# Patient Record
Sex: FEMALE | Race: BLACK OR AFRICAN AMERICAN | NOT HISPANIC OR LATINO | Employment: FULL TIME | URBAN - METROPOLITAN AREA
[De-identification: names, ages, dates, MRNs, and addresses within clinical notes are randomized per-mention and may not be internally consistent; named-entity substitution may affect disease eponyms.]

---

## 2018-01-18 RX ORDER — METFORMIN HYDROCHLORIDE 1000 MG/1
TABLET ORAL
Qty: 60 TABLET | Refills: 2 | OUTPATIENT
Start: 2018-01-18

## 2018-01-18 RX ORDER — LISINOPRIL AND HYDROCHLOROTHIAZIDE 12.5; 2 MG/1; MG/1
TABLET ORAL
Qty: 30 TABLET | Refills: 2 | OUTPATIENT
Start: 2018-01-18

## 2018-01-18 RX ORDER — SIMVASTATIN 40 MG/1
TABLET, FILM COATED ORAL
Qty: 30 TABLET | Refills: 2 | OUTPATIENT
Start: 2018-01-18

## 2018-01-24 ENCOUNTER — TELEPHONE (OUTPATIENT)
Dept: PRIMARY CARE CLINIC | Facility: CLINIC | Age: 58
End: 2018-01-24

## 2018-01-24 NOTE — TELEPHONE ENCOUNTER
----- Message from Tova Johnson sent at 1/24/2018 12:49 PM CST -----  Patient states that she need to see the doctor today for med refills.  Please call [phone].

## 2018-02-01 ENCOUNTER — OFFICE VISIT (OUTPATIENT)
Dept: PRIMARY CARE CLINIC | Facility: CLINIC | Age: 58
End: 2018-02-01
Payer: COMMERCIAL

## 2018-02-01 VITALS
BODY MASS INDEX: 39.24 KG/M2 | HEIGHT: 67 IN | HEART RATE: 71 BPM | TEMPERATURE: 98 F | WEIGHT: 250 LBS | DIASTOLIC BLOOD PRESSURE: 80 MMHG | SYSTOLIC BLOOD PRESSURE: 184 MMHG | OXYGEN SATURATION: 98 % | RESPIRATION RATE: 18 BRPM

## 2018-02-01 DIAGNOSIS — E11.69 TYPE 2 DIABETES MELLITUS WITH HYPERLIPIDEMIA: Primary | ICD-10-CM

## 2018-02-01 DIAGNOSIS — I10 ESSENTIAL HYPERTENSION, BENIGN: ICD-10-CM

## 2018-02-01 DIAGNOSIS — R06.09 EXERTIONAL DYSPNEA: ICD-10-CM

## 2018-02-01 DIAGNOSIS — E78.5 TYPE 2 DIABETES MELLITUS WITH HYPERLIPIDEMIA: Primary | ICD-10-CM

## 2018-02-01 DIAGNOSIS — E78.00 HYPERCHOLESTEROLEMIA: ICD-10-CM

## 2018-02-01 PROCEDURE — 99214 OFFICE O/P EST MOD 30 MIN: CPT | Mod: S$GLB,,, | Performed by: FAMILY MEDICINE

## 2018-02-01 PROCEDURE — 3008F BODY MASS INDEX DOCD: CPT | Mod: S$GLB,,, | Performed by: FAMILY MEDICINE

## 2018-02-01 PROCEDURE — 99999 PR PBB SHADOW E&M-EST. PATIENT-LVL IV: CPT | Mod: PBBFAC,,, | Performed by: FAMILY MEDICINE

## 2018-02-01 RX ORDER — INSULIN GLARGINE 100 [IU]/ML
42 INJECTION, SOLUTION SUBCUTANEOUS DAILY
Qty: 15 ML | Refills: 5 | Status: SHIPPED | OUTPATIENT
Start: 2018-02-01 | End: 2018-10-24 | Stop reason: SDUPTHER

## 2018-02-01 RX ORDER — METFORMIN HYDROCHLORIDE 1000 MG/1
1000 TABLET ORAL 2 TIMES DAILY WITH MEALS
Qty: 180 TABLET | Refills: 1 | Status: SHIPPED | OUTPATIENT
Start: 2018-02-01 | End: 2019-04-17 | Stop reason: SDUPTHER

## 2018-02-01 RX ORDER — LISINOPRIL AND HYDROCHLOROTHIAZIDE 12.5; 2 MG/1; MG/1
1 TABLET ORAL DAILY
Qty: 90 TABLET | Refills: 1 | Status: SHIPPED | OUTPATIENT
Start: 2018-02-01 | End: 2019-04-17 | Stop reason: SDUPTHER

## 2018-02-01 RX ORDER — ASPIRIN 81 MG/1
81 TABLET ORAL DAILY
COMMUNITY
Start: 2018-02-01

## 2018-02-01 RX ORDER — LISINOPRIL AND HYDROCHLOROTHIAZIDE 12.5; 2 MG/1; MG/1
1 TABLET ORAL DAILY
COMMUNITY
End: 2018-02-01 | Stop reason: SDUPTHER

## 2018-02-01 RX ORDER — SIMVASTATIN 40 MG/1
40 TABLET, FILM COATED ORAL NIGHTLY
Qty: 90 TABLET | Refills: 1 | Status: SHIPPED | OUTPATIENT
Start: 2018-02-01 | End: 2019-02-03 | Stop reason: SDUPTHER

## 2018-02-01 RX ORDER — INSULIN GLARGINE 100 [IU]/ML
42 INJECTION, SOLUTION SUBCUTANEOUS DAILY
COMMUNITY
End: 2018-02-01 | Stop reason: SDUPTHER

## 2018-02-01 RX ORDER — SIMVASTATIN 40 MG/1
40 TABLET, FILM COATED ORAL NIGHTLY
COMMUNITY
End: 2018-02-01 | Stop reason: SDUPTHER

## 2018-02-01 RX ORDER — METFORMIN HYDROCHLORIDE 1000 MG/1
1000 TABLET ORAL 2 TIMES DAILY WITH MEALS
COMMUNITY
End: 2018-02-01 | Stop reason: SDUPTHER

## 2018-02-01 RX ORDER — ASPIRIN 81 MG/1
81 TABLET ORAL DAILY
COMMUNITY
End: 2018-02-01 | Stop reason: SDUPTHER

## 2018-02-01 NOTE — PROGRESS NOTES
"Subjective:       Patient ID: Aishwarya Jensen is a 57 y.o. female.    Chief Complaint: Medication Refill (pt states all meds need to be refilled. )    Out of all meds x 2 weeks.  DM - doesn't have glucometer, so hasn't been checking sugar. 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   HENT: Negative for trouble swallowing.    Eyes: Negative for visual disturbance.   Respiratory: Positive for shortness of breath.    Cardiovascular: Negative for chest pain, palpitations and leg swelling.   Gastrointestinal: Negative for blood in stool, nausea and vomiting.   Endocrine: Negative for polydipsia and polyuria.   Genitourinary: Negative for difficulty urinating.   Musculoskeletal: Negative for joint swelling.   Skin: Negative for rash.   Neurological: Negative for dizziness and weakness.   Hematological: Does not bruise/bleed easily.   Psychiatric/Behavioral: Negative for sleep disturbance.       Objective:      Vitals:    02/01/18 1424   BP: (!) 184/80   BP Location: Left arm   Patient Position: Sitting   BP Method: Medium (Automatic)   Pulse: 71   Resp: 18   Temp: 98.3 °F (36.8 °C)   TempSrc: Oral   SpO2: 98%   Weight: 113.4 kg (250 lb)   Height: 5' 7" (1.702 m)     Physical Exam   Constitutional: She is oriented to person, place, and time. She appears well-developed and well-nourished.   HENT:   Head: Normocephalic and atraumatic.   Eyes: EOM are normal. Pupils are equal, round, and reactive to light.   Neck: Neck supple. No JVD present. Carotid bruit is not present.   Cardiovascular: Normal rate, regular rhythm and normal heart sounds.    Pulses:       Radial pulses are 2+ on the right side, and 2+ on the left side. "   Pulmonary/Chest: Effort normal and breath sounds normal.   Abdominal: Soft. Bowel sounds are normal. She exhibits no distension. There is no tenderness.   Musculoskeletal: She exhibits no edema.   Neurological: She is alert and oriented to person, place, and time.   Skin: Skin is warm and dry.   Psychiatric: She has a normal mood and affect. Her behavior is normal.   Nursing note and vitals reviewed.      Assessment:       1. Type 2 diabetes mellitus with hyperlipidemia    2. Essential hypertension, benign Sub-optimally controlled   3. Hypercholesterolemia    4. Exertional dyspnea        Plan:       Type 2 diabetes mellitus with hyperlipidemia  -     insulin glargine (LANTUS SOLOSTAR) 100 unit/mL (3 mL) InPn pen; Inject 42 Units into the skin once daily.  Dispense: 15 mL; Refill: 5  -     liraglutide 0.6 mg/0.1 mL, 18 mg/3 mL, subq PNIJ 0.6 mg/0.1 mL (18 mg/3 mL) PnIj; Inject 1.2 mg into the skin once daily.  Dispense: 6 mL; Refill: 5  -     metFORMIN (GLUCOPHAGE) 1000 MG tablet; Take 1 tablet (1,000 mg total) by mouth 2 (two) times daily with meals.  Dispense: 180 tablet; Refill: 1  -     Hemoglobin A1c; Future; Expected date: 05/01/2018  -     Microalbumin/creatinine urine ratio; Future; Expected date: 05/01/2018  -     Ambulatory Referral to Diabetes Education    Essential hypertension, benign  Comments:  Restart medication  Orders:  -     lisinopril-hydrochlorothiazide (PRINZIDE,ZESTORETIC) 20-12.5 mg per tablet; Take 1 tablet by mouth once daily.  Dispense: 90 tablet; Refill: 1  -     CBC auto differential; Future; Expected date: 05/01/2018  -     POCT EKG 12-LEAD (NOT FOR [facility] USE)  -     Stress test ([facility] ONLY); Future  -     Transthoracic echo (TTE) complete ([facility] ONLY); Future    Hypercholesterolemia  -     simvastatin (ZOCOR) 40 MG tablet; Take 1 tablet (40 mg total) by mouth every evening.  Dispense: 90 tablet; Refill: 1  -     Comprehensive metabolic panel; Future; Expected date:  05/01/2018  -     Lipid panel; Future; Expected date: 05/01/2018    Exertional dyspnea  Comments:  Needs risk stratification  Orders:  -     POCT EKG 12-LEAD (NOT FOR [facility] USE)  -     Stress test ([facility] ONLY); Future  -     Transthoracic echo (TTE) complete ([facility] ONLY); Future    Other orders  -     aspirin (ECOTRIN) 81 MG EC tablet; Take 1 tablet (81 mg total) by mouth once daily.      Medication List with Changes/Refills   Changed and/or Refilled Medications    Modified Medication Previous Medication    ASPIRIN (ECOTRIN) 81 MG EC TABLET aspirin (ECOTRIN) 81 MG EC tablet       Take 1 tablet (81 mg total) by mouth once daily.    Take 81 mg by mouth once daily.    INSULIN GLARGINE (LANTUS SOLOSTAR) 100 UNIT/ML (3 ML) INPN PEN insulin glargine (LANTUS SOLOSTAR) 100 unit/mL (3 mL) InPn pen       Inject 42 Units into the skin once daily.    Inject 42 Units into the skin once daily.    LIRAGLUTIDE 0.6 MG/0.1 ML, 18 MG/3 ML, SUBQ PNIJ 0.6 MG/0.1 ML (18 MG/3 ML) PNIJ liraglutide 0.6 mg/0.1 mL, 18 mg/3 mL, subq PNIJ 0.6 mg/0.1 mL (18 mg/3 mL) PnIj       Inject 1.2 mg into the skin once daily.    Inject 1.2 mg into the skin once daily.    LISINOPRIL-HYDROCHLOROTHIAZIDE (PRINZIDE,ZESTORETIC) 20-12.5 MG PER TABLET lisinopril-hydrochlorothiazide (PRINZIDE,ZESTORETIC) 20-12.5 mg per tablet       Take 1 tablet by mouth once daily.    Take 1 tablet by mouth once daily.    METFORMIN (GLUCOPHAGE) 1000 MG TABLET metFORMIN (GLUCOPHAGE) 1000 MG tablet       Take 1 tablet (1,000 mg total) by mouth 2 (two) times daily with meals.    Take 1,000 mg by mouth 2 (two) times daily with meals.    SIMVASTATIN (ZOCOR) 40 MG TABLET simvastatin (ZOCOR) 40 MG tablet       Take 1 tablet (40 mg total) by mouth every evening.    Take 40 mg by mouth every evening.

## 2018-02-01 NOTE — PROGRESS NOTES
Pt ID verified by Name and . EKG done per MD order. Pt tolerated well. Result signed off by Dr Cintron.

## 2018-03-02 ENCOUNTER — CLINICAL SUPPORT (OUTPATIENT)
Dept: PRIMARY CARE CLINIC | Facility: CLINIC | Age: 58
End: 2018-03-02
Payer: COMMERCIAL

## 2018-03-02 DIAGNOSIS — E78.5 TYPE 2 DIABETES MELLITUS WITH HYPERLIPIDEMIA: ICD-10-CM

## 2018-03-02 DIAGNOSIS — I10 ESSENTIAL HYPERTENSION, BENIGN: ICD-10-CM

## 2018-03-02 DIAGNOSIS — E78.00 HYPERCHOLESTEROLEMIA: ICD-10-CM

## 2018-03-02 DIAGNOSIS — E11.69 TYPE 2 DIABETES MELLITUS WITH HYPERLIPIDEMIA: ICD-10-CM

## 2018-03-02 LAB
ALBUMIN SERPL BCP-MCNC: 3.4 G/DL
ALP SERPL-CCNC: 111 U/L
ALT SERPL W/O P-5'-P-CCNC: 20 U/L
ANION GAP SERPL CALC-SCNC: 9 MMOL/L
AST SERPL-CCNC: 21 U/L
BASOPHILS # BLD AUTO: 0.03 K/UL
BASOPHILS NFR BLD: 0.6 %
BILIRUB SERPL-MCNC: 0.7 MG/DL
BUN SERPL-MCNC: 16 MG/DL
CALCIUM SERPL-MCNC: 9.3 MG/DL
CHLORIDE SERPL-SCNC: 103 MMOL/L
CHOLEST SERPL-MCNC: 180 MG/DL
CHOLEST/HDLC SERPL: 3.2 {RATIO}
CO2 SERPL-SCNC: 28 MMOL/L
CREAT SERPL-MCNC: 0.9 MG/DL
CREAT UR-MCNC: 145 MG/DL
DIFFERENTIAL METHOD: ABNORMAL
EOSINOPHIL # BLD AUTO: 0.1 K/UL
EOSINOPHIL NFR BLD: 1.9 %
ERYTHROCYTE [DISTWIDTH] IN BLOOD BY AUTOMATED COUNT: 13.7 %
EST. GFR  (AFRICAN AMERICAN): >60 ML/MIN/1.73 M^2
EST. GFR  (NON AFRICAN AMERICAN): >60 ML/MIN/1.73 M^2
ESTIMATED AVG GLUCOSE: ABNORMAL MG/DL
GLUCOSE SERPL-MCNC: 103 MG/DL
HBA1C MFR BLD HPLC: >14 %
HCT VFR BLD AUTO: 38.1 %
HDLC SERPL-MCNC: 56 MG/DL
HDLC SERPL: 31.1 %
HGB BLD-MCNC: 11.3 G/DL
IMM GRANULOCYTES # BLD AUTO: 0.01 K/UL
IMM GRANULOCYTES NFR BLD AUTO: 0.2 %
LDLC SERPL CALC-MCNC: 104.4 MG/DL
LYMPHOCYTES # BLD AUTO: 2 K/UL
LYMPHOCYTES NFR BLD: 37.8 %
MCH RBC QN AUTO: 22.6 PG
MCHC RBC AUTO-ENTMCNC: 29.7 G/DL
MCV RBC AUTO: 76 FL
MICROALBUMIN UR DL<=1MG/L-MCNC: 90 UG/ML
MICROALBUMIN/CREATININE RATIO: 62.1 UG/MG
MONOCYTES # BLD AUTO: 0.3 K/UL
MONOCYTES NFR BLD: 5.5 %
NEUTROPHILS # BLD AUTO: 2.9 K/UL
NEUTROPHILS NFR BLD: 54 %
NONHDLC SERPL-MCNC: 124 MG/DL
NRBC BLD-RTO: 0 /100 WBC
PLATELET # BLD AUTO: 231 K/UL
PMV BLD AUTO: 12.9 FL
POTASSIUM SERPL-SCNC: 3.5 MMOL/L
PROT SERPL-MCNC: 7.5 G/DL
RBC # BLD AUTO: 4.99 M/UL
SODIUM SERPL-SCNC: 140 MMOL/L
TRIGL SERPL-MCNC: 98 MG/DL
WBC # BLD AUTO: 5.29 K/UL

## 2018-03-02 PROCEDURE — 83036 HEMOGLOBIN GLYCOSYLATED A1C: CPT

## 2018-03-02 PROCEDURE — 80061 LIPID PANEL: CPT

## 2018-03-02 PROCEDURE — 85025 COMPLETE CBC W/AUTO DIFF WBC: CPT

## 2018-03-02 PROCEDURE — 99999 PR PBB SHADOW E&M-EST. PATIENT-LVL I: CPT | Mod: PBBFAC,,,

## 2018-03-02 PROCEDURE — 82043 UR ALBUMIN QUANTITATIVE: CPT

## 2018-03-02 PROCEDURE — 80053 COMPREHEN METABOLIC PANEL: CPT

## 2018-03-09 ENCOUNTER — OFFICE VISIT (OUTPATIENT)
Dept: PRIMARY CARE CLINIC | Facility: CLINIC | Age: 58
End: 2018-03-09
Payer: COMMERCIAL

## 2018-03-09 VITALS
HEIGHT: 67 IN | WEIGHT: 222 LBS | BODY MASS INDEX: 34.84 KG/M2 | OXYGEN SATURATION: 100 % | TEMPERATURE: 98 F | DIASTOLIC BLOOD PRESSURE: 71 MMHG | RESPIRATION RATE: 18 BRPM | SYSTOLIC BLOOD PRESSURE: 145 MMHG | HEART RATE: 70 BPM

## 2018-03-09 DIAGNOSIS — Z13.5 DIABETIC RETINOPATHY SCREENING: ICD-10-CM

## 2018-03-09 DIAGNOSIS — R80.9 MICROALBUMINURIA DUE TO TYPE 2 DIABETES MELLITUS: ICD-10-CM

## 2018-03-09 DIAGNOSIS — E78.5 TYPE 2 DIABETES MELLITUS WITH HYPERLIPIDEMIA: Primary | ICD-10-CM

## 2018-03-09 DIAGNOSIS — Z12.11 COLON CANCER SCREENING: ICD-10-CM

## 2018-03-09 DIAGNOSIS — E11.69 TYPE 2 DIABETES MELLITUS WITH HYPERLIPIDEMIA: Primary | ICD-10-CM

## 2018-03-09 DIAGNOSIS — I10 ESSENTIAL HYPERTENSION, BENIGN: ICD-10-CM

## 2018-03-09 DIAGNOSIS — Z23 NEED FOR VACCINATION: ICD-10-CM

## 2018-03-09 DIAGNOSIS — E11.29 MICROALBUMINURIA DUE TO TYPE 2 DIABETES MELLITUS: ICD-10-CM

## 2018-03-09 DIAGNOSIS — E78.00 HYPERCHOLESTEROLEMIA: ICD-10-CM

## 2018-03-09 DIAGNOSIS — Z12.31 ENCOUNTER FOR SCREENING MAMMOGRAM FOR BREAST CANCER: ICD-10-CM

## 2018-03-09 DIAGNOSIS — Z11.59 NEED FOR HEPATITIS C SCREENING TEST: ICD-10-CM

## 2018-03-09 LAB — GLUCOSE SERPL-MCNC: 130 MG/DL (ref 70–110)

## 2018-03-09 PROCEDURE — 3078F DIAST BP <80 MM HG: CPT | Mod: S$GLB,,, | Performed by: FAMILY MEDICINE

## 2018-03-09 PROCEDURE — 99214 OFFICE O/P EST MOD 30 MIN: CPT | Mod: S$GLB,,, | Performed by: FAMILY MEDICINE

## 2018-03-09 PROCEDURE — 99999 PR PBB SHADOW E&M-EST. PATIENT-LVL IV: CPT | Mod: PBBFAC,,, | Performed by: FAMILY MEDICINE

## 2018-03-09 PROCEDURE — 3077F SYST BP >= 140 MM HG: CPT | Mod: S$GLB,,, | Performed by: FAMILY MEDICINE

## 2018-03-09 PROCEDURE — 82948 REAGENT STRIP/BLOOD GLUCOSE: CPT | Mod: S$GLB,,, | Performed by: FAMILY MEDICINE

## 2018-03-09 NOTE — PROGRESS NOTES
"Subjective:       Patient ID: Aishwarya Jensen is a 57 y.o. female.    Chief Complaint: Results (pt is here to discuss lab results )    Recent lab reviewed, A1C exceptionally high. Upon further questioning, pt admits that she had been out of all of her meds for ~4-5 months (not 2 weeks as she previously told me). Has been feeling a little lightheaded at times. Hasn't been checking sugar b/c trying to iron out insurance issues related to her glucometer. No hypoglycemic symptoms, not checking BP, but has been consistently compliant with all of her meds past few weeks.      Review of Systems   Constitutional: Negative for fever.   HENT: Negative for trouble swallowing.    Eyes: Negative for visual disturbance.   Respiratory: Negative for cough and wheezing.    Cardiovascular: Negative for chest pain.   Gastrointestinal: Negative for nausea and vomiting.   Genitourinary: Negative for difficulty urinating.   Skin: Negative for rash.   Neurological: Positive for light-headedness. Negative for syncope.       Objective:      Vitals:    03/09/18 1038   BP: (!) 145/71   BP Location: Left arm   Patient Position: Sitting   BP Method: Large (Automatic)   Pulse: 70   Resp: 18   Temp: 98.2 °F (36.8 °C)   TempSrc: Oral   SpO2: 100%   Weight: 100.7 kg (222 lb)   Height: 5' 7" (1.702 m)     Physical Exam   Constitutional: She is oriented to person, place, and time. She appears well-developed and well-nourished.   HENT:   Head: Normocephalic and atraumatic.   Eyes: EOM are normal.   Neck: Neck supple. No JVD present.   Cardiovascular: Normal rate, regular rhythm and normal heart sounds.    Pulses:       Dorsalis pedis pulses are 2+ on the right side, and 2+ on the left side.   Pulmonary/Chest: Effort normal and breath sounds normal.   Abdominal: Soft. There is no tenderness.   Musculoskeletal: She exhibits no edema.        Right foot: There is no deformity.        Left foot: There is no deformity.   Feet:   Right Foot:   Protective " Sensation: 8 sites tested. 8 sites sensed.   Skin Integrity: Negative for ulcer, blister or skin breakdown.   Left Foot:   Protective Sensation: 8 sites tested. 8 sites sensed.   Skin Integrity: Negative for ulcer, blister or skin breakdown.   Neurological: She is alert and oriented to person, place, and time.   Skin: Skin is warm and dry.   Psychiatric: She has a normal mood and affect. Her behavior is normal.   Nursing note and vitals reviewed.      Assessment:       1. Type 2 diabetes mellitus with hyperlipidemia Poorly controlled   2. Hypercholesterolemia    3. Essential hypertension, benign    4. Microalbuminuria due to type 2 diabetes mellitus    5. Diabetic retinopathy screening    6. Need for hepatitis C screening test    7. Colon cancer screening    8. Encounter for screening mammogram for breast cancer    9. Need for vaccination        Plan:       Type 2 diabetes mellitus with hyperlipidemia  -     blood sugar diagnostic (BLOOD GLUCOSE TEST) Strp; 1 strip by Misc.(Non-Drug; Combo Route) route 2 (two) times daily. Contour Next EZ  Dispense: 200 strip; Refill: 1  -     Ambulatory Referral to Ophthalmology  -     Basic metabolic panel; Future; Expected date: 06/09/2018  -     Hemoglobin A1c; Future; Expected date: 06/09/2018  -     POCT Glucose  -     [facility] DIABETES FOOT EXAM  Current medications for now, reassess glycemic control in a few months  Hypercholesterolemia    Essential hypertension, benign  Continue current medications  Microalbuminuria due to type 2 diabetes mellitus    Diabetic retinopathy screening  -     Ambulatory Referral to Ophthalmology    Need for hepatitis C screening test  -     Hepatitis C antibody; Future; Expected date: 06/09/2018    Colon cancer screening  -     Ambulatory referral to Colorectal Surgery    Encounter for screening mammogram for breast cancer  -     Mammo Digital Screening Bilat without CA; Future; Expected date: 03/23/2018    Need for vaccination  Comments:  thinks she  may have had pneumonia shot at work, says she will check      Medication List with Changes/Refills   Current Medications    ASPIRIN (ECOTRIN) 81 MG EC TABLET    Take 1 tablet (81 mg total) by mouth once daily.    BLOOD-GLUCOSE METER (CONTOUR NEXT EZ METER MISC)    by Misc.(Non-Drug; Combo Route) route 2 (two) times daily.    INSULIN GLARGINE (LANTUS SOLOSTAR) 100 UNIT/ML (3 ML) INPN PEN    Inject 42 Units into the skin once daily.    LIRAGLUTIDE 0.6 MG/0.1 ML, 18 MG/3 ML, SUBQ PNIJ 0.6 MG/0.1 ML (18 MG/3 ML) PNIJ    Inject 1.2 mg into the skin once daily.    LISINOPRIL-HYDROCHLOROTHIAZIDE (PRINZIDE,ZESTORETIC) 20-12.5 MG PER TABLET    Take 1 tablet by mouth once daily.    METFORMIN (GLUCOPHAGE) 1000 MG TABLET    Take 1 tablet (1,000 mg total) by mouth 2 (two) times daily with meals.    SIMVASTATIN (ZOCOR) 40 MG TABLET    Take 1 tablet (40 mg total) by mouth every evening.   Changed and/or Refilled Medications    Modified Medication Previous Medication    BLOOD SUGAR DIAGNOSTIC (BLOOD GLUCOSE TEST) STRP blood sugar diagnostic (BLOOD GLUCOSE TEST) Strp       1 strip by Misc.(Non-Drug; Combo Route) route 2 (two) times daily. Contour Next EZ    1 strip by Misc.(Non-Drug; Combo Route) route 2 (two) times daily. Contour Next EZ   Discontinued Medications    BLOOD SUGAR DIAGNOSTIC (ONETOUCH VERIO) STRP    1 strip by Misc.(Non-Drug; Combo Route) route 2 (two) times daily before meals.    LANCETS (ONETOUCH DELICA LANCETS) 33 GAUGE MISC    1 lancet by Misc.(Non-Drug; Combo Route) route 2 (two) times daily before meals.

## 2018-03-23 ENCOUNTER — TELEPHONE (OUTPATIENT)
Dept: OPHTHALMOLOGY | Facility: CLINIC | Age: 58
End: 2018-03-23

## 2018-03-26 ENCOUNTER — TELEPHONE (OUTPATIENT)
Dept: SURGERY | Facility: CLINIC | Age: 58
End: 2018-03-26

## 2018-03-26 NOTE — TELEPHONE ENCOUNTER
----- Message from Shashank Archuleta LPN sent at 3/9/2018  4:41 PM CST -----  Please call to schedule LVM  ----- Message -----  From: Kermit Toney MA  Sent: 3/9/2018  11:17 AM  To: Shashank Archuleta LPN    Please contact patient to schedule colonoscopy

## 2018-06-11 ENCOUNTER — CLINICAL SUPPORT (OUTPATIENT)
Dept: PRIMARY CARE CLINIC | Facility: CLINIC | Age: 58
End: 2018-06-11
Payer: COMMERCIAL

## 2018-06-11 DIAGNOSIS — Z11.59 NEED FOR HEPATITIS C SCREENING TEST: ICD-10-CM

## 2018-06-11 DIAGNOSIS — E11.69 TYPE 2 DIABETES MELLITUS WITH HYPERLIPIDEMIA: ICD-10-CM

## 2018-06-11 DIAGNOSIS — E78.5 TYPE 2 DIABETES MELLITUS WITH HYPERLIPIDEMIA: ICD-10-CM

## 2018-06-11 LAB
ANION GAP SERPL CALC-SCNC: 8 MMOL/L
BUN SERPL-MCNC: 22 MG/DL
CALCIUM SERPL-MCNC: 10 MG/DL
CHLORIDE SERPL-SCNC: 96 MMOL/L
CO2 SERPL-SCNC: 33 MMOL/L
CREAT SERPL-MCNC: 0.9 MG/DL
EST. GFR  (AFRICAN AMERICAN): >60 ML/MIN/1.73 M^2
EST. GFR  (NON AFRICAN AMERICAN): >60 ML/MIN/1.73 M^2
ESTIMATED AVG GLUCOSE: ABNORMAL MG/DL
GLUCOSE SERPL-MCNC: 244 MG/DL
HBA1C MFR BLD HPLC: >14 %
POTASSIUM SERPL-SCNC: 4 MMOL/L
SODIUM SERPL-SCNC: 137 MMOL/L

## 2018-06-11 PROCEDURE — 83036 HEMOGLOBIN GLYCOSYLATED A1C: CPT

## 2018-06-11 PROCEDURE — 86803 HEPATITIS C AB TEST: CPT

## 2018-06-11 PROCEDURE — 99999 PR PBB SHADOW E&M-EST. PATIENT-LVL II: CPT | Mod: PBBFAC,,,

## 2018-06-12 LAB — HCV AB SERPL QL IA: NEGATIVE

## 2018-07-09 ENCOUNTER — OFFICE VISIT (OUTPATIENT)
Dept: PRIMARY CARE CLINIC | Facility: CLINIC | Age: 58
End: 2018-07-09
Payer: COMMERCIAL

## 2018-07-09 VITALS
RESPIRATION RATE: 18 BRPM | WEIGHT: 228.13 LBS | DIASTOLIC BLOOD PRESSURE: 68 MMHG | HEIGHT: 67 IN | BODY MASS INDEX: 35.81 KG/M2 | SYSTOLIC BLOOD PRESSURE: 103 MMHG | OXYGEN SATURATION: 98 % | TEMPERATURE: 98 F

## 2018-07-09 DIAGNOSIS — E78.00 HYPERCHOLESTEROLEMIA: ICD-10-CM

## 2018-07-09 DIAGNOSIS — E11.69 TYPE 2 DIABETES MELLITUS WITH HYPERLIPIDEMIA: Primary | ICD-10-CM

## 2018-07-09 DIAGNOSIS — I10 ESSENTIAL HYPERTENSION, BENIGN: ICD-10-CM

## 2018-07-09 DIAGNOSIS — E78.5 TYPE 2 DIABETES MELLITUS WITH HYPERLIPIDEMIA: Primary | ICD-10-CM

## 2018-07-09 LAB — GLUCOSE SERPL-MCNC: 193 MG/DL (ref 70–110)

## 2018-07-09 PROCEDURE — 99999 PR PBB SHADOW E&M-EST. PATIENT-LVL III: CPT | Mod: PBBFAC,,, | Performed by: FAMILY MEDICINE

## 2018-07-09 PROCEDURE — 99214 OFFICE O/P EST MOD 30 MIN: CPT | Mod: S$GLB,,, | Performed by: FAMILY MEDICINE

## 2018-07-09 PROCEDURE — 3008F BODY MASS INDEX DOCD: CPT | Mod: CPTII,S$GLB,, | Performed by: FAMILY MEDICINE

## 2018-07-09 PROCEDURE — 82948 REAGENT STRIP/BLOOD GLUCOSE: CPT | Mod: S$GLB,,, | Performed by: FAMILY MEDICINE

## 2018-07-09 PROCEDURE — 3074F SYST BP LT 130 MM HG: CPT | Mod: CPTII,S$GLB,, | Performed by: FAMILY MEDICINE

## 2018-07-09 PROCEDURE — 3078F DIAST BP <80 MM HG: CPT | Mod: CPTII,S$GLB,, | Performed by: FAMILY MEDICINE

## 2018-07-09 NOTE — PROGRESS NOTES
Subjective:       Patient ID: Aishwarya Jensen is a 58 y.o. female.    Chief Complaint: Follow-up    Diabetes   She presents for her follow-up diabetic visit. She has type 2 diabetes mellitus. Her disease course has been worsening. There are no hypoglycemic associated symptoms. Pertinent negatives for diabetes include no blurred vision, no chest pain, no fatigue, no foot paresthesias, no foot ulcerations, no polydipsia, no polyphagia, no polyuria, no weakness and no weight loss. There are no hypoglycemic complications. Diabetic complications include nephropathy. Risk factors for coronary artery disease include diabetes mellitus, dyslipidemia, hypertension and obesity. Current diabetic treatment includes insulin injections and oral agent (monotherapy). She is compliant with treatment most of the time. She is currently taking insulin at bedtime. Insulin injections are given by patient. Her weight is stable. She is following a generally unhealthy diet. She has not had a previous visit with a dietitian. There is no change in her home blood glucose trend. Her dinner blood glucose range is generally 180-200 mg/dl. An ACE inhibitor/angiotensin II receptor blocker is being taken.    Continues to eat poorly, says she eats a lot of sweets.  Frequently forgets to take her evening dose of metformin  Review of Systems   Constitutional: Negative for fatigue and weight loss.   HENT: Negative for trouble swallowing.    Eyes: Negative for blurred vision and visual disturbance.   Respiratory: Negative for shortness of breath.    Cardiovascular: Negative for chest pain.   Gastrointestinal: Negative for vomiting.   Endocrine: Negative for polydipsia, polyphagia and polyuria.   Genitourinary: Negative for difficulty urinating.   Musculoskeletal: Negative for joint swelling.   Skin: Negative for rash.   Neurological: Negative for weakness.   Hematological: Does not bruise/bleed easily.   Psychiatric/Behavioral: Negative for agitation  "and behavioral problems.       Objective:      Vitals:    07/09/18 1254   BP: 103/68   BP Location: Right arm   Patient Position: Sitting   BP Method: Large (Automatic)   Resp: 18   Temp: 98 °F (36.7 °C)   TempSrc: Oral   SpO2: 98%   Weight: 103.5 kg (228 lb 1.6 oz)   Height: 5' 7" (1.702 m)     Lab Results   Component Value Date    WBC 5.29 03/02/2018    HGB 11.3 (L) 03/02/2018    HCT 38.1 03/02/2018     03/02/2018    CHOL 180 03/02/2018    TRIG 98 03/02/2018    HDL 56 03/02/2018    ALT 20 03/02/2018    AST 21 03/02/2018     06/11/2018    K 4.0 06/11/2018    CL 96 (L) 06/11/2018    CREATININE 0.9 06/11/2018    BUN 22 (H) 06/11/2018    CO2 33 (H) 06/11/2018    HGBA1C >14.0 (H) 06/11/2018     Physical Exam   Constitutional: She is oriented to person, place, and time. She appears well-developed and well-nourished.   HENT:   Head: Normocephalic and atraumatic.   Eyes: EOM are normal.   Neck: No JVD present.   Cardiovascular: Normal rate, regular rhythm and normal heart sounds.    Pulmonary/Chest: Effort normal and breath sounds normal.   Musculoskeletal: She exhibits no edema.   Neurological: She is alert and oriented to person, place, and time.   Skin: Skin is warm and dry.   Psychiatric: She has a normal mood and affect. Her behavior is normal.   Nursing note and vitals reviewed.      Assessment:       1. Type 2 diabetes mellitus with hyperlipidemia    2. Essential hypertension, benign    3. Hypercholesterolemia        Plan:       Type 2 diabetes mellitus with hyperlipidemia  -     POCT Glucose  -     Basic metabolic panel; Future; Expected date: 10/09/2018  -     Hemoglobin A1c; Future; Expected date: 10/09/2018  -     Ambulatory Referral to Diabetes Education  -     liraglutide 0.6 mg/0.1 mL, 18 mg/3 mL, subq PNIJ 0.6 mg/0.1 mL (18 mg/3 mL) PnIj; Inject 1.8 mg into the skin once daily.  Dispense: 6 mL; Refill: 5  -     empagliflozin (JARDIANCE) 25 mg Tab; Take 25 mg by mouth once daily.  Dispense: 30 " tablet; Refill: 5    Essential hypertension, benign    Hypercholesterolemia     Stressed importance of improving dietary and medication compliance to improve glycemic control.  Increase Victoza, add Jardiance.  Medication List with Changes/Refills   New Medications    EMPAGLIFLOZIN (JARDIANCE) 25 MG TAB    Take 25 mg by mouth once daily.   Current Medications    ASPIRIN (ECOTRIN) 81 MG EC TABLET    Take 1 tablet (81 mg total) by mouth once daily.    BLOOD SUGAR DIAGNOSTIC (BLOOD GLUCOSE TEST) STRP    1 strip by Misc.(Non-Drug; Combo Route) route 2 (two) times daily. Contour Next EZ    BLOOD-GLUCOSE METER (CONTOUR NEXT EZ METER MISC)    by Misc.(Non-Drug; Combo Route) route 2 (two) times daily.    INSULIN GLARGINE (LANTUS SOLOSTAR) 100 UNIT/ML (3 ML) INPN PEN    Inject 42 Units into the skin once daily.    LISINOPRIL-HYDROCHLOROTHIAZIDE (PRINZIDE,ZESTORETIC) 20-12.5 MG PER TABLET    Take 1 tablet by mouth once daily.    METFORMIN (GLUCOPHAGE) 1000 MG TABLET    Take 1 tablet (1,000 mg total) by mouth 2 (two) times daily with meals.    SIMVASTATIN (ZOCOR) 40 MG TABLET    Take 1 tablet (40 mg total) by mouth every evening.   Changed and/or Refilled Medications    Modified Medication Previous Medication    LIRAGLUTIDE 0.6 MG/0.1 ML, 18 MG/3 ML, SUBQ PNIJ 0.6 MG/0.1 ML (18 MG/3 ML) PNIJ liraglutide 0.6 mg/0.1 mL, 18 mg/3 mL, subq PNIJ 0.6 mg/0.1 mL (18 mg/3 mL) PnIj       Inject 1.8 mg into the skin once daily.    Inject 1.2 mg into the skin once daily.

## 2018-07-16 ENCOUNTER — TELEPHONE (OUTPATIENT)
Dept: PRIMARY CARE CLINIC | Facility: CLINIC | Age: 58
End: 2018-07-16

## 2018-07-16 DIAGNOSIS — E11.69 TYPE 2 DIABETES MELLITUS WITH HYPERLIPIDEMIA: Primary | ICD-10-CM

## 2018-07-16 DIAGNOSIS — E78.5 TYPE 2 DIABETES MELLITUS WITH HYPERLIPIDEMIA: Primary | ICD-10-CM

## 2018-07-16 NOTE — TELEPHONE ENCOUNTER
----- Message from Savita Rockwell sent at 7/16/2018  3:43 PM CDT -----  Contact: Patient  Type: Needs Medical Advice    Who Called:  Aishwarya, patient  Symptoms (please be specific):  N/A  How long has patient had these symptoms:  N/A  Pharmacy name and phone #:    [id number] [place] [address] [address]  Phone: [phone] Fax: [phone]  Best Call Back Number: [phone]  Additional Information: Calling because Rx empagliflozin (JARDIANCE) 25 mg Tab is not covered by her insurance, can you prescribe something else. Thanks.

## 2018-07-19 ENCOUNTER — TELEPHONE (OUTPATIENT)
Dept: PRIMARY CARE CLINIC | Facility: CLINIC | Age: 58
End: 2018-07-19

## 2018-07-19 NOTE — TELEPHONE ENCOUNTER
----- Message from Kristal Alex sent at 7/19/2018  8:22 AM CDT -----  Type:  Pharmacy Calling for Prior Authorization    Name of Caller:  [place]  Pharmacy Name:  Cover my Meds  Prescription Name:  Jardiance  What do they need to clarify?: Prior authorization  Best Call Back Number:  [phone] Reference number [id number]  Additional Information:

## 2018-07-23 ENCOUNTER — TELEPHONE (OUTPATIENT)
Dept: PRIMARY CARE CLINIC | Facility: CLINIC | Age: 58
End: 2018-07-23

## 2018-07-23 RX ORDER — LISINOPRIL AND HYDROCHLOROTHIAZIDE 12.5; 2 MG/1; MG/1
TABLET ORAL
Qty: 90 TABLET | Refills: 0 | Status: SHIPPED | OUTPATIENT
Start: 2018-07-23 | End: 2018-10-24

## 2018-07-23 RX ORDER — DAPAGLIFLOZIN 10 MG/1
10 TABLET, FILM COATED ORAL DAILY
Qty: 30 TABLET | Refills: 2 | Status: SHIPPED | OUTPATIENT
Start: 2018-07-23 | End: 2018-10-24 | Stop reason: CLARIF

## 2018-07-23 NOTE — TELEPHONE ENCOUNTER
Neither Jardiance or Invokana are covered.  Is there something else in the same class that can be prescribed?

## 2018-07-23 NOTE — TELEPHONE ENCOUNTER
----- Message from Sandra Hastings sent at 7/23/2018 12:45 PM CDT -----  Contact: self   Patient need to speak with a nurse regarding rx canagliflozin (INVOKANA) 300 mg Tab tablet she states her insurance does not cover this medication and would like something else called in. Please call back at  [phone] (home)

## 2018-08-08 ENCOUNTER — TELEPHONE (OUTPATIENT)
Dept: PRIMARY CARE CLINIC | Facility: CLINIC | Age: 58
End: 2018-08-08

## 2018-08-08 NOTE — TELEPHONE ENCOUNTER
We have now tried all SGLT-2 inhibitors, and apparently none of them are covered.  She should be able to go to [employer] and get a co-pay voucher that should get the medicine covered, presumably for a $0 co-pay.

## 2018-08-08 NOTE — TELEPHONE ENCOUNTER
----- Message from Asuncion Campbell sent at 8/8/2018 11:11 AM CDT -----  Type: Needs Medical Advice    Who Called:  Patient  Pharmacy name and phone #:    [id number] - [place], [place] - [address] [facility]  [address] [facility] [address]  Phone: [phone] Fax: [phone]  Best Call Back Number: [phone]  Additional Information: Patient is asking office to please call her in medication for her diabetes that her insurance will cover. She states that the ones that has been prescribed has not been covered. Please call to advise.

## 2018-08-09 NOTE — TELEPHONE ENCOUNTER
Called patient notified Dr Cintron messages states does not have nor how to work computer. Notified patient would see if we could get card and call patient back

## 2018-10-24 ENCOUNTER — OFFICE VISIT (OUTPATIENT)
Dept: PRIMARY CARE CLINIC | Facility: CLINIC | Age: 58
End: 2018-10-24
Payer: COMMERCIAL

## 2018-10-24 ENCOUNTER — TELEPHONE (OUTPATIENT)
Dept: PRIMARY CARE CLINIC | Facility: CLINIC | Age: 58
End: 2018-10-24

## 2018-10-24 VITALS
DIASTOLIC BLOOD PRESSURE: 74 MMHG | SYSTOLIC BLOOD PRESSURE: 140 MMHG | OXYGEN SATURATION: 97 % | BODY MASS INDEX: 34.84 KG/M2 | HEART RATE: 84 BPM | WEIGHT: 222 LBS | TEMPERATURE: 99 F | RESPIRATION RATE: 18 BRPM | HEIGHT: 67 IN

## 2018-10-24 DIAGNOSIS — E78.5 TYPE 2 DIABETES MELLITUS WITH HYPERLIPIDEMIA: ICD-10-CM

## 2018-10-24 DIAGNOSIS — J06.9 UPPER RESPIRATORY TRACT INFECTION, UNSPECIFIED TYPE: Primary | ICD-10-CM

## 2018-10-24 DIAGNOSIS — E11.69 TYPE 2 DIABETES MELLITUS WITH HYPERLIPIDEMIA: ICD-10-CM

## 2018-10-24 DIAGNOSIS — B37.2 CUTANEOUS CANDIDIASIS: ICD-10-CM

## 2018-10-24 LAB — GLUCOSE SERPL-MCNC: >450 MG/DL (ref 70–110)

## 2018-10-24 PROCEDURE — 82948 REAGENT STRIP/BLOOD GLUCOSE: CPT | Mod: S$GLB,,, | Performed by: FAMILY MEDICINE

## 2018-10-24 PROCEDURE — 99214 OFFICE O/P EST MOD 30 MIN: CPT | Mod: S$GLB,,, | Performed by: FAMILY MEDICINE

## 2018-10-24 PROCEDURE — 3008F BODY MASS INDEX DOCD: CPT | Mod: CPTII,S$GLB,, | Performed by: FAMILY MEDICINE

## 2018-10-24 PROCEDURE — 3077F SYST BP >= 140 MM HG: CPT | Mod: CPTII,S$GLB,, | Performed by: FAMILY MEDICINE

## 2018-10-24 PROCEDURE — 99999 PR PBB SHADOW E&M-EST. PATIENT-LVL III: CPT | Mod: PBBFAC,,, | Performed by: FAMILY MEDICINE

## 2018-10-24 PROCEDURE — 3078F DIAST BP <80 MM HG: CPT | Mod: CPTII,S$GLB,, | Performed by: FAMILY MEDICINE

## 2018-10-24 RX ORDER — INSULIN GLARGINE 100 [IU]/ML
42 INJECTION, SOLUTION SUBCUTANEOUS DAILY
Qty: 15 ML | Refills: 5 | Status: SHIPPED | OUTPATIENT
Start: 2018-10-24 | End: 2019-05-01

## 2018-10-24 RX ORDER — AZITHROMYCIN 250 MG/1
TABLET, FILM COATED ORAL
Qty: 6 TABLET | Refills: 0 | Status: SHIPPED | OUTPATIENT
Start: 2018-10-24 | End: 2018-10-29

## 2018-10-24 RX ORDER — PROMETHAZINE HYDROCHLORIDE AND DEXTROMETHORPHAN HYDROBROMIDE 6.25; 15 MG/5ML; MG/5ML
5 SYRUP ORAL EVERY 6 HOURS PRN
Qty: 180 ML | Refills: 1 | Status: SHIPPED | OUTPATIENT
Start: 2018-10-24 | End: 2019-04-17

## 2018-10-24 RX ORDER — NYSTATIN AND TRIAMCINOLONE ACETONIDE 100000; 1 [USP'U]/G; MG/G
CREAM TOPICAL 2 TIMES DAILY
Qty: 30 G | Refills: 0 | Status: SHIPPED | OUTPATIENT
Start: 2018-10-24 | End: 2020-09-25

## 2018-10-24 NOTE — PROGRESS NOTES
"Subjective:       Patient ID: Aishwarya Jensen is a 58 y.o. female.    Chief Complaint: Cough (patient is coughing up a yellow phlegm x2 days) and Rash (under breast x2 days )    URI    This is a new problem. The current episode started in the past 7 days. The problem has been gradually worsening. The maximum temperature recorded prior to her arrival was 100.4 - 100.9 F. Associated symptoms include congestion, coughing, headaches, a rash, rhinorrhea and a sore throat. Pertinent negatives include no abdominal pain, chest pain, dysuria, nausea, sinus pain or wheezing. Treatments tried: cough drops. The treatment provided no relief.   Rash   This is a new problem. The current episode started in the past 7 days. The affected locations include the torso. The rash is characterized by redness. She was exposed to a new medication (baby powder). Associated symptoms include congestion, coughing, a fever, rhinorrhea and a sore throat. Past treatments include nothing.     Review of Systems   Constitutional: Positive for fever.   HENT: Positive for congestion, rhinorrhea and sore throat. Negative for sinus pain.    Respiratory: Positive for cough. Negative for wheezing.    Cardiovascular: Negative for chest pain.   Gastrointestinal: Negative for abdominal pain and nausea.   Genitourinary: Negative for dysuria.   Skin: Positive for rash.   Neurological: Positive for headaches.       Objective:      Vitals:    10/24/18 1005   BP: (!) 140/74   BP Location: Left arm   Patient Position: Sitting   BP Method: Large (Automatic)   Pulse: 84   Resp: 18   Temp: 98.8 °F (37.1 °C)   TempSrc: Oral   SpO2: 97%   Weight: 100.7 kg (222 lb)   Height: 5' 7" (1.702 m)     Physical Exam   Constitutional: She is oriented to person, place, and time. She appears well-developed and well-nourished.   HENT:   Head: Normocephalic and atraumatic.   Right Ear: Tympanic membrane normal.   Left Ear: Tympanic membrane normal.   Cobblestoning present   Eyes: " EOM are normal.   Neck: Neck supple.   Cardiovascular: Normal rate, regular rhythm and normal heart sounds.   Pulmonary/Chest: Effort normal and breath sounds normal.   Musculoskeletal: She exhibits no edema.   Lymphadenopathy:     She has no cervical adenopathy.   Neurological: She is alert and oriented to person, place, and time.   Skin: Skin is warm and dry.        Nursing note and vitals reviewed.      Assessment:       1. Upper respiratory tract infection, unspecified type    2. Type 2 diabetes mellitus with hyperlipidemia    3. Cutaneous candidiasis        Plan:       Upper respiratory tract infection, unspecified type  -     azithromycin (Z-DAMIEN) 250 MG tablet; Take 2 tablets (500 mg total) by mouth once daily for 1 day, THEN 1 tablet (250 mg total) once daily for 4 days.  Dispense: 6 tablet; Refill: 0  -     promethazine-dextromethorphan (PROMETHAZINE-DM) 6.25-15 mg/5 mL Syrp; Take 5 mLs by mouth every 6 (six) hours as needed (cough).  Dispense: 180 mL; Refill: 1    Type 2 diabetes mellitus with hyperlipidemia  -     insulin glargine (LANTUS SOLOSTAR U-100 INSULIN) 100 unit/mL (3 mL) InPn pen; Inject 42 Units into the skin once daily.  Dispense: 15 mL; Refill: 5  -     POCT Glucose    Cutaneous candidiasis  -     nystatin-triamcinolone (MYCOLOG II) cream; Apply topically 2 (two) times daily.  Dispense: 30 g; Refill: 0         Medication List           Accurate as of 10/24/18 10:32 AM. If you have any questions, ask your nurse or doctor.               START taking these medications    azithromycin 250 MG tablet  Commonly known as:  Z-DAMIEN  Take 2 tablets (500 mg total) by mouth once daily for 1 day, THEN 1 tablet (250 mg total) once daily for 4 days.  Start taking on:  10/24/2018  Started by:  Joey Cintron MD     nystatin-triamcinolone cream  Commonly known as:  MYCOLOG II  Apply topically 2 (two) times daily.  Started by:  Joey Cintron MD     promethazine-dextromethorphan 6.25-15 mg/5 mL Syrp  Commonly  known as:  PROMETHAZINE-DM  Take 5 mLs by mouth every 6 (six) hours as needed (cough).  Started by:  Joey Cintron MD        CHANGE how you take these medications    lisinopril-hydrochlorothiazide 20-12.5 mg per tablet  Commonly known as:  PRINZIDE,ZESTORETIC  Take 1 tablet by mouth once daily.  What changed:  Another medication with the same name was removed. Continue taking this medication, and follow the directions you see here.  Changed by:  Joey Cintron MD        CONTINUE taking these medications    aspirin 81 MG EC tablet  Commonly known as:  ECOTRIN  Take 1 tablet (81 mg total) by mouth once daily.     blood sugar diagnostic Strp  Commonly known as:  BLOOD GLUCOSE TEST  1 strip by Misc.(Non-Drug; Combo Route) route 2 (two) times daily. Contour Next EZ     CONTOUR NEXT EZ METER MISC     insulin glargine 100 unit/mL (3 mL) Inpn pen  Commonly known as:  LANTUS SOLOSTAR U-100 INSULIN  Inject 42 Units into the skin once daily.     liraglutide 0.6 mg/0.1 mL (18 mg/3 mL) subq PNIJ 0.6 mg/0.1 mL (18 mg/3 mL) Pnij  Commonly known as:  VICTOZA 2-DAMIEN  Inject 1.8 mg into the skin once daily.     metFORMIN 1000 MG tablet  Commonly known as:  GLUCOPHAGE  Take 1 tablet (1,000 mg total) by mouth 2 (two) times daily with meals.     simvastatin 40 MG tablet  Commonly known as:  ZOCOR  Take 1 tablet (40 mg total) by mouth every evening.        STOP taking these medications    dapagliflozin 10 mg Tab  Commonly known as:  FARXIGA  Stopped by:  Joey Cintron MD           Where to Get Your Medications      These medications were sent to [id number] [address] [id number] [facility]  [address] [id number]    Phone:  [phone]   · azithromycin 250 MG tablet  · insulin glargine 100 unit/mL (3 mL) Inpn pen  · nystatin-triamcinolone cream  · promethazine-dextromethorphan 6.25-15 mg/5 mL Syrp

## 2018-10-24 NOTE — TELEPHONE ENCOUNTER
Spoke with Kimberly with [facility] lab, patient has a glucose level of 498. Message sent to PCP. Please advise.

## 2018-10-24 NOTE — TELEPHONE ENCOUNTER
Spoke with patient, instructed to make sure she follows up with him on 11/12/18 and to make sure she is taking all of her medications. Patient verbalized understanding.

## 2018-10-24 NOTE — TELEPHONE ENCOUNTER
Please notify patient of severely elevated blood sugar.  Please ensure that she is actually taking all of her medications as prescribed, and make sure that she follows up with me in a couple of weeks as advised.

## 2019-02-03 DIAGNOSIS — E78.00 HYPERCHOLESTEROLEMIA: ICD-10-CM

## 2019-02-03 RX ORDER — SIMVASTATIN 40 MG/1
40 TABLET, FILM COATED ORAL NIGHTLY
Qty: 30 TABLET | Refills: 5 | Status: SHIPPED | OUTPATIENT
Start: 2019-02-03 | End: 2019-04-17 | Stop reason: SDUPTHER

## 2019-02-22 DIAGNOSIS — Z12.11 COLON CANCER SCREENING: ICD-10-CM

## 2019-03-08 DIAGNOSIS — E11.9 TYPE 2 DIABETES MELLITUS WITHOUT COMPLICATION: ICD-10-CM

## 2019-03-22 DIAGNOSIS — E11.9 TYPE 2 DIABETES MELLITUS WITHOUT COMPLICATION: ICD-10-CM

## 2019-04-12 DIAGNOSIS — I10 ESSENTIAL HYPERTENSION, BENIGN: ICD-10-CM

## 2019-04-15 ENCOUNTER — TELEPHONE (OUTPATIENT)
Dept: PRIMARY CARE CLINIC | Facility: CLINIC | Age: 59
End: 2019-04-15

## 2019-04-15 DIAGNOSIS — Z12.39 BREAST CANCER SCREENING: Primary | ICD-10-CM

## 2019-04-15 NOTE — TELEPHONE ENCOUNTER
Patient wanted to schedule her mammogram after her appointment with Dr. Cintron on Wednesday. Appointment made

## 2019-04-15 NOTE — TELEPHONE ENCOUNTER
----- Message from Savita Rockwell sent at 4/15/2019 12:34 PM CDT -----  Contact: Patient  Type:  Mammogram    Caller is requesting to schedule their annual mammogram appointment.  Order is not listed in EPIC.  Please enter order and contact patient to schedule.    Name of Caller:  Aishwarya, patient  Where would they like the mammogram performed?  [facility]  Best Call Back Number:  [phone]  Additional Information: Please add order and call patient to schedule. Thanks.

## 2019-04-17 ENCOUNTER — CLINICAL SUPPORT (OUTPATIENT)
Dept: PRIMARY CARE CLINIC | Facility: CLINIC | Age: 59
End: 2019-04-17
Payer: COMMERCIAL

## 2019-04-17 ENCOUNTER — OFFICE VISIT (OUTPATIENT)
Dept: PRIMARY CARE CLINIC | Facility: CLINIC | Age: 59
End: 2019-04-17
Payer: COMMERCIAL

## 2019-04-17 VITALS
SYSTOLIC BLOOD PRESSURE: 156 MMHG | BODY MASS INDEX: 34.84 KG/M2 | WEIGHT: 222 LBS | TEMPERATURE: 98 F | HEART RATE: 68 BPM | HEIGHT: 67 IN | DIASTOLIC BLOOD PRESSURE: 73 MMHG | RESPIRATION RATE: 18 BRPM | OXYGEN SATURATION: 98 %

## 2019-04-17 DIAGNOSIS — E78.00 HYPERCHOLESTEROLEMIA: ICD-10-CM

## 2019-04-17 DIAGNOSIS — E11.69 TYPE 2 DIABETES MELLITUS WITH HYPERLIPIDEMIA: Primary | ICD-10-CM

## 2019-04-17 DIAGNOSIS — E78.5 TYPE 2 DIABETES MELLITUS WITH HYPERLIPIDEMIA: Primary | ICD-10-CM

## 2019-04-17 DIAGNOSIS — I10 ESSENTIAL HYPERTENSION, BENIGN: ICD-10-CM

## 2019-04-17 DIAGNOSIS — E11.69 TYPE 2 DIABETES MELLITUS WITH HYPERLIPIDEMIA: ICD-10-CM

## 2019-04-17 DIAGNOSIS — E11.9 TYPE 2 DIABETES MELLITUS WITHOUT COMPLICATION: ICD-10-CM

## 2019-04-17 DIAGNOSIS — E78.5 TYPE 2 DIABETES MELLITUS WITH HYPERLIPIDEMIA: ICD-10-CM

## 2019-04-17 DIAGNOSIS — Z91.148 NONCOMPLIANCE W/MEDICATION TREATMENT DUE TO INTERMIT USE OF MEDICATION: ICD-10-CM

## 2019-04-17 LAB
ALBUMIN SERPL BCP-MCNC: 3.4 G/DL (ref 3.5–5.2)
ALP SERPL-CCNC: 101 U/L (ref 38–126)
ALT SERPL W/O P-5'-P-CCNC: 27 U/L (ref 14–54)
ANION GAP SERPL CALC-SCNC: 10 MMOL/L (ref 8–16)
AST SERPL-CCNC: 25 U/L (ref 15–41)
BASOPHILS # BLD AUTO: 0.1 K/UL (ref 0–0.2)
BASOPHILS NFR BLD: 1.3 % (ref 0–1.9)
BILIRUB SERPL-MCNC: 0.5 MG/DL (ref 0.3–1.2)
BUN SERPL-MCNC: 17 MG/DL (ref 6–20)
CALCIUM SERPL-MCNC: 9.3 MG/DL (ref 8.6–10)
CHLORIDE SERPL-SCNC: 101 MMOL/L (ref 101–111)
CHOLEST SERPL-MCNC: 210 MG/DL (ref 80–200)
CHOLEST/HDLC SERPL: 3.4 {RATIO} (ref 2–5)
CO2 SERPL-SCNC: 29 MMOL/L (ref 23–29)
CREAT SERPL-MCNC: 0.9 MG/DL (ref 0.5–1.4)
DIFFERENTIAL METHOD: ABNORMAL
EOSINOPHIL # BLD AUTO: 0.1 K/UL (ref 0–0.5)
EOSINOPHIL NFR BLD: 2.2 % (ref 0–8)
ERYTHROCYTE [DISTWIDTH] IN BLOOD BY AUTOMATED COUNT: 13.5 % (ref 11.5–14.5)
EST. GFR  (AFRICAN AMERICAN): >60 ML/MIN/1.73 M^2
EST. GFR  (NON AFRICAN AMERICAN): >60 ML/MIN/1.73 M^2
ESTIMATED AVG GLUCOSE: ABNORMAL MG/DL (ref 68–131)
GLUCOSE SERPL-MCNC: 173 MG/DL (ref 74–118)
GLUCOSE SERPL-MCNC: 224 MG/DL (ref 70–110)
HBA1C MFR BLD HPLC: >14 % (ref 4–5.6)
HCT VFR BLD AUTO: 39.5 % (ref 37–48.5)
HDLC SERPL-MCNC: 62 MG/DL (ref 40–75)
HDLC SERPL: 29.5 % (ref 20–50)
HGB BLD-MCNC: 12 G/DL (ref 12–16)
LDLC SERPL CALC-MCNC: 120 MG/DL
LYMPHOCYTES # BLD AUTO: 1.8 K/UL (ref 1–4.8)
LYMPHOCYTES NFR BLD: 45.5 % (ref 18–48)
MCH RBC QN AUTO: 22.8 PG (ref 27–31)
MCHC RBC AUTO-ENTMCNC: 30.4 G/DL (ref 32–36)
MCV RBC AUTO: 75 FL (ref 82–98)
MONOCYTES # BLD AUTO: 0.2 K/UL (ref 0.3–1)
MONOCYTES NFR BLD: 5.8 % (ref 4–15)
NEUTROPHILS # BLD AUTO: 1.8 K/UL (ref 1.8–7.7)
NEUTROPHILS NFR BLD: 45.2 % (ref 38–73)
NONHDLC SERPL-MCNC: 148 MG/DL
PLATELET # BLD AUTO: 228 K/UL (ref 150–350)
PMV BLD AUTO: 10.6 FL (ref 9.2–12.9)
POTASSIUM SERPL-SCNC: 3.6 MMOL/L (ref 3.5–5.1)
PROT SERPL-MCNC: 7 G/DL (ref 6–8.4)
RBC # BLD AUTO: 5.27 M/UL (ref 4–5.4)
SODIUM SERPL-SCNC: 140 MMOL/L (ref 136–145)
TRIGL SERPL-MCNC: 139 MG/DL (ref 30–150)
WBC # BLD AUTO: 4.1 K/UL (ref 3.9–12.7)

## 2019-04-17 PROCEDURE — 82962 POCT GLUCOSE, HAND-HELD DEVICE: ICD-10-PCS | Mod: S$GLB,,, | Performed by: FAMILY MEDICINE

## 2019-04-17 PROCEDURE — 80061 LIPID PANEL: CPT

## 2019-04-17 PROCEDURE — 3077F SYST BP >= 140 MM HG: CPT | Mod: CPTII,S$GLB,, | Performed by: FAMILY MEDICINE

## 2019-04-17 PROCEDURE — 83036 HEMOGLOBIN GLYCOSYLATED A1C: CPT

## 2019-04-17 PROCEDURE — 99214 OFFICE O/P EST MOD 30 MIN: CPT | Mod: S$GLB,,, | Performed by: FAMILY MEDICINE

## 2019-04-17 PROCEDURE — 3046F HEMOGLOBIN A1C LEVEL >9.0%: CPT | Mod: CPTII,S$GLB,, | Performed by: FAMILY MEDICINE

## 2019-04-17 PROCEDURE — 3078F PR MOST RECENT DIASTOLIC BLOOD PRESSURE < 80 MM HG: ICD-10-PCS | Mod: CPTII,S$GLB,, | Performed by: FAMILY MEDICINE

## 2019-04-17 PROCEDURE — 3008F BODY MASS INDEX DOCD: CPT | Mod: CPTII,S$GLB,, | Performed by: FAMILY MEDICINE

## 2019-04-17 PROCEDURE — 3046F PR MOST RECENT HEMOGLOBIN A1C LEVEL > 9.0%: ICD-10-PCS | Mod: CPTII,S$GLB,, | Performed by: FAMILY MEDICINE

## 2019-04-17 PROCEDURE — 99214 PR OFFICE/OUTPT VISIT, EST, LEVL IV, 30-39 MIN: ICD-10-PCS | Mod: S$GLB,,, | Performed by: FAMILY MEDICINE

## 2019-04-17 PROCEDURE — 82962 GLUCOSE BLOOD TEST: CPT | Mod: S$GLB,,, | Performed by: FAMILY MEDICINE

## 2019-04-17 PROCEDURE — 3008F PR BODY MASS INDEX (BMI) DOCUMENTED: ICD-10-PCS | Mod: CPTII,S$GLB,, | Performed by: FAMILY MEDICINE

## 2019-04-17 PROCEDURE — 80053 COMPREHEN METABOLIC PANEL: CPT

## 2019-04-17 PROCEDURE — 3077F PR MOST RECENT SYSTOLIC BLOOD PRESSURE >= 140 MM HG: ICD-10-PCS | Mod: CPTII,S$GLB,, | Performed by: FAMILY MEDICINE

## 2019-04-17 PROCEDURE — 99999 PR PBB SHADOW E&M-EST. PATIENT-LVL IV: CPT | Mod: PBBFAC,,, | Performed by: FAMILY MEDICINE

## 2019-04-17 PROCEDURE — 85025 COMPLETE CBC W/AUTO DIFF WBC: CPT

## 2019-04-17 PROCEDURE — 3078F DIAST BP <80 MM HG: CPT | Mod: CPTII,S$GLB,, | Performed by: FAMILY MEDICINE

## 2019-04-17 PROCEDURE — 99999 PR PBB SHADOW E&M-EST. PATIENT-LVL IV: ICD-10-PCS | Mod: PBBFAC,,, | Performed by: FAMILY MEDICINE

## 2019-04-17 RX ORDER — LISINOPRIL AND HYDROCHLOROTHIAZIDE 12.5; 2 MG/1; MG/1
1 TABLET ORAL DAILY
Qty: 90 TABLET | Refills: 1 | Status: SHIPPED | OUTPATIENT
Start: 2019-04-17 | End: 2020-03-09

## 2019-04-17 RX ORDER — LISINOPRIL AND HYDROCHLOROTHIAZIDE 12.5; 2 MG/1; MG/1
1 TABLET ORAL DAILY
Qty: 30 TABLET | Refills: 0 | OUTPATIENT
Start: 2019-04-17

## 2019-04-17 RX ORDER — SIMVASTATIN 40 MG/1
40 TABLET, FILM COATED ORAL NIGHTLY
Qty: 90 TABLET | Refills: 1 | Status: SHIPPED | OUTPATIENT
Start: 2019-04-17 | End: 2019-05-01 | Stop reason: SDUPTHER

## 2019-04-17 RX ORDER — METFORMIN HYDROCHLORIDE 1000 MG/1
1000 TABLET ORAL 2 TIMES DAILY WITH MEALS
Qty: 180 TABLET | Refills: 1 | Status: SHIPPED | OUTPATIENT
Start: 2019-04-17 | End: 2020-07-02 | Stop reason: SDUPTHER

## 2019-04-17 NOTE — PROGRESS NOTES
"Subjective:       Patient ID: Aishwarya Jensen is a 58 y.o. female.    Chief Complaint: Diabetes (patient is here for a check up) and Vaginal Itching (thinks she has a yeast infection )    Long overdue for updated labs.  Intermittently compliant patient.  Says she frequently misses her p.m. dose of metformin, unclear if she is actually taking her Victoza and Lantus consistently as prescribed (she 1st told me that she has 1 Victoza pen left, and a few minutes later said she has been out for a few weeks).  Also has not been eating particularly well.  Has not been monitoring her blood sugar regularly, either.  Has been prescribed SGLT2 inhibitors in the past, but they were not covered.  Ran out of blood pressure medications a couple of weeks ago, so blood pressure is elevated today.  Not checking her pressure at home.    Review of Systems   Constitutional: Negative for fever.   Eyes: Negative for visual disturbance.   Respiratory: Negative for shortness of breath.    Gastrointestinal: Negative for nausea and vomiting.   Endocrine: Positive for polydipsia and polyuria.   Genitourinary: Negative for difficulty urinating.   Skin: Negative for rash and wound.   Neurological: Negative for seizures and syncope.       Objective:      Vitals:    04/17/19 0805   BP: (!) 156/73   BP Location: Left arm   Patient Position: Sitting   BP Method: Large (Automatic)   Pulse: 68   Resp: 18   Temp: 98.2 °F (36.8 °C)   TempSrc: Oral   SpO2: 98%   Weight: 100.7 kg (222 lb)   Height: 5' 7" (1.702 m)     Physical Exam   Constitutional: She is oriented to person, place, and time. She appears well-developed and well-nourished.   HENT:   Head: Normocephalic and atraumatic.   Cardiovascular: Normal rate, regular rhythm and normal heart sounds.   Pulses:       Dorsalis pedis pulses are 1+ on the right side, and 1+ on the left side.   Pulmonary/Chest: Effort normal and breath sounds normal.   Musculoskeletal: She exhibits no edema.   Feet: "   Right Foot:   Protective Sensation: 10 sites tested. 10 sites sensed.   Skin Integrity: Negative for ulcer, blister or skin breakdown.   Left Foot:   Protective Sensation: 10 sites tested. 10 sites sensed.   Skin Integrity: Negative for ulcer, blister or skin breakdown.   Neurological: She is alert and oriented to person, place, and time.   Skin: Skin is warm and dry.   Nursing note and vitals reviewed.      Assessment:       1. Type 2 diabetes mellitus with hyperlipidemia    2. Hypercholesterolemia    3. Essential hypertension, benign    4. Noncompliance w/medication treatment due to intermit use of medication        Plan:       Type 2 diabetes mellitus with hyperlipidemia  -     Foot Exam Performed  -     Ambulatory Referral to Ophthalmology  -     metFORMIN (GLUCOPHAGE) 1000 MG tablet; Take 1 tablet (1,000 mg total) by mouth 2 (two) times daily with meals.  Dispense: 180 tablet; Refill: 1  -     POCT Glucose, Hand-Held Device  -     Comprehensive metabolic panel; Future; Expected date: 04/17/2019  -     liraglutide 0.6 mg/0.1 mL, 18 mg/3 mL, subq PNIJ (VICTOZA 2-DAMIEN) 0.6 mg/0.1 mL (18 mg/3 mL) PnIj; Inject 1.8 mg into the skin once daily.  Dispense: 6 mL; Refill: 5  -     Ambulatory referral to Endocrinology  -     Ambulatory Referral to Diabetes Education  Stressed importance of taking all medications as prescribed, and monitoring blood sugar regularly.  Needs updated labs, referral to endocrinology and Diabetes Education  Hypercholesterolemia  -     simvastatin (ZOCOR) 40 MG tablet; Take 1 tablet (40 mg total) by mouth every evening.  Dispense: 90 tablet; Refill: 1  -     Comprehensive metabolic panel; Future; Expected date: 04/17/2019  Check labs today adjust meds accordingly  Essential hypertension, benign  -     lisinopril-hydrochlorothiazide (PRINZIDE,ZESTORETIC) 20-12.5 mg per tablet; Take 1 tablet by mouth once daily.  Dispense: 90 tablet; Refill: 1  -     CBC auto differential; Future; Expected date:  04/17/2019  Needs to get back on blood pressure medications, follow-up in a couple of weeks  Noncompliance w/medication treatment due to intermit use of medication  As above    Medication List with Changes/Refills   Current Medications    ASPIRIN (ECOTRIN) 81 MG EC TABLET    Take 1 tablet (81 mg total) by mouth once daily.    BLOOD SUGAR DIAGNOSTIC (BLOOD GLUCOSE TEST) STRP    1 strip by Misc.(Non-Drug; Combo Route) route 2 (two) times daily. Contour Next EZ    BLOOD-GLUCOSE METER (CONTOUR NEXT EZ METER MISC)    by Misc.(Non-Drug; Combo Route) route 2 (two) times daily.    INSULIN GLARGINE (LANTUS SOLOSTAR U-100 INSULIN) 100 UNIT/ML (3 ML) INPN PEN    Inject 42 Units into the skin once daily.    NYSTATIN-TRIAMCINOLONE (MYCOLOG II) CREAM    Apply topically 2 (two) times daily.   Changed and/or Refilled Medications    Modified Medication Previous Medication    LIRAGLUTIDE 0.6 MG/0.1 ML, 18 MG/3 ML, SUBQ PNIJ (VICTOZA 2-DAMIEN) 0.6 MG/0.1 ML (18 MG/3 ML) PNIJ liraglutide 0.6 mg/0.1 mL, 18 mg/3 mL, subq PNIJ 0.6 mg/0.1 mL (18 mg/3 mL) PnIj       Inject 1.8 mg into the skin once daily.    Inject 1.8 mg into the skin once daily.    LISINOPRIL-HYDROCHLOROTHIAZIDE (PRINZIDE,ZESTORETIC) 20-12.5 MG PER TABLET lisinopril-hydrochlorothiazide (PRINZIDE,ZESTORETIC) 20-12.5 mg per tablet       Take 1 tablet by mouth once daily.    Take 1 tablet by mouth once daily.    METFORMIN (GLUCOPHAGE) 1000 MG TABLET metFORMIN (GLUCOPHAGE) 1000 MG tablet       Take 1 tablet (1,000 mg total) by mouth 2 (two) times daily with meals.    Take 1 tablet (1,000 mg total) by mouth 2 (two) times daily with meals.    SIMVASTATIN (ZOCOR) 40 MG TABLET simvastatin (ZOCOR) 40 MG tablet       Take 1 tablet (40 mg total) by mouth every evening.    TAKE 1 TABLET (40 MG TOTAL) BY MOUTH EVERY EVENING.   Discontinued Medications    HYDROCORTISONE 2.5 % CREAM    Apply topically 2 (two) times daily.    NYSTATIN (MYCOSTATIN) CREAM    Apply topically 2 (two) times  daily.    PROMETHAZINE-DEXTROMETHORPHAN (PROMETHAZINE-DM) 6.25-15 MG/5 ML SYRP    Take 5 mLs by mouth every 6 (six) hours as needed (cough).

## 2019-04-22 ENCOUNTER — LAB VISIT (OUTPATIENT)
Dept: LAB | Facility: HOSPITAL | Age: 59
End: 2019-04-22
Attending: FAMILY MEDICINE
Payer: COMMERCIAL

## 2019-04-22 DIAGNOSIS — Z12.11 COLON CANCER SCREENING: ICD-10-CM

## 2019-04-22 PROCEDURE — 82274 ASSAY TEST FOR BLOOD FECAL: CPT

## 2019-04-23 LAB — HEMOCCULT STL QL IA: NEGATIVE

## 2019-05-01 ENCOUNTER — OFFICE VISIT (OUTPATIENT)
Dept: ENDOCRINOLOGY | Facility: CLINIC | Age: 59
End: 2019-05-01
Payer: COMMERCIAL

## 2019-05-01 ENCOUNTER — CLINICAL SUPPORT (OUTPATIENT)
Dept: PRIMARY CARE CLINIC | Facility: CLINIC | Age: 59
End: 2019-05-01
Payer: COMMERCIAL

## 2019-05-01 VITALS
WEIGHT: 220.69 LBS | BODY MASS INDEX: 34.64 KG/M2 | HEART RATE: 64 BPM | OXYGEN SATURATION: 99 % | DIASTOLIC BLOOD PRESSURE: 69 MMHG | HEIGHT: 67 IN | SYSTOLIC BLOOD PRESSURE: 139 MMHG

## 2019-05-01 DIAGNOSIS — R80.9 MICROALBUMINURIA DUE TO TYPE 2 DIABETES MELLITUS: ICD-10-CM

## 2019-05-01 DIAGNOSIS — E11.29 MICROALBUMINURIA DUE TO TYPE 2 DIABETES MELLITUS: ICD-10-CM

## 2019-05-01 DIAGNOSIS — Z23 NEED FOR SHINGLES VACCINE: ICD-10-CM

## 2019-05-01 DIAGNOSIS — Z79.4 TYPE 2 DIABETES MELLITUS WITH HYPERGLYCEMIA, WITH LONG-TERM CURRENT USE OF INSULIN: ICD-10-CM

## 2019-05-01 DIAGNOSIS — Z79.4 TYPE 2 DIABETES MELLITUS WITH HYPERGLYCEMIA, WITH LONG-TERM CURRENT USE OF INSULIN: Primary | ICD-10-CM

## 2019-05-01 DIAGNOSIS — Z79.4 TYPE 2 DIABETES MELLITUS WITH DIABETIC POLYNEUROPATHY, WITH LONG-TERM CURRENT USE OF INSULIN: ICD-10-CM

## 2019-05-01 DIAGNOSIS — E11.42 TYPE 2 DIABETES MELLITUS WITH DIABETIC POLYNEUROPATHY, WITH LONG-TERM CURRENT USE OF INSULIN: ICD-10-CM

## 2019-05-01 DIAGNOSIS — E11.65 TYPE 2 DIABETES MELLITUS WITH HYPERGLYCEMIA, WITH LONG-TERM CURRENT USE OF INSULIN: ICD-10-CM

## 2019-05-01 DIAGNOSIS — I10 ESSENTIAL HYPERTENSION, BENIGN: ICD-10-CM

## 2019-05-01 DIAGNOSIS — E11.65 TYPE 2 DIABETES MELLITUS WITH HYPERGLYCEMIA, WITH LONG-TERM CURRENT USE OF INSULIN: Primary | ICD-10-CM

## 2019-05-01 DIAGNOSIS — E78.00 HYPERCHOLESTEROLEMIA: ICD-10-CM

## 2019-05-01 DIAGNOSIS — Z91.148 NONCOMPLIANCE W/MEDICATION TREATMENT DUE TO INTERMIT USE OF MEDICATION: ICD-10-CM

## 2019-05-01 LAB
ALBUMIN/CREAT UR: 119.5 UG/MG (ref 0–30)
CREAT UR-MCNC: 159 MG/DL (ref 15–325)
MICROALBUMIN UR DL<=1MG/L-MCNC: 190 UG/ML

## 2019-05-01 PROCEDURE — 3078F PR MOST RECENT DIASTOLIC BLOOD PRESSURE < 80 MM HG: ICD-10-PCS | Mod: CPTII,S$GLB,, | Performed by: NURSE PRACTITIONER

## 2019-05-01 PROCEDURE — 3008F PR BODY MASS INDEX (BMI) DOCUMENTED: ICD-10-PCS | Mod: CPTII,S$GLB,, | Performed by: NURSE PRACTITIONER

## 2019-05-01 PROCEDURE — 3008F BODY MASS INDEX DOCD: CPT | Mod: CPTII,S$GLB,, | Performed by: NURSE PRACTITIONER

## 2019-05-01 PROCEDURE — 3046F HEMOGLOBIN A1C LEVEL >9.0%: CPT | Mod: CPTII,S$GLB,, | Performed by: NURSE PRACTITIONER

## 2019-05-01 PROCEDURE — 3078F DIAST BP <80 MM HG: CPT | Mod: CPTII,S$GLB,, | Performed by: NURSE PRACTITIONER

## 2019-05-01 PROCEDURE — 3075F PR MOST RECENT SYSTOLIC BLOOD PRESS GE 130-139MM HG: ICD-10-PCS | Mod: CPTII,S$GLB,, | Performed by: NURSE PRACTITIONER

## 2019-05-01 PROCEDURE — 99999 PR PBB SHADOW E&M-EST. PATIENT-LVL V: CPT | Mod: PBBFAC,,, | Performed by: NURSE PRACTITIONER

## 2019-05-01 PROCEDURE — 99204 OFFICE O/P NEW MOD 45 MIN: CPT | Mod: S$GLB,,, | Performed by: NURSE PRACTITIONER

## 2019-05-01 PROCEDURE — 99999 PR PBB SHADOW E&M-EST. PATIENT-LVL V: ICD-10-PCS | Mod: PBBFAC,,, | Performed by: NURSE PRACTITIONER

## 2019-05-01 PROCEDURE — 82043 UR ALBUMIN QUANTITATIVE: CPT

## 2019-05-01 PROCEDURE — 3075F SYST BP GE 130 - 139MM HG: CPT | Mod: CPTII,S$GLB,, | Performed by: NURSE PRACTITIONER

## 2019-05-01 PROCEDURE — 99204 PR OFFICE/OUTPT VISIT, NEW, LEVL IV, 45-59 MIN: ICD-10-PCS | Mod: S$GLB,,, | Performed by: NURSE PRACTITIONER

## 2019-05-01 PROCEDURE — 3046F PR MOST RECENT HEMOGLOBIN A1C LEVEL > 9.0%: ICD-10-PCS | Mod: CPTII,S$GLB,, | Performed by: NURSE PRACTITIONER

## 2019-05-01 RX ORDER — SIMVASTATIN 40 MG/1
40 TABLET, FILM COATED ORAL NIGHTLY
Qty: 90 TABLET | Refills: 1 | Status: SHIPPED | OUTPATIENT
Start: 2019-05-01 | End: 2020-04-03

## 2019-05-01 RX ORDER — LANCETS
EACH MISCELLANEOUS
Qty: 150 EACH | Refills: 11 | Status: SHIPPED | OUTPATIENT
Start: 2019-05-01 | End: 2019-05-15 | Stop reason: CLARIF

## 2019-05-01 RX ORDER — INSULIN PUMP SYRINGE, 3 ML
EACH MISCELLANEOUS
Qty: 1 EACH | Refills: 0 | Status: SHIPPED | OUTPATIENT
Start: 2019-05-01 | End: 2019-05-15 | Stop reason: CLARIF

## 2019-05-01 RX ORDER — INSULIN ASPART 100 [IU]/ML
INJECTION, SOLUTION INTRAVENOUS; SUBCUTANEOUS
Qty: 3 VIAL | Refills: 3 | Status: SHIPPED | OUTPATIENT
Start: 2019-05-01 | End: 2019-11-30 | Stop reason: SDUPTHER

## 2019-05-01 NOTE — ASSESSMENT & PLAN NOTE
Uncontrolled   BG readings are markedly above goal based on A1c level. Likely r/t noncompliance.    She is not currently monitoring her BG.   Discussed the importance of SBGM and insulin compliance.     Discussed DM course, progression, and the need for good BG control to prevent or allay long-term complications.       -- Reviewed goals of therapy are to get the best control we can without hypoglycemia  -- Refer to diabetes education- VGo start     Medication changes:   Continue Metformin 1000 mg BID- encouraged better compliance   Stop Victoza   Start Ozempic  Ozempic 0.25 mg weekly for 2 weeks & then 0.5 mg weekly thereafter. Discussed MOA & SE.   Start Vgo 30- 4 clicks 3 times AC.   Benefits form completed today.     -- Reviewed patient's current insulin regimen. Clarified proper insulin dose and timing in relation to meals, etc. Insulin injection sites and proper rotation instructed.    -- Advised frequent self blood glucose monitoring.  Patient encouraged to document glucose results and bring them to every clinic visit. AC/HS. Rx for new meter sent to pharmacy. Logs provided. Send me logs weekly after starting the VGo.   -- Hypoglycemia precautions discussed. Instructed on precautions before driving.    -- Call for Bg repeatedly < 90 or > 180.   -- Close adherence to lifestyle changes recommended.   -- Periodic follow ups for eye evaluations, foot care and dental care suggested.    -- referral to ophthalmology - annual exam   -- referral to podiatry - nail care.   --urine MAC today   -- coupon cards given for all medications.

## 2019-05-01 NOTE — ASSESSMENT & PLAN NOTE
On statin per ADA recommendations  LDL goal < 100. LDL above goal. LFTs WNL. Continue statin.   Missing doses of cholesterol medication.

## 2019-05-01 NOTE — PATIENT INSTRUCTIONS
Stop Victoza   Start Ozempic  Ozempic 0.25 mg weekly for 2 weeks & then 0.5 mg weekly thereafter. Discussed MOA & SE. \    We are going to start the VGo. You will  the VGo devices and the Novolog insulin and bring it to your diabetes education appointment to learn how to put it on and use it. Give half (20 units) of Lantus the night before your diabetes education appointment.       Recommend medic alert bracelet.       Snacks can be an important part of a balanced, healthy meal plan. They allow you to eat more frequently, feeling full and satisfied throughout the day. Also, they allow you to spread carbohydrates evenly, which may stabilize blood sugars.  Plus, snacks are enjoyable!     The amount of carbohydrate needed at snacks varies. Generally, about 15-30 grams of carbohydrate per snack is recommended.  Below you will find some tasty treats.       0-5 gm carb   Crystal Light   Vitamin Water Zero   Herbal tea, unsweetened   2 tsp peanut butter on celery   1./2 cup sugar-free jell-o   1 sugar-free popsicle   ¼ cup blueberries   8oz Blue Amee unsweetened almond milk   5 baby carrots & celery sticks, cucumbers, bell peppers dipped in ¼ cup salsa, 2Tbsp light ranch dressing or 2Tbsp plain Greek yogurt   10 Goldfish crackers   ½ oz low-fat cheese or string cheese   1 closed handful of nuts, unsalted   1 Tbsp of sunflower seeds, unsalted   1 cup Smart Pop popcorn   1 whole grain brown rice cake        15 gm carb   1 small piece of fruit or ½ banana or 1/2 cup lite canned fruit   3 derek cracker squares   3 cups Smart Pop popcorn, top spray butter, Jewell lite salt or cinnamon and Truvia   5 Vanilla Wafers   ½ cup low fat, no added sugar ice cream or frozen yogurt (Blue bell, Blue Bunny, Weight Watchers, Skinny Cow)   ½ turkey, ham, or chicken sandwich   ½ c fruit with ½ c Cottage cheese   4-6 unsalted wheat crackers with 1 oz low fat cheese or 1 tbsp peanut butter    30-45 goldfish  crackers (depending on flavor)    7-8 Sabianist mini brown rice cakes (caramel, apple cinnamon, chocolate)    12 Sabianist mini brown rice cakes (cheddar, bbq, ranch)    1/3 cup hummus dip with raw veg   1/2 whole wheat reynold, 1Tbsp hummus   Mini Pizza (1/2 whole wheat English muffin, low-fat  cheese, tomato sauce)   100 calorie snack pack (Oreo, Chips Ahoy, Ritz Mix, Baked Cheetos)   4-6 oz. light or Greek Style yogurt (Chobani, Yoplait, Okios, Stoneyfield)   ½ cup sugar-free pudding     6 in. wheat tortilla or reynold oven toasted chips (topped with spray butter flavoring, cinnamon, Truvia OR spray butter, garlic powder, chili powder)    18 BBQ Popchips (available at [employer], [employer], Fresh Market)

## 2019-05-01 NOTE — ASSESSMENT & PLAN NOTE
Optimize BG readings.   See above.   Referral to podiatry     Educated patient to check feet daily for any foreign objects and/or wounds. Discussed with patient the importance of wearing appropriate footwear at all times, not to walk barefoot ever, and to check shoes before putting them on feet. Instructed patient to keep feet dry by regularly changing shoes and socks and drying feet after baths and exercises. Also, instructed patient to report any new lesions, discolorations, or swelling to a healthcare professional.
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
## 2019-05-01 NOTE — ASSESSMENT & PLAN NOTE
BP goal is < 140/90.   Tolerating ACEI    Controlled   Blood pressure goals discussed with patient

## 2019-05-01 NOTE — LETTER
May 1, 2019      Joey Cintron MD  [address]  [address]  [place] [address]           [facility]  [address] [place] [address]  Phone: [phone]  Fax: [phone]          Patient: Aishwarya Jensen   MR Number: [id number]   YOB: 1960   Date of Visit: 5/1/2019       Dear Dr. Joey Cintron:    Thank you for referring Aishwarya Jensen to me for evaluation. Attached you will find relevant portions of my assessment and plan of care.    If you have questions, please do not hesitate to call me. I look forward to following Aishwarya Jensen along with you.    Sincerely,    Micki Knott, NP    Enclosure  CC:  No Recipients    If you would like to receive this communication electronically, please contact [email or web address] or [phone] to request more information on [employer] access.    For providers and/or their staff who would like to refer a patient to [facility], please contact us through our one-stop-shop provider referral line, [facility], at [phone].    If you feel you have received this communication in error or would no longer like to receive these types of communications, please e-mail [email or web address]

## 2019-05-03 ENCOUNTER — TELEPHONE (OUTPATIENT)
Dept: ENDOCRINOLOGY | Facility: CLINIC | Age: 59
End: 2019-05-03

## 2019-05-03 NOTE — TELEPHONE ENCOUNTER
----- Message from Kristal Alex sent at 5/3/2019  3:05 PM CDT -----  Type:  Pharmacy Calling to Clarify an RX    Name of Caller:  Carol  Pharmacy Name:  [facility]  Prescription Name:  Vego  What do they need to clarify?:  Needs to know which Vego needed and what type diabetic  Best Call Back Number:  [phone]  Additional Information:

## 2019-05-10 DIAGNOSIS — Z79.4 TYPE 2 DIABETES MELLITUS WITH HYPERGLYCEMIA, WITH LONG-TERM CURRENT USE OF INSULIN: ICD-10-CM

## 2019-05-10 DIAGNOSIS — E11.65 TYPE 2 DIABETES MELLITUS WITH HYPERGLYCEMIA, WITH LONG-TERM CURRENT USE OF INSULIN: ICD-10-CM

## 2019-05-10 RX ORDER — SUB-Q INSULIN DEVICE, 40 UNIT
EACH MISCELLANEOUS
Qty: 30 DEVICE | Refills: 6 | Status: SHIPPED | OUTPATIENT
Start: 2019-05-10 | End: 2020-07-02

## 2019-05-15 ENCOUNTER — CLINICAL SUPPORT (OUTPATIENT)
Dept: DIABETES | Facility: CLINIC | Age: 59
End: 2019-05-15
Payer: COMMERCIAL

## 2019-05-15 VITALS — WEIGHT: 220.69 LBS | HEIGHT: 67 IN | BODY MASS INDEX: 34.64 KG/M2

## 2019-05-15 DIAGNOSIS — E11.69 TYPE 2 DIABETES MELLITUS WITH HYPERLIPIDEMIA: Primary | ICD-10-CM

## 2019-05-15 DIAGNOSIS — E78.5 TYPE 2 DIABETES MELLITUS WITH HYPERLIPIDEMIA: Primary | ICD-10-CM

## 2019-05-15 DIAGNOSIS — E11.29 MICROALBUMINURIA DUE TO TYPE 2 DIABETES MELLITUS: ICD-10-CM

## 2019-05-15 DIAGNOSIS — Z79.4 TYPE 2 DIABETES MELLITUS WITH HYPERGLYCEMIA, WITH LONG-TERM CURRENT USE OF INSULIN: ICD-10-CM

## 2019-05-15 DIAGNOSIS — E11.65 TYPE 2 DIABETES MELLITUS WITH HYPERGLYCEMIA, WITH LONG-TERM CURRENT USE OF INSULIN: ICD-10-CM

## 2019-05-15 DIAGNOSIS — E11.42 TYPE 2 DIABETES MELLITUS WITH DIABETIC POLYNEUROPATHY, WITH LONG-TERM CURRENT USE OF INSULIN: ICD-10-CM

## 2019-05-15 DIAGNOSIS — R80.9 MICROALBUMINURIA DUE TO TYPE 2 DIABETES MELLITUS: ICD-10-CM

## 2019-05-15 DIAGNOSIS — Z79.4 TYPE 2 DIABETES MELLITUS WITH DIABETIC POLYNEUROPATHY, WITH LONG-TERM CURRENT USE OF INSULIN: ICD-10-CM

## 2019-05-15 PROCEDURE — G0108 PR DIAB MANAGE TRN  PER INDIV: ICD-10-PCS | Mod: S$GLB,,, | Performed by: DIETITIAN, REGISTERED

## 2019-05-15 PROCEDURE — G0108 DIAB MANAGE TRN  PER INDIV: HCPCS | Mod: S$GLB,,, | Performed by: DIETITIAN, REGISTERED

## 2019-05-15 PROCEDURE — 99999 PR PBB SHADOW E&M-EST. PATIENT-LVL II: CPT | Mod: PBBFAC,,, | Performed by: DIETITIAN, REGISTERED

## 2019-05-15 PROCEDURE — 99999 PR PBB SHADOW E&M-EST. PATIENT-LVL II: ICD-10-PCS | Mod: PBBFAC,,, | Performed by: DIETITIAN, REGISTERED

## 2019-05-15 RX ORDER — LANCETS 33 GAUGE
EACH MISCELLANEOUS
Refills: 11 | COMMUNITY
Start: 2019-05-01 | End: 2020-07-02

## 2019-05-15 RX ORDER — BLOOD-GLUCOSE METER
EACH MISCELLANEOUS
Refills: 0 | COMMUNITY
Start: 2019-05-01 | End: 2020-07-02

## 2019-05-15 NOTE — PROGRESS NOTES
Diabetes Education  Author: Marla Stein RD  Date: 5/15/2019    Diabetes Care Management Summary  Diabetes Education Record Assessment/Progress: Initial  Current Diabetes Risk Level: High     Last A1c:   Lab Results   Component Value Date    HGBA1C >14.0 (H) 04/17/2019     Last Visit with Diabetes Educator: Last Education Visit: Not Found  Last OPCM Referral: : Not Found   Enrolled in OPCM: No    Insulin Pump Education  Discussed Medtronic VGo30 insulin pump. Patient educated on insulin pump therapy, the purpose of insulin pump therapy, advantages and disadvantages of insulin pump therapy vs multiple daily injections, explained the basal rate is 30 units a day - patient will receive a little bit of insulin throughout 24 hours for a total of 30 units based on which VGo is used, bolus dose are delivered delivered by the double clicks - each double click administers 2 units of insulin, explained that patient will be doing 4 sets of double clicks before each meal, discussed when to change Vgo, demonstrated how to fill VGo with insulin, how to apply vgo and insert the needle, explained the double clicks and what each feature is on the vgo, explained and demonstrated how to safely retract the needle and remove the vgo after 24 hours, discussed ease of usage, carbohydrate counting, demonstrated applying device, inserting needle, administering bolus insulin, retracting needle, and removing/disposing of vgo. Patient and spouse state understanding and performed return demonstration. Patient started first vgo in office. Patient provided with written literature and CDE contact information     Diabetes Type  Diabetes Type : Type II    Diabetes History  Diabetes Diagnosis: 5-10 years  Current Treatment: Oral Medication, Injectable, Insulin    Health Maintenance was reviewed today with patient. Discussed with patient importance of routine eye exams, foot exams/foot care, blood work (i.e.: A1c, microalbumin, and lipid), dental  visits, yearly flu vaccine, and pneumonia vaccine as indicated by PCP. Patient verbalized understanding.     Health Maintenance Topics with due status: Not Due       Topic Last Completion Date    TETANUS VACCINE 01/01/2012    Influenza Vaccine 10/10/2018    Mammogram 04/17/2019    Lipid Panel 04/17/2019    Hemoglobin A1c 04/17/2019    Fecal Occult Blood Test (FOBT)/FitKit 04/18/2019    Low Dose Statin 05/01/2019    Foot Exam 05/01/2019     Health Maintenance Due   Topic Date Due    Eye Exam  06/05/1968       Nutrition  What type of sweetener do you use?: Sweet N Low  What type of beverages do you drink?: juice, water    Monitoring   Monitoring: Other  Self Monitoring : True Metrix checking a couple times a day  Blood Glucose Logs: No  Do you use a personal continuous glucose monitor?: No  In the last month, how often have you had a low blood sugar reaction?: never  What are your symptoms of low blood sugar?: dizzy, light headed  How do you treat low blood sugar?: juice  Can you tell when your blood sugar is too high?: yes  How do you treat high blood sugar?: medication    Exercise   Exercise Type: none  Frequency: Never    Current Diabetes Treatment   Current Treatment: Oral Medication, Injectable, Insulin    Social History  Preferred Learning Method: Face to Face, Demonstration  Primary Support: Self, Family  Educational Level: High School  Occupation: works at the [employer]  Smoking Status: Never a Smoker  Alcohol Use: Never    PHQ-2 Total Score: 0       DDS-2 Score  ( > 3 = SIGNIFICANT DISTRESS): 1.5                   Barriers to Change  Barriers to Change: Financial  Learning Challenges : None    Readiness to Learn   Readiness to Learn : Acceptance    Cultural Influences  Cultural Influences: No    Diabetes Education Assessment/Progress  Diabetes Disease Process (diabetes disease process and treatment options): Discussion, Individual Session, Comprehends Key Points  Nutrition (Incorporating nutritional management into  one's lifestyle): Discussion, Individual Session, Comprehends Key Points  Physical Activity (incorporating physical activity into one's lifestyle): Discussion, Individual Session, Comprehends Key Points  Medications (states correct name, dose, onset, peak, duration, side effects & timing of meds): Discussion, Instructed, Individual Session, Written Materials Provided, Comprehends Key Points, Demonstration, Demonstrates Understanding/Competency(verbalizes/demonstrates), Return Demonstration  Monitoring (monitoring blood glucose/other parameters & using results): Discussion, Individual Session, Comprehends Key Points  Acute Complications (preventing, detecting, and treating acute complications): Discussion, Individual Session, Comprehends Key Points, Written Materials Provided, Instructed  Chronic Complications (preventing, detecting, and treating chronic complications): Comprehends Key Points, Individual Session, Discussion  Clinical (diabetes, other pertinent medical history, and relevant comorbidities reviewed during visit): Discussion, Individual Session, Comprehends Key Points  Cognitive (knowledge of self-management skills, functional health literacy): Discussion, Individual Session, Comprehends Key Points  Psychosocial (emotional response to diabetes): Discussion, Individual Session, Comprehends Key Points  Diabetes Distress and Support Systems: Discussion, Individual Session, Comprehends Key Points  Behavioral (readiness for change, lifestyle practices, self-care behaviors): Discussion, Individual Session, Comprehends Key Points    Goals  Patient has selected/evaluated goals during today's session: Yes, evaluated  Monitoring: In Progress    Diabetes Self-Management Support Plan  Stress Management: family  Medication: Medication Assistance  Review Status: Patient has selected and agrees to support plan., Patient was provided Diabetes Self-Management Support Plan document that includes support  options.    Diabetes Care Plan/Intervention  Education Plan/Intervention: MNT Class, Basic Group Class, Individual Follow-Up MNT, Individual Follow-Up DSMT    Diabetes Meal Plan  Restrictions: Low Fat, Low Sodium, Restricted Carbohydrate  Calories: 1800  Carbohydrate Per Meal: 30-45g  Carbohydrate Per Snack : 7-15g  Fat: 50  Protein: 135    Today's Self-Management Care Plan was developed with the patient's input and is based on barriers identified during today's assessment.    The long and short-term goals in the care plan were written with the patient/caregiver's input. The patient has agreed to work toward these goals to improve her overall diabetes control.      The patient received a copy of today's self-management plan and verbalized understanding of the care plan, goals, and all of today's instructions.      The patient was encouraged to communicate with her physician and care team regarding her condition(s) and treatment.  I provided the patient with my contact information today and encouraged her to contact me via phone or patient portal as needed.     Education Units of Time   Time Spent: 60 min

## 2019-05-21 ENCOUNTER — TELEPHONE (OUTPATIENT)
Dept: ENDOCRINOLOGY | Facility: CLINIC | Age: 59
End: 2019-05-21

## 2019-05-21 NOTE — TELEPHONE ENCOUNTER
----- Message from Marla Stein RD sent at 5/21/2019  2:47 PM CDT -----  Contact: Patient  Can you please see why this patient has not received her VGos    Thank you    ----- Message -----  From: Savita Rockwell  Sent: 5/21/2019  11:00 AM  To: Marla Stein RD    Type: Needs Medical Advice    Who Called:  Aishwarya patient  Symptoms (please be specific):  N/A  How long has patient had these symptoms:  N/A  Pharmacy name and phone #:    [facility] [place] - [address]  [address] [place] [address]  Phone: [phone] Fax: [phone]  Best Call Back Number: [phone]  Additional Information: Calling to inquire about the insulin she has not received yet. Please call her. Thanks.

## 2019-05-23 NOTE — TELEPHONE ENCOUNTER
Spoke with the pharmacist and she state that her deductible was 65.00 for a 30 day supply or a 90 day. The prescription was changed to 90 day for 65.00

## 2019-05-23 NOTE — TELEPHONE ENCOUNTER
Spoke with the patient to let her know of changes to her medication and up and coming appointment. Patient stated her understanding and will pick her meds up as soon as her can. She will also call Marla to schedule the appointment herself.

## 2019-05-23 NOTE — TELEPHONE ENCOUNTER
Spoke with patient. She states that she has her V-go but is having trouble getting her Novolog. Pharmacy is being called.

## 2019-06-12 ENCOUNTER — TELEPHONE (OUTPATIENT)
Dept: ENDOCRINOLOGY | Facility: CLINIC | Age: 59
End: 2019-06-12

## 2019-06-12 NOTE — TELEPHONE ENCOUNTER
----- Message from RT Felicitas sent at 6/12/2019  3:49 PM CDT -----  Contact: pt    pt , requesting a call back soon concerning the diabetic class, thanks.

## 2019-07-05 ENCOUNTER — NUTRITION (OUTPATIENT)
Dept: DIABETES | Facility: CLINIC | Age: 59
End: 2019-07-05
Payer: COMMERCIAL

## 2019-07-05 VITALS — BODY MASS INDEX: 34.64 KG/M2 | WEIGHT: 220.69 LBS | HEIGHT: 67 IN

## 2019-07-05 DIAGNOSIS — E78.5 TYPE 2 DIABETES MELLITUS WITH HYPERLIPIDEMIA: Primary | ICD-10-CM

## 2019-07-05 DIAGNOSIS — E11.29 MICROALBUMINURIA DUE TO TYPE 2 DIABETES MELLITUS: ICD-10-CM

## 2019-07-05 DIAGNOSIS — R80.9 MICROALBUMINURIA DUE TO TYPE 2 DIABETES MELLITUS: ICD-10-CM

## 2019-07-05 DIAGNOSIS — Z79.4 TYPE 2 DIABETES MELLITUS WITH HYPERGLYCEMIA, WITH LONG-TERM CURRENT USE OF INSULIN: ICD-10-CM

## 2019-07-05 DIAGNOSIS — E11.42 TYPE 2 DIABETES MELLITUS WITH DIABETIC POLYNEUROPATHY, WITH LONG-TERM CURRENT USE OF INSULIN: ICD-10-CM

## 2019-07-05 DIAGNOSIS — E11.65 TYPE 2 DIABETES MELLITUS WITH HYPERGLYCEMIA, WITH LONG-TERM CURRENT USE OF INSULIN: ICD-10-CM

## 2019-07-05 DIAGNOSIS — Z79.4 TYPE 2 DIABETES MELLITUS WITH DIABETIC POLYNEUROPATHY, WITH LONG-TERM CURRENT USE OF INSULIN: ICD-10-CM

## 2019-07-05 DIAGNOSIS — E11.69 TYPE 2 DIABETES MELLITUS WITH HYPERLIPIDEMIA: Primary | ICD-10-CM

## 2019-07-05 PROCEDURE — 99999 PR PBB SHADOW E&M-EST. PATIENT-LVL III: ICD-10-PCS | Mod: PBBFAC,,, | Performed by: DIETITIAN, REGISTERED

## 2019-07-05 PROCEDURE — G0108 DIAB MANAGE TRN  PER INDIV: HCPCS | Mod: S$GLB,,, | Performed by: DIETITIAN, REGISTERED

## 2019-07-05 PROCEDURE — 99999 PR PBB SHADOW E&M-EST. PATIENT-LVL III: CPT | Mod: PBBFAC,,, | Performed by: DIETITIAN, REGISTERED

## 2019-07-05 PROCEDURE — G0108 PR DIAB MANAGE TRN  PER INDIV: ICD-10-PCS | Mod: S$GLB,,, | Performed by: DIETITIAN, REGISTERED

## 2019-07-05 NOTE — PROGRESS NOTES
Diabetes Education  Author: Marla Stein RD  Date: 7/5/2019    Diabetes Care Management Summary  Diabetes Education Record Assessment/Progress: Comprehensive/Group  Current Diabetes Risk Level: High     Last A1c:   Lab Results   Component Value Date    HGBA1C >14.0 (H) 04/17/2019     Last Visit with Diabetes Educator: Last Education Visit: Not Found  Last OPCM Referral: : Not Found   Enrolled in OPCM: No    Vgo Disposable Insulin Pump Education  Discussed Vgo30 disposable insulin pump. Patient educated on insulin pump therapy, the purpose of insulin pump therapy, advantages and disadvantages of insulin pump therapy vs multiple daily injections, explained the basal rate is 1.25 - patient will receive a little bit of insulin throughout 24 hours for a total of 30 units based on which VGo is used, bolus dose are delivered delivered by the double clicks - each double click administers 2 units of insulin, explained that patient will be doing 4 sets of double clicks before each meal, discussed when to change Vgo, demonstrated how to fill VGo with insulin, how to apply vgo and insert the needle, explained the double clicks and what each feature is on the vgo, explained and demonstrated how to safely retract the needle and remove the vgo after 24 hours, discussed ease of usage, carbohydrate counting, demonstrated applying device, inserting needle, administering bolus insulin, retracting needle, and removing/disposing of vgo. Patient states understanding and performed return demonstration. Patient started first vgo in office. Patient provided with written literature and CDE contact information     Diabetes Type  Diabetes Type : Type II    Diabetes History  Diabetes Diagnosis: 5-10 years  Current Treatment: Injectable, Insulin, Oral Medication  Reviewed Problem List with Patient: Yes    Health Maintenance was reviewed today with patient. Discussed with patient importance of routine eye exams, foot exams/foot care, blood work  (i.e.: A1c, microalbumin, and lipid), dental visits, yearly flu vaccine, and pneumonia vaccine as indicated by PCP. Patient verbalized understanding.     Health Maintenance Topics with due status: Not Due       Topic Last Completion Date    TETANUS VACCINE 01/01/2012    Influenza Vaccine 10/10/2018    Mammogram 04/17/2019    Lipid Panel 04/17/2019    Hemoglobin A1c 04/17/2019    Fecal Occult Blood Test (FOBT)/FitKit 04/18/2019    Low Dose Statin 05/01/2019    Foot Exam 05/01/2019    Shingles Vaccine 05/11/2019     Health Maintenance Due   Topic Date Due    Eye Exam  06/05/1970       Nutrition  Meal Planning: eats out seldom, 3 meals per day, water  What type of sweetener do you use?: Sweet N Low  What type of beverages do you drink?: juice, water  Meal Plan 24 Hour Recall - Breakfast: Toast and Eggs  Meal Plan 24 Hour Recall - Lunch: Baked Chicken  Meal Plan 24 Hour Recall - Dinner: Fish [place]    Monitoring   Self Monitoring : True Metrix checking a couple times a day  Blood Glucose Logs: No  Do you use a personal continuous glucose monitor?: No  In the last month, how often have you had a low blood sugar reaction?: never  Can you tell when your blood sugar is too high?: sometimes  How do you treat high blood sugar?: medication    Exercise   Exercise Type: none  Frequency: Never    Current Diabetes Treatment   Current Treatment: Injectable, Insulin, Oral Medication    Social History  Preferred Learning Method: Face to Face  Primary Support: Self, Daughter, Family  Educational Level: High School  Occupation: works at [employer]  Smoking Status: Never a Smoker  Alcohol Use: Never                                Barriers to Change  Barriers to Change: Financial  Learning Challenges : None    Readiness to Learn   Readiness to Learn : Acceptance    Cultural Influences  Cultural Influences: No    Diabetes Education Assessment/Progress  Diabetes Disease Process (diabetes disease process and treatment options): Discussion,  Individual Session, Comprehends Key Points  Nutrition (Incorporating nutritional management into one's lifestyle): Discussion, Individual Session, Comprehends Key Points  Physical Activity (incorporating physical activity into one's lifestyle): Discussion, Individual Session, Comprehends Key Points  Medications (states correct name, dose, onset, peak, duration, side effects & timing of meds): Discussion, Instructed, Individual Session, Written Materials Provided, Comprehends Key Points, Demonstration, Demonstrates Understanding/Competency(verbalizes/demonstrates), Return Demonstration  Monitoring (monitoring blood glucose/other parameters & using results): Discussion, Individual Session, Comprehends Key Points  Acute Complications (preventing, detecting, and treating acute complications): Discussion, Individual Session, Comprehends Key Points  Chronic Complications (preventing, detecting, and treating chronic complications): Comprehends Key Points, Individual Session, Discussion  Clinical (diabetes, other pertinent medical history, and relevant comorbidities reviewed during visit): Discussion, Individual Session, Comprehends Key Points  Cognitive (knowledge of self-management skills, functional health literacy): Discussion, Individual Session, Comprehends Key Points  Psychosocial (emotional response to diabetes): Discussion, Individual Session, Comprehends Key Points  Diabetes Distress and Support Systems: Discussion, Individual Session, Comprehends Key Points  Behavioral (readiness for change, lifestyle practices, self-care behaviors): Discussion, Individual Session, Comprehends Key Points    Goals  Patient has selected/evaluated goals during today's session: Yes, evaluated  Monitoring: In Progress         Diabetes Care Plan/Intervention  Education Plan/Intervention: Individual Follow-Up MNT, Individual Follow-Up DSMT    Diabetes Meal Plan  Restrictions: Low Fat, Low Sodium, Restricted Carbohydrate  Calories:  1800  Carbohydrate Per Meal: 30-45g  Carbohydrate Per Snack : 7-15g  Fat: 50  Protein: 135    Today's Self-Management Care Plan was developed with the patient's input and is based on barriers identified during today's assessment.    The long and short-term goals in the care plan were written with the patient/caregiver's input. The patient has agreed to work toward these goals to improve her overall diabetes control.      The patient received a copy of today's self-management plan and verbalized understanding of the care plan, goals, and all of today's instructions.      The patient was encouraged to communicate with her physician and care team regarding her condition(s) and treatment.  I provided the patient with my contact information today and encouraged her to contact me via phone or patient portal as needed.     Education Units of Time   Time Spent: 30 min

## 2019-08-23 DIAGNOSIS — E11.9 TYPE 2 DIABETES MELLITUS WITHOUT COMPLICATION: ICD-10-CM

## 2019-11-04 ENCOUNTER — PATIENT OUTREACH (OUTPATIENT)
Dept: ADMINISTRATIVE | Facility: HOSPITAL | Age: 59
End: 2019-11-04

## 2019-11-04 NOTE — PROGRESS NOTES
Patient due for follow up r/t HTN.   Attempted to contact patient to discuss/schedule appointment. Unable to lmom d/t vm box full.   Portal message sent to patient w/ appt task attached.

## 2019-11-15 DIAGNOSIS — Z79.4 TYPE 2 DIABETES MELLITUS WITH HYPERGLYCEMIA, WITH LONG-TERM CURRENT USE OF INSULIN: ICD-10-CM

## 2019-11-15 DIAGNOSIS — E11.65 TYPE 2 DIABETES MELLITUS WITH HYPERGLYCEMIA, WITH LONG-TERM CURRENT USE OF INSULIN: ICD-10-CM

## 2019-11-18 RX ORDER — SUB-Q INSULIN DEVICE, 40 UNIT
EACH MISCELLANEOUS
Qty: 90 DEVICE | Refills: 0 | Status: SHIPPED | OUTPATIENT
Start: 2019-11-18 | End: 2020-06-19

## 2019-11-18 NOTE — TELEPHONE ENCOUNTER
Please call patient and set up a follow up appointment. Patient has not been seen since May 2019. She needs labs and follow up with me before I leave for maternity leave.   Thank you

## 2019-11-18 NOTE — TELEPHONE ENCOUNTER
Attempted to contact patient to leave message to make appt. And have lab work done (not seen since May) voicemail full and not able to leave message.  Letter was sent by Wilbert Saha LPN on 11/04/2019 to patient will try again to make comtact

## 2019-11-21 ENCOUNTER — TELEPHONE (OUTPATIENT)
Dept: PRIMARY CARE CLINIC | Facility: CLINIC | Age: 59
End: 2019-11-21

## 2019-11-21 NOTE — TELEPHONE ENCOUNTER
----- Message from Savita Rockwell sent at 11/21/2019 11:16 AM CST -----  Contact: Lily Madelyn with [employer] phone [phone] fax [phone]  Lily Joshi with [employer] phone [phone] fax [phone], Calling to inform you that she is the patients  and is requesting a copy of her most recent clinicals. Please advise. Thanks.

## 2019-11-22 DIAGNOSIS — E11.9 TYPE 2 DIABETES MELLITUS WITHOUT COMPLICATION, UNSPECIFIED WHETHER LONG TERM INSULIN USE: ICD-10-CM

## 2019-11-25 ENCOUNTER — TELEPHONE (OUTPATIENT)
Dept: PRIMARY CARE CLINIC | Facility: CLINIC | Age: 59
End: 2019-11-25

## 2019-11-25 ENCOUNTER — OFFICE VISIT (OUTPATIENT)
Dept: PRIMARY CARE CLINIC | Facility: CLINIC | Age: 59
End: 2019-11-25
Payer: COMMERCIAL

## 2019-11-25 VITALS
SYSTOLIC BLOOD PRESSURE: 130 MMHG | HEART RATE: 63 BPM | OXYGEN SATURATION: 95 % | DIASTOLIC BLOOD PRESSURE: 66 MMHG | HEIGHT: 67 IN | WEIGHT: 200.38 LBS | BODY MASS INDEX: 31.45 KG/M2 | TEMPERATURE: 98 F | RESPIRATION RATE: 16 BRPM

## 2019-11-25 DIAGNOSIS — E11.65 TYPE 2 DIABETES MELLITUS WITH HYPERGLYCEMIA, WITH LONG-TERM CURRENT USE OF INSULIN: Primary | ICD-10-CM

## 2019-11-25 DIAGNOSIS — E11.65 TYPE 2 DIABETES MELLITUS WITH HYPERGLYCEMIA, WITH LONG-TERM CURRENT USE OF INSULIN: ICD-10-CM

## 2019-11-25 DIAGNOSIS — Z79.4 TYPE 2 DIABETES MELLITUS WITH HYPERGLYCEMIA, WITH LONG-TERM CURRENT USE OF INSULIN: Primary | ICD-10-CM

## 2019-11-25 DIAGNOSIS — Z23 NEED FOR VACCINATION: ICD-10-CM

## 2019-11-25 DIAGNOSIS — Z79.4 TYPE 2 DIABETES MELLITUS WITH HYPERGLYCEMIA, WITH LONG-TERM CURRENT USE OF INSULIN: ICD-10-CM

## 2019-11-25 DIAGNOSIS — N76.0 ACUTE VAGINITIS: Primary | ICD-10-CM

## 2019-11-25 PROCEDURE — 3075F SYST BP GE 130 - 139MM HG: CPT | Mod: CPTII,S$GLB,, | Performed by: FAMILY MEDICINE

## 2019-11-25 PROCEDURE — 3078F PR MOST RECENT DIASTOLIC BLOOD PRESSURE < 80 MM HG: ICD-10-PCS | Mod: CPTII,S$GLB,, | Performed by: FAMILY MEDICINE

## 2019-11-25 PROCEDURE — 99214 PR OFFICE/OUTPT VISIT, EST, LEVL IV, 30-39 MIN: ICD-10-PCS | Mod: 25,S$GLB,, | Performed by: FAMILY MEDICINE

## 2019-11-25 PROCEDURE — 3075F PR MOST RECENT SYSTOLIC BLOOD PRESS GE 130-139MM HG: ICD-10-PCS | Mod: CPTII,S$GLB,, | Performed by: FAMILY MEDICINE

## 2019-11-25 PROCEDURE — 3078F DIAST BP <80 MM HG: CPT | Mod: CPTII,S$GLB,, | Performed by: FAMILY MEDICINE

## 2019-11-25 PROCEDURE — 90686 IIV4 VACC NO PRSV 0.5 ML IM: CPT | Mod: S$GLB,,, | Performed by: FAMILY MEDICINE

## 2019-11-25 PROCEDURE — 87801 DETECT AGNT MULT DNA AMPLI: CPT

## 2019-11-25 PROCEDURE — 90471 FLU VACCINE (QUAD) GREATER THAN OR EQUAL TO 3YO PRESERVATIVE FREE IM: ICD-10-PCS | Mod: S$GLB,,, | Performed by: FAMILY MEDICINE

## 2019-11-25 PROCEDURE — 3008F BODY MASS INDEX DOCD: CPT | Mod: CPTII,S$GLB,, | Performed by: FAMILY MEDICINE

## 2019-11-25 PROCEDURE — 99999 PR PBB SHADOW E&M-EST. PATIENT-LVL III: ICD-10-PCS | Mod: PBBFAC,,, | Performed by: FAMILY MEDICINE

## 2019-11-25 PROCEDURE — 90471 IMMUNIZATION ADMIN: CPT | Mod: S$GLB,,, | Performed by: FAMILY MEDICINE

## 2019-11-25 PROCEDURE — 99214 OFFICE O/P EST MOD 30 MIN: CPT | Mod: 25,S$GLB,, | Performed by: FAMILY MEDICINE

## 2019-11-25 PROCEDURE — 99999 PR PBB SHADOW E&M-EST. PATIENT-LVL III: CPT | Mod: PBBFAC,,, | Performed by: FAMILY MEDICINE

## 2019-11-25 PROCEDURE — 3008F PR BODY MASS INDEX (BMI) DOCUMENTED: ICD-10-PCS | Mod: CPTII,S$GLB,, | Performed by: FAMILY MEDICINE

## 2019-11-25 PROCEDURE — 90686 FLU VACCINE (QUAD) GREATER THAN OR EQUAL TO 3YO PRESERVATIVE FREE IM: ICD-10-PCS | Mod: S$GLB,,, | Performed by: FAMILY MEDICINE

## 2019-11-25 PROCEDURE — 87481 CANDIDA DNA AMP PROBE: CPT | Mod: 59

## 2019-11-25 RX ORDER — AMOXICILLIN AND CLAVULANATE POTASSIUM 875; 125 MG/1; MG/1
1 TABLET, FILM COATED ORAL 2 TIMES DAILY
Refills: 0 | COMMUNITY
Start: 2019-11-15 | End: 2020-07-02 | Stop reason: ALTCHOICE

## 2019-11-25 RX ORDER — FLUCONAZOLE 150 MG/1
150 TABLET ORAL
Qty: 2 TABLET | Refills: 0 | Status: SHIPPED | OUTPATIENT
Start: 2019-11-25 | End: 2019-12-03

## 2019-11-25 RX ORDER — ERYTHROMYCIN 5 MG/G
OINTMENT OPHTHALMIC
Refills: 2 | COMMUNITY
Start: 2019-11-15 | End: 2020-07-02 | Stop reason: ALTCHOICE

## 2019-11-25 NOTE — TELEPHONE ENCOUNTER
Last seen by me 5/2019---   BG on labs was 470 today. She reports that she is compliant with the VGo---- she reports she is filling the devices with insulin and administering the clicks with meals.  She reports she is changing her V Go every 24 hr.  She reports she had her V Go on today 1 her lab work was drawn  Will have patient meet with diabetes education this week to assess if she is using the V Go properly as I anticipate she is not given her high her blood sugar reading.   Instructed patient to force sugar free and clear liquids today--- mostly water-- also instructed her to administer 3 clicks while we were on the phone.   She v.u   All questions answered.

## 2019-11-25 NOTE — PROGRESS NOTES
"Subjective:       Patient ID: Aishwarya Jensen is a 59 y.o. female.    Chief Complaint: Vaginitis (discharge, itching, x 4 days patient states monistat is not working )    Vaginal Itching   The patient's primary symptoms include genital itching. The patient's pertinent negatives include no genital lesions, genital odor, genital rash, pelvic pain, vaginal bleeding or vaginal discharge. This is a new problem. The current episode started in the past 7 days. The problem occurs intermittently. The problem has been waxing and waning. The patient is experiencing no pain. She is not pregnant. Pertinent negatives include no abdominal pain, anorexia, back pain, chills, dysuria, fever, frequency, nausea, urgency or vomiting. Nothing aggravates the symptoms. She has tried antifungals for the symptoms. The treatment provided mild relief. She uses nothing for contraception.   currently on antibiotics for sinus infection  Review of Systems   Constitutional: Negative for chills and fever.   Gastrointestinal: Negative for abdominal pain, anorexia, nausea and vomiting.   Genitourinary: Negative for dysuria, frequency, pelvic pain, urgency and vaginal discharge.   Musculoskeletal: Negative for back pain.       Objective:      Vitals:    11/25/19 1300   BP: 130/66   BP Location: Left arm   Patient Position: Sitting   BP Method: Large (Manual)   Pulse: 63   Resp: 16   Temp: 98 °F (36.7 °C)   TempSrc: Oral   SpO2: 95%   Weight: 90.9 kg (200 lb 6.4 oz)   Height: 5' 7" (1.702 m)     Physical Exam   Constitutional: She is oriented to person, place, and time. She appears well-developed and well-nourished.   HENT:   Head: Normocephalic and atraumatic.   Cardiovascular: Normal rate, regular rhythm and normal heart sounds.   Pulmonary/Chest: Effort normal and breath sounds normal.   Abdominal: There is no tenderness.   Musculoskeletal: She exhibits no edema.   Neurological: She is alert and oriented to person, place, and time.   Skin: Skin is " warm and dry.   Nursing note and vitals reviewed.      Lab Results   Component Value Date    WBC 4.10 04/17/2019    HGB 12.0 04/17/2019    HCT 39.5 04/17/2019     04/17/2019    CHOL 210 (H) 04/17/2019    TRIG 139 04/17/2019    HDL 62 04/17/2019    ALT 27 04/17/2019    AST 25 04/17/2019     04/17/2019    K 3.6 04/17/2019     04/17/2019    CREATININE 0.9 04/17/2019    BUN 17 04/17/2019    CO2 29 04/17/2019    HGBA1C >14.0 (H) 04/17/2019      Assessment:       1. Acute vaginitis    2. Type 2 diabetes mellitus with hyperglycemia, with long-term current use of insulin    3. Need for vaccination        Plan:       Acute vaginitis  -     VAGINOSIS SCREEN BY DNA PROBE  -     fluconazole (DIFLUCAN) 150 MG Tab; Take 1 tablet (150 mg total) by mouth every 7 days. for 2 doses  Dispense: 2 tablet; Refill: 0    Type 2 diabetes mellitus with hyperglycemia, with long-term current use of insulin  -     Basic metabolic panel; Future; Expected date: 11/25/2019  -     Hemoglobin A1c; Future; Expected date: 11/25/2019    Need for vaccination  -     Influenza - Quadrivalent (PF)      Medication List with Changes/Refills   New Medications    FLUCONAZOLE (DIFLUCAN) 150 MG TAB    Take 1 tablet (150 mg total) by mouth every 7 days. for 2 doses   Current Medications    AMOXICILLIN-CLAVULANATE 875-125MG (AUGMENTIN) 875-125 MG PER TABLET    Take 1 tablet by mouth 2 (two) times daily.    ASPIRIN (ECOTRIN) 81 MG EC TABLET    Take 1 tablet (81 mg total) by mouth once daily.    BLOOD SUGAR DIAGNOSTIC STRP    To check BG 4 times daily, to use with insurance preferred meter    ERYTHROMYCIN (ROMYCIN) OPHTHALMIC OINTMENT    USE IN LEFT EYE AT BEDTIME    INSULIN ASPART U-100 (NOVOLOG) 100 UNIT/ML INJECTION    To use with [employer] with 4 clicks with meals. Max TTD of 66 units    LISINOPRIL-HYDROCHLOROTHIAZIDE (PRINZIDE,ZESTORETIC) 20-12.5 MG PER TABLET    Take 1 tablet by mouth once daily.    METFORMIN (GLUCOPHAGE) 1000 MG TABLET    Take  1 tablet (1,000 mg total) by mouth 2 (two) times daily with meals.    NYSTATIN-TRIAMCINOLONE (MYCOLOG II) CREAM    Apply topically 2 (two) times daily.    SEMAGLUTIDE (OZEMPIC) 0.25 MG OR 0.5 MG(2 MG/1.5 ML) PNIJ    Ozempic 0.25 mg weekly for 2 weeks & then 0.5 mg weekly x 4 weeks    SIMVASTATIN (ZOCOR) 40 MG TABLET    Take 1 tablet (40 mg total) by mouth every evening.    TRIAMCINOLONE ACETONIDE 0.1% (KENALOG) 0.1 % CREAM    Apply topically 2 (two) times daily. for 10 days    TRUE METRIX GLUCOSE METER MISC        TRUEPLUS LANCETS 33 GAUGE MISC        V-GO 30 DAVID    DAILY    V-GO 30 DAVID    USE ONCE DAILY

## 2019-11-26 ENCOUNTER — PATIENT OUTREACH (OUTPATIENT)
Dept: ADMINISTRATIVE | Facility: OTHER | Age: 59
End: 2019-11-26

## 2019-11-26 LAB
BACTERIAL VAGINOSIS DNA: POSITIVE
CANDIDA GLABRATA DNA: NEGATIVE
CANDIDA KRUSEI DNA: NEGATIVE
CANDIDA RRNA VAG QL PROBE: POSITIVE
T VAGINALIS RRNA GENITAL QL PROBE: NEGATIVE

## 2019-11-27 ENCOUNTER — TELEPHONE (OUTPATIENT)
Dept: DIABETES | Facility: CLINIC | Age: 59
End: 2019-11-27

## 2019-11-27 ENCOUNTER — CLINICAL SUPPORT (OUTPATIENT)
Dept: DIABETES | Facility: CLINIC | Age: 59
End: 2019-11-27
Payer: COMMERCIAL

## 2019-11-27 VITALS — WEIGHT: 200.38 LBS | BODY MASS INDEX: 31.45 KG/M2 | HEIGHT: 67 IN

## 2019-11-27 DIAGNOSIS — Z79.4 TYPE 2 DIABETES MELLITUS WITH DIABETIC POLYNEUROPATHY, WITH LONG-TERM CURRENT USE OF INSULIN: Primary | ICD-10-CM

## 2019-11-27 DIAGNOSIS — E11.42 TYPE 2 DIABETES MELLITUS WITH DIABETIC POLYNEUROPATHY, WITH LONG-TERM CURRENT USE OF INSULIN: Primary | ICD-10-CM

## 2019-11-27 DIAGNOSIS — Z79.4 TYPE 2 DIABETES MELLITUS WITH HYPERGLYCEMIA, WITH LONG-TERM CURRENT USE OF INSULIN: ICD-10-CM

## 2019-11-27 DIAGNOSIS — E78.5 TYPE 2 DIABETES MELLITUS WITH HYPERLIPIDEMIA: ICD-10-CM

## 2019-11-27 DIAGNOSIS — E11.65 TYPE 2 DIABETES MELLITUS WITH HYPERGLYCEMIA, WITH LONG-TERM CURRENT USE OF INSULIN: ICD-10-CM

## 2019-11-27 DIAGNOSIS — E11.69 TYPE 2 DIABETES MELLITUS WITH HYPERLIPIDEMIA: ICD-10-CM

## 2019-11-27 PROCEDURE — G0108 PR DIAB MANAGE TRN  PER INDIV: ICD-10-PCS | Mod: S$GLB,,, | Performed by: DIETITIAN, REGISTERED

## 2019-11-27 PROCEDURE — 99999 PR PBB SHADOW E&M-EST. PATIENT-LVL III: ICD-10-PCS | Mod: PBBFAC,,, | Performed by: DIETITIAN, REGISTERED

## 2019-11-27 PROCEDURE — 99999 PR PBB SHADOW E&M-EST. PATIENT-LVL III: CPT | Mod: PBBFAC,,, | Performed by: DIETITIAN, REGISTERED

## 2019-11-27 PROCEDURE — G0108 DIAB MANAGE TRN  PER INDIV: HCPCS | Mod: S$GLB,,, | Performed by: DIETITIAN, REGISTERED

## 2019-11-27 RX ORDER — METRONIDAZOLE 500 MG/1
500 TABLET ORAL EVERY 12 HOURS
Qty: 14 TABLET | Refills: 0 | Status: SHIPPED | OUTPATIENT
Start: 2019-11-27 | End: 2019-12-04

## 2019-11-27 RX ORDER — FLUCONAZOLE 150 MG/1
150 TABLET ORAL DAILY
Qty: 1 TABLET | Refills: 0 | Status: SHIPPED | OUTPATIENT
Start: 2019-11-27 | End: 2019-11-28

## 2019-11-27 NOTE — PROGRESS NOTES
Diabetes Education  Author: Marla Stein RD  Date: 11/27/2019    Diabetes Care Management Summary  Diabetes Education Record Assessment/Progress: Post Program/Follow-up  Current Diabetes Risk Level: High     Last A1c:   Lab Results   Component Value Date    HGBA1C >14.0 (H) 11/25/2019     Last Visit with Diabetes Educator: Last Education Visit: Not Found  Last OPCM Referral: : Not Found   Enrolled in OPCM: No      Diabetes Type  Diabetes Type : Type II    Diabetes History  Current Treatment: Insulin pump, Insulin, Oral Medication, Injectable  Reviewed Problem List with Patient: Yes    Health Maintenance was reviewed today with patient. Discussed with patient importance of routine eye exams, foot exams/foot care, blood work (i.e.: A1c, microalbumin, and lipid), dental visits, yearly flu vaccine, and pneumonia vaccine as indicated by PCP. Patient verbalized understanding.     Health Maintenance Topics with due status: Not Due       Topic Last Completion Date    TETANUS VACCINE 01/01/2012    Mammogram 04/17/2019    Lipid Panel 04/17/2019    Fecal Occult Blood Test (FOBT)/FitKit 04/18/2019    Foot Exam 05/01/2019    Low Dose Statin 11/25/2019    Hemoglobin A1c 11/25/2019     Health Maintenance Due   Topic Date Due    Eye Exam  06/05/1970       Nutrition  Meal Planning: 3 meals per day, artificial sweeteners, water, eats out seldom, snacks between meal  What type of sweetener do you use?: Sweet N Low, sugar  What type of beverages do you drink?: juice, water, diet soda/tea, other (see comments)(coffee with sugar cause out of sweet and low, and green tea)  Meal Plan 24 Hour Recall - Breakfast: Toast and Eggs, coffee with sugar and cream  Meal Plan 24 Hour Recall - Lunch: fish with pasta and yams  Meal Plan 24 Hour Recall - Dinner: fish with pasta and yams  Meal Plan 24 Hour Recall - Snack: peanut butter crackers    Monitoring   Monitoring: Other  Self Monitoring : True Metrix checking a couple times a day  Blood  Glucose Logs: No  Do you use a personal continuous glucose monitor?: No  In the last month, how often have you had a low blood sugar reaction?: never  What are your symptoms of low blood sugar?: dizzy, light headed  How do you treat low blood sugar?: juice  Can you tell when your blood sugar is too high?: yes  How do you treat high blood sugar?: medication    Exercise   Exercise Type: none  Frequency: Never    Current Diabetes Treatment   Current Treatment: Insulin pump, Insulin, Oral Medication, Injectable  Patient was wearing VGo, but was not inserting needle     Social History  Preferred Learning Method: Face to Face  Primary Support: Self, Family  Educational Level: High School  Occupation: works at [employer]  Smoking Status: Never a Smoker  Alcohol Use: Never            DDS-2 Score  ( > 3 = SIGNIFICANT DISTRESS): 1                   Barriers to Change  Barriers to Change: Financial  Learning Challenges : None    Readiness to Learn   Readiness to Learn : Acceptance    Cultural Influences  Cultural Influences: No    Diabetes Education Assessment/Progress  Diabetes Disease Process (diabetes disease process and treatment options): Discussion, Individual Session, Comprehends Key Points  Nutrition (Incorporating nutritional management into one's lifestyle): Discussion, Individual Session, Comprehends Key Points  Physical Activity (incorporating physical activity into one's lifestyle): Discussion, Individual Session, Comprehends Key Points  Medications (states correct name, dose, onset, peak, duration, side effects & timing of meds): Discussion, Instructed, Individual Session, Written Materials Provided, Comprehends Key Points, Demonstration, Demonstrates Understanding/Competency(verbalizes/demonstrates), Return Demonstration  Monitoring (monitoring blood glucose/other parameters & using results): Discussion, Individual Session, Comprehends Key Points  Acute Complications (preventing, detecting, and treating acute  complications): Discussion, Individual Session, Comprehends Key Points  Chronic Complications (preventing, detecting, and treating chronic complications): Comprehends Key Points, Individual Session, Discussion  Clinical (diabetes, other pertinent medical history, and relevant comorbidities reviewed during visit): Discussion, Individual Session, Comprehends Key Points  Cognitive (knowledge of self-management skills, functional health literacy): Discussion, Individual Session, Comprehends Key Points  Psychosocial (emotional response to diabetes): Discussion, Individual Session, Comprehends Key Points  Diabetes Distress and Support Systems: Discussion, Individual Session, Comprehends Key Points  Behavioral (readiness for change, lifestyle practices, self-care behaviors): Discussion, Individual Session, Comprehends Key Points    Discussed [employer] VGo30 insulin pump. Patient educated on insulin pump therapy, the purpose of insulin pump therapy, advantages and disadvantages of insulin pump therapy vs multiple daily injections, explained the basal rate is 30 units a day - patient will receive a little bit of insulin throughout 24 hours for a total of 30 units based on which VGo is used, bolus dose are delivered delivered by the double clicks - each double click administers 2 units of insulin, explained that patient will be doing 4 sets of double clicks before each meal, discussed when to change Vgo, demonstrated how to fill VGo with insulin, how to apply vgo and insert the needle, explained the double clicks and what each feature is on the vgo, explained and demonstrated how to safely retract the needle and remove the vgo after 24 hours, discussed ease of usage, carbohydrate counting, demonstrated applying device, inserting needle, administering bolus insulin, retracting needle, and removing/disposing of vgo. Patient and spouse state understanding and performed return demonstration. Patient started first vgo in office.  Patient provided with written literature and CDE contact information     Goals  Patient has selected/evaluated goals during today's session: Yes, evaluated  Monitoring: In Progress    Diabetes Self-Management Support Plan  Stress Management: family  Medication: Medication Assistance, pharmacy  Review Status: Patient has selected and agrees to support plan., Patient was provided Diabetes Self-Management Support Plan document that includes support options.    Diabetes Care Plan/Intervention  Education Plan/Intervention: Individual Follow-Up DSMT    Diabetes Meal Plan  Restrictions: Low Fat, Low Sodium, Restricted Carbohydrate  Calories: 1800  Carbohydrate Per Meal: 30-45g  Carbohydrate Per Snack : 7-15g  Fat: 50  Protein: 135    Today's Self-Management Care Plan was developed with the patient's input and is based on barriers identified during today's assessment.    The long and short-term goals in the care plan were written with the patient/caregiver's input. The patient has agreed to work toward these goals to improve her overall diabetes control.      The patient received a copy of today's self-management plan and verbalized understanding of the care plan, goals, and all of today's instructions.      The patient was encouraged to communicate with her physician and care team regarding her condition(s) and treatment.  I provided the patient with my contact information today and encouraged her to contact me via phone or patient portal as needed.     Education Units of Time   Time Spent: 30 min

## 2019-11-27 NOTE — LETTER
November 27, 2019    Micki Knott, BRENT  [address]  [address]  [place] [address]       Patient: Aishwarya Jensen   MR Number: [id number]   YOB: 1960   Date of Visit: 11/27/2019     Dear Dr. Knott:    Thank you for referring Aishwarya for diabetes self-management education and support. She has completed all components of our Diabetes Management Program and her Self-Management Support Plan.  Patient was wearing VGo, but was not inserting needle   Below is a summary of her clinical outcomes and goal progress.    Patient Outcomes:    A1c Status:   Lab Results   Component Value Date    HGBA1C >14.0 (H) 11/25/2019    HGBA1C >14.0 (H) 04/17/2019     Goals  Patient has selected/evaluated goals during today's session: Yes, evaluated  Monitoring: In Progress    Diabetes Self-Management Support Plan  Stress Management: family  Medication: Medication Assistance, pharmacy  Review Status: Patient has selected and agrees to support plan., Patient was provided Diabetes Self-Management Support Plan document that includes support options.    Follow up:   · Aishwarya to follow diabetes support plan indicated above  · Aishwarya to attend medical appointments as scheduled  · Aishwarya to update you on her DM education progress as needed      If you have questions, please do not hesitate to call me. I look forward to providing additional education and support as needed.    Sincerely,    Marla Stein RD

## 2019-11-27 NOTE — TELEPHONE ENCOUNTER
----- Message from Micki Knott NP sent at 11/26/2019 11:23 PM CST -----  Please call lab results to patient   Her A1c is markedly above goal at >14%. Make sure that she follows up with Marla as scheduled on 11/27 to discuss the VGo as there must be something going on. If she is wearing the VGo and not missing days or clicks--- her A1c should not be >14%.   Please offer for her to follow up with Endocrine at [facility], Macarena Toussaint NP in primary care or Endocrine in [place] while I am out on maternity leave. We can set up a follow up appointment with me in April 2020 when I return.   Thank you.

## 2019-11-30 DIAGNOSIS — Z79.4 TYPE 2 DIABETES MELLITUS WITH HYPERGLYCEMIA, WITH LONG-TERM CURRENT USE OF INSULIN: ICD-10-CM

## 2019-11-30 DIAGNOSIS — E11.65 TYPE 2 DIABETES MELLITUS WITH HYPERGLYCEMIA, WITH LONG-TERM CURRENT USE OF INSULIN: ICD-10-CM

## 2019-11-30 RX ORDER — INSULIN ASPART 100 [IU]/ML
INJECTION, SOLUTION INTRAVENOUS; SUBCUTANEOUS
Qty: 3 VIAL | Refills: 4 | Status: SHIPPED | OUTPATIENT
Start: 2019-11-30 | End: 2020-04-03

## 2020-03-08 DIAGNOSIS — I10 ESSENTIAL HYPERTENSION, BENIGN: ICD-10-CM

## 2020-03-09 RX ORDER — LISINOPRIL AND HYDROCHLOROTHIAZIDE 12.5; 2 MG/1; MG/1
1 TABLET ORAL DAILY
Qty: 90 TABLET | Refills: 1 | Status: SHIPPED | OUTPATIENT
Start: 2020-03-09 | End: 2020-07-02 | Stop reason: SDUPTHER

## 2020-03-12 DIAGNOSIS — E11.9 TYPE 2 DIABETES MELLITUS WITHOUT COMPLICATION: ICD-10-CM

## 2020-04-03 DIAGNOSIS — E78.00 HYPERCHOLESTEROLEMIA: ICD-10-CM

## 2020-04-03 DIAGNOSIS — Z79.4 TYPE 2 DIABETES MELLITUS WITH HYPERGLYCEMIA, WITH LONG-TERM CURRENT USE OF INSULIN: ICD-10-CM

## 2020-04-03 DIAGNOSIS — E11.65 TYPE 2 DIABETES MELLITUS WITH HYPERGLYCEMIA, WITH LONG-TERM CURRENT USE OF INSULIN: ICD-10-CM

## 2020-04-03 RX ORDER — INSULIN ASPART 100 [IU]/ML
INJECTION, SOLUTION INTRAVENOUS; SUBCUTANEOUS
Qty: 9 VIAL | Refills: 0 | Status: SHIPPED | OUTPATIENT
Start: 2020-04-03 | End: 2020-06-19

## 2020-04-03 RX ORDER — SIMVASTATIN 40 MG/1
TABLET, FILM COATED ORAL
Qty: 30 TABLET | Refills: 5 | Status: SHIPPED | OUTPATIENT
Start: 2020-04-03 | End: 2020-07-02 | Stop reason: SDUPTHER

## 2020-04-03 RX ORDER — SIMVASTATIN 40 MG/1
TABLET, FILM COATED ORAL
Qty: 90 TABLET | Refills: 0 | Status: SHIPPED | OUTPATIENT
Start: 2020-04-03 | End: 2020-07-02 | Stop reason: SDUPTHER

## 2020-04-27 ENCOUNTER — TELEPHONE (OUTPATIENT)
Dept: PRIMARY CARE CLINIC | Facility: CLINIC | Age: 60
End: 2020-04-27

## 2020-04-27 DIAGNOSIS — R06.02 SOB (SHORTNESS OF BREATH): ICD-10-CM

## 2020-04-27 DIAGNOSIS — U07.1 COVID-19: Primary | ICD-10-CM

## 2020-04-27 NOTE — TELEPHONE ENCOUNTER
----- Message from Agustina Walls sent at 4/27/2020  8:51 AM CDT -----  Contact: Patient  Reason: Patient would like to set up appt         Contact: [phone]

## 2020-04-28 ENCOUNTER — LAB VISIT (OUTPATIENT)
Dept: PRIMARY CARE CLINIC | Facility: CLINIC | Age: 60
End: 2020-04-28
Payer: COMMERCIAL

## 2020-04-28 DIAGNOSIS — U07.1 COVID-19: ICD-10-CM

## 2020-04-28 PROCEDURE — U0002 COVID-19 LAB TEST NON-CDC: HCPCS

## 2020-04-29 LAB — SARS-COV-2 RNA RESP QL NAA+PROBE: NOT DETECTED

## 2020-05-04 ENCOUNTER — TELEPHONE (OUTPATIENT)
Dept: PRIMARY CARE CLINIC | Facility: CLINIC | Age: 60
End: 2020-05-04

## 2020-05-04 NOTE — TELEPHONE ENCOUNTER
----- Message from Annmarie Lau sent at 5/4/2020  9:24 AM CDT -----  Contact: self/[phone]  Patient called in regards needing to find out about her 2nd covid 19 test results. Please call and advise. Thank you

## 2020-05-06 ENCOUNTER — TELEPHONE (OUTPATIENT)
Dept: PRIMARY CARE CLINIC | Facility: CLINIC | Age: 60
End: 2020-05-06

## 2020-05-06 NOTE — LETTER
May 6, 2020      [facility]  [address], [facility] [address]  [place] [facility] [id number]  Phone: [phone]  Fax: [phone]       Patient: Aishwarya Jensen   YOB: 1960    To Whom It May Concern:    Airam Jensen may return to work/school on 05/11/2020 with no restrictions. If you have any questions or concerns, or if I can be of further assistance, please do not hesitate to contact me.    Sincerely,        Kermit Toney MA

## 2020-05-06 NOTE — TELEPHONE ENCOUNTER
Patient needs a letter to return to work now that she is negative for Covid after her positive test done at [facility] on 04/06/2020. Letter printed a left at the  for her to  and patient notified.

## 2020-05-14 DIAGNOSIS — E11.9 TYPE 2 DIABETES MELLITUS WITHOUT COMPLICATION: ICD-10-CM

## 2020-05-14 DIAGNOSIS — Z12.11 COLON CANCER SCREENING: ICD-10-CM

## 2020-05-20 ENCOUNTER — TELEPHONE (OUTPATIENT)
Dept: PRIMARY CARE CLINIC | Facility: CLINIC | Age: 60
End: 2020-05-20

## 2020-05-20 NOTE — TELEPHONE ENCOUNTER
----- Message from Savita Moiz sent at 5/20/2020  2:42 PM CDT -----  Contact: Patient  Type: Needs Medical Advice    Who Called:  bentley Neff  Symptoms (please be specific):  N/A  How long has patient had these symptoms:  N/A  Pharmacy name and phone #:  N/A  Best Call Back Number: [phone]  Additional Information: Calling because she needs a letter stating she had COVID 19 and how long she missed work for it. Please advise. Thanks.

## 2020-06-19 DIAGNOSIS — E11.65 TYPE 2 DIABETES MELLITUS WITH HYPERGLYCEMIA, WITH LONG-TERM CURRENT USE OF INSULIN: ICD-10-CM

## 2020-06-19 DIAGNOSIS — Z79.4 TYPE 2 DIABETES MELLITUS WITH HYPERGLYCEMIA, WITH LONG-TERM CURRENT USE OF INSULIN: ICD-10-CM

## 2020-06-19 RX ORDER — INSULIN ASPART 100 [IU]/ML
INJECTION, SOLUTION INTRAVENOUS; SUBCUTANEOUS
Qty: 90 ML | Refills: 0 | Status: SHIPPED | OUTPATIENT
Start: 2020-06-19 | End: 2020-07-02 | Stop reason: SDUPTHER

## 2020-06-19 RX ORDER — SUB-Q INSULIN DEVICE, 40 UNIT
EACH MISCELLANEOUS
Qty: 30 DEVICE | Refills: 0 | Status: SHIPPED | OUTPATIENT
Start: 2020-06-19 | End: 2020-07-02 | Stop reason: SDUPTHER

## 2020-06-19 NOTE — TELEPHONE ENCOUNTER
Patient needs a follow up appointment with me.   Last seen by me in 5/2019  Needs to get lab work done that is ordered by Saida.   Thank you

## 2020-06-30 ENCOUNTER — PATIENT OUTREACH (OUTPATIENT)
Dept: ADMINISTRATIVE | Facility: OTHER | Age: 60
End: 2020-06-30

## 2020-07-02 ENCOUNTER — OFFICE VISIT (OUTPATIENT)
Dept: DIABETES | Facility: CLINIC | Age: 60
End: 2020-07-02
Payer: COMMERCIAL

## 2020-07-02 VITALS
BODY MASS INDEX: 32.82 KG/M2 | HEIGHT: 67 IN | HEART RATE: 77 BPM | SYSTOLIC BLOOD PRESSURE: 154 MMHG | DIASTOLIC BLOOD PRESSURE: 82 MMHG | OXYGEN SATURATION: 99 % | WEIGHT: 209.13 LBS

## 2020-07-02 DIAGNOSIS — E78.5 TYPE 2 DIABETES MELLITUS WITH HYPERLIPIDEMIA: ICD-10-CM

## 2020-07-02 DIAGNOSIS — Z79.4 TYPE 2 DIABETES MELLITUS WITH DIABETIC POLYNEUROPATHY, WITH LONG-TERM CURRENT USE OF INSULIN: ICD-10-CM

## 2020-07-02 DIAGNOSIS — E11.65 TYPE 2 DIABETES MELLITUS WITH HYPERGLYCEMIA, WITH LONG-TERM CURRENT USE OF INSULIN: Primary | ICD-10-CM

## 2020-07-02 DIAGNOSIS — Z71.9 HEALTH EDUCATION/COUNSELING: ICD-10-CM

## 2020-07-02 DIAGNOSIS — Z12.39 BREAST CANCER SCREENING: ICD-10-CM

## 2020-07-02 DIAGNOSIS — E11.42 TYPE 2 DIABETES MELLITUS WITH DIABETIC POLYNEUROPATHY, WITH LONG-TERM CURRENT USE OF INSULIN: ICD-10-CM

## 2020-07-02 DIAGNOSIS — E78.00 HYPERCHOLESTEROLEMIA: ICD-10-CM

## 2020-07-02 DIAGNOSIS — Z79.4 TYPE 2 DIABETES MELLITUS WITH HYPERGLYCEMIA, WITH LONG-TERM CURRENT USE OF INSULIN: Primary | ICD-10-CM

## 2020-07-02 DIAGNOSIS — E11.29 MICROALBUMINURIA DUE TO TYPE 2 DIABETES MELLITUS: ICD-10-CM

## 2020-07-02 DIAGNOSIS — E11.69 TYPE 2 DIABETES MELLITUS WITH HYPERLIPIDEMIA: ICD-10-CM

## 2020-07-02 DIAGNOSIS — Z11.4 ENCOUNTER FOR SCREENING FOR HIV: ICD-10-CM

## 2020-07-02 DIAGNOSIS — R80.9 MICROALBUMINURIA DUE TO TYPE 2 DIABETES MELLITUS: ICD-10-CM

## 2020-07-02 DIAGNOSIS — E78.5 DYSLIPIDEMIA, GOAL LDL BELOW 100: ICD-10-CM

## 2020-07-02 DIAGNOSIS — Z59.9 FINANCIAL DIFFICULTIES: ICD-10-CM

## 2020-07-02 DIAGNOSIS — Z12.39 SCREENING FOR BREAST CANCER: ICD-10-CM

## 2020-07-02 DIAGNOSIS — I10 ESSENTIAL HYPERTENSION, BENIGN: ICD-10-CM

## 2020-07-02 PROBLEM — Z91.148 NONCOMPLIANCE W/MEDICATION TREATMENT DUE TO INTERMIT USE OF MEDICATION: Status: RESOLVED | Noted: 2019-04-17 | Resolved: 2020-07-02

## 2020-07-02 PROCEDURE — 99214 PR OFFICE/OUTPT VISIT, EST, LEVL IV, 30-39 MIN: ICD-10-PCS | Mod: S$GLB,,, | Performed by: NURSE PRACTITIONER

## 2020-07-02 PROCEDURE — 3077F SYST BP >= 140 MM HG: CPT | Mod: CPTII,S$GLB,, | Performed by: NURSE PRACTITIONER

## 2020-07-02 PROCEDURE — 3079F PR MOST RECENT DIASTOLIC BLOOD PRESSURE 80-89 MM HG: ICD-10-PCS | Mod: CPTII,S$GLB,, | Performed by: NURSE PRACTITIONER

## 2020-07-02 PROCEDURE — 99999 PR PBB SHADOW E&M-EST. PATIENT-LVL V: CPT | Mod: PBBFAC,,, | Performed by: NURSE PRACTITIONER

## 2020-07-02 PROCEDURE — 3051F PR MOST RECENT HEMOGLOBIN A1C LEVEL 7.0 - < 8.0%: ICD-10-PCS | Mod: CPTII,S$GLB,, | Performed by: NURSE PRACTITIONER

## 2020-07-02 PROCEDURE — 3079F DIAST BP 80-89 MM HG: CPT | Mod: CPTII,S$GLB,, | Performed by: NURSE PRACTITIONER

## 2020-07-02 PROCEDURE — 3008F PR BODY MASS INDEX (BMI) DOCUMENTED: ICD-10-PCS | Mod: CPTII,S$GLB,, | Performed by: NURSE PRACTITIONER

## 2020-07-02 PROCEDURE — 3077F PR MOST RECENT SYSTOLIC BLOOD PRESSURE >= 140 MM HG: ICD-10-PCS | Mod: CPTII,S$GLB,, | Performed by: NURSE PRACTITIONER

## 2020-07-02 PROCEDURE — 3051F HG A1C>EQUAL 7.0%<8.0%: CPT | Mod: CPTII,S$GLB,, | Performed by: NURSE PRACTITIONER

## 2020-07-02 PROCEDURE — 99999 PR PBB SHADOW E&M-EST. PATIENT-LVL V: ICD-10-PCS | Mod: PBBFAC,,, | Performed by: NURSE PRACTITIONER

## 2020-07-02 PROCEDURE — 3008F BODY MASS INDEX DOCD: CPT | Mod: CPTII,S$GLB,, | Performed by: NURSE PRACTITIONER

## 2020-07-02 PROCEDURE — 99214 OFFICE O/P EST MOD 30 MIN: CPT | Mod: S$GLB,,, | Performed by: NURSE PRACTITIONER

## 2020-07-02 RX ORDER — SUB-Q INSULIN DEVICE, 40 UNIT
1 EACH MISCELLANEOUS DAILY
Qty: 30 DEVICE | Refills: 6 | Status: SHIPPED | OUTPATIENT
Start: 2020-07-02 | End: 2021-03-11 | Stop reason: SDUPTHER

## 2020-07-02 RX ORDER — INSULIN ASPART 100 [IU]/ML
INJECTION, SOLUTION INTRAVENOUS; SUBCUTANEOUS
Qty: 90 ML | Refills: 2 | Status: SHIPPED | OUTPATIENT
Start: 2020-07-02 | End: 2020-10-30 | Stop reason: SDUPTHER

## 2020-07-02 RX ORDER — METFORMIN HYDROCHLORIDE 1000 MG/1
1000 TABLET ORAL 2 TIMES DAILY WITH MEALS
Qty: 180 TABLET | Refills: 1 | Status: SHIPPED | OUTPATIENT
Start: 2020-07-02 | End: 2021-03-11 | Stop reason: SDUPTHER

## 2020-07-02 RX ORDER — LISINOPRIL AND HYDROCHLOROTHIAZIDE 12.5; 2 MG/1; MG/1
1 TABLET ORAL DAILY
Qty: 90 TABLET | Refills: 1 | Status: SHIPPED | OUTPATIENT
Start: 2020-07-02 | End: 2021-03-11 | Stop reason: SDUPTHER

## 2020-07-02 RX ORDER — LANCETS
EACH MISCELLANEOUS
Qty: 150 EACH | Refills: 11 | Status: SHIPPED | OUTPATIENT
Start: 2020-07-02 | End: 2021-12-01

## 2020-07-02 RX ORDER — INSULIN PUMP SYRINGE, 3 ML
EACH MISCELLANEOUS
Qty: 1 EACH | Refills: 0 | Status: SHIPPED | OUTPATIENT
Start: 2020-07-02 | End: 2021-12-01

## 2020-07-02 RX ORDER — SIMVASTATIN 40 MG/1
80 TABLET, FILM COATED ORAL NIGHTLY
Qty: 90 TABLET | Refills: 0
Start: 2020-07-02 | End: 2021-01-14

## 2020-07-02 SDOH — SOCIAL DETERMINANTS OF HEALTH (SDOH): PROBLEM RELATED TO HOUSING AND ECONOMIC CIRCUMSTANCES, UNSPECIFIED: Z59.9

## 2020-07-02 NOTE — PATIENT INSTRUCTIONS
Continue Metformin 1000 mg 2 times per day   Continue VGo30 with Novolog -- increase to 5 clicks 3 times per day with meals. If blood sugars remain above 150-160's--- increase to 6 clicks     Cut back on fruit       For cholesterol--- I want you to double up on your Simvastatin until you run out and then we will change you to Lipitor. Call me when you are close to running out   [phone]          Snacks can be an important part of a balanced, healthy meal plan. They allow you to eat more frequently, feeling full and satisfied throughout the day. Also, they allow you to spread carbohydrates evenly, which may stabilize blood sugars.  Plus, snacks are enjoyable!     The amount of carbohydrate needed at snacks varies. Generally, less than 15 grams of carbohydrate per snack is recommended.  Below you will find some tasty treats.       0-5 gm carb   Crystal Light   Vitamin Water Zero   Herbal tea, unsweetened   2 tsp peanut butter on celery   1./2 cup sugar-free jell-o   1 sugar-free popsicle   ¼ cup blueberries   8oz Blue Amee unsweetened almond milk   5 baby carrots & celery sticks, cucumbers, bell peppers dipped in ¼ cup salsa, 2Tbsp light ranch dressing or 2Tbsp plain Greek yogurt   10 Goldfish crackers   ½ oz low-fat cheese or string cheese   1 closed handful of nuts, unsalted   1 Tbsp of sunflower seeds, unsalted   1 cup Smart Pop popcorn   1 whole grain brown rice cake        15 gm carb   1 small piece of fruit or ½ banana or 1/2 cup lite canned fruit   3 derek cracker squares   3 cups Smart Pop popcorn, top spray butter, Jewell lite salt or cinnamon and Truvia   5 Vanilla Wafers   ½ cup low fat, no added sugar ice cream or frozen yogurt (Blue bell, Blue Bunny, Weight Watchers, Skinny Cow)   ½ turkey, ham, or chicken sandwich   ½ c fruit with ½ c Cottage cheese   4-6 unsalted wheat crackers with 1 oz low fat cheese or 1 tbsp peanut butter    30-45 goldfish crackers  (depending on flavor)    7-8 Yarsanism mini brown rice cakes (caramel, apple cinnamon, chocolate)    12 Yarsanism mini brown rice cakes (cheddar, bbq, ranch)    1/3 cup hummus dip with raw veg   1/2 whole wheat reynold, 1Tbsp hummus   Mini Pizza (1/2 whole wheat English muffin, low-fat  cheese, tomato sauce)   100 calorie snack pack (Oreo, Chips Ahoy, Ritz Mix, Baked Cheetos)   4-6 oz. light or Greek Style yogurt (Chobani, Yoplait, Okios, [place]oneyfield)   ½ cup sugar-free pudding     6 in. wheat tortilla or reynold oven toasted chips (topped with spray butter flavoring, cinnamon, Truvia OR spray butter, garlic powder, chili powder)    18 BBQ Popchips (available at [employer], [employer], Fresh Market)

## 2020-07-02 NOTE — ASSESSMENT & PLAN NOTE
Uncontrolled.   Although A1c has drastically improved with proper VGo use.   Would like to get her closer to 7%   Financial difficulties are limiting treatment options.   We discussed adding in Ozempic/ Victoza/ or Trulicity- but she doesn't have the extra $25 dollars per month   We also discussed SGLT2- Jardiance-- and I have a coupon card for $0 or $5 dollars per month but she would prefer to remain on her current regimen and maxmize the VGo       -- Medication Changes:   Continue Metformin 1000 mg 2 times per day   Continue VGo30 with Novolog -- increase to 5 clicks 3 times per day with meals. If blood sugars remain above 150-160's--- increase to 6 clicks     Will revisit SGLT2 with RTC     -- Reviewed goals of therapy are to get the best control we can without hypoglycemia.  -- Reviewed patient's current insulin regimen. Clarified proper insulin dose and timing in relation to meals, etc. Insulin injection sites and proper rotation instructed.    -- Advised frequent self blood glucose monitoring.  Patient encouraged to document glucose results and bring them to every clinic visit. AC/Hs- logs provided. rx for new meter sent in  -- Hypoglycemia precautions discussed. Instructed on precautions before driving.    -- Call for Bg repeatedly < 90 or > 180.   -- Close adherence to lifestyle changes recommended.   -- Periodic follow ups for eye evaluations, foot care and dental care suggested.    Due for eye exam

## 2020-07-02 NOTE — PROGRESS NOTES
CC:   Chief Complaint   Patient presents with    Diabetes Mellitus     Cholestoral questions-what can she do to lower it       HPI: Aishwarya Jensen is a 60 y.o. female presents for a follow up visit today for the management of T2DM   She was diagnosed with Type 2 diabetes in her early 50's on routine lab work. She was initially started on Metformin. Insulin therapy was started soon after diagnosis.     Family hx of diabetes: Mother, father, brothers   Hospitalized for diabetes: denies     No personal or FH of thyroid cancer or personal of pancreatic cancer or pancreatitis.     She was last seen by me in May of 2019.  At that visit she was instructed to continue her metformin and work on better compliance, stop Victoza and changed to Ozempic weekly and start the V Go.  In November 2019 her A1c was still greater than 14% --after that A1c results she met with Marla with diabetes education to review the V Go as patient was adamant that she was wearing the V Go and administering clicks- she was doing something wrong with her V Go (I think she wasn't putting insulin in it)--now her A1c is down to 7.7% with proper VGo use.   She had stopped the victoza but never started the Ozempic? She is unsure why she never started the Ozempic.   Cost is an issue for patient.   She is resistant to adding in another other medications today as she is worried about the cost of her prescriptions.   Transportation is also an issue for patient. Right now her car is broken and she is having to find rides or take the bus.     Her blood pressure is above goal in clinic today. She is not monitoring at home. She reports compliance with her Lisinopril/ HCTZ    She works at the [employer] in housekeeping.       DIABETES COMPLICATIONS: nephropathy and peripheral neuropathy      Diabetes Management Status    ASA:  Yes - 81 mg- taking it every other day     Statin: Taking  ACE/ARB: Taking    Screening or Prevention Patient's value Goal  Complete/Controlled?   HgA1C Testing and Control   Lab Results   Component Value Date    HGBA1C 7.7 (H) 06/25/2020      Annually/Less than 8% Yes   Lipid profile : 06/25/2020 Annually Yes   LDL control Lab Results   Component Value Date    LDLCALC 105 (H) 06/25/2020    Annually/Less than 100 mg/dl  No   Nephropathy screening Lab Results   Component Value Date    LABMICR 190.0 05/01/2019     Lab Results   Component Value Date    PROTEINUA Negative 12/05/2018    Annually Yes   Blood pressure BP Readings from Last 1 Encounters:   07/02/20 (!) 154/82    Less than 140/90 No   Dilated retinal exam Most Recent Eye Exam Date: Not Found Annually No   Foot exam   : 07/02/2020 Annually Yes       CURRENT A1C:    Hemoglobin A1C   Date Value Ref Range Status   06/25/2020 7.7 (H) 4.0 - 5.6 % Final     Comment:     ADA Screening Guidelines:  5.7-6.4%  Consistent with prediabetes  >or=6.5%  Consistent with diabetes  High levels of fetal hemoglobin interfere with the HbA1C  assay. Heterozygous hemoglobin variants (HbS, HgC, etc)do  not significantly interfere with this assay.   However, presence of multiple variants may affect accuracy.     11/25/2019 >14.0 (H) 4.0 - 5.6 % Final     Comment:     ADA Screening Guidelines:  5.7-6.4%  Consistent with prediabetes  >or=6.5%  Consistent with diabetes  High levels of fetal hemoglobin interfere with the HbA1C  assay. Heterozygous hemoglobin variants (HbS, HgC, etc)do  not significantly interfere with this assay.   However, presence of multiple variants may affect accuracy.     04/17/2019 >14.0 (H) 4.0 - 5.6 % Final     Comment:     ADA Screening Guidelines:  5.7-6.4%  Consistent with prediabetes  >or=6.5%  Consistent with diabetes  High levels of fetal hemoglobin interfere with the HbA1C  assay. Heterozygous hemoglobin variants (HbS, HgC, etc)do  not significantly interfere with this assay.   However, presence of multiple variants may affect accuracy.         GOAL A1C: less than 8%     DM  MEDICATIONS USED IN THE PAST: Metformin   Victoza   Lantus   VGo  Never started Ozempic     CURRENT DIABETES MEDICATIONS: Metformin 1000 mg BID (mostly taking it just daily in the AM- she forgets the second dose), VGo30 with Novolog 4 clicks 3 times per day   Insulin:VGo  Missed doses: Yes- Missing the evening dose of the Metformin   Denies missing the VGo at all.   VGo is costing her $75 dollars.   Wearing her VGo to her abdomen   Changing the VGo every 24 hours       BLOOD GLUCOSE MONITORING:  She misplaced her meter.   She has checked over the last week.   When she was checking she was checking 2-3 times per day   Per oral recall: FBG: ~ 107  Throughout the day : 140-145-  Highest of 160.       HYPOGLYCEMIA:  Once it was 60   Medic alert bracelet: No       MEALS: eating 3 meals per day   Eating more baked chicken and veggies.   Staying away from rice but still eating bread with breakfast   Snack: fruits and peanut butter crackers   Drinks: water    Drinking juice -- body armor per day -- has 28 grams of carbs   Splash water     CURRENT EXERCISE:  No      Review of Systems  Review of Systems   Constitutional: Negative for appetite change, fatigue and unexpected weight change.   HENT: Negative for trouble swallowing.    Eyes: Negative for visual disturbance.   Respiratory: Negative for shortness of breath.    Cardiovascular: Negative for chest pain.   Gastrointestinal: Negative for nausea.   Endocrine: Negative for polydipsia, polyphagia and polyuria.   Genitourinary:        + Nocturia 1 time per night    Skin: Negative for wound.   Neurological: Positive for numbness (has improved ).       Physical Exam   Physical Exam  Vitals signs and nursing note reviewed.   Constitutional:       Appearance: She is well-developed.      Comments: Obese female patient    HENT:      Head: Normocephalic and atraumatic.      Right Ear: External ear normal.      Left Ear: External ear normal.      Nose: Nose normal.   Neck:       Musculoskeletal: Normal range of motion and neck supple.      Thyroid: No thyromegaly.      Trachea: No tracheal deviation.   Cardiovascular:      Rate and Rhythm: Normal rate and regular rhythm.      Heart sounds: No murmur.   Pulmonary:      Effort: Pulmonary effort is normal. No respiratory distress.      Breath sounds: Normal breath sounds.   Abdominal:      Palpations: Abdomen is soft.      Tenderness: There is no abdominal tenderness.      Hernia: No hernia is present.   Skin:     General: Skin is warm and dry.      Capillary Refill: Capillary refill takes less than 2 seconds.      Findings: No rash.      Comments: VGo sites are normal appearing. No lipo hypertropthy or atrophy     Neurological:      Mental Status: She is alert and oriented to person, place, and time.      Cranial Nerves: No cranial nerve deficit.   Psychiatric:         Behavior: Behavior normal.         Judgment: Judgment normal.         FOOT EXAMINATION: Appropriate footwear.     Protective Sensation (w/ 10 gram monofilament):  Right: Intact  Left: Intact    Visual Inspection:  Normal -  Bilateral, Nails Intact - without Evidence of Foot Deformity- Bilateral and Onychomycosis -  Bilateral    Pedal Pulses:   Right: Present  Left: Present    Posterior tibialis:   Right:Present  Left: Present        No results found for: TSH      Type 2 diabetes mellitus with hyperglycemia, with long-term current use of insulin  Uncontrolled.   Although A1c has drastically improved with proper VGo use.   Would like to get her closer to 7%   Financial difficulties are limiting treatment options.   We discussed adding in Ozempic/ Victoza/ or Trulicity- but she doesn't have the extra $25 dollars per month   We also discussed SGLT2- Jardiance-- and I have a coupon card for $0 or $5 dollars per month but she would prefer to remain on her current regimen and maxmize the VGo       -- Medication Changes:   Continue Metformin 1000 mg 2 times per day   Continue VGo30  with Novolog -- increase to 5 clicks 3 times per day with meals. If blood sugars remain above 150-160's--- increase to 6 clicks     Will revisit SGLT2 with RTC     -- Reviewed goals of therapy are to get the best control we can without hypoglycemia.  -- Reviewed patient's current insulin regimen. Clarified proper insulin dose and timing in relation to meals, etc. Insulin injection sites and proper rotation instructed.    -- Advised frequent self blood glucose monitoring.  Patient encouraged to document glucose results and bring them to every clinic visit. AC/Hs- logs provided. rx for new meter sent in  -- Hypoglycemia precautions discussed. Instructed on precautions before driving.    -- Call for Bg repeatedly < 90 or > 180.   -- Close adherence to lifestyle changes recommended.   -- Periodic follow ups for eye evaluations, foot care and dental care suggested.    Due for eye exam       Type 2 diabetes mellitus with diabetic polyneuropathy, with long-term current use of insulin  Optimize BG readings.   See above.   Referral to podiatry     Educated patient to check feet daily for any foreign objects and/or wounds. Discussed with patient the importance of wearing appropriate footwear at all times, not to walk barefoot ever, and to check shoes before putting them on feet. Instructed patient to keep feet dry by regularly changing shoes and socks and drying feet after baths and exercises. Also, instructed patient to report any new lesions, discolorations, or swelling to a healthcare professional.        Microalbuminuria due to type 2 diabetes mellitus  Urine MAC today   Optimize BG readings.   See above.   On ACEi       Essential hypertension, benign  BP goal is < 140/90.   Tolerating ACEI    Blood pressure goals discussed with patient  BP above goal in clinic today. - reports medication compliance.   She is following up with Saida in a couple of weeks and will have a repeat BP then     Dyslipidemia, goal LDL below  100  On statin per ADA recommendations  LDL goal < 100. LDL above goal.  Reports compliance with her Simvastatin. LFTs WNL.   Discussed changing her to Lipitor--she reports she just refilled her Simvastatin and due to cost she would prefer to remain on that for now.  Will increase to 80 mg to be more cost effective for patient.   Could remain on simvastatin 80 nightly or change to Lipitor 20 mg nightly        Follow up in about 3 months (around 10/2/2020).  Urine MAC today   Diabetic eye camera today in Saida's office.   Schedule follow up with Saida and he will look at your blood pressure   F/u with me in 3 months with labs prior   Please schedule mammogram      Addendum- eye camera is not working- will place referral for optometry       Orders Placed This Encounter   Procedures    Mammo Digital Screening Bilat     Standing Status:   Future     Standing Expiration Date:   9/2/2021     Order Specific Question:   May the Radiologist modify the order per protocol to meet the clinical needs of the patient?     Answer:   Yes    Hemoglobin A1C     Standing Status:   Future     Standing Expiration Date:   1/2/2022    Comprehensive metabolic panel     Standing Status:   Future     Standing Expiration Date:   1/2/2022    Lipid Panel     Standing Status:   Future     Standing Expiration Date:   1/2/2022    Microalbumin/creatinine urine ratio     Standing Status:   Future     Number of Occurrences:   1     Standing Expiration Date:   1/2/2022     Order Specific Question:   Specimen Source     Answer:   Urine    CBC auto differential     Standing Status:   Future     Standing Expiration Date:   1/2/2022    HIV 1/2 Ag/Ab (4th Gen)     Standing Status:   Future     Standing Expiration Date:   8/31/2021    Ambulatory referral/consult to Podiatry     Standing Status:   Future     Standing Expiration Date:   8/2/2021     Referral Priority:   Routine     Referral Type:   Consultation     Referral Reason:   Specialty  Services Required     Requested Specialty:   Podiatry     Number of Visits Requested:   1    Ambulatory referral/consult to Optometry     Standing Status:   Future     Standing Expiration Date:   8/2/2021     Referral Priority:   Routine     Referral Type:   Vision (Optometry)     Referral Reason:   Specialty Services Required     Requested Specialty:   Optometry     Number of Visits Requested:   1    Diabetic Eye Screening Photo     Standing Status:   Future     Standing Expiration Date:   1/2/2022       Recommendations were discussed with the patient in detail  The patient verbalized understanding and agrees with the plan outlined as above.

## 2020-07-02 NOTE — ASSESSMENT & PLAN NOTE
BP goal is < 140/90.   Tolerating ACEI    Blood pressure goals discussed with patient  BP above goal in clinic today. - reports medication compliance.   She is following up with Saida in a couple of weeks and will have a repeat BP then

## 2020-07-02 NOTE — ASSESSMENT & PLAN NOTE
On statin per ADA recommendations  LDL goal < 100. LDL above goal.  Reports compliance with her Simvastatin. LFTs WNL.   Discussed changing her to Lipitor--she reports she just refilled her Simvastatin and due to cost she would prefer to remain on that for now.  Will increase to 80 mg to be more cost effective for patient.   Could remain on simvastatin 80 nightly or change to Lipitor 20 mg nightly

## 2020-08-06 ENCOUNTER — OFFICE VISIT (OUTPATIENT)
Dept: PRIMARY CARE CLINIC | Facility: CLINIC | Age: 60
End: 2020-08-06
Payer: COMMERCIAL

## 2020-08-06 VITALS — WEIGHT: 220.69 LBS | BODY MASS INDEX: 34.64 KG/M2 | HEIGHT: 67 IN

## 2020-08-06 DIAGNOSIS — Z53.21 PROC/TRTMT NOT CRD OUT D/T PT LV BEF SEEN BY HLTH CARE PROV: Primary | ICD-10-CM

## 2020-08-06 PROCEDURE — 99499 UNLISTED E&M SERVICE: CPT | Mod: S$GLB,,, | Performed by: FAMILY MEDICINE

## 2020-08-06 PROCEDURE — 99999 PR PBB SHADOW E&M-EST. PATIENT-LVL I: CPT | Mod: PBBFAC,,, | Performed by: FAMILY MEDICINE

## 2020-08-06 PROCEDURE — 99499 NO LOS: ICD-10-PCS | Mod: S$GLB,,, | Performed by: FAMILY MEDICINE

## 2020-08-06 PROCEDURE — 99999 PR PBB SHADOW E&M-EST. PATIENT-LVL I: ICD-10-PCS | Mod: PBBFAC,,, | Performed by: FAMILY MEDICINE

## 2020-09-25 ENCOUNTER — OFFICE VISIT (OUTPATIENT)
Dept: PODIATRY | Facility: CLINIC | Age: 60
End: 2020-09-25
Payer: COMMERCIAL

## 2020-09-25 ENCOUNTER — OFFICE VISIT (OUTPATIENT)
Dept: PRIMARY CARE CLINIC | Facility: CLINIC | Age: 60
End: 2020-09-25
Payer: COMMERCIAL

## 2020-09-25 VITALS
TEMPERATURE: 98 F | RESPIRATION RATE: 16 BRPM | HEIGHT: 67 IN | SYSTOLIC BLOOD PRESSURE: 185 MMHG | BODY MASS INDEX: 34.17 KG/M2 | BODY MASS INDEX: 34.07 KG/M2 | DIASTOLIC BLOOD PRESSURE: 73 MMHG | WEIGHT: 217.5 LBS | OXYGEN SATURATION: 98 % | DIASTOLIC BLOOD PRESSURE: 86 MMHG | HEART RATE: 64 BPM | SYSTOLIC BLOOD PRESSURE: 132 MMHG | WEIGHT: 217.69 LBS | HEART RATE: 65 BPM

## 2020-09-25 DIAGNOSIS — L60.2 NAIL HYPERTROPHY: ICD-10-CM

## 2020-09-25 DIAGNOSIS — Z79.4 TYPE 2 DIABETES MELLITUS WITH HYPERGLYCEMIA, WITH LONG-TERM CURRENT USE OF INSULIN: ICD-10-CM

## 2020-09-25 DIAGNOSIS — L60.9 LOOSE TOENAIL: ICD-10-CM

## 2020-09-25 DIAGNOSIS — E78.5 DYSLIPIDEMIA, GOAL LDL BELOW 100: ICD-10-CM

## 2020-09-25 DIAGNOSIS — I10 ESSENTIAL HYPERTENSION, BENIGN: Primary | ICD-10-CM

## 2020-09-25 DIAGNOSIS — Z23 NEED FOR VACCINATION: ICD-10-CM

## 2020-09-25 DIAGNOSIS — B35.1 ONYCHOMYCOSIS DUE TO DERMATOPHYTE: ICD-10-CM

## 2020-09-25 DIAGNOSIS — I83.93 ASYMPTOMATIC VARICOSE VEINS OF BILATERAL LOWER EXTREMITIES: ICD-10-CM

## 2020-09-25 DIAGNOSIS — E08.43 DIABETIC AUTONOMIC NEUROPATHY ASSOCIATED WITH DIABETES MELLITUS DUE TO UNDERLYING CONDITION: ICD-10-CM

## 2020-09-25 DIAGNOSIS — E11.65 TYPE 2 DIABETES MELLITUS WITH HYPERGLYCEMIA, WITH LONG-TERM CURRENT USE OF INSULIN: ICD-10-CM

## 2020-09-25 DIAGNOSIS — Z79.4 TYPE 2 DIABETES MELLITUS WITH DIABETIC POLYNEUROPATHY, WITH LONG-TERM CURRENT USE OF INSULIN: Primary | ICD-10-CM

## 2020-09-25 DIAGNOSIS — E11.42 TYPE 2 DIABETES MELLITUS WITH DIABETIC POLYNEUROPATHY, WITH LONG-TERM CURRENT USE OF INSULIN: Primary | ICD-10-CM

## 2020-09-25 PROCEDURE — 11730 AVULSION NAIL PLATE SIMPLE 1: CPT | Mod: T5,S$GLB,, | Performed by: PODIATRIST

## 2020-09-25 PROCEDURE — 3052F HG A1C>EQUAL 8.0%<EQUAL 9.0%: CPT | Mod: CPTII,S$GLB,, | Performed by: PODIATRIST

## 2020-09-25 PROCEDURE — 3052F PR MOST RECENT HEMOGLOBIN A1C LEVEL 8.0 - < 9.0%: ICD-10-PCS | Mod: CPTII,S$GLB,, | Performed by: PODIATRIST

## 2020-09-25 PROCEDURE — 11719 TRIM NAIL(S) ANY NUMBER: CPT | Mod: 59,S$GLB,, | Performed by: PODIATRIST

## 2020-09-25 PROCEDURE — 11720 DEBRIDE NAIL 1-5: CPT | Mod: 59,S$GLB,, | Performed by: PODIATRIST

## 2020-09-25 PROCEDURE — 3051F HG A1C>EQUAL 7.0%<8.0%: CPT | Mod: CPTII,S$GLB,, | Performed by: FAMILY MEDICINE

## 2020-09-25 PROCEDURE — 3051F PR MOST RECENT HEMOGLOBIN A1C LEVEL 7.0 - < 8.0%: ICD-10-PCS | Mod: CPTII,S$GLB,, | Performed by: FAMILY MEDICINE

## 2020-09-25 PROCEDURE — 11719 PR TRIM NAIL(S): ICD-10-PCS | Mod: 59,S$GLB,, | Performed by: PODIATRIST

## 2020-09-25 PROCEDURE — 99999 PR PBB SHADOW E&M-EST. PATIENT-LVL IV: CPT | Mod: PBBFAC,,, | Performed by: PODIATRIST

## 2020-09-25 PROCEDURE — 99213 OFFICE O/P EST LOW 20 MIN: CPT | Mod: S$GLB,,, | Performed by: FAMILY MEDICINE

## 2020-09-25 PROCEDURE — 3078F PR MOST RECENT DIASTOLIC BLOOD PRESSURE < 80 MM HG: ICD-10-PCS | Mod: CPTII,S$GLB,, | Performed by: PODIATRIST

## 2020-09-25 PROCEDURE — 99213 PR OFFICE/OUTPT VISIT, EST, LEVL III, 20-29 MIN: ICD-10-PCS | Mod: S$GLB,,, | Performed by: FAMILY MEDICINE

## 2020-09-25 PROCEDURE — 3078F DIAST BP <80 MM HG: CPT | Mod: CPTII,S$GLB,, | Performed by: PODIATRIST

## 2020-09-25 PROCEDURE — 3008F BODY MASS INDEX DOCD: CPT | Mod: CPTII,S$GLB,, | Performed by: PODIATRIST

## 2020-09-25 PROCEDURE — 3079F DIAST BP 80-89 MM HG: CPT | Mod: CPTII,S$GLB,, | Performed by: FAMILY MEDICINE

## 2020-09-25 PROCEDURE — 99999 PR PBB SHADOW E&M-EST. PATIENT-LVL V: CPT | Mod: PBBFAC,,, | Performed by: FAMILY MEDICINE

## 2020-09-25 PROCEDURE — 99203 OFFICE O/P NEW LOW 30 MIN: CPT | Mod: S$GLB,,, | Performed by: PODIATRIST

## 2020-09-25 PROCEDURE — 99999 PR PBB SHADOW E&M-EST. PATIENT-LVL V: ICD-10-PCS | Mod: PBBFAC,,, | Performed by: FAMILY MEDICINE

## 2020-09-25 PROCEDURE — 3008F BODY MASS INDEX DOCD: CPT | Mod: CPTII,S$GLB,, | Performed by: FAMILY MEDICINE

## 2020-09-25 PROCEDURE — 99999 PR PBB SHADOW E&M-EST. PATIENT-LVL IV: ICD-10-PCS | Mod: PBBFAC,,, | Performed by: PODIATRIST

## 2020-09-25 PROCEDURE — 3074F SYST BP LT 130 MM HG: CPT | Mod: CPTII,S$GLB,, | Performed by: PODIATRIST

## 2020-09-25 PROCEDURE — 99203 PR OFFICE/OUTPT VISIT, NEW, LEVL III, 30-44 MIN: ICD-10-PCS | Mod: S$GLB,,, | Performed by: PODIATRIST

## 2020-09-25 PROCEDURE — 3074F PR MOST RECENT SYSTOLIC BLOOD PRESSURE < 130 MM HG: ICD-10-PCS | Mod: CPTII,S$GLB,, | Performed by: PODIATRIST

## 2020-09-25 PROCEDURE — 3075F SYST BP GE 130 - 139MM HG: CPT | Mod: CPTII,S$GLB,, | Performed by: FAMILY MEDICINE

## 2020-09-25 PROCEDURE — 3008F PR BODY MASS INDEX (BMI) DOCUMENTED: ICD-10-PCS | Mod: CPTII,S$GLB,, | Performed by: PODIATRIST

## 2020-09-25 PROCEDURE — 3008F PR BODY MASS INDEX (BMI) DOCUMENTED: ICD-10-PCS | Mod: CPTII,S$GLB,, | Performed by: FAMILY MEDICINE

## 2020-09-25 PROCEDURE — 11720 PR DEBRIDEMENT OF NAIL(S), 1-5: ICD-10-PCS | Mod: 59,S$GLB,, | Performed by: PODIATRIST

## 2020-09-25 PROCEDURE — 3079F PR MOST RECENT DIASTOLIC BLOOD PRESSURE 80-89 MM HG: ICD-10-PCS | Mod: CPTII,S$GLB,, | Performed by: FAMILY MEDICINE

## 2020-09-25 PROCEDURE — 11730 PR REMOVAL OF NAIL PLATE: ICD-10-PCS | Mod: T5,S$GLB,, | Performed by: PODIATRIST

## 2020-09-25 PROCEDURE — 3075F PR MOST RECENT SYSTOLIC BLOOD PRESS GE 130-139MM HG: ICD-10-PCS | Mod: CPTII,S$GLB,, | Performed by: FAMILY MEDICINE

## 2020-09-25 RX ORDER — ZOSTER VACCINE RECOMBINANT, ADJUVANTED 50 MCG/0.5
0.5 KIT INTRAMUSCULAR ONCE
Qty: 1 EACH | Refills: 0 | Status: SHIPPED | OUTPATIENT
Start: 2020-09-25 | End: 2020-09-25

## 2020-09-25 NOTE — PATIENT INSTRUCTIONS
For your fungal(mycotic) nails, pick one & use for at least 3-6 months:    1. Listerine mouthwash (yellow/gold) - soak for 5-10minutes daily (may re-use solution up to a week)    2. Vicks Vaporub to nails daily after a bath/end of day/bedtime.    3. Lamisil (terbanafine) 1% antifungal cream daily to nails after shower.

## 2020-09-25 NOTE — PROGRESS NOTES
"Subjective:       Patient ID: Aishwarya Jensen is a 60 y.o. female.    Chief Complaint: Hypertension and Labs Only    Blood pressure has been well controlled.  Compliant medications.  Denies chest pain, palpitations or shortness of breath.  Scheduled for labs later today, and has a follow-up with diabetes management in about a week.    Review of Systems   Constitutional: Negative for chills, fatigue and fever.   HENT: Negative for congestion.    Eyes: Negative for visual disturbance.   Respiratory: Negative for cough and shortness of breath.    Cardiovascular: Negative for chest pain.   Gastrointestinal: Negative for abdominal pain, nausea and vomiting.   Genitourinary: Negative for difficulty urinating.   Musculoskeletal: Negative for arthralgias.   Skin: Negative for rash.   Neurological: Negative for dizziness.   Psychiatric/Behavioral: Negative for sleep disturbance.       Objective:      Vitals:    09/25/20 0918 09/25/20 1003   BP: (!) 140/82 132/86   BP Location: Left arm Left arm   Patient Position: Sitting Sitting   BP Method: Medium (Manual) Medium (Manual)   Pulse: 65    Resp: 16    Temp: 97.7 °F (36.5 °C)    TempSrc: Oral    SpO2: 98%    Weight: 98.8 kg (217 lb 11.3 oz)    Height: 5' 7" (1.702 m)      Physical Exam  Vitals signs and nursing note reviewed.   Constitutional:       Appearance: She is well-developed.   HENT:      Head: Normocephalic and atraumatic.   Cardiovascular:      Rate and Rhythm: Normal rate and regular rhythm.      Heart sounds: Normal heart sounds.   Pulmonary:      Effort: Pulmonary effort is normal.      Breath sounds: Normal breath sounds.   Skin:     General: Skin is warm and dry.   Neurological:      Mental Status: She is alert and oriented to person, place, and time.         Lab Results   Component Value Date    WBC 4.10 04/17/2019    HGB 12.0 04/17/2019    HCT 39.5 04/17/2019     04/17/2019    CHOL 191 06/25/2020    TRIG 87 06/25/2020    HDL 69 06/25/2020    ALT 27 " 04/17/2019    AST 25 04/17/2019     (L) 11/25/2019    K 4.3 11/25/2019    CL 96 (L) 11/25/2019    CREATININE 1.0 11/25/2019    BUN 26 (H) 11/25/2019    CO2 29 11/25/2019    HGBA1C 7.7 (H) 06/25/2020      Assessment:       1. Essential hypertension, benign    2. Dyslipidemia, goal LDL below 100    3. Type 2 diabetes mellitus with hyperglycemia, with long-term current use of insulin    4. Need for vaccination        Plan:       Essential hypertension, benign  Well controlled on current regimen  Dyslipidemia, goal LDL below 100  Continue statin, fasting labs today  Type 2 diabetes mellitus with hyperglycemia, with long-term current use of insulin  Will get labs today, follow-up with endocrinology  Need for vaccination  -     varicella-zoster [id number]-[id number], PF, (SHINGRIX, PF,) 50 mcg/0.5 mL injection; Inject 0.5 mLs into the muscle once. for 1 dose  Dispense: 1 each; Refill: 0      Medication List with Changes/Refills   New Medications    VARICELLA-ZOSTER [id number], PF, (SHINGRIX, PF,) 50 MCG/0.5 ML INJECTION    Inject 0.5 mLs into the muscle once. for 1 dose   Current Medications    ASPIRIN (ECOTRIN) 81 MG EC TABLET    Take 1 tablet (81 mg total) by mouth once daily.    BLOOD SUGAR DIAGNOSTIC STRP    To check BG 4 times daily, to use with insurance preferred meter    BLOOD-GLUCOSE METER KIT    To check BG 4  times daily, to use with insurance preferred meter    INSULIN ASPART U-100 (NOVOLOG U-100 INSULIN ASPART) 100 UNIT/ML INJECTION    USE WITH VGO 30 4 CLICKS WITH MEALS MAX 90 UNITS SUBCUTANEOUSLY ONCE DAILY    LANCETS MISC    To check BG 4 times daily, to use with insurance preferred meter    LISINOPRIL-HYDROCHLOROTHIAZIDE (PRINZIDE,ZESTORETIC) 20-12.5 MG PER TABLET    Take 1 tablet by mouth once daily.    METFORMIN (GLUCOPHAGE) 1000 MG TABLET    Take 1 tablet (1,000 mg total) by mouth 2 (two) times daily with meals.    RANITIDINE (ZANTAC) 150 MG TABLET    Zantac 150 mg tablet   Take 1 tablet every day by oral  route.    SIMVASTATIN (ZOCOR) 40 MG TABLET    Take 2 tablets (80 mg total) by mouth every evening.    SUB-Q INSULIN DEVICE, 30 UNIT (V-GO 30) DAVID    Inject 1 Device into the skin once daily.   Discontinued Medications    NYSTATIN-TRIAMCINOLONE (MYCOLOG II) CREAM    Apply topically 2 (two) times daily.

## 2020-09-27 PROBLEM — I83.93 ASYMPTOMATIC VARICOSE VEINS OF BILATERAL LOWER EXTREMITIES: Status: ACTIVE | Noted: 2020-09-27

## 2020-09-27 PROBLEM — B35.1 ONYCHOMYCOSIS DUE TO DERMATOPHYTE: Status: ACTIVE | Noted: 2020-09-27

## 2020-09-27 PROBLEM — E08.43 DIABETIC AUTONOMIC NEUROPATHY ASSOCIATED WITH DIABETES MELLITUS DUE TO UNDERLYING CONDITION: Status: RESOLVED | Noted: 2020-09-27 | Resolved: 2020-09-27

## 2020-09-27 PROBLEM — E08.43 DIABETIC AUTONOMIC NEUROPATHY ASSOCIATED WITH DIABETES MELLITUS DUE TO UNDERLYING CONDITION: Status: ACTIVE | Noted: 2020-09-27

## 2020-09-28 NOTE — PROGRESS NOTES
Subjective:      Patient ID: Aishwarya Jensen is a 60 y.o. female.    Chief Complaint: Diabetic Foot Exam    Aishwarya is a 60 y.o. female who presents to the clinic for evaluation and treatment of high risk feet. Aishwarya has a past medical history of Diabetes mellitus, type 2, Hypercholesteremia, and Hypertension. The patient's chief complaint is long, thick toenails. This patient has documented high risk feet requiring routine maintenance secondary to peripheral neuropathy.    Patient states that she was diagnosed w/ DM 20-30 yrs.ago. She has started to get numbness & tingling on & off to her feet  recently but it is not affecting her ADL or sleep. She stays active as she works in [employer] @ the [employer] & has for 4-5yrs. Used to cook. She is hoping to get off her Insulin since she has lost wt. From 240 to 217.e over the last 3-4 months. Just came from her PCP earlier today.    PCP: Joey Cintron MD    Date Last Seen by PCP: 9/25/20     Current shoe gear:  Affected Foot: Tennis shoes     Unaffected Foot: Tennis shoes  Wears house slippers @ home    Hemoglobin A1C   Date Value Ref Range Status   09/25/2020 8.2 (H) 4.0 - 5.6 % Final     Comment:     ADA Screening Guidelines:  5.7-6.4%  Consistent with prediabetes  >or=6.5%  Consistent with diabetes  High levels of fetal hemoglobin interfere with the HbA1C  assay. Heterozygous hemoglobin variants (HbS, HgC, etc)do  not significantly interfere with this assay.   However, presence of multiple variants may affect accuracy.     06/25/2020 7.7 (H) 4.0 - 5.6 % Final     Comment:     ADA Screening Guidelines:  5.7-6.4%  Consistent with prediabetes  >or=6.5%  Consistent with diabetes  High levels of fetal hemoglobin interfere with the HbA1C  assay. Heterozygous hemoglobin variants (HbS, HgC, etc)do  not significantly interfere with this assay.   However, presence of multiple variants may affect accuracy.     11/25/2019 >14.0 (H) 4.0 - 5.6 % Final     Comment:     ADA Screening  Guidelines:  5.7-6.4%  Consistent with prediabetes  >or=6.5%  Consistent with diabetes  High levels of fetal hemoglobin interfere with the HbA1C  assay. Heterozygous hemoglobin variants (HbS, HgC, etc)do  not significantly interfere with this assay.   However, presence of multiple variants may affect accuracy.          Objective:      Review of Systems   Constitution: Positive for weight loss. Negative for decreased appetite and malaise/fatigue.   Cardiovascular: Negative for claudication and leg swelling.   Skin: Positive for color change and nail changes. Negative for suspicious lesions and unusual hair distribution.   Musculoskeletal: Negative for arthritis, joint pain, joint swelling and myalgias.   Neurological: Positive for numbness and paresthesias. Negative for sensory change and weakness.   Psychiatric/Behavioral: The patient is not nervous/anxious.        Physical Exam  Vitals signs reviewed.   Constitutional:       General: She is not in acute distress.     Appearance: Normal appearance. She is well-developed and overweight. She is not ill-appearing.   Cardiovascular:      Pulses: Normal pulses.           Dorsalis pedis pulses are 2+ on the right side and 2+ on the left side.      Comments: Venous insufficiency d/t varicosities  Musculoskeletal: Normal range of motion.         General: No swelling, tenderness, deformity or signs of injury.      Right ankle: Normal. She exhibits normal range of motion, no swelling, no deformity and normal pulse. No tenderness.      Left ankle: Normal. She exhibits normal range of motion, no swelling, no deformity and normal pulse. No tenderness.      Right lower leg: Edema (varicosities B/L LE with hyperpigmentation & slight brawny edema, R>L) present.      Left lower leg: Edema present.      Right foot: Normal range of motion. Prominent metatarsal heads present. No deformity or bunion.      Left foot: Normal range of motion. Prominent metatarsal heads present. No  deformity or bunion.   Feet:      Right foot:      Skin integrity: Skin integrity normal. No skin breakdown, callus or dry skin.      Toenail Condition: Right toenails are abnormally thick. Fungal disease present.     Left foot:      Skin integrity: Skin integrity normal. No skin breakdown, callus or dry skin.      Toenail Condition: Left toenails are abnormally thick. Fungal disease present.     Comments: Flexible cavus B/L  Left hallux nail w/subungual debris discoloration & thickening/dystrophic changes, as well as 4 B/L & 5R, consistent w/ onychomycosis.  Right hallux nail plate significantly dystrophic & crumbly,with loosening from nail bed but no disruption of skin - dry.   Skin:     General: Skin is warm and dry.      Capillary Refill: Capillary refill takes less than 2 seconds.      Findings: No bruising, signs of injury or lesion.      Nails: There is no clubbing.     Neurological:      Mental Status: She is alert and oriented to person, place, and time.      Sensory: Sensation is intact. No sensory deficit.      Motor: Motor function is intact. No weakness or atrophy.      Gait: Gait is intact. Gait normal.   Psychiatric:         Mood and Affect: Mood and affect normal. Mood is not anxious.         Speech: Speech normal.         Behavior: Behavior normal. Behavior is cooperative.          Assessment:        Encounter Diagnoses   Name Primary?    Diabetic autonomic neuropathy associated with diabetes mellitus due to underlying condition     Asymptomatic varicose veins of bilateral lower extremities     Onychomycosis due to dermatophyte     Type 2 diabetes mellitus with diabetic polyneuropathy, with long-term current use of insulin Yes    Loose toenail     Nail hypertrophy          Plan:      Onychomycosis due to dermatophyte  Debrided nails B/L 1-5 toes.  Given written instructions on options for topical antifungal tx.  Loose right hallux nail plate avulsed subtotally (dry nail bed w/ no disruption  nor sign of acute inflammation). Ptn.tolerated procedure well. No dsg,necessary.    Asymptomatic varicose veins of bilateral lower extremities  Dispensed BLE Tubigrip & advised re: OTC compression stockings.    Type 2 diabetes mellitus with diabetic polyneuropathy, with long-term current use of insulin  Ptn.advised re: decrease A1c & will re-evaluate to see if recent early neuropathic symptoms resolve.  Given Dx, advised on appropriate shoe gear to prevent injury; also, given cavus foot type, to prevent neuroarthropathy.  Rx DM shoe w/ custom-accommodative insoles dispensed.     Aishwarya was seen today for diabetic foot exam.    Diagnoses and all orders for this visit:    Type 2 diabetes mellitus with diabetic polyneuropathy, with long-term current use of insulin    Diabetic autonomic neuropathy associated with diabetes mellitus due to underlying condition  -     DIABETIC SHOES FOR HOME USE    Asymptomatic varicose veins of bilateral lower extremities    Onychomycosis due to dermatophyte    Loose toenail    Nail hypertrophy    I counseled the patient on her conditions, their implications and medical management.    - Shoe inspection. Diabetic Foot Education. Patient reminded of the importance of good nutrition and blood sugar control to help prevent podiatric complications of diabetes. Patient instructed on proper foot hygeine. We discussed wearing proper shoe gear, daily foot inspections, never walking without protective shoe gear, never putting sharp instruments to feet (she will be cautious when presents for pedicures), routine podiatric nail visits every 6-12 months, sooner prn..      - With patient's permission, nails were aggressively reduced and debrided x 1-5 B/L to their soft tissue attachment, mechanically removing all offending nail and debris, left 1, 4 B/L & 5 R. Patient relates relief following the procedure. She will continue to monitor the areas daily, inspect her feet, wear protective shoe gear when  ambulatory, moisturizer to maintain skin integrity and follow in this office in approximately 6-12 months, sooner p.r.n.    -Loose right great toenail  Verbal consent is obtained. Using a sterile nail clipper, I removed the loosened nail from the nail bed to the proximal eponychium/to the level of the nail skin fold. This was well tolerated, no bleeding. Call if pain, erythema, or bleeding.

## 2020-09-28 NOTE — ASSESSMENT & PLAN NOTE
Ptn.advised re: decrease A1c & will re-evaluate to see if recent early neuropathic symptoms resolve.  Given Dx, advised on appropriate shoe gear to prevent injury; also, given cavus foot type, to prevent neuroarthropathy.  Rx DM shoe w/ custom-accommodative insoles dispensed.

## 2020-09-28 NOTE — ASSESSMENT & PLAN NOTE
Debrided nails B/L 1-5 toes.  Given written instructions on options for topical antifungal tx.  Loose right hallux nail plate avulsed subtotally (dry nail bed w/ no disruption nor sign of acute inflammation). Ptn.tolerated procedure well. No dsg,necessary.

## 2020-09-30 ENCOUNTER — TELEPHONE (OUTPATIENT)
Dept: DIABETES | Facility: CLINIC | Age: 60
End: 2020-09-30

## 2020-09-30 NOTE — TELEPHONE ENCOUNTER
----- Message from Micki Knott NP sent at 9/29/2020 10:01 AM CDT -----  Please call lab results to patient.  Please make sure that she follows up with me as scheduled next week as her A1c level has increased to 8.2%! We need to get it back down under 8% and closer to 7%.   On her lab work her blood sugar was elevated at 213.  Her kidney function and liver function are within normal limits.  HIV screening was negative  Her cholesterol was elevated--we will need to make adjustments to her cholesterol pill at her visit next week  Please let me know if she has any questions or concerns.  I will see her next week to discuss her labs thoroughly.  Thank you

## 2020-10-05 ENCOUNTER — PATIENT MESSAGE (OUTPATIENT)
Dept: ADMINISTRATIVE | Facility: HOSPITAL | Age: 60
End: 2020-10-05

## 2020-10-21 ENCOUNTER — TELEPHONE (OUTPATIENT)
Dept: PRIMARY CARE CLINIC | Facility: CLINIC | Age: 60
End: 2020-10-21

## 2020-10-21 DIAGNOSIS — Z12.11 COLON CANCER SCREENING: Primary | ICD-10-CM

## 2020-10-21 NOTE — TELEPHONE ENCOUNTER
----- Message from Precious Watters sent at 10/21/2020 10:39 AM CDT -----  Contact: [phone]  Patient is returning a phone call.  Who left a message for the patient: ?  Does patient know what this is regarding:  ?  Comments:

## 2020-10-30 DIAGNOSIS — E11.65 TYPE 2 DIABETES MELLITUS WITH HYPERGLYCEMIA, WITH LONG-TERM CURRENT USE OF INSULIN: ICD-10-CM

## 2020-10-30 DIAGNOSIS — Z79.4 TYPE 2 DIABETES MELLITUS WITH HYPERGLYCEMIA, WITH LONG-TERM CURRENT USE OF INSULIN: ICD-10-CM

## 2020-10-30 RX ORDER — INSULIN ASPART 100 [IU]/ML
INJECTION, SOLUTION INTRAVENOUS; SUBCUTANEOUS
Qty: 90 ML | Refills: 2 | Status: SHIPPED | OUTPATIENT
Start: 2020-10-30 | End: 2021-03-11 | Stop reason: SDUPTHER

## 2020-10-30 NOTE — TELEPHONE ENCOUNTER
----- Message from Marysol Ro sent at 10/30/2020 10:59 AM CDT -----  Contact: [phone]          Requesting an RX refill or new RX.  Is this a refill or new RX: refill  RX name and strength:insulin aspart U-100 (NOVOLOG U-100 INSULIN ASPART) 100 unit/mL injection  Is this a 30 day or 90 day RX:   Pharmacy name and phone # (copy/paste from chart):  [facility] [address] [place] [address] (N), [place] - [address] [phone] (Phone)  [phone] (Fax)      Comments:

## 2020-12-11 ENCOUNTER — PATIENT MESSAGE (OUTPATIENT)
Dept: OTHER | Facility: OTHER | Age: 60
End: 2020-12-11

## 2021-01-04 ENCOUNTER — PATIENT MESSAGE (OUTPATIENT)
Dept: ADMINISTRATIVE | Facility: HOSPITAL | Age: 61
End: 2021-01-04

## 2021-01-04 ENCOUNTER — HOSPITAL ENCOUNTER (OUTPATIENT)
Dept: RADIOLOGY | Facility: OTHER | Age: 61
Discharge: HOME | End: 2021-01-04
Attending: NURSE PRACTITIONER
Payer: MEDICAID

## 2021-01-04 DIAGNOSIS — Z12.39 SCREENING FOR BREAST CANCER: ICD-10-CM

## 2021-01-04 DIAGNOSIS — Z12.31 BREAST CANCER SCREENING BY MAMMOGRAM: ICD-10-CM

## 2021-01-04 PROCEDURE — 77067 SCR MAMMO BI INCL CAD: CPT | Mod: 26,,, | Performed by: RADIOLOGY

## 2021-01-04 PROCEDURE — 77067 SCR MAMMO BI INCL CAD: CPT | Mod: TC

## 2021-01-04 PROCEDURE — 77063 BREAST TOMOSYNTHESIS BI: CPT | Mod: 26,,, | Performed by: RADIOLOGY

## 2021-01-04 PROCEDURE — 77067 MAMMO DIGITAL SCREENING BILAT WITH TOMO: ICD-10-PCS | Mod: 26,,, | Performed by: RADIOLOGY

## 2021-01-04 PROCEDURE — 77063 MAMMO DIGITAL SCREENING BILAT WITH TOMO: ICD-10-PCS | Mod: 26,,, | Performed by: RADIOLOGY

## 2021-01-05 ENCOUNTER — PATIENT MESSAGE (OUTPATIENT)
Dept: PODIATRY | Facility: CLINIC | Age: 61
End: 2021-01-05

## 2021-01-06 DIAGNOSIS — E11.9 TYPE 2 DIABETES MELLITUS WITHOUT COMPLICATION: ICD-10-CM

## 2021-01-11 ENCOUNTER — OFFICE VISIT (OUTPATIENT)
Dept: PRIMARY CARE CLINIC | Facility: CLINIC | Age: 61
End: 2021-01-11
Payer: MEDICAID

## 2021-01-11 VITALS
WEIGHT: 213.88 LBS | BODY MASS INDEX: 34.37 KG/M2 | RESPIRATION RATE: 18 BRPM | HEIGHT: 66 IN | OXYGEN SATURATION: 95 % | SYSTOLIC BLOOD PRESSURE: 138 MMHG | HEART RATE: 71 BPM | DIASTOLIC BLOOD PRESSURE: 76 MMHG

## 2021-01-11 DIAGNOSIS — S40.012D CONTUSION OF LEFT SHOULDER, SUBSEQUENT ENCOUNTER: Primary | ICD-10-CM

## 2021-01-11 DIAGNOSIS — S80.01XD CONTUSION OF RIGHT KNEE, SUBSEQUENT ENCOUNTER: ICD-10-CM

## 2021-01-11 PROCEDURE — 99214 PR OFFICE/OUTPT VISIT, EST, LEVL IV, 30-39 MIN: ICD-10-PCS | Mod: S$PBB,,, | Performed by: FAMILY MEDICINE

## 2021-01-11 PROCEDURE — 99999 PR PBB SHADOW E&M-EST. PATIENT-LVL V: ICD-10-PCS | Mod: PBBFAC,,, | Performed by: FAMILY MEDICINE

## 2021-01-11 PROCEDURE — 99215 OFFICE O/P EST HI 40 MIN: CPT | Mod: PBBFAC,PN | Performed by: FAMILY MEDICINE

## 2021-01-11 PROCEDURE — 99214 OFFICE O/P EST MOD 30 MIN: CPT | Mod: S$PBB,,, | Performed by: FAMILY MEDICINE

## 2021-01-11 PROCEDURE — 99999 PR PBB SHADOW E&M-EST. PATIENT-LVL V: CPT | Mod: PBBFAC,,, | Performed by: FAMILY MEDICINE

## 2021-01-11 RX ORDER — TRAMADOL HYDROCHLORIDE 50 MG/1
50 TABLET ORAL EVERY 6 HOURS
Qty: 20 TABLET | Refills: 0 | Status: SHIPPED | OUTPATIENT
Start: 2021-01-11 | End: 2021-01-28 | Stop reason: SDUPTHER

## 2021-01-11 RX ORDER — HYDROXYZINE PAMOATE 50 MG/1
50 CAPSULE ORAL EVERY 8 HOURS PRN
Qty: 30 CAPSULE | Refills: 0 | Status: SHIPPED | OUTPATIENT
Start: 2021-01-11 | End: 2021-03-11 | Stop reason: SDUPTHER

## 2021-01-12 ENCOUNTER — TELEPHONE (OUTPATIENT)
Dept: DIABETES | Facility: CLINIC | Age: 61
End: 2021-01-12

## 2021-01-12 ENCOUNTER — PATIENT OUTREACH (OUTPATIENT)
Dept: ADMINISTRATIVE | Facility: OTHER | Age: 61
End: 2021-01-12

## 2021-01-14 ENCOUNTER — OFFICE VISIT (OUTPATIENT)
Dept: DIABETES | Facility: CLINIC | Age: 61
End: 2021-01-14
Payer: COMMERCIAL

## 2021-01-14 VITALS
WEIGHT: 212 LBS | HEIGHT: 66 IN | DIASTOLIC BLOOD PRESSURE: 80 MMHG | SYSTOLIC BLOOD PRESSURE: 188 MMHG | BODY MASS INDEX: 34.07 KG/M2 | HEART RATE: 80 BPM | OXYGEN SATURATION: 100 %

## 2021-01-14 DIAGNOSIS — I10 ELEVATED BLOOD PRESSURE READING IN OFFICE WITH DIAGNOSIS OF HYPERTENSION: ICD-10-CM

## 2021-01-14 DIAGNOSIS — Z91.148 NONCOMPLIANCE W/MEDICATION TREATMENT DUE TO INTERMIT USE OF MEDICATION: ICD-10-CM

## 2021-01-14 DIAGNOSIS — I10 ESSENTIAL HYPERTENSION, BENIGN: ICD-10-CM

## 2021-01-14 DIAGNOSIS — R80.9 MICROALBUMINURIA DUE TO TYPE 2 DIABETES MELLITUS: ICD-10-CM

## 2021-01-14 DIAGNOSIS — Z79.4 TYPE 2 DIABETES MELLITUS WITH HYPERGLYCEMIA, WITH LONG-TERM CURRENT USE OF INSULIN: Primary | ICD-10-CM

## 2021-01-14 DIAGNOSIS — Z91.199 NONCOMPLIANCE WITH DIABETES TREATMENT: ICD-10-CM

## 2021-01-14 DIAGNOSIS — E11.65 TYPE 2 DIABETES MELLITUS WITH HYPERGLYCEMIA, WITH LONG-TERM CURRENT USE OF INSULIN: Primary | ICD-10-CM

## 2021-01-14 DIAGNOSIS — E11.29 MICROALBUMINURIA DUE TO TYPE 2 DIABETES MELLITUS: ICD-10-CM

## 2021-01-14 DIAGNOSIS — E11.42 TYPE 2 DIABETES MELLITUS WITH DIABETIC POLYNEUROPATHY, WITH LONG-TERM CURRENT USE OF INSULIN: ICD-10-CM

## 2021-01-14 DIAGNOSIS — Z71.9 HEALTH EDUCATION/COUNSELING: ICD-10-CM

## 2021-01-14 DIAGNOSIS — Z79.4 TYPE 2 DIABETES MELLITUS WITH DIABETIC POLYNEUROPATHY, WITH LONG-TERM CURRENT USE OF INSULIN: ICD-10-CM

## 2021-01-14 DIAGNOSIS — E78.5 DYSLIPIDEMIA, GOAL LDL BELOW 100: ICD-10-CM

## 2021-01-14 LAB — GLUCOSE SERPL-MCNC: 342 MG/DL (ref 70–110)

## 2021-01-14 PROCEDURE — 99214 PR OFFICE/OUTPT VISIT, EST, LEVL IV, 30-39 MIN: ICD-10-PCS | Mod: S$GLB,,, | Performed by: NURSE PRACTITIONER

## 2021-01-14 PROCEDURE — 99999 PR PBB SHADOW E&M-EST. PATIENT-LVL V: CPT | Mod: PBBFAC,,, | Performed by: NURSE PRACTITIONER

## 2021-01-14 PROCEDURE — 99999 PR PBB SHADOW E&M-EST. PATIENT-LVL V: ICD-10-PCS | Mod: PBBFAC,,, | Performed by: NURSE PRACTITIONER

## 2021-01-14 PROCEDURE — 99214 OFFICE O/P EST MOD 30 MIN: CPT | Mod: S$GLB,,, | Performed by: NURSE PRACTITIONER

## 2021-01-14 PROCEDURE — 99215 OFFICE O/P EST HI 40 MIN: CPT | Mod: PBBFAC,PN | Performed by: NURSE PRACTITIONER

## 2021-01-14 PROCEDURE — 82962 GLUCOSE BLOOD TEST: CPT | Mod: PBBFAC,PN | Performed by: NURSE PRACTITIONER

## 2021-01-14 RX ORDER — AMLODIPINE BESYLATE 5 MG/1
5 TABLET ORAL DAILY
Qty: 30 TABLET | Refills: 6 | Status: SHIPPED | OUTPATIENT
Start: 2021-01-14 | End: 2021-09-29 | Stop reason: SDUPTHER

## 2021-01-14 RX ORDER — DAPAGLIFLOZIN 5 MG/1
5 TABLET, FILM COATED ORAL DAILY
Qty: 30 TABLET | Refills: 2 | Status: SHIPPED | OUTPATIENT
Start: 2021-01-14 | End: 2021-03-12

## 2021-01-14 RX ORDER — ATORVASTATIN CALCIUM 40 MG/1
40 TABLET, FILM COATED ORAL NIGHTLY
Qty: 30 TABLET | Refills: 6 | Status: SHIPPED | OUTPATIENT
Start: 2021-01-14 | End: 2021-03-12

## 2021-01-15 ENCOUNTER — TELEPHONE (OUTPATIENT)
Dept: PRIMARY CARE CLINIC | Facility: CLINIC | Age: 61
End: 2021-01-15

## 2021-01-19 ENCOUNTER — TELEPHONE (OUTPATIENT)
Dept: PRIMARY CARE CLINIC | Facility: CLINIC | Age: 61
End: 2021-01-19

## 2021-01-21 ENCOUNTER — TELEPHONE (OUTPATIENT)
Dept: PRIMARY CARE CLINIC | Facility: CLINIC | Age: 61
End: 2021-01-21

## 2021-01-21 DIAGNOSIS — S40.012D CONTUSION OF LEFT SHOULDER, SUBSEQUENT ENCOUNTER: Primary | ICD-10-CM

## 2021-01-25 ENCOUNTER — TELEPHONE (OUTPATIENT)
Dept: PRIMARY CARE CLINIC | Facility: CLINIC | Age: 61
End: 2021-01-25

## 2021-01-27 ENCOUNTER — TELEPHONE (OUTPATIENT)
Dept: PRIMARY CARE CLINIC | Facility: CLINIC | Age: 61
End: 2021-01-27

## 2021-01-28 ENCOUNTER — OFFICE VISIT (OUTPATIENT)
Dept: PRIMARY CARE CLINIC | Facility: CLINIC | Age: 61
End: 2021-01-28
Payer: COMMERCIAL

## 2021-01-28 VITALS
HEART RATE: 68 BPM | WEIGHT: 225.31 LBS | HEIGHT: 66 IN | SYSTOLIC BLOOD PRESSURE: 162 MMHG | OXYGEN SATURATION: 99 % | BODY MASS INDEX: 36.21 KG/M2 | RESPIRATION RATE: 17 BRPM | DIASTOLIC BLOOD PRESSURE: 68 MMHG

## 2021-01-28 DIAGNOSIS — E78.5 HYPERLIPIDEMIA, UNSPECIFIED HYPERLIPIDEMIA TYPE: ICD-10-CM

## 2021-01-28 DIAGNOSIS — E66.9 CLASS 2 OBESITY WITH BODY MASS INDEX (BMI) OF 36.0 TO 36.9 IN ADULT, UNSPECIFIED OBESITY TYPE, UNSPECIFIED WHETHER SERIOUS COMORBIDITY PRESENT: ICD-10-CM

## 2021-01-28 DIAGNOSIS — I10 HYPERTENSION, UNSPECIFIED TYPE: ICD-10-CM

## 2021-01-28 DIAGNOSIS — S40.012D CONTUSION OF LEFT SHOULDER, SUBSEQUENT ENCOUNTER: ICD-10-CM

## 2021-01-28 DIAGNOSIS — E11.65 TYPE 2 DIABETES MELLITUS WITH HYPERGLYCEMIA, WITH LONG-TERM CURRENT USE OF INSULIN: ICD-10-CM

## 2021-01-28 DIAGNOSIS — S80.01XD CONTUSION OF RIGHT KNEE, SUBSEQUENT ENCOUNTER: ICD-10-CM

## 2021-01-28 DIAGNOSIS — S40.011D CONTUSION OF RIGHT SHOULDER, SUBSEQUENT ENCOUNTER: ICD-10-CM

## 2021-01-28 DIAGNOSIS — S46.911D STRAIN OF RIGHT SHOULDER, SUBSEQUENT ENCOUNTER: ICD-10-CM

## 2021-01-28 DIAGNOSIS — S80.811D ABRASION OF ANTERIOR RIGHT LOWER LEG, SUBSEQUENT ENCOUNTER: Primary | ICD-10-CM

## 2021-01-28 DIAGNOSIS — Z79.4 TYPE 2 DIABETES MELLITUS WITH HYPERGLYCEMIA, WITH LONG-TERM CURRENT USE OF INSULIN: ICD-10-CM

## 2021-01-28 PROCEDURE — 99999 PR PBB SHADOW E&M-EST. PATIENT-LVL III: ICD-10-PCS | Mod: PBBFAC,,, | Performed by: FAMILY MEDICINE

## 2021-01-28 PROCEDURE — 99214 PR OFFICE/OUTPT VISIT, EST, LEVL IV, 30-39 MIN: ICD-10-PCS | Mod: S$GLB,,, | Performed by: FAMILY MEDICINE

## 2021-01-28 PROCEDURE — 99214 OFFICE O/P EST MOD 30 MIN: CPT | Mod: S$GLB,,, | Performed by: FAMILY MEDICINE

## 2021-01-28 PROCEDURE — 99999 PR PBB SHADOW E&M-EST. PATIENT-LVL III: CPT | Mod: PBBFAC,,, | Performed by: FAMILY MEDICINE

## 2021-01-28 RX ORDER — TRAMADOL HYDROCHLORIDE 50 MG/1
50 TABLET ORAL EVERY 6 HOURS
Qty: 20 TABLET | Refills: 0 | Status: SHIPPED | OUTPATIENT
Start: 2021-01-28 | End: 2021-09-29

## 2021-01-28 RX ORDER — MUPIROCIN 20 MG/G
OINTMENT TOPICAL 3 TIMES DAILY
Qty: 22 G | Refills: 1 | Status: SHIPPED | OUTPATIENT
Start: 2021-01-28

## 2021-01-28 RX ORDER — IBUPROFEN 600 MG/1
600 TABLET ORAL EVERY 6 HOURS PRN
Qty: 100 TABLET | Refills: 5 | Status: SHIPPED | OUTPATIENT
Start: 2021-01-28 | End: 2021-09-29

## 2021-02-16 LAB
LEFT EYE DM RETINOPATHY: NEGATIVE
RIGHT EYE DM RETINOPATHY: NEGATIVE

## 2021-02-19 ENCOUNTER — PATIENT OUTREACH (OUTPATIENT)
Dept: ADMINISTRATIVE | Facility: OTHER | Age: 61
End: 2021-02-19

## 2021-02-25 ENCOUNTER — PATIENT OUTREACH (OUTPATIENT)
Dept: ADMINISTRATIVE | Facility: OTHER | Age: 61
End: 2021-02-25

## 2021-02-26 ENCOUNTER — PATIENT OUTREACH (OUTPATIENT)
Dept: ADMINISTRATIVE | Facility: HOSPITAL | Age: 61
End: 2021-02-26

## 2021-03-03 ENCOUNTER — TELEPHONE (OUTPATIENT)
Dept: PRIMARY CARE CLINIC | Facility: CLINIC | Age: 61
End: 2021-03-03

## 2021-03-03 DIAGNOSIS — Z12.11 COLON CANCER SCREENING: Primary | ICD-10-CM

## 2021-03-04 ENCOUNTER — TELEPHONE (OUTPATIENT)
Dept: SURGERY | Facility: CLINIC | Age: 61
End: 2021-03-04

## 2021-03-11 ENCOUNTER — CLINICAL SUPPORT (OUTPATIENT)
Dept: PRIMARY CARE CLINIC | Facility: CLINIC | Age: 61
End: 2021-03-11
Attending: NURSE PRACTITIONER
Payer: COMMERCIAL

## 2021-03-11 ENCOUNTER — OFFICE VISIT (OUTPATIENT)
Dept: DIABETES | Facility: CLINIC | Age: 61
End: 2021-03-11
Payer: COMMERCIAL

## 2021-03-11 VITALS
DIASTOLIC BLOOD PRESSURE: 80 MMHG | WEIGHT: 217.88 LBS | HEART RATE: 77 BPM | HEIGHT: 66 IN | BODY MASS INDEX: 35.02 KG/M2 | SYSTOLIC BLOOD PRESSURE: 158 MMHG

## 2021-03-11 DIAGNOSIS — E78.5 DYSLIPIDEMIA, GOAL LDL BELOW 100: ICD-10-CM

## 2021-03-11 DIAGNOSIS — Z71.9 HEALTH EDUCATION/COUNSELING: ICD-10-CM

## 2021-03-11 DIAGNOSIS — E11.42 TYPE 2 DIABETES MELLITUS WITH DIABETIC POLYNEUROPATHY, WITH LONG-TERM CURRENT USE OF INSULIN: ICD-10-CM

## 2021-03-11 DIAGNOSIS — E11.29 MICROALBUMINURIA DUE TO TYPE 2 DIABETES MELLITUS: ICD-10-CM

## 2021-03-11 DIAGNOSIS — Z91.148 NONCOMPLIANCE W/MEDICATION TREATMENT DUE TO INTERMIT USE OF MEDICATION: ICD-10-CM

## 2021-03-11 DIAGNOSIS — B37.9 YEAST INFECTION: ICD-10-CM

## 2021-03-11 DIAGNOSIS — I10 ESSENTIAL HYPERTENSION, BENIGN: ICD-10-CM

## 2021-03-11 DIAGNOSIS — Z79.4 TYPE 2 DIABETES MELLITUS WITH HYPERGLYCEMIA, WITH LONG-TERM CURRENT USE OF INSULIN: Primary | ICD-10-CM

## 2021-03-11 DIAGNOSIS — E11.65 TYPE 2 DIABETES MELLITUS WITH HYPERGLYCEMIA, WITH LONG-TERM CURRENT USE OF INSULIN: ICD-10-CM

## 2021-03-11 DIAGNOSIS — R80.9 MICROALBUMINURIA DUE TO TYPE 2 DIABETES MELLITUS: ICD-10-CM

## 2021-03-11 DIAGNOSIS — Z79.4 TYPE 2 DIABETES MELLITUS WITH HYPERGLYCEMIA, WITH LONG-TERM CURRENT USE OF INSULIN: ICD-10-CM

## 2021-03-11 DIAGNOSIS — E11.65 TYPE 2 DIABETES MELLITUS WITH HYPERGLYCEMIA, WITH LONG-TERM CURRENT USE OF INSULIN: Primary | ICD-10-CM

## 2021-03-11 DIAGNOSIS — Z79.4 TYPE 2 DIABETES MELLITUS WITH DIABETIC POLYNEUROPATHY, WITH LONG-TERM CURRENT USE OF INSULIN: ICD-10-CM

## 2021-03-11 DIAGNOSIS — I10 ELEVATED BLOOD PRESSURE READING IN OFFICE WITH DIAGNOSIS OF HYPERTENSION: ICD-10-CM

## 2021-03-11 DIAGNOSIS — E66.01 CLASS 2 SEVERE OBESITY DUE TO EXCESS CALORIES WITH SERIOUS COMORBIDITY AND BODY MASS INDEX (BMI) OF 35.0 TO 35.9 IN ADULT: ICD-10-CM

## 2021-03-11 PROBLEM — E66.812 CLASS 2 SEVERE OBESITY DUE TO EXCESS CALORIES WITH SERIOUS COMORBIDITY AND BODY MASS INDEX (BMI) OF 35.0 TO 35.9 IN ADULT: Status: ACTIVE | Noted: 2021-03-11

## 2021-03-11 LAB — GLUCOSE SERPL-MCNC: 126 MG/DL (ref 70–110)

## 2021-03-11 PROCEDURE — 3079F DIAST BP 80-89 MM HG: CPT | Mod: CPTII,S$GLB,, | Performed by: NURSE PRACTITIONER

## 2021-03-11 PROCEDURE — 3079F PR MOST RECENT DIASTOLIC BLOOD PRESSURE 80-89 MM HG: ICD-10-PCS | Mod: CPTII,S$GLB,, | Performed by: NURSE PRACTITIONER

## 2021-03-11 PROCEDURE — 3046F HEMOGLOBIN A1C LEVEL >9.0%: CPT | Mod: CPTII,S$GLB,, | Performed by: NURSE PRACTITIONER

## 2021-03-11 PROCEDURE — 99999 PR PBB SHADOW E&M-EST. PATIENT-LVL IV: ICD-10-PCS | Mod: PBBFAC,,, | Performed by: NURSE PRACTITIONER

## 2021-03-11 PROCEDURE — 3008F BODY MASS INDEX DOCD: CPT | Mod: CPTII,S$GLB,, | Performed by: NURSE PRACTITIONER

## 2021-03-11 PROCEDURE — 1125F AMNT PAIN NOTED PAIN PRSNT: CPT | Mod: S$GLB,,, | Performed by: NURSE PRACTITIONER

## 2021-03-11 PROCEDURE — 99999 PR PBB SHADOW E&M-EST. PATIENT-LVL IV: CPT | Mod: PBBFAC,,, | Performed by: NURSE PRACTITIONER

## 2021-03-11 PROCEDURE — 99214 PR OFFICE/OUTPT VISIT, EST, LEVL IV, 30-39 MIN: ICD-10-PCS | Mod: S$GLB,,, | Performed by: NURSE PRACTITIONER

## 2021-03-11 PROCEDURE — 3008F PR BODY MASS INDEX (BMI) DOCUMENTED: ICD-10-PCS | Mod: CPTII,S$GLB,, | Performed by: NURSE PRACTITIONER

## 2021-03-11 PROCEDURE — 82962 POCT GLUCOSE, HAND-HELD DEVICE: ICD-10-PCS | Mod: S$GLB,,, | Performed by: NURSE PRACTITIONER

## 2021-03-11 PROCEDURE — 3077F SYST BP >= 140 MM HG: CPT | Mod: CPTII,S$GLB,, | Performed by: NURSE PRACTITIONER

## 2021-03-11 PROCEDURE — 1125F PR PAIN SEVERITY QUANTIFIED, PAIN PRESENT: ICD-10-PCS | Mod: S$GLB,,, | Performed by: NURSE PRACTITIONER

## 2021-03-11 PROCEDURE — 3077F PR MOST RECENT SYSTOLIC BLOOD PRESSURE >= 140 MM HG: ICD-10-PCS | Mod: CPTII,S$GLB,, | Performed by: NURSE PRACTITIONER

## 2021-03-11 PROCEDURE — 99214 OFFICE O/P EST MOD 30 MIN: CPT | Mod: S$GLB,,, | Performed by: NURSE PRACTITIONER

## 2021-03-11 PROCEDURE — 3046F PR MOST RECENT HEMOGLOBIN A1C LEVEL > 9.0%: ICD-10-PCS | Mod: CPTII,S$GLB,, | Performed by: NURSE PRACTITIONER

## 2021-03-11 PROCEDURE — 82962 GLUCOSE BLOOD TEST: CPT | Mod: S$GLB,,, | Performed by: NURSE PRACTITIONER

## 2021-03-11 RX ORDER — LISINOPRIL AND HYDROCHLOROTHIAZIDE 12.5; 2 MG/1; MG/1
1 TABLET ORAL DAILY
Qty: 90 TABLET | Refills: 1 | Status: SHIPPED | OUTPATIENT
Start: 2021-03-11 | End: 2021-09-29 | Stop reason: SDUPTHER

## 2021-03-11 RX ORDER — INSULIN ASPART 100 [IU]/ML
INJECTION, SOLUTION INTRAVENOUS; SUBCUTANEOUS
Qty: 90 ML | Refills: 2 | Status: SHIPPED | OUTPATIENT
Start: 2021-03-11 | End: 2021-05-20 | Stop reason: SDUPTHER

## 2021-03-11 RX ORDER — HYDROXYZINE PAMOATE 50 MG/1
50 CAPSULE ORAL EVERY 8 HOURS PRN
Qty: 30 CAPSULE | Refills: 0 | Status: SHIPPED | OUTPATIENT
Start: 2021-03-11 | End: 2021-09-29 | Stop reason: SDUPTHER

## 2021-03-11 RX ORDER — FLUCONAZOLE 150 MG/1
150 TABLET ORAL ONCE
Qty: 2 TABLET | Refills: 0 | Status: SHIPPED | OUTPATIENT
Start: 2021-03-11 | End: 2021-03-11

## 2021-03-11 RX ORDER — METFORMIN HYDROCHLORIDE 1000 MG/1
1000 TABLET ORAL 2 TIMES DAILY WITH MEALS
Qty: 180 TABLET | Refills: 1 | Status: SHIPPED | OUTPATIENT
Start: 2021-03-11 | End: 2021-12-01 | Stop reason: SDUPTHER

## 2021-03-11 RX ORDER — SUB-Q INSULIN DEVICE, 40 UNIT
1 EACH MISCELLANEOUS DAILY
Qty: 30 DEVICE | Refills: 6 | Status: SHIPPED | OUTPATIENT
Start: 2021-03-11 | End: 2021-03-11

## 2021-03-11 RX ORDER — FLASH GLUCOSE SENSOR
1 KIT MISCELLANEOUS
Qty: 2 KIT | Refills: 11 | Status: SHIPPED | OUTPATIENT
Start: 2021-03-11 | End: 2021-09-29

## 2021-03-11 RX ORDER — SUB-Q INSULIN DEVICE, 40 UNIT
1 EACH MISCELLANEOUS DAILY
Qty: 90 DEVICE | Refills: 2 | Status: SHIPPED | OUTPATIENT
Start: 2021-03-11 | End: 2021-12-01 | Stop reason: SDUPTHER

## 2021-03-11 RX ORDER — FLASH GLUCOSE SENSOR
1 KIT MISCELLANEOUS
Qty: 2 KIT | Refills: 11 | Status: SHIPPED | OUTPATIENT
Start: 2021-03-11 | End: 2021-03-11 | Stop reason: SDUPTHER

## 2021-03-12 ENCOUNTER — TELEPHONE (OUTPATIENT)
Dept: DIABETES | Facility: CLINIC | Age: 61
End: 2021-03-12

## 2021-03-12 DIAGNOSIS — Z79.4 TYPE 2 DIABETES MELLITUS WITH HYPERGLYCEMIA, WITH LONG-TERM CURRENT USE OF INSULIN: Primary | ICD-10-CM

## 2021-03-12 DIAGNOSIS — E11.65 TYPE 2 DIABETES MELLITUS WITH HYPERGLYCEMIA, WITH LONG-TERM CURRENT USE OF INSULIN: Primary | ICD-10-CM

## 2021-03-12 DIAGNOSIS — E78.5 DYSLIPIDEMIA, GOAL LDL BELOW 100: ICD-10-CM

## 2021-03-12 RX ORDER — ATORVASTATIN CALCIUM 80 MG/1
80 TABLET, FILM COATED ORAL NIGHTLY
Qty: 90 TABLET | Refills: 1 | Status: SHIPPED | OUTPATIENT
Start: 2021-03-12 | End: 2021-09-29 | Stop reason: SDUPTHER

## 2021-03-12 RX ORDER — DAPAGLIFLOZIN 10 MG/1
10 TABLET, FILM COATED ORAL DAILY
Qty: 90 TABLET | Refills: 1 | Status: SHIPPED | OUTPATIENT
Start: 2021-03-12 | End: 2021-12-01 | Stop reason: SDUPTHER

## 2021-03-15 ENCOUNTER — TELEPHONE (OUTPATIENT)
Dept: DIABETES | Facility: CLINIC | Age: 61
End: 2021-03-15

## 2021-03-16 ENCOUNTER — TELEPHONE (OUTPATIENT)
Dept: DIABETES | Facility: CLINIC | Age: 61
End: 2021-03-16

## 2021-03-16 DIAGNOSIS — E78.5 DYSLIPIDEMIA, GOAL LDL BELOW 100: Primary | ICD-10-CM

## 2021-03-25 ENCOUNTER — TELEPHONE (OUTPATIENT)
Dept: DIABETES | Facility: CLINIC | Age: 61
End: 2021-03-25

## 2021-03-25 ENCOUNTER — OFFICE VISIT (OUTPATIENT)
Dept: PODIATRY | Facility: CLINIC | Age: 61
End: 2021-03-25
Payer: COMMERCIAL

## 2021-03-25 ENCOUNTER — CLINICAL SUPPORT (OUTPATIENT)
Dept: PRIMARY CARE CLINIC | Facility: CLINIC | Age: 61
End: 2021-03-25
Payer: COMMERCIAL

## 2021-03-25 VITALS
HEIGHT: 66 IN | DIASTOLIC BLOOD PRESSURE: 66 MMHG | HEART RATE: 67 BPM | TEMPERATURE: 98 F | BODY MASS INDEX: 35.41 KG/M2 | SYSTOLIC BLOOD PRESSURE: 130 MMHG | RESPIRATION RATE: 18 BRPM | WEIGHT: 220.31 LBS

## 2021-03-25 VITALS — OXYGEN SATURATION: 98 % | DIASTOLIC BLOOD PRESSURE: 66 MMHG | HEART RATE: 82 BPM | SYSTOLIC BLOOD PRESSURE: 106 MMHG

## 2021-03-25 DIAGNOSIS — E66.01 CLASS 2 SEVERE OBESITY DUE TO EXCESS CALORIES WITH SERIOUS COMORBIDITY AND BODY MASS INDEX (BMI) OF 35.0 TO 35.9 IN ADULT: ICD-10-CM

## 2021-03-25 DIAGNOSIS — Z79.4 TYPE 2 DIABETES MELLITUS WITH HYPERGLYCEMIA, WITH LONG-TERM CURRENT USE OF INSULIN: Primary | ICD-10-CM

## 2021-03-25 DIAGNOSIS — B35.1 ONYCHOMYCOSIS: ICD-10-CM

## 2021-03-25 DIAGNOSIS — L60.2 NAIL HYPERTROPHY: ICD-10-CM

## 2021-03-25 DIAGNOSIS — E11.42 TYPE 2 DIABETES MELLITUS WITH DIABETIC POLYNEUROPATHY, WITH LONG-TERM CURRENT USE OF INSULIN: Primary | ICD-10-CM

## 2021-03-25 DIAGNOSIS — E11.65 TYPE 2 DIABETES MELLITUS WITH HYPERGLYCEMIA, WITH LONG-TERM CURRENT USE OF INSULIN: Primary | ICD-10-CM

## 2021-03-25 DIAGNOSIS — Z79.4 TYPE 2 DIABETES MELLITUS WITH DIABETIC POLYNEUROPATHY, WITH LONG-TERM CURRENT USE OF INSULIN: Primary | ICD-10-CM

## 2021-03-25 DIAGNOSIS — G62.9 NEUROPATHY: ICD-10-CM

## 2021-03-25 DIAGNOSIS — I10 HYPERTENSION, UNSPECIFIED TYPE: Primary | ICD-10-CM

## 2021-03-25 DIAGNOSIS — I87.2 EDEMA OF BOTH LOWER EXTREMITIES DUE TO PERIPHERAL VENOUS INSUFFICIENCY: ICD-10-CM

## 2021-03-25 PROCEDURE — 99999 PR PBB SHADOW E&M-EST. PATIENT-LVL III: ICD-10-PCS | Mod: PBBFAC,,,

## 2021-03-25 PROCEDURE — 1125F PR PAIN SEVERITY QUANTIFIED, PAIN PRESENT: ICD-10-PCS | Mod: S$GLB,,, | Performed by: PODIATRIST

## 2021-03-25 PROCEDURE — 3046F PR MOST RECENT HEMOGLOBIN A1C LEVEL > 9.0%: ICD-10-PCS | Mod: CPTII,S$GLB,, | Performed by: PODIATRIST

## 2021-03-25 PROCEDURE — 11721 DEBRIDE NAIL 6 OR MORE: CPT | Mod: S$GLB,,, | Performed by: PODIATRIST

## 2021-03-25 PROCEDURE — 99213 PR OFFICE/OUTPT VISIT, EST, LEVL III, 20-29 MIN: ICD-10-PCS | Mod: 25,S$GLB,, | Performed by: PODIATRIST

## 2021-03-25 PROCEDURE — 1125F AMNT PAIN NOTED PAIN PRSNT: CPT | Mod: S$GLB,,, | Performed by: PODIATRIST

## 2021-03-25 PROCEDURE — 99213 OFFICE O/P EST LOW 20 MIN: CPT | Mod: 25,S$GLB,, | Performed by: PODIATRIST

## 2021-03-25 PROCEDURE — 11721 DIABETIC NAIL DEBRIDEMENT: ICD-10-PCS | Mod: S$GLB,,, | Performed by: PODIATRIST

## 2021-03-25 PROCEDURE — 3008F BODY MASS INDEX DOCD: CPT | Mod: CPTII,S$GLB,, | Performed by: PODIATRIST

## 2021-03-25 PROCEDURE — 3046F HEMOGLOBIN A1C LEVEL >9.0%: CPT | Mod: CPTII,S$GLB,, | Performed by: PODIATRIST

## 2021-03-25 PROCEDURE — 99999 PR PBB SHADOW E&M-EST. PATIENT-LVL IV: CPT | Mod: PBBFAC,,, | Performed by: PODIATRIST

## 2021-03-25 PROCEDURE — 99999 PR PBB SHADOW E&M-EST. PATIENT-LVL IV: ICD-10-PCS | Mod: PBBFAC,,, | Performed by: PODIATRIST

## 2021-03-25 PROCEDURE — 3078F PR MOST RECENT DIASTOLIC BLOOD PRESSURE < 80 MM HG: ICD-10-PCS | Mod: CPTII,S$GLB,, | Performed by: PODIATRIST

## 2021-03-25 PROCEDURE — 99999 PR PBB SHADOW E&M-EST. PATIENT-LVL III: CPT | Mod: PBBFAC,,,

## 2021-03-25 PROCEDURE — 3078F DIAST BP <80 MM HG: CPT | Mod: CPTII,S$GLB,, | Performed by: PODIATRIST

## 2021-03-25 PROCEDURE — 3075F SYST BP GE 130 - 139MM HG: CPT | Mod: CPTII,S$GLB,, | Performed by: PODIATRIST

## 2021-03-25 PROCEDURE — 3008F PR BODY MASS INDEX (BMI) DOCUMENTED: ICD-10-PCS | Mod: CPTII,S$GLB,, | Performed by: PODIATRIST

## 2021-03-25 PROCEDURE — 3075F PR MOST RECENT SYSTOLIC BLOOD PRESS GE 130-139MM HG: ICD-10-PCS | Mod: CPTII,S$GLB,, | Performed by: PODIATRIST

## 2021-03-25 RX ORDER — GABAPENTIN 300 MG/1
300 CAPSULE ORAL NIGHTLY
Qty: 30 CAPSULE | Refills: 3 | Status: SHIPPED | OUTPATIENT
Start: 2021-03-25 | End: 2021-09-29

## 2021-03-25 RX ORDER — FLUCONAZOLE 150 MG/1
TABLET ORAL
COMMUNITY
Start: 2021-03-11

## 2021-03-27 ENCOUNTER — IMMUNIZATION (OUTPATIENT)
Dept: PRIMARY CARE CLINIC | Facility: CLINIC | Age: 61
End: 2021-03-27
Payer: COMMERCIAL

## 2021-03-27 DIAGNOSIS — Z23 NEED FOR VACCINATION: Primary | ICD-10-CM

## 2021-03-27 PROCEDURE — 0011A COVID-19, MRNA, LNP-S, PF, 100 MCG/0.5 ML DOSE VACCINE: CPT | Mod: PBBFAC | Performed by: EMERGENCY MEDICINE

## 2021-04-05 ENCOUNTER — OFFICE VISIT (OUTPATIENT)
Dept: PRIMARY CARE CLINIC | Facility: CLINIC | Age: 61
End: 2021-04-05
Payer: COMMERCIAL

## 2021-04-05 ENCOUNTER — CLINICAL SUPPORT (OUTPATIENT)
Dept: PRIMARY CARE CLINIC | Facility: CLINIC | Age: 61
End: 2021-04-05
Attending: NURSE PRACTITIONER
Payer: COMMERCIAL

## 2021-04-05 VITALS
OXYGEN SATURATION: 98 % | SYSTOLIC BLOOD PRESSURE: 118 MMHG | RESPIRATION RATE: 18 BRPM | HEART RATE: 74 BPM | WEIGHT: 218.25 LBS | DIASTOLIC BLOOD PRESSURE: 72 MMHG | HEIGHT: 66 IN | BODY MASS INDEX: 35.08 KG/M2

## 2021-04-05 DIAGNOSIS — E11.65 TYPE 2 DIABETES MELLITUS WITH HYPERGLYCEMIA, WITH LONG-TERM CURRENT USE OF INSULIN: ICD-10-CM

## 2021-04-05 DIAGNOSIS — Z79.4 TYPE 2 DIABETES MELLITUS WITH HYPERGLYCEMIA, WITH LONG-TERM CURRENT USE OF INSULIN: ICD-10-CM

## 2021-04-05 DIAGNOSIS — Z12.11 COLON CANCER SCREENING: ICD-10-CM

## 2021-04-05 DIAGNOSIS — F51.01 PRIMARY INSOMNIA: Primary | ICD-10-CM

## 2021-04-05 DIAGNOSIS — Z79.4 TYPE 2 DIABETES MELLITUS WITH HYPERGLYCEMIA, WITH LONG-TERM CURRENT USE OF INSULIN: Primary | ICD-10-CM

## 2021-04-05 DIAGNOSIS — E11.65 TYPE 2 DIABETES MELLITUS WITH HYPERGLYCEMIA, WITH LONG-TERM CURRENT USE OF INSULIN: Primary | ICD-10-CM

## 2021-04-05 LAB — GLUCOSE SERPL-MCNC: 281 MG/DL (ref 70–110)

## 2021-04-05 PROCEDURE — 82962 POCT GLUCOSE, HAND-HELD DEVICE: ICD-10-PCS | Mod: S$GLB,,, | Performed by: FAMILY MEDICINE

## 2021-04-05 PROCEDURE — 1126F PR PAIN SEVERITY QUANTIFIED, NO PAIN PRESENT: ICD-10-PCS | Mod: S$GLB,,, | Performed by: FAMILY MEDICINE

## 2021-04-05 PROCEDURE — 92228 IMG RTA DETC/MNTR DS PHY/QHP: CPT | Mod: 26,S$GLB,, | Performed by: OPTOMETRIST

## 2021-04-05 PROCEDURE — 82962 GLUCOSE BLOOD TEST: CPT | Mod: S$GLB,,, | Performed by: FAMILY MEDICINE

## 2021-04-05 PROCEDURE — 3008F PR BODY MASS INDEX (BMI) DOCUMENTED: ICD-10-PCS | Mod: CPTII,S$GLB,, | Performed by: FAMILY MEDICINE

## 2021-04-05 PROCEDURE — 99999 PR PBB SHADOW E&M-EST. PATIENT-LVL V: CPT | Mod: PBBFAC,,, | Performed by: FAMILY MEDICINE

## 2021-04-05 PROCEDURE — 99213 PR OFFICE/OUTPT VISIT, EST, LEVL III, 20-29 MIN: ICD-10-PCS | Mod: S$GLB,,, | Performed by: FAMILY MEDICINE

## 2021-04-05 PROCEDURE — 3078F PR MOST RECENT DIASTOLIC BLOOD PRESSURE < 80 MM HG: ICD-10-PCS | Mod: CPTII,S$GLB,, | Performed by: FAMILY MEDICINE

## 2021-04-05 PROCEDURE — 3046F HEMOGLOBIN A1C LEVEL >9.0%: CPT | Mod: CPTII,S$GLB,, | Performed by: FAMILY MEDICINE

## 2021-04-05 PROCEDURE — 92228 IMG RTA DETC/MNTR DS PHY/QHP: CPT | Mod: TC,S$GLB,, | Performed by: FAMILY MEDICINE

## 2021-04-05 PROCEDURE — 3074F SYST BP LT 130 MM HG: CPT | Mod: CPTII,S$GLB,, | Performed by: FAMILY MEDICINE

## 2021-04-05 PROCEDURE — 92228 DIABETIC EYE SCREENING PHOTO: ICD-10-PCS | Mod: TC,S$GLB,, | Performed by: FAMILY MEDICINE

## 2021-04-05 PROCEDURE — 92228 DIABETIC EYE SCREENING PHOTO: ICD-10-PCS | Mod: 26,S$GLB,, | Performed by: OPTOMETRIST

## 2021-04-05 PROCEDURE — 1126F AMNT PAIN NOTED NONE PRSNT: CPT | Mod: S$GLB,,, | Performed by: FAMILY MEDICINE

## 2021-04-05 PROCEDURE — 3046F PR MOST RECENT HEMOGLOBIN A1C LEVEL > 9.0%: ICD-10-PCS | Mod: CPTII,S$GLB,, | Performed by: FAMILY MEDICINE

## 2021-04-05 PROCEDURE — 3078F DIAST BP <80 MM HG: CPT | Mod: CPTII,S$GLB,, | Performed by: FAMILY MEDICINE

## 2021-04-05 PROCEDURE — 99999 PR PBB SHADOW E&M-EST. PATIENT-LVL V: ICD-10-PCS | Mod: PBBFAC,,, | Performed by: FAMILY MEDICINE

## 2021-04-05 PROCEDURE — 99213 OFFICE O/P EST LOW 20 MIN: CPT | Mod: S$GLB,,, | Performed by: FAMILY MEDICINE

## 2021-04-05 PROCEDURE — 3008F BODY MASS INDEX DOCD: CPT | Mod: CPTII,S$GLB,, | Performed by: FAMILY MEDICINE

## 2021-04-05 PROCEDURE — 3074F PR MOST RECENT SYSTOLIC BLOOD PRESSURE < 130 MM HG: ICD-10-PCS | Mod: CPTII,S$GLB,, | Performed by: FAMILY MEDICINE

## 2021-04-05 RX ORDER — TRAZODONE HYDROCHLORIDE 50 MG/1
TABLET ORAL
Qty: 60 TABLET | Refills: 1 | Status: SHIPPED | OUTPATIENT
Start: 2021-04-05 | End: 2021-09-29 | Stop reason: SDUPTHER

## 2021-04-06 ENCOUNTER — TELEPHONE (OUTPATIENT)
Dept: SURGERY | Facility: CLINIC | Age: 61
End: 2021-04-06

## 2021-04-12 ENCOUNTER — TELEPHONE (OUTPATIENT)
Dept: DIABETES | Facility: CLINIC | Age: 61
End: 2021-04-12

## 2021-04-14 ENCOUNTER — TELEPHONE (OUTPATIENT)
Dept: PRIMARY CARE CLINIC | Facility: CLINIC | Age: 61
End: 2021-04-14

## 2021-04-20 ENCOUNTER — PATIENT OUTREACH (OUTPATIENT)
Dept: ADMINISTRATIVE | Facility: OTHER | Age: 61
End: 2021-04-20

## 2021-04-24 ENCOUNTER — IMMUNIZATION (OUTPATIENT)
Dept: PRIMARY CARE CLINIC | Facility: CLINIC | Age: 61
End: 2021-04-24
Payer: COMMERCIAL

## 2021-04-24 DIAGNOSIS — Z23 NEED FOR VACCINATION: Primary | ICD-10-CM

## 2021-04-24 PROCEDURE — 91301 COVID-19, MRNA, LNP-S, PF, 100 MCG/0.5 ML DOSE VACCINE: ICD-10-PCS | Mod: S$GLB,,, | Performed by: EMERGENCY MEDICINE

## 2021-04-24 PROCEDURE — 0012A COVID-19, MRNA, LNP-S, PF, 100 MCG/0.5 ML DOSE VACCINE: CPT | Mod: CV19,S$GLB,, | Performed by: EMERGENCY MEDICINE

## 2021-04-24 PROCEDURE — 0012A COVID-19, MRNA, LNP-S, PF, 100 MCG/0.5 ML DOSE VACCINE: ICD-10-PCS | Mod: CV19,S$GLB,, | Performed by: EMERGENCY MEDICINE

## 2021-04-24 PROCEDURE — 91301 COVID-19, MRNA, LNP-S, PF, 100 MCG/0.5 ML DOSE VACCINE: CPT | Mod: S$GLB,,, | Performed by: EMERGENCY MEDICINE

## 2021-05-20 DIAGNOSIS — Z79.4 TYPE 2 DIABETES MELLITUS WITH HYPERGLYCEMIA, WITH LONG-TERM CURRENT USE OF INSULIN: ICD-10-CM

## 2021-05-20 DIAGNOSIS — E11.65 TYPE 2 DIABETES MELLITUS WITH HYPERGLYCEMIA, WITH LONG-TERM CURRENT USE OF INSULIN: ICD-10-CM

## 2021-05-20 RX ORDER — INSULIN ASPART 100 [IU]/ML
INJECTION, SOLUTION INTRAVENOUS; SUBCUTANEOUS
Qty: 9 VIAL | Refills: 2 | Status: SHIPPED | OUTPATIENT
Start: 2021-05-20 | End: 2021-12-01 | Stop reason: SDUPTHER

## 2021-06-14 ENCOUNTER — PATIENT OUTREACH (OUTPATIENT)
Dept: ADMINISTRATIVE | Facility: OTHER | Age: 61
End: 2021-06-14

## 2021-07-06 ENCOUNTER — PATIENT MESSAGE (OUTPATIENT)
Dept: ADMINISTRATIVE | Facility: HOSPITAL | Age: 61
End: 2021-07-06

## 2021-08-03 ENCOUNTER — PATIENT MESSAGE (OUTPATIENT)
Dept: ADMINISTRATIVE | Facility: HOSPITAL | Age: 61
End: 2021-08-03

## 2021-09-22 ENCOUNTER — LAB VISIT (OUTPATIENT)
Dept: LAB | Facility: HOSPITAL | Age: 61
End: 2021-09-22
Attending: FAMILY MEDICINE
Payer: COMMERCIAL

## 2021-09-22 DIAGNOSIS — Z12.11 COLON CANCER SCREENING: ICD-10-CM

## 2021-09-22 PROCEDURE — 82274 ASSAY TEST FOR BLOOD FECAL: CPT | Performed by: FAMILY MEDICINE

## 2021-09-29 ENCOUNTER — OFFICE VISIT (OUTPATIENT)
Dept: PRIMARY CARE CLINIC | Facility: CLINIC | Age: 61
End: 2021-09-29
Payer: COMMERCIAL

## 2021-09-29 VITALS
HEIGHT: 66 IN | RESPIRATION RATE: 16 BRPM | DIASTOLIC BLOOD PRESSURE: 90 MMHG | SYSTOLIC BLOOD PRESSURE: 158 MMHG | HEART RATE: 61 BPM | BODY MASS INDEX: 36.46 KG/M2 | WEIGHT: 226.88 LBS | OXYGEN SATURATION: 99 %

## 2021-09-29 DIAGNOSIS — M75.52 SUBACROMIAL BURSITIS OF LEFT SHOULDER JOINT: Primary | ICD-10-CM

## 2021-09-29 DIAGNOSIS — I10 ESSENTIAL HYPERTENSION, BENIGN: ICD-10-CM

## 2021-09-29 DIAGNOSIS — E11.65 TYPE 2 DIABETES MELLITUS WITH HYPERGLYCEMIA, WITH LONG-TERM CURRENT USE OF INSULIN: ICD-10-CM

## 2021-09-29 DIAGNOSIS — Z12.11 COLON CANCER SCREENING: ICD-10-CM

## 2021-09-29 DIAGNOSIS — Z79.4 TYPE 2 DIABETES MELLITUS WITH HYPERGLYCEMIA, WITH LONG-TERM CURRENT USE OF INSULIN: ICD-10-CM

## 2021-09-29 DIAGNOSIS — F51.01 PRIMARY INSOMNIA: ICD-10-CM

## 2021-09-29 DIAGNOSIS — E78.5 DYSLIPIDEMIA, GOAL LDL BELOW 100: ICD-10-CM

## 2021-09-29 DIAGNOSIS — Z23 NEED FOR VACCINATION: ICD-10-CM

## 2021-09-29 PROCEDURE — 90471 IMMUNIZATION ADMIN: CPT | Mod: S$GLB,,, | Performed by: FAMILY MEDICINE

## 2021-09-29 PROCEDURE — 3060F POS MICROALBUMINURIA REV: CPT | Mod: CPTII,S$GLB,, | Performed by: FAMILY MEDICINE

## 2021-09-29 PROCEDURE — 1159F MED LIST DOCD IN RCRD: CPT | Mod: CPTII,S$GLB,, | Performed by: FAMILY MEDICINE

## 2021-09-29 PROCEDURE — 99999 PR PBB SHADOW E&M-EST. PATIENT-LVL III: CPT | Mod: PBBFAC,,, | Performed by: FAMILY MEDICINE

## 2021-09-29 PROCEDURE — 99999 PR PBB SHADOW E&M-EST. PATIENT-LVL III: ICD-10-PCS | Mod: PBBFAC,,, | Performed by: FAMILY MEDICINE

## 2021-09-29 PROCEDURE — 3077F SYST BP >= 140 MM HG: CPT | Mod: CPTII,S$GLB,, | Performed by: FAMILY MEDICINE

## 2021-09-29 PROCEDURE — 4010F PR ACE/ARB THEARPY RXD/TAKEN: ICD-10-PCS | Mod: CPTII,S$GLB,, | Performed by: FAMILY MEDICINE

## 2021-09-29 PROCEDURE — 3080F DIAST BP >= 90 MM HG: CPT | Mod: CPTII,S$GLB,, | Performed by: FAMILY MEDICINE

## 2021-09-29 PROCEDURE — 3008F BODY MASS INDEX DOCD: CPT | Mod: CPTII,S$GLB,, | Performed by: FAMILY MEDICINE

## 2021-09-29 PROCEDURE — 3008F PR BODY MASS INDEX (BMI) DOCUMENTED: ICD-10-PCS | Mod: CPTII,S$GLB,, | Performed by: FAMILY MEDICINE

## 2021-09-29 PROCEDURE — 3066F PR DOCUMENTATION OF TREATMENT FOR NEPHROPATHY: ICD-10-PCS | Mod: CPTII,S$GLB,, | Performed by: FAMILY MEDICINE

## 2021-09-29 PROCEDURE — 3077F PR MOST RECENT SYSTOLIC BLOOD PRESSURE >= 140 MM HG: ICD-10-PCS | Mod: CPTII,S$GLB,, | Performed by: FAMILY MEDICINE

## 2021-09-29 PROCEDURE — 3066F NEPHROPATHY DOC TX: CPT | Mod: CPTII,S$GLB,, | Performed by: FAMILY MEDICINE

## 2021-09-29 PROCEDURE — 1159F PR MEDICATION LIST DOCUMENTED IN MEDICAL RECORD: ICD-10-PCS | Mod: CPTII,S$GLB,, | Performed by: FAMILY MEDICINE

## 2021-09-29 PROCEDURE — 3046F PR MOST RECENT HEMOGLOBIN A1C LEVEL > 9.0%: ICD-10-PCS | Mod: CPTII,S$GLB,, | Performed by: FAMILY MEDICINE

## 2021-09-29 PROCEDURE — 90686 IIV4 VACC NO PRSV 0.5 ML IM: CPT | Mod: S$GLB,,, | Performed by: FAMILY MEDICINE

## 2021-09-29 PROCEDURE — 4010F ACE/ARB THERAPY RXD/TAKEN: CPT | Mod: CPTII,S$GLB,, | Performed by: FAMILY MEDICINE

## 2021-09-29 PROCEDURE — 3060F PR POS MICROALBUMINURIA RESULT DOCUMENTED/REVIEW: ICD-10-PCS | Mod: CPTII,S$GLB,, | Performed by: FAMILY MEDICINE

## 2021-09-29 PROCEDURE — 99214 PR OFFICE/OUTPT VISIT, EST, LEVL IV, 30-39 MIN: ICD-10-PCS | Mod: 25,S$GLB,, | Performed by: FAMILY MEDICINE

## 2021-09-29 PROCEDURE — 3046F HEMOGLOBIN A1C LEVEL >9.0%: CPT | Mod: CPTII,S$GLB,, | Performed by: FAMILY MEDICINE

## 2021-09-29 PROCEDURE — 90471 FLU VACCINE (QUAD) GREATER THAN OR EQUAL TO 3YO PRESERVATIVE FREE IM: ICD-10-PCS | Mod: S$GLB,,, | Performed by: FAMILY MEDICINE

## 2021-09-29 PROCEDURE — 90686 FLU VACCINE (QUAD) GREATER THAN OR EQUAL TO 3YO PRESERVATIVE FREE IM: ICD-10-PCS | Mod: S$GLB,,, | Performed by: FAMILY MEDICINE

## 2021-09-29 PROCEDURE — 3080F PR MOST RECENT DIASTOLIC BLOOD PRESSURE >= 90 MM HG: ICD-10-PCS | Mod: CPTII,S$GLB,, | Performed by: FAMILY MEDICINE

## 2021-09-29 PROCEDURE — 99214 OFFICE O/P EST MOD 30 MIN: CPT | Mod: 25,S$GLB,, | Performed by: FAMILY MEDICINE

## 2021-09-29 RX ORDER — LISINOPRIL AND HYDROCHLOROTHIAZIDE 12.5; 2 MG/1; MG/1
1 TABLET ORAL DAILY
Qty: 90 TABLET | Refills: 1 | Status: SHIPPED | OUTPATIENT
Start: 2021-09-29 | End: 2022-05-02 | Stop reason: SDUPTHER

## 2021-09-29 RX ORDER — HYDROXYZINE PAMOATE 50 MG/1
50 CAPSULE ORAL EVERY 8 HOURS PRN
Qty: 30 CAPSULE | Refills: 2 | Status: SHIPPED | OUTPATIENT
Start: 2021-09-29 | End: 2022-01-31

## 2021-09-29 RX ORDER — ATORVASTATIN CALCIUM 80 MG/1
80 TABLET, FILM COATED ORAL NIGHTLY
Qty: 90 TABLET | Refills: 1 | Status: SHIPPED | OUTPATIENT
Start: 2021-09-29 | End: 2021-12-01 | Stop reason: SDUPTHER

## 2021-09-29 RX ORDER — TRAZODONE HYDROCHLORIDE 50 MG/1
TABLET ORAL
Qty: 60 TABLET | Refills: 1 | Status: SHIPPED | OUTPATIENT
Start: 2021-09-29 | End: 2022-03-25

## 2021-09-29 RX ORDER — AMLODIPINE BESYLATE 5 MG/1
5 TABLET ORAL DAILY
Qty: 90 TABLET | Refills: 1 | Status: SHIPPED | OUTPATIENT
Start: 2021-09-29 | End: 2022-05-02 | Stop reason: SDUPTHER

## 2021-09-29 RX ORDER — DICLOFENAC SODIUM 75 MG/1
75 TABLET, DELAYED RELEASE ORAL 2 TIMES DAILY PRN
Qty: 30 TABLET | Refills: 1 | Status: SHIPPED | OUTPATIENT
Start: 2021-09-29 | End: 2022-01-31

## 2021-10-05 ENCOUNTER — OFFICE VISIT (OUTPATIENT)
Dept: PODIATRY | Facility: CLINIC | Age: 61
End: 2021-10-05
Payer: COMMERCIAL

## 2021-10-05 ENCOUNTER — PATIENT MESSAGE (OUTPATIENT)
Dept: ADMINISTRATIVE | Facility: HOSPITAL | Age: 61
End: 2021-10-05

## 2021-10-05 VITALS
SYSTOLIC BLOOD PRESSURE: 170 MMHG | DIASTOLIC BLOOD PRESSURE: 67 MMHG | WEIGHT: 223 LBS | HEIGHT: 67 IN | HEART RATE: 76 BPM | BODY MASS INDEX: 35 KG/M2

## 2021-10-05 DIAGNOSIS — L60.2 ONYCHOGRYPHOSIS: ICD-10-CM

## 2021-10-05 DIAGNOSIS — Q84.5 ENLARGED AND HYPERTROPHIC NAILS: ICD-10-CM

## 2021-10-05 DIAGNOSIS — B35.1 ONYCHOMYCOSIS: ICD-10-CM

## 2021-10-05 DIAGNOSIS — S90.121A CONTUSION OF SECOND TOE OF RIGHT FOOT, INITIAL ENCOUNTER: Primary | ICD-10-CM

## 2021-10-05 DIAGNOSIS — E11.42 TYPE 2 DIABETES MELLITUS WITH DIABETIC POLYNEUROPATHY, WITH LONG-TERM CURRENT USE OF INSULIN: ICD-10-CM

## 2021-10-05 DIAGNOSIS — Z79.4 TYPE 2 DIABETES MELLITUS WITH DIABETIC POLYNEUROPATHY, WITH LONG-TERM CURRENT USE OF INSULIN: ICD-10-CM

## 2021-10-05 PROCEDURE — 4010F PR ACE/ARB THEARPY RXD/TAKEN: ICD-10-PCS | Mod: CPTII,S$GLB,, | Performed by: PODIATRIST

## 2021-10-05 PROCEDURE — 11721 DM NAIL DEBRIDEMENT: ICD-10-PCS | Mod: 59,S$GLB,, | Performed by: PODIATRIST

## 2021-10-05 PROCEDURE — 3078F PR MOST RECENT DIASTOLIC BLOOD PRESSURE < 80 MM HG: ICD-10-PCS | Mod: CPTII,S$GLB,, | Performed by: PODIATRIST

## 2021-10-05 PROCEDURE — 11721 DEBRIDE NAIL 6 OR MORE: CPT | Mod: 59,S$GLB,, | Performed by: PODIATRIST

## 2021-10-05 PROCEDURE — 99999 PR PBB SHADOW E&M-EST. PATIENT-LVL III: CPT | Mod: PBBFAC,,, | Performed by: PODIATRIST

## 2021-10-05 PROCEDURE — 3077F PR MOST RECENT SYSTOLIC BLOOD PRESSURE >= 140 MM HG: ICD-10-PCS | Mod: CPTII,S$GLB,, | Performed by: PODIATRIST

## 2021-10-05 PROCEDURE — 3046F HEMOGLOBIN A1C LEVEL >9.0%: CPT | Mod: CPTII,S$GLB,, | Performed by: PODIATRIST

## 2021-10-05 PROCEDURE — 1159F MED LIST DOCD IN RCRD: CPT | Mod: CPTII,S$GLB,, | Performed by: PODIATRIST

## 2021-10-05 PROCEDURE — 99214 OFFICE O/P EST MOD 30 MIN: CPT | Mod: 25,S$GLB,, | Performed by: PODIATRIST

## 2021-10-05 PROCEDURE — 1160F RVW MEDS BY RX/DR IN RCRD: CPT | Mod: CPTII,S$GLB,, | Performed by: PODIATRIST

## 2021-10-05 PROCEDURE — 4010F ACE/ARB THERAPY RXD/TAKEN: CPT | Mod: CPTII,S$GLB,, | Performed by: PODIATRIST

## 2021-10-05 PROCEDURE — 3060F POS MICROALBUMINURIA REV: CPT | Mod: CPTII,S$GLB,, | Performed by: PODIATRIST

## 2021-10-05 PROCEDURE — 3060F PR POS MICROALBUMINURIA RESULT DOCUMENTED/REVIEW: ICD-10-PCS | Mod: CPTII,S$GLB,, | Performed by: PODIATRIST

## 2021-10-05 PROCEDURE — 3066F NEPHROPATHY DOC TX: CPT | Mod: CPTII,S$GLB,, | Performed by: PODIATRIST

## 2021-10-05 PROCEDURE — 3078F DIAST BP <80 MM HG: CPT | Mod: CPTII,S$GLB,, | Performed by: PODIATRIST

## 2021-10-05 PROCEDURE — 3008F BODY MASS INDEX DOCD: CPT | Mod: CPTII,S$GLB,, | Performed by: PODIATRIST

## 2021-10-05 PROCEDURE — 3077F SYST BP >= 140 MM HG: CPT | Mod: CPTII,S$GLB,, | Performed by: PODIATRIST

## 2021-10-05 PROCEDURE — 3046F PR MOST RECENT HEMOGLOBIN A1C LEVEL > 9.0%: ICD-10-PCS | Mod: CPTII,S$GLB,, | Performed by: PODIATRIST

## 2021-10-05 PROCEDURE — 1160F PR REVIEW ALL MEDS BY PRESCRIBER/CLIN PHARMACIST DOCUMENTED: ICD-10-PCS | Mod: CPTII,S$GLB,, | Performed by: PODIATRIST

## 2021-10-05 PROCEDURE — 3066F PR DOCUMENTATION OF TREATMENT FOR NEPHROPATHY: ICD-10-PCS | Mod: CPTII,S$GLB,, | Performed by: PODIATRIST

## 2021-10-05 PROCEDURE — 99214 PR OFFICE/OUTPT VISIT, EST, LEVL IV, 30-39 MIN: ICD-10-PCS | Mod: 25,S$GLB,, | Performed by: PODIATRIST

## 2021-10-05 PROCEDURE — 1159F PR MEDICATION LIST DOCUMENTED IN MEDICAL RECORD: ICD-10-PCS | Mod: CPTII,S$GLB,, | Performed by: PODIATRIST

## 2021-10-05 PROCEDURE — 99999 PR PBB SHADOW E&M-EST. PATIENT-LVL III: ICD-10-PCS | Mod: PBBFAC,,, | Performed by: PODIATRIST

## 2021-10-05 PROCEDURE — 3008F PR BODY MASS INDEX (BMI) DOCUMENTED: ICD-10-PCS | Mod: CPTII,S$GLB,, | Performed by: PODIATRIST

## 2021-10-07 LAB — HEMOCCULT STL QL IA: NEGATIVE

## 2021-10-08 ENCOUNTER — TELEPHONE (OUTPATIENT)
Dept: DIABETES | Facility: CLINIC | Age: 61
End: 2021-10-08

## 2021-10-18 ENCOUNTER — PATIENT MESSAGE (OUTPATIENT)
Dept: ADMINISTRATIVE | Facility: HOSPITAL | Age: 61
End: 2021-10-18
Payer: COMMERCIAL

## 2021-10-26 ENCOUNTER — TELEPHONE (OUTPATIENT)
Dept: PRIMARY CARE CLINIC | Facility: CLINIC | Age: 61
End: 2021-10-26
Payer: COMMERCIAL

## 2021-11-04 ENCOUNTER — PATIENT OUTREACH (OUTPATIENT)
Dept: ADMINISTRATIVE | Facility: HOSPITAL | Age: 61
End: 2021-11-04
Payer: COMMERCIAL

## 2021-11-05 ENCOUNTER — CLINICAL SUPPORT (OUTPATIENT)
Dept: PRIMARY CARE CLINIC | Facility: CLINIC | Age: 61
End: 2021-11-05
Payer: COMMERCIAL

## 2021-11-05 VITALS — DIASTOLIC BLOOD PRESSURE: 64 MMHG | SYSTOLIC BLOOD PRESSURE: 136 MMHG

## 2021-11-05 PROCEDURE — 99999 PR PBB SHADOW E&M-EST. PATIENT-LVL II: ICD-10-PCS | Mod: PBBFAC,,,

## 2021-11-05 PROCEDURE — 99999 PR PBB SHADOW E&M-EST. PATIENT-LVL II: CPT | Mod: PBBFAC,,,

## 2021-12-01 ENCOUNTER — OFFICE VISIT (OUTPATIENT)
Dept: DIABETES | Facility: CLINIC | Age: 61
End: 2021-12-01
Payer: COMMERCIAL

## 2021-12-01 VITALS
SYSTOLIC BLOOD PRESSURE: 132 MMHG | DIASTOLIC BLOOD PRESSURE: 70 MMHG | WEIGHT: 215.5 LBS | TEMPERATURE: 98 F | OXYGEN SATURATION: 96 % | HEIGHT: 67 IN | BODY MASS INDEX: 33.82 KG/M2 | HEART RATE: 65 BPM

## 2021-12-01 DIAGNOSIS — Z91.148 NONCOMPLIANCE WITH MEDICATIONS: ICD-10-CM

## 2021-12-01 DIAGNOSIS — E78.5 DYSLIPIDEMIA, GOAL LDL BELOW 100: ICD-10-CM

## 2021-12-01 DIAGNOSIS — E66.09 CLASS 1 OBESITY DUE TO EXCESS CALORIES WITH SERIOUS COMORBIDITY AND BODY MASS INDEX (BMI) OF 33.0 TO 33.9 IN ADULT: ICD-10-CM

## 2021-12-01 DIAGNOSIS — Z91.199 NONCOMPLIANCE WITH DIABETES TREATMENT: ICD-10-CM

## 2021-12-01 DIAGNOSIS — Z91.148 NONCOMPLIANCE W/MEDICATION TREATMENT DUE TO INTERMIT USE OF MEDICATION: ICD-10-CM

## 2021-12-01 DIAGNOSIS — Z71.9 HEALTH EDUCATION/COUNSELING: ICD-10-CM

## 2021-12-01 DIAGNOSIS — Z79.4 TYPE 2 DIABETES MELLITUS WITH HYPERGLYCEMIA, WITH LONG-TERM CURRENT USE OF INSULIN: Primary | ICD-10-CM

## 2021-12-01 DIAGNOSIS — Z79.4 TYPE 2 DIABETES MELLITUS WITH DIABETIC POLYNEUROPATHY, WITH LONG-TERM CURRENT USE OF INSULIN: ICD-10-CM

## 2021-12-01 DIAGNOSIS — E11.65 TYPE 2 DIABETES MELLITUS WITH HYPERGLYCEMIA, WITH LONG-TERM CURRENT USE OF INSULIN: Primary | ICD-10-CM

## 2021-12-01 DIAGNOSIS — E11.42 TYPE 2 DIABETES MELLITUS WITH DIABETIC POLYNEUROPATHY, WITH LONG-TERM CURRENT USE OF INSULIN: ICD-10-CM

## 2021-12-01 DIAGNOSIS — I10 ESSENTIAL HYPERTENSION, BENIGN: ICD-10-CM

## 2021-12-01 DIAGNOSIS — Z59.9 FINANCIAL DIFFICULTIES: ICD-10-CM

## 2021-12-01 DIAGNOSIS — E11.29 MICROALBUMINURIA DUE TO TYPE 2 DIABETES MELLITUS: ICD-10-CM

## 2021-12-01 DIAGNOSIS — R80.9 MICROALBUMINURIA DUE TO TYPE 2 DIABETES MELLITUS: ICD-10-CM

## 2021-12-01 DIAGNOSIS — Z71.89 COUNSELING AND COORDINATION OF CARE: ICD-10-CM

## 2021-12-01 PROCEDURE — 3060F PR POS MICROALBUMINURIA RESULT DOCUMENTED/REVIEW: ICD-10-PCS | Mod: CPTII,S$GLB,, | Performed by: NURSE PRACTITIONER

## 2021-12-01 PROCEDURE — 4010F PR ACE/ARB THEARPY RXD/TAKEN: ICD-10-PCS | Mod: CPTII,S$GLB,, | Performed by: NURSE PRACTITIONER

## 2021-12-01 PROCEDURE — 99999 PR PBB SHADOW E&M-EST. PATIENT-LVL V: ICD-10-PCS | Mod: PBBFAC,,, | Performed by: NURSE PRACTITIONER

## 2021-12-01 PROCEDURE — 3060F POS MICROALBUMINURIA REV: CPT | Mod: CPTII,S$GLB,, | Performed by: NURSE PRACTITIONER

## 2021-12-01 PROCEDURE — 3066F NEPHROPATHY DOC TX: CPT | Mod: CPTII,S$GLB,, | Performed by: NURSE PRACTITIONER

## 2021-12-01 PROCEDURE — 4010F ACE/ARB THERAPY RXD/TAKEN: CPT | Mod: CPTII,S$GLB,, | Performed by: NURSE PRACTITIONER

## 2021-12-01 PROCEDURE — 99214 PR OFFICE/OUTPT VISIT, EST, LEVL IV, 30-39 MIN: ICD-10-PCS | Mod: S$GLB,,, | Performed by: NURSE PRACTITIONER

## 2021-12-01 PROCEDURE — 3066F PR DOCUMENTATION OF TREATMENT FOR NEPHROPATHY: ICD-10-PCS | Mod: CPTII,S$GLB,, | Performed by: NURSE PRACTITIONER

## 2021-12-01 PROCEDURE — 99214 OFFICE O/P EST MOD 30 MIN: CPT | Mod: S$GLB,,, | Performed by: NURSE PRACTITIONER

## 2021-12-01 PROCEDURE — 99999 PR PBB SHADOW E&M-EST. PATIENT-LVL V: CPT | Mod: PBBFAC,,, | Performed by: NURSE PRACTITIONER

## 2021-12-01 RX ORDER — SUB-Q INSULIN DEVICE, 40 UNIT
1 EACH MISCELLANEOUS DAILY
Qty: 90 EACH | Refills: 2 | Status: SHIPPED | OUTPATIENT
Start: 2021-12-01 | End: 2022-03-15 | Stop reason: SDUPTHER

## 2021-12-01 RX ORDER — LANCETS
EACH MISCELLANEOUS
Qty: 150 EACH | Refills: 11 | Status: SHIPPED | OUTPATIENT
Start: 2021-12-01 | End: 2022-05-10 | Stop reason: SDUPTHER

## 2021-12-01 RX ORDER — INSULIN PUMP SYRINGE, 3 ML
EACH MISCELLANEOUS
Qty: 1 EACH | Refills: 0 | Status: SHIPPED | OUTPATIENT
Start: 2021-12-01 | End: 2022-05-10 | Stop reason: SDUPTHER

## 2021-12-01 RX ORDER — DAPAGLIFLOZIN 10 MG/1
10 TABLET, FILM COATED ORAL DAILY
Qty: 90 TABLET | Refills: 1 | Status: SHIPPED | OUTPATIENT
Start: 2021-12-01 | End: 2021-12-01 | Stop reason: SDUPTHER

## 2021-12-01 RX ORDER — CYCLOBENZAPRINE HCL 10 MG
10 TABLET ORAL DAILY
COMMUNITY

## 2021-12-01 RX ORDER — INSULIN ASPART 100 [IU]/ML
INJECTION, SOLUTION INTRAVENOUS; SUBCUTANEOUS
Qty: 9 EACH | Refills: 2 | Status: SHIPPED | OUTPATIENT
Start: 2021-12-01 | End: 2022-03-15

## 2021-12-01 RX ORDER — ATORVASTATIN CALCIUM 80 MG/1
80 TABLET, FILM COATED ORAL NIGHTLY
Qty: 90 TABLET | Refills: 1 | Status: SHIPPED | OUTPATIENT
Start: 2021-12-01 | End: 2022-01-31

## 2021-12-01 RX ORDER — METFORMIN HYDROCHLORIDE 1000 MG/1
1000 TABLET ORAL 2 TIMES DAILY WITH MEALS
Qty: 180 TABLET | Refills: 1 | Status: SHIPPED | OUTPATIENT
Start: 2021-12-01 | End: 2022-03-15 | Stop reason: SDUPTHER

## 2021-12-01 RX ORDER — DAPAGLIFLOZIN 10 MG/1
10 TABLET, FILM COATED ORAL DAILY
Qty: 90 TABLET | Refills: 1 | Status: SHIPPED | OUTPATIENT
Start: 2021-12-01 | End: 2022-03-15 | Stop reason: SDUPTHER

## 2021-12-01 SDOH — SOCIAL DETERMINANTS OF HEALTH (SDOH): PROBLEM RELATED TO HOUSING AND ECONOMIC CIRCUMSTANCES, UNSPECIFIED: Z59.9

## 2021-12-06 ENCOUNTER — TELEPHONE (OUTPATIENT)
Dept: DIABETES | Facility: CLINIC | Age: 61
End: 2021-12-06
Payer: COMMERCIAL

## 2021-12-15 ENCOUNTER — PROCEDURE VISIT (OUTPATIENT)
Dept: DIABETES | Facility: CLINIC | Age: 61
End: 2021-12-15
Payer: COMMERCIAL

## 2021-12-15 DIAGNOSIS — E11.65 TYPE 2 DIABETES MELLITUS WITH HYPERGLYCEMIA, WITH LONG-TERM CURRENT USE OF INSULIN: ICD-10-CM

## 2021-12-15 DIAGNOSIS — Z79.4 TYPE 2 DIABETES MELLITUS WITH HYPERGLYCEMIA, WITH LONG-TERM CURRENT USE OF INSULIN: ICD-10-CM

## 2022-01-05 ENCOUNTER — NUTRITION (OUTPATIENT)
Dept: DIABETES | Facility: CLINIC | Age: 62
End: 2022-01-05
Payer: COMMERCIAL

## 2022-01-05 ENCOUNTER — TELEPHONE (OUTPATIENT)
Dept: PRIMARY CARE CLINIC | Facility: CLINIC | Age: 62
End: 2022-01-05
Payer: COMMERCIAL

## 2022-01-05 DIAGNOSIS — Z12.31 ENCOUNTER FOR SCREENING MAMMOGRAM FOR MALIGNANT NEOPLASM OF BREAST: Primary | ICD-10-CM

## 2022-01-05 DIAGNOSIS — E11.65 TYPE 2 DIABETES MELLITUS WITH HYPERGLYCEMIA, WITH LONG-TERM CURRENT USE OF INSULIN: Primary | ICD-10-CM

## 2022-01-05 DIAGNOSIS — E11.42 TYPE 2 DIABETES MELLITUS WITH DIABETIC POLYNEUROPATHY, WITH LONG-TERM CURRENT USE OF INSULIN: ICD-10-CM

## 2022-01-05 DIAGNOSIS — Z79.4 TYPE 2 DIABETES MELLITUS WITH HYPERGLYCEMIA, WITH LONG-TERM CURRENT USE OF INSULIN: Primary | ICD-10-CM

## 2022-01-05 DIAGNOSIS — Z79.4 TYPE 2 DIABETES MELLITUS WITH DIABETIC POLYNEUROPATHY, WITH LONG-TERM CURRENT USE OF INSULIN: ICD-10-CM

## 2022-01-05 PROCEDURE — G0108 PR DIAB MANAGE TRN  PER INDIV: ICD-10-PCS | Mod: S$GLB,,, | Performed by: DIETITIAN, REGISTERED

## 2022-01-05 PROCEDURE — 99999 PR PBB SHADOW E&M-EST. PATIENT-LVL I: ICD-10-PCS | Mod: PBBFAC,,, | Performed by: DIETITIAN, REGISTERED

## 2022-01-05 PROCEDURE — 95250 PR GLUCOSE MONITORING,72 HRS,SUB-Q SENSOR: ICD-10-PCS | Mod: S$GLB,,, | Performed by: DIETITIAN, REGISTERED

## 2022-01-05 PROCEDURE — G0108 DIAB MANAGE TRN  PER INDIV: HCPCS | Mod: S$GLB,,, | Performed by: DIETITIAN, REGISTERED

## 2022-01-05 PROCEDURE — 95251 CONT GLUC MNTR ANALYSIS I&R: CPT | Mod: S$GLB,,, | Performed by: NURSE PRACTITIONER

## 2022-01-05 PROCEDURE — 99999 PR PBB SHADOW E&M-EST. PATIENT-LVL I: CPT | Mod: PBBFAC,,, | Performed by: DIETITIAN, REGISTERED

## 2022-01-05 PROCEDURE — 95251 PR GLUCOSE MONITOR, 72 HOUR, PHYS INTERP: ICD-10-PCS | Mod: S$GLB,,, | Performed by: NURSE PRACTITIONER

## 2022-01-05 PROCEDURE — 95250 CONT GLUC MNTR PHYS/QHP EQP: CPT | Mod: S$GLB,,, | Performed by: DIETITIAN, REGISTERED

## 2022-01-05 NOTE — TELEPHONE ENCOUNTER
----- Message from Maria Alejandra Etienne sent at 1/5/2022 11:47 AM CST -----  Caller is requesting to schedule their annual screening mammogram appointment. Order is not listed in Epic.  Please enter order and contact patient to schedule.  Would the patient like a call back, or a response through their [facility] portal?:   call

## 2022-01-05 NOTE — PROGRESS NOTES
Diabetes Care Specialist Progress Note  Author: Marla Stein RD, CDE  Date: 1/5/2022    Program Intake  Reason for Diabetes Program Visit:: Intervention  Type of Intervention:: Individual  Individual: Device Training  Device Training: Professional CGM  Current diabetes risk level:: high  In the last 12 months, have you:: none  Permission to speak with others about care:: no    Lab Results   Component Value Date    HGBA1C >14.0 (H) 10/05/2021       Clinical    Patient Health Rating  Compared to other people your age, how would you rate your health?: Good    Problem Review  Reviewed Problem List with Patient: yes  Active comorbidities affecting diabetes self-care.: yes  Comorbidities: Cardiovascular Disease,Hypertension,Neuropathy  Reviewed health maintenance: yes    Clinical Assessment  Current Diabetes Treatment: Oral Medication,Insulin (vgo)  Have you ever experienced hypoglycemia (low blood sugar)?: no  Have you ever experienced hyperglycemia (high blood sugar)?: no    Medication Information  How do you obtain your medications?: Patient drives,Family picks up  How many days a week do you miss your medications?: 3 or more  Do you use a pill box or medication chart to help you manage your medications?: No  Do you sometimes have difficulty refilling your medications?: Yes (see comment)  Medication adherence impacting ability to self-manage diabetes?: Yes    Labs  Do you have regular lab work to monitor your medications?: Yes  Type of Regular Lab Work: A1c,Cholesterol,Microalbumin,CBC,BMP  Where do you get your labs drawn?: [facility]  Lab Compliance Barriers: No (every 6 months - shouldbe every 3 months)    Nutritional Status  Diet: Regular  Meal Plan 24 Hour Recall: Breakfast,Lunch,Dinner,Snack  Meal Plan 24 Hour Recall - Breakfast: Toast and Eggs, coffee with sugar and cream  Meal Plan 24 Hour Recall - Lunch: meat and pasta and potato or sweet potato  Meal Plan 24 Hour Recall - Dinner: meat and pasta and potato or  sweet potato  Meal Plan 24 Hour Recall - Snack: peanut butter crackers, drinks water or sparkling water  Change in appetite?: No  Dentation:: Intact  Recent Changes in Weight: No Recent Weight Change  Current nutritional status an area of need that is impacting patient's ability to self-manage diabetes?: Yes    Additional Social History    Support  Does anyone support you with your diabetes care?: yes  Who supports you?: family member,self,spouse  Who takes you to your medical appointments?: self  Does the current support meet the patient's needs?: Yes  Is Support an area impacting ability to self-manage diabetes?: No    Access to Mass Media & Technology  Does the patient have access to any of the following devices or technologies?: Smart phone  Media or technology needs impacting ability to self-manage diabetes?: No    Cognitive/Behavioral Health  Alert and Oriented: Yes  Difficulty Thinking: No  Requires Prompting: No  Requires assistance for routine expression?: No  Cognitive or behavioral barriers impacting ability to self-manage diabetes?: No    Culture/[place]ze  Culture or Lutheran beliefs that may impact ability to access healthcare: No    Communication  Language preference: English  Hearing Problems: No  Vision Problems: No  Communication needs impacting ability to self-manage diabetes?: No    Health Literacy  Preferred Learning Method: Face to Face,Demonstration,Reading Materials  How often do you need to have someone help you read instructions, pamphlets, or written material from your doctor or pharmacy?: Sometimes  Health literacy needs impacting ability to self-manage diabetes?: No      Diabetes Self-Management Skills Assessment    Diabetes Disease Process/Treatment Options  Patient/caregiver able to state what happens when someone has diabetes.: yes  Patient/caregiver knows what type of diabetes they have.: yes  Diabetes Type : Type II  Patient/caregiver able to identify at least three signs and  symptoms of diabetes.: yes  Identified signs and symptoms:: frequent urination,increased thirst,fatigue  Patient able to identify at least three risk factors for diabetes.: yes  Identified risk factors:: being overweight,family history  Diabetes Disease Process/Treatment Options: Skills Assessment Completed: Yes  Assessment indicates:: Adequate understanding  Area of need?: No    Nutrition/Healthy Eating  Challenges to healthy eating:: portion control,snacking between meals and at night,eating when feeling depressed/stressed,lack of will power,eating out, going to parties  Method of carbohydrate measurement:: no method  Patient can identify foods that impact blood sugar.: yes  Patient-identified foods:: starches (bread, pasta, rice, cereal),sweets,soda  Nutrition/Healthy Eating Skills Assessment Completed:: Yes  Assessment indicates:: Knowledge deficit,Instruction Needed  Area of need?: Yes    Physical Activity/Exercise  Patient's daily activity level:: lightly active  Patient formally exercises outside of work.: no  Reasons for not exercising:: work schedule  Patient can identify forms of physical activity.: yes  Stated forms of physical activity:: moving to burn calories  Patient can identify reasons why exercise/physical activity is important in diabetes management.: no  Physical Activity/Exercise Skills Assessment Completed: : Yes  Assessment indicates:: Knowledge deficit,Instruction Needed  Area of need?: Yes    Medications  Patient is able to describe current diabetes management routine.: yes  Diabetes management routine:: insulin,oral medications  Patient is able to identify current diabetes medications, dosages, and appropriate timing of medications.: yes  Patient understands the purpose of the medications taken for diabetes.: no  Patient reports problems or concerns with current medication regimen.: yes  Medication regimen problems/concerns:: financial concerns  Medication Skills Assessment Completed::  Yes  Assessment indicates:: Knowledge deficit,Instruction Needed  Area of need?: Yes    Home Blood Glucose Monitoring  Patient states that blood sugar is checked at home daily.: no  Reasons for not monitoring:: meter broken  Home Blood Glucose Monitoring Skills Assessment Completed: : Yes  Assessment indicates:: Knowledge deficit,Instruction Needed  Area of need?: Yes              Patient did not return food and beverage log    Acute Complications  Patient is able to identify types of acute complications: Yes  Patient Identified:: Hypoglycemia,Hyperglycemia  Patient is able to state the basic meaning of hypoglycemia?: Yes  Able to state the blood sugar range for hypoglycemia?: no (see comments)  Patient can identify general symptoms of hypoglycemia: no (see comments)  Able to state proper treatment of hypoglycemia?: no (see comments)  Patient is able to state the basic meaning of hyperglycemia?: Yes  Able to state the blood sugar range for hyperglycemia?: no (see comments)  Patient able to state proper treatment of hyperglycemia?: no (see comments)  Patient able to verbalize sick day plan?: no  Acute Complications Skills Assessment Completed: : Yes  Assessment indicates:: Knowledge deficit,Instruction Needed  Area of need?: Yes    Chronic Complications  Patient can identify major chronic complications of diabetes.: yes  Stated chronic complications:: kidney disease,neuropathy/nerve damage,retinopathy  Patient can identify ways to prevent or delay diabetes complications.: yes  Stated ways to prevent complications:: controlling blood sugar  Patient is aware that having diabetes increases risk of heart disease?: No  Patient is aware that heart disease is the leading cause of death and disability in people with diabetes?: No  Patient able to state risk factors for heart disease?: Yes  Patient stated risk factors for heart disease:: High blood pressure,High cholesterol  Patient is taking statin?: Yes  Chronic  Complications Skills Assessment Completed: : Yes  Assessment indicates:: Knowledge deficit,Instruction Needed  Area of need?: Yes    Psychosocial/Coping  Patient can identify ways of coping with chronic disease.: yes  Patient-stated ways of coping with chronic disease:: spiritual/Scientology practices,support from loved ones  Psychosocial/Coping Skills Assessment Completed: : Yes  Assessment indicates:: Adequate understanding  Area of need?: No      Diabetes Self Support Plan         Assessment Summary and Plan    Based on today's diabetes care assessment, the following areas of need were identified:      Social 1/5/2022   Support No   Access to Mass Media/Tech No   Cognitive/Behavioral Health No   Culture/[employer]ist No   Communication No   Health Literacy No        Clinical 1/5/2022   Medication Adherence Yes   Lab Compliance No   Nutritional Status Yes        Diabetes Self-Management Skills 1/5/2022   Diabetes Disease Process/Treatment Options No   Nutrition/Healthy Eating Yes   Physical Activity/Exercise Yes   Medication Yes   Home Blood Glucose Monitoring Yes   Acute Complications Yes   Chronic Complications Yes   Psychosocial/Coping No          Today's interventions were provided through individual discussion, instruction, and written materials were provided.      Patient verbalized understanding of instruction and written materials.  Pt was able to return back demonstration of instructions today. Patient understood key points, needs reinforcement and further instruction.     Diabetes Self-Management Care Plan:    Today's Diabetes Self-Management Care Plan was developed with Aishwarya's input. Aishwarya has agreed to work toward the following goal(s) to improve his/her overall diabetes control.      Care Plan: Diabetes Management   Updates made since 12/6/2021 12:00 AM      Problem: Healthy Eating       Goal: Eat 3 meals daily with 30-45g/2-3 servings of Carbohydrate per meal.    Start Date: 1/5/2022   Priority:  Medium   Barriers: Lack of Motivation to Change      Task: Reviewed the sources and role of Carbohydrate, Protein, and Fat and how each nutrient impacts blood sugar. Completed 1/5/2022      Task: Provided visual examples using dry measuring cups, food models, and other familiar objects such as computer mouse, deck or cards, tennis ball etc. to help with visualization of portions.       Task: Explained how to count carbohydrates using the food label and the use of dry measuring cups for accurate carb counting.       Task: Discussed strategies for choosing healthier menu options when dining out.       Task: Recommended replacing beverages containing high sugar content with noncaloric/sugar free options and/or water.       Task: Review the importance of balancing carbohydrates with each meal using portion control techniques to count servings of carbohydrate and label reading to identify serving size and amount of total carbs per serving.       Task: Provided Sample plate method and reviewed the use of the plate to estimate amounts of carbohydrate per meal.       Task: The Diabetes Care Specialist identified the following barriers to self-manage their condition. Completed 1/5/2022   Note:    Barriers: Knowledge Deficit, Financial Concerns, Lack of Motivation to Change, and Lack of Supplies     Task: Reviewed the sources and role of Carbohydrate, Protein, and Fat and how each nutrient impacts blood sugar.       Task: Reviewed Blood Sugar Goals after eating and explained how carbohydrate sources, nonstarchy vegetables, protein and fat affects blood sugar control.       Task: Reviewed sources of Carbohydrate: Starch, Milk, Fruit, Sugar, and nonstarchy vegetables Completed 1/5/2022      Task: Discussed the importance of balancing carbohydrates with each meal using portion control techniques to count carbohydrate grams or servings, label reading to identify servings size and amount of total carbohydrate per serving, the  plate method, other       Task: Provided visual examples using dry measuring cups, food models, and other familiar objects such as computer mouse, deck or cards, tennis ball etc. to help with visualization of portions.       Task: Explained how to count carbohydrates using the food label and the use of dry measuring cups for accurate carb counting.       Task: Discussed strategies for choosing healthier menu options when dining out.       Task: Recommended and provided sample meal plan reflectin-30g and 30-45g (2-3 servings), 45-60g and 60-75g (3-4 servings) of carbohydrates per meal.       Task: Provided examples and planning techniques to help with healthy and balanced meal planning and timing of meals.       Task: Discussed strategies for choosing healthier menu options.       Task: Encouraged reducing frequency of fast food/eating out.       Task: Counseled on mindful eating practices       Task: Reviewed healthy (0-5g carbohydrate) snack options and provided list of examples.       Task: Encouraged reducing snacking and reviewed appropriate snack choices.       Task: Recommended carbohydrates per snack and provided samples of snack options.       Task: Encouraged to reduce or replace consumption of sugar-containing beverages with noncalorie or sugar-free versions and/or water; provided examples of beverage options.       Task: Provided information on low sodium, low saturated fat/low cholesterol, renal restrictions, gastroparesis nutrition, celiac nutrition, other ________       Task: Discussed guidelines for preventing, detecting and treating hypoglycemia and hyperglycemia and reviewed the importance of meal and medication timing with diabetes mediations for prevention of hypoglycemia and maximum drug benefit.       Problem: Medications       Goal: Patient Agrees to take Diabetes Medication(s) insulin via Vgo30 as prescribed.    Start Date: 2022   Priority: High   Barriers: Financial   Note:     Discussed MOA, onset, side effects, dosage of meds.   Provided with strategies for daily medication adherence (phone alarms, medication reminder apps, medication list, pill organizer, pill packing, pharmacy delivery options).    Discussed any concerns about regimen.    Reviewed significance of mealtimes and medication administration.    Reviewed correction scale insulin with mealtimes as prescribed.     Vgo Disposable Insulin Pump Education  Discussed Vgo30 disposable insulin pump. Patient educated on insulin pump therapy, the purpose of insulin pump therapy, advantages and disadvantages of insulin pump therapy vs multiple daily injections, explained the basal rate is 1.25 - patient will receive a little bit of insulin throughout 24 hours for a total of 30 units based on which VGo is used, bolus dose are delivered delivered by the double clicks - each double click administers 2 units of insulin, explained that patient will be doing 4-5 sets of double clicks before each meal, discussed when to change Vgo, demonstrated how to fill VGo with insulin, how to apply vgo and insert the needle, explained the double clicks and what each feature is on the vgo, explained and demonstrated how to safely retract the needle and remove the vgo after 24 hours, discussed ease of usage, carbohydrate counting, demonstrated applying device, inserting needle, administering bolus insulin, retracting needle, and removing/disposing of vgo. Patient states understanding and performed return demonstration. Patient started first vgo in office. Patient provided with written literature and CDE contact information        Task: Reviewed with patient all current diabetes medications and provided basic review of the purpose, dosage, frequency, side effects, and storage of both oral and injectable diabetes medications. Completed 1/5/2022      Task: Reviewed possible resources for acquiring cost prohibitive medication. Completed 1/5/2022       Task: Discussed guidelines for preventing, detecting and treating hypoglycemia and hyperglycemia and reviewed the importance of meal and medication timing with diabetes mediations for prevention of hypoglycemia and maximum drug benefit. Completed 1/5/2022      Task: Reviewed and reconciled current medication list today. Completed 1/5/2022      Task: Instructed patient on how to self-administer insulin using the VGo 30 Completed 1/5/2022      Task: Discussed any concerns regarding the current medication regimen.       Task: Reviewed possible side effects of all oral and injectable diabetes medications.       Task: Discussed and reviewed why meal and medication timing is important with diabetes medications.       Task: Reviewed action, peak, duration, dosing, and proper storage guidelines of all diabetes medications.       Task: Instructed on how to use a correction scale with mealtime insulin as prescribed.       Task: Instructed on proper injection technique for self-administration of an injectable medication.       Task: reviewed appropriate injection site selection and importance of rotating sites.       Task: Discussed importance of changing syringe/pen needle after each injection.       Task: Reviewed sources of Carbohydrate: Starch, Milk, Fruit, Sugar, and nonstarchy vegetables       Task: Instructed on application of IC, CF ratio using written examples.       Task: Discussed guidelines for preventing, detecting and treating hypoglycemia and hyperglycemia and reviewed the importance of meal and medication timing with diabetes mediations for prevention of hypoglycemia and maximum drug benefit.       Problem: Acute Complications       Goal: Patient agrees to identify and manage signs and symptoms of high/low blood sugar (hyper/hypoglycemia) by keeping a log of events and using proper treatment.    Start Date: 1/5/2022   Priority: Low   Barriers: No Barriers Identified   Note:    Hyperglycemia  ABC's of Diabetes     Discussed importance of A1c less than 6.0 to reduce risk of micro and macro complications, controlled Blood Pressure 130/80 and Cholesterol Lab Values--Total Cholesterol <200mg, LDL < 100, HDL >45 men and >50 women, Triglycerides <150mg for prevention heart disease, heart attack, stroke.   how to use a glucometer   reviewed understanding diabetes distress   reviewed current level and goal level for HgbA1c, blood glucose, microalbumin, and lipids.     Hypoglycemia  Reviewed blood glucose goals, prevention, detection, and treatment of hypoglycemia, and when to contact the clinic.       Task: Reviewed proper treatment of hypoglycemia with the rule of 15--patient to eat 15g simple carbohydrate (4 glucose tablets, 1 glucose gel, 5 pieces hard candy, ½ cup fruit juice, ½ can regular soda, etc) and wait 15 minutes and recheck home glucose.       Task: Reviewed common causes and precautions to help prevent hyper/hypoglycemic events.       Task: Reviewed signs and symptoms of hyper/hypoglycemia, what range is considered to be hyper/hypoglycemia, and when to seek further medical attention. Completed 1/5/2022      Task: Discussed, sick day planning, natural disaster planning, and/or travel planning to prevent hyper/hypoglycemia.       Task: Discussed risk factors for developing diabetic ketoacidosis (DKA), strategies for reducing risk, testing with ketone test strips if BG is >240mg/dl, basic protocol for managing DKA, and when to seek further medical attention.           Follow Up Plan     Follow up in about 4 weeks (around 2/2/2022) for Carbohydrate Counting, General Follow-up, Personal CGM Upload.    Today's care plan and follow up schedule was discussed with patient.  Aishwarya verbalized understanding of the care plan, goals, and agrees to follow up plan.        The patient was encouraged to communicate with his/her health care provider/physician and care team regarding his/her condition(s) and treatment.  I  provided the patient with my contact information today and encouraged to contact me via phone or [facility] Patient Portal as needed.     Length of Visit   Total Time: 60 Minutes

## 2022-01-06 ENCOUNTER — TELEPHONE (OUTPATIENT)
Dept: ADMINISTRATIVE | Facility: HOSPITAL | Age: 62
End: 2022-01-06
Payer: COMMERCIAL

## 2022-01-17 ENCOUNTER — TELEPHONE (OUTPATIENT)
Dept: DIABETES | Facility: CLINIC | Age: 62
End: 2022-01-17
Payer: COMMERCIAL

## 2022-01-17 NOTE — TELEPHONE ENCOUNTER
Please call patient and let her know that I reviewed her professional CGM.  Her blood sugar readings are still running above goal.  Her average blood sugar was 203 and she was in target range 44% of the time.  It looks like she may be missing doses of medication or wearing her V Go for more than 24 hours.  I would like to see if she could come into clinic on Friday January 21st-- so we can review this together

## 2022-01-18 ENCOUNTER — PATIENT OUTREACH (OUTPATIENT)
Dept: ADMINISTRATIVE | Facility: OTHER | Age: 62
End: 2022-01-18
Payer: COMMERCIAL

## 2022-01-18 ENCOUNTER — PATIENT MESSAGE (OUTPATIENT)
Dept: DIABETES | Facility: CLINIC | Age: 62
End: 2022-01-18
Payer: COMMERCIAL

## 2022-01-18 ENCOUNTER — TELEPHONE (OUTPATIENT)
Dept: DIABETES | Facility: CLINIC | Age: 62
End: 2022-01-18
Payer: COMMERCIAL

## 2022-01-18 NOTE — PROGRESS NOTES
LINKS immunization registry updated  Care Everywhere updated  Health Maintenance updated  Chart reviewed for overdue Proactive [facility] Encounters (STEPHANIE) health maintenance testing (CRS, Breast Ca, Diabetic Eye Exam)   Orders entered:N/A

## 2022-01-18 NOTE — TELEPHONE ENCOUNTER
----- Message from Micki Knott NP sent at 1/17/2022 10:17 AM CST -----  Please call lab results to patient  Her A1c has come down from greater than 14% to 9.4%.  I am very proud of her.  Keep up the good work. We are heading in the right direction.  We want her to be less than 8% closer to 7%   Her cholesterol is still above goal.  Please make sure that she is taking her atorvastatin 80 mg. If she is not missing any doses then we may need to add in an additional agent.  Her kidney function was stable.  Her blood sugar was actually within range on lab work at 103.  Please make sure that she follows up with me as scheduled so we can discuss her blood sugar readings and continue to improve them closer to goal  Let me know if she is taking the atorvastatin as prescribed as then I will add an additional agent--if she is missing doses please encourage patient to be compliant with medication

## 2022-01-21 ENCOUNTER — TELEPHONE (OUTPATIENT)
Dept: DIABETES | Facility: CLINIC | Age: 62
End: 2022-01-21
Payer: COMMERCIAL

## 2022-01-24 ENCOUNTER — TELEPHONE (OUTPATIENT)
Dept: PRIMARY CARE CLINIC | Facility: CLINIC | Age: 62
End: 2022-01-24
Payer: COMMERCIAL

## 2022-01-24 NOTE — TELEPHONE ENCOUNTER
----- Message from Maria Alejandra Etienne sent at 1/24/2022 10:32 AM CST -----  Contact: pt  Pt stated her insurance company is waiting on a PA for the Diabetic Patches for this patient.Please advise

## 2022-01-25 NOTE — TELEPHONE ENCOUNTER
I just spoke with her and informed her the PA had been approved and she is reaching out to her pharmacy.

## 2022-01-25 NOTE — TELEPHONE ENCOUNTER
----- Message from Racquel Cochran sent at 1/25/2022 11:19 AM CST -----  Contact: [phone]  Pt would like a call to update on the status of V-Go Patch refills. Please advise.      [facility]  [place] - [address]  [address] [place] [address]  Phone: [phone] Fax: [phone]

## 2022-01-31 ENCOUNTER — OFFICE VISIT (OUTPATIENT)
Dept: PRIMARY CARE CLINIC | Facility: CLINIC | Age: 62
End: 2022-01-31
Payer: COMMERCIAL

## 2022-01-31 VITALS
WEIGHT: 226.88 LBS | BODY MASS INDEX: 35.61 KG/M2 | HEART RATE: 66 BPM | DIASTOLIC BLOOD PRESSURE: 72 MMHG | OXYGEN SATURATION: 95 % | SYSTOLIC BLOOD PRESSURE: 124 MMHG | RESPIRATION RATE: 18 BRPM | HEIGHT: 67 IN

## 2022-01-31 DIAGNOSIS — E11.65 TYPE 2 DIABETES MELLITUS WITH HYPERGLYCEMIA, WITH LONG-TERM CURRENT USE OF INSULIN: Primary | ICD-10-CM

## 2022-01-31 DIAGNOSIS — Z12.31 ENCOUNTER FOR SCREENING MAMMOGRAM FOR BREAST CANCER: ICD-10-CM

## 2022-01-31 DIAGNOSIS — Z79.4 TYPE 2 DIABETES MELLITUS WITH HYPERGLYCEMIA, WITH LONG-TERM CURRENT USE OF INSULIN: Primary | ICD-10-CM

## 2022-01-31 DIAGNOSIS — N18.31 STAGE 3A CHRONIC KIDNEY DISEASE: ICD-10-CM

## 2022-01-31 DIAGNOSIS — E78.5 DYSLIPIDEMIA, GOAL LDL BELOW 100: ICD-10-CM

## 2022-01-31 DIAGNOSIS — I10 ESSENTIAL HYPERTENSION, BENIGN: ICD-10-CM

## 2022-01-31 DIAGNOSIS — E66.01 SEVERE OBESITY (BMI 35.0-39.9) WITH COMORBIDITY: ICD-10-CM

## 2022-01-31 PROCEDURE — 3078F PR MOST RECENT DIASTOLIC BLOOD PRESSURE < 80 MM HG: ICD-10-PCS | Mod: CPTII,S$GLB,, | Performed by: FAMILY MEDICINE

## 2022-01-31 PROCEDURE — 1160F PR REVIEW ALL MEDS BY PRESCRIBER/CLIN PHARMACIST DOCUMENTED: ICD-10-PCS | Mod: CPTII,S$GLB,, | Performed by: FAMILY MEDICINE

## 2022-01-31 PROCEDURE — 99999 PR PBB SHADOW E&M-EST. PATIENT-LVL V: CPT | Mod: PBBFAC,,, | Performed by: FAMILY MEDICINE

## 2022-01-31 PROCEDURE — 99214 PR OFFICE/OUTPT VISIT, EST, LEVL IV, 30-39 MIN: ICD-10-PCS | Mod: S$GLB,,, | Performed by: FAMILY MEDICINE

## 2022-01-31 PROCEDURE — 99214 OFFICE O/P EST MOD 30 MIN: CPT | Mod: S$GLB,,, | Performed by: FAMILY MEDICINE

## 2022-01-31 PROCEDURE — 1159F PR MEDICATION LIST DOCUMENTED IN MEDICAL RECORD: ICD-10-PCS | Mod: CPTII,S$GLB,, | Performed by: FAMILY MEDICINE

## 2022-01-31 PROCEDURE — 3078F DIAST BP <80 MM HG: CPT | Mod: CPTII,S$GLB,, | Performed by: FAMILY MEDICINE

## 2022-01-31 PROCEDURE — 3074F SYST BP LT 130 MM HG: CPT | Mod: CPTII,S$GLB,, | Performed by: FAMILY MEDICINE

## 2022-01-31 PROCEDURE — 3008F BODY MASS INDEX DOCD: CPT | Mod: CPTII,S$GLB,, | Performed by: FAMILY MEDICINE

## 2022-01-31 PROCEDURE — 1160F RVW MEDS BY RX/DR IN RCRD: CPT | Mod: CPTII,S$GLB,, | Performed by: FAMILY MEDICINE

## 2022-01-31 PROCEDURE — 3046F PR MOST RECENT HEMOGLOBIN A1C LEVEL > 9.0%: ICD-10-PCS | Mod: CPTII,S$GLB,, | Performed by: FAMILY MEDICINE

## 2022-01-31 PROCEDURE — 99999 PR PBB SHADOW E&M-EST. PATIENT-LVL V: ICD-10-PCS | Mod: PBBFAC,,, | Performed by: FAMILY MEDICINE

## 2022-01-31 PROCEDURE — 1159F MED LIST DOCD IN RCRD: CPT | Mod: CPTII,S$GLB,, | Performed by: FAMILY MEDICINE

## 2022-01-31 PROCEDURE — 3046F HEMOGLOBIN A1C LEVEL >9.0%: CPT | Mod: CPTII,S$GLB,, | Performed by: FAMILY MEDICINE

## 2022-01-31 PROCEDURE — 3008F PR BODY MASS INDEX (BMI) DOCUMENTED: ICD-10-PCS | Mod: CPTII,S$GLB,, | Performed by: FAMILY MEDICINE

## 2022-01-31 PROCEDURE — 3074F PR MOST RECENT SYSTOLIC BLOOD PRESSURE < 130 MM HG: ICD-10-PCS | Mod: CPTII,S$GLB,, | Performed by: FAMILY MEDICINE

## 2022-01-31 RX ORDER — HYDROXYZINE PAMOATE 50 MG/1
50 CAPSULE ORAL EVERY 8 HOURS PRN
Qty: 30 CAPSULE | Refills: 2 | Status: SHIPPED | OUTPATIENT
Start: 2022-01-31

## 2022-01-31 RX ORDER — ROSUVASTATIN CALCIUM 40 MG/1
40 TABLET, COATED ORAL NIGHTLY
Qty: 90 TABLET | Refills: 3 | Status: SHIPPED | OUTPATIENT
Start: 2022-01-31 | End: 2022-06-15 | Stop reason: SDUPTHER

## 2022-01-31 NOTE — PROGRESS NOTES
"Subjective:       Patient ID: Aishwarya Jensen is a 61 y.o. female.    Chief Complaint: Diabetes    Here for follow-up on diabetes.  A1c has come down considerably, but still not at goal.  Insists that she is using her V-Go daily as prescribed.  Has improved her diet, but still eating a moderately high amount of carbohydrates daily, including bread and fruit (oranges and bananas daily).  No chest pain, palpitations or shortness of breath.  Recent LDL not at goal, taking atorvastatin 80 mg daily.    Review of Systems   Constitutional: Negative for chills, fatigue and fever.   HENT: Negative for congestion.    Eyes: Negative for visual disturbance.   Respiratory: Negative for cough and shortness of breath.    Cardiovascular: Negative for chest pain.   Gastrointestinal: Negative for abdominal pain, nausea and vomiting.   Genitourinary: Negative for difficulty urinating.   Musculoskeletal: Negative for arthralgias.   Skin: Negative for rash.   Neurological: Negative for dizziness.   Psychiatric/Behavioral: Negative for sleep disturbance.       Objective:      Vitals:    01/31/22 1016   BP: 124/72   BP Location: Right arm   Patient Position: Sitting   BP Method: Large (Manual)   Pulse: 66   Resp: 18   SpO2: 95%   Weight: 102.9 kg (226 lb 13.7 oz)   Height: 5' 7" (1.702 m)     Physical Exam  Vitals and nursing note reviewed.   Constitutional:       Appearance: She is well-developed and well-nourished.   HENT:      Head: Normocephalic and atraumatic.   Cardiovascular:      Rate and Rhythm: Normal rate and regular rhythm.      Heart sounds: Normal heart sounds.   Pulmonary:      Effort: Pulmonary effort is normal.      Breath sounds: Normal breath sounds.   Musculoskeletal:         General: No edema.   Skin:     General: Skin is warm and dry.   Neurological:      Mental Status: She is alert and oriented to person, place, and time.         Lab Results   Component Value Date    WBC 4.90 09/25/2020    HGB 10.7 (L) 09/25/2020 "    HCT 34.7 (L) 09/25/2020     09/25/2020    CHOL 209 (H) 01/14/2022    TRIG 82 01/14/2022    HDL 54 01/14/2022    ALT 26 10/05/2021    AST 26 10/05/2021     01/14/2022    K 4.7 01/14/2022     01/14/2022    CREATININE 1.2 01/14/2022    BUN 30 (H) 01/14/2022    CO2 26 01/14/2022    TSH 3.184 01/14/2022    HGBA1C 9.4 (H) 01/14/2022      Assessment:       1. Type 2 diabetes mellitus with hyperglycemia, with long-term current use of insulin    2. Severe obesity (BMI 35.0-39.9) with comorbidity    3. Essential hypertension, benign    4. Dyslipidemia, goal LDL below 100    5. Stage 3a chronic kidney disease    6. Encounter for screening mammogram for breast cancer        Plan:       Type 2 diabetes mellitus with hyperglycemia, with long-term current use of insulin  -     Comprehensive Metabolic Panel; Future; Expected date: 04/30/2022  -     Hemoglobin A1C; Future; Expected date: 04/30/2022  Stressed importance of dietary modification.  Continue V-Go.  Follow-up with diabetes management.  Severe obesity (BMI 35.0-39.9) with comorbidity  Low carb diet, exercise  Essential hypertension, benign  Well controlled  Dyslipidemia, goal LDL below 100  -     rosuvastatin (CRESTOR) 40 MG Tab; Take 1 tablet (40 mg total) by mouth every evening.  Dispense: 90 tablet; Refill: 3  -     Lipid Panel; Future; Expected date: 04/30/2022  LDL not at goal, will switch to more potent statin  Stage 3a chronic kidney disease  -     Comprehensive Metabolic Panel; Future; Expected date: 04/30/2022  Avoid nephrotoxins  Encounter for screening mammogram for breast cancer  -     Mammo Digital Screening Bilat w/ [place]; Future; Expected date: 01/31/2022    Other orders  -     hydrOXYzine pamoate (VISTARIL) 50 MG Cap; Take 1 capsule (50 mg total) by mouth every 8 (eight) hours as needed (itching).  Dispense: 30 capsule; Refill: 2      Medication List with Changes/Refills   New Medications    ROSUVASTATIN (CRESTOR) 40 MG TAB    Take 1  tablet (40 mg total) by mouth every evening.   Current Medications    AMLODIPINE (NORVASC) 5 MG TABLET    Take 1 tablet (5 mg total) by mouth once daily.    ASPIRIN (ECOTRIN) 81 MG EC TABLET    Take 1 tablet (81 mg total) by mouth once daily.    BLOOD SUGAR DIAGNOSTIC STRP    To check BG 4 times daily, to use with insurance preferred meter    BLOOD-GLUCOSE METER KIT    To check BG 4 times daily, to use with insurance preferred meter    CYCLOBENZAPRINE (FLEXERIL) 10 MG TABLET    Take 10 mg by mouth once daily. TAKE ONE TABLET BY MOUTH ONCE DAILY AS NEEDED    DAPAGLIFLOZIN (FARXIGA) 10 MG TABLET    Take 1 tablet (10 mg total) by mouth once daily.    FLUCONAZOLE (DIFLUCAN) 150 MG TAB    TAKE ONE TABLET BY MOUTH AS A ONE TIME DOSE MAY REPEAT IN 3 DAYS IF NEEDED FOR 1 DOSE.    INSULIN ASPART U-100 (NOVOLOG U-100 INSULIN ASPART) 100 UNIT/ML INJECTION    USE WITH VGO 30 5 CLICKS WITH MEALS  UNITS SUBCUTANEOUSLY ONCE DAILY.    LANCETS MISC    To check BG 4 times daily, to use with insurance preferred meter    LISINOPRIL-HYDROCHLOROTHIAZIDE (PRINZIDE,ZESTORETIC) 20-12.5 MG PER TABLET    Take 1 tablet by mouth once daily.    METFORMIN (GLUCOPHAGE) 1000 MG TABLET    Take 1 tablet (1,000 mg total) by mouth 2 (two) times daily with meals.    MUPIROCIN (BACTROBAN) 2 % OINTMENT    Apply topically 3 (three) times daily.    SUB-Q INSULIN DEVICE, 30 UNIT (V-GO 30) DAVID    Inject 1 Device into the skin once daily.    TRAZODONE (DESYREL) 50 MG TABLET    1-2 tablets at bedtime as needed for insomnia   Changed and/or Refilled Medications    Modified Medication Previous Medication    HYDROXYZINE PAMOATE (VISTARIL) 50 MG CAP hydrOXYzine pamoate (VISTARIL) 50 MG Cap       Take 1 capsule (50 mg total) by mouth every 8 (eight) hours as needed (itching).    Take 1 capsule (50 mg total) by mouth every 8 (eight) hours as needed (itching).   Discontinued Medications    ATORVASTATIN (LIPITOR) 80 MG TABLET    Take 1 tablet (80 mg total) by  mouth every evening.    DICLOFENAC (VOLTAREN) 75 MG EC TABLET    Take 1 tablet (75 mg total) by mouth 2 (two) times daily as needed (pain).    RANITIDINE (ZANTAC) 150 MG TABLET    Zantac 150 mg tablet   Take 1 tablet every day by oral route.

## 2022-02-18 ENCOUNTER — HOSPITAL ENCOUNTER (OUTPATIENT)
Dept: RADIOLOGY | Facility: OTHER | Age: 62
Discharge: HOME | End: 2022-02-18
Attending: FAMILY MEDICINE
Payer: COMMERCIAL

## 2022-02-18 DIAGNOSIS — Z12.31 ENCOUNTER FOR SCREENING MAMMOGRAM FOR BREAST CANCER: ICD-10-CM

## 2022-02-18 PROCEDURE — 77067 MAMMO DIGITAL SCREENING BILAT WITH TOMO: ICD-10-PCS | Mod: 26,,, | Performed by: RADIOLOGY

## 2022-02-18 PROCEDURE — 77063 BREAST TOMOSYNTHESIS BI: CPT | Mod: TC

## 2022-02-18 PROCEDURE — 77063 MAMMO DIGITAL SCREENING BILAT WITH TOMO: ICD-10-PCS | Mod: 26,,, | Performed by: RADIOLOGY

## 2022-02-18 PROCEDURE — 77067 SCR MAMMO BI INCL CAD: CPT | Mod: 26,,, | Performed by: RADIOLOGY

## 2022-02-18 PROCEDURE — 77063 BREAST TOMOSYNTHESIS BI: CPT | Mod: 26,,, | Performed by: RADIOLOGY

## 2022-02-19 ENCOUNTER — PATIENT OUTREACH (OUTPATIENT)
Dept: ADMINISTRATIVE | Facility: OTHER | Age: 62
End: 2022-02-19
Payer: COMMERCIAL

## 2022-02-19 NOTE — PROGRESS NOTES
LINKS immunization registry updated  Care Everywhere updated  Health Maintenance updated  DIS/Chart reviewed for overdue Proactive [facility] Encounters (STEPHANIE) health maintenance testing (CRS, Breast Ca, Diabetic Eye Exam)   Orders entered:N/A

## 2022-02-22 ENCOUNTER — CLINICAL SUPPORT (OUTPATIENT)
Dept: DIABETES | Facility: CLINIC | Age: 62
End: 2022-02-22
Payer: COMMERCIAL

## 2022-02-22 DIAGNOSIS — Z79.4 TYPE 2 DIABETES MELLITUS WITH HYPERGLYCEMIA, WITH LONG-TERM CURRENT USE OF INSULIN: ICD-10-CM

## 2022-02-22 DIAGNOSIS — E11.65 TYPE 2 DIABETES MELLITUS WITH HYPERGLYCEMIA, WITH LONG-TERM CURRENT USE OF INSULIN: ICD-10-CM

## 2022-02-22 PROCEDURE — G0108 PR DIAB MANAGE TRN  PER INDIV: ICD-10-PCS | Mod: S$GLB,,, | Performed by: DIETITIAN, REGISTERED

## 2022-02-22 PROCEDURE — 99999 PR PBB SHADOW E&M-EST. PATIENT-LVL I: CPT | Mod: PBBFAC,,, | Performed by: DIETITIAN, REGISTERED

## 2022-02-22 PROCEDURE — G0108 DIAB MANAGE TRN  PER INDIV: HCPCS | Mod: S$GLB,,, | Performed by: DIETITIAN, REGISTERED

## 2022-02-22 PROCEDURE — 99999 PR PBB SHADOW E&M-EST. PATIENT-LVL I: ICD-10-PCS | Mod: PBBFAC,,, | Performed by: DIETITIAN, REGISTERED

## 2022-02-22 NOTE — PROGRESS NOTES
Diabetes Care Specialist Follow-up Note  Author: Marla Stein RD, CDE  Date: 2/22/2022    Program Intake  Reason for Diabetes Program Visit:: Intervention  Type of Intervention:: Individual  Individual: Medication Training  Medication Training: VGO  Current diabetes risk level:: high  In the last 12 months, have you:: none  Permission to speak with others about care:: no    Lab Results   Component Value Date    HGBA1C 9.4 (H) 01/14/2022     A1c Pre Diabetes Care Specialist Intervention:  >14%    Clinical    Patient Health Rating  Compared to other people your age, how would you rate your health?: Good    Problem Review  Reviewed Problem List with Patient: yes  Active comorbidities affecting diabetes self-care.: yes  Comorbidities: Cardiovascular Disease, Hypertension, Neuropathy  Reviewed health maintenance: yes    Clinical Assessment  Current Diabetes Treatment: Oral Medication, Insulin  Have you ever experienced hypoglycemia (low blood sugar)?: no  Have you ever experienced hyperglycemia (high blood sugar)?: no    Medication Information  How do you obtain your medications?: Patient drives, Family picks up  How many days a week do you miss your medications?: 3 or more  Do you use a pill box or medication chart to help you manage your medications?: Pill box  Do you sometimes have difficulty refilling your medications?: Yes (see comment)  Medication adherence impacting ability to self-manage diabetes?: Yes    Labs  Do you have regular lab work to monitor your medications?: Yes  Type of Regular Lab Work: A1c, Cholesterol, Microalbumin, CBC, BMP  Where do you get your labs drawn?: [facility]  Lab Compliance Barriers: No    Nutritional Status  Diet: Regular  Meal Plan 24 Hour Recall: Breakfast, Lunch, Dinner, Snack  Meal Plan 24 Hour Recall - Breakfast: Toast and Eggs, coffee with sugar and cream  Meal Plan 24 Hour Recall - Lunch: chicken noodle soup with crackers  Meal Plan 24 Hour Recall - Dinner: [employer]  pizza  Meal Plan 24 Hour Recall - Snack: peanut butter crackers, almonds, cheerios, drinks water or sparkling water body armour lyte  Change in appetite?: No  Dentation:: Intact  Recent Changes in Weight: No Recent Weight Change  Current nutritional status an area of need that is impacting patient's ability to self-manage diabetes?: Yes    Additional Social History    Support  Does anyone support you with your diabetes care?: yes  Who supports you?: family member, spouse, self  Who takes you to your medical appointments?: self  Does the current support meet the patient's needs?: Yes  Is Support an area impacting ability to self-manage diabetes?: No    Access to Mass Media & Technology  Does the patient have access to any of the following devices or technologies?: Smart phone  Media or technology needs impacting ability to self-manage diabetes?: No    Cognitive/Behavioral Health  Alert and Oriented: Yes  Difficulty Thinking: No  Requires Prompting: No  Requires assistance for routine expression?: No  Cognitive or behavioral barriers impacting ability to self-manage diabetes?: No    Culture/Scientologist  Culture or Anabaptist beliefs that may impact ability to access healthcare: No    Communication  Language preference: English  Hearing Problems: No  Vision Problems: No  Communication needs impacting ability to self-manage diabetes?: No    Health Literacy  Preferred Learning Method: Face to Face, Demonstration, Reading Materials  How often do you need to have someone help you read instructions, pamphlets, or written material from your doctor or pharmacy?: Sometimes  Health literacy needs impacting ability to self-manage diabetes?: No      Diabetes Self-Management Skills Assessment     Diabetes Disease Process/Treatment Options  Patient/caregiver able to state what happens when someone has diabetes.: yes  Patient/caregiver knows what type of diabetes they have.: yes  Diabetes Type : Type II  Patient/caregiver able to  identify at least three signs and symptoms of diabetes.: yes  Identified signs and symptoms:: frequent urination, increased thirst, fatigue  Patient able to identify at least three risk factors for diabetes.: yes  Identified risk factors:: being overweight, family history  Diabetes Disease Process/Treatment Options: Skills Assessment Completed: No  Assessment indicates:: Adequate understanding  Deferred due to:: Other (comment) (previously completed, there has been no change and patient has adequate understanding)  Area of need?: No    Nutrition/Healthy Eating  Challenges to healthy eating:: portion control, snacking between meals and at night, eating when feeling depressed/stressed, lack of will power, eating out, going to parties  Method of carbohydrate measurement:: no method  Patient can identify foods that impact blood sugar.: yes  Patient-identified foods:: starches (bread, pasta, rice, cereal), sweets, soda, fruit/fruit juice, yogurt, starchy vegetables (corn, peas, beans)  Nutrition/Healthy Eating Skills Assessment Completed:: Yes  Assessment indicates:: Adequate understanding  Area of need?: Yes    Physical Activity/Exercise  Patient's daily activity level:: lightly active  Patient formally exercises outside of work.: no  Reasons for not exercising:: work schedule  Patient can identify forms of physical activity.: yes  Stated forms of physical activity:: moving to burn calories  Patient can identify reasons why exercise/physical activity is important in diabetes management.: no  Physical Activity/Exercise Skills Assessment Completed: : Yes  Assessment indicates:: Adequate understanding  Area of need?: No    Medications  Patient is able to describe current diabetes management routine.: yes  Diabetes management routine:: insulin, oral medications  Patient is able to identify current diabetes medications, dosages, and appropriate timing of medications.: yes  Patient understands the purpose of the medications  taken for diabetes.: no  Patient reports problems or concerns with current medication regimen.: yes  Medication regimen problems/concerns:: financial concerns, other (see comments) (patient's Vgo is empty at time of visit, and when asked to demonstrate how to fill it patient couldn't)  Medication Skills Assessment Completed:: Yes  Assessment indicates:: Knowledge deficit, Instruction Needed  Area of need?: Yes    Home Blood Glucose Monitoring  Patient states that blood sugar is checked at home daily.: yes  Monitoring Method:: home glucometer  How often do you check your blood sugar?: Twice a day  When do you check your blood sugar?: Before breakfast, Before dinner  When you check what is your typical blood sugar range? : 200s to 300s with an occassional 140-150  Blood glucose logs:: no, encouraged to keep logs, encouraged to bring logs to provider visits (patient instructed to bring meter to follow-up visit)  Blood glucose logs reviewed today?: no  Home Blood Glucose Monitoring Skills Assessment Completed: : Yes  Assessment indicates:: Adequate understanding  Area of need?: No    Acute Complications  Patient is able to identify types of acute complications: Yes  Patient Identified:: Hypoglycemia, Hyperglycemia  Patient is able to state the basic meaning of hypoglycemia?: Yes  Able to state the blood sugar range for hypoglycemia?: yes  Patient stated range:: <80  Patient can identify general symptoms of hypoglycemia: yes  Patient identified:: other (see comments) (feels chilled)  Able to state proper treatment of hypoglycemia?: yes  Patient identified:: other (see comments) (eat something)  Patient is able to state the basic meaning of hyperglycemia?: Yes  Able to state the blood sugar range for hyperglycemia?: no (see comments) (feels dizzy with hyperglycemia)  Patient able to state proper treatment of hyperglycemia?: no (see comments)  Patient able to verbalize sick day plan?: no  Acute Complications Skills  Assessment Completed: : Yes  Assessment indicates:: Knowledge deficit, Instruction Needed  Area of need?: Yes    Chronic Complications  Patient can identify major chronic complications of diabetes.: yes  Stated chronic complications:: kidney disease, neuropathy/nerve damage, retinopathy, heart disease/heart attack, stroke  Patient can identify ways to prevent or delay diabetes complications.: yes  Stated ways to prevent complications:: controlling blood sugar  Patient is aware that having diabetes increases risk of heart disease?: Yes  Patient is aware that heart disease is the leading cause of death and disability in people with diabetes?: Yes  Patient able to state risk factors for heart disease?: Yes  Patient stated risk factors for heart disease:: High blood pressure, High cholesterol, Having diabetes  Patient is taking statin?: Yes  Chronic Complications Skills Assessment Completed: : Yes  Assessment indicates:: Adequate understanding  Area of need?: No    Psychosocial/Coping  Patient can identify ways of coping with chronic disease.: yes  Patient-stated ways of coping with chronic disease:: spiritual/Buddhist practices, support from loved ones  Psychosocial/Coping Skills Assessment Completed: : Yes  Assessment indicates:: Adequate understanding  Area of need?: No      Diabetes Self-Support Plan    Diabetes Self-Management Support Plan  Stress management:: family  Medication:: Medication Assistance  Review status:: Patient has selected and agrees to support plan., Patient was provided Diabetes Self-Management Support Plan document that includes support options.    During today's follow-up visit,  the following areas required further assessment and content was provided/reviewed.    Based on today's diabetes care assessment, the following areas of need were identified:      Social 2/22/2022   Support No   Access to Mass Media/Tech No   Cognitive/Behavioral Health No   Culture/Religious No   Communication No    Health Literacy No        Clinical 2/22/2022   Medication Adherence Yes   Lab Compliance No   Nutritional Status Yes        Diabetes Self-Management Skills 2/22/2022   Diabetes Disease Process/Treatment Options No   Nutrition/Healthy Eating Yes   Physical Activity/Exercise No   Medication Yes   Home Blood Glucose Monitoring No   Acute Complications Yes   Chronic Complications No   Psychosocial/Coping No        Today's interventions were provided through individual discussion, instruction, and written materials were provided.    Patient verbalized understanding of instruction and written materials.  Pt was able to return back demonstration of instructions today. Patient understood key points, needs reinforcement and further instruction.     Diabetes Self-Management Care Plan Review:  Diabetes Self-Management Care Plan Review and Evaluation of Progress:    During today's follow-up Agustin Diabetes Self-Management Care Plan progress was reviewed and progress was evaluated including his/her input. Aishwarya has agreed to continue his/her journey to improve/maintain overall diabetes control by continuing to set health goals. See care plan progress below.      Care Plan: Diabetes Management   Updates made since 1/23/2022 12:00 AM      Problem: Healthy Eating       Goal: Eat 3 meals daily with 30-45g/2-3 servings of Carbohydrate per meal.    Start Date: 1/5/2022   Priority: Medium   Barriers: Lack of Motivation to Change      Task: Provided visual examples using dry measuring cups, food models, and other familiar objects such as computer mouse, deck or cards, tennis ball etc. to help with visualization of portions. Completed 2/22/2022      Task: Explained how to count carbohydrates using the food label and the use of dry measuring cups for accurate carb counting. Completed 2/22/2022      Task: Review the importance of balancing carbohydrates with each meal using portion control techniques to count servings of  carbohydrate and label reading to identify serving size and amount of total carbs per serving. Completed 2/22/2022      Task: Provided Sample plate method and reviewed the use of the plate to estimate amounts of carbohydrate per meal. Completed 2/22/2022      Task: Reviewed the sources and role of Carbohydrate, Protein, and Fat and how each nutrient impacts blood sugar. Completed 2/22/2022      Task: Provided visual examples using dry measuring cups, food models, and other familiar objects such as computer mouse, deck or cards, tennis ball etc. to help with visualization of portions. Completed 2/22/2022      Task: Explained how to count carbohydrates using the food label and the use of dry measuring cups for accurate carb counting. Completed 2/22/2022      Problem: Medications       Goal: Patient Agrees to take Diabetes Medication(s) insulin via Vgo30 as prescribed.    Start Date: 1/5/2022   Priority: High   Barriers: Financial   Note:    Discussed MOA, onset, side effects, dosage of meds.   Provided with strategies for daily medication adherence (phone alarms, medication reminder apps, medication list, pill organizer, pill packing, pharmacy delivery options).    Discussed any concerns about regimen.    Reviewed significance of mealtimes and medication administration.    Reviewed correction scale insulin with mealtimes as prescribed.       Re-education on Vgo Disposable Insulin Pump Education  Discussed Vgo30 disposable insulin pump. Patient educated on insulin pump therapy, the purpose of insulin pump therapy, advantages and disadvantages of insulin pump therapy vs multiple daily injections, explained the basal rate is 1.25 - patient will receive a little bit of insulin throughout 24 hours for a total of 30 units based on which VGo is used, bolus dose are delivered delivered by the double clicks - each double click administers 2 units of insulin, explained that patient will be doing 4-5 sets of double clicks  before each meal, discussed when to change Vgo, demonstrated how to fill VGo with insulin, how to apply vgo and insert the needle, explained the double clicks and what each feature is on the vgo, explained and demonstrated how to safely retract the needle and remove the vgo after 24 hours, discussed ease of usage, carbohydrate counting, demonstrated applying device, inserting needle, administering bolus insulin, retracting needle, and removing/disposing of vgo. Patient states understanding and performed return demonstration. Patient started first vgo in office. Patient provided with written literature and CDE contact information        Task: Discussed any concerns regarding the current medication regimen. Completed 2/22/2022      Task: Reviewed possible side effects of all oral and injectable diabetes medications. Completed 2/22/2022      Task: Discussed and reviewed why meal and medication timing is important with diabetes medications. Completed 2/22/2022      Task: Reviewed sources of Carbohydrate: Starch, Milk, Fruit, Sugar, and nonstarchy vegetables Completed 2/22/2022      Task: Discussed guidelines for preventing, detecting and treating hypoglycemia and hyperglycemia and reviewed the importance of meal and medication timing with diabetes mediations for prevention of hypoglycemia and maximum drug benefit. Completed 2/22/2022      Problem: Acute Complications       Goal: Patient agrees to identify and manage signs and symptoms of high/low blood sugar (hyper/hypoglycemia) by keeping a log of events and using proper treatment.    Start Date: 1/5/2022   Priority: Low   Barriers: No Barriers Identified   Note:    Hyperglycemia  ABC's of Diabetes    Discussed importance of A1c less than 6.0 to reduce risk of micro and macro complications, controlled Blood Pressure 130/80 and Cholesterol Lab Values--Total Cholesterol <200mg, LDL < 100, HDL >45 men and >50 women, Triglycerides <150mg for prevention heart disease,  heart attack, stroke.   how to use a glucometer   reviewed understanding diabetes distress   reviewed current level and goal level for HgbA1c, blood glucose, microalbumin, and lipids.     Hypoglycemia  Reviewed blood glucose goals, prevention, detection, and treatment of hypoglycemia, and when to contact the clinic.       Task: Reviewed proper treatment of hypoglycemia with the rule of 15--patient to eat 15g simple carbohydrate (4 glucose tablets, 1 glucose gel, 5 pieces hard candy, ½ cup fruit juice, ½ can regular soda, etc) and wait 15 minutes and recheck home glucose. Completed 2/22/2022      Task: Reviewed common causes and precautions to help prevent hyper/hypoglycemic events. Completed 2/22/2022      Task: Discussed, sick day planning, natural disaster planning, and/or travel planning to prevent hyper/hypoglycemia. Completed 2/22/2022      Task: Discussed risk factors for developing diabetic ketoacidosis (DKA), strategies for reducing risk, testing with ketone test strips if BG is >240mg/dl, basic protocol for managing DKA, and when to seek further medical attention. Completed 2/22/2022          Follow Up Plan     Follow up in about 4 weeks (around 3/22/2022) for General Follow-up, Blood Sugar Log Review and F/U MNT.    Today's care plan and follow up schedule was discussed with patient.  Aishwarya verbalized understanding of the care plan, goals, and agrees to follow up plan.        The patient was encouraged to communicate with his/her health care provider/physician and care team regarding his/her condition(s) and treatment.  I provided the patient with my contact information today and encouraged to contact me via phone or [facility] Patient Portal as needed.     Length of Visit   Total Time: 100 Minutes

## 2022-03-15 ENCOUNTER — OFFICE VISIT (OUTPATIENT)
Dept: DIABETES | Facility: CLINIC | Age: 62
End: 2022-03-15
Payer: COMMERCIAL

## 2022-03-15 ENCOUNTER — NUTRITION (OUTPATIENT)
Dept: DIABETES | Facility: CLINIC | Age: 62
End: 2022-03-15
Payer: COMMERCIAL

## 2022-03-15 ENCOUNTER — TELEPHONE (OUTPATIENT)
Dept: DIABETES | Facility: CLINIC | Age: 62
End: 2022-03-15

## 2022-03-15 ENCOUNTER — PATIENT MESSAGE (OUTPATIENT)
Dept: DIABETES | Facility: CLINIC | Age: 62
End: 2022-03-15

## 2022-03-15 ENCOUNTER — OFFICE VISIT (OUTPATIENT)
Dept: PODIATRY | Facility: CLINIC | Age: 62
End: 2022-03-15
Payer: COMMERCIAL

## 2022-03-15 VITALS
TEMPERATURE: 98 F | BODY MASS INDEX: 35.96 KG/M2 | HEIGHT: 67 IN | HEART RATE: 61 BPM | SYSTOLIC BLOOD PRESSURE: 132 MMHG | WEIGHT: 229.13 LBS | OXYGEN SATURATION: 96 % | DIASTOLIC BLOOD PRESSURE: 72 MMHG

## 2022-03-15 VITALS
HEIGHT: 67 IN | WEIGHT: 229.69 LBS | SYSTOLIC BLOOD PRESSURE: 162 MMHG | BODY MASS INDEX: 36.05 KG/M2 | HEART RATE: 61 BPM | DIASTOLIC BLOOD PRESSURE: 70 MMHG

## 2022-03-15 DIAGNOSIS — Z79.4 TYPE 2 DIABETES MELLITUS WITH DIABETIC POLYNEUROPATHY, WITH LONG-TERM CURRENT USE OF INSULIN: ICD-10-CM

## 2022-03-15 DIAGNOSIS — E11.65 TYPE 2 DIABETES MELLITUS WITH HYPERGLYCEMIA, WITH LONG-TERM CURRENT USE OF INSULIN: Primary | ICD-10-CM

## 2022-03-15 DIAGNOSIS — E11.42 TYPE 2 DIABETES MELLITUS WITH DIABETIC POLYNEUROPATHY, WITH LONG-TERM CURRENT USE OF INSULIN: ICD-10-CM

## 2022-03-15 DIAGNOSIS — Z79.4 TYPE 2 DIABETES MELLITUS WITH HYPERGLYCEMIA, WITH LONG-TERM CURRENT USE OF INSULIN: Primary | ICD-10-CM

## 2022-03-15 DIAGNOSIS — E11.29 MICROALBUMINURIA DUE TO TYPE 2 DIABETES MELLITUS: ICD-10-CM

## 2022-03-15 DIAGNOSIS — R80.9 MICROALBUMINURIA DUE TO TYPE 2 DIABETES MELLITUS: ICD-10-CM

## 2022-03-15 DIAGNOSIS — Q84.5 ENLARGED AND HYPERTROPHIC NAILS: ICD-10-CM

## 2022-03-15 DIAGNOSIS — Z71.9 HEALTH EDUCATION/COUNSELING: ICD-10-CM

## 2022-03-15 DIAGNOSIS — E11.65 POORLY CONTROLLED DIABETES MELLITUS: ICD-10-CM

## 2022-03-15 DIAGNOSIS — E78.5 DYSLIPIDEMIA, GOAL LDL BELOW 100: ICD-10-CM

## 2022-03-15 DIAGNOSIS — I10 ESSENTIAL HYPERTENSION, BENIGN: ICD-10-CM

## 2022-03-15 DIAGNOSIS — B35.1 ONYCHOMYCOSIS: Primary | ICD-10-CM

## 2022-03-15 DIAGNOSIS — E66.01 CLASS 2 SEVERE OBESITY DUE TO EXCESS CALORIES WITH SERIOUS COMORBIDITY AND BODY MASS INDEX (BMI) OF 35.0 TO 35.9 IN ADULT: ICD-10-CM

## 2022-03-15 DIAGNOSIS — Z91.199 NONCOMPLIANCE WITH DIABETES TREATMENT: ICD-10-CM

## 2022-03-15 DIAGNOSIS — L60.2 ONYCHOGRYPHOSIS: ICD-10-CM

## 2022-03-15 PROCEDURE — 4010F ACE/ARB THERAPY RXD/TAKEN: CPT | Mod: CPTII,S$GLB,, | Performed by: PODIATRIST

## 2022-03-15 PROCEDURE — 99999 PR PBB SHADOW E&M-EST. PATIENT-LVL V: ICD-10-PCS | Mod: PBBFAC,,, | Performed by: NURSE PRACTITIONER

## 2022-03-15 PROCEDURE — 3008F PR BODY MASS INDEX (BMI) DOCUMENTED: ICD-10-PCS | Mod: CPTII,S$GLB,, | Performed by: NURSE PRACTITIONER

## 2022-03-15 PROCEDURE — 3062F PR POS MACROALBUMINURIA RESULT DOCUMENTED/REVIEW: ICD-10-PCS | Mod: CPTII,S$GLB,, | Performed by: PODIATRIST

## 2022-03-15 PROCEDURE — 99214 OFFICE O/P EST MOD 30 MIN: CPT | Mod: S$GLB,,, | Performed by: NURSE PRACTITIONER

## 2022-03-15 PROCEDURE — 11721 DEBRIDE NAIL 6 OR MORE: CPT | Mod: S$GLB,,, | Performed by: PODIATRIST

## 2022-03-15 PROCEDURE — 99999 PR PBB SHADOW E&M-EST. PATIENT-LVL IV: ICD-10-PCS | Mod: PBBFAC,,, | Performed by: PODIATRIST

## 2022-03-15 PROCEDURE — 3008F BODY MASS INDEX DOCD: CPT | Mod: CPTII,S$GLB,, | Performed by: NURSE PRACTITIONER

## 2022-03-15 PROCEDURE — 1159F MED LIST DOCD IN RCRD: CPT | Mod: CPTII,S$GLB,, | Performed by: NURSE PRACTITIONER

## 2022-03-15 PROCEDURE — 3008F PR BODY MASS INDEX (BMI) DOCUMENTED: ICD-10-PCS | Mod: CPTII,S$GLB,, | Performed by: PODIATRIST

## 2022-03-15 PROCEDURE — 3075F SYST BP GE 130 - 139MM HG: CPT | Mod: CPTII,S$GLB,, | Performed by: NURSE PRACTITIONER

## 2022-03-15 PROCEDURE — G0108 DIAB MANAGE TRN  PER INDIV: HCPCS | Mod: S$GLB,,, | Performed by: DIETITIAN, REGISTERED

## 2022-03-15 PROCEDURE — 99999 PR PBB SHADOW E&M-EST. PATIENT-LVL V: CPT | Mod: PBBFAC,,, | Performed by: NURSE PRACTITIONER

## 2022-03-15 PROCEDURE — 99213 OFFICE O/P EST LOW 20 MIN: CPT | Mod: 25,S$GLB,, | Performed by: PODIATRIST

## 2022-03-15 PROCEDURE — G0108 PR DIAB MANAGE TRN  PER INDIV: ICD-10-PCS | Mod: S$GLB,,, | Performed by: DIETITIAN, REGISTERED

## 2022-03-15 PROCEDURE — 3046F PR MOST RECENT HEMOGLOBIN A1C LEVEL > 9.0%: ICD-10-PCS | Mod: CPTII,S$GLB,, | Performed by: NURSE PRACTITIONER

## 2022-03-15 PROCEDURE — 99999 PR PBB SHADOW E&M-EST. PATIENT-LVL I: ICD-10-PCS | Mod: PBBFAC,,, | Performed by: DIETITIAN, REGISTERED

## 2022-03-15 PROCEDURE — 3078F PR MOST RECENT DIASTOLIC BLOOD PRESSURE < 80 MM HG: ICD-10-PCS | Mod: CPTII,S$GLB,, | Performed by: PODIATRIST

## 2022-03-15 PROCEDURE — 4010F PR ACE/ARB THEARPY RXD/TAKEN: ICD-10-PCS | Mod: CPTII,S$GLB,, | Performed by: NURSE PRACTITIONER

## 2022-03-15 PROCEDURE — 3078F PR MOST RECENT DIASTOLIC BLOOD PRESSURE < 80 MM HG: ICD-10-PCS | Mod: CPTII,S$GLB,, | Performed by: NURSE PRACTITIONER

## 2022-03-15 PROCEDURE — 4010F ACE/ARB THERAPY RXD/TAKEN: CPT | Mod: CPTII,S$GLB,, | Performed by: NURSE PRACTITIONER

## 2022-03-15 PROCEDURE — 3077F SYST BP >= 140 MM HG: CPT | Mod: CPTII,S$GLB,, | Performed by: PODIATRIST

## 2022-03-15 PROCEDURE — 99999 PR PBB SHADOW E&M-EST. PATIENT-LVL IV: CPT | Mod: PBBFAC,,, | Performed by: PODIATRIST

## 2022-03-15 PROCEDURE — 3046F HEMOGLOBIN A1C LEVEL >9.0%: CPT | Mod: CPTII,S$GLB,, | Performed by: NURSE PRACTITIONER

## 2022-03-15 PROCEDURE — 3066F PR DOCUMENTATION OF TREATMENT FOR NEPHROPATHY: ICD-10-PCS | Mod: CPTII,S$GLB,, | Performed by: PODIATRIST

## 2022-03-15 PROCEDURE — 3062F POS MACROALBUMINURIA REV: CPT | Mod: CPTII,S$GLB,, | Performed by: PODIATRIST

## 2022-03-15 PROCEDURE — 4010F PR ACE/ARB THEARPY RXD/TAKEN: ICD-10-PCS | Mod: CPTII,S$GLB,, | Performed by: PODIATRIST

## 2022-03-15 PROCEDURE — 11721 DM NAIL DEBRIDEMENT: ICD-10-PCS | Mod: S$GLB,,, | Performed by: PODIATRIST

## 2022-03-15 PROCEDURE — 1160F PR REVIEW ALL MEDS BY PRESCRIBER/CLIN PHARMACIST DOCUMENTED: ICD-10-PCS | Mod: CPTII,S$GLB,, | Performed by: NURSE PRACTITIONER

## 2022-03-15 PROCEDURE — 3008F BODY MASS INDEX DOCD: CPT | Mod: CPTII,S$GLB,, | Performed by: PODIATRIST

## 2022-03-15 PROCEDURE — 99214 PR OFFICE/OUTPT VISIT, EST, LEVL IV, 30-39 MIN: ICD-10-PCS | Mod: S$GLB,,, | Performed by: NURSE PRACTITIONER

## 2022-03-15 PROCEDURE — 3046F HEMOGLOBIN A1C LEVEL >9.0%: CPT | Mod: CPTII,S$GLB,, | Performed by: PODIATRIST

## 2022-03-15 PROCEDURE — 3077F PR MOST RECENT SYSTOLIC BLOOD PRESSURE >= 140 MM HG: ICD-10-PCS | Mod: CPTII,S$GLB,, | Performed by: PODIATRIST

## 2022-03-15 PROCEDURE — 1159F PR MEDICATION LIST DOCUMENTED IN MEDICAL RECORD: ICD-10-PCS | Mod: CPTII,S$GLB,, | Performed by: NURSE PRACTITIONER

## 2022-03-15 PROCEDURE — 3046F PR MOST RECENT HEMOGLOBIN A1C LEVEL > 9.0%: ICD-10-PCS | Mod: CPTII,S$GLB,, | Performed by: PODIATRIST

## 2022-03-15 PROCEDURE — 3066F NEPHROPATHY DOC TX: CPT | Mod: CPTII,S$GLB,, | Performed by: PODIATRIST

## 2022-03-15 PROCEDURE — 1160F RVW MEDS BY RX/DR IN RCRD: CPT | Mod: CPTII,S$GLB,, | Performed by: NURSE PRACTITIONER

## 2022-03-15 PROCEDURE — 3075F PR MOST RECENT SYSTOLIC BLOOD PRESS GE 130-139MM HG: ICD-10-PCS | Mod: CPTII,S$GLB,, | Performed by: NURSE PRACTITIONER

## 2022-03-15 PROCEDURE — 3078F DIAST BP <80 MM HG: CPT | Mod: CPTII,S$GLB,, | Performed by: NURSE PRACTITIONER

## 2022-03-15 PROCEDURE — 99213 PR OFFICE/OUTPT VISIT, EST, LEVL III, 20-29 MIN: ICD-10-PCS | Mod: 25,S$GLB,, | Performed by: PODIATRIST

## 2022-03-15 PROCEDURE — 3078F DIAST BP <80 MM HG: CPT | Mod: CPTII,S$GLB,, | Performed by: PODIATRIST

## 2022-03-15 PROCEDURE — 99999 PR PBB SHADOW E&M-EST. PATIENT-LVL I: CPT | Mod: PBBFAC,,, | Performed by: DIETITIAN, REGISTERED

## 2022-03-15 RX ORDER — SEMAGLUTIDE 1.34 MG/ML
0.5 INJECTION, SOLUTION SUBCUTANEOUS
Qty: 1 PEN | Refills: 6 | Status: SHIPPED | OUTPATIENT
Start: 2022-03-15 | End: 2022-06-15 | Stop reason: SDUPTHER

## 2022-03-15 RX ORDER — SUB-Q INSULIN DEVICE, 40 UNIT
1 EACH MISCELLANEOUS DAILY
Qty: 90 EACH | Refills: 2 | Status: SHIPPED | OUTPATIENT
Start: 2022-03-15 | End: 2022-06-15 | Stop reason: SDUPTHER

## 2022-03-15 RX ORDER — METFORMIN HYDROCHLORIDE 1000 MG/1
1000 TABLET ORAL
Qty: 90 TABLET | Refills: 1 | Status: SHIPPED | OUTPATIENT
Start: 2022-03-15 | End: 2022-06-15 | Stop reason: SDUPTHER

## 2022-03-15 RX ORDER — DAPAGLIFLOZIN 10 MG/1
10 TABLET, FILM COATED ORAL DAILY
Qty: 90 TABLET | Refills: 1 | Status: SHIPPED | OUTPATIENT
Start: 2022-03-15 | End: 2022-06-15 | Stop reason: SDUPTHER

## 2022-03-15 RX ORDER — INSULIN LISPRO-AABC 100 [IU]/ML
INJECTION, SOLUTION INTRAVENOUS; SUBCUTANEOUS
Qty: 30 ML | Refills: 6 | Status: SHIPPED | OUTPATIENT
Start: 2022-03-15 | End: 2022-03-15

## 2022-03-15 NOTE — Clinical Note
Please call pharmacy and make sure she will be able to get the Lyumjev and the ozmepic. Make sure it is not too expensive. It shouldn't be because she has BCBS and Medicaid

## 2022-03-15 NOTE — TELEPHONE ENCOUNTER
----- Message from Anai Etienne LPN sent at 3/15/2022  3:45 PM CDT -----  Pharmacy stated her ozempic is $35 and the lyumjev is over $900 he ran it with both insurances

## 2022-03-15 NOTE — ASSESSMENT & PLAN NOTE
Optimize BG readings.   See above.       Educated patient to check feet daily for any foreign objects and/or wounds. Discussed with patient the importance of wearing appropriate footwear at all times, not to walk barefoot ever, and to check shoes before putting them on feet. Instructed patient to keep feet dry by regularly changing shoes and socks and drying feet after baths and exercises. Also, instructed patient to report any new lesions, discolorations, or swelling to a healthcare professional.

## 2022-03-15 NOTE — ASSESSMENT & PLAN NOTE
Body mass index is 35.88 kg/m².  Increases insulin resistance.   Discussed DM diet and exercise.

## 2022-03-15 NOTE — TELEPHONE ENCOUNTER
----- Message from Micki Knott NP sent at 3/15/2022  3:25 PM CDT -----  Please call pharmacy and make sure she will be able to get the Lyumjev and the ozmepic. Make sure it is not too expensive. It shouldn't be because she has [employer] and Medicaid

## 2022-03-15 NOTE — PATIENT INSTRUCTIONS
Continue Metformin 1000 mg daily     Continue Farxiga 10 mg daily     Continue VGo30   We will change the Novolog insulin to Lyumjev   Give 5-6 clicks 3 times per day with meals   1-2 clicks for snacks   Give clicks BEFORE THE MEAL   CHANGE THE VGO EVERY 24 HOURS!!!!!     Start Ozempic 0.25 mg weekly for 4 weeks & then 0.5 mg weekly thereafter.  You take this ONCE A WEEK   INJECTING ONLY INTO THE ABDOMEN

## 2022-03-15 NOTE — TELEPHONE ENCOUNTER
Ok. See if Fiasp is better   I am guessing that the Lyumjev is not covered. Lets try Fiasp   Please let me know what the Fiasp is costing     See if she can use a coupon on the Ozempic? We have some in my office

## 2022-03-15 NOTE — PROGRESS NOTES
Subjective:      Patient ID: Aishwarya Jensen is a 61 y.o. female.    Chief Complaint: Foot Problem    Aishwarya is a 61 y.o. female who presents to the clinic for follow-up treatment of high risk feet. States missed last appointment due to transportation. Does go for pedicures though q4-5 months.   Aishwarya has a past medical history of Diabetes mellitus, type 2, Hypercholesteremia, and Hypertension. Has appointment w/ DM management & nutrition today while here.  Diagnosed w/ DM almost 30 yrs.ago. Poorly controlled BS & doesn't check regularly. 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  Here for 'foot issues' States that her nails are long and deformed so can't manage them without hurting herself - hurts when in shoes.    PCP: Joey Cintron MD    Date Last Seen by PCP: 1/31/22     Current shoe gear:  Slides    Hemoglobin A1C   Date Value Ref Range Status   01/14/2022 9.4 (H) 4.0 - 5.6 % Final     Comment:     ADA Screening Guidelines:  5.7-6.4%  Consistent with prediabetes  >or=6.5%  Consistent with diabetes    High levels of fetal hemoglobin interfere with the HbA1C  assay. Heterozygous hemoglobin variants (HbS, HgC, etc)do  not significantly interfere with this assay.   However, presence of multiple variants may affect accuracy.     10/05/2021 >14.0 (H) 4.0 - 5.6 % Final     Comment:     ADA Screening Guidelines:  5.7-6.4%  Consistent with prediabetes  >or=6.5%  Consistent with diabetes    High levels of fetal hemoglobin interfere with the HbA1C  assay. Heterozygous hemoglobin variants (HbS, HgC, etc)do  not significantly interfere with this assay.   However, presence of multiple variants may affect accuracy.     01/11/2021 12.7 (H) 4.0 - 5.6 % Final     Comment:     ADA Screening Guidelines:  5.7-6.4%  Consistent with prediabetes  >or=6.5%  Consistent with diabetes  High levels of fetal hemoglobin interfere with the HbA1C  assay.  Heterozygous hemoglobin variants (HbS, HgC, etc)do  not significantly interfere with this assay.   However, presence of multiple variants may affect accuracy.        Objective:      Physical Exam  Vitals signs reviewed.   Constitutional:       General: She is not in acute distress.     Appearance: Normal appearance. She is well-developed and  obese.  Cardiovascular:      Pulses: Dorsalis pedis pulses are 2+ on the right side and 2+ on the left side.      Comments: Venous insufficiency d/t varicosities  Musculoskeletal:     General: No swelling, tenderness, deformity or signs of injury.      She exhibits normal range of motion.     Edema (BLE hyperpigmentation & brawny edema, R>L) present.      Prominent metatarsal heads present. No bunion.   Feet:      Skin integrity: Skin integrity normal. No skin breakdown, callus or dry skin.      Toenail Condition: toenails are very long, abnormally thick & gryphotic with mycosis - Fungal disease present to all nail plates, with discoloration, dystrophic changes B/L (mild 2nd & 3rd), but sparing 4th B/L.  Subungual dried blood w/ subtotal loosening of toe nail 2nd R; no open wound.       Comments: Flexible cavus B/L  Skin:     General: Skin is warm and dry.      Capillary Refill: Capillary refill takes less than 2 seconds.      Findings: No bruising, signs of injury or lesion.      Neurological:      Mental Status: She is alert and oriented to person, place, and time.      Sensory: Sensation is intact to light touch although states also has numbness & parasthesias.     Motor: Motor function is intact. No weakness or atrophy.      Gait: Gait is intact. Gait normal.   Psychiatric:         Mood and Affect: Mood and affect normal. Mood is not anxious.          Behavior: Behavior normal. Behavior is cooperative.      Assessment:      Encounter Diagnoses   Name Primary?    Onychomycosis Yes    Onychogryphosis     Type 2 diabetes mellitus with diabetic polyneuropathy, with long-term  current use of insulin     Enlarged and hypertrophic nails     Poorly controlled diabetes mellitus      Problem List Items Addressed This Visit        Endocrine    Type 2 diabetes mellitus with diabetic polyneuropathy, with long-term current use of insulin    Relevant Orders    DM nail debridement    DM nail trimming      Other Visit Diagnoses     Onychomycosis    -  Primary    Relevant Orders    DM nail debridement    Onychogryphosis        Relevant Orders    DM nail debridement    Enlarged and hypertrophic nails        Relevant Orders    DM nail trimming    Poorly controlled diabetes mellitus              Plan:      Aishwarya was seen today for foot problem.    Diagnoses and all orders for this visit:    Onychomycosis  -     DM nail debridement    Onychogryphosis  -     DM nail debridement    Type 2 diabetes mellitus with diabetic polyneuropathy, with long-term current use of insulin  -     DM nail debridement  -     DM nail trimming    Enlarged and hypertrophic nails  -     DM nail trimming    Poorly controlled diabetes mellitus    I counseled the patient on her conditions, their implications and medical management.    - Diabetic Foot Education. Patient reminded of the importance of good nutrition and blood sugar control to help prevent podiatric complications of diabetes. We discussed wearing proper shoe gear, daily foot inspections, never walking without protective shoe gear, podiatric nail visits every 3-6 months, sooner prn.      - With patient's permission, nails were aggressively reduced and debrided x 10 to their soft tissue attachment, mechanically removing all offending nail and debris. Patient relates relief following the procedure.

## 2022-03-15 NOTE — ASSESSMENT & PLAN NOTE
Uncontrolled  I placed a referral to outpatient case management again today   I suspect VGo noncompliance at times--either not giving the clicks when she is eating or wearing the V Go for more than 24 hours?  Reviewed the V Go extensively with patient today  She deferred coming off of the V Go  She also met with diabetes education today to review V Go  Unfortunately she is not interested in a personal CGM-- we did discuss that this would help with self awareness and self accountability        Discussed DM course, progression, and the need for good BG control to prevent or allay long-term complications. Reviewed proper hypoglycemia management. Discussed consistency in BG monitoring to allow for safe titration of insulin.        -- Medication Changes:   Continue Metformin 1000 mg daily     Continue Farxiga 10 mg daily     Continue VGo30   We will change the Novolog insulin to Lyumjev -- this may help with click compliance. She can give clicks as she eats or right after eating   Give 5-6 clicks 3 times per day with meals   1-2 clicks for snacks   Give clicks BEFORE THE MEAL   CHANGE THE VGO EVERY 24 HOURS!!!!!     Start Ozempic 0.25 mg weekly for 4 weeks & then 0.5 mg weekly thereafter.  You take this ONCE A WEEK   INJECTING ONLY INTO THE ABDOMEN          -- Reviewed goals of therapy are to get the best control we can without hypoglycemia.  -- Reviewed patient's current insulin regimen. Clarified proper insulin dose and timing in relation to meals, etc. Insulin injection sites and proper rotation instructed.    -- Advised frequent self blood glucose monitoring.  Patient encouraged to document glucose results and bring them to every clinic visit.  She deferred a personal CGM.  Monitor blood sugars 4 times per day before meals and before bed.  Record readings.  Logs provided  -- Hypoglycemia precautions discussed. Instructed on precautions before driving.    -- Call for Bg repeatedly < 90 or > 180.   -- Close adherence  to lifestyle changes recommended.   -- Periodic follow ups for eye evaluations, foot care and dental care suggested.

## 2022-03-15 NOTE — PROGRESS NOTES
CC:   Chief Complaint   Patient presents with    Diabetes Mellitus       HPI: Aishwarya Jensen is a 61 y.o. female presents for a follow up visit today for the management of T2DM   She was diagnosed with Type 2 diabetes in her early 50's on routine lab work. She was initially started on Metformin. Insulin therapy was started soon after diagnosis.     Family hx of diabetes: Mother, father, brothers   Hospitalized for diabetes: denies     No personal or FH of thyroid cancer or personal of pancreatic cancer or pancreatitis.     She works at the [employer] in housekeeping.       She met with diabetes education prior to our follow-up visit today  She saw me last in December of 2021  Following that visit we got a professional CGM  Please see results below  Reviewed her pharmacy history--she last filled in 90 day supply of NovoLog in December  She recently filled her metformin--she does report that she is only taking the metformin daily  She filled her V Go in November, January, February  Would be due to fill her V Go soon in March  She filled her Farxiga in December and again in March  She still struggling with some V Go compliance  Typically giving her V Go clicks after she eats  I am concerned that she is wearing the V Go for more than 24 hours yet she is adamant that she is not  Today she presents with the V Go with a giant air bubble in the device--so she will not get the full dose of insulin  She has met with diabetes education--saw her again today  She does not want to come off of the V Go  Unfortunately she is not interested in a personal continuous glucose monitor  Her recent A1c went from greater than 14% to 9.4%      Professional CGM download:                      DIABETES COMPLICATIONS: nephropathy and peripheral neuropathy      Diabetes Management Status    ASA:  Yes - 81 mg- taking it every day     Statin: Taking- Crestor -- she was changed from Lipitor to Crestor recently per her PCP -- filled a 90 day  supply in January   ACE/ARB: Taking- lisinopril 20 mg     Screening or Prevention Patient's value Goal Complete/Controlled?   HgA1C Testing and Control   Lab Results   Component Value Date    HGBA1C 9.4 (H) 01/14/2022      Annually/Less than 8% Yes   Lipid profile : 01/14/2022 Annually Yes   LDL control Lab Results   Component Value Date    LDLCALC 138.6 01/14/2022    Annually/Less than 100 mg/dl  No   Nephropathy screening Lab Results   Component Value Date    LABMICR 451.0 03/12/2021     Lab Results   Component Value Date    PROTEINUA Negative 12/05/2018    Annually Yes   Blood pressure BP Readings from Last 1 Encounters:   03/15/22 132/72    Less than 140/90 No   Dilated retinal exam : 04/09/2021 Annually No   Foot exam   : 10/05/2021 Annually Yes       CURRENT A1C:    Hemoglobin A1C   Date Value Ref Range Status   01/14/2022 9.4 (H) 4.0 - 5.6 % Final     Comment:     ADA Screening Guidelines:  5.7-6.4%  Consistent with prediabetes  >or=6.5%  Consistent with diabetes    High levels of fetal hemoglobin interfere with the HbA1C  assay. Heterozygous hemoglobin variants (HbS, HgC, etc)do  not significantly interfere with this assay.   However, presence of multiple variants may affect accuracy.     10/05/2021 >14.0 (H) 4.0 - 5.6 % Final     Comment:     ADA Screening Guidelines:  5.7-6.4%  Consistent with prediabetes  >or=6.5%  Consistent with diabetes    High levels of fetal hemoglobin interfere with the HbA1C  assay. Heterozygous hemoglobin variants (HbS, HgC, etc)do  not significantly interfere with this assay.   However, presence of multiple variants may affect accuracy.     01/11/2021 12.7 (H) 4.0 - 5.6 % Final     Comment:     ADA Screening Guidelines:  5.7-6.4%  Consistent with prediabetes  >or=6.5%  Consistent with diabetes  High levels of fetal hemoglobin interfere with the HbA1C  assay. Heterozygous hemoglobin variants (HbS, HgC, etc)do  not significantly interfere with this assay.   However, presence of  multiple variants may affect accuracy.         GOAL A1C: less than 8%     DM MEDICATIONS USED IN THE PAST: Metformin   Victoza   Lantus   VGo  Never started Ozempic   Novolog   Freestyle manisha -- didn't like it       CURRENT DIABETES MEDICATIONS: Metformin 1000 mg daily mostly-- she forgets the evening dose and VGo30 with Novolog 5 clicks 3 times per day with food- is generally giving clicks after she eats and Farxiga 10 mg daily     Insulin:VGo  Missed doses:  Missing the VGo and missing the evening dose of Metformin   VGo is costing her $35 dollars.   Usually wearing the VGo to her abdomen   Today her VGo has a large air bubble in it.   She is giving her clicks after she eats not before   She is adamant that she is changing her VGo every 24 hours.         BLOOD GLUCOSE MONITORING: see logs below   + lots of blood sugar variability  Which leads me to believe V Go noncompliance as blood sugars are going from 240-93                HYPOGLYCEMIA:  Rarely       MEALS: eating 3 meals per day   drinking OJ sometimes   Drinks: water    Sparking water- SF       CURRENT EXERCISE:  No      Review of Systems  Review of Systems   Constitutional: Negative for appetite change, fatigue and unexpected weight change.   HENT: Negative for trouble swallowing.    Eyes: Negative for visual disturbance.   Respiratory: Negative for shortness of breath.    Cardiovascular: Negative for chest pain.   Gastrointestinal: Negative for nausea.   Endocrine: Negative for polydipsia, polyphagia and polyuria.   Genitourinary:        + Nocturia 2 times per night    Musculoskeletal: Positive for arthralgias.   Skin: Negative for wound.   Neurological: Negative for numbness.   Psychiatric/Behavioral: Positive for sleep disturbance.       Physical Exam   Physical Exam  Vitals and nursing note reviewed.   Constitutional:       Appearance: She is well-developed. She is obese.   HENT:      Head: Normocephalic and atraumatic.      Right Ear: External ear  normal.      Left Ear: External ear normal.      Nose: Nose normal.   Neck:      Thyroid: No thyromegaly.      Trachea: No tracheal deviation.   Cardiovascular:      Rate and Rhythm: Normal rate and regular rhythm.      Heart sounds: No murmur heard.  Pulmonary:      Effort: Pulmonary effort is normal. No respiratory distress.      Breath sounds: Normal breath sounds.   Abdominal:      Palpations: Abdomen is soft.      Tenderness: There is no abdominal tenderness.      Hernia: No hernia is present.   Musculoskeletal:      Cervical back: Normal range of motion and neck supple.   Skin:     General: Skin is warm and dry.      Capillary Refill: Capillary refill takes less than 2 seconds.      Findings: No rash.      Comments: She is wearing the VGo to her abdomen.   Large air bubble in it. -- will affect the amount of insulin she is receiving    Neurological:      Mental Status: She is alert and oriented to person, place, and time.      Cranial Nerves: No cranial nerve deficit.   Psychiatric:         Behavior: Behavior normal.         Judgment: Judgment normal.           FOOT EXAMINATION: Appropriate footwear.         Lab Results   Component Value Date    TSH 3.184 01/14/2022         Type 2 diabetes mellitus with hyperglycemia, with long-term current use of insulin  Uncontrolled  I placed a referral to outpatient case management again today   I suspect VGo noncompliance at times--either not giving the clicks when she is eating or wearing the V Go for more than 24 hours?  Reviewed the V Go extensively with patient today  She deferred coming off of the V Go  She also met with diabetes education today to review V Go  Unfortunately she is not interested in a personal CGM-- we did discuss that this would help with self awareness and self accountability        Discussed DM course, progression, and the need for good BG control to prevent or allay long-term complications. Reviewed proper hypoglycemia management. Discussed  consistency in BG monitoring to allow for safe titration of insulin.        -- Medication Changes:   Continue Metformin 1000 mg daily     Continue Farxiga 10 mg daily     Continue VGo30   We will change the Novolog insulin to Lyumjev -- this may help with click compliance. She can give clicks as she eats or right after eating   Give 5-6 clicks 3 times per day with meals   1-2 clicks for snacks   Give clicks BEFORE THE MEAL   CHANGE THE VGO EVERY 24 HOURS!!!!!     Start Ozempic 0.25 mg weekly for 4 weeks & then 0.5 mg weekly thereafter.  You take this ONCE A WEEK   INJECTING ONLY INTO THE ABDOMEN          -- Reviewed goals of therapy are to get the best control we can without hypoglycemia.  -- Reviewed patient's current insulin regimen. Clarified proper insulin dose and timing in relation to meals, etc. Insulin injection sites and proper rotation instructed.    -- Advised frequent self blood glucose monitoring.  Patient encouraged to document glucose results and bring them to every clinic visit.  She deferred a personal CGM.  Monitor blood sugars 4 times per day before meals and before bed.  Record readings.  Logs provided  -- Hypoglycemia precautions discussed. Instructed on precautions before driving.    -- Call for Bg repeatedly < 90 or > 180.   -- Close adherence to lifestyle changes recommended.   -- Periodic follow ups for eye evaluations, foot care and dental care suggested.          Type 2 diabetes mellitus with diabetic polyneuropathy, with long-term current use of insulin  Optimize BG readings.   See above.       Educated patient to check feet daily for any foreign objects and/or wounds. Discussed with patient the importance of wearing appropriate footwear at all times, not to walk barefoot ever, and to check shoes before putting them on feet. Instructed patient to keep feet dry by regularly changing shoes and socks and drying feet after baths and exercises. Also, instructed patient to report any new  lesions, discolorations, or swelling to a healthcare professional.        Microalbuminuria due to type 2 diabetes mellitus  Optimize BG readings.   See above.   On ACEi       Essential hypertension, benign  BP goal is < 140/90.   Tolerating ACEi  Controlled   Blood pressure goals discussed with patient      Dyslipidemia, goal LDL below 100  On statin per ADA recommendations  LDL goal < 100.  Her Lipitor was changed to Crestor per PCP    Class 2 severe obesity due to excess calories with serious comorbidity and body mass index (BMI) of 35.0 to 35.9 in adult  Body mass index is 35.88 kg/m².  Increases insulin resistance.   Discussed DM diet and exercise.         Spent 25 minutes with patient with > 50% time spent in counseling      Noncompliance is hindering her overall management      Follow up in about 3 months (around 6/15/2022).  Urine MAC today   Follow up with me in 3 months -_ A1c already ordered per PCP       Orders Placed This Encounter   Procedures    Microalbumin/Creatinine Ratio, Urine     Standing Status:   Future     Number of Occurrences:   1     Standing Expiration Date:   9/15/2023     Order Specific Question:   Specimen Source     Answer:   Urine    Ambulatory referral/consult to Outpatient Case Management     Referral Priority:   Routine     Referral Type:   Consultation     Referral Reason:   Specialty Services Required     Number of Visits Requested:   1       Recommendations were discussed with the patient in detail  The patient verbalized understanding and agrees with the plan outlined as above.

## 2022-03-16 NOTE — PROGRESS NOTES
Diabetes Care Specialist Follow-up Note  Author: Marla Stein RD, CDE  Date: 3/16/2022    Program Intake  Reason for Diabetes Program Visit:: Intervention  Type of Intervention:: Individual  Individual: Education  Education: Pattern Management, Self-Management Skill Review  Current diabetes risk level:: high  Permission to speak with others about care:: no    Lab Results   Component Value Date    HGBA1C 9.4 (H) 01/14/2022     A1c Pre Diabetes Care Specialist Intervention:  >14.0%    Clinical    Patient Health Rating  Compared to other people your age, how would you rate your health?: Good    Problem Review  Reviewed Problem List with Patient: yes  Active comorbidities affecting diabetes self-care.: yes  Comorbidities: Cardiovascular Disease, Hypertension, Neuropathy  Reviewed health maintenance: yes    Clinical Assessment  Current Diabetes Treatment: Oral Medication, Insulin  Have you ever experienced hypoglycemia (low blood sugar)?: no  Have you ever experienced hyperglycemia (high blood sugar)?: no    Medication Information  How do you obtain your medications?: Patient drives, Family picks up  How many days a week do you miss your medications?: Never  Do you use a pill box or medication chart to help you manage your medications?: Pill box  Do you sometimes have difficulty refilling your medications?: Yes (see comment)  Medication adherence impacting ability to self-manage diabetes?: Yes    Labs  Do you have regular lab work to monitor your medications?: Yes  Type of Regular Lab Work: A1c, Cholesterol, Microalbumin, CBC, BMP  Where do you get your labs drawn?: [facility]  Lab Compliance Barriers: No    Nutritional Status  Diet: Regular  Meal Plan 24 Hour Recall: Breakfast, Lunch, Dinner, Snack  Meal Plan 24 Hour Recall - Breakfast: Toast and Eggs, coffee with sugar and cream  Meal Plan 24 Hour Recall - Lunch: chicken noodle soup with crackers  Meal Plan 24 Hour Recall - Dinner: [employer] pizza  Meal Plan 24 Hour  Recall - Snack: peanut butter crackers, almonds, cheerios, drinks water or sparkling water body armour lyte  Change in appetite?: No  Dentation:: Intact  Recent Changes in Weight: No Recent Weight Change  Current nutritional status an area of need that is impacting patient's ability to self-manage diabetes?: Yes    Additional Social History    Support  Does anyone support you with your diabetes care?: yes  Who supports you?: family member, spouse, self  Who takes you to your medical appointments?: self  Does the current support meet the patient's needs?: Yes  Is Support an area impacting ability to self-manage diabetes?: No    Access to Mass Media & Technology  Does the patient have access to any of the following devices or technologies?: Smart phone  Media or technology needs impacting ability to self-manage diabetes?: No    Cognitive/Behavioral Health  Alert and Oriented: Yes  Difficulty Thinking: No  Requires Prompting: No  Requires assistance for routine expression?: No  Cognitive or behavioral barriers impacting ability to self-manage diabetes?: No    Culture/Rastafari  Culture or Hinduism beliefs that may impact ability to access healthcare: No    Communication  Language preference: English  Hearing Problems: No  Vision Problems: Yes  Vision problem type:: Decreased Vision  Vision Assistance: Glasses  Communication needs impacting ability to self-manage diabetes?: No    Health Literacy  Preferred Learning Method: Face to Face, Demonstration, Reading Materials  How often do you need to have someone help you read instructions, pamphlets, or written material from your doctor or pharmacy?: Sometimes  Health literacy needs impacting ability to self-manage diabetes?: No      Diabetes Self-Management Skills Assessment     Diabetes Disease Process/Treatment Options  Patient/caregiver able to state what happens when someone has diabetes.: yes  Patient/caregiver knows what type of diabetes they have.: yes  Diabetes Type  : Type II  Patient/caregiver able to identify at least three signs and symptoms of diabetes.: yes  Identified signs and symptoms:: frequent urination, increased thirst, fatigue  Patient able to identify at least three risk factors for diabetes.: yes  Identified risk factors:: being overweight, family history  Diabetes Disease Process/Treatment Options: Skills Assessment Completed: No  Assessment indicates:: Adequate understanding  Deferred due to:: Other (comment) (previously completed, there has been no change and patient has adequate understanding)  Area of need?: No    Nutrition/Healthy Eating  Challenges to healthy eating:: portion control, snacking between meals and at night, eating when feeling depressed/stressed, lack of will power, eating out, going to parties  Method of carbohydrate measurement:: no method  Patient can identify foods that impact blood sugar.: yes  Patient-identified foods:: starches (bread, pasta, rice, cereal), sweets, soda, fruit/fruit juice, yogurt, starchy vegetables (corn, peas, beans)  Nutrition/Healthy Eating Skills Assessment Completed:: Yes  Assessment indicates:: Adequate understanding  Area of need?: No    Physical Activity/Exercise  Patient's daily activity level:: lightly active  Patient formally exercises outside of work.: no  Reasons for not exercising:: work schedule  Patient can identify forms of physical activity.: yes  Stated forms of physical activity:: moving to burn calories  Patient can identify reasons why exercise/physical activity is important in diabetes management.: no  Physical Activity/Exercise Skills Assessment Completed: : No  Assessment indicates:: Adequate understanding  Deffered due to:: Other (comment) (previously completed, there has been no change and patient has adequate understanding)  Area of need?: No    Medications  Patient is able to describe current diabetes management routine.: yes  Diabetes management routine:: insulin, oral  medications  Patient is able to identify current diabetes medications, dosages, and appropriate timing of medications.: yes  Patient understands the purpose of the medications taken for diabetes.: no  Patient reports problems or concerns with current medication regimen.: yes  Medication regimen problems/concerns:: financial concerns, other (see comments) (patient's Vgo is empty at time of visit, and when asked to demonstrate how to fill it patient couldn't)  Medication Skills Assessment Completed:: Yes  Assessment indicates:: Instruction Needed  Area of need?: Yes    Home Blood Glucose Monitoring  Patient states that blood sugar is checked at home daily.: yes  Monitoring Method:: home glucometer  Home glucometer meter type:: accu-chek guide  How often do you check your blood sugar?: Occasionally  When do you check your blood sugar?: Before breakfast, Before dinner, Other  When you check what is your typical blood sugar range? : 270, 153, 144, 297, 123, 163, 127, 379, 356, 199, HI, 297, 438, 135, 167, 430, LO, 88, 150, 71, 186, 390  Blood glucose logs:: yes (patient remembered to bring glucometer to visit, I transcribed data from glucometer to logs for provider visit and scanned into media)  Blood glucose logs reviewed today?: no  Home Blood Glucose Monitoring Skills Assessment Completed: : Yes  Assessment indicates:: Adequate understanding  Area of need?: No    Acute Complications  Patient is able to identify types of acute complications: Yes  Patient Identified:: Hypoglycemia, Hyperglycemia  Patient is able to state the basic meaning of hypoglycemia?: Yes  Able to state the blood sugar range for hypoglycemia?: yes  Patient stated range:: <70  Patient can identify general symptoms of hypoglycemia: yes  Patient identified:: other (see comments) (feels chilled)  Able to state proper treatment of hypoglycemia?: yes  Patient identified:: other (see comments) (eat something like a sandwich - instructed on proper  treatment options)  Patient is able to state the basic meaning of hyperglycemia?: Yes  Able to state the blood sugar range for hyperglycemia?: yes (feels dizzy with hyperglycemia)  Patient stated range:: >250  Patient able to state proper treatment of hyperglycemia?: yes  Patient identified:: take medication as recommended, monitor blood sugar  Patient able to verbalize sick day plan?: no  Acute Complications Skills Assessment Completed: : Yes  Assessment indicates:: Knowledge deficit, Instruction Needed  Area of need?: Yes    Chronic Complications  Patient can identify major chronic complications of diabetes.: yes  Stated chronic complications:: kidney disease, neuropathy/nerve damage, retinopathy, heart disease/heart attack, stroke  Patient can identify ways to prevent or delay diabetes complications.: yes  Stated ways to prevent complications:: controlling blood sugar  Patient is aware that having diabetes increases risk of heart disease?: Yes  Patient is aware that heart disease is the leading cause of death and disability in people with diabetes?: Yes  Patient able to state risk factors for heart disease?: Yes  Patient stated risk factors for heart disease:: High blood pressure, High cholesterol, Having diabetes  Patient is taking statin?: Yes  Chronic Complications Skills Assessment Completed: : No  Assessment indicates:: Adequate understanding  Deferred due to:: Other (comment) (previously completed, there has been no change and patient has adequate understanding)  Area of need?: No    Psychosocial/Coping  Patient can identify ways of coping with chronic disease.: yes  Patient-stated ways of coping with chronic disease:: spiritual/Moravian practices, support from loved ones  Psychosocial/Coping Skills Assessment Completed: : No  Assessment indicates:: Adequate understanding  Deffered due to:: Other (comment) (previously completed, there has been no change and patient has adequate understanding)  Area of  need?: No      Diabetes Self-Support Plan    Diabetes Self-Management Support Plan  Stress management:: family  Medication:: Medication Assistance  Review status:: Patient has selected and agrees to support plan., Patient was provided Diabetes Self-Management Support Plan document that includes support options.    During today's follow-up visit,  the following areas required further assessment and content was provided/reviewed.    Based on today's diabetes care assessment, the following areas of need were identified:      Social 3/15/2022   Support No   Access to Mass Media/Tech No   Cognitive/Behavioral Health No   Culture/Jainism No   Communication No   Health Literacy No        Clinical 3/15/2022   Medication Adherence Yes   Lab Compliance No   Nutritional Status Yes        Diabetes Self-Management Skills 3/15/2022   Diabetes Disease Process/Treatment Options No   Nutrition/Healthy Eating No   Physical Activity/Exercise No   Medication Yes   Home Blood Glucose Monitoring No   Acute Complications Yes   Chronic Complications No   Psychosocial/Coping No        Today's interventions were provided through individual discussion, instruction, and written materials were provided.    Patient verbalized understanding of instruction and written materials.  Pt was able to return back demonstration of instructions today. Patient understood key points, needs reinforcement and further instruction.     Diabetes Self-Management Care Plan Review:  Diabetes Self-Management Care Plan Review and Evaluation of Progress:    During today's follow-up Aishwarya's Diabetes Self-Management Care Plan progress was reviewed and progress was evaluated including his/her input. Aishwarya has agreed to continue his/her journey to improve/maintain overall diabetes control by continuing to set health goals. See care plan progress below.      Care Plan: Diabetes Management   Updates made since 2/14/2022 12:00 AM      Problem: Healthy Eating        Goal: Eat 3 meals daily with 30-45g/2-3 servings of Carbohydrate per meal.    Start Date: 1/5/2022   Expected End Date: 4/15/2022   This Visit's Progress: On track   Priority: Medium   Barriers: Lack of Motivation to Change   Note:    Patient states that she is taking 5 clicks with meals and changing vgo daily     Task: Provided visual examples using dry measuring cups, food models, and other familiar objects such as computer mouse, deck or cards, tennis ball etc. to help with visualization of portions. Completed 2/22/2022      Task: Explained how to count carbohydrates using the food label and the use of dry measuring cups for accurate carb counting. Completed 2/22/2022      Task: Discussed strategies for choosing healthier menu options when dining out. Completed 3/16/2022      Task: Recommended replacing beverages containing high sugar content with noncaloric/sugar free options and/or water. Completed 3/16/2022      Task: Review the importance of balancing carbohydrates with each meal using portion control techniques to count servings of carbohydrate and label reading to identify serving size and amount of total carbs per serving. Completed 2/22/2022      Task: Provided Sample plate method and reviewed the use of the plate to estimate amounts of carbohydrate per meal. Completed 2/22/2022      Task: Reviewed the sources and role of Carbohydrate, Protein, and Fat and how each nutrient impacts blood sugar. Completed 2/22/2022      Task: Reviewed Blood Sugar Goals after eating and explained how carbohydrate sources, nonstarchy vegetables, protein and fat affects blood sugar control. Completed 3/16/2022      Task: Discussed the importance of balancing carbohydrates with each meal using portion control techniques to count carbohydrate grams or servings, label reading to identify servings size and amount of total carbohydrate per serving, the plate method, other Completed 3/16/2022      Task: Provided visual examples  using dry measuring cups, food models, and other familiar objects such as computer mouse, deck or cards, tennis ball etc. to help with visualization of portions. Completed 2022      Task: Explained how to count carbohydrates using the food label and the use of dry measuring cups for accurate carb counting. Completed 2022      Task: Discussed strategies for choosing healthier menu options when dining out. Completed 3/16/2022      Task: Recommended and provided sample meal plan reflectin-30g and 30-45g (2-3 servings), 45-60g and 60-75g (3-4 servings) of carbohydrates per meal. Completed 3/16/2022      Task: Provided examples and planning techniques to help with healthy and balanced meal planning and timing of meals. Completed 3/16/2022      Task: Discussed strategies for choosing healthier menu options. Completed 3/16/2022      Task: Encouraged reducing frequency of fast food/eating out. Completed 3/16/2022      Task: Counseled on mindful eating practices Completed 3/16/2022      Task: Reviewed healthy (0-5g carbohydrate) snack options and provided list of examples. Completed 3/16/2022      Task: Encouraged reducing snacking and reviewed appropriate snack choices. Completed 3/16/2022      Task: Recommended carbohydrates per snack and provided samples of snack options. Completed 3/16/2022      Task: Encouraged to reduce or replace consumption of sugar-containing beverages with noncalorie or sugar-free versions and/or water; provided examples of beverage options. Completed 3/16/2022      Task: Provided information on low sodium, low saturated fat/low cholesterol, renal restrictions, gastroparesis nutrition Completed 3/16/2022      Task: Discussed guidelines for preventing, detecting and treating hypoglycemia and hyperglycemia and reviewed the importance of meal and medication timing with diabetes mediations for prevention of hypoglycemia and maximum drug benefit. Completed 3/16/2022      Problem:  Medications       Goal: Patient Agrees to take Diabetes Medication(s) insulin via Vgo30 as prescribed.    Start Date: 1/5/2022   Expected End Date: 4/15/2022   This Visit's Progress: Not met   Priority: High   Barriers: Financial   Note:    Reviewed Vgo Disposable Insulin Pump Education  Discussed how to ensure all bubbles are out of vgo prior to putting on - at time of visit patient had a huge bubble in Vgo that is on her.  Patient provided with written literature and CDE contact information        Task: Discussed any concerns regarding the current medication regimen. Completed 2/22/2022      Task: Reviewed possible side effects of all oral and injectable diabetes medications. Completed 2/22/2022      Task: Discussed and reviewed why meal and medication timing is important with diabetes medications. Completed 2/22/2022      Task: Reviewed action, peak, duration, dosing, and proper storage guidelines of all diabetes medications. Completed 3/16/2022      Task: reviewed appropriate injection site selection and importance of rotating sites. Completed 3/16/2022      Task: Discussed importance of changing Vgo every 24-hours and to ensure there are no bubbles in the VGo prior to taking it out of the easy fill and placing on body. Completed 3/16/2022      Task: Reviewed sources of Carbohydrate: Starch, Milk, Fruit, Sugar, and nonstarchy vegetables Completed 2/22/2022      Task: Discussed guidelines for preventing, detecting and treating hypoglycemia and hyperglycemia and reviewed the importance of meal and medication timing with diabetes mediations for prevention of hypoglycemia and maximum drug benefit. Completed 2/22/2022      Problem: Acute Complications Resolved 3/15/2022      Goal: Patient agrees to identify and manage signs and symptoms of high/low blood sugar (hyper/hypoglycemia) by keeping a log of events and using proper treatment. Completed 3/15/2022   Start Date: 1/5/2022   Priority: Low   Barriers: No Barriers  Identified   Note:    Hyperglycemia  ABC's of Diabetes    Discussed importance of A1c less than 6.0 to reduce risk of micro and macro complications, controlled Blood Pressure 130/80 and Cholesterol Lab Values--Total Cholesterol <200mg, LDL < 100, HDL >45 men and >50 women, Triglycerides <150mg for prevention heart disease, heart attack, stroke.   how to use a glucometer   reviewed understanding diabetes distress   reviewed current level and goal level for HgbA1c, blood glucose, microalbumin, and lipids.     Hypoglycemia  Reviewed blood glucose goals, prevention, detection, and treatment of hypoglycemia, and when to contact the clinic.       Task: Reviewed proper treatment of hypoglycemia with the rule of 15--patient to eat 15g simple carbohydrate (4 glucose tablets, 1 glucose gel, 5 pieces hard candy, ½ cup fruit juice, ½ can regular soda, etc) and wait 15 minutes and recheck home glucose. Completed 2/22/2022      Task: Reviewed common causes and precautions to help prevent hyper/hypoglycemic events. Completed 2/22/2022      Task: Discussed, sick day planning, natural disaster planning, and/or travel planning to prevent hyper/hypoglycemia. Completed 2/22/2022      Task: Discussed risk factors for developing diabetic ketoacidosis (DKA), strategies for reducing risk, testing with ketone test strips if BG is >240mg/dl, basic protocol for managing DKA, and when to seek further medical attention. Completed 2/22/2022          Follow Up Plan     Follow up in about 4 weeks (around 4/12/2022) for Post Program Follow-Up, Blood Sugar Log Review and F/U MNT.    Today's care plan and follow up schedule was discussed with patient.  Aishwarya verbalized understanding of the care plan, goals, and agrees to follow up plan.        The patient was encouraged to communicate with his/her health care provider/physician and care team regarding his/her condition(s) and treatment.  I provided the patient with my contact information  today and encouraged to contact me via phone or [facility] Patient Portal as needed.     Length of Visit   Total Time: 60 Minutes

## 2022-03-17 ENCOUNTER — PATIENT MESSAGE (OUTPATIENT)
Dept: DIABETES | Facility: CLINIC | Age: 62
End: 2022-03-17
Payer: COMMERCIAL

## 2022-03-21 NOTE — PROCEDURES
DM nail debridement    Date/Time: 3/15/2022 1:45 PM  Performed by: Annmarie Keane DPM  Authorized by: Annmarie Keane DPM     Consent Done?:  Yes (Verbal)    Nail Care Type:  Debride(Left 1st Toe, Left 2nd Toe, Left 3rd Toe, Right 1st Toe, Right 2nd Toe, Right 3rd Toe, Right 5th Toe and Left 5th Toe)  Patient tolerance:  Patient tolerated the procedure well with no immediate complications  DM nail trimming    Date/Time: 3/15/2022 1:45 PM  Performed by: Annmarie Keane DPM  Authorized by: Annmarie Keane DPM     Consent Done?:  Yes (Verbal)    Nail Care Type:  Trim(Left 4th Toe and Right 4th Toe)  Patient tolerance:  Patient tolerated the procedure well with no immediate complications

## 2022-03-25 DIAGNOSIS — F51.01 PRIMARY INSOMNIA: ICD-10-CM

## 2022-03-25 RX ORDER — TRAZODONE HYDROCHLORIDE 50 MG/1
TABLET ORAL
Qty: 60 TABLET | Refills: 5 | Status: SHIPPED | OUTPATIENT
Start: 2022-03-25 | End: 2023-11-14

## 2022-03-25 NOTE — TELEPHONE ENCOUNTER
No new care gaps identified.  Powered by [employer]. Reference number: [id number].   3/25/2022 12:10:06 PM CDT

## 2022-03-25 NOTE — TELEPHONE ENCOUNTER
This Rx Request does not qualify for assessment with the OR   Please review protocol details and the Care Due Message for extra clinical information    Reasons Rx Request may be deferred:  Medication is non-delegated    Note composed:2:33 PM 03/25/2022

## 2022-04-18 ENCOUNTER — TELEPHONE (OUTPATIENT)
Dept: PRIMARY CARE CLINIC | Facility: CLINIC | Age: 62
End: 2022-04-18
Payer: COMMERCIAL

## 2022-04-18 DIAGNOSIS — M25.562 ARTHRALGIA OF BOTH KNEES: Primary | ICD-10-CM

## 2022-04-18 DIAGNOSIS — M25.561 ARTHRALGIA OF BOTH KNEES: Primary | ICD-10-CM

## 2022-04-18 NOTE — TELEPHONE ENCOUNTER
----- Message from Bettina Webb sent at 4/18/2022  8:23 AM CDT -----  Contact: self/[phone]  Pt called in regards to talking to the dr about the med's that the nutritionist put her on. Pt would also like to know when can she come and drop off some medical leave paperwork. Pt would like a call back.     Please advise

## 2022-04-22 ENCOUNTER — TELEPHONE (OUTPATIENT)
Dept: PRIMARY CARE CLINIC | Facility: CLINIC | Age: 62
End: 2022-04-22
Payer: COMMERCIAL

## 2022-04-22 NOTE — TELEPHONE ENCOUNTER
----- Message from Mayte Goodson sent at 4/22/2022 11:53 AM CDT -----  Contact: [phone] Patient  Pt states [facility] advised they are not able to fill her Rxs due to her [employer] insurance ending. Pt is requesting a call back from the office.

## 2022-04-22 NOTE — TELEPHONE ENCOUNTER
----- Message from Maye Castellanos sent at 4/22/2022 11:59 AM CDT -----  Contact: Self/[phone]  Patient is returning a phone call.  Who left a message for the patient: Heather  Does patient know what this is regarding:  yes  Would you like a call back, or a response through your [facility] portal?:  call back   Comments:

## 2022-05-02 ENCOUNTER — OFFICE VISIT (OUTPATIENT)
Dept: PRIMARY CARE CLINIC | Facility: CLINIC | Age: 62
End: 2022-05-02
Payer: COMMERCIAL

## 2022-05-02 VITALS
DIASTOLIC BLOOD PRESSURE: 82 MMHG | SYSTOLIC BLOOD PRESSURE: 126 MMHG | RESPIRATION RATE: 18 BRPM | OXYGEN SATURATION: 94 % | BODY MASS INDEX: 35 KG/M2 | WEIGHT: 223 LBS | HEIGHT: 67 IN | HEART RATE: 71 BPM

## 2022-05-02 DIAGNOSIS — R06.09 EXERTIONAL DYSPNEA: ICD-10-CM

## 2022-05-02 DIAGNOSIS — M25.561 ACUTE PAIN OF RIGHT KNEE: Primary | ICD-10-CM

## 2022-05-02 DIAGNOSIS — I10 ESSENTIAL HYPERTENSION, BENIGN: ICD-10-CM

## 2022-05-02 PROCEDURE — 3079F PR MOST RECENT DIASTOLIC BLOOD PRESSURE 80-89 MM HG: ICD-10-PCS | Mod: CPTII,S$GLB,, | Performed by: FAMILY MEDICINE

## 2022-05-02 PROCEDURE — 3046F PR MOST RECENT HEMOGLOBIN A1C LEVEL > 9.0%: ICD-10-PCS | Mod: CPTII,S$GLB,, | Performed by: FAMILY MEDICINE

## 2022-05-02 PROCEDURE — 1160F RVW MEDS BY RX/DR IN RCRD: CPT | Mod: CPTII,S$GLB,, | Performed by: FAMILY MEDICINE

## 2022-05-02 PROCEDURE — 3062F POS MACROALBUMINURIA REV: CPT | Mod: CPTII,S$GLB,, | Performed by: FAMILY MEDICINE

## 2022-05-02 PROCEDURE — 93005 ELECTROCARDIOGRAM TRACING: CPT | Mod: S$GLB,,, | Performed by: FAMILY MEDICINE

## 2022-05-02 PROCEDURE — 3079F DIAST BP 80-89 MM HG: CPT | Mod: CPTII,S$GLB,, | Performed by: FAMILY MEDICINE

## 2022-05-02 PROCEDURE — 93010 ELECTROCARDIOGRAM REPORT: CPT | Mod: S$GLB,,, | Performed by: INTERNAL MEDICINE

## 2022-05-02 PROCEDURE — 3066F NEPHROPATHY DOC TX: CPT | Mod: CPTII,S$GLB,, | Performed by: FAMILY MEDICINE

## 2022-05-02 PROCEDURE — 3074F SYST BP LT 130 MM HG: CPT | Mod: CPTII,S$GLB,, | Performed by: FAMILY MEDICINE

## 2022-05-02 PROCEDURE — 3066F PR DOCUMENTATION OF TREATMENT FOR NEPHROPATHY: ICD-10-PCS | Mod: CPTII,S$GLB,, | Performed by: FAMILY MEDICINE

## 2022-05-02 PROCEDURE — 4010F ACE/ARB THERAPY RXD/TAKEN: CPT | Mod: CPTII,S$GLB,, | Performed by: FAMILY MEDICINE

## 2022-05-02 PROCEDURE — 99214 PR OFFICE/OUTPT VISIT, EST, LEVL IV, 30-39 MIN: ICD-10-PCS | Mod: S$GLB,,, | Performed by: FAMILY MEDICINE

## 2022-05-02 PROCEDURE — 4010F PR ACE/ARB THEARPY RXD/TAKEN: ICD-10-PCS | Mod: CPTII,S$GLB,, | Performed by: FAMILY MEDICINE

## 2022-05-02 PROCEDURE — 3046F HEMOGLOBIN A1C LEVEL >9.0%: CPT | Mod: CPTII,S$GLB,, | Performed by: FAMILY MEDICINE

## 2022-05-02 PROCEDURE — 99214 OFFICE O/P EST MOD 30 MIN: CPT | Mod: S$GLB,,, | Performed by: FAMILY MEDICINE

## 2022-05-02 PROCEDURE — 3008F BODY MASS INDEX DOCD: CPT | Mod: CPTII,S$GLB,, | Performed by: FAMILY MEDICINE

## 2022-05-02 PROCEDURE — 3074F PR MOST RECENT SYSTOLIC BLOOD PRESSURE < 130 MM HG: ICD-10-PCS | Mod: CPTII,S$GLB,, | Performed by: FAMILY MEDICINE

## 2022-05-02 PROCEDURE — 1159F PR MEDICATION LIST DOCUMENTED IN MEDICAL RECORD: ICD-10-PCS | Mod: CPTII,S$GLB,, | Performed by: FAMILY MEDICINE

## 2022-05-02 PROCEDURE — 99999 PR PBB SHADOW E&M-EST. PATIENT-LVL V: CPT | Mod: PBBFAC,,, | Performed by: FAMILY MEDICINE

## 2022-05-02 PROCEDURE — 1160F PR REVIEW ALL MEDS BY PRESCRIBER/CLIN PHARMACIST DOCUMENTED: ICD-10-PCS | Mod: CPTII,S$GLB,, | Performed by: FAMILY MEDICINE

## 2022-05-02 PROCEDURE — 99999 PR PBB SHADOW E&M-EST. PATIENT-LVL V: ICD-10-PCS | Mod: PBBFAC,,, | Performed by: FAMILY MEDICINE

## 2022-05-02 PROCEDURE — 93005 EKG 12-LEAD: ICD-10-PCS | Mod: S$GLB,,, | Performed by: FAMILY MEDICINE

## 2022-05-02 PROCEDURE — 93010 EKG 12-LEAD: ICD-10-PCS | Mod: S$GLB,,, | Performed by: INTERNAL MEDICINE

## 2022-05-02 PROCEDURE — 1159F MED LIST DOCD IN RCRD: CPT | Mod: CPTII,S$GLB,, | Performed by: FAMILY MEDICINE

## 2022-05-02 PROCEDURE — 3062F PR POS MACROALBUMINURIA RESULT DOCUMENTED/REVIEW: ICD-10-PCS | Mod: CPTII,S$GLB,, | Performed by: FAMILY MEDICINE

## 2022-05-02 PROCEDURE — 3008F PR BODY MASS INDEX (BMI) DOCUMENTED: ICD-10-PCS | Mod: CPTII,S$GLB,, | Performed by: FAMILY MEDICINE

## 2022-05-02 RX ORDER — LISINOPRIL AND HYDROCHLOROTHIAZIDE 12.5; 2 MG/1; MG/1
1 TABLET ORAL DAILY
Qty: 90 TABLET | Refills: 1 | Status: SHIPPED | OUTPATIENT
Start: 2022-05-02 | End: 2022-12-21

## 2022-05-02 RX ORDER — AMLODIPINE BESYLATE 5 MG/1
5 TABLET ORAL DAILY
Qty: 90 TABLET | Refills: 1 | Status: SHIPPED | OUTPATIENT
Start: 2022-05-02 | End: 2023-09-15

## 2022-05-02 NOTE — PROGRESS NOTES
"Subjective:       Patient ID: Aishwarya Jensen is a 61 y.o. female.    Chief Complaint: Leg Pain (Right leg) and Shortness of Breath (Gets SOB when walking)    Right knee and lower leg pain for the past several days.  Sounds like her knee started hurting after she climbed about 25 stairs.  No fall or discrete injury.  Pain at rest, worse with movement.  Pain radiating down to lower leg, as well.  Went to the emergency room a few days ago.  X-rays negative for acute abnormalities.  Ultrasound negative for DVT, showed complex popliteal fluid collection.  Has been advised to follow-up with ortho  Also reports an episode of exertional dyspnea after walking approximately 5 blocks several days ago.  Denies chest pain or palpitations.  No dizziness or lightheadedness.  Stress test negative in 2018.     Review of Systems   Constitutional: Negative for chills and fever.   Respiratory: Positive for shortness of breath.    Cardiovascular: Negative for chest pain and palpitations.   Musculoskeletal: Positive for arthralgias.   Neurological: Negative for dizziness and light-headedness.   Psychiatric/Behavioral: Negative for agitation and confusion.       Objective:      Vitals:    05/02/22 1601   BP: 126/82   BP Location: Right arm   Patient Position: Sitting   BP Method: Large (Manual)   Pulse: 71   Resp: 18   SpO2: (!) 94%   Weight: 101.1 kg (222 lb 15.9 oz)   Height: 5' 7" (1.702 m)     Physical Exam  Vitals and nursing note reviewed.   Constitutional:       Appearance: She is well-developed.   HENT:      Head: Normocephalic and atraumatic.   Cardiovascular:      Rate and Rhythm: Normal rate and regular rhythm.      Heart sounds: Normal heart sounds.   Pulmonary:      Effort: Pulmonary effort is normal.      Breath sounds: Normal breath sounds.   Musculoskeletal:         General: Deformity (Numerous varicosities to both lower extremities, right greater than left) present.      Right knee: Decreased range of motion. " Tenderness present over the medial joint line.      Instability Tests: Anterior drawer test negative.   Skin:     General: Skin is warm and dry.   Neurological:      Mental Status: She is alert and oriented to person, place, and time.   Psychiatric:         Mood and Affect: Mood normal.         Behavior: Behavior normal.         Lab Results   Component Value Date    WBC 4.90 09/25/2020    HGB 10.7 (L) 09/25/2020    HCT 34.7 (L) 09/25/2020     09/25/2020    CHOL 209 (H) 01/14/2022    TRIG 82 01/14/2022    HDL 54 01/14/2022    ALT 26 10/05/2021    AST 26 10/05/2021     01/14/2022    K 4.7 01/14/2022     01/14/2022    CREATININE 1.2 01/14/2022    BUN 30 (H) 01/14/2022    CO2 26 01/14/2022    TSH 3.184 01/14/2022    HGBA1C 9.4 (H) 01/14/2022    X-Ray Knee 3 View Right  Order: [id number]   Status: Final result     Visible to patient: Yes (not seen)     Next appt: 05/09/2022 at 07:45 AM in Orthopedics (Tammy Raymond PA-C)     Dx: Acute pain of right knee     0 Result Notes    Details    Reading Physician Reading Date Result Priority   Walter Sapp MD  [phone]  [phone] 4/29/2022 STAT     Narrative & Impression  EXAMINATION:  XR KNEE 3 VIEW RIGHT     CLINICAL HISTORY:  Pain in right knee     TECHNIQUE:  AP, lateral, and Merchant views of the right knee were performed.     COMPARISON:  Right knee radiograph dated 04/05/2022     FINDINGS:  No acute fracture, dislocation, or osseous destruction.  No more than mild medial femorotibial joint space narrowing.  Remaining knee joint cartilage spaces are maintained.     Impression:     As above.        Electronically signed by: Walter Sapp  Date:                                            04/29/2022  Time:                                           15:25     US Lower Extremity Veins Right  Order: [id number]   Status: Final result     Visible to patient: Yes (not seen)     Next appt: 05/09/2022 at 07:45 AM in Orthopedics (Tammy Raymond PA-C)      Dx: Lower leg pain     0 Result Notes    Details    Reading Physician Reading Date Result Priority   Yash Mcrae MD  [phone]  [phone] 4/29/2022 STAT     Narrative & Impression  EXAMINATION:  US LOWER EXTREMITY VEINS RIGHT     CLINICAL HISTORY:  Pain in unspecified lower leg     TECHNIQUE:  Duplex and color flow Doppler evaluation and graded compression of the right lower extremity veins was performed.     COMPARISON:  None     FINDINGS:  Duplex and color flow Doppler evaluation does not reveal any evidence of acute venous thrombosis in the common femoral, superficial femoral, greater saphenous, popliteal, peroneal, anterior tibial and posterior tibial veins of the right lower extremity.  There is no reflux to suggest valvular incompetence.     There is a complex appearing collection along the posteromedial aspect of the right knee measuring approximately 11.3 x 1.8 x 1.9 cm.     Impression:     No evidence of deep venous thrombosis in the right lower extremity.     Complex collection along the posteromedial aspect of the right knee, nonspecific, possibly complex popliteal fossa cyst.  Infected collection not excluded.        Electronically signed by: Yash Mcrae MD  Date:                                            04/29/2022  Time:                                           15:16     Transthoracic echo (TTE) complete  Order# [id number]  Reading physician: Giovani Henson MD Ordering physician: Joey Cintron MD Study date: 2/23/18       Reason for Exam  Priority: Routine  Dx: Essential hypertension, benign [I10 (ICD-10-CM)] (Restart medication); Exertional dyspnea [R06.09 (ICD-10-CM)] (Needs risk stratification)         Result Image Hyperlink     Show images for Transthoracic echo (TTE) complete ([facility] ONLY)      Summary    · Left ventricle shows moderate concentric hypertrophy.  · Left ventricle ejection fraction is normal.  · Left ventricular diastolic filling is abnormal  consistent with impaired relaxation.  · Left atrium is mildly dilated.  · Mild regurgitation is present in the aortic valve.  · Findings are consistent with hypertensive heart disease    Aishwarya Jensen  Stress test only, exercise  Order# [id number]  Reading physician: Giovani Henson MD Ordering physician: Joey Cintron MD Study date: 2/23/18       Reason for Exam  Priority: Routine  Dx: Essential hypertension, benign [I10 (ICD-10-CM)] (Restart medication); Exertional dyspnea [R06.09 (ICD-10-CM)] (Needs risk stratification)         Conclusion    · Poor physical performance although adequate heart rate was obtained  · Stress ECG is negative for ischemia      Assessment:       1. Acute pain of right knee    2. Essential hypertension, benign    3. Exertional dyspnea        Plan:       Acute pain of right knee  -     Ambulatory referral/consult to Orthopedics; Future; Expected date: 05/09/2022  Needs to follow-up with ortho for definitive treatment  Essential hypertension, benign  -     amLODIPine (NORVASC) 5 MG tablet; Take 1 tablet (5 mg total) by mouth once daily.  Dispense: 90 tablet; Refill: 1  -     lisinopriL-hydrochlorothiazide (PRINZIDE,ZESTORETIC) 20-12.5 mg per tablet; Take 1 tablet by mouth once daily.  Dispense: 90 tablet; Refill: 1  Stable on current regimen  Exertional dyspnea  -     EKG 12-lead  -     Ambulatory referral/consult to Cardiology; Future; Expected date: 05/09/2022  No acute EKG changes.  Follow-up with Cardiology, may need repeat stress test    Medication List with Changes/Refills   Current Medications    ACETAMINOPHEN-CODEINE 300-30MG (TYLENOL #3) 300-30 MG TAB    Take 1 tablet by mouth every 6 (six) hours as needed (pain).    ASPIRIN (ECOTRIN) 81 MG EC TABLET    Take 1 tablet (81 mg total) by mouth once daily.    BLOOD SUGAR DIAGNOSTIC STRP    To check BG 4 times daily, to use with insurance preferred meter    BLOOD-GLUCOSE METER KIT    To check BG 4 times daily, to use  with insurance preferred meter    CYCLOBENZAPRINE (FLEXERIL) 10 MG TABLET    Take 10 mg by mouth once daily. TAKE ONE TABLET BY MOUTH ONCE DAILY AS NEEDED    DAPAGLIFLOZIN (FARXIGA) 10 MG TABLET    Take 1 tablet (10 mg total) by mouth once daily.    FLUCONAZOLE (DIFLUCAN) 150 MG TAB    TAKE ONE TABLET BY MOUTH AS A ONE TIME DOSE MAY REPEAT IN 3 DAYS IF NEEDED FOR 1 DOSE.    HYDROXYZINE PAMOATE (VISTARIL) 50 MG CAP    Take 1 capsule (50 mg total) by mouth every 8 (eight) hours as needed (itching).    INSULIN ASPART, NIACINAMIDE, 100 UNIT/ML SOLN    To use with VGo 30- 5-6 clicks TID AC, Max TDD of 100 units. Vials only, Normal    LANCETS MISC    To check BG 4 times daily, to use with insurance preferred meter    MELOXICAM (MOBIC) 15 MG TABLET    Take 1 tablet (15 mg total) by mouth once daily. With meals    METFORMIN (GLUCOPHAGE) 1000 MG TABLET    Take 1 tablet (1,000 mg total) by mouth daily with breakfast.    MUPIROCIN (BACTROBAN) 2 % OINTMENT    Apply topically 3 (three) times daily.    ROSUVASTATIN (CRESTOR) 40 MG TAB    Take 1 tablet (40 mg total) by mouth every evening.    SEMAGLUTIDE (OZEMPIC) 0.25 MG OR 0.5 MG(2 MG/1.5 ML) PEN INJECTOR    Inject 0.5 mg into the skin every 7 days.    SUB-Q INSULIN DEVICE, 30 UNIT (V-GO 30) DAVID    Inject 1 Device into the skin once daily.    TRAZODONE (DESYREL) 50 MG TABLET    TAKE 1 TO 2 TABLETS BY MOUTH AT BEDTIME AS NEEDED FOR INSOMNIA   Changed and/or Refilled Medications    Modified Medication Previous Medication    AMLODIPINE (NORVASC) 5 MG TABLET amLODIPine (NORVASC) 5 MG tablet       Take 1 tablet (5 mg total) by mouth once daily.    Take 1 tablet (5 mg total) by mouth once daily.    LISINOPRIL-HYDROCHLOROTHIAZIDE (PRINZIDE,ZESTORETIC) 20-12.5 MG PER TABLET lisinopriL-hydrochlorothiazide (PRINZIDE,ZESTORETIC) 20-12.5 mg per tablet       Take 1 tablet by mouth once daily.    Take 1 tablet by mouth once daily.

## 2022-05-06 ENCOUNTER — PATIENT OUTREACH (OUTPATIENT)
Dept: ADMINISTRATIVE | Facility: OTHER | Age: 62
End: 2022-05-06
Payer: COMMERCIAL

## 2022-05-06 NOTE — PROGRESS NOTES
Requested updates within Care Everywhere.  Patient's chart was reviewed for overdue STEPHANIE topics.  Health maintenance:updated  Immunizations:reconciled   Legacy:   Media:  Orders placed:  Tasked appts:  Labs Linked:  Upcoming appt:Hemoglobin A1c 5/9/22

## 2022-05-09 ENCOUNTER — TELEPHONE (OUTPATIENT)
Dept: ORTHOPEDICS | Facility: CLINIC | Age: 62
End: 2022-05-09
Payer: COMMERCIAL

## 2022-05-09 NOTE — TELEPHONE ENCOUNTER
Spoke with pt. Pt states she will be longer than 20 mins. Pt decided to be reschedule. Pt would like to be see closer to her home; Shelia Stewart 5/24/22 @ [id number]. Patient states verbal understanding and has no further questions.

## 2022-05-09 NOTE — TELEPHONE ENCOUNTER
----- Message from Ira Hemant sent at 5/9/2022  7:29 AM CDT -----  Regarding: Pt called to inform the provider that she is running 15-20 min late for her 7:45 am appt due to traffice and would like a call back this morning asap due to still wanting to be seen  Patient Advice:    Pt called to inform the provider that she is running 15-20 min late for her 7:45 am appt due to traffice and would like a call back this morning asap due to still wanting to be seen    Pt can be reached at [phone]

## 2022-05-10 DIAGNOSIS — E11.65 TYPE 2 DIABETES MELLITUS WITH HYPERGLYCEMIA, WITH LONG-TERM CURRENT USE OF INSULIN: ICD-10-CM

## 2022-05-10 DIAGNOSIS — Z79.4 TYPE 2 DIABETES MELLITUS WITH HYPERGLYCEMIA, WITH LONG-TERM CURRENT USE OF INSULIN: ICD-10-CM

## 2022-05-10 RX ORDER — INSULIN PUMP SYRINGE, 3 ML
EACH MISCELLANEOUS
Qty: 1 EACH | Refills: 0 | Status: SHIPPED | OUTPATIENT
Start: 2022-05-10 | End: 2023-01-17 | Stop reason: SDUPTHER

## 2022-05-10 RX ORDER — LANCETS
EACH MISCELLANEOUS
Qty: 150 EACH | Refills: 11 | Status: SHIPPED | OUTPATIENT
Start: 2022-05-10 | End: 2023-01-17 | Stop reason: SDUPTHER

## 2022-05-10 NOTE — TELEPHONE ENCOUNTER
No new care gaps identified.  [facility] Embedded Care Gaps. Reference number: [id number]. 5/10/2022   2:51:31 PM CDT

## 2022-05-11 ENCOUNTER — TELEPHONE (OUTPATIENT)
Dept: ORTHOPEDICS | Facility: CLINIC | Age: 62
End: 2022-05-11
Payer: COMMERCIAL

## 2022-05-11 ENCOUNTER — OFFICE VISIT (OUTPATIENT)
Dept: CARDIOLOGY | Facility: CLINIC | Age: 62
End: 2022-05-11
Payer: COMMERCIAL

## 2022-05-11 ENCOUNTER — OFFICE VISIT (OUTPATIENT)
Dept: PODIATRY | Facility: CLINIC | Age: 62
End: 2022-05-11
Payer: COMMERCIAL

## 2022-05-11 VITALS
SYSTOLIC BLOOD PRESSURE: 184 MMHG | DIASTOLIC BLOOD PRESSURE: 77 MMHG | HEART RATE: 81 BPM | WEIGHT: 220.38 LBS | BODY MASS INDEX: 34.59 KG/M2 | HEIGHT: 67 IN

## 2022-05-11 VITALS
WEIGHT: 220.38 LBS | BODY MASS INDEX: 34.59 KG/M2 | SYSTOLIC BLOOD PRESSURE: 193 MMHG | HEIGHT: 67 IN | OXYGEN SATURATION: 97 % | DIASTOLIC BLOOD PRESSURE: 81 MMHG | HEART RATE: 91 BPM

## 2022-05-11 DIAGNOSIS — Z79.4 TYPE 2 DIABETES MELLITUS WITH DIABETIC POLYNEUROPATHY, WITH LONG-TERM CURRENT USE OF INSULIN: Primary | ICD-10-CM

## 2022-05-11 DIAGNOSIS — R06.09 DYSPNEA ON EXERTION: ICD-10-CM

## 2022-05-11 DIAGNOSIS — B35.1 ONYCHOMYCOSIS: ICD-10-CM

## 2022-05-11 DIAGNOSIS — M25.562 PAIN IN BOTH KNEES, UNSPECIFIED CHRONICITY: Primary | ICD-10-CM

## 2022-05-11 DIAGNOSIS — I10 ESSENTIAL HYPERTENSION, BENIGN: ICD-10-CM

## 2022-05-11 DIAGNOSIS — M25.562 ARTHRALGIA OF BOTH KNEES: ICD-10-CM

## 2022-05-11 DIAGNOSIS — L60.2 ONYCHOGRYPHOSIS: ICD-10-CM

## 2022-05-11 DIAGNOSIS — I87.2 EDEMA OF BOTH LOWER EXTREMITIES DUE TO PERIPHERAL VENOUS INSUFFICIENCY: ICD-10-CM

## 2022-05-11 DIAGNOSIS — I83.93 ASYMPTOMATIC VARICOSE VEINS OF BILATERAL LOWER EXTREMITIES: ICD-10-CM

## 2022-05-11 DIAGNOSIS — M25.561 PAIN IN BOTH KNEES, UNSPECIFIED CHRONICITY: Primary | ICD-10-CM

## 2022-05-11 DIAGNOSIS — E11.42 TYPE 2 DIABETES MELLITUS WITH DIABETIC POLYNEUROPATHY, WITH LONG-TERM CURRENT USE OF INSULIN: Primary | ICD-10-CM

## 2022-05-11 DIAGNOSIS — M25.561 ARTHRALGIA OF BOTH KNEES: ICD-10-CM

## 2022-05-11 DIAGNOSIS — R06.09 EXERTIONAL DYSPNEA: ICD-10-CM

## 2022-05-11 DIAGNOSIS — L60.2 HYPERTROPHY OF NAIL: ICD-10-CM

## 2022-05-11 PROCEDURE — 4010F PR ACE/ARB THEARPY RXD/TAKEN: ICD-10-PCS | Mod: CPTII,S$GLB,, | Performed by: NURSE PRACTITIONER

## 2022-05-11 PROCEDURE — 3062F POS MACROALBUMINURIA REV: CPT | Mod: CPTII,S$GLB,, | Performed by: NURSE PRACTITIONER

## 2022-05-11 PROCEDURE — 3079F DIAST BP 80-89 MM HG: CPT | Mod: CPTII,S$GLB,, | Performed by: NURSE PRACTITIONER

## 2022-05-11 PROCEDURE — 99999 PR PBB SHADOW E&M-EST. PATIENT-LVL V: CPT | Mod: PBBFAC,,, | Performed by: NURSE PRACTITIONER

## 2022-05-11 PROCEDURE — 4010F PR ACE/ARB THEARPY RXD/TAKEN: ICD-10-PCS | Mod: CPTII,S$GLB,, | Performed by: PODIATRIST

## 2022-05-11 PROCEDURE — 3077F SYST BP >= 140 MM HG: CPT | Mod: CPTII,S$GLB,, | Performed by: NURSE PRACTITIONER

## 2022-05-11 PROCEDURE — 99203 PR OFFICE/OUTPT VISIT, NEW, LEVL III, 30-44 MIN: ICD-10-PCS | Mod: S$GLB,,, | Performed by: NURSE PRACTITIONER

## 2022-05-11 PROCEDURE — 3052F HG A1C>EQUAL 8.0%<EQUAL 9.0%: CPT | Mod: CPTII,S$GLB,, | Performed by: NURSE PRACTITIONER

## 2022-05-11 PROCEDURE — 99203 OFFICE O/P NEW LOW 30 MIN: CPT | Mod: S$GLB,,, | Performed by: NURSE PRACTITIONER

## 2022-05-11 PROCEDURE — 1160F PR REVIEW ALL MEDS BY PRESCRIBER/CLIN PHARMACIST DOCUMENTED: ICD-10-PCS | Mod: CPTII,S$GLB,, | Performed by: NURSE PRACTITIONER

## 2022-05-11 PROCEDURE — 3078F PR MOST RECENT DIASTOLIC BLOOD PRESSURE < 80 MM HG: ICD-10-PCS | Mod: CPTII,S$GLB,, | Performed by: PODIATRIST

## 2022-05-11 PROCEDURE — 11721 DM NAIL DEBRIDEMENT: ICD-10-PCS | Mod: S$GLB,,, | Performed by: PODIATRIST

## 2022-05-11 PROCEDURE — 3077F PR MOST RECENT SYSTOLIC BLOOD PRESSURE >= 140 MM HG: ICD-10-PCS | Mod: CPTII,S$GLB,, | Performed by: PODIATRIST

## 2022-05-11 PROCEDURE — 99214 OFFICE O/P EST MOD 30 MIN: CPT | Mod: 25,S$GLB,, | Performed by: PODIATRIST

## 2022-05-11 PROCEDURE — 3062F PR POS MACROALBUMINURIA RESULT DOCUMENTED/REVIEW: ICD-10-PCS | Mod: CPTII,S$GLB,, | Performed by: PODIATRIST

## 2022-05-11 PROCEDURE — 3008F PR BODY MASS INDEX (BMI) DOCUMENTED: ICD-10-PCS | Mod: CPTII,S$GLB,, | Performed by: PODIATRIST

## 2022-05-11 PROCEDURE — 99999 PR PBB SHADOW E&M-EST. PATIENT-LVL III: CPT | Mod: PBBFAC,,, | Performed by: PODIATRIST

## 2022-05-11 PROCEDURE — 99999 PR PBB SHADOW E&M-EST. PATIENT-LVL V: ICD-10-PCS | Mod: PBBFAC,,, | Performed by: NURSE PRACTITIONER

## 2022-05-11 PROCEDURE — 11721 DEBRIDE NAIL 6 OR MORE: CPT | Mod: S$GLB,,, | Performed by: PODIATRIST

## 2022-05-11 PROCEDURE — 3052F PR MOST RECENT HEMOGLOBIN A1C LEVEL 8.0 - < 9.0%: ICD-10-PCS | Mod: CPTII,S$GLB,, | Performed by: PODIATRIST

## 2022-05-11 PROCEDURE — 3008F PR BODY MASS INDEX (BMI) DOCUMENTED: ICD-10-PCS | Mod: CPTII,S$GLB,, | Performed by: NURSE PRACTITIONER

## 2022-05-11 PROCEDURE — 3062F POS MACROALBUMINURIA REV: CPT | Mod: CPTII,S$GLB,, | Performed by: PODIATRIST

## 2022-05-11 PROCEDURE — 3062F PR POS MACROALBUMINURIA RESULT DOCUMENTED/REVIEW: ICD-10-PCS | Mod: CPTII,S$GLB,, | Performed by: NURSE PRACTITIONER

## 2022-05-11 PROCEDURE — 3079F PR MOST RECENT DIASTOLIC BLOOD PRESSURE 80-89 MM HG: ICD-10-PCS | Mod: CPTII,S$GLB,, | Performed by: NURSE PRACTITIONER

## 2022-05-11 PROCEDURE — 3078F DIAST BP <80 MM HG: CPT | Mod: CPTII,S$GLB,, | Performed by: PODIATRIST

## 2022-05-11 PROCEDURE — 99999 PR PBB SHADOW E&M-EST. PATIENT-LVL III: ICD-10-PCS | Mod: PBBFAC,,, | Performed by: PODIATRIST

## 2022-05-11 PROCEDURE — 1159F MED LIST DOCD IN RCRD: CPT | Mod: CPTII,S$GLB,, | Performed by: NURSE PRACTITIONER

## 2022-05-11 PROCEDURE — 3077F PR MOST RECENT SYSTOLIC BLOOD PRESSURE >= 140 MM HG: ICD-10-PCS | Mod: CPTII,S$GLB,, | Performed by: NURSE PRACTITIONER

## 2022-05-11 PROCEDURE — 99214 PR OFFICE/OUTPT VISIT, EST, LEVL IV, 30-39 MIN: ICD-10-PCS | Mod: 25,S$GLB,, | Performed by: PODIATRIST

## 2022-05-11 PROCEDURE — 3066F PR DOCUMENTATION OF TREATMENT FOR NEPHROPATHY: ICD-10-PCS | Mod: CPTII,S$GLB,, | Performed by: PODIATRIST

## 2022-05-11 PROCEDURE — 3066F NEPHROPATHY DOC TX: CPT | Mod: CPTII,S$GLB,, | Performed by: PODIATRIST

## 2022-05-11 PROCEDURE — 3077F SYST BP >= 140 MM HG: CPT | Mod: CPTII,S$GLB,, | Performed by: PODIATRIST

## 2022-05-11 PROCEDURE — 1160F RVW MEDS BY RX/DR IN RCRD: CPT | Mod: CPTII,S$GLB,, | Performed by: NURSE PRACTITIONER

## 2022-05-11 PROCEDURE — 3008F BODY MASS INDEX DOCD: CPT | Mod: CPTII,S$GLB,, | Performed by: NURSE PRACTITIONER

## 2022-05-11 PROCEDURE — 3066F NEPHROPATHY DOC TX: CPT | Mod: CPTII,S$GLB,, | Performed by: NURSE PRACTITIONER

## 2022-05-11 PROCEDURE — 4010F ACE/ARB THERAPY RXD/TAKEN: CPT | Mod: CPTII,S$GLB,, | Performed by: PODIATRIST

## 2022-05-11 PROCEDURE — 3066F PR DOCUMENTATION OF TREATMENT FOR NEPHROPATHY: ICD-10-PCS | Mod: CPTII,S$GLB,, | Performed by: NURSE PRACTITIONER

## 2022-05-11 PROCEDURE — 3052F HG A1C>EQUAL 8.0%<EQUAL 9.0%: CPT | Mod: CPTII,S$GLB,, | Performed by: PODIATRIST

## 2022-05-11 PROCEDURE — 3052F PR MOST RECENT HEMOGLOBIN A1C LEVEL 8.0 - < 9.0%: ICD-10-PCS | Mod: CPTII,S$GLB,, | Performed by: NURSE PRACTITIONER

## 2022-05-11 PROCEDURE — 3008F BODY MASS INDEX DOCD: CPT | Mod: CPTII,S$GLB,, | Performed by: PODIATRIST

## 2022-05-11 PROCEDURE — 1159F PR MEDICATION LIST DOCUMENTED IN MEDICAL RECORD: ICD-10-PCS | Mod: CPTII,S$GLB,, | Performed by: NURSE PRACTITIONER

## 2022-05-11 PROCEDURE — 4010F ACE/ARB THERAPY RXD/TAKEN: CPT | Mod: CPTII,S$GLB,, | Performed by: NURSE PRACTITIONER

## 2022-05-11 RX ORDER — INSULIN ASPART 100 [IU]/ML
INJECTION, SOLUTION INTRAVENOUS; SUBCUTANEOUS
COMMUNITY
Start: 2022-05-09 | End: 2022-06-15

## 2022-05-11 NOTE — PROGRESS NOTES
Cardiology    5/11/2022  9:47 AM    Problem list  Patient Active Problem List   Diagnosis    Essential hypertension, benign    Dyslipidemia, goal LDL below 100    Microalbuminuria due to type 2 diabetes mellitus    Type 2 diabetes mellitus with hyperglycemia, with long-term current use of insulin    Type 2 diabetes mellitus with diabetic polyneuropathy, with long-term current use of insulin    Asymptomatic varicose veins of bilateral lower extremities    Onychomycosis due to dermatophyte    Class 2 severe obesity due to excess calories with serious comorbidity and body mass index (BMI) of 35.0 to 35.9 in adult    Stage 3a chronic kidney disease       CC:  Dyspnea    HPI:  Walking will cause dyspnea, walking for an hour can elicit it.  It just goes away randomly so it is hard to say what helps it.  No chest pain  Has swelling in right leg and it comes and goes  No heart problems in past  No family history of heart probs  Does not check BP at home, didn't;t take meds yet today  Did have a stress test in 2018 and had some Atkins at that time, no CP  No exercise  Will go out three times a week for walks. Not able to climb a flight fo stairs due to leg pain,.   Right knee     150/82 repeat BP  Medications  Current Outpatient Medications   Medication Sig Dispense Refill    amLODIPine (NORVASC) 5 MG tablet Take 1 tablet (5 mg total) by mouth once daily. 90 tablet 1    aspirin (ECOTRIN) 81 MG EC tablet Take 1 tablet (81 mg total) by mouth once daily.      blood sugar diagnostic Strp To check BG 4 times daily, to use with insurance preferred meter 150 each 11    blood-glucose meter kit To check BG 4 times daily, to use with insurance preferred meter 1 each 0    cyclobenzaprine (FLEXERIL) 10 MG tablet Take 10 mg by mouth once daily. TAKE ONE TABLET BY MOUTH ONCE DAILY AS NEEDED      dapagliflozin (FARXIGA) 10 mg tablet Take 1 tablet (10 mg total) by mouth once daily. 90 tablet 1    fluconazole  (DIFLUCAN) 150 MG Tab TAKE ONE TABLET BY MOUTH AS A ONE TIME DOSE MAY REPEAT IN 3 DAYS IF NEEDED FOR 1 DOSE.      hydrOXYzine pamoate (VISTARIL) 50 MG Cap Take 1 capsule (50 mg total) by mouth every 8 (eight) hours as needed (itching). 30 capsule 2    insulin aspart, niacinamide, 100 unit/mL Soln To use with VGo 30- 5-6 clicks TID AC, Max TDD of 100 units. Vials only, Normal 30 mL 6    lancets Misc To check BG 4 times daily, to use with insurance preferred meter 150 each 11    lisinopriL-hydrochlorothiazide (PRINZIDE,ZESTORETIC) 20-12.5 mg per tablet Take 1 tablet by mouth once daily. 90 tablet 1    meloxicam (MOBIC) 15 MG tablet Take 1 tablet (15 mg total) by mouth once daily. With meals 30 tablet 0    metFORMIN (GLUCOPHAGE) 1000 MG tablet Take 1 tablet (1,000 mg total) by mouth daily with breakfast. 90 tablet 1    mupirocin (BACTROBAN) 2 % ointment Apply topically 3 (three) times daily. 22 g 1    rosuvastatin (CRESTOR) 40 MG Tab Take 1 tablet (40 mg total) by mouth every evening. 90 tablet 3    semaglutide (OZEMPIC) 0.25 mg or 0.5 mg(2 mg/1.5 mL) pen injector Inject 0.5 mg into the skin every 7 days. 1 pen 6    sub-q insulin device, 30 unit (V-GO 30) Katelyn Inject 1 Device into the skin once daily. 90 each 2    traZODone (DESYREL) 50 MG tablet TAKE 1 TO 2 TABLETS BY MOUTH AT BEDTIME AS NEEDED FOR INSOMNIA 60 tablet 5    insulin aspart U-100 (NOVOLOG) 100 unit/mL injection Inject into the skin.       No current facility-administered medications for this visit.      Prior to Admission medications    Medication Sig Start Date End Date Taking? Authorizing Provider   amLODIPine (NORVASC) 5 MG tablet Take 1 tablet (5 mg total) by mouth once daily. 5/2/22  Yes Joey Cintron MD   aspirin (ECOTRIN) 81 MG EC tablet Take 1 tablet (81 mg total) by mouth once daily. 2/1/18  Yes Joey Cintron MD   blood sugar diagnostic Strp To check BG 4 times daily, to use with insurance preferred meter 5/10/22  Yes Joey CARVALHO  MD Saida   blood-glucose meter kit To check BG 4 times daily, to use with insurance preferred meter 5/10/22  Yes Joey Cintron MD   cyclobenzaprine (FLEXERIL) 10 MG tablet Take 10 mg by mouth once daily. TAKE ONE TABLET BY MOUTH ONCE DAILY AS NEEDED   Yes Historical Provider   dapagliflozin (FARXIGA) 10 mg tablet Take 1 tablet (10 mg total) by mouth once daily. 3/15/22  Yes Micki Knott NP   fluconazole (DIFLUCAN) 150 MG Tab TAKE ONE TABLET BY MOUTH AS A ONE TIME DOSE MAY REPEAT IN 3 DAYS IF NEEDED FOR 1 DOSE. 3/11/21  Yes Historical Provider   hydrOXYzine pamoate (VISTARIL) 50 MG Cap Take 1 capsule (50 mg total) by mouth every 8 (eight) hours as needed (itching). 1/31/22  Yes Joey Cintron MD   insulin aspart, niacinamide, 100 unit/mL Soln To use with VGo 30- 5-6 clicks TID AC, Max TDD of 100 units. Vials only, Normal 3/15/22  Yes Micki Knott NP   lancets Misc To check BG 4 times daily, to use with insurance preferred meter 5/10/22  Yes Joey Cintron MD   lisinopriL-hydrochlorothiazide (PRINZIDE,ZESTORETIC) 20-12.5 mg per tablet Take 1 tablet by mouth once daily. 5/2/22  Yes Joey Cintron MD   meloxicam (MOBIC) 15 MG tablet Take 1 tablet (15 mg total) by mouth once daily. With meals 4/5/22  Yes Marley Davis PA-C   metFORMIN (GLUCOPHAGE) 1000 MG tablet Take 1 tablet (1,000 mg total) by mouth daily with breakfast. 3/15/22  Yes Micki Knott NP   mupirocin (BACTROBAN) 2 % ointment Apply topically 3 (three) times daily. 1/28/21  Yes Charlie Ballesteros MD   rosuvastatin (CRESTOR) 40 MG Tab Take 1 tablet (40 mg total) by mouth every evening. 1/31/22  Yes Joey Cintron MD   semaglutide (OZEMPIC) 0.25 mg or 0.5 mg(2 mg/1.5 mL) pen injector Inject 0.5 mg into the skin every 7 days. 3/15/22 3/15/23 Yes Micki Knott NP   sub-q insulin device, 30 unit (V-GO 30) Katelyn Inject 1 Device into the skin once daily. 3/15/22  Yes Micki Knott NP   traZODone (DESYREL) 50 MG tablet TAKE 1 TO 2  TABLETS BY MOUTH AT BEDTIME AS NEEDED FOR INSOMNIA 3/25/22  Yes Joey Cintron MD   insulin aspart U-100 (NOVOLOG) 100 unit/mL injection Inject into the skin. 22   Historical Provider         History  Past Medical History:   Diagnosis Date    Diabetes mellitus, type 2     Hypercholesteremia     Hypertension      Past Surgical History:   Procedure Laterality Date    HYSTERECTOMY       Social History     Socioeconomic History    Marital status:    Tobacco Use    Smoking status: Former Smoker     Start date:      Quit date:      Years since quittin.3    Smokeless tobacco: Never Used   Substance and Sexual Activity    Alcohol use: Yes     Comment: socially    Drug use: No    Sexual activity: Not Currently         Allergies  Review of patient's allergies indicates:  No Known Allergies      Review of Systems   Review of Systems   Constitutional: Negative for diaphoresis and malaise/fatigue.   HENT: Negative.    Cardiovascular: Positive for dyspnea on exertion, irregular heartbeat, leg swelling and palpitations. Negative for chest pain, claudication, near-syncope, orthopnea, paroxysmal nocturnal dyspnea and syncope.   Respiratory: Negative for shortness of breath.    Endocrine: Negative for polydipsia, polyphagia and polyuria.   Hematologic/Lymphatic: Does not bruise/bleed easily.   Musculoskeletal: Positive for joint pain and joint swelling.   Gastrointestinal: Negative for bloating, nausea and vomiting.   Genitourinary: Negative.    Neurological: Positive for dizziness and light-headedness. Negative for excessive daytime sleepiness, loss of balance and weakness.   Psychiatric/Behavioral: The patient is not nervous/anxious.    Allergic/Immunologic: Negative.          Physical Exam  Wt Readings from Last 1 Encounters:   22 100 kg (220 lb 5.6 oz)     BP Readings from Last 3 Encounters:   22 (!) 193/81   22 126/82   22 132/77     Pulse Readings from Last 1  Encounters:   05/11/22 91     Body mass index is 34.51 kg/m².    Physical Exam  Vitals and nursing note reviewed.   Constitutional:       Appearance: Normal appearance. She is obese.   HENT:      Head: Normocephalic and atraumatic.      Mouth/Throat:      Mouth: Mucous membranes are moist.   Eyes:      Pupils: Pupils are equal, round, and reactive to light.   Cardiovascular:      Rate and Rhythm: Normal rate and regular rhythm.      Pulses:           Radial pulses are 2+ on the right side and 2+ on the left side.        Dorsalis pedis pulses are 2+ on the right side and 2+ on the left side.        Posterior tibial pulses are 2+ on the right side and 2+ on the left side.      Heart sounds: No murmur heard.    Gallop present.   Pulmonary:      Effort: Pulmonary effort is normal. No respiratory distress.      Breath sounds: Normal breath sounds.   Abdominal:      General: There is no distension.      Tenderness: There is no abdominal tenderness.   Musculoskeletal:      Cervical back: Normal range of motion.      Right lower leg: Edema present.      Left lower leg: No edema.      Comments: Varicosities bilateral lower extremities   Skin:     General: Skin is warm and dry.      Findings: No erythema.   Neurological:      General: No focal deficit present.      Mental Status: She is alert.   Psychiatric:         Mood and Affect: Mood normal.         Behavior: Behavior normal.       Problem List Items Addressed This Visit        Cardiac/Vascular    Essential hypertension, benign    Overview     Has not had meds today- no change just yet  Recommend monitoring BP at home             Current Assessment & Plan     As above           Dyspnea on exertion    Overview     Suspect anginal equivalent  'Recommend echo and stress test           Current Assessment & Plan     As above             Other Visit Diagnoses     Exertional dyspnea        Relevant Orders    Echo    Exercise Stress - EKG                      Follow Up  4  weeks      @Shellie Rey, ROMEO

## 2022-05-11 NOTE — PROGRESS NOTES
Subjective:      Patient ID: Aishwarya Jensen is a 61 y.o. female.    Chief Complaint: No chief complaint on file.    Aishwarya is a 61 y.o. female who presents to the clinic for follow-up evaluation and treatment of high risk feet. No showed last appointment a month ago.  Aishwarya has a past medical history of Diabetes mellitus, type 2, Hypercholesteremia, and Hypertension. Diagnosed w/ DM almost 30 yrs.ago. Poorly controlled BS. 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  States dealing w/ lots of knee pain - requests ortho.referral. States here for foot issues. Indicates that her nails are long and deformed so hurts when in shoes. Also, swelling in legs.    PCP: Joey Cintron MD    Date Last Seen by PCP: 1/31/22   DM management: BRENT Cloud 3/15/22    Current shoe gear:  Slides    Hemoglobin A1C   Date Value Ref Range Status   05/11/2022 8.4 (H) 4.0 - 5.6 % Final     Comment:     ADA Screening Guidelines:  5.7-6.4%  Consistent with prediabetes  >or=6.5%  Consistent with diabetes    High levels of fetal hemoglobin interfere with the HbA1C  assay. Heterozygous hemoglobin variants (HbS, HgC, etc)do  not significantly interfere with this assay.   However, presence of multiple variants may affect accuracy.     01/14/2022 9.4 (H) 4.0 - 5.6 % Final     Comment:     ADA Screening Guidelines:  5.7-6.4%  Consistent with prediabetes  >or=6.5%  Consistent with diabetes    High levels of fetal hemoglobin interfere with the HbA1C  assay. Heterozygous hemoglobin variants (HbS, HgC, etc)do  not significantly interfere with this assay.   However, presence of multiple variants may affect accuracy.     10/05/2021 >14.0 (H) 4.0 - 5.6 % Final     Comment:     ADA Screening Guidelines:  5.7-6.4%  Consistent with prediabetes  >or=6.5%  Consistent with diabetes    High levels of fetal hemoglobin interfere with the HbA1C  assay. Heterozygous hemoglobin variants (HbS, HgC, etc)do  not significantly interfere with  this assay.   However, presence of multiple variants may affect accuracy.        Objective:      Physical Exam  Vitals signs reviewed.   Constitutional:       General: She is not in acute distress.     Appearance: Normal appearance. She is well-developed and  obese.  Cardiovascular:      Pulses: Dorsalis pedis pulses are 2+ BLE.      Comments: Venous insufficiency d/t varicosities  Musculoskeletal:     General: No swelling, tenderness, deformity or signs of injury.      She exhibits normal range of motion.     Edema (BLE hyperpigmentation & brawny edema, R>L) present.      Prominent metatarsal heads present. No bunion.        Comments: Flexible cavus B/L  Feet:      Skin integrity: Skin integrity normal. No skin breakdown, callus or dry skin.      Toenail Condition: toenails are very long, abnormally thick & gryphotic with mycosis - Fungal disease present to nail plates, with discoloration, dystrophic changes B/L (mild 2nd & 3rd), but sparing 4th B/L.  Skin:     General: Skin is warm and dry.      Capillary Refill: Capillary refill takes less than 2 seconds.      Findings: No bruising, signs of injury or lesion.      Neurological:      Mental Status: She is alert and oriented to person, place, and time.      Sensory: Sensation is intact to light touch although states also has numbness & parasthesias BLE.     Motor: Motor function is intact. No weakness or atrophy.      Gait: Gait is intact. Gait normal.   Psychiatric:         Mood and Affect: Mood and affect normal. Mood is not anxious.          Behavior: Behavior normal. Behavior is cooperative.      Assessment:      Encounter Diagnoses   Name Primary?    Arthralgia of both knees     Type 2 diabetes mellitus with diabetic polyneuropathy, with long-term current use of insulin Yes    Onychomycosis     Onychogryphosis     Asymptomatic varicose veins of bilateral lower extremities     Edema of both lower extremities due to peripheral venous insufficiency      Hypertrophy of nail      Problem List Items Addressed This Visit        Cardiac/Vascular    Asymptomatic varicose veins of bilateral lower extremities       Endocrine    Type 2 diabetes mellitus with diabetic polyneuropathy, with long-term current use of insulin - Primary    Relevant Orders    DM nail debridement    DM nail trimming      Other Visit Diagnoses     Arthralgia of both knees        Onychomycosis        Relevant Orders    DM nail debridement    Onychogryphosis        Relevant Orders    DM nail debridement    Edema of both lower extremities due to peripheral venous insufficiency        Relevant Orders    DM nail debridement    DM nail trimming    Hypertrophy of nail        Relevant Orders    DM nail trimming          Plan:      Diagnoses and all orders for this visit:    Type 2 diabetes mellitus with diabetic polyneuropathy, with long-term current use of insulin  -     DM nail debridement  -     DM nail trimming    Arthralgia of both knees  -     Ambulatory referral/consult to Orthopedics    Onychomycosis  -     DM nail debridement    Onychogryphosis  -     DM nail debridement    Asymptomatic varicose veins of bilateral lower extremities    Edema of both lower extremities due to peripheral venous insufficiency  -     DM nail debridement  -     DM nail trimming    Hypertrophy of nail  -     DM nail trimming    I counseled the patient on her conditions, their implications and medical management.    - Diabetic Foot Education. Patient reminded of the importance of good nutrition and blood sugar control to help prevent podiatric complications of diabetes. We discussed wearing proper shoe gear, daily foot inspections, never walking without protective shoe gear, podiatric nail visits every 3-4 months, sooner prn.      - With patient's permission, nails were aggressively reduced and debrided x 10 to their soft tissue attachment, mechanically removing all offending nail and debris. Patient relates relief following  the procedure.

## 2022-05-11 NOTE — TELEPHONE ENCOUNTER
Spoke with pt. Advised pt she will need xrays prior to her appt. Images ordered and scheduled. All questions answered. Pt verbalized understanding.

## 2022-05-11 NOTE — PATIENT INSTRUCTIONS
We will get you set up for a stress test and an echocardiogram    We will see you back in about 4 weeks    Watch your sodium intake     Continue all of your medications as you have

## 2022-05-17 ENCOUNTER — OFFICE VISIT (OUTPATIENT)
Dept: ORTHOPEDICS | Facility: CLINIC | Age: 62
End: 2022-05-17
Payer: COMMERCIAL

## 2022-05-17 ENCOUNTER — TELEPHONE (OUTPATIENT)
Dept: PRIMARY CARE CLINIC | Facility: CLINIC | Age: 62
End: 2022-05-17
Payer: COMMERCIAL

## 2022-05-17 VITALS
BODY MASS INDEX: 34.41 KG/M2 | SYSTOLIC BLOOD PRESSURE: 124 MMHG | HEART RATE: 73 BPM | WEIGHT: 219.25 LBS | HEIGHT: 67 IN | DIASTOLIC BLOOD PRESSURE: 66 MMHG

## 2022-05-17 DIAGNOSIS — M17.12 PRIMARY OSTEOARTHRITIS OF LEFT KNEE: Primary | ICD-10-CM

## 2022-05-17 DIAGNOSIS — M17.11 PRIMARY OSTEOARTHRITIS OF RIGHT KNEE: ICD-10-CM

## 2022-05-17 DIAGNOSIS — M25.561 ACUTE PAIN OF RIGHT KNEE: ICD-10-CM

## 2022-05-17 PROBLEM — R06.09 DYSPNEA ON EXERTION: Status: ACTIVE | Noted: 2022-05-17

## 2022-05-17 PROCEDURE — 3008F PR BODY MASS INDEX (BMI) DOCUMENTED: ICD-10-PCS | Mod: CPTII,S$GLB,, | Performed by: ORTHOPAEDIC SURGERY

## 2022-05-17 PROCEDURE — 99203 OFFICE O/P NEW LOW 30 MIN: CPT | Mod: S$GLB,,, | Performed by: ORTHOPAEDIC SURGERY

## 2022-05-17 PROCEDURE — 99999 PR PBB SHADOW E&M-EST. PATIENT-LVL V: CPT | Mod: PBBFAC,,, | Performed by: ORTHOPAEDIC SURGERY

## 2022-05-17 PROCEDURE — 3008F BODY MASS INDEX DOCD: CPT | Mod: CPTII,S$GLB,, | Performed by: ORTHOPAEDIC SURGERY

## 2022-05-17 PROCEDURE — 3052F PR MOST RECENT HEMOGLOBIN A1C LEVEL 8.0 - < 9.0%: ICD-10-PCS | Mod: CPTII,S$GLB,, | Performed by: ORTHOPAEDIC SURGERY

## 2022-05-17 PROCEDURE — 3062F POS MACROALBUMINURIA REV: CPT | Mod: CPTII,S$GLB,, | Performed by: ORTHOPAEDIC SURGERY

## 2022-05-17 PROCEDURE — 3074F PR MOST RECENT SYSTOLIC BLOOD PRESSURE < 130 MM HG: ICD-10-PCS | Mod: CPTII,S$GLB,, | Performed by: ORTHOPAEDIC SURGERY

## 2022-05-17 PROCEDURE — 3078F DIAST BP <80 MM HG: CPT | Mod: CPTII,S$GLB,, | Performed by: ORTHOPAEDIC SURGERY

## 2022-05-17 PROCEDURE — 3074F SYST BP LT 130 MM HG: CPT | Mod: CPTII,S$GLB,, | Performed by: ORTHOPAEDIC SURGERY

## 2022-05-17 PROCEDURE — 1159F PR MEDICATION LIST DOCUMENTED IN MEDICAL RECORD: ICD-10-PCS | Mod: CPTII,S$GLB,, | Performed by: ORTHOPAEDIC SURGERY

## 2022-05-17 PROCEDURE — 4010F ACE/ARB THERAPY RXD/TAKEN: CPT | Mod: CPTII,S$GLB,, | Performed by: ORTHOPAEDIC SURGERY

## 2022-05-17 PROCEDURE — 99999 PR PBB SHADOW E&M-EST. PATIENT-LVL V: ICD-10-PCS | Mod: PBBFAC,,, | Performed by: ORTHOPAEDIC SURGERY

## 2022-05-17 PROCEDURE — 3066F NEPHROPATHY DOC TX: CPT | Mod: CPTII,S$GLB,, | Performed by: ORTHOPAEDIC SURGERY

## 2022-05-17 PROCEDURE — 1159F MED LIST DOCD IN RCRD: CPT | Mod: CPTII,S$GLB,, | Performed by: ORTHOPAEDIC SURGERY

## 2022-05-17 PROCEDURE — 3078F PR MOST RECENT DIASTOLIC BLOOD PRESSURE < 80 MM HG: ICD-10-PCS | Mod: CPTII,S$GLB,, | Performed by: ORTHOPAEDIC SURGERY

## 2022-05-17 PROCEDURE — 4010F PR ACE/ARB THEARPY RXD/TAKEN: ICD-10-PCS | Mod: CPTII,S$GLB,, | Performed by: ORTHOPAEDIC SURGERY

## 2022-05-17 PROCEDURE — 1160F PR REVIEW ALL MEDS BY PRESCRIBER/CLIN PHARMACIST DOCUMENTED: ICD-10-PCS | Mod: CPTII,S$GLB,, | Performed by: ORTHOPAEDIC SURGERY

## 2022-05-17 PROCEDURE — 99203 PR OFFICE/OUTPT VISIT, NEW, LEVL III, 30-44 MIN: ICD-10-PCS | Mod: S$GLB,,, | Performed by: ORTHOPAEDIC SURGERY

## 2022-05-17 PROCEDURE — 1160F RVW MEDS BY RX/DR IN RCRD: CPT | Mod: CPTII,S$GLB,, | Performed by: ORTHOPAEDIC SURGERY

## 2022-05-17 PROCEDURE — 3052F HG A1C>EQUAL 8.0%<EQUAL 9.0%: CPT | Mod: CPTII,S$GLB,, | Performed by: ORTHOPAEDIC SURGERY

## 2022-05-17 PROCEDURE — 3066F PR DOCUMENTATION OF TREATMENT FOR NEPHROPATHY: ICD-10-PCS | Mod: CPTII,S$GLB,, | Performed by: ORTHOPAEDIC SURGERY

## 2022-05-17 PROCEDURE — 3062F PR POS MACROALBUMINURIA RESULT DOCUMENTED/REVIEW: ICD-10-PCS | Mod: CPTII,S$GLB,, | Performed by: ORTHOPAEDIC SURGERY

## 2022-05-17 NOTE — TELEPHONE ENCOUNTER
"----- Message from Freeman Bernard sent at 5/17/2022  7:53 AM CDT -----  Consult     For the stress test the instructions says: "If you take medications for diabetes, please talk with your regular doctor about a possible medication adjustment."     Thank you    "

## 2022-05-17 NOTE — TELEPHONE ENCOUNTER
The  said that message popped up when scheduling the stress test. Do you want the patient to hold any diabetic meds or change anything prior to or during her testing?

## 2022-05-17 NOTE — PROGRESS NOTES
Subjective:       Patient ID: Aishwarya Jensen is a 61 y.o. female.    Chief Complaint:   Pain of the Right Knee and Pain of the Left Knee  She comes in for opinion and advice regarding bilateral knee pain 6/10.  She fell at work last year onto her knees, and began having pain after that.  She has occasional night pain interfering with sleep.  The right side hurts much worse than the left.  She has trouble with kneeling, getting up from the toilet, and going up the stairs.  No groin pain, distal numbness or tingling.  She has been going to physical therapy which seems to help.    Past Medical History:   Diagnosis Date    Diabetes mellitus, type 2     Hypercholesteremia     Hypertension      Past Surgical History:   Procedure Laterality Date    HYSTERECTOMY       Family History   Problem Relation Age of Onset    Diabetes Mother     Diabetes Father     Diabetes Brother     Cancer Maternal Grandmother     Breast cancer Paternal Grandmother      Social History     Socioeconomic History    Marital status:    Tobacco Use    Smoking status: Former Smoker     Start date:      Quit date:      Years since quittin.3    Smokeless tobacco: Never Used   Substance and Sexual Activity    Alcohol use: Yes     Comment: socially    Drug use: No    Sexual activity: Not Currently       Current Outpatient Medications   Medication Sig Dispense Refill    amLODIPine (NORVASC) 5 MG tablet Take 1 tablet (5 mg total) by mouth once daily. 90 tablet 1    aspirin (ECOTRIN) 81 MG EC tablet Take 1 tablet (81 mg total) by mouth once daily.      blood sugar diagnostic Strp To check BG 4 times daily, to use with insurance preferred meter 150 each 11    blood-glucose meter kit To check BG 4 times daily, to use with insurance preferred meter 1 each 0    dapagliflozin (FARXIGA) 10 mg tablet Take 1 tablet (10 mg total) by mouth once daily. 90 tablet 1    fluconazole (DIFLUCAN) 150 MG Tab TAKE ONE TABLET BY  "MOUTH AS A ONE TIME DOSE MAY REPEAT IN 3 DAYS IF NEEDED FOR 1 DOSE.      hydrOXYzine pamoate (VISTARIL) 50 MG Cap Take 1 capsule (50 mg total) by mouth every 8 (eight) hours as needed (itching). 30 capsule 2    insulin aspart U-100 (NOVOLOG) 100 unit/mL injection Inject into the skin.      insulin aspart, niacinamide, 100 unit/mL Soln To use with VGo 30- 5-6 clicks TID AC, Max TDD of 100 units. Vials only, Normal 30 mL 6    lancets Misc To check BG 4 times daily, to use with insurance preferred meter 150 each 11    lisinopriL-hydrochlorothiazide (PRINZIDE,ZESTORETIC) 20-12.5 mg per tablet Take 1 tablet by mouth once daily. 90 tablet 1    meloxicam (MOBIC) 15 MG tablet Take 1 tablet (15 mg total) by mouth once daily. With meals 30 tablet 0    metFORMIN (GLUCOPHAGE) 1000 MG tablet Take 1 tablet (1,000 mg total) by mouth daily with breakfast. 90 tablet 1    rosuvastatin (CRESTOR) 40 MG Tab Take 1 tablet (40 mg total) by mouth every evening. 90 tablet 3    semaglutide (OZEMPIC) 0.25 mg or 0.5 mg(2 mg/1.5 mL) pen injector Inject 0.5 mg into the skin every 7 days. 1 pen 6    sub-q insulin device, 30 unit (V-GO 30) Katelyn Inject 1 Device into the skin once daily. 90 each 2    traZODone (DESYREL) 50 MG tablet TAKE 1 TO 2 TABLETS BY MOUTH AT BEDTIME AS NEEDED FOR INSOMNIA 60 tablet 5    cyclobenzaprine (FLEXERIL) 10 MG tablet Take 10 mg by mouth once daily. TAKE ONE TABLET BY MOUTH ONCE DAILY AS NEEDED      mupirocin (BACTROBAN) 2 % ointment Apply topically 3 (three) times daily. (Patient not taking: Reported on 5/17/2022) 22 g 1     No current facility-administered medications for this visit.     Review of patient's allergies indicates:  No Known Allergies      Objective:      Vitals:    05/17/22 0908   BP: 124/66   BP Location: Left arm   Patient Position: Sitting   BP Method: Large (Automatic)   Pulse: 73   Weight: 99.5 kg (219 lb 4 oz)   Height: 5' 7" (1.702 m)     Physical Exam  Bilateral knees:  There is is " a mild varus deformity, which is passively correctable.  Active range of motion is 0-110 degrees.  Anterior crepitus with active extension.  Patellar mobility is limited.  Boggy synovitis without effusion.  Medial joint line tenderness predominates.  No instability to varus/valgus/Lachman's stressing.  No pain in the groin with flexion and internal rotation of the hip which is not limited.  Skin intact.  Distal neurovascular intact.  Flip test negative.    Imaging Review:   Weightbearing x-rays show Kellgren-Lj grade 3 arthrosis bilaterally with medial compartment narrowing and tiny marginal osteophytes.  No fractures or acute findings.  Assessment:   DJD, knees  Plan:       For now, we will treat with Tylenol Arthritis, ice, use of a cane, and physical therapy.  She will let me know if she feels like we need to try injections or other interventions.  These were all explained to her in detail.    The patient's pathophysiology was explained in detail with reference to x-rays, models, other visual aids as appropriate.  Treatment options were discussed in detail.  Questions were invited and answered to the patient's satisfaction.    Harry Lambert MD  Orthopaedic Surgery

## 2022-05-19 NOTE — TELEPHONE ENCOUNTER
Could we contact the patient to let her know to hold diabetic meds morning of stress and resume after?    Thanks,  Shellie

## 2022-05-28 NOTE — PROCEDURES
DM nail debridement    Date/Time: 5/11/2022 10:30 AM  Performed by: Annmarie Keane DPM  Authorized by: Annmarie Keane DPM     Consent Done?:  Yes (Verbal)    Nail Care Type:  Debride(Left 1st Toe, Left 2nd Toe, Left 3rd Toe, Left 5th Toe, Right 1st Toe, Right 2nd Toe, Right 3rd Toe and Right 5th Toe)  Patient tolerance:  Patient tolerated the procedure well with no immediate complications  DM nail trimming    Date/Time: 5/11/2022 10:30 AM  Performed by: Annmarie Keane DPM  Authorized by: Annmarie Keane DPM     Consent Done?:  Yes (Verbal)    Nail Care Type:  Trim(Left 4th Toe and Right 4th Toe)  Patient tolerance:  Patient tolerated the procedure well with no immediate complications

## 2022-05-31 ENCOUNTER — TELEPHONE (OUTPATIENT)
Dept: PRIMARY CARE CLINIC | Facility: CLINIC | Age: 62
End: 2022-05-31
Payer: COMMERCIAL

## 2022-05-31 NOTE — TELEPHONE ENCOUNTER
----- Message from Savita Rockwell sent at 5/31/2022  9:29 AM CDT -----  Contact: Patient, [phone]  Calling because she needs a sooner appointment than July for leg pain. Please call her. Thanks.

## 2022-06-15 ENCOUNTER — OFFICE VISIT (OUTPATIENT)
Dept: DIABETES | Facility: CLINIC | Age: 62
End: 2022-06-15
Payer: COMMERCIAL

## 2022-06-15 ENCOUNTER — TELEPHONE (OUTPATIENT)
Dept: DIABETES | Facility: CLINIC | Age: 62
End: 2022-06-15

## 2022-06-15 VITALS
HEART RATE: 78 BPM | SYSTOLIC BLOOD PRESSURE: 132 MMHG | OXYGEN SATURATION: 97 % | WEIGHT: 222.81 LBS | TEMPERATURE: 98 F | DIASTOLIC BLOOD PRESSURE: 70 MMHG | BODY MASS INDEX: 34.97 KG/M2 | HEIGHT: 67 IN

## 2022-06-15 DIAGNOSIS — R80.9 MICROALBUMINURIA DUE TO TYPE 2 DIABETES MELLITUS: ICD-10-CM

## 2022-06-15 DIAGNOSIS — E11.29 MICROALBUMINURIA DUE TO TYPE 2 DIABETES MELLITUS: ICD-10-CM

## 2022-06-15 DIAGNOSIS — Z79.4 TYPE 2 DIABETES MELLITUS WITH HYPERGLYCEMIA, WITH LONG-TERM CURRENT USE OF INSULIN: Primary | ICD-10-CM

## 2022-06-15 DIAGNOSIS — E66.09 CLASS 1 OBESITY DUE TO EXCESS CALORIES WITH SERIOUS COMORBIDITY AND BODY MASS INDEX (BMI) OF 34.0 TO 34.9 IN ADULT: ICD-10-CM

## 2022-06-15 DIAGNOSIS — Z79.4 TYPE 2 DIABETES MELLITUS WITH DIABETIC POLYNEUROPATHY, WITH LONG-TERM CURRENT USE OF INSULIN: ICD-10-CM

## 2022-06-15 DIAGNOSIS — E78.5 DYSLIPIDEMIA, GOAL LDL BELOW 100: ICD-10-CM

## 2022-06-15 DIAGNOSIS — Z91.199 NONCOMPLIANCE WITH DIABETES TREATMENT: ICD-10-CM

## 2022-06-15 DIAGNOSIS — N18.31 TYPE 2 DIABETES MELLITUS WITH STAGE 3A CHRONIC KIDNEY DISEASE, WITH LONG-TERM CURRENT USE OF INSULIN: ICD-10-CM

## 2022-06-15 DIAGNOSIS — I10 ESSENTIAL HYPERTENSION, BENIGN: ICD-10-CM

## 2022-06-15 DIAGNOSIS — Z79.4 TYPE 2 DIABETES MELLITUS WITH STAGE 3A CHRONIC KIDNEY DISEASE, WITH LONG-TERM CURRENT USE OF INSULIN: ICD-10-CM

## 2022-06-15 DIAGNOSIS — E11.42 TYPE 2 DIABETES MELLITUS WITH DIABETIC POLYNEUROPATHY, WITH LONG-TERM CURRENT USE OF INSULIN: ICD-10-CM

## 2022-06-15 DIAGNOSIS — Z71.9 HEALTH EDUCATION/COUNSELING: ICD-10-CM

## 2022-06-15 DIAGNOSIS — E11.65 TYPE 2 DIABETES MELLITUS WITH HYPERGLYCEMIA, WITH LONG-TERM CURRENT USE OF INSULIN: Primary | ICD-10-CM

## 2022-06-15 DIAGNOSIS — E11.22 TYPE 2 DIABETES MELLITUS WITH STAGE 3A CHRONIC KIDNEY DISEASE, WITH LONG-TERM CURRENT USE OF INSULIN: ICD-10-CM

## 2022-06-15 PROBLEM — E66.811 CLASS 1 OBESITY DUE TO EXCESS CALORIES WITH SERIOUS COMORBIDITY AND BODY MASS INDEX (BMI) OF 34.0 TO 34.9 IN ADULT: Status: ACTIVE | Noted: 2021-03-11

## 2022-06-15 PROCEDURE — 99999 PR PBB SHADOW E&M-EST. PATIENT-LVL V: ICD-10-PCS | Mod: PBBFAC,,, | Performed by: NURSE PRACTITIONER

## 2022-06-15 PROCEDURE — 99214 PR OFFICE/OUTPT VISIT, EST, LEVL IV, 30-39 MIN: ICD-10-PCS | Mod: S$GLB,,, | Performed by: NURSE PRACTITIONER

## 2022-06-15 PROCEDURE — 3008F PR BODY MASS INDEX (BMI) DOCUMENTED: ICD-10-PCS | Mod: CPTII,S$GLB,, | Performed by: NURSE PRACTITIONER

## 2022-06-15 PROCEDURE — 3078F PR MOST RECENT DIASTOLIC BLOOD PRESSURE < 80 MM HG: ICD-10-PCS | Mod: CPTII,S$GLB,, | Performed by: NURSE PRACTITIONER

## 2022-06-15 PROCEDURE — 3078F DIAST BP <80 MM HG: CPT | Mod: CPTII,S$GLB,, | Performed by: NURSE PRACTITIONER

## 2022-06-15 PROCEDURE — 3066F PR DOCUMENTATION OF TREATMENT FOR NEPHROPATHY: ICD-10-PCS | Mod: CPTII,S$GLB,, | Performed by: NURSE PRACTITIONER

## 2022-06-15 PROCEDURE — 99999 PR PBB SHADOW E&M-EST. PATIENT-LVL V: CPT | Mod: PBBFAC,,, | Performed by: NURSE PRACTITIONER

## 2022-06-15 PROCEDURE — 4010F PR ACE/ARB THEARPY RXD/TAKEN: ICD-10-PCS | Mod: CPTII,S$GLB,, | Performed by: NURSE PRACTITIONER

## 2022-06-15 PROCEDURE — 3052F HG A1C>EQUAL 8.0%<EQUAL 9.0%: CPT | Mod: CPTII,S$GLB,, | Performed by: NURSE PRACTITIONER

## 2022-06-15 PROCEDURE — 1160F RVW MEDS BY RX/DR IN RCRD: CPT | Mod: CPTII,S$GLB,, | Performed by: NURSE PRACTITIONER

## 2022-06-15 PROCEDURE — 3008F BODY MASS INDEX DOCD: CPT | Mod: CPTII,S$GLB,, | Performed by: NURSE PRACTITIONER

## 2022-06-15 PROCEDURE — 3052F PR MOST RECENT HEMOGLOBIN A1C LEVEL 8.0 - < 9.0%: ICD-10-PCS | Mod: CPTII,S$GLB,, | Performed by: NURSE PRACTITIONER

## 2022-06-15 PROCEDURE — 1159F PR MEDICATION LIST DOCUMENTED IN MEDICAL RECORD: ICD-10-PCS | Mod: CPTII,S$GLB,, | Performed by: NURSE PRACTITIONER

## 2022-06-15 PROCEDURE — 3062F PR POS MACROALBUMINURIA RESULT DOCUMENTED/REVIEW: ICD-10-PCS | Mod: CPTII,S$GLB,, | Performed by: NURSE PRACTITIONER

## 2022-06-15 PROCEDURE — 99214 OFFICE O/P EST MOD 30 MIN: CPT | Mod: S$GLB,,, | Performed by: NURSE PRACTITIONER

## 2022-06-15 PROCEDURE — 1159F MED LIST DOCD IN RCRD: CPT | Mod: CPTII,S$GLB,, | Performed by: NURSE PRACTITIONER

## 2022-06-15 PROCEDURE — 3062F POS MACROALBUMINURIA REV: CPT | Mod: CPTII,S$GLB,, | Performed by: NURSE PRACTITIONER

## 2022-06-15 PROCEDURE — 1160F PR REVIEW ALL MEDS BY PRESCRIBER/CLIN PHARMACIST DOCUMENTED: ICD-10-PCS | Mod: CPTII,S$GLB,, | Performed by: NURSE PRACTITIONER

## 2022-06-15 PROCEDURE — 3066F NEPHROPATHY DOC TX: CPT | Mod: CPTII,S$GLB,, | Performed by: NURSE PRACTITIONER

## 2022-06-15 PROCEDURE — 4010F ACE/ARB THERAPY RXD/TAKEN: CPT | Mod: CPTII,S$GLB,, | Performed by: NURSE PRACTITIONER

## 2022-06-15 PROCEDURE — 3075F PR MOST RECENT SYSTOLIC BLOOD PRESS GE 130-139MM HG: ICD-10-PCS | Mod: CPTII,S$GLB,, | Performed by: NURSE PRACTITIONER

## 2022-06-15 PROCEDURE — 3075F SYST BP GE 130 - 139MM HG: CPT | Mod: CPTII,S$GLB,, | Performed by: NURSE PRACTITIONER

## 2022-06-15 RX ORDER — METFORMIN HYDROCHLORIDE 1000 MG/1
1000 TABLET ORAL
Qty: 90 TABLET | Refills: 1 | Status: SHIPPED | OUTPATIENT
Start: 2022-06-15 | End: 2022-10-25 | Stop reason: SDUPTHER

## 2022-06-15 RX ORDER — SEMAGLUTIDE 1.34 MG/ML
0.5 INJECTION, SOLUTION SUBCUTANEOUS
Qty: 1 PEN | Refills: 0 | Status: SHIPPED | OUTPATIENT
Start: 2022-06-15 | End: 2022-08-08 | Stop reason: DRUGHIGH

## 2022-06-15 RX ORDER — SEMAGLUTIDE 1.34 MG/ML
1 INJECTION, SOLUTION SUBCUTANEOUS
Qty: 1 PEN | Refills: 6 | Status: SHIPPED | OUTPATIENT
Start: 2022-07-15 | End: 2022-10-25

## 2022-06-15 RX ORDER — DAPAGLIFLOZIN 10 MG/1
10 TABLET, FILM COATED ORAL DAILY
Qty: 90 TABLET | Refills: 1 | Status: SHIPPED | OUTPATIENT
Start: 2022-06-15 | End: 2022-10-25 | Stop reason: SDUPTHER

## 2022-06-15 RX ORDER — ROSUVASTATIN CALCIUM 40 MG/1
40 TABLET, COATED ORAL NIGHTLY
Qty: 90 TABLET | Refills: 3 | Status: SHIPPED | OUTPATIENT
Start: 2022-06-15 | End: 2023-01-25 | Stop reason: SDUPTHER

## 2022-06-15 RX ORDER — SUB-Q INSULIN DEVICE, 40 UNIT
1 EACH MISCELLANEOUS DAILY
Qty: 90 EACH | Refills: 2 | Status: SHIPPED | OUTPATIENT
Start: 2022-06-15 | End: 2022-10-25 | Stop reason: SDUPTHER

## 2022-06-15 NOTE — PATIENT INSTRUCTIONS
Continue Metformin 1000 mg daily     Continue Farxiga 10 mg daily     Continue VGo30 with Fiasp   Adjust your clicks to 6 clicks before meals       Start taking the Ozempic weekly NOT daily   Start with 0.5 mg weekly for 4 weeks and then increase to 1 mg weekly and stay there.

## 2022-06-15 NOTE — TELEPHONE ENCOUNTER
Called pharmacy , clarified pt is to take Ozempic 0.5mg weekly for 4 weeks, pt is to take 1mg there after .

## 2022-06-15 NOTE — ASSESSMENT & PLAN NOTE
Body mass index is 34.9 kg/m².  Increases insulin resistance.   Discussed DM diet and exercise.

## 2022-06-15 NOTE — PROGRESS NOTES
CC:   Chief Complaint   Patient presents with    Diabetes Mellitus       HPI: Aishwarya Jensen is a 62 y.o. female presents for a follow up visit today for the management of T2DM   She was diagnosed with Type 2 diabetes in her early 50's on routine lab work. She was initially started on Metformin. Insulin therapy was started soon after diagnosis.     Family hx of diabetes: Mother, father, brothers   Hospitalized for diabetes: denies     No personal or FH of thyroid cancer or personal of pancreatic cancer or pancreatitis.     She is retired now.      Our last visit was in March of 2022  At that visit I had put a referral in to outpatient case management again  I suspected V Go noncompliance at times--based on her professional CGM results  We reviewed the V Go extensively and she met with diabetes education  Unfortunately she was not interested in a personal CGM-- she is still not interested.   We continued her metformin, continued her Farxiga, continued her V Go  We started on Ozempic weekly--today she reports that she is running out of the Ozempic before the month is up.  After discussion she has been taking the Ozempic daily instead of weekly   Her A1c in May was down 8.4% from 9.4%  Reports BG readings are mostly running 170-210's       DIABETES COMPLICATIONS: nephropathy and peripheral neuropathy      Diabetes Management Status    ASA:  Yes - 81 mg- taking it every day     Statin: Taking- Crestor 40 mg nightly -- possibly missing doses? Based on pharmacy review   ACE/ARB: Taking- lisinopril 20 mg     Screening or Prevention Patient's value Goal Complete/Controlled?   HgA1C Testing and Control   Lab Results   Component Value Date    HGBA1C 8.4 (H) 05/11/2022      Annually/Less than 8% Yes   Lipid profile : 05/11/2022 Annually Yes   LDL control Lab Results   Component Value Date    LDLCALC 80.8 05/11/2022    Annually/Less than 100 mg/dl  No   Nephropathy screening Lab Results   Component Value Date     LABMICR 260.0 03/15/2022     Lab Results   Component Value Date    PROTEINUA Negative 12/05/2018    Annually Yes   Blood pressure BP Readings from Last 1 Encounters:   06/15/22 132/70    Less than 140/90 No   Dilated retinal exam : 02/10/2022 Annually No   Foot exam   : 10/05/2021 Annually Yes       CURRENT A1C:    Hemoglobin A1C   Date Value Ref Range Status   05/11/2022 8.4 (H) 4.0 - 5.6 % Final     Comment:     ADA Screening Guidelines:  5.7-6.4%  Consistent with prediabetes  >or=6.5%  Consistent with diabetes    High levels of fetal hemoglobin interfere with the HbA1C  assay. Heterozygous hemoglobin variants (HbS, HgC, etc)do  not significantly interfere with this assay.   However, presence of multiple variants may affect accuracy.     01/14/2022 9.4 (H) 4.0 - 5.6 % Final     Comment:     ADA Screening Guidelines:  5.7-6.4%  Consistent with prediabetes  >or=6.5%  Consistent with diabetes    High levels of fetal hemoglobin interfere with the HbA1C  assay. Heterozygous hemoglobin variants (HbS, HgC, etc)do  not significantly interfere with this assay.   However, presence of multiple variants may affect accuracy.     10/05/2021 >14.0 (H) 4.0 - 5.6 % Final     Comment:     ADA Screening Guidelines:  5.7-6.4%  Consistent with prediabetes  >or=6.5%  Consistent with diabetes    High levels of fetal hemoglobin interfere with the HbA1C  assay. Heterozygous hemoglobin variants (HbS, HgC, etc)do  not significantly interfere with this assay.   However, presence of multiple variants may affect accuracy.         GOAL A1C: less than 8%     DM MEDICATIONS USED IN THE PAST: Metformin   Victoza   Lantus   VGo  Novolog    Freestyle manisha -- didn't like it   Ozempic   Fiasp     CURRENT DIABETES MEDICATIONS: Metformin 1000 mg daily mostly-- she forgets the evening dose VGo30 with Novolog Or Fiasp 5 clicks 3 times per day with food-before she eats now    Farxiga 10 mg daily   Ozempic 0.5 mg daily-- she has been running out of Ozempic  because she is taking it incorrectly ( taking the 0.5 mg daily-- 1 pen will only last 4 doses)    Insulin:VGo  Missed doses:  Missing the VGo and missing the evening dose of Metformin   VGo is costing her $35 dollars.   Usually wearing the VGo to her abdomen   She is adamant that she is changing her VGo every 24 hours.   Now giving her VGo clicks before she eats   Taking Ozempic daily instead of weekly       BLOOD GLUCOSE MONITORING: checking her BG 3-4 times per day   She forgot her logs at home.   Per oral recall: 170's -210's   Mostly in the 200's       HYPOGLYCEMIA:  Rarely -- 2 times in the 70's       MEALS: eating 3 meals per day   drinking OJ sometimes   Drinks: water    Sparking water- SF       CURRENT EXERCISE:  No      Review of Systems  Review of Systems   Constitutional: Negative for appetite change, fatigue and unexpected weight change.   HENT: Negative for trouble swallowing.    Eyes: Negative for visual disturbance.   Respiratory: Negative for shortness of breath.    Cardiovascular: Negative for chest pain.   Gastrointestinal: Positive for diarrhea. Negative for nausea.   Endocrine: Negative for polydipsia, polyphagia and polyuria.   Genitourinary:        + Nocturia 2 times per night    Musculoskeletal: Positive for arthralgias (right knee ).   Skin: Negative for wound.   Neurological: Negative for numbness.   Psychiatric/Behavioral: Positive for sleep disturbance.       Physical Exam   Physical Exam  Vitals and nursing note reviewed.   Constitutional:       Appearance: She is well-developed. She is obese.   HENT:      Head: Normocephalic and atraumatic.      Right Ear: External ear normal.      Left Ear: External ear normal.      Nose: Nose normal.   Neck:      Thyroid: No thyromegaly.      Trachea: No tracheal deviation.   Cardiovascular:      Rate and Rhythm: Normal rate and regular rhythm.      Heart sounds: No murmur heard.  Pulmonary:      Effort: Pulmonary effort is normal. No respiratory  distress.      Breath sounds: Normal breath sounds.   Abdominal:      Palpations: Abdomen is soft.      Tenderness: There is no abdominal tenderness.      Hernia: No hernia is present.   Musculoskeletal:      Cervical back: Normal range of motion and neck supple.      Right knee: Swelling present.   Skin:     General: Skin is warm and dry.      Capillary Refill: Capillary refill takes less than 2 seconds.      Findings: No rash.      Comments: She is wearing the VGo to her abdomen.    Neurological:      Mental Status: She is alert and oriented to person, place, and time.      Cranial Nerves: No cranial nerve deficit.   Psychiatric:         Behavior: Behavior normal.         Judgment: Judgment normal.           FOOT EXAMINATION: Appropriate footwear.         Lab Results   Component Value Date    TSH 3.184 01/14/2022         Type 2 diabetes mellitus with hyperglycemia, with long-term current use of insulin  Uncontrolled  Although A1c is trending down. -- improving   Unfortunately she is not interested in a personal CGM-- we did discuss that this would help with self awareness and self accountability  She has been taking the Ozempic incorrectly -- taking it daily instead of weekly        Discussed DM course, progression, and the need for good BG control to prevent or allay long-term complications. Reviewed proper hypoglycemia management. Discussed consistency in BG monitoring to allow for safe titration of insulin.      -- Medication Changes:     Continue Metformin 1000 mg daily     Continue Farxiga 10 mg daily     Continue VGo30 with Fiasp   Adjust your clicks to 6 clicks before meals   Give clicks BEFORE THE MEAL   CHANGE THE VGO EVERY 24 HOURS!!!!!     Start taking the Ozempic weekly NOT daily   Start with 0.5 mg weekly for 4 weeks and then increase to 1 mg weekly and stay there.        -- Reviewed goals of therapy are to get the best control we can without hypoglycemia.  -- Reviewed patient's current insulin  regimen. Clarified proper insulin dose and timing in relation to meals, etc. Insulin injection sites and proper rotation instructed.    -- Advised frequent self blood glucose monitoring.  Patient encouraged to document glucose results and bring them to every clinic visit.  She deferred a personal CGM.  Monitor blood sugars 4 times per day before meals and before bed.  Record readings.  Logs provided  -- Hypoglycemia precautions discussed. Instructed on precautions before driving.    -- Call for Bg repeatedly < 70 or > 200.   -- Close adherence to lifestyle changes recommended.   -- Periodic follow ups for eye evaluations, foot care and dental care suggested.          Type 2 diabetes mellitus with diabetic polyneuropathy, with long-term current use of insulin  Optimize BG readings.   See above.       Educated patient to check feet daily for any foreign objects and/or wounds. Discussed with patient the importance of wearing appropriate footwear at all times, not to walk barefoot ever, and to check shoes before putting them on feet. Instructed patient to keep feet dry by regularly changing shoes and socks and drying feet after baths and exercises. Also, instructed patient to report any new lesions, discolorations, or swelling to a healthcare professional.        Type 2 diabetes mellitus with stage 3a chronic kidney disease, with long-term current use of insulin  Optimize BG readings without insulin stacking or hypoglycemia   See above.   On Farxiga and Lisinopril     Microalbuminuria due to type 2 diabetes mellitus  Optimize BG readings.   See above.   On ACEi and SGLT2       Essential hypertension, benign  BP goal is < 140/90.   Tolerating ACEi  Controlled   Blood pressure goals discussed with patient      Dyslipidemia, goal LDL below 100  On statin per ADA recommendations  LDL goal < 100. LDL at goal. LFTs WNL. Continue statin.   Encouraged compliance     Class 1 obesity due to excess calories with serious  comorbidity and body mass index (BMI) of 34.0 to 34.9 in adult  Body mass index is 34.9 kg/m².  Increases insulin resistance.   Discussed DM diet and exercise.         Spent 25 minutes with patient with > 50% time spent in counseling on medication regimen       Noncompliance is hindering her overall management      Follow up in about 4 months (around 10/15/2022).  Follow up with me in 4 months with labs prior       Orders Placed This Encounter   Procedures    Hemoglobin A1C     Standing Status:   Future     Standing Expiration Date:   12/15/2023    Basic Metabolic Panel     Standing Status:   Future     Standing Expiration Date:   12/15/2023       Recommendations were discussed with the patient in detail  The patient verbalized understanding and agrees with the plan outlined as above.

## 2022-06-15 NOTE — ASSESSMENT & PLAN NOTE
Optimize BG readings without insulin stacking or hypoglycemia   See above.   On Farxiga and Lisinopril

## 2022-06-15 NOTE — Clinical Note
Please call pharmacy and explain that we want her to be on Ozempic 0.5 mg weekly for 4 weeks and then increase to 1 mg weekly thereafter!  Make sure they know this please  Please also make sure that she is getting Fiasp insulin NOT novolog  Thank you

## 2022-06-15 NOTE — ASSESSMENT & PLAN NOTE
On statin per ADA recommendations  LDL goal < 100. LDL at goal. LFTs WNL. Continue statin.   Encouraged compliance

## 2022-07-01 ENCOUNTER — TELEPHONE (OUTPATIENT)
Dept: PRIMARY CARE CLINIC | Facility: CLINIC | Age: 62
End: 2022-07-01
Payer: COMMERCIAL

## 2022-07-01 NOTE — TELEPHONE ENCOUNTER
----- Message from Freeman Bernard sent at 7/1/2022 12:56 PM CDT -----  Requesting an RX refill or new RX.  Is this a refill or new RX: Refill  RX name and strength semaglutide (OZEMPIC) 0.25 mg or 0.5 mg(2 mg/1.5 mL) pen injector  Is this a 30 day or 90 day RX:   Pharmacy name and phone # [id number] - [phone] Phone:  [phone] Fax:  [phone]    She got a letter from her ins com stating it needs a PA

## 2022-07-05 NOTE — TELEPHONE ENCOUNTER
Spoke with pharmacy, states PA is not needed on the ozempic. It is $35. Patient made aware and stated understanding

## 2022-08-08 ENCOUNTER — OFFICE VISIT (OUTPATIENT)
Dept: PRIMARY CARE CLINIC | Facility: CLINIC | Age: 62
End: 2022-08-08
Payer: COMMERCIAL

## 2022-08-08 VITALS
BODY MASS INDEX: 33.72 KG/M2 | TEMPERATURE: 98 F | DIASTOLIC BLOOD PRESSURE: 76 MMHG | HEART RATE: 66 BPM | RESPIRATION RATE: 16 BRPM | OXYGEN SATURATION: 98 % | WEIGHT: 214.81 LBS | HEIGHT: 67 IN | SYSTOLIC BLOOD PRESSURE: 120 MMHG

## 2022-08-08 DIAGNOSIS — E11.65 TYPE 2 DIABETES MELLITUS WITH HYPERGLYCEMIA, WITH LONG-TERM CURRENT USE OF INSULIN: Primary | ICD-10-CM

## 2022-08-08 DIAGNOSIS — M17.11 PRIMARY OSTEOARTHRITIS OF RIGHT KNEE: ICD-10-CM

## 2022-08-08 DIAGNOSIS — N18.31 STAGE 3A CHRONIC KIDNEY DISEASE: ICD-10-CM

## 2022-08-08 DIAGNOSIS — I10 ESSENTIAL HYPERTENSION, BENIGN: ICD-10-CM

## 2022-08-08 DIAGNOSIS — Z79.4 TYPE 2 DIABETES MELLITUS WITH HYPERGLYCEMIA, WITH LONG-TERM CURRENT USE OF INSULIN: Primary | ICD-10-CM

## 2022-08-08 PROCEDURE — 3074F SYST BP LT 130 MM HG: CPT | Mod: CPTII,S$GLB,, | Performed by: FAMILY MEDICINE

## 2022-08-08 PROCEDURE — 1160F RVW MEDS BY RX/DR IN RCRD: CPT | Mod: CPTII,S$GLB,, | Performed by: FAMILY MEDICINE

## 2022-08-08 PROCEDURE — 4010F PR ACE/ARB THEARPY RXD/TAKEN: ICD-10-PCS | Mod: CPTII,S$GLB,, | Performed by: FAMILY MEDICINE

## 2022-08-08 PROCEDURE — 3078F PR MOST RECENT DIASTOLIC BLOOD PRESSURE < 80 MM HG: ICD-10-PCS | Mod: CPTII,S$GLB,, | Performed by: FAMILY MEDICINE

## 2022-08-08 PROCEDURE — 4010F ACE/ARB THERAPY RXD/TAKEN: CPT | Mod: CPTII,S$GLB,, | Performed by: FAMILY MEDICINE

## 2022-08-08 PROCEDURE — 3066F NEPHROPATHY DOC TX: CPT | Mod: CPTII,S$GLB,, | Performed by: FAMILY MEDICINE

## 2022-08-08 PROCEDURE — 99999 PR PBB SHADOW E&M-EST. PATIENT-LVL V: ICD-10-PCS | Mod: PBBFAC,,, | Performed by: FAMILY MEDICINE

## 2022-08-08 PROCEDURE — 3078F DIAST BP <80 MM HG: CPT | Mod: CPTII,S$GLB,, | Performed by: FAMILY MEDICINE

## 2022-08-08 PROCEDURE — 3074F PR MOST RECENT SYSTOLIC BLOOD PRESSURE < 130 MM HG: ICD-10-PCS | Mod: CPTII,S$GLB,, | Performed by: FAMILY MEDICINE

## 2022-08-08 PROCEDURE — 3008F PR BODY MASS INDEX (BMI) DOCUMENTED: ICD-10-PCS | Mod: CPTII,S$GLB,, | Performed by: FAMILY MEDICINE

## 2022-08-08 PROCEDURE — 3062F PR POS MACROALBUMINURIA RESULT DOCUMENTED/REVIEW: ICD-10-PCS | Mod: CPTII,S$GLB,, | Performed by: FAMILY MEDICINE

## 2022-08-08 PROCEDURE — 99999 PR PBB SHADOW E&M-EST. PATIENT-LVL V: CPT | Mod: PBBFAC,,, | Performed by: FAMILY MEDICINE

## 2022-08-08 PROCEDURE — 99214 PR OFFICE/OUTPT VISIT, EST, LEVL IV, 30-39 MIN: ICD-10-PCS | Mod: S$GLB,,, | Performed by: FAMILY MEDICINE

## 2022-08-08 PROCEDURE — 3052F PR MOST RECENT HEMOGLOBIN A1C LEVEL 8.0 - < 9.0%: ICD-10-PCS | Mod: CPTII,S$GLB,, | Performed by: FAMILY MEDICINE

## 2022-08-08 PROCEDURE — 99214 OFFICE O/P EST MOD 30 MIN: CPT | Mod: S$GLB,,, | Performed by: FAMILY MEDICINE

## 2022-08-08 PROCEDURE — 3062F POS MACROALBUMINURIA REV: CPT | Mod: CPTII,S$GLB,, | Performed by: FAMILY MEDICINE

## 2022-08-08 PROCEDURE — 3008F BODY MASS INDEX DOCD: CPT | Mod: CPTII,S$GLB,, | Performed by: FAMILY MEDICINE

## 2022-08-08 PROCEDURE — 3052F HG A1C>EQUAL 8.0%<EQUAL 9.0%: CPT | Mod: CPTII,S$GLB,, | Performed by: FAMILY MEDICINE

## 2022-08-08 PROCEDURE — 1160F PR REVIEW ALL MEDS BY PRESCRIBER/CLIN PHARMACIST DOCUMENTED: ICD-10-PCS | Mod: CPTII,S$GLB,, | Performed by: FAMILY MEDICINE

## 2022-08-08 PROCEDURE — 1159F MED LIST DOCD IN RCRD: CPT | Mod: CPTII,S$GLB,, | Performed by: FAMILY MEDICINE

## 2022-08-08 PROCEDURE — 1159F PR MEDICATION LIST DOCUMENTED IN MEDICAL RECORD: ICD-10-PCS | Mod: CPTII,S$GLB,, | Performed by: FAMILY MEDICINE

## 2022-08-08 PROCEDURE — 3066F PR DOCUMENTATION OF TREATMENT FOR NEPHROPATHY: ICD-10-PCS | Mod: CPTII,S$GLB,, | Performed by: FAMILY MEDICINE

## 2022-08-08 RX ORDER — NABUMETONE 750 MG/1
750 TABLET, FILM COATED ORAL 2 TIMES DAILY
COMMUNITY
Start: 2022-07-19

## 2022-08-08 NOTE — PROGRESS NOTES
"Subjective:       Patient ID: Aishwarya Jensen is a 62 y.o. female.    Chief Complaint: Follow-up (6 month f/u)    Here for regular checkup.  No recent falls or injury.  Complains of bilateral knee pain, worse on the right, needs to follow-up with ortho.  Has been taking 0.5 mg of Ozempic, supposed to go to 1 mg daily.  Has been eating a little better.  Blood sugar rarely greater than 200, sometimes as low as the 70s.    Review of Systems   Respiratory: Negative for shortness of breath.    Cardiovascular: Negative for chest pain.   Gastrointestinal: Negative for nausea and vomiting.   Musculoskeletal: Positive for arthralgias.   Allergic/Immunologic: Negative for immunocompromised state.   Neurological: Negative for dizziness.   Psychiatric/Behavioral: Negative for agitation and confusion.       Objective:      Vitals:    08/08/22 1059   BP: 120/76   BP Location: Right arm   Patient Position: Sitting   BP Method: Medium (Manual)   Pulse: 66   Resp: 16   Temp: 98.1 °F (36.7 °C)   TempSrc: Oral   SpO2: 98%   Weight: 97.4 kg (214 lb 13.4 oz)   Height: 5' 7" (1.702 m)     Physical Exam  Vitals and nursing note reviewed.   Constitutional:       Appearance: She is well-developed.   HENT:      Head: Normocephalic and atraumatic.   Cardiovascular:      Rate and Rhythm: Normal rate and regular rhythm.      Heart sounds: Normal heart sounds.   Pulmonary:      Effort: Pulmonary effort is normal.      Breath sounds: Normal breath sounds.   Skin:     General: Skin is warm and dry.   Neurological:      Mental Status: She is alert and oriented to person, place, and time.   Psychiatric:         Mood and Affect: Mood normal.         Behavior: Behavior normal.         Lab Results   Component Value Date    WBC 4.90 09/25/2020    HGB 10.7 (L) 09/25/2020    HCT 34.7 (L) 09/25/2020     09/25/2020    CHOL 156 05/11/2022    TRIG 116 05/11/2022    HDL 52 05/11/2022    ALT 16 05/11/2022    AST 14 05/11/2022     " 05/11/2022    K 3.9 05/11/2022     05/11/2022    CREATININE 1.3 05/11/2022    BUN 30 (H) 05/11/2022    CO2 26 05/11/2022    TSH 3.184 01/14/2022    HGBA1C 8.4 (H) 05/11/2022      Assessment:       1. Type 2 diabetes mellitus with hyperglycemia, with long-term current use of insulin    2. Stage 3a chronic kidney disease    3. Essential hypertension, benign    4. Primary osteoarthritis of right knee        Plan:       Type 2 diabetes mellitus with hyperglycemia, with long-term current use of insulin  Continue current regimen, increase Ozempic to 1 mg daily, recheck labs in a few months  Stage 3a chronic kidney disease  Avoid nephrotoxins  Essential hypertension, benign  Well controlled  Primary osteoarthritis of right knee  Follow-up with ortho    Medication List with Changes/Refills   Current Medications    AMLODIPINE (NORVASC) 5 MG TABLET    Take 1 tablet (5 mg total) by mouth once daily.    ASPIRIN (ECOTRIN) 81 MG EC TABLET    Take 1 tablet (81 mg total) by mouth once daily.    BLOOD SUGAR DIAGNOSTIC STRP    To check BG 4 times daily, to use with insurance preferred meter    BLOOD-GLUCOSE METER KIT    To check BG 4 times daily, to use with insurance preferred meter    CYCLOBENZAPRINE (FLEXERIL) 10 MG TABLET    Take 10 mg by mouth once daily. TAKE ONE TABLET BY MOUTH ONCE DAILY AS NEEDED    DAPAGLIFLOZIN (FARXIGA) 10 MG TABLET    Take 1 tablet (10 mg total) by mouth once daily.    FLUCONAZOLE (DIFLUCAN) 150 MG TAB    TAKE ONE TABLET BY MOUTH AS A ONE TIME DOSE MAY REPEAT IN 3 DAYS IF NEEDED FOR 1 DOSE.    HYDROXYZINE PAMOATE (VISTARIL) 50 MG CAP    Take 1 capsule (50 mg total) by mouth every 8 (eight) hours as needed (itching).    INSULIN ASPART, NIACINAMIDE, 100 UNIT/ML SOLN    To use with VGo 30- 5-6 clicks TID AC, Max TDD of 100 units. Vials only, Normal    LANCETS MISC    To check BG 4 times daily, to use with insurance preferred meter    LISINOPRIL-HYDROCHLOROTHIAZIDE (PRINZIDE,ZESTORETIC) 20-12.5 MG PER  TABLET    Take 1 tablet by mouth once daily.    MELOXICAM (MOBIC) 15 MG TABLET    Take 1 tablet (15 mg total) by mouth once daily. With meals    METFORMIN (GLUCOPHAGE) 1000 MG TABLET    Take 1 tablet (1,000 mg total) by mouth daily with breakfast.    MUPIROCIN (BACTROBAN) 2 % OINTMENT    Apply topically 3 (three) times daily.    NABUMETONE (RELAFEN) 750 MG TABLET    Take 750 mg by mouth 2 (two) times daily.    ROSUVASTATIN (CRESTOR) 40 MG TAB    Take 1 tablet (40 mg total) by mouth every evening.    SEMAGLUTIDE (OZEMPIC) 1 MG/DOSE (4 MG/3 ML)    Inject 1 mg into the skin every 7 days.    SUB-Q INSULIN DEVICE, 30 UNIT (V-GO 30) DAVID    Inject 1 Device into the skin once daily.    TRAZODONE (DESYREL) 50 MG TABLET    TAKE 1 TO 2 TABLETS BY MOUTH AT BEDTIME AS NEEDED FOR INSOMNIA   Discontinued Medications    SEMAGLUTIDE (OZEMPIC) 0.25 MG OR 0.5 MG(2 MG/1.5 ML) PEN INJECTOR    Inject 0.5 mg into the skin every 7 days.

## 2022-08-24 ENCOUNTER — PATIENT MESSAGE (OUTPATIENT)
Dept: PRIMARY CARE CLINIC | Facility: CLINIC | Age: 62
End: 2022-08-24
Payer: COMMERCIAL

## 2022-09-07 ENCOUNTER — OFFICE VISIT (OUTPATIENT)
Dept: PODIATRY | Facility: CLINIC | Age: 62
End: 2022-09-07
Payer: COMMERCIAL

## 2022-09-07 VITALS
WEIGHT: 214 LBS | SYSTOLIC BLOOD PRESSURE: 130 MMHG | HEIGHT: 67 IN | HEART RATE: 83 BPM | DIASTOLIC BLOOD PRESSURE: 71 MMHG | BODY MASS INDEX: 33.59 KG/M2

## 2022-09-07 DIAGNOSIS — E11.42 TYPE 2 DIABETES MELLITUS WITH DIABETIC POLYNEUROPATHY, WITH LONG-TERM CURRENT USE OF INSULIN: Primary | ICD-10-CM

## 2022-09-07 DIAGNOSIS — M79.674 PAIN DUE TO ONYCHOMYCOSIS OF TOENAILS OF BOTH FEET: ICD-10-CM

## 2022-09-07 DIAGNOSIS — M79.675 PAIN DUE TO ONYCHOMYCOSIS OF TOENAILS OF BOTH FEET: ICD-10-CM

## 2022-09-07 DIAGNOSIS — Q84.5 ENLARGED AND HYPERTROPHIC NAILS: ICD-10-CM

## 2022-09-07 DIAGNOSIS — Z79.4 TYPE 2 DIABETES MELLITUS WITH DIABETIC POLYNEUROPATHY, WITH LONG-TERM CURRENT USE OF INSULIN: Primary | ICD-10-CM

## 2022-09-07 DIAGNOSIS — I83.93 ASYMPTOMATIC VARICOSE VEINS OF BILATERAL LOWER EXTREMITIES: ICD-10-CM

## 2022-09-07 DIAGNOSIS — B35.1 PAIN DUE TO ONYCHOMYCOSIS OF TOENAILS OF BOTH FEET: ICD-10-CM

## 2022-09-07 PROCEDURE — 3052F PR MOST RECENT HEMOGLOBIN A1C LEVEL 8.0 - < 9.0%: ICD-10-PCS | Mod: CPTII,S$GLB,, | Performed by: PODIATRIST

## 2022-09-07 PROCEDURE — 11721 DEBRIDE NAIL 6 OR MORE: CPT | Mod: S$GLB,,, | Performed by: PODIATRIST

## 2022-09-07 PROCEDURE — 1160F RVW MEDS BY RX/DR IN RCRD: CPT | Mod: CPTII,S$GLB,, | Performed by: PODIATRIST

## 2022-09-07 PROCEDURE — 3075F PR MOST RECENT SYSTOLIC BLOOD PRESS GE 130-139MM HG: ICD-10-PCS | Mod: CPTII,S$GLB,, | Performed by: PODIATRIST

## 2022-09-07 PROCEDURE — 4010F ACE/ARB THERAPY RXD/TAKEN: CPT | Mod: CPTII,S$GLB,, | Performed by: PODIATRIST

## 2022-09-07 PROCEDURE — 3066F PR DOCUMENTATION OF TREATMENT FOR NEPHROPATHY: ICD-10-PCS | Mod: CPTII,S$GLB,, | Performed by: PODIATRIST

## 2022-09-07 PROCEDURE — 3062F PR POS MACROALBUMINURIA RESULT DOCUMENTED/REVIEW: ICD-10-PCS | Mod: CPTII,S$GLB,, | Performed by: PODIATRIST

## 2022-09-07 PROCEDURE — 99213 OFFICE O/P EST LOW 20 MIN: CPT | Mod: 25,S$GLB,, | Performed by: PODIATRIST

## 2022-09-07 PROCEDURE — 1159F PR MEDICATION LIST DOCUMENTED IN MEDICAL RECORD: ICD-10-PCS | Mod: CPTII,S$GLB,, | Performed by: PODIATRIST

## 2022-09-07 PROCEDURE — 3052F HG A1C>EQUAL 8.0%<EQUAL 9.0%: CPT | Mod: CPTII,S$GLB,, | Performed by: PODIATRIST

## 2022-09-07 PROCEDURE — 3078F DIAST BP <80 MM HG: CPT | Mod: CPTII,S$GLB,, | Performed by: PODIATRIST

## 2022-09-07 PROCEDURE — 3075F SYST BP GE 130 - 139MM HG: CPT | Mod: CPTII,S$GLB,, | Performed by: PODIATRIST

## 2022-09-07 PROCEDURE — 3066F NEPHROPATHY DOC TX: CPT | Mod: CPTII,S$GLB,, | Performed by: PODIATRIST

## 2022-09-07 PROCEDURE — 11721 ROUTINE FOOT CARE: ICD-10-PCS | Mod: S$GLB,,, | Performed by: PODIATRIST

## 2022-09-07 PROCEDURE — 99213 PR OFFICE/OUTPT VISIT, EST, LEVL III, 20-29 MIN: ICD-10-PCS | Mod: 25,S$GLB,, | Performed by: PODIATRIST

## 2022-09-07 PROCEDURE — 1159F MED LIST DOCD IN RCRD: CPT | Mod: CPTII,S$GLB,, | Performed by: PODIATRIST

## 2022-09-07 PROCEDURE — 99999 PR PBB SHADOW E&M-EST. PATIENT-LVL III: ICD-10-PCS | Mod: PBBFAC,,, | Performed by: PODIATRIST

## 2022-09-07 PROCEDURE — 99999 PR PBB SHADOW E&M-EST. PATIENT-LVL III: CPT | Mod: PBBFAC,,, | Performed by: PODIATRIST

## 2022-09-07 PROCEDURE — 3078F PR MOST RECENT DIASTOLIC BLOOD PRESSURE < 80 MM HG: ICD-10-PCS | Mod: CPTII,S$GLB,, | Performed by: PODIATRIST

## 2022-09-07 PROCEDURE — 1160F PR REVIEW ALL MEDS BY PRESCRIBER/CLIN PHARMACIST DOCUMENTED: ICD-10-PCS | Mod: CPTII,S$GLB,, | Performed by: PODIATRIST

## 2022-09-07 PROCEDURE — 3008F PR BODY MASS INDEX (BMI) DOCUMENTED: ICD-10-PCS | Mod: CPTII,S$GLB,, | Performed by: PODIATRIST

## 2022-09-07 PROCEDURE — 3062F POS MACROALBUMINURIA REV: CPT | Mod: CPTII,S$GLB,, | Performed by: PODIATRIST

## 2022-09-07 PROCEDURE — 4010F PR ACE/ARB THEARPY RXD/TAKEN: ICD-10-PCS | Mod: CPTII,S$GLB,, | Performed by: PODIATRIST

## 2022-09-07 PROCEDURE — 3008F BODY MASS INDEX DOCD: CPT | Mod: CPTII,S$GLB,, | Performed by: PODIATRIST

## 2022-09-07 NOTE — PROGRESS NOTES
Subjective:      Patient ID: Aishwarya Jensen is a 62 y.o. female.    Chief Complaint: No chief complaint on file.    Aishwarya is a 62 y.o. female who presents to the clinic for follow-up evaluation and treatment of high risk feet. Last here 4 months ago.    Aishwarya has a past medical history of Diabetes mellitus, type 2, Hypercholesteremia, and Hypertension. Dx DM 30 yrs.ago - still poorly controlled BS but improving. 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  Knee pain - ortho.referral last visit.     No acute c/o but wants nails debrided before they get as bad as last visit - long and deformed so hurt in shoes.     Going on a cruise Feb.so will come again prior, for DM check up/foot care.     PCP: Joey Cintron MD    Date Last Seen by PCP: 8/8/22   DM management: Micki Knott NP  DOLV 6/15/22    Current shoe gear: tennis shoes    Hemoglobin A1C   Date Value Ref Range Status   05/11/2022 8.4 (H) 4.0 - 5.6 % Final     Comment:     ADA Screening Guidelines:  5.7-6.4%  Consistent with prediabetes  >or=6.5%  Consistent with diabetes    High levels of fetal hemoglobin interfere with the HbA1C  assay. Heterozygous hemoglobin variants (HbS, HgC, etc)do  not significantly interfere with this assay.   However, presence of multiple variants may affect accuracy.     01/14/2022 9.4 (H) 4.0 - 5.6 % Final     Comment:     ADA Screening Guidelines:  5.7-6.4%  Consistent with prediabetes  >or=6.5%  Consistent with diabetes    High levels of fetal hemoglobin interfere with the HbA1C  assay. Heterozygous hemoglobin variants (HbS, HgC, etc)do  not significantly interfere with this assay.   However, presence of multiple variants may affect accuracy.     10/05/2021 >14.0 (H) 4.0 - 5.6 % Final     Comment:     ADA Screening Guidelines:  5.7-6.4%  Consistent with prediabetes  >or=6.5%  Consistent with diabetes    High levels of fetal hemoglobin interfere with the HbA1C  assay. Heterozygous hemoglobin variants (HbS, HgC,  etc)do  not significantly interfere with this assay.   However, presence of multiple variants may affect accuracy.        Objective:      Physical Exam  Vitals signs reviewed.   Constitutional:       General: She is not in acute distress.     Appearance: Normal appearance. She is well-developed and  obese.  Cardiovascular:      Pulses: Dorsalis pedis pulses are 2+ BL.      Comments: Venous insufficiency d/t varicosities; minimal edema B/L  Musculoskeletal:     General: No swelling, tenderness, deformity or signs of injury.      She exhibits normal ROM.     Edema BLE R>L  w/ mild hyperpigmentation present.      Prominent metatarsal heads present. No bunion.      Comments: Flexible cavus B/L  Feet:      Skin integrity: Skin integrity normal. No skin breakdown, callus or dry skin.      Toenail Condition: toenails are hypertrophic, abnormally thick & gryphotic w/ mycosis - Fungal disease present to nail plates, with discoloration, dystrophic changes B/L sparing 4th B/L.  Skin:     General: Skin is warm and dry.      Capillary Refill: < 2 seconds.      Findings: No bruising, signs of injury or lesion.      Neurological:      Mental Status: She is AAO x 3.      Sensory: Sensation is intact to light touch although states has numbness & parasthesias BLE.     Motor: Motor function is intact. No weakness or atrophy.      Gait: Gait is intact. Gait normal.   Psychiatric:         Mood and Affect: Mood and affect normal. Mood is not anxious.          Behavior: Behavior normal. Behavior is cooperative.      Assessment:      Encounter Diagnoses   Name Primary?    Type 2 diabetes mellitus with diabetic polyneuropathy, with long-term current use of insulin Yes    Asymptomatic varicose veins of bilateral lower extremities     Enlarged and hypertrophic nails     Pain due to onychomycosis of toenails of both feet      Problem List Items Addressed This Visit          Cardiac/Vascular    Asymptomatic varicose veins of bilateral lower  extremities       Endocrine    Type 2 diabetes mellitus with diabetic polyneuropathy, with long-term current use of insulin - Primary    Relevant Orders    Routine Foot Care     Other Visit Diagnoses       Enlarged and hypertrophic nails        Relevant Orders    Routine Foot Care    Pain due to onychomycosis of toenails of both feet        Relevant Orders    Routine Foot Care            Plan:      Diagnoses and all orders for this visit:    Type 2 diabetes mellitus with diabetic polyneuropathy, with long-term current use of insulin  -     Routine Foot Care    Asymptomatic varicose veins of bilateral lower extremities    Enlarged and hypertrophic nails  -     Routine Foot Care    Pain due to onychomycosis of toenails of both feet  -     Routine Foot Care    I counseled the patient on her conditions, their implications and medical management.    - Diabetic Foot Education. Patient reminded of the importance of good nutrition and blood sugar control to help prevent podiatric complications of diabetes. We discussed wearing proper shoe gear, daily foot inspections, never walking without protective shoe gear, podiatric nail visits every 4 months, sooner prn.      - With patient's permission, nails were aggressively reduced and debrided x 10 to their soft tissue attachment, mechanically removing all offending nail and debris. Patient relates relief following the procedure.

## 2022-09-14 NOTE — PROCEDURES
Routine Foot Care    Date/Time: 9/7/2022 10:45 AM  Performed by: Annmarie Keane DPM  Authorized by: Annmarie Keane DPM     Consent Done?:  Yes (Verbal)    Nail Care Type:  Debride  Location(s): All  (Left 1st Toe, Left 3rd Toe, Left 2nd Toe, Left 4th Toe, Left 5th Toe, Right 1st Toe, Right 2nd Toe, Right 3rd Toe, Right 4th Toe and Right 5th Toe)  Patient tolerance:  Patient tolerated the procedure well with no immediate complications

## 2022-10-19 ENCOUNTER — PATIENT MESSAGE (OUTPATIENT)
Dept: DIABETES | Facility: CLINIC | Age: 62
End: 2022-10-19
Payer: COMMERCIAL

## 2022-10-19 ENCOUNTER — TELEPHONE (OUTPATIENT)
Dept: DIABETES | Facility: CLINIC | Age: 62
End: 2022-10-19
Payer: COMMERCIAL

## 2022-10-25 ENCOUNTER — OFFICE VISIT (OUTPATIENT)
Dept: DIABETES | Facility: CLINIC | Age: 62
End: 2022-10-25
Payer: COMMERCIAL

## 2022-10-25 VITALS
OXYGEN SATURATION: 98 % | WEIGHT: 215.63 LBS | HEIGHT: 67 IN | SYSTOLIC BLOOD PRESSURE: 120 MMHG | BODY MASS INDEX: 33.84 KG/M2 | HEART RATE: 81 BPM | DIASTOLIC BLOOD PRESSURE: 70 MMHG

## 2022-10-25 DIAGNOSIS — E78.5 DYSLIPIDEMIA, GOAL LDL BELOW 100: ICD-10-CM

## 2022-10-25 DIAGNOSIS — I10 ESSENTIAL HYPERTENSION, BENIGN: ICD-10-CM

## 2022-10-25 DIAGNOSIS — E11.22 TYPE 2 DIABETES MELLITUS WITH STAGE 3A CHRONIC KIDNEY DISEASE, WITH LONG-TERM CURRENT USE OF INSULIN: ICD-10-CM

## 2022-10-25 DIAGNOSIS — E11.65 TYPE 2 DIABETES MELLITUS WITH HYPERGLYCEMIA, WITH LONG-TERM CURRENT USE OF INSULIN: Primary | ICD-10-CM

## 2022-10-25 DIAGNOSIS — R80.9 MICROALBUMINURIA DUE TO TYPE 2 DIABETES MELLITUS: ICD-10-CM

## 2022-10-25 DIAGNOSIS — Z79.4 TYPE 2 DIABETES MELLITUS WITH HYPERGLYCEMIA, WITH LONG-TERM CURRENT USE OF INSULIN: Primary | ICD-10-CM

## 2022-10-25 DIAGNOSIS — E11.42 TYPE 2 DIABETES MELLITUS WITH DIABETIC POLYNEUROPATHY, WITH LONG-TERM CURRENT USE OF INSULIN: ICD-10-CM

## 2022-10-25 DIAGNOSIS — Z71.9 HEALTH EDUCATION/COUNSELING: ICD-10-CM

## 2022-10-25 DIAGNOSIS — Z91.199 NONCOMPLIANCE WITH DIABETES TREATMENT: ICD-10-CM

## 2022-10-25 DIAGNOSIS — Z79.4 TYPE 2 DIABETES MELLITUS WITH DIABETIC POLYNEUROPATHY, WITH LONG-TERM CURRENT USE OF INSULIN: ICD-10-CM

## 2022-10-25 DIAGNOSIS — Z79.4 TYPE 2 DIABETES MELLITUS WITH STAGE 3A CHRONIC KIDNEY DISEASE, WITH LONG-TERM CURRENT USE OF INSULIN: ICD-10-CM

## 2022-10-25 DIAGNOSIS — E11.29 MICROALBUMINURIA DUE TO TYPE 2 DIABETES MELLITUS: ICD-10-CM

## 2022-10-25 DIAGNOSIS — E66.09 CLASS 1 OBESITY DUE TO EXCESS CALORIES WITH SERIOUS COMORBIDITY AND BODY MASS INDEX (BMI) OF 33.0 TO 33.9 IN ADULT: ICD-10-CM

## 2022-10-25 DIAGNOSIS — N18.31 TYPE 2 DIABETES MELLITUS WITH STAGE 3A CHRONIC KIDNEY DISEASE, WITH LONG-TERM CURRENT USE OF INSULIN: ICD-10-CM

## 2022-10-25 PROCEDURE — 3062F POS MACROALBUMINURIA REV: CPT | Mod: CPTII,S$GLB,, | Performed by: NURSE PRACTITIONER

## 2022-10-25 PROCEDURE — 3066F NEPHROPATHY DOC TX: CPT | Mod: CPTII,S$GLB,, | Performed by: NURSE PRACTITIONER

## 2022-10-25 PROCEDURE — 3062F PR POS MACROALBUMINURIA RESULT DOCUMENTED/REVIEW: ICD-10-PCS | Mod: CPTII,S$GLB,, | Performed by: NURSE PRACTITIONER

## 2022-10-25 PROCEDURE — 3078F DIAST BP <80 MM HG: CPT | Mod: CPTII,S$GLB,, | Performed by: NURSE PRACTITIONER

## 2022-10-25 PROCEDURE — 99999 PR PBB SHADOW E&M-EST. PATIENT-LVL V: ICD-10-PCS | Mod: PBBFAC,,, | Performed by: NURSE PRACTITIONER

## 2022-10-25 PROCEDURE — 4010F PR ACE/ARB THEARPY RXD/TAKEN: ICD-10-PCS | Mod: CPTII,S$GLB,, | Performed by: NURSE PRACTITIONER

## 2022-10-25 PROCEDURE — 3074F SYST BP LT 130 MM HG: CPT | Mod: CPTII,S$GLB,, | Performed by: NURSE PRACTITIONER

## 2022-10-25 PROCEDURE — 3066F PR DOCUMENTATION OF TREATMENT FOR NEPHROPATHY: ICD-10-PCS | Mod: CPTII,S$GLB,, | Performed by: NURSE PRACTITIONER

## 2022-10-25 PROCEDURE — 1160F RVW MEDS BY RX/DR IN RCRD: CPT | Mod: CPTII,S$GLB,, | Performed by: NURSE PRACTITIONER

## 2022-10-25 PROCEDURE — 3078F PR MOST RECENT DIASTOLIC BLOOD PRESSURE < 80 MM HG: ICD-10-PCS | Mod: CPTII,S$GLB,, | Performed by: NURSE PRACTITIONER

## 2022-10-25 PROCEDURE — 1159F MED LIST DOCD IN RCRD: CPT | Mod: CPTII,S$GLB,, | Performed by: NURSE PRACTITIONER

## 2022-10-25 PROCEDURE — 1159F PR MEDICATION LIST DOCUMENTED IN MEDICAL RECORD: ICD-10-PCS | Mod: CPTII,S$GLB,, | Performed by: NURSE PRACTITIONER

## 2022-10-25 PROCEDURE — 3051F HG A1C>EQUAL 7.0%<8.0%: CPT | Mod: CPTII,S$GLB,, | Performed by: NURSE PRACTITIONER

## 2022-10-25 PROCEDURE — 3051F PR MOST RECENT HEMOGLOBIN A1C LEVEL 7.0 - < 8.0%: ICD-10-PCS | Mod: CPTII,S$GLB,, | Performed by: NURSE PRACTITIONER

## 2022-10-25 PROCEDURE — 4010F ACE/ARB THERAPY RXD/TAKEN: CPT | Mod: CPTII,S$GLB,, | Performed by: NURSE PRACTITIONER

## 2022-10-25 PROCEDURE — 99214 OFFICE O/P EST MOD 30 MIN: CPT | Mod: S$GLB,,, | Performed by: NURSE PRACTITIONER

## 2022-10-25 PROCEDURE — 99214 PR OFFICE/OUTPT VISIT, EST, LEVL IV, 30-39 MIN: ICD-10-PCS | Mod: S$GLB,,, | Performed by: NURSE PRACTITIONER

## 2022-10-25 PROCEDURE — 1160F PR REVIEW ALL MEDS BY PRESCRIBER/CLIN PHARMACIST DOCUMENTED: ICD-10-PCS | Mod: CPTII,S$GLB,, | Performed by: NURSE PRACTITIONER

## 2022-10-25 PROCEDURE — 99999 PR PBB SHADOW E&M-EST. PATIENT-LVL V: CPT | Mod: PBBFAC,,, | Performed by: NURSE PRACTITIONER

## 2022-10-25 PROCEDURE — 3074F PR MOST RECENT SYSTOLIC BLOOD PRESSURE < 130 MM HG: ICD-10-PCS | Mod: CPTII,S$GLB,, | Performed by: NURSE PRACTITIONER

## 2022-10-25 RX ORDER — BLOOD-GLUCOSE SENSOR
EACH MISCELLANEOUS
Qty: 3 EACH | Refills: 11 | OUTPATIENT
Start: 2022-10-25 | End: 2022-10-25 | Stop reason: SDUPTHER

## 2022-10-25 RX ORDER — BLOOD-GLUCOSE,RECEIVER,CONT
1 EACH MISCELLANEOUS ONCE
Qty: 1 EACH | Refills: 0 | Status: SHIPPED | OUTPATIENT
Start: 2022-10-25 | End: 2023-05-31 | Stop reason: SDUPTHER

## 2022-10-25 RX ORDER — BLOOD-GLUCOSE TRANSMITTER
EACH MISCELLANEOUS
Qty: 1 EACH | Refills: 4 | Status: SHIPPED | OUTPATIENT
Start: 2022-10-25 | End: 2023-07-18

## 2022-10-25 RX ORDER — BLOOD-GLUCOSE SENSOR
EACH MISCELLANEOUS
Qty: 3 EACH | Refills: 11 | Status: SHIPPED | OUTPATIENT
Start: 2022-10-25 | End: 2023-07-18

## 2022-10-25 RX ORDER — SUB-Q INSULIN DEVICE, 40 UNIT
1 EACH MISCELLANEOUS DAILY
Qty: 90 EACH | Refills: 2 | Status: SHIPPED | OUTPATIENT
Start: 2022-10-25 | End: 2023-01-25 | Stop reason: SDUPTHER

## 2022-10-25 RX ORDER — DAPAGLIFLOZIN 10 MG/1
10 TABLET, FILM COATED ORAL DAILY
Qty: 90 TABLET | Refills: 1 | Status: SHIPPED | OUTPATIENT
Start: 2022-10-25 | End: 2023-01-25 | Stop reason: SDUPTHER

## 2022-10-25 RX ORDER — BLOOD-GLUCOSE TRANSMITTER
EACH MISCELLANEOUS
Qty: 1 EACH | Refills: 4 | OUTPATIENT
Start: 2022-10-25 | End: 2022-10-25 | Stop reason: SDUPTHER

## 2022-10-25 RX ORDER — METFORMIN HYDROCHLORIDE 1000 MG/1
1000 TABLET ORAL
Qty: 90 TABLET | Refills: 1 | Status: SHIPPED | OUTPATIENT
Start: 2022-10-25 | End: 2023-01-25 | Stop reason: SDUPTHER

## 2022-10-25 RX ORDER — SEMAGLUTIDE 2.68 MG/ML
2 INJECTION, SOLUTION SUBCUTANEOUS
Qty: 3 ML | Refills: 6 | Status: SHIPPED | OUTPATIENT
Start: 2022-10-25 | End: 2023-01-25 | Stop reason: SDUPTHER

## 2022-10-25 RX ORDER — BLOOD-GLUCOSE,RECEIVER,CONT
1 EACH MISCELLANEOUS ONCE
Qty: 1 EACH | Refills: 0 | Status: SHIPPED | OUTPATIENT
Start: 2022-10-25 | End: 2022-10-25 | Stop reason: SDUPTHER

## 2022-10-25 NOTE — ASSESSMENT & PLAN NOTE
Body mass index is 33.77 kg/m².  Increases insulin resistance.   Discussed DM diet and exercise.

## 2022-10-25 NOTE — ASSESSMENT & PLAN NOTE
Uncontrolled   Although A1c is trending down. -- improving ---now less than 8%    Discussed DM course, progression, and the need for good BG control to prevent or allay long-term complications. Reviewed proper hypoglycemia management. Discussed consistency in BG monitoring to allow for safe titration of insulin.      -- Medication Changes:     Continue Metformin 1000 mg daily-- forgets to take the evening dose of metformin     Continue Farxiga 10 mg daily     Continue VGo30 with Fiasp    6 clicks before meals   Give clicks BEFORE THE MEAL   CHANGE THE VGO EVERY 24 HOURS!!!!!     Increase Ozempic to 2 mg weekly      -- Reviewed goals of therapy are to get the best control we can without hypoglycemia.  -- Reviewed patient's current insulin regimen. Clarified proper insulin dose and timing in relation to meals, etc. Insulin injection sites and proper rotation instructed.    -- Advised frequent self blood glucose monitoring.  Patient encouraged to document glucose results and bring them to every clinic visit.  [facility]   -- Hypoglycemia precautions discussed. Instructed on precautions before driving.    -- Call for Bg repeatedly < 70 or > 200.   -- Close adherence to lifestyle changes recommended.   -- Periodic follow ups for eye evaluations, foot care and dental care suggested.    patient will benefit from a Dexcom G6 to prevent hypoglycemia due to patient experiences level 2 or hypoglycemic event <54     Patient has diabetes mellitus and manages diabetes with intensive insulin regimen and uses prandial and basal insulin daily-- on the VGO   Patient requires a therapeutic CGM and is willing to use therapeutic CGM for the necessary frequent adjustments of insulin therapy.  I have completed an in-person visit during the previous 6 months and will continue to have in-person visits every 6 months to assess adherence to their CGM regimen and diabetes treatment plan.  Due to COVID  pandemic and need for remote monitoring this tool is medically necessary

## 2022-10-25 NOTE — PROGRESS NOTES
CC:   Chief Complaint   Patient presents with    Diabetes Mellitus       HPI: Aishwarya Jensen is a 62 y.o. female presents for a follow up visit today for the management of T2DM   She was diagnosed with Type 2 diabetes in her early 50's on routine lab work. She was initially started on Metformin. Insulin therapy was started soon after diagnosis.     Family hx of diabetes: Mother, father, brothers   Hospitalized for diabetes: denies     No personal or FH of thyroid cancer or personal of pancreatic cancer or pancreatitis.     She is retired now.     Our last visit was in June 2022.   At that visit we continued her Metformin, Farxiga, VGo with Fiasp   We discussed that she needs to take the OZempic WEEKLY NOT DAILY  A1c is down to 7.9% from 8.4%   Denies GI upset with the Ozempic --  Confirmed that she is taking the Ozempic weekly now.   Today she is open to trying the Dexcom.  She does report an episode where her blood sugar dropped to in the 30s??  She did not confirm with a second fingerstick.  She can not remember the events leading up to the blood sugar  Has had blood sugars down in the 70s      DIABETES COMPLICATIONS: nephropathy and peripheral neuropathy      Diabetes Management Status    ASA:  Yes - 81 mg- taking it every day     Statin: Taking- Crestor 40 mg nightly -- possibly missing doses?   ACE/ARB: Taking- lisinopril 20 mg     Screening or Prevention Patient's value Goal Complete/Controlled?   HgA1C Testing and Control   Lab Results   Component Value Date    HGBA1C 7.9 (H) 10/18/2022      Annually/Less than 8% Yes   Lipid profile : 05/11/2022 Annually Yes   LDL control Lab Results   Component Value Date    LDLCALC 80.8 05/11/2022    Annually/Less than 100 mg/dl  No   Nephropathy screening Lab Results   Component Value Date    LABMICR 260.0 03/15/2022     Lab Results   Component Value Date    PROTEINUA Negative 12/05/2018    Annually Yes   Blood pressure BP Readings from Last 1 Encounters:    10/25/22 120/70    Less than 140/90 No   Dilated retinal exam : 03/14/2022 Annually No   Foot exam   : 09/07/2022 Annually Yes       CURRENT A1C:    Hemoglobin A1C   Date Value Ref Range Status   10/18/2022 7.9 (H) 4.0 - 5.6 % Final     Comment:     ADA Screening Guidelines:  5.7-6.4%  Consistent with prediabetes  >or=6.5%  Consistent with diabetes    High levels of fetal hemoglobin interfere with the HbA1C  assay. Heterozygous hemoglobin variants (HbS, HgC, etc)do  not significantly interfere with this assay.   However, presence of multiple variants may affect accuracy.     05/11/2022 8.4 (H) 4.0 - 5.6 % Final     Comment:     ADA Screening Guidelines:  5.7-6.4%  Consistent with prediabetes  >or=6.5%  Consistent with diabetes    High levels of fetal hemoglobin interfere with the HbA1C  assay. Heterozygous hemoglobin variants (HbS, HgC, etc)do  not significantly interfere with this assay.   However, presence of multiple variants may affect accuracy.     01/14/2022 9.4 (H) 4.0 - 5.6 % Final     Comment:     ADA Screening Guidelines:  5.7-6.4%  Consistent with prediabetes  >or=6.5%  Consistent with diabetes    High levels of fetal hemoglobin interfere with the HbA1C  assay. Heterozygous hemoglobin variants (HbS, HgC, etc)do  not significantly interfere with this assay.   However, presence of multiple variants may affect accuracy.         GOAL A1C: less than 8%     DM MEDICATIONS USED IN THE PAST: Metformin   Victoza   Lantus   VGo  Novolog    Freestyle manisha -- didn't like it   Ozempic   Fiasp       CURRENT DIABETES MEDICATIONS: Metformin 1000 mg daily mostly-- she forgets the evening dose   VGo30 with Fiasp 6 clicks 3 times per day with food-before meals     Farxiga 10 mg daily   Ozempic 1 mg weekly on Mondays   Insulin:VGo  Missed doses:  Missing  the evening dose of Metformin   VGo is costing her $35 dollars.   Usually wearing the VGo to her abdomen   She is adamant that she is changing her VGo every 24 hours.  "  Now giving her VGo clicks before she eats   Denies missing medication doses       BLOOD GLUCOSE MONITORING: checking her BG 3-4 times per day   She forgot her logs at home.   Per oral recall: variable--- 71, 30 one time?? She kind of felt bad?   Mostly running 200's >?? 170's       HYPOGLYCEMIA:  once to 30??- she "felt bad"    Maybe some hypoglycemia unawareness or error with meter   Some in the 70's       MEALS: eating 3 meals per day   drinking OJ sometimes   Drinks: water    Sparking water- SF       CURRENT EXERCISE:  No      Review of Systems  Review of Systems   Constitutional:  Negative for appetite change, fatigue and unexpected weight change.   HENT:  Negative for trouble swallowing.    Eyes:  Negative for visual disturbance.   Respiratory:  Negative for shortness of breath.    Cardiovascular:  Negative for chest pain.   Gastrointestinal:  Negative for abdominal pain, diarrhea and nausea.   Endocrine: Negative for polydipsia, polyphagia and polyuria.   Genitourinary:         + Nocturia 2 times per night    Musculoskeletal:  Positive for arthralgias (right knee ).   Skin:  Negative for wound.   Neurological:  Negative for numbness.   Psychiatric/Behavioral:  Positive for sleep disturbance.      Physical Exam   Physical Exam  Vitals and nursing note reviewed.   Constitutional:       Appearance: She is well-developed. She is obese.   HENT:      Head: Normocephalic and atraumatic.      Right Ear: External ear normal.      Left Ear: External ear normal.      Nose: Nose normal.   Neck:      Thyroid: No thyromegaly.      Trachea: No tracheal deviation.   Cardiovascular:      Rate and Rhythm: Normal rate and regular rhythm.      Heart sounds: No murmur heard.  Pulmonary:      Effort: Pulmonary effort is normal. No respiratory distress.      Breath sounds: Normal breath sounds.   Abdominal:      Palpations: Abdomen is soft.      Tenderness: There is no abdominal tenderness.      Hernia: No hernia is present. "   Musculoskeletal:      Cervical back: Normal range of motion and neck supple.   Skin:     General: Skin is warm and dry.      Capillary Refill: Capillary refill takes less than 2 seconds.      Findings: No rash.      Comments: She is wearing the VGo to her abdomen.    Neurological:      Mental Status: She is alert and oriented to person, place, and time.      Cranial Nerves: No cranial nerve deficit.   Psychiatric:         Behavior: Behavior normal.         Judgment: Judgment normal.         FOOT EXAMINATION: Appropriate footwear.         Lab Results   Component Value Date    TSH 3.184 01/14/2022         Type 2 diabetes mellitus with hyperglycemia, with long-term current use of insulin  Uncontrolled   Although A1c is trending down. -- improving ---now less than 8%    Discussed DM course, progression, and the need for good BG control to prevent or allay long-term complications. Reviewed proper hypoglycemia management. Discussed consistency in BG monitoring to allow for safe titration of insulin.      -- Medication Changes:     Continue Metformin 1000 mg daily-- forgets to take the evening dose of metformin     Continue Farxiga 10 mg daily     Continue VGo30 with Fiasp    6 clicks before meals   Give clicks BEFORE THE MEAL   CHANGE THE VGO EVERY 24 HOURS!!!!!     Increase Ozempic to 2 mg weekly      -- Reviewed goals of therapy are to get the best control we can without hypoglycemia.  -- Reviewed patient's current insulin regimen. Clarified proper insulin dose and timing in relation to meals, etc. Insulin injection sites and proper rotation instructed.    -- Advised frequent self blood glucose monitoring.  Patient encouraged to document glucose results and bring them to every clinic visit.  RX for dexcom to [facility] [place]   -- Hypoglycemia precautions discussed. Instructed on precautions before driving.    -- Call for Bg repeatedly < 70 or > 200.   -- Close adherence to lifestyle changes recommended.    -- Periodic follow ups for eye evaluations, foot care and dental care suggested.    patient will benefit from a Dexcom G6 to prevent hypoglycemia due to patient experiences level 2 or hypoglycemic event <54     Patient has diabetes mellitus and manages diabetes with intensive insulin regimen and uses prandial and basal insulin daily-- on the VGO   Patient requires a therapeutic CGM and is willing to use therapeutic CGM for the necessary frequent adjustments of insulin therapy.  I have completed an in-person visit during the previous 6 months and will continue to have in-person visits every 6 months to assess adherence to their CGM regimen and diabetes treatment plan.  Due to COVID pandemic and need for remote monitoring this tool is medically necessary        Type 2 diabetes mellitus with diabetic polyneuropathy, with long-term current use of insulin  Optimize BG readings.   See above.       Educated patient to check feet daily for any foreign objects and/or wounds. Discussed with patient the importance of wearing appropriate footwear at all times, not to walk barefoot ever, and to check shoes before putting them on feet. Instructed patient to keep feet dry by regularly changing shoes and socks and drying feet after baths and exercises. Also, instructed patient to report any new lesions, discolorations, or swelling to a healthcare professional.        Type 2 diabetes mellitus with stage 3a chronic kidney disease, with long-term current use of insulin  Optimize BG readings without insulin stacking or hypoglycemia   See above.   On Farxiga and Lisinopril     Microalbuminuria due to type 2 diabetes mellitus  Optimize BG readings.   See above.   On ACEi and SGLT2       Essential hypertension, benign  BP goal is < 140/90.   Tolerating ACEi  Controlled   Blood pressure goals discussed with patient      Class 1 obesity due to excess calories with serious comorbidity and body mass index (BMI) of 33.0 to 33.9 in  adult  Body mass index is 33.77 kg/m².  Increases insulin resistance.   Discussed DM diet and exercise.       Dyslipidemia, goal LDL below 100  On statin per ADA recommendations  Check lipids with RTC         Spent 25 minutes with patient with > 50% time spent in counseling on medication regimen         Follow up in about 3 months (around 1/25/2023).  Schedule with zoltan for dexcom start -- schedule in 2-3 weeks please   1 week follow up for dexcom download   Follow up with me in 3 months with labs prior       Orders Placed This Encounter   Procedures    CBC Auto Differential     Standing Status:   Future     Standing Expiration Date:   4/25/2024    Comprehensive Metabolic Panel     Standing Status:   Future     Standing Expiration Date:   4/25/2024    TSH     Standing Status:   Future     Standing Expiration Date:   4/25/2024    Lipid Panel     Standing Status:   Future     Standing Expiration Date:   4/25/2024    Hemoglobin A1C     Standing Status:   Future     Standing Expiration Date:   4/25/2024    Microalbumin/Creatinine Ratio, Urine     Standing Status:   Future     Standing Expiration Date:   4/25/2024     Order Specific Question:   Specimen Source     Answer:   Urine    Ambulatory referral/consult to Diabetes Education     Standing Status:   Future     Standing Expiration Date:   4/25/2024     Referral Priority:   Routine     Referral Type:   Consultation     Referral Reason:   Specialty Services Required     Referred to Provider:   Zoltan tSein RD, CDE     Requested Specialty:   Diabetes     Number of Visits Requested:   1     Expiration Date:   10/25/2023         Recommendations were discussed with the patient in detail  The patient verbalized understanding and agrees with the plan outlined as above.

## 2022-10-31 ENCOUNTER — TELEPHONE (OUTPATIENT)
Dept: DIABETES | Facility: CLINIC | Age: 62
End: 2022-10-31
Payer: COMMERCIAL

## 2022-11-01 ENCOUNTER — NUTRITION (OUTPATIENT)
Dept: DIABETES | Facility: CLINIC | Age: 62
End: 2022-11-01
Payer: COMMERCIAL

## 2022-11-01 DIAGNOSIS — Z79.4 TYPE 2 DIABETES MELLITUS WITH DIABETIC POLYNEUROPATHY, WITH LONG-TERM CURRENT USE OF INSULIN: ICD-10-CM

## 2022-11-01 DIAGNOSIS — E11.22 TYPE 2 DIABETES MELLITUS WITH STAGE 3A CHRONIC KIDNEY DISEASE, WITH LONG-TERM CURRENT USE OF INSULIN: ICD-10-CM

## 2022-11-01 DIAGNOSIS — Z79.4 TYPE 2 DIABETES MELLITUS WITH STAGE 3A CHRONIC KIDNEY DISEASE, WITH LONG-TERM CURRENT USE OF INSULIN: ICD-10-CM

## 2022-11-01 DIAGNOSIS — N18.31 TYPE 2 DIABETES MELLITUS WITH STAGE 3A CHRONIC KIDNEY DISEASE, WITH LONG-TERM CURRENT USE OF INSULIN: ICD-10-CM

## 2022-11-01 DIAGNOSIS — E11.42 TYPE 2 DIABETES MELLITUS WITH DIABETIC POLYNEUROPATHY, WITH LONG-TERM CURRENT USE OF INSULIN: ICD-10-CM

## 2022-11-01 DIAGNOSIS — Z79.4 TYPE 2 DIABETES MELLITUS WITH HYPERGLYCEMIA, WITH LONG-TERM CURRENT USE OF INSULIN: Primary | ICD-10-CM

## 2022-11-01 DIAGNOSIS — E11.65 TYPE 2 DIABETES MELLITUS WITH HYPERGLYCEMIA, WITH LONG-TERM CURRENT USE OF INSULIN: Primary | ICD-10-CM

## 2022-11-01 PROCEDURE — G0108 PR DIAB MANAGE TRN  PER INDIV: ICD-10-PCS | Mod: S$GLB,,, | Performed by: DIETITIAN, REGISTERED

## 2022-11-01 PROCEDURE — G0108 DIAB MANAGE TRN  PER INDIV: HCPCS | Mod: S$GLB,,, | Performed by: DIETITIAN, REGISTERED

## 2022-11-08 NOTE — PROGRESS NOTES
Diabetes Care Specialist Follow-up Note  Author: Marla Stein RD, CDE  Date: 11/8/2022    Program Intake  Reason for Diabetes Program Visit:: Intervention  Type of Intervention:: Individual  Individual: Device Training, Education  Education: Pattern Management, Self-Management Skill Review, Nutrition and Meal Planning  Device Training: Other (glucometer)  Current diabetes risk level:: high  In the last 12 months, have you:: none  Permission to speak with others about care:: no    Lab Results   Component Value Date    HGBA1C 7.9 (H) 10/18/2022     A1c Pre Diabetes Care Specialist Intervention:  >14%    Clinical    Patient Health Rating  Compared to other people your age, how would you rate your health?: Good    Problem Review  Reviewed Problem List with Patient: yes  Active comorbidities affecting diabetes self-care.: yes  Comorbidities: Cardiovascular Disease, Hypertension, Neuropathy  Reviewed health maintenance: yes    Clinical Assessment  Current Diabetes Treatment: Oral Medication, Insulin  Have you ever experienced hypoglycemia (low blood sugar)?: no  Have you ever experienced hyperglycemia (high blood sugar)?: no    Medication Information  How do you obtain your medications?: Patient drives, Family picks up  How many days a week do you miss your medications?: Never  Do you use a pill box or medication chart to help you manage your medications?: Pill box  Do you sometimes have difficulty refilling your medications?: Yes (see comment)  Medication adherence impacting ability to self-manage diabetes?: Yes    Labs  Do you have regular lab work to monitor your medications?: Yes  Type of Regular Lab Work: A1c, Cholesterol, Microalbumin, CBC, BMP  Where do you get your labs drawn?: [facility]  Lab Compliance Barriers: No    Nutritional Status  Diet: Regular  Meal Plan 24 Hour Recall: Breakfast, Lunch, Dinner, Snack  Meal Plan 24 Hour Recall - Breakfast: Toast and Eggs, coffee with sugar and cream  Meal Plan 24 Hour  Recall - Lunch: chicken noodle soup with crackers  Meal Plan 24 Hour Recall - Dinner: [employer] pizza  Meal Plan 24 Hour Recall - Snack: peanut butter crackers, almonds, cheerios, drinks water or sparkling water body armour lyte  Change in appetite?: No  Dentation:: Intact  Recent Changes in Weight: No Recent Weight Change  Current nutritional status an area of need that is impacting patient's ability to self-manage diabetes?: Yes    Additional Social History    Support  Does anyone support you with your diabetes care?: yes  Who supports you?: self, spouse, family member  Who takes you to your medical appointments?: self  Does the current support meet the patient's needs?: Yes  Is Support an area impacting ability to self-manage diabetes?: No    Access to Mass Media & Technology  Does the patient have access to any of the following devices or technologies?: Smart phone  Media or technology needs impacting ability to self-manage diabetes?: No    Cognitive/Behavioral Health  Alert and Oriented: Yes  Difficulty Thinking: No  Requires Prompting: No  Requires assistance for routine expression?: No  Cognitive or behavioral barriers impacting ability to self-manage diabetes?: No    Culture/Rastafari  Culture or Confucianist beliefs that may impact ability to access healthcare: No    Communication  Language preference: English  Hearing Problems: No  Vision Problems: Yes  Vision problem type:: Decreased Vision  Vision Assistance: Glasses  Communication needs impacting ability to self-manage diabetes?: No    Health Literacy  Preferred Learning Method: Face to Face, Demonstration, Reading Materials  How often do you need to have someone help you read instructions, pamphlets, or written material from your doctor or pharmacy?: Sometimes  Health literacy needs impacting ability to self-manage diabetes?: No      Diabetes Self-Management Skills Assessment     Diabetes Disease Process/Treatment Options  Patient/caregiver able to state  what happens when someone has diabetes.: yes  Patient/caregiver knows what type of diabetes they have.: yes  Diabetes Type : Type II  Patient/caregiver able to identify at least three signs and symptoms of diabetes.: yes  Identified signs and symptoms:: frequent urination, increased thirst, fatigue  Patient able to identify at least three risk factors for diabetes.: yes  Identified risk factors:: being overweight, family history  Diabetes Disease Process/Treatment Options: Skills Assessment Completed: No  Assessment indicates:: Adequate understanding  Deferred due to:: Other (comment) (previously completed, there has been no change and patient has adequate understanding)  Area of need?: No    Nutrition/Healthy Eating  Challenges to healthy eating:: portion control, snacking between meals and at night, eating when feeling depressed/stressed, lack of will power, eating out, going to parties  Method of carbohydrate measurement:: no method  Patient can identify foods that impact blood sugar.: yes  Patient-identified foods:: starches (bread, pasta, rice, cereal), sweets, soda, fruit/fruit juice, yogurt, starchy vegetables (corn, peas, beans)  Nutrition/Healthy Eating Skills Assessment Completed:: Yes  Assessment indicates:: Adequate understanding  Area of need?: No    Physical Activity/Exercise  Patient's daily activity level:: lightly active  Patient formally exercises outside of work.: no  Reasons for not exercising:: work schedule  Patient can identify forms of physical activity.: yes  Stated forms of physical activity:: moving to burn calories  Patient can identify reasons why exercise/physical activity is important in diabetes management.: no  Physical Activity/Exercise Skills Assessment Completed: : No  Assessment indicates:: Adequate understanding  Deffered due to:: Other (comment) (previously completed, there has been no change and patient has adequate understanding)  Area of need?: No    Medications  Patient is  able to describe current diabetes management routine.: yes  Diabetes management routine:: insulin, oral medications  Patient is able to identify current diabetes medications, dosages, and appropriate timing of medications.: yes  Patient understands the purpose of the medications taken for diabetes.: yes  Patient reports problems or concerns with current medication regimen.: no  Medication regimen problems/concerns::  (patient's Vgo is empty at time of visit, and when asked to demonstrate how to fill it patient couldn't)  Medication Skills Assessment Completed:: Yes  Assessment indicates:: Adequate understanding  Area of need?: No    Home Blood Glucose Monitoring  Patient states that blood sugar is checked at home daily.: yes  Monitoring Method:: home glucometer  Reasons for not monitoring:: meter broken  Home glucometer meter type:: accu-chek guide  How often do you check your blood sugar?: Occasionally  When do you check your blood sugar?: Before breakfast, Before dinner, Other  Blood glucose logs:: yes (patient remembered to bring glucometer to visit, I transcribed data from glucometer to logs for provider visit and scanned into media)  Blood glucose logs reviewed today?: no  Home Blood Glucose Monitoring Skills Assessment Completed: : Yes  Assessment indicates:: Instruction Needed  Area of need?: Yes    Acute Complications  Patient is able to identify types of acute complications: Yes  Patient Identified:: Hypoglycemia, Hyperglycemia  Patient is able to state the basic meaning of hypoglycemia?: Yes  Able to state the blood sugar range for hypoglycemia?: yes  Patient can identify general symptoms of hypoglycemia: yes  Patient identified:: other (see comments) (feels chilled)  Able to state proper treatment of hypoglycemia?: yes  Patient identified:: other (see comments) (eat something like a sandwich - instructed on proper treatment options)  Patient is able to state the basic meaning of hyperglycemia?: Yes  Able  to state the blood sugar range for hyperglycemia?: yes (feels dizzy with hyperglycemia)  Patient able to state proper treatment of hyperglycemia?: yes  Patient identified:: take medication as recommended, monitor blood sugar  Patient able to verbalize sick day plan?: no  Acute Complications Skills Assessment Completed: : Yes  Assessment indicates:: Knowledge deficit, Instruction Needed  Area of need?: Yes    Chronic Complications  Patient can identify major chronic complications of diabetes.: yes  Stated chronic complications:: kidney disease, neuropathy/nerve damage, retinopathy, heart disease/heart attack, stroke  Patient can identify ways to prevent or delay diabetes complications.: yes  Stated ways to prevent complications:: controlling blood sugar  Patient is aware that having diabetes increases risk of heart disease?: Yes  Patient is aware that heart disease is the leading cause of death and disability in people with diabetes?: Yes  Patient able to state risk factors for heart disease?: Yes  Patient stated risk factors for heart disease:: High blood pressure, High cholesterol, Having diabetes  Patient is taking statin?: Yes  Chronic Complications Skills Assessment Completed: : No  Assessment indicates:: Adequate understanding  Deferred due to:: Other (comment) (previously completed, there has been no change and patient has adequate understanding)  Area of need?: No    Psychosocial/Coping  Patient can identify ways of coping with chronic disease.: yes  Patient-stated ways of coping with chronic disease:: spiritual/Episcopalian practices, support from loved ones  Psychosocial/Coping Skills Assessment Completed: : No  Assessment indicates:: Adequate understanding  Deffered due to:: Other (comment) (previously completed, there has been no change and patient has adequate understanding)  Area of need?: No      During today's follow-up visit,  the following areas required further assessment and content was  provided/reviewed.    Based on today's diabetes care assessment, the following areas of need were identified:      Social 11/1/2022   Support No   Access to Mass Media/Tech No   Cognitive/Behavioral Health No   Culture/Gnosticism No   Communication No   Health Literacy No        Clinical 11/1/2022   Medication Adherence Yes   Lab Compliance No   Nutritional Status Yes        Diabetes Self-Management Skills 11/1/2022   Diabetes Disease Process/Treatment Options No   Nutrition/Healthy Eating No   Physical Activity/Exercise No   Medication No   Home Blood Glucose Monitoring Yes   Acute Complications Yes   Chronic Complications No   Psychosocial/Coping No        Today's interventions were provided through individual discussion, instruction, and written materials were provided.    Patient verbalized understanding of instruction and written materials.  Pt was able to return back demonstration of instructions today. Patient understood key points, needs reinforcement and further instruction.     Diabetes Self-Management Care Plan Review:  Diabetes Self-Management Care Plan Review and Evaluation of Progress:    During today's follow-up Agustin Diabetes Self-Management Care Plan progress was reviewed and progress was evaluated including his/her input. Aishwarya has agreed to continue his/her journey to improve/maintain overall diabetes control by continuing to set health goals. See care plan progress below.      Care Plan: Diabetes Management   Updates made since 10/9/2022 12:00 AM        Problem: Healthy Eating         Goal: Eat 3 meals daily with 30-45g/2-3 servings of Carbohydrate per meal.    Start Date: 1/5/2022   Expected End Date: 4/15/2022   This Visit's Progress: On track   Recent Progress: On track   Priority: Medium   Barriers: Lack of Motivation to Change   Note:    Patient states that she is taking 5 clicks with meals and changing vgo daily  Reviewed carb counting, portion control, importance of spacing meals  throughout the day to prevent post prandial elevations.  Recommended low saturated fat, low sodium diet to aid in control of hypertension and cholesterol. Reviewed plate method and portion control, dining out tips, meal planning, reading a food label, healthy snack options, benefits of physical activity         Problem: Medications Resolved 11/1/2022        Goal: Patient Agrees to take Diabetes Medication(s) insulin via Vgo30 as prescribed. Completed 11/1/2022   Start Date: 1/5/2022   Expected End Date: 4/15/2022   This Visit's Progress: On track   Recent Progress: Not met   Priority: High   Barriers: Financial   Note:    Reviewed Vgo Disposable Insulin Pump Education  Discussed how to ensure all bubbles are out of vgo prior to putting on - at time of visit patient had a huge bubble in Vgo that is on her.  Patient provided with written literature and CDE contact information          Problem: Blood Glucose Self-Monitoring         Goal: Patient agrees to check and record blood sugars 3 times per day.    Start Date: 11/1/2022   Expected End Date: 11/1/2023   This Visit's Progress: Not met   Priority: High   Barriers: Lack of Supplies   Note:    Patient was instructed how to use a meter. Discussed signs and symptoms, and causes and treatment of hypoglycemia. Covered blood sugar and A1C goals. Instructed patient to use provided logbooks to record blood sugars. Instructed patient to follow-up with their physician. Patient verbalized understanding of all instructions.  Prescription for meter and testing supplies pended to PCP         Task: Reviewed the importance of self-monitoring blood glucose and keeping logs. Completed 11/8/2022        Task: Instructed on how to self-monitor blood glucose using a home glucometer, how to properly dispose of used strips and lancets after use, and how to appropriately store meter and supplies. Completed 11/8/2022        Task: Provided patient with blood glucose logs, reviewed appropriate  timing and frequency to SMBG, education on parameters on when to notify provider and advised patient to bring logs to all appts with PCP/Endocrinologist/Diabetes Care Specialist. Completed 11/8/2022        Task: Discussed ways to minimize pain when monitoring blood glucose. Completed 11/8/2022          Follow Up Plan     Follow up in about 4 weeks (around 11/29/2022) for Blood Sugar Log Review and F/U MNT, General Follow-up.    Today's care plan and follow up schedule was discussed with patient.  Aishwarya verbalized understanding of the care plan, goals, and agrees to follow up plan.        The patient was encouraged to communicate with his/her health care provider/physician and care team regarding his/her condition(s) and treatment.  I provided the patient with my contact information today and encouraged to contact me via phone or [facility]'s Patient Portal as needed.

## 2022-11-14 ENCOUNTER — TELEPHONE (OUTPATIENT)
Dept: DIABETES | Facility: CLINIC | Age: 62
End: 2022-11-14
Payer: COMMERCIAL

## 2022-12-06 ENCOUNTER — TELEPHONE (OUTPATIENT)
Dept: PRIMARY CARE CLINIC | Facility: CLINIC | Age: 62
End: 2022-12-06
Payer: COMMERCIAL

## 2022-12-06 NOTE — TELEPHONE ENCOUNTER
----- Message from Freeman Bernard sent at 12/6/2022  9:55 AM CST -----  Contact: Pt [phone]  She said the ins co. Need you to call them regarding the sub-q insulin device, 30 unit (V-GO 30) Katelyn at [phone]    Thank you

## 2022-12-08 ENCOUNTER — TELEPHONE (OUTPATIENT)
Dept: PRIMARY CARE CLINIC | Facility: CLINIC | Age: 62
End: 2022-12-08
Payer: COMMERCIAL

## 2022-12-08 DIAGNOSIS — Z23 NEED FOR VACCINATION: Primary | ICD-10-CM

## 2022-12-08 NOTE — TELEPHONE ENCOUNTER
I called and spoke to the patient and notified her last flu shot was 9/29/21. Pt scheduled a nurse visit for 12/15/22 for flu shot.

## 2022-12-08 NOTE — TELEPHONE ENCOUNTER
----- Message from Patricia Maya sent at 12/8/2022 10:36 AM CST -----  Contact: NICOLETTE PRADO [[id number]]@ [phone]  Patient would like to know if she received her Flu shot this year or not?

## 2022-12-15 ENCOUNTER — CLINICAL SUPPORT (OUTPATIENT)
Dept: PRIMARY CARE CLINIC | Facility: CLINIC | Age: 62
End: 2022-12-15
Payer: COMMERCIAL

## 2022-12-15 ENCOUNTER — NUTRITION (OUTPATIENT)
Dept: DIABETES | Facility: CLINIC | Age: 62
End: 2022-12-15
Payer: COMMERCIAL

## 2022-12-15 DIAGNOSIS — E11.65 TYPE 2 DIABETES MELLITUS WITH HYPERGLYCEMIA, WITH LONG-TERM CURRENT USE OF INSULIN: ICD-10-CM

## 2022-12-15 DIAGNOSIS — Z79.4 TYPE 2 DIABETES MELLITUS WITH HYPERGLYCEMIA, WITH LONG-TERM CURRENT USE OF INSULIN: ICD-10-CM

## 2022-12-15 DIAGNOSIS — Z23 NEED FOR VACCINATION: ICD-10-CM

## 2022-12-15 PROCEDURE — 90471 IMMUNIZATION ADMIN: CPT | Mod: S$GLB,,, | Performed by: FAMILY MEDICINE

## 2022-12-15 PROCEDURE — 99999 PR PBB SHADOW E&M-EST. PATIENT-LVL I: ICD-10-PCS | Mod: PBBFAC,,, | Performed by: DIETITIAN, REGISTERED

## 2022-12-15 PROCEDURE — 99999 PR PBB SHADOW E&M-EST. PATIENT-LVL I: CPT | Mod: PBBFAC,,, | Performed by: DIETITIAN, REGISTERED

## 2022-12-15 PROCEDURE — 90686 IIV4 VACC NO PRSV 0.5 ML IM: CPT | Mod: S$GLB,,, | Performed by: FAMILY MEDICINE

## 2022-12-15 PROCEDURE — 90686 FLU VACCINE (QUAD) GREATER THAN OR EQUAL TO 3YO PRESERVATIVE FREE IM: ICD-10-PCS | Mod: S$GLB,,, | Performed by: FAMILY MEDICINE

## 2022-12-15 PROCEDURE — 90471 FLU VACCINE (QUAD) GREATER THAN OR EQUAL TO 3YO PRESERVATIVE FREE IM: ICD-10-PCS | Mod: S$GLB,,, | Performed by: FAMILY MEDICINE

## 2022-12-15 NOTE — PROGRESS NOTES
Patient was identified by name and . NKA. Administered Influenza vaccine IM using aseptic technique

## 2022-12-22 NOTE — PROGRESS NOTES
Diabetes Care Specialist Progress Note  Author: Marla Stein RD, CDE  Date: 12/22/2022    Program Intake  Reason for Diabetes Program Visit:: Intervention  Type of Intervention:: Individual  Individual: Device Training, Education  Education: Pattern Management, Self-Management Skill Review, Nutrition and Meal Planning  Current diabetes risk level:: high  In the last 12 months, have you:: none  Permission to speak with others about care:: no    Lab Results   Component Value Date    HGBA1C 7.9 (H) 10/18/2022       Clinical    Patient Health Rating  Compared to other people your age, how would you rate your health?: Good    Problem Review  Reviewed Problem List with Patient: yes  Active comorbidities affecting diabetes self-care.: yes  Comorbidities: Cardiovascular Disease, Hypertension, Neuropathy  Reviewed health maintenance: yes    Clinical Assessment  Current Diabetes Treatment: Oral Medication, Insulin  Have you ever experienced hypoglycemia (low blood sugar)?: no  Have you ever experienced hyperglycemia (high blood sugar)?: no    Medication Information  How do you obtain your medications?: Patient drives, Family picks up  How many days a week do you miss your medications?: Never  Do you use a pill box or medication chart to help you manage your medications?: Pill box  Do you sometimes have difficulty refilling your medications?: Yes (see comment)  Medication adherence impacting ability to self-manage diabetes?: Yes    Labs  Do you have regular lab work to monitor your medications?: Yes  Type of Regular Lab Work: A1c, Cholesterol, Microalbumin, CBC, BMP  Where do you get your labs drawn?: [facility]  Lab Compliance Barriers: No    Nutritional Status  Diet: Regular  Meal Plan 24 Hour Recall: Breakfast, Lunch, Dinner, Snack  Meal Plan 24 Hour Recall - Breakfast: Toast and Eggs, coffee with sugar and cream  Meal Plan 24 Hour Recall - Lunch: chicken noodle soup with crackers  Meal Plan 24 Hour Recall - Dinner: Papa  [employer]ey pizza  Meal Plan 24 Hour Recall - Snack: peanut butter crackers, almonds, cheerios, drinks water or sparkling water body armour lyte  Change in appetite?: No  Dentation:: Intact  Recent Changes in Weight: No Recent Weight Change  Current nutritional status an area of need that is impacting patient's ability to self-manage diabetes?: Yes    Additional Social History    Support  Does anyone support you with your diabetes care?: yes  Who supports you?: self, spouse, family member  Who takes you to your medical appointments?: self  Does the current support meet the patient's needs?: Yes  Is Support an area impacting ability to self-manage diabetes?: No    Access to Mass Media & Technology  Does the patient have access to any of the following devices or technologies?: Smart phone  Media or technology needs impacting ability to self-manage diabetes?: No    Cognitive/Behavioral Health  Alert and Oriented: Yes  Difficulty Thinking: No  Requires Prompting: No  Requires assistance for routine expression?: No  Cognitive or behavioral barriers impacting ability to self-manage diabetes?: No    Culture/Ad[employer]ist  Culture or Hoahaoism beliefs that may impact ability to access healthcare: No    Communication  Language preference: English  Hearing Problems: No  Vision Problems: Yes  Vision problem type:: Decreased Vision  Vision Assistance: Glasses  Communication needs impacting ability to self-manage diabetes?: No    Health Literacy  Preferred Learning Method: Face to Face, Demonstration, Reading Materials  How often do you need to have someone help you read instructions, pamphlets, or written material from your doctor or pharmacy?: Sometimes  Health literacy needs impacting ability to self-manage diabetes?: No      Diabetes Self-Management Skills Assessment    Diabetes Disease Process/Treatment Options  Patient/caregiver able to state what happens when someone has diabetes.: yes  Patient/caregiver knows what type of diabetes  they have.: yes  Diabetes Type : Type II  Patient/caregiver able to identify at least three signs and symptoms of diabetes.: yes  Identified signs and symptoms:: frequent urination, increased thirst, fatigue  Patient able to identify at least three risk factors for diabetes.: yes  Identified risk factors:: being overweight, family history  Diabetes Disease Process/Treatment Options: Skills Assessment Completed: No  Assessment indicates:: Adequate understanding  Deferred due to:: Other (comment) (previously completed, there has been no change and patient has adequate understanding)  Area of need?: No    Nutrition/Healthy Eating  Challenges to healthy eating:: portion control, snacking between meals and at night, eating when feeling depressed/stressed, lack of will power, eating out, going to parties  Method of carbohydrate measurement:: no method  Patient can identify foods that impact blood sugar.: yes  Patient-identified foods:: starches (bread, pasta, rice, cereal), sweets, soda, fruit/fruit juice, yogurt, starchy vegetables (corn, peas, beans), milk  Nutrition/Healthy Eating Skills Assessment Completed:: Yes  Assessment indicates:: Adequate understanding  Area of need?: No    Physical Activity/Exercise  Patient's daily activity level:: lightly active  Patient formally exercises outside of work.: no  Reasons for not exercising:: work schedule  Patient can identify forms of physical activity.: yes  Stated forms of physical activity:: moving to burn calories  Patient can identify reasons why exercise/physical activity is important in diabetes management.: no  Physical Activity/Exercise Skills Assessment Completed: : No  Assessment indicates:: Adequate understanding  Deffered due to:: Other (comment) (previously completed, there has been no change and patient has adequate understanding)  Area of need?: No    Medications  Patient is able to describe current diabetes management routine.: yes  Diabetes management  routine:: insulin, oral medications  Patient is able to identify current diabetes medications, dosages, and appropriate timing of medications.: yes  Patient understands the purpose of the medications taken for diabetes.: yes  Patient reports problems or concerns with current medication regimen.: no  Medication regimen problems/concerns::  (patient's Vgo is empty at time of visit, and when asked to demonstrate how to fill it patient couldn't)  Medication Skills Assessment Completed:: Yes  Assessment indicates:: Adequate understanding  Area of need?: No    Home Blood Glucose Monitoring  Patient states that blood sugar is checked at home daily.: yes  Monitoring Method:: home glucometer (patient was supposed to start the dexcom but has not picked up from the pharmacy - dexcom is free of charge)  Home glucometer meter type:: accu-chek guide  How often do you check your blood sugar?: Occasionally  When do you check your blood sugar?: Before breakfast, Before dinner, Other  Blood glucose logs:: yes (patient remembered to bring glucometer to visit, I transcribed data from glucometer to logs for provider visit and scanned into media)  Blood glucose logs reviewed today?: no  Home Blood Glucose Monitoring Skills Assessment Completed: : Yes  Assessment indicates:: Instruction Needed  Area of need?: Yes    Acute Complications  Patient is able to identify types of acute complications: Yes  Patient Identified:: Hypoglycemia, Hyperglycemia  Patient is able to state the basic meaning of hypoglycemia?: Yes  Able to state the blood sugar range for hypoglycemia?: yes  Patient can identify general symptoms of hypoglycemia: yes  Patient identified:: other (see comments) (feels chilled)  Able to state proper treatment of hypoglycemia?: yes  Patient identified:: other (see comments) (eat something like a sandwich - instructed on proper treatment options)  Patient is able to state the basic meaning of hyperglycemia?: Yes  Able to state the  blood sugar range for hyperglycemia?: yes (feels dizzy with hyperglycemia)  Patient able to state proper treatment of hyperglycemia?: yes  Patient identified:: take medication as recommended, monitor blood sugar  Patient able to verbalize sick day plan?: yes  Patient-stated sick day plan:: drink more fluids, check blood sugar every 2-3 hours  Acute Complications Skills Assessment Completed: : Yes  Assessment indicates:: Adequate understanding  Area of need?: No    Chronic Complications  Patient can identify major chronic complications of diabetes.: yes  Stated chronic complications:: kidney disease, neuropathy/nerve damage, retinopathy, heart disease/heart attack, stroke  Patient can identify ways to prevent or delay diabetes complications.: yes  Stated ways to prevent complications:: controlling blood sugar  Patient is aware that having diabetes increases risk of heart disease?: Yes  Patient is aware that heart disease is the leading cause of death and disability in people with diabetes?: Yes  Patient able to state risk factors for heart disease?: Yes  Patient stated risk factors for heart disease:: High blood pressure, High cholesterol, Having diabetes  Patient is taking statin?: Yes  Chronic Complications Skills Assessment Completed: : No  Assessment indicates:: Adequate understanding  Deferred due to:: Other (comment) (previously completed, there has been no change and patient has adequate understanding)  Area of need?: No    Psychosocial/Coping  Patient can identify ways of coping with chronic disease.: yes  Patient-stated ways of coping with chronic disease:: spiritual/Taoist practices, support from loved ones  Psychosocial/Coping Skills Assessment Completed: : No  Assessment indicates:: Adequate understanding  Deffered due to:: Other (comment) (previously completed, there has been no change and patient has adequate understanding)  Area of need?: No      Diabetes Self Support Plan    Diabetes  Self-Management Support Plan  Stress management:: family  Medication:: Medication Assistance  Review status:: Patient has selected and agrees to support plan., Patient was provided Diabetes Self-Management Support Plan document that includes support options.    Assessment Summary and Plan    Based on today's diabetes care assessment, the following areas of need were identified:      Social 12/15/2022   Support No   Access to Mass Media/Tech No   Cognitive/Behavioral Health No   Culture/[employer] No   Communication No   Health Literacy No        Clinical 12/15/2022   Medication Adherence Yes   Lab Compliance No   Nutritional Status Yes        Diabetes Self-Management Skills 12/15/2022   Diabetes Disease Process/Treatment Options No   Nutrition/Healthy Eating No   Physical Activity/Exercise No   Medication No   Home Blood Glucose Monitoring Yes   Acute Complications No   Chronic Complications No   Psychosocial/Coping No          Today's interventions were provided through individual discussion, instruction, and written materials were provided.      Patient verbalized understanding of instruction and written materials.  Pt was able to return back demonstration of instructions today. Patient understood key points, needs reinforcement and further instruction.     Diabetes Self-Management Care Plan:    Today's Diabetes Self-Management Care Plan was developed with Aishwarya's input. Aishwarya has agreed to work toward the following goal(s) to improve his/her overall diabetes control.      Care Plan: Diabetes Management   Updates made since 11/22/2022 12:00 AM        Problem: Healthy Eating         Goal: Eat 3 meals daily with 30-45g/2-3 servings of Carbohydrate per meal.    Start Date: 1/5/2022   Expected End Date: 4/15/2022   This Visit's Progress: On track   Recent Progress: On track   Priority: Medium   Barriers: Lack of Motivation to Change   Note:    Patient states that she is taking 5 clicks with meals and changing vgo  daily  Reviewed carb counting, portion control, importance of spacing meals throughout the day to prevent post prandial elevations.  Recommended low saturated fat, low sodium diet to aid in control of hypertension and cholesterol. Reviewed plate method and portion control, dining out tips, meal planning, reading a food label, healthy snack options, benefits of physical activity         Problem: Blood Glucose Self-Monitoring         Goal: Patient agrees to check and record blood sugars 3 times per day.    Start Date: 11/1/2022   Expected End Date: 11/1/2023   This Visit's Progress: Not met   Recent Progress: Not met   Priority: High   Barriers: Lack of Supplies   Note:    Patient was instructed how to use a meter. Discussed signs and symptoms, and causes and treatment of hypoglycemia. Covered blood sugar and A1C goals. Instructed patient to use provided logbooks to record blood sugars. Instructed patient to follow-up with their physician. Patient verbalized understanding of all instructions.  Prescription for meter and testing supplies pended to PCP           Follow Up Plan     Follow up in about 4 weeks (around 1/12/2023) for Personal CGM Start.    Today's care plan and follow up schedule was discussed with patient.  Aishwarya verbalized understanding of the care plan, goals, and agrees to follow up plan.        The patient was encouraged to communicate with his/her health care provider/physician and care team regarding his/her condition(s) and treatment.  I provided the patient with my contact information today and encouraged to contact me via phone or [facility] Patient Portal as needed.     Length of Visit   Total Time: 30 Minutes

## 2022-12-27 ENCOUNTER — PATIENT OUTREACH (OUTPATIENT)
Dept: ADMINISTRATIVE | Facility: OTHER | Age: 62
End: 2022-12-27
Payer: MEDICARE

## 2022-12-27 NOTE — PROGRESS NOTES
CHW - Initial Contact    This Community Health Worker completed OR updated the Social Determinant of Health questionnaire with patient via telephone today.    Pt identified barriers of most importance are: transportation, food and utilities assistance   Referrals to community agencies completed with patient/caregiver consent outside of [facility] include: no chw is going to mail out some information to the patient  Referrals were put through [facility] - yes.... chw put in a referral for [facility] premium support   Support and Services: yes   Other information discussed the patient needs / wants help with: no   Follow up required:   Initial Outreach - Due: 1/12/2023

## 2023-01-04 ENCOUNTER — TELEPHONE (OUTPATIENT)
Dept: DIABETES | Facility: CLINIC | Age: 63
End: 2023-01-04
Payer: MEDICARE

## 2023-01-04 NOTE — TELEPHONE ENCOUNTER
----- Message from Marla Stein RD, LEWIS sent at 1/4/2023  1:55 PM CST -----  Contact: Patient, [phone]  Please call to schedule patient an appointment with me.  ----- Message -----  From: Savita Moiz  Sent: 1/4/2023   1:43 PM CST  To: Marla Stein RD, LEWIS    Calling because she received the Dexcom. Please advise. Thanks.

## 2023-01-06 ENCOUNTER — NUTRITION (OUTPATIENT)
Dept: DIABETES | Facility: CLINIC | Age: 63
End: 2023-01-06
Payer: COMMERCIAL

## 2023-01-06 DIAGNOSIS — Z79.4 TYPE 2 DIABETES MELLITUS WITH STAGE 3A CHRONIC KIDNEY DISEASE, WITH LONG-TERM CURRENT USE OF INSULIN: ICD-10-CM

## 2023-01-06 DIAGNOSIS — Z79.4 TYPE 2 DIABETES MELLITUS WITH DIABETIC POLYNEUROPATHY, WITH LONG-TERM CURRENT USE OF INSULIN: ICD-10-CM

## 2023-01-06 DIAGNOSIS — Z79.4 TYPE 2 DIABETES MELLITUS WITH HYPERGLYCEMIA, WITH LONG-TERM CURRENT USE OF INSULIN: Primary | ICD-10-CM

## 2023-01-06 DIAGNOSIS — N18.31 TYPE 2 DIABETES MELLITUS WITH STAGE 3A CHRONIC KIDNEY DISEASE, WITH LONG-TERM CURRENT USE OF INSULIN: ICD-10-CM

## 2023-01-06 DIAGNOSIS — E11.42 TYPE 2 DIABETES MELLITUS WITH DIABETIC POLYNEUROPATHY, WITH LONG-TERM CURRENT USE OF INSULIN: ICD-10-CM

## 2023-01-06 DIAGNOSIS — E11.22 TYPE 2 DIABETES MELLITUS WITH STAGE 3A CHRONIC KIDNEY DISEASE, WITH LONG-TERM CURRENT USE OF INSULIN: ICD-10-CM

## 2023-01-06 DIAGNOSIS — E11.65 TYPE 2 DIABETES MELLITUS WITH HYPERGLYCEMIA, WITH LONG-TERM CURRENT USE OF INSULIN: Primary | ICD-10-CM

## 2023-01-06 PROCEDURE — G0108 PR DIAB MANAGE TRN  PER INDIV: ICD-10-PCS | Mod: S$GLB,,, | Performed by: DIETITIAN, REGISTERED

## 2023-01-06 PROCEDURE — G0108 DIAB MANAGE TRN  PER INDIV: HCPCS | Mod: S$GLB,,, | Performed by: DIETITIAN, REGISTERED

## 2023-01-06 NOTE — PROGRESS NOTES
Diabetes Care Specialist Follow-up Note  Author: Marla Stein RD, CDE  Date: 1/6/2023    Program Intake  Reason for Diabetes Program Visit:: Intervention  Type of Intervention:: Individual  Individual: Device Training  Device Training: Personal CGM  Current diabetes risk level:: high  In the last 12 months, have you:: none  Permission to speak with others about care:: no    Lab Results   Component Value Date    HGBA1C 7.9 (H) 10/18/2022     A1c Pre Diabetes Care Specialist Intervention:  >14.0%    Clinical    Patient Health Rating  Compared to other people your age, how would you rate your health?: Good    Problem Review  Reviewed Problem List with Patient: yes  Active comorbidities affecting diabetes self-care.: yes  Comorbidities: Cardiovascular Disease, Hypertension, Neuropathy  Reviewed health maintenance: yes    Clinical Assessment  Current Diabetes Treatment: Oral Medication, Insulin  Have you ever experienced hypoglycemia (low blood sugar)?: no  Have you ever experienced hyperglycemia (high blood sugar)?: no    Medication Information  How do you obtain your medications?: Patient drives, Family picks up  How many days a week do you miss your medications?: Never  Do you use a pill box or medication chart to help you manage your medications?: Pill box  Do you sometimes have difficulty refilling your medications?: Yes (see comment)  Medication adherence impacting ability to self-manage diabetes?: Yes    Labs  Do you have regular lab work to monitor your medications?: Yes  Type of Regular Lab Work: A1c, Cholesterol, Microalbumin, CBC, BMP  Where do you get your labs drawn?: [facility]  Lab Compliance Barriers: No    Nutritional Status  Diet: Regular  Meal Plan 24 Hour Recall: Breakfast, Lunch, Dinner, Snack  Meal Plan 24 Hour Recall - Breakfast: Toast and Eggs, coffee with sugar and cream  Meal Plan 24 Hour Recall - Lunch: chicken noodle soup with crackers  Meal Plan 24 Hour Recall - Dinner: [employer] pizza  Meal  Plan 24 Hour Recall - Snack: peanut butter crackers, almonds, cheerios, drinks water or sparkling water body armour lyte  Change in appetite?: No  Dentation:: Intact  Recent Changes in Weight: No Recent Weight Change  Current nutritional status an area of need that is impacting patient's ability to self-manage diabetes?: Yes    Additional Social History    Support  Does anyone support you with your diabetes care?: yes  Who supports you?: self, spouse, family member  Who takes you to your medical appointments?: self  Does the current support meet the patient's needs?: Yes  Is Support an area impacting ability to self-manage diabetes?: No    Access to Mass Media & Technology  Does the patient have access to any of the following devices or technologies?: Smart phone  Media or technology needs impacting ability to self-manage diabetes?: No    Cognitive/Behavioral Health  Alert and Oriented: Yes  Difficulty Thinking: No  Requires Prompting: No  Requires assistance for routine expression?: No  Cognitive or behavioral barriers impacting ability to self-manage diabetes?: No    Culture/Jainism  Culture or Amish beliefs that may impact ability to access healthcare: No    Communication  Language preference: English  Hearing Problems: No  Vision Problems: Yes  Vision problem type:: Decreased Vision  Vision Assistance: Glasses  Communication needs impacting ability to self-manage diabetes?: No    Health Literacy  Preferred Learning Method: Face to Face, Demonstration, Reading Materials  How often do you need to have someone help you read instructions, pamphlets, or written material from your doctor or pharmacy?: Often  Health literacy needs impacting ability to self-manage diabetes?: No      Diabetes Self-Management Skills Assessment     Diabetes Disease Process/Treatment Options  Patient/caregiver able to state what happens when someone has diabetes.: yes  Patient/caregiver knows what type of diabetes they have.:  yes  Diabetes Type : Type II  Patient/caregiver able to identify at least three signs and symptoms of diabetes.: yes  Identified signs and symptoms:: frequent urination, increased thirst, fatigue  Patient able to identify at least three risk factors for diabetes.: yes  Identified risk factors:: being overweight, family history  Diabetes Disease Process/Treatment Options: Skills Assessment Completed: No  Assessment indicates:: Adequate understanding  Deferred due to:: Other (comment) (previously completed, there has been no change and patient has adequate understanding)  Area of need?: No    Nutrition/Healthy Eating  Challenges to healthy eating:: portion control, snacking between meals and at night, eating when feeling depressed/stressed, lack of will power, eating out, going to parties  Method of carbohydrate measurement:: no method  Patient can identify foods that impact blood sugar.: yes  Patient-identified foods:: starches (bread, pasta, rice, cereal), sweets, soda, fruit/fruit juice, yogurt, starchy vegetables (corn, peas, beans), milk  Nutrition/Healthy Eating Skills Assessment Completed:: Yes  Assessment indicates:: Adequate understanding  Area of need?: No    Physical Activity/Exercise  Patient's daily activity level:: lightly active  Patient formally exercises outside of work.: no  Reasons for not exercising:: work schedule  Patient can identify forms of physical activity.: yes  Stated forms of physical activity:: moving to burn calories  Patient can identify reasons why exercise/physical activity is important in diabetes management.: no  Physical Activity/Exercise Skills Assessment Completed: : No  Assessment indicates:: Adequate understanding  Deffered due to:: Other (comment) (previously completed, there has been no change and patient has adequate understanding)  Area of need?: No    Medications  Patient is able to describe current diabetes management routine.: yes  Diabetes management routine:: insulin,  oral medications  Patient is able to identify current diabetes medications, dosages, and appropriate timing of medications.: yes  Patient understands the purpose of the medications taken for diabetes.: yes  Patient reports problems or concerns with current medication regimen.: no  Medication regimen problems/concerns::  (patient's Vgo is empty at time of visit, and when asked to demonstrate how to fill it patient couldn't)  Medication Skills Assessment Completed:: Yes  Assessment indicates:: Adequate understanding  Area of need?: No    Home Blood Glucose Monitoring  Patient states that blood sugar is checked at home daily.: yes  Monitoring Method:: home glucometer, personal continuous glucose monitor (patient was supposed to start the dexcom but has not picked up from the pharmacy - dexcom is free of charge)  Home glucometer meter type:: accu-chek guide  How often do you check your blood sugar?: Occasionally  Blood glucose logs:: yes (patient remembered to bring glucometer to visit, I transcribed data from glucometer to logs for provider visit and scanned into media)  Blood glucose logs reviewed today?: no  Personal CGM type:: Dexcom G6  Patient is able to use personal CGM appropriately.: no  CGM Report reviewed?: no  Unable to download personal CGM: starting at time of visit  Home Blood Glucose Monitoring Skills Assessment Completed: : Yes  Assessment indicates:: Instruction Needed  Area of need?: Yes    Acute Complications  Patient is able to identify types of acute complications: Yes  Patient Identified:: Hypoglycemia, Hyperglycemia  Patient is able to state the basic meaning of hypoglycemia?: Yes  Able to state the blood sugar range for hypoglycemia?: yes  Patient can identify general symptoms of hypoglycemia: yes  Patient identified:: other (see comments) (feels chilled)  Able to state proper treatment of hypoglycemia?: yes  Patient identified:: other (see comments) (eat something like a sandwich - instructed  on proper treatment options)  Patient is able to state the basic meaning of hyperglycemia?: Yes  Able to state the blood sugar range for hyperglycemia?: yes (feels dizzy with hyperglycemia)  Patient able to state proper treatment of hyperglycemia?: yes  Patient identified:: take medication as recommended, monitor blood sugar  Patient able to verbalize sick day plan?: yes  Patient-stated sick day plan:: drink more fluids, check blood sugar every 2-3 hours  Acute Complications Skills Assessment Completed: : Yes  Assessment indicates:: Adequate understanding  Area of need?: No    Chronic Complications  Patient can identify major chronic complications of diabetes.: yes  Stated chronic complications:: kidney disease, neuropathy/nerve damage, retinopathy, heart disease/heart attack, stroke  Patient can identify ways to prevent or delay diabetes complications.: yes  Stated ways to prevent complications:: controlling blood sugar  Patient is aware that having diabetes increases risk of heart disease?: Yes  Patient is aware that heart disease is the leading cause of death and disability in people with diabetes?: Yes  Patient able to state risk factors for heart disease?: Yes  Patient stated risk factors for heart disease:: High blood pressure, High cholesterol, Having diabetes, Medication non-adherance  Patient is taking statin?: Yes  Chronic Complications Skills Assessment Completed: : No  Assessment indicates:: Adequate understanding  Deferred due to:: Other (comment) (previously completed, there has been no change and patient has adequate understanding)  Area of need?: No    Psychosocial/Coping  Patient can identify ways of coping with chronic disease.: yes  Patient-stated ways of coping with chronic disease:: spiritual/Roman [employer] practices, support from loved ones  Psychosocial/Coping Skills Assessment Completed: : No  Assessment indicates:: Adequate understanding  Deffered due to:: Other (comment) (previously completed,  there has been no change and patient has adequate understanding)  Area of need?: No      During today's follow-up visit,  the following areas required further assessment and content was provided/reviewed.    Based on today's diabetes care assessment, the following areas of need were identified:      Social 1/6/2023   Support No   Access to Mass Media/Tech No   Cognitive/Behavioral Health No   Culture/Gnosticist No   Communication No   Health Literacy No        Clinical 1/6/2023   Medication Adherence Yes   Lab Compliance No   Nutritional Status Yes        Diabetes Self-Management Skills 1/6/2023   Diabetes Disease Process/Treatment Options No   Nutrition/Healthy Eating No   Physical Activity/Exercise No   Medication No   Home Blood Glucose Monitoring Yes   Acute Complications No   Chronic Complications No   Psychosocial/Coping No        Today's interventions were provided through individual discussion, instruction, and written materials were provided.    Patient verbalized understanding of instruction and written materials.  Pt was able to return back demonstration of instructions today. Patient understood key points, needs reinforcement and further instruction.     Diabetes Self-Management Care Plan Review:  Diabetes Self-Management Care Plan Review and Evaluation of Progress:    During today's follow-up Aishwarya's Diabetes Self-Management Care Plan progress was reviewed and progress was evaluated including his/her input. Aishwarya has agreed to continue his/her journey to improve/maintain overall diabetes control by continuing to set health goals. See care plan progress below.      Care Plan: Diabetes Management   Updates made since 12/7/2022 12:00 AM        Problem: Healthy Eating Resolved 1/6/2023        Goal: Eat 3 meals daily with 30-45g/2-3 servings of Carbohydrate per meal. Completed 1/6/2023   Start Date: 1/5/2022   Expected End Date: 4/15/2022   This Visit's Progress: On track   Recent Progress: On track  "  Priority: Medium   Barriers: Lack of Motivation to Change   Note:    Patient states that she is taking 5 clicks with meals and changing vgo daily  Reviewed carb counting, portion control, importance of spacing meals throughout the day to prevent post prandial elevations.  Recommended low saturated fat, low sodium diet to aid in control of hypertension and cholesterol. Reviewed plate method and portion control, dining out tips, meal planning, reading a food label, healthy snack options, benefits of physical activity         Problem: Blood Glucose Self-Monitoring         Goal: Patient agrees to check and record blood sugars 3 times per day.    Start Date: 11/1/2022   Expected End Date: 11/1/2023   This Visit's Progress: Not met   Recent Progress: Not met   Priority: High   Barriers: Lack of Supplies   Note:    Patient was instructed how to use a meter. Discussed signs and symptoms, and causes and treatment of hypoglycemia. Covered blood sugar and A1C goals. Instructed patient to use provided logbooks to record blood sugars. Instructed patient to follow-up with their physician. Patient verbalized understanding of all instructions.  Prescription for meter and testing supplies pended to PCP    1/6/2023  Dexcom G6 Education  Patient is here in clinic today for initial start of Dexcom continuous glucose monitoring system (CGMA). Reviewed with patient how to insert sensor and transmitter. Patient inserted sensor on right abdomen and inserted transmitter. Time and date was set on monitor and settings were set. Hypoglycemia trigger set for 70 and hyperglycemia set for 300. Patient provided with phone number for 24-hour help line if needed. Discussed various types of possible alarms and what to do. Questions addressed. Initialization finished. No futher questions.  Patient referred to clinic today for Dexcom G6 continuous glucose sensor system training.  Education was provided using "Quick Start Guide" per Dexcom " protocol.    Overview:  5min glucose reading updates, trending arrows, BG graph screens, battery life indicator, Blue Tooth Symbol.  Menus: trend Graph, start sensor, enter BG, events, Alerts, Settings, Shutdown, Stop Sensor   Settings:                          * Urgent Low: 55 mg/dL                          * Urgent Low Soon: Off                          * Low alert: 70                          * High alert: 300                          * Rise rate: Off                          * Fall Rate: Off                          * Signal Loss: 30 min                          * No Reading: Off                          * Always sound: On                             Reviewed additional setting options with patient, including Graph Height and Transmitter ID. Transmitter was paired with .    Reviewed where to find sensor insertion time and sensor expiration date.   Discussed no need to calibrate sensor during the entirety of the 10 day wear. Discussed that pt can calibrate sensor if desired, but at that time she will need to continue calibrating every 12 hours for sensor to remain accurate. Reviewed appropriate calibration techniques.  Reviewed sensor site selection. Site selected and prepped using aseptic technique Inserted to left abdomen. Transmitter placed in pod and secured.  Practiced sensor pod/transmitter removal from site, and removal of transmitter from sensor pod.  Patient able to demonstrate without difficulty.  Encouraged to review manual prior to starting another sensor.   Reviewed problem solving aspects of sensor transmission/variables that can disrupt RF transmission.  range 20 feet, but the first 3 hrs keep within 3 feet of transmitter.  Pt instructed on lag time of interstitial fluid from CBG and was advised to tx hypoglycemia and dose insulin based on SMBG values.  Link to video provided and written instructions provided for patient to review before 10 day sensor change  Dexcom  technical support contact number given and examples of when to contact them discussed.      Changing the Sensor (every 10 days)  Remove the whole thing from your body  Hold the sensor by the skinny part and break the clear piece  Remove the gray transmitter  In the reader touch start sensor  Touch yes you want to start sensor  Touch the square you want to put the number in for   There will be a 4-digit code on the sensor (like 5937)  Enter the code from the sensor  Touch ok  Touch yes, it is correct  Insert Sensor  Remove orange piece and paper tabs  Place on skin and hold in place  Push the orange button  Insert the gray transmitter into the sensor  Put the small tongue like side in first and push down  You should hear 2 clicks  Touch confirm once sensor is secure on body  Touch start Sensor  Touch 1 then 2  Sensor will take 2 hours to warm up    Changing Transmitter (every 90 days) in the reader  Touch the =   Touch transmitter  Touch pair new  Go through the process to start sensor  Touch yes you want to start sensor  Touch the square you want to put the number in for   There will be a 4-digit code on the sensor (like 5937)  Enter the code from the sensor  Touch ok  Touch yes, it is correct  Touch the square you want to put the number in for   There will be a 6-digit code on the transmitter box (like [id number])  Enter the code from the transmitter box  Touch ok  Touch yes, it is correct  Insert Sensor  Remove orange piece and paper tabs  Place on skin and hold in place  Push the orange button  Insert the gray transmitter into the sensor  Put the small tongue like side in first and push down  You should hear 2 clicks  Touch confirm once sensor is secure on body  Touch start Sensor  Touch 1 then 2  Sensor will take 2 hours to warm up             Follow Up Plan     Follow up in about 10 days (around 1/16/2023) for Personal CGM Upload.    Today's care plan and follow up schedule was discussed with patient.  Aishwarya  verbalized understanding of the care plan, goals, and agrees to follow up plan.        The patient was encouraged to communicate with his/her health care provider/physician and care team regarding his/her condition(s) and treatment.  I provided the patient with my contact information today and encouraged to contact me via phone or [facility]'s Patient Portal as needed.

## 2023-01-12 ENCOUNTER — PATIENT OUTREACH (OUTPATIENT)
Dept: ADMINISTRATIVE | Facility: OTHER | Age: 63
End: 2023-01-12
Payer: MEDICARE

## 2023-01-12 NOTE — PROGRESS NOTES
CHW - Outreach Attempt    Community Health Worker left a voicemail message for 1st attempt to contact patient regarding: SDOH  Community Health Worker to attempt to contact patient on: 01/12/2023

## 2023-01-12 NOTE — PROGRESS NOTES
CHW - Follow Up    This Community Health Worker completed a follow up visit with patient via telephone today.  Pt/Caregiver reported: patient stated that she did hear from [place] and that she needs help with her utilities and transportation  Community Health Worker provided: chw put in a referral to fan [email or web address], and [employer]  Follow up required:   Follow-up Outreach - Due: 1/26/2023

## 2023-01-16 DIAGNOSIS — Z79.4 TYPE 2 DIABETES MELLITUS WITH HYPERGLYCEMIA, WITH LONG-TERM CURRENT USE OF INSULIN: ICD-10-CM

## 2023-01-16 DIAGNOSIS — E11.65 TYPE 2 DIABETES MELLITUS WITH HYPERGLYCEMIA, WITH LONG-TERM CURRENT USE OF INSULIN: ICD-10-CM

## 2023-01-16 NOTE — TELEPHONE ENCOUNTER
No new care gaps identified.  [facility] Embedded Care Gaps. Reference number: [id number]. 1/16/2023   3:21:35 PM CST

## 2023-01-17 ENCOUNTER — NUTRITION (OUTPATIENT)
Dept: DIABETES | Facility: CLINIC | Age: 63
End: 2023-01-17
Payer: COMMERCIAL

## 2023-01-17 DIAGNOSIS — Z79.4 TYPE 2 DIABETES MELLITUS WITH STAGE 3A CHRONIC KIDNEY DISEASE, WITH LONG-TERM CURRENT USE OF INSULIN: ICD-10-CM

## 2023-01-17 DIAGNOSIS — E11.42 TYPE 2 DIABETES MELLITUS WITH DIABETIC POLYNEUROPATHY, WITH LONG-TERM CURRENT USE OF INSULIN: ICD-10-CM

## 2023-01-17 DIAGNOSIS — Z79.4 TYPE 2 DIABETES MELLITUS WITH DIABETIC POLYNEUROPATHY, WITH LONG-TERM CURRENT USE OF INSULIN: ICD-10-CM

## 2023-01-17 DIAGNOSIS — E11.22 TYPE 2 DIABETES MELLITUS WITH STAGE 3A CHRONIC KIDNEY DISEASE, WITH LONG-TERM CURRENT USE OF INSULIN: ICD-10-CM

## 2023-01-17 DIAGNOSIS — N18.31 TYPE 2 DIABETES MELLITUS WITH STAGE 3A CHRONIC KIDNEY DISEASE, WITH LONG-TERM CURRENT USE OF INSULIN: ICD-10-CM

## 2023-01-17 DIAGNOSIS — Z79.4 TYPE 2 DIABETES MELLITUS WITH HYPERGLYCEMIA, WITH LONG-TERM CURRENT USE OF INSULIN: Primary | ICD-10-CM

## 2023-01-17 DIAGNOSIS — E11.65 TYPE 2 DIABETES MELLITUS WITH HYPERGLYCEMIA, WITH LONG-TERM CURRENT USE OF INSULIN: Primary | ICD-10-CM

## 2023-01-17 PROCEDURE — G0108 PR DIAB MANAGE TRN  PER INDIV: ICD-10-PCS | Mod: S$GLB,,, | Performed by: DIETITIAN, REGISTERED

## 2023-01-17 PROCEDURE — G0108 DIAB MANAGE TRN  PER INDIV: HCPCS | Mod: S$GLB,,, | Performed by: DIETITIAN, REGISTERED

## 2023-01-17 PROCEDURE — 99999 PR PBB SHADOW E&M-EST. PATIENT-LVL I: CPT | Mod: PBBFAC,,, | Performed by: DIETITIAN, REGISTERED

## 2023-01-17 PROCEDURE — 99999 PR PBB SHADOW E&M-EST. PATIENT-LVL I: ICD-10-PCS | Mod: PBBFAC,,, | Performed by: DIETITIAN, REGISTERED

## 2023-01-17 RX ORDER — INSULIN PUMP SYRINGE, 3 ML
EACH MISCELLANEOUS
Qty: 1 EACH | Refills: 1 | Status: SHIPPED | OUTPATIENT
Start: 2023-01-17

## 2023-01-17 RX ORDER — LANCETS
EACH MISCELLANEOUS
Qty: 400 EACH | Refills: 3 | Status: SHIPPED | OUTPATIENT
Start: 2023-01-17

## 2023-01-17 RX ORDER — BLOOD-GLUCOSE METER
EACH MISCELLANEOUS
Qty: 1 EACH | Refills: 0 | OUTPATIENT
Start: 2023-01-17

## 2023-01-17 NOTE — TELEPHONE ENCOUNTER
Refill Decision Note   Aishwarya Carneybrettaram  is requesting a refill authorization.  Brief Assessment and Rationale for Refill:  Approve    -Medication-Related Problems Identified: Dose adjustment  Medication Therapy Plan:       Medication Reconciliation Completed: No   Comments:     No Care Gaps recommended.     Note composed:5:50 PM 01/17/2023

## 2023-01-18 ENCOUNTER — PATIENT MESSAGE (OUTPATIENT)
Dept: DIABETES | Facility: CLINIC | Age: 63
End: 2023-01-18
Payer: MEDICARE

## 2023-01-18 NOTE — PROGRESS NOTES
Diabetes Care Specialist Progress Note  Author: Marla Stein RD, CDE  Date: 1/18/2023    Program Intake  Reason for Diabetes Program Visit:: Intervention  Type of Intervention:: Individual  Individual: Device Training, Education  Education: Self-Management Skill Review, Nutrition and Meal Planning, Pattern Management  Device Training: Personal CGM  Current diabetes risk level:: high  In the last 12 months, have you:: none  Permission to speak with others about care:: no    Lab Results   Component Value Date    HGBA1C 6.9 (H) 01/17/2023       Clinical    Patient Health Rating  Compared to other people your age, how would you rate your health?: Good    Problem Review  Reviewed Problem List with Patient: yes  Active comorbidities affecting diabetes self-care.: yes  Comorbidities: Cardiovascular Disease, Hypertension, Neuropathy  Reviewed health maintenance: yes    Clinical Assessment  Current Diabetes Treatment: Oral Medication, Insulin  Have you ever experienced hypoglycemia (low blood sugar)?: no  Have you ever experienced hyperglycemia (high blood sugar)?: no    Medication Information  How do you obtain your medications?: Patient drives, Family picks up  How many days a week do you miss your medications?: Never  Do you use a pill box or medication chart to help you manage your medications?: Pill box  Do you sometimes have difficulty refilling your medications?: Yes (see comment)  Medication adherence impacting ability to self-manage diabetes?: Yes    Labs  Do you have regular lab work to monitor your medications?: Yes  Type of Regular Lab Work: A1c, Cholesterol, Microalbumin, CBC, BMP  Where do you get your labs drawn?: [facility]  Lab Compliance Barriers: No    Nutritional Status  Diet: Regular  Meal Plan 24 Hour Recall: Breakfast, Lunch, Dinner, Snack  Meal Plan 24 Hour Recall - Breakfast: Toast and Eggs, coffee with sugar and cream  Meal Plan 24 Hour Recall - Lunch: chicken noodle soup with crackers  Meal Plan 24  Hour Recall - Dinner: [employer] pizza  Meal Plan 24 Hour Recall - Snack: peanut butter crackers, almonds, cheerios, drinks water or sparkling water body armour lyte  Change in appetite?: No  Dentation:: Intact  Recent Changes in Weight: No Recent Weight Change  Current nutritional status an area of need that is impacting patient's ability to self-manage diabetes?: Yes    Additional Social History    Support  Does anyone support you with your diabetes care?: yes  Who supports you?: self, spouse, family member  Who takes you to your medical appointments?: self  Does the current support meet the patient's needs?: Yes  Is Support an area impacting ability to self-manage diabetes?: No    Access to Mass Media & Technology  Does the patient have access to any of the following devices or technologies?: Smart phone  Media or technology needs impacting ability to self-manage diabetes?: No    Cognitive/Behavioral Health  Alert and Oriented: Yes  Difficulty Thinking: No  Requires Prompting: No  Requires assistance for routine expression?: No  Cognitive or behavioral barriers impacting ability to self-manage diabetes?: No    Culture/Hinduism  Culture or Methodist beliefs that may impact ability to access healthcare: No    Communication  Language preference: English  Hearing Problems: No  Vision Problems: Yes  Vision problem type:: Decreased Vision  Vision Assistance: Glasses  Communication needs impacting ability to self-manage diabetes?: No    Health Literacy  Preferred Learning Method: Face to Face, Demonstration, Reading Materials  How often do you need to have someone help you read instructions, pamphlets, or written material from your doctor or pharmacy?: Often  Health literacy needs impacting ability to self-manage diabetes?: No      Diabetes Self-Management Skills Assessment    Diabetes Disease Process/Treatment Options  Patient/caregiver able to state what happens when someone has diabetes.: yes  Patient/caregiver  knows what type of diabetes they have.: yes  Diabetes Type : Type II  Patient/caregiver able to identify at least three signs and symptoms of diabetes.: yes  Identified signs and symptoms:: frequent urination, increased thirst, fatigue  Patient able to identify at least three risk factors for diabetes.: yes  Identified risk factors:: being overweight, family history  Diabetes Disease Process/Treatment Options: Skills Assessment Completed: No  Assessment indicates:: Adequate understanding  Deferred due to:: Other (comment) (previously completed, there has been no change and patient has adequate understanding)  Area of need?: No    Nutrition/Healthy Eating  Challenges to healthy eating:: portion control, snacking between meals and at night, eating when feeling depressed/stressed, lack of will power, eating out, going to parties  Method of carbohydrate measurement:: no method  Patient can identify foods that impact blood sugar.: yes  Patient-identified foods:: starches (bread, pasta, rice, cereal), sweets, soda, fruit/fruit juice, yogurt, starchy vegetables (corn, peas, beans), milk  Nutrition/Healthy Eating Skills Assessment Completed:: Yes  Assessment indicates:: Adequate understanding  Area of need?: No    Physical Activity/Exercise  Patient's daily activity level:: lightly active  Patient formally exercises outside of work.: no  Reasons for not exercising:: work schedule  Patient can identify forms of physical activity.: yes  Stated forms of physical activity:: moving to burn calories  Patient can identify reasons why exercise/physical activity is important in diabetes management.: no  Physical Activity/Exercise Skills Assessment Completed: : No  Assessment indicates:: Adequate understanding  Deffered due to:: Other (comment) (previously completed, there has been no change and patient has adequate understanding)  Area of need?: No    Medications  Patient is able to describe current diabetes management routine.:  yes  Diabetes management routine:: insulin, oral medications  Patient is able to identify current diabetes medications, dosages, and appropriate timing of medications.: yes  Patient understands the purpose of the medications taken for diabetes.: yes  Patient reports problems or concerns with current medication regimen.: no  Medication regimen problems/concerns:: financial concerns (patient's Vgo is empty at time of visit, and when asked to demonstrate how to fill it patient couldn't)  Medication Skills Assessment Completed:: Yes  Assessment indicates:: Adequate understanding  Area of need?: No    Home Blood Glucose Monitoring  Patient states that blood sugar is checked at home daily.: yes  Monitoring Method:: home glucometer, personal continuous glucose monitor (patient was supposed to start the dexcom but has not picked up from the pharmacy - dexcom is free of charge)  Home glucometer meter type:: accu-chek guide  Blood glucose logs::  (patient remembered to bring glucometer to visit, I transcribed data from glucometer to logs for provider visit and scanned into media)  Personal CGM type:: Dexcom G6  Patient is able to use personal CGM appropriately.: no  CGM Report reviewed?: yes  Home Blood Glucose Monitoring Skills Assessment Completed: : Yes  Assessment indicates:: Instruction Needed  Area of need?: Yes    Acute Complications  Patient is able to identify types of acute complications: Yes  Patient Identified:: Hypoglycemia, Hyperglycemia  Patient is able to state the basic meaning of hypoglycemia?: Yes  Able to state the blood sugar range for hypoglycemia?: yes  Patient can identify general symptoms of hypoglycemia: yes  Patient identified:: other (see comments) (feels chilled)  Able to state proper treatment of hypoglycemia?: yes  Patient identified:: other (see comments) (eat something like a sandwich - instructed on proper treatment options)  Patient is able to state the basic meaning of hyperglycemia?:  Yes  Able to state the blood sugar range for hyperglycemia?: yes (feels dizzy with hyperglycemia)  Patient able to state proper treatment of hyperglycemia?: yes  Patient identified:: take medication as recommended, monitor blood sugar  Patient able to verbalize sick day plan?: yes  Patient-stated sick day plan:: drink more fluids, check blood sugar every 2-3 hours  Acute Complications Skills Assessment Completed: : Yes  Assessment indicates:: Adequate understanding  Area of need?: No    Chronic Complications  Patient can identify major chronic complications of diabetes.: yes  Stated chronic complications:: kidney disease, neuropathy/nerve damage, retinopathy, heart disease/heart attack, stroke  Patient can identify ways to prevent or delay diabetes complications.: yes  Stated ways to prevent complications:: controlling blood sugar  Patient is aware that having diabetes increases risk of heart disease?: Yes  Patient is aware that heart disease is the leading cause of death and disability in people with diabetes?: Yes  Patient able to state risk factors for heart disease?: Yes  Patient stated risk factors for heart disease:: High blood pressure, Medication non-adherance, High cholesterol, Having diabetes, Diet, Limited activity  Patient is taking statin?: Yes  Chronic Complications Skills Assessment Completed: : No  Assessment indicates:: Adequate understanding  Deferred due to:: Other (comment) (previously completed, there has been no change and patient has adequate understanding)  Area of need?: No    Psychosocial/Coping  Patient can identify ways of coping with chronic disease.: yes  Patient-stated ways of coping with chronic disease:: spiritual/Latter-day practices, support from loved ones  Psychosocial/Coping Skills Assessment Completed: : No  Assessment indicates:: Adequate understanding  Deffered due to:: Other (comment) (previously completed, there has been no change and patient has adequate  understanding)  Area of need?: No      Diabetes Self Support Plan    Diabetes Self-Management Support Plan  Stress management:: family  Medication:: Medication Assistance  Review status:: Patient has selected and agrees to support plan., Patient was provided Diabetes Self-Management Support Plan document that includes support options.    Assessment Summary and Plan    Based on today's diabetes care assessment, the following areas of need were identified:      Social 1/17/2023   Support No   Access to Mass Media/Tech No   Cognitive/Behavioral Health No   Culture/Mormon No   Communication No   Health Literacy No        Clinical 1/17/2023   Medication Adherence Yes   Lab Compliance No   Nutritional Status Yes        Diabetes Self-Management Skills 1/17/2023   Diabetes Disease Process/Treatment Options No   Nutrition/Healthy Eating No   Physical Activity/Exercise No   Medication No   Home Blood Glucose Monitoring Yes   Acute Complications No   Chronic Complications No   Psychosocial/Coping No          Today's interventions were provided through individual discussion, instruction, and written materials were provided.      Patient verbalized understanding of instruction and written materials.  Pt was able to return back demonstration of instructions today. Patient understood key points, needs reinforcement and further instruction.     Diabetes Self-Management Care Plan:    Today's Diabetes Self-Management Care Plan was developed with Aishwarya's input. Aishwarya has agreed to work toward the following goal(s) to improve his/her overall diabetes control.      Care Plan: Diabetes Management   Updates made since 12/19/2022 12:00 AM        Problem: Healthy Eating Resolved 1/6/2023        Goal: Eat 3 meals daily with 30-45g/2-3 servings of Carbohydrate per meal. Completed 1/6/2023   Start Date: 1/5/2022   Expected End Date: 4/15/2022   This Visit's Progress: On track   Recent Progress: On track   Priority: Medium   Barriers:  "Lack of Motivation to Change   Note:    Patient states that she is taking 5 clicks with meals and changing vgo daily  Reviewed carb counting, portion control, importance of spacing meals throughout the day to prevent post prandial elevations.  Recommended low saturated fat, low sodium diet to aid in control of hypertension and cholesterol. Reviewed plate method and portion control, dining out tips, meal planning, reading a food label, healthy snack options, benefits of physical activity         Problem: Blood Glucose Self-Monitoring         Goal: Patient agrees to check and record blood sugars 3 times per day.    Start Date: 11/1/2022   Expected End Date: 11/1/2023   This Visit's Progress: On track   Recent Progress: Not met   Priority: High   Barriers: Lack of Supplies   Note:    Patient was instructed how to use a meter. Discussed signs and symptoms, and causes and treatment of hypoglycemia. Covered blood sugar and A1C goals. Instructed patient to use provided logbooks to record blood sugars. Instructed patient to follow-up with their physician. Patient verbalized understanding of all instructions.  Prescription for meter and testing supplies pended to PCP    1/6/2023  Dexcom G6 Education  Patient is here in clinic today for initial start of Dexcom continuous glucose monitoring system (CGMA). Reviewed with patient how to insert sensor and transmitter. Patient inserted sensor on right abdomen and inserted transmitter. Time and date was set on monitor and settings were set. Hypoglycemia trigger set for 70 and hyperglycemia set for 300. Patient provided with phone number for 24-hour help line if needed. Discussed various types of possible alarms and what to do. Questions addressed. Initialization finished. No futher questions.  Patient referred to clinic today for Dexcom G6 continuous glucose sensor system training.  Education was provided using "Quick Start Guide" per Dexcom protocol.    Overview:  5min glucose " reading updates, trending arrows, BG graph screens, battery life indicator, Blue Tooth Symbol.  Menus: trend Graph, start sensor, enter BG, events, Alerts, Settings, Shutdown, Stop Sensor   Settings:                          * Urgent Low: 55 mg/dL                          * Urgent Low Soon: Off                          * Low alert: 70                          * High alert: 300                          * Rise rate: Off                          * Fall Rate: Off                          * Signal Loss: 30 min                          * No Reading: Off                          * Always sound: On                             Reviewed additional setting options with patient, including Graph Height and Transmitter ID. Transmitter was paired with .    Reviewed where to find sensor insertion time and sensor expiration date.   Discussed no need to calibrate sensor during the entirety of the 10 day wear. Discussed that pt can calibrate sensor if desired, but at that time she will need to continue calibrating every 12 hours for sensor to remain accurate. Reviewed appropriate calibration techniques.  Reviewed sensor site selection. Site selected and prepped using aseptic technique Inserted to left abdomen. Transmitter placed in pod and secured.  Practiced sensor pod/transmitter removal from site, and removal of transmitter from sensor pod.  Patient able to demonstrate without difficulty.  Encouraged to review manual prior to starting another sensor.   Reviewed problem solving aspects of sensor transmission/variables that can disrupt RF transmission.  range 20 feet, but the first 3 hrs keep within 3 feet of transmitter.  Pt instructed on lag time of interstitial fluid from CBG and was advised to tx hypoglycemia and dose insulin based on SMBG values.  Link to video provided and written instructions provided for patient to review before 10 day sensor change  Dexcom technical support contact number given and  examples of when to contact them discussed.      1/17 Changing the Sensor (every 10 days)  Remove the whole thing from your body  Hold the sensor by the skinny part and break the clear piece  Remove the gray transmitter  In the reader touch start sensor  Touch yes you want to start sensor  Touch the square you want to put the number in for   There will be a 4-digit code on the sensor (like 5937)  Enter the code from the sensor  Touch ok  Touch yes, it is correct  Insert Sensor  Remove orange piece and paper tabs  Place on skin and hold in place  Push the orange button  Insert the gray transmitter into the sensor  Put the small tongue like side in first and push down  You should hear 2 clicks  Touch confirm once sensor is secure on body  Touch start Sensor  Touch 1 then 2  Sensor will take 2 hours to warm up    Changing Transmitter (every 90 days) in the reader  Touch the =   Touch transmitter  Touch pair new  Go through the process to start sensor  Touch yes you want to start sensor  Touch the square you want to put the number in for   There will be a 4-digit code on the sensor (like 5937)  Enter the code from the sensor  Touch ok  Touch yes, it is correct  Touch the square you want to put the number in for   There will be a 6-digit code on the transmitter box (like [id number])  Enter the code from the transmitter box  Touch ok  Touch yes, it is correct  Insert Sensor  Remove orange piece and paper tabs  Place on skin and hold in place  Push the orange button  Insert the gray transmitter into the sensor  Put the small tongue like side in first and push down  You should hear 2 clicks  Touch confirm once sensor is secure on body  Touch start Sensor  Touch 1 then 2  Sensor will take 2 hours to warm up             Follow Up Plan     Follow up in about 10 days (around 1/27/2023) for Personal CGM Upload.    Today's care plan and follow up schedule was discussed with patient.  Aishwarya verbalized understanding of the care  plan, goals, and agrees to follow up plan.        The patient was encouraged to communicate with his/her health care provider/physician and care team regarding his/her condition(s) and treatment.  I provided the patient with my contact information today and encouraged to contact me via phone or [facility]'s Patient Portal as needed.     Length of Visit   Total Time: 30 Minutes

## 2023-01-25 ENCOUNTER — OFFICE VISIT (OUTPATIENT)
Dept: DIABETES | Facility: CLINIC | Age: 63
End: 2023-01-25
Payer: COMMERCIAL

## 2023-01-25 ENCOUNTER — PATIENT OUTREACH (OUTPATIENT)
Dept: ADMINISTRATIVE | Facility: HOSPITAL | Age: 63
End: 2023-01-25
Payer: MEDICARE

## 2023-01-25 ENCOUNTER — DOCUMENTATION ONLY (OUTPATIENT)
Dept: SURGERY | Facility: CLINIC | Age: 63
End: 2023-01-25
Payer: MEDICARE

## 2023-01-25 ENCOUNTER — OFFICE VISIT (OUTPATIENT)
Dept: PODIATRY | Facility: CLINIC | Age: 63
End: 2023-01-25
Payer: COMMERCIAL

## 2023-01-25 VITALS
DIASTOLIC BLOOD PRESSURE: 78 MMHG | HEIGHT: 67 IN | HEART RATE: 68 BPM | SYSTOLIC BLOOD PRESSURE: 132 MMHG | OXYGEN SATURATION: 98 % | BODY MASS INDEX: 34.75 KG/M2 | WEIGHT: 221.38 LBS

## 2023-01-25 VITALS
BODY MASS INDEX: 33.74 KG/M2 | HEIGHT: 67 IN | WEIGHT: 215 LBS | DIASTOLIC BLOOD PRESSURE: 81 MMHG | SYSTOLIC BLOOD PRESSURE: 152 MMHG | HEART RATE: 84 BPM

## 2023-01-25 DIAGNOSIS — E66.09 CLASS 1 OBESITY DUE TO EXCESS CALORIES WITH SERIOUS COMORBIDITY AND BODY MASS INDEX (BMI) OF 34.0 TO 34.9 IN ADULT: ICD-10-CM

## 2023-01-25 DIAGNOSIS — M79.675 PAIN DUE TO ONYCHOMYCOSIS OF TOENAILS OF BOTH FEET: ICD-10-CM

## 2023-01-25 DIAGNOSIS — Z12.11 SCREENING FOR COLON CANCER: Primary | ICD-10-CM

## 2023-01-25 DIAGNOSIS — E11.42 TYPE 2 DIABETES MELLITUS WITH DIABETIC POLYNEUROPATHY, WITH LONG-TERM CURRENT USE OF INSULIN: Primary | ICD-10-CM

## 2023-01-25 DIAGNOSIS — E11.22 TYPE 2 DIABETES MELLITUS WITH STAGE 3A CHRONIC KIDNEY DISEASE, WITH LONG-TERM CURRENT USE OF INSULIN: ICD-10-CM

## 2023-01-25 DIAGNOSIS — Q84.5 ENLARGED AND HYPERTROPHIC NAILS: ICD-10-CM

## 2023-01-25 DIAGNOSIS — E11.42 TYPE 2 DIABETES MELLITUS WITH DIABETIC POLYNEUROPATHY, WITH LONG-TERM CURRENT USE OF INSULIN: ICD-10-CM

## 2023-01-25 DIAGNOSIS — Z79.4 TYPE 2 DIABETES MELLITUS WITH DIABETIC POLYNEUROPATHY, WITH LONG-TERM CURRENT USE OF INSULIN: ICD-10-CM

## 2023-01-25 DIAGNOSIS — I10 ESSENTIAL HYPERTENSION, BENIGN: ICD-10-CM

## 2023-01-25 DIAGNOSIS — Z79.4 TYPE 2 DIABETES MELLITUS WITH STAGE 3A CHRONIC KIDNEY DISEASE, WITH LONG-TERM CURRENT USE OF INSULIN: ICD-10-CM

## 2023-01-25 DIAGNOSIS — Z79.4 TYPE 2 DIABETES MELLITUS WITH DIABETIC POLYNEUROPATHY, WITH LONG-TERM CURRENT USE OF INSULIN: Primary | ICD-10-CM

## 2023-01-25 DIAGNOSIS — E11.65 TYPE 2 DIABETES MELLITUS WITH HYPERGLYCEMIA, WITH LONG-TERM CURRENT USE OF INSULIN: Primary | ICD-10-CM

## 2023-01-25 DIAGNOSIS — N18.31 TYPE 2 DIABETES MELLITUS WITH STAGE 3A CHRONIC KIDNEY DISEASE, WITH LONG-TERM CURRENT USE OF INSULIN: ICD-10-CM

## 2023-01-25 DIAGNOSIS — Z91.148 NONCOMPLIANCE W/MEDICATION TREATMENT DUE TO INTERMIT USE OF MEDICATION: ICD-10-CM

## 2023-01-25 DIAGNOSIS — E11.29 MICROALBUMINURIA DUE TO TYPE 2 DIABETES MELLITUS: ICD-10-CM

## 2023-01-25 DIAGNOSIS — B35.1 PAIN DUE TO ONYCHOMYCOSIS OF TOENAILS OF BOTH FEET: ICD-10-CM

## 2023-01-25 DIAGNOSIS — Z71.9 HEALTH EDUCATION/COUNSELING: ICD-10-CM

## 2023-01-25 DIAGNOSIS — Z79.4 TYPE 2 DIABETES MELLITUS WITH HYPERGLYCEMIA, WITH LONG-TERM CURRENT USE OF INSULIN: Primary | ICD-10-CM

## 2023-01-25 DIAGNOSIS — M79.674 PAIN DUE TO ONYCHOMYCOSIS OF TOENAILS OF BOTH FEET: ICD-10-CM

## 2023-01-25 DIAGNOSIS — E78.5 DYSLIPIDEMIA, GOAL LDL BELOW 100: ICD-10-CM

## 2023-01-25 DIAGNOSIS — I83.893 VARICOSE VEINS OF LEG WITH EDEMA, BILATERAL: ICD-10-CM

## 2023-01-25 DIAGNOSIS — R80.9 MICROALBUMINURIA DUE TO TYPE 2 DIABETES MELLITUS: ICD-10-CM

## 2023-01-25 DIAGNOSIS — D64.9 ANEMIA, UNSPECIFIED TYPE: ICD-10-CM

## 2023-01-25 PROCEDURE — 3060F PR POS MICROALBUMINURIA RESULT DOCUMENTED/REVIEW: ICD-10-PCS | Mod: CPTII,S$GLB,, | Performed by: PODIATRIST

## 2023-01-25 PROCEDURE — 3075F SYST BP GE 130 - 139MM HG: CPT | Mod: CPTII,S$GLB,, | Performed by: NURSE PRACTITIONER

## 2023-01-25 PROCEDURE — 3079F PR MOST RECENT DIASTOLIC BLOOD PRESSURE 80-89 MM HG: ICD-10-PCS | Mod: CPTII,S$GLB,, | Performed by: PODIATRIST

## 2023-01-25 PROCEDURE — 1159F PR MEDICATION LIST DOCUMENTED IN MEDICAL RECORD: ICD-10-PCS | Mod: CPTII,S$GLB,, | Performed by: NURSE PRACTITIONER

## 2023-01-25 PROCEDURE — 3060F PR POS MICROALBUMINURIA RESULT DOCUMENTED/REVIEW: ICD-10-PCS | Mod: CPTII,S$GLB,, | Performed by: NURSE PRACTITIONER

## 2023-01-25 PROCEDURE — 99214 PR OFFICE/OUTPT VISIT, EST, LEVL IV, 30-39 MIN: ICD-10-PCS | Mod: S$GLB,,, | Performed by: NURSE PRACTITIONER

## 2023-01-25 PROCEDURE — 3060F POS MICROALBUMINURIA REV: CPT | Mod: CPTII,S$GLB,, | Performed by: NURSE PRACTITIONER

## 2023-01-25 PROCEDURE — 99213 PR OFFICE/OUTPT VISIT, EST, LEVL III, 20-29 MIN: ICD-10-PCS | Mod: 25,S$GLB,, | Performed by: PODIATRIST

## 2023-01-25 PROCEDURE — 1159F MED LIST DOCD IN RCRD: CPT | Mod: CPTII,S$GLB,, | Performed by: NURSE PRACTITIONER

## 2023-01-25 PROCEDURE — 3060F POS MICROALBUMINURIA REV: CPT | Mod: CPTII,S$GLB,, | Performed by: PODIATRIST

## 2023-01-25 PROCEDURE — 3008F PR BODY MASS INDEX (BMI) DOCUMENTED: ICD-10-PCS | Mod: CPTII,S$GLB,, | Performed by: PODIATRIST

## 2023-01-25 PROCEDURE — 11721 DEBRIDE NAIL 6 OR MORE: CPT | Mod: S$GLB,,, | Performed by: PODIATRIST

## 2023-01-25 PROCEDURE — 3044F PR MOST RECENT HEMOGLOBIN A1C LEVEL <7.0%: ICD-10-PCS | Mod: CPTII,S$GLB,, | Performed by: NURSE PRACTITIONER

## 2023-01-25 PROCEDURE — 3066F PR DOCUMENTATION OF TREATMENT FOR NEPHROPATHY: ICD-10-PCS | Mod: CPTII,S$GLB,, | Performed by: PODIATRIST

## 2023-01-25 PROCEDURE — 95251 PR GLUCOSE MONITOR, 72 HOUR, PHYS INTERP: ICD-10-PCS | Mod: S$GLB,,, | Performed by: NURSE PRACTITIONER

## 2023-01-25 PROCEDURE — 3078F PR MOST RECENT DIASTOLIC BLOOD PRESSURE < 80 MM HG: ICD-10-PCS | Mod: CPTII,S$GLB,, | Performed by: NURSE PRACTITIONER

## 2023-01-25 PROCEDURE — 99999 PR PBB SHADOW E&M-EST. PATIENT-LVL III: CPT | Mod: PBBFAC,,, | Performed by: PODIATRIST

## 2023-01-25 PROCEDURE — 95251 CONT GLUC MNTR ANALYSIS I&R: CPT | Mod: S$GLB,,, | Performed by: NURSE PRACTITIONER

## 2023-01-25 PROCEDURE — 3077F SYST BP >= 140 MM HG: CPT | Mod: CPTII,S$GLB,, | Performed by: PODIATRIST

## 2023-01-25 PROCEDURE — 99999 PR PBB SHADOW E&M-EST. PATIENT-LVL V: ICD-10-PCS | Mod: PBBFAC,,, | Performed by: NURSE PRACTITIONER

## 2023-01-25 PROCEDURE — 1160F RVW MEDS BY RX/DR IN RCRD: CPT | Mod: CPTII,S$GLB,, | Performed by: NURSE PRACTITIONER

## 2023-01-25 PROCEDURE — 3008F BODY MASS INDEX DOCD: CPT | Mod: CPTII,S$GLB,, | Performed by: NURSE PRACTITIONER

## 2023-01-25 PROCEDURE — 3066F NEPHROPATHY DOC TX: CPT | Mod: CPTII,S$GLB,, | Performed by: NURSE PRACTITIONER

## 2023-01-25 PROCEDURE — 3044F HG A1C LEVEL LT 7.0%: CPT | Mod: CPTII,S$GLB,, | Performed by: PODIATRIST

## 2023-01-25 PROCEDURE — 3079F DIAST BP 80-89 MM HG: CPT | Mod: CPTII,S$GLB,, | Performed by: PODIATRIST

## 2023-01-25 PROCEDURE — 99213 OFFICE O/P EST LOW 20 MIN: CPT | Mod: 25,S$GLB,, | Performed by: PODIATRIST

## 2023-01-25 PROCEDURE — 99214 OFFICE O/P EST MOD 30 MIN: CPT | Mod: S$GLB,,, | Performed by: NURSE PRACTITIONER

## 2023-01-25 PROCEDURE — 1160F PR REVIEW ALL MEDS BY PRESCRIBER/CLIN PHARMACIST DOCUMENTED: ICD-10-PCS | Mod: CPTII,S$GLB,, | Performed by: NURSE PRACTITIONER

## 2023-01-25 PROCEDURE — 3066F NEPHROPATHY DOC TX: CPT | Mod: CPTII,S$GLB,, | Performed by: PODIATRIST

## 2023-01-25 PROCEDURE — 3008F BODY MASS INDEX DOCD: CPT | Mod: CPTII,S$GLB,, | Performed by: PODIATRIST

## 2023-01-25 PROCEDURE — 99999 PR PBB SHADOW E&M-EST. PATIENT-LVL III: ICD-10-PCS | Mod: PBBFAC,,, | Performed by: PODIATRIST

## 2023-01-25 PROCEDURE — 3044F PR MOST RECENT HEMOGLOBIN A1C LEVEL <7.0%: ICD-10-PCS | Mod: CPTII,S$GLB,, | Performed by: PODIATRIST

## 2023-01-25 PROCEDURE — 3078F DIAST BP <80 MM HG: CPT | Mod: CPTII,S$GLB,, | Performed by: NURSE PRACTITIONER

## 2023-01-25 PROCEDURE — 3066F PR DOCUMENTATION OF TREATMENT FOR NEPHROPATHY: ICD-10-PCS | Mod: CPTII,S$GLB,, | Performed by: NURSE PRACTITIONER

## 2023-01-25 PROCEDURE — 3008F PR BODY MASS INDEX (BMI) DOCUMENTED: ICD-10-PCS | Mod: CPTII,S$GLB,, | Performed by: NURSE PRACTITIONER

## 2023-01-25 PROCEDURE — 3044F HG A1C LEVEL LT 7.0%: CPT | Mod: CPTII,S$GLB,, | Performed by: NURSE PRACTITIONER

## 2023-01-25 PROCEDURE — 3075F PR MOST RECENT SYSTOLIC BLOOD PRESS GE 130-139MM HG: ICD-10-PCS | Mod: CPTII,S$GLB,, | Performed by: NURSE PRACTITIONER

## 2023-01-25 PROCEDURE — 99999 PR PBB SHADOW E&M-EST. PATIENT-LVL V: CPT | Mod: PBBFAC,,, | Performed by: NURSE PRACTITIONER

## 2023-01-25 PROCEDURE — 3077F PR MOST RECENT SYSTOLIC BLOOD PRESSURE >= 140 MM HG: ICD-10-PCS | Mod: CPTII,S$GLB,, | Performed by: PODIATRIST

## 2023-01-25 PROCEDURE — 11721 PR DEBRIDEMENT OF NAILS, 6 OR MORE: ICD-10-PCS | Mod: S$GLB,,, | Performed by: PODIATRIST

## 2023-01-25 RX ORDER — SEMAGLUTIDE 2.68 MG/ML
2 INJECTION, SOLUTION SUBCUTANEOUS
Qty: 1 PEN | Refills: 6 | Status: SHIPPED | OUTPATIENT
Start: 2023-01-25 | End: 2023-07-18 | Stop reason: SINTOL

## 2023-01-25 RX ORDER — EZETIMIBE 10 MG/1
10 TABLET ORAL NIGHTLY
Qty: 90 TABLET | Refills: 3 | Status: SHIPPED | OUTPATIENT
Start: 2023-01-25 | End: 2023-01-25

## 2023-01-25 RX ORDER — ROSUVASTATIN CALCIUM 40 MG/1
40 TABLET, COATED ORAL NIGHTLY
Qty: 90 TABLET | Refills: 3 | Status: ON HOLD | OUTPATIENT
Start: 2023-01-25 | End: 2023-03-29

## 2023-01-25 RX ORDER — METFORMIN HYDROCHLORIDE 1000 MG/1
1000 TABLET ORAL
Qty: 90 TABLET | Refills: 2 | Status: SHIPPED | OUTPATIENT
Start: 2023-01-25 | End: 2023-07-18 | Stop reason: SDUPTHER

## 2023-01-25 RX ORDER — SODIUM, POTASSIUM,MAG SULFATES 17.5-3.13G
1 SOLUTION, RECONSTITUTED, ORAL ORAL DAILY
Qty: 1 KIT | Refills: 0 | Status: SHIPPED | OUTPATIENT
Start: 2023-01-25 | End: 2023-01-27

## 2023-01-25 RX ORDER — DAPAGLIFLOZIN 10 MG/1
10 TABLET, FILM COATED ORAL DAILY
Qty: 90 TABLET | Refills: 2 | Status: SHIPPED | OUTPATIENT
Start: 2023-01-25 | End: 2023-11-14 | Stop reason: SDUPTHER

## 2023-01-25 RX ORDER — SODIUM CHLORIDE 0.9 % (FLUSH) 0.9 %
3 SYRINGE (ML) INJECTION
Status: CANCELLED | OUTPATIENT
Start: 2023-01-25

## 2023-01-25 RX ORDER — SUB-Q INSULIN DEVICE, 40 UNIT
1 EACH MISCELLANEOUS DAILY
Qty: 90 EACH | Refills: 2 | Status: SHIPPED | OUTPATIENT
Start: 2023-01-25 | End: 2023-07-18 | Stop reason: SDUPTHER

## 2023-01-25 NOTE — CARE UPDATE
This patient arrived at the clinic as a walk in patient, post visit with the Practitioner,requesting to be scheduled to have a Colonoscopy. A review of the patient's chart,denotes a referral to schedule the patient to have a Colonoscopy for colon cancer screening. The patient verbally consented to be scheduled to have the Colonoscopy, and was scheduled for the Colonoscopy on the date of the patient's request 03/29/2023. The patient was given a detailed explanation of the Colonoscopy prep instructions, along with a copy of the associated bowel prep instructions. The patient acknowledged understanding of the prep instructions, and was give an opportunity to ask any questions about the Colonoscopy procedure, and the associated prep instructions.    Bowel Prep/SUPREP instructions                                               [facility]    [address] [place] [address]      You are scheduled for a Colonoscopy with Dr.Kevin KYMBERLY Burns MD_______________________ on _03/29/2023___________________   At [facility] in [place].    Check in at the hospital on 1st floor Registration area next to Emergency room.    Please call [phone] to reschedule or if you have any questions.    An adult friend/family member must come with you to drive you home.  You cannot drive, take a taxi, Uber/Lyft or bus to leave the Hospital alone. If you do not have someone with you to drive you home, your test will be cancelled.       Please follow the directions of your doctor if you take any pills that thin your blood.  If you take these meds: Aggrenox, Brilinta, Effient, Eliquis, Lovenox, Plavix, Pletal Pradaxa ticilid, Xarelto, or Coumadin, let the doctor's office know.    Don't: On the morning of the test do not take insulin or pills for diabetes.   Do: On the morning of the test, do take any pills for blood pressure, heart, anti-rejection and or seizures with a small sip of water. Bring any  inhalers with you day of procedure.    To have a good prep, you must follow these instructions- please do not use the directions from the pharmacy!      The doctor will send a prescription for the SUPREP   The day Before the test:   You can only drink CLEAR LIQUIDS the whole day before your test. You can't eat any food for the whole day.    You CAN have:   Water,Coffee or decaf coffee (no milk or cream)    Tea   Soft drinks- regular and sugar free   Jell-O (green or yellow)   Apple Juice, grape juice, white cranberry juice   Gatorade, Power Aid, Crystal Light, Glynn Aid   Lemonade and Limeade   Bouillon, clear soup   Snowball, popsicles   YOU CAN'T DRINK ANYTHING RED   YOU CAN'T DRINK ALCOHOL   ONLY DRINK WHAT IS ON THIS List      At 5pm the night before your test:   Pour the 1st bottle of SUPREP into the cup provided in the box.  Add water to the line on the cup and mix well. Drink the whole cup and then drink 2 more full cups of water over the 1 hour.    This can be easier to drink if it is cold.  You can mix it 20 minutes ahead of time and place in the refrigerator before you drink it.  You must drink it within 30-45 minutes of mixing it. Do NOT pour the drink over ice. You can drink it with a straw.    The Day of the test- We will call you 1 day before your test to tell you what time to get there.    5 hours before you come to the hospital (this may be the middle of the night)   Pour the 2nd bottle of SUPREP into to the cup provided in the box. Add water to the line on the cup and mix well. Drink the whole cup and then drink 2 more full cups of water over 1 hour.    It might be easier to drink if it is cold. You can mix it 20 minutes ahead of time and place in the refrigerator before you drink it. You must drink it within 30-45 minutes of mixing it. Do NOT pour the drink over ice. You can drink it with a straw.   YOU CAN'T EAT OR DRINK ANYTHING ELSE ONCE YOU FINISH THE PREP.   Leave all valuables and jewelry at  home. You will be at the hospital for 2-4 hours.    Call the Endoscopy Scheduling Department at [phone] with any questions about your procedure.    Please  your medication from your local pharmacy. If you are unable to  the SUPREP Kit please contact our office.   Thank you   Endo Scheduling Dept   [facility]            The patient has been advised the Colonoscopy Prep Kit will be ordered from the patient's verified preferred pharmacy on file.  - The medication can  be picked up by the patient, or the patient's designated representative. The patient was further explained; Colonoscopy Prep instructions will be mailed to the patient verified address on file. The patient has been advised to follow the Colonoscopy Prep instructions being mailed;as precise meticulously as possible. Patient was advise you  will receive a follow up phone call; to summarize the Colonoscopy Prep instructions, prior to the scheduled Colonoscopy procedure date. At this time the patient will be given an opportunity to ask any questions regarding the Colonoscopy Prep and associated Colonoscopy procedure. procedure.

## 2023-01-25 NOTE — PROGRESS NOTES
CC:   Chief Complaint   Patient presents with    Diabetes Mellitus       HPI: Aishwarya Jensen is a 62 y.o. female presents for a follow up visit today for the management of T2DM   She was diagnosed with Type 2 diabetes in her early 50's on routine lab work. She was initially started on Metformin. Insulin therapy was started soon after diagnosis.     Family hx of diabetes: Mother, father, brothers   Hospitalized for diabetes: denies     No personal or FH of thyroid cancer or personal of pancreatic cancer or pancreatitis.     She is retired now.     Our last visit was in October of 2022  At that visit we increased her Ozempic  Continued on her metformin, Farxiga, V Go 30 with Fiasp   We also started on the Dexcom personal CGM  Her A1c has greatly improved to 6.9% and her blood sugar readings are mostly at goal on her Dexcom download.  Please see attached  Occasionally having some hypoglycemia overnight--but after discussion this is due to her taking additional clicks and not eating and her blood sugar readings are running a little high  Reviewed proper V Go use and clicks today  Her cholesterol is high but she does report that she is been missing doses of her Crestor  Anemia seen on recent labs- needs to get colonoscopy done              DIABETES COMPLICATIONS: nephropathy and peripheral neuropathy      Diabetes Management Status    ASA:  Yes - 81 mg- taking it every day     Statin: Taking- Crestor 40 mg nightly -- she has been missing doses   ACE/ARB: Taking- lisinopril 20 mg     Screening or Prevention Patient's value Goal Complete/Controlled?   HgA1C Testing and Control   Lab Results   Component Value Date    HGBA1C 6.9 (H) 01/17/2023      Annually/Less than 8% Yes   Lipid profile : 01/17/2023 Annually Yes   LDL control Lab Results   Component Value Date    LDLCALC 150.0 01/17/2023    Annually/Less than 100 mg/dl  No   Nephropathy screening Lab Results   Component Value Date    LABMICR 401.0 01/17/2023      Lab Results   Component Value Date    PROTEINUA Negative 12/05/2018    Annually Yes   Blood pressure BP Readings from Last 1 Encounters:   01/25/23 132/78    Less than 140/90 No   Dilated retinal exam : 03/14/2022 Annually No   Foot exam   : 01/25/2023 Annually Yes       CURRENT A1C:    Hemoglobin A1C   Date Value Ref Range Status   01/17/2023 6.9 (H) 4.0 - 5.6 % Final     Comment:     ADA Screening Guidelines:  5.7-6.4%  Consistent with prediabetes  >or=6.5%  Consistent with diabetes    High levels of fetal hemoglobin interfere with the HbA1C  assay. Heterozygous hemoglobin variants (HbS, HgC, etc)do  not significantly interfere with this assay.   However, presence of multiple variants may affect accuracy.     10/18/2022 7.9 (H) 4.0 - 5.6 % Final     Comment:     ADA Screening Guidelines:  5.7-6.4%  Consistent with prediabetes  >or=6.5%  Consistent with diabetes    High levels of fetal hemoglobin interfere with the HbA1C  assay. Heterozygous hemoglobin variants (HbS, HgC, etc)do  not significantly interfere with this assay.   However, presence of multiple variants may affect accuracy.     05/11/2022 8.4 (H) 4.0 - 5.6 % Final     Comment:     ADA Screening Guidelines:  5.7-6.4%  Consistent with prediabetes  >or=6.5%  Consistent with diabetes    High levels of fetal hemoglobin interfere with the HbA1C  assay. Heterozygous hemoglobin variants (HbS, HgC, etc)do  not significantly interfere with this assay.   However, presence of multiple variants may affect accuracy.         GOAL A1C: less than 8%     DM MEDICATIONS USED IN THE PAST: Metformin   Victoza   Lantus   VGo  Novolog    Freestyle manisha -- didn't like it   Ozempic   Fiasp   Dexcom       CURRENT DIABETES MEDICATIONS: Metformin 1000 mg daily  VGo30 with Fiasp 5 clicks with meals   Giving clicks before the meals   Sometimes giving extra 2-3 clicks when her BG is high -- and then her BG is dropping low    Farxiga 10 mg daily   Ozempic 2 mg weekly on Mondays  "  Insulin:VGo  Missed doses:  denies   VGo is costing her $35 dollars.   Usually wearing the VGo to her abdomen   She is adamant that she is changing her VGo every 24 hours.   giving her VGo clicks before she eats   Denies missing medication doses       BLOOD GLUCOSE MONITORING:   Sensor type: Dexcom G 6   Average BG readin  Time in range: 79%   15% high   5% very high   <1% low   <1% very low    Site change: q10 days    2 weeks prior her average BG was 146 and she was in target range 77% of the time     Supplies: [facility]       Sensor was downloaded in clinic today and reviewed with patient.   Please see attached document for download.         HYPOGLYCEMIA:  <1% low and <1% very low   2 weeks prior 1% low   once to 30??- she "felt bad"    Maybe some hypoglycemia unawareness or error with meter   Some in the 70's       MEALS: eating 3 meals per day   drinking OJ sometimes   Drinks: water    Sparking water- SF -- ICE - SF       CURRENT EXERCISE:  No      Review of Systems  Review of Systems   Constitutional:  Negative for appetite change, fatigue and unexpected weight change.   HENT:  Negative for trouble swallowing.    Eyes:  Negative for visual disturbance.   Respiratory:  Negative for shortness of breath.    Cardiovascular:  Negative for chest pain.   Gastrointestinal:  Negative for abdominal pain, diarrhea and nausea.   Endocrine: Negative for polydipsia, polyphagia and polyuria.   Genitourinary:         + Nocturia 2 times per night    Musculoskeletal:  Negative for arthralgias.   Skin:  Negative for wound.   Neurological:  Negative for numbness.   Psychiatric/Behavioral:  Positive for sleep disturbance.      Physical Exam   Physical Exam  Vitals and nursing note reviewed.   Constitutional:       Appearance: She is well-developed. She is obese.   HENT:      Head: Normocephalic and atraumatic.      Right Ear: External ear normal.      Left Ear: External ear normal.      Nose: Nose normal. "   Neck:      Thyroid: No thyromegaly.      Trachea: No tracheal deviation.   Cardiovascular:      Rate and Rhythm: Normal rate and regular rhythm.      Heart sounds: No murmur heard.  Pulmonary:      Effort: Pulmonary effort is normal. No respiratory distress.      Breath sounds: Normal breath sounds.   Abdominal:      Palpations: Abdomen is soft.      Tenderness: There is no abdominal tenderness.      Hernia: No hernia is present.   Musculoskeletal:      Cervical back: Normal range of motion and neck supple.   Skin:     General: Skin is warm and dry.      Capillary Refill: Capillary refill takes less than 2 seconds.      Findings: No rash.      Comments: She is wearing the VGo to her abdomen.    Neurological:      Mental Status: She is alert and oriented to person, place, and time.      Cranial Nerves: No cranial nerve deficit.   Psychiatric:         Behavior: Behavior normal.         Judgment: Judgment normal.         FOOT EXAMINATION: Appropriate footwear.         Lab Results   Component Value Date    TSH 4.993 (H) 01/17/2023         Type 2 diabetes mellitus with hyperglycemia, with long-term current use of insulin  Controlled  A1c has greatly improved  Blood sugar readings are at goal on Dexcom download  Dexcom is helping patient with self awareness and self accountability        -- Medication Changes:     Continue Metformin 1000 mg daily-- forgets to take the evening dose of metformin     Continue Farxiga 10 mg daily     Continue VGo30 with Fiasp    5-6 clicks before meals   Give clicks BEFORE THE MEAL   CHANGE THE VGO EVERY 24 HOURS!!!!!   We discussed that she should not be giving herself additional clicks overnight as this is causing hypoglycemia    Continue Ozempic  2 mg weekly      -- Reviewed goals of therapy are to get the best control we can without hypoglycemia.  -- Reviewed patient's current insulin regimen. Clarified proper insulin dose and timing in relation to meals, etc. Insulin injection sites  and proper rotation instructed.    -- Advised frequent self blood glucose monitoring.  Patient encouraged to document glucose results and bring them to every clinic visit.  Continue to use dexcom personal CGM -- [facility]  --helping patient with self awareness and self accountability  -- Hypoglycemia precautions discussed. Instructed on precautions before driving.    -- Call for Bg repeatedly < 70 or > 200.   -- Close adherence to lifestyle changes recommended.   -- Periodic follow ups for eye evaluations, foot care and dental care suggested.      patient will benefit from a Dexcom G6 to prevent hypoglycemia due to patient experiences level 2 or hypoglycemic event <54   Patient has diabetes mellitus and manages diabetes with intensive insulin regimen and uses prandial and basal insulin daily-- on the VGO   Patient requires a therapeutic CGM and is willing to use therapeutic CGM for the necessary frequent adjustments of insulin therapy.  I have completed an in-person visit during the previous 6 months and will continue to have in-person visits every 6 months to assess adherence to their CGM regimen and diabetes treatment plan.  Due to COVID pandemic and need for remote monitoring this tool is medically necessary        Type 2 diabetes mellitus with diabetic polyneuropathy, with long-term current use of insulin  Optimize BG readings.   See above.       Educated patient to check feet daily for any foreign objects and/or wounds. Discussed with patient the importance of wearing appropriate footwear at all times, not to walk barefoot ever, and to check shoes before putting them on feet. Instructed patient to keep feet dry by regularly changing shoes and socks and drying feet after baths and exercises. Also, instructed patient to report any new lesions, discolorations, or swelling to a healthcare professional.        Microalbuminuria due to type 2 diabetes mellitus  Optimize BG readings.    See above.   On ACEi and SGLT2       Type 2 diabetes mellitus with stage 3a chronic kidney disease, with long-term current use of insulin  Optimize BG readings without insulin stacking or hypoglycemia   See above.   On Farxiga and Lisinopril     Essential hypertension, benign  BP goal is < 140/90.   Tolerating ACEi  Controlled   Blood pressure goals discussed with patient      Dyslipidemia, goal LDL below 100  Reports noncompliance with her Crestor  Encouraged patient to get back on track   RX sent to pharmacy         Class 1 obesity due to excess calories with serious comorbidity and body mass index (BMI) of 34.0 to 34.9 in adult  Body mass index is 34.68 kg/m².  Increases insulin resistance.   Discussed DM diet and exercise.       Anemia  Check for iron deficiency   Schedule colonoscopy           Spent 25 minutes withrt patient with > 50% time spent in counseling on medication regimen         Follow up in about 4 months (around 5/25/2023).  Check Iron level and Ferritin level today please   See aleyda to schedule colonoscopy-- needs to be done! She is anemic  Follow up with me in 4 months with labs prior        Orders Placed This Encounter   Procedures    IRON AND TIBC     Standing Status:   Future     Standing Expiration Date:   3/25/2024    FERRITIN     Standing Status:   Future     Standing Expiration Date:   3/25/2024    Hemoglobin A1C     Standing Status:   Future     Standing Expiration Date:   7/25/2024    Basic Metabolic Panel     Standing Status:   Future     Standing Expiration Date:   7/25/2024         Recommendations were discussed with the patient in detail  The patient verbalized understanding and agrees with the plan outlined as above.

## 2023-01-25 NOTE — PROCEDURES
Routine Foot Care    Date/Time: 1/25/2023 9:30 AM  Performed by: Annmarie Keane DPM  Authorized by: Annmarie Keane DPM     Consent Done?:  Yes (Verbal)    Nail Care Type:  Debride  Location(s): All  (Left 1st Toe, Left 3rd Toe, Left 2nd Toe, Left 4th Toe, Left 5th Toe, Right 1st Toe, Right 2nd Toe, Right 3rd Toe, Right 4th Toe and Right 5th Toe)  Patient tolerance:  Patient tolerated the procedure well with no immediate complications

## 2023-01-25 NOTE — ASSESSMENT & PLAN NOTE
Body mass index is 34.68 kg/m².  Increases insulin resistance.   Discussed DM diet and exercise.

## 2023-01-25 NOTE — PROGRESS NOTES
Health Maintenance Due   Topic Date Due    COVID-19 Vaccine (4 - Booster for Moderna series) 04/14/2022    Mammogram  02/18/2023        Chart review done.   HM updated.   Immunizations reviewed & updated.   Care Everywhere updated.

## 2023-01-25 NOTE — ASSESSMENT & PLAN NOTE
Reports noncompliance with her Crestor  Encouraged patient to get back on track   RX sent to pharmacy

## 2023-01-25 NOTE — ASSESSMENT & PLAN NOTE
Controlled  A1c has greatly improved  Blood sugar readings are at goal on Dexcom download  Dexcom is helping patient with self awareness and self accountability        -- Medication Changes:     Continue Metformin 1000 mg daily-- forgets to take the evening dose of metformin     Continue Farxiga 10 mg daily     Continue VGo30 with Fiasp    5-6 clicks before meals   Give clicks BEFORE THE MEAL   CHANGE THE VGO EVERY 24 HOURS!!!!!   We discussed that she should not be giving herself additional clicks overnight as this is causing hypoglycemia    Continue Ozempic  2 mg weekly      -- Reviewed goals of therapy are to get the best control we can without hypoglycemia.  -- Reviewed patient's current insulin regimen. Clarified proper insulin dose and timing in relation to meals, etc. Insulin injection sites and proper rotation instructed.    -- Advised frequent self blood glucose monitoring.  Patient encouraged to document glucose results and bring them to every clinic visit.  Continue to use dexcom personal CGM -- supplies from [facility] [facility]  --helping patient with self awareness and self accountability  -- Hypoglycemia precautions discussed. Instructed on precautions before driving.    -- Call for Bg repeatedly < 70 or > 200.   -- Close adherence to lifestyle changes recommended.   -- Periodic follow ups for eye evaluations, foot care and dental care suggested.      patient will benefit from a Dexcom G6 to prevent hypoglycemia due to patient experiences level 2 or hypoglycemic event <54   Patient has diabetes mellitus and manages diabetes with intensive insulin regimen and uses prandial and basal insulin daily-- on the VGO   Patient requires a therapeutic CGM and is willing to use therapeutic CGM for the necessary frequent adjustments of insulin therapy.  I have completed an in-person visit during the previous 6 months and will continue to have in-person visits every 6 months to assess adherence to their  CGM regimen and diabetes treatment plan.  Due to COVID pandemic and need for remote monitoring this tool is medically necessary

## 2023-01-25 NOTE — PROGRESS NOTES
Subjective:      Patient ID: Aishwarya Jensen is a 62 y.o. female.    Chief Complaint: No chief complaint on file.    Aishwarya is a 62 y.o. female who presents to the clinic for follow-up evaluation and treatment of high risk feet. Last here 4+ months ago - states missed d/t weather.    Aishwarya has a past medical history of Diabetes mellitus, type 2, Hypercholesteremia, and Hypertension. Dx DM 30 yrs.ago. 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  Knee pain - ortho.referral last visit.     Wants nails care again; concerned about color L 2nd toe nail. Nails hurt in shoes.     Going on a cruise July w/ daughter & family.    PCP: Joey Cintron MD    Date Last Seen by PCP: 8/8/22   DM management: Micki Knott NP  DOLDIVINE 10/25/22 due today    Current shoe gear: tennis shoes    Hemoglobin A1C   Date Value Ref Range Status   01/17/2023 6.9 (H) 4.0 - 5.6 % Final     Comment:     ADA Screening Guidelines:  5.7-6.4%  Consistent with prediabetes  >or=6.5%  Consistent with diabetes    High levels of fetal hemoglobin interfere with the HbA1C  assay. Heterozygous hemoglobin variants (HbS, HgC, etc)do  not significantly interfere with this assay.   However, presence of multiple variants may affect accuracy.     10/18/2022 7.9 (H) 4.0 - 5.6 % Final     Comment:     ADA Screening Guidelines:  5.7-6.4%  Consistent with prediabetes  >or=6.5%  Consistent with diabetes    High levels of fetal hemoglobin interfere with the HbA1C  assay. Heterozygous hemoglobin variants (HbS, HgC, etc)do  not significantly interfere with this assay.   However, presence of multiple variants may affect accuracy.     05/11/2022 8.4 (H) 4.0 - 5.6 % Final     Comment:     ADA Screening Guidelines:  5.7-6.4%  Consistent with prediabetes  >or=6.5%  Consistent with diabetes    High levels of fetal hemoglobin interfere with the HbA1C  assay. Heterozygous hemoglobin variants (HbS, HgC, etc)do  not significantly interfere with this assay.   However,  presence of multiple variants may affect accuracy.        Objective:      Physical Exam  Vitals signs reviewed.   Constitutional:       General: She is not in acute distress.     Appearance: Normal appearance. She is well-developed and  obese.  Cardiovascular:      Pulses: Dorsalis pedis pulses are 2+ BL.      Comments: Venous insufficiency d/t varicosities; mild ankle edema BLE R>L & telangectasias, w/ mild stasis hyperpigmentation present.     Musculoskeletal:     General: No swelling, tenderness, deformity or signs of injury.      She exhibits normal ROM.        Prominent metatarsal heads present. No bunion.      Comments: Flexible cavus B/L  Feet:      Skin integrity: Skin integrity normal. No skin breakdown, callus or dry skin.      Toenail Condition: toenails are hypertrophic, abnormally thick & gryphotic 2nd toe L w/ mycosis - Fungal disease present to nail plates, w/ discoloration, dystrophic changes B/L sparing 4th B/L.  Skin:     General: Skin is warm and dry.      Capillary Refill: < 2 seconds.      Findings: No bruising, signs of injury or lesion.      Neurological:      Mental Status: She is AAO x 3.      Sensory: Sensation is intact to light touch although states has numbness & parasthesias BLE.     Motor: Motor function is intact. No weakness or atrophy.      Gait: Gait is intact. Gait normal.   Psychiatric:         Mood and Affect: Mood and affect normal. Mood is not anxious.          Behavior: Behavior normal. Behavior is cooperative.      Assessment:      Encounter Diagnoses   Name Primary?    Type 2 diabetes mellitus with diabetic polyneuropathy, with long-term current use of insulin Yes    Varicose veins of leg with edema, bilateral     Enlarged and hypertrophic nails     Pain due to onychomycosis of toenails of both feet      Problem List Items Addressed This Visit          Endocrine    Type 2 diabetes mellitus with diabetic polyneuropathy, with long-term current use of insulin - Primary     Relevant Orders    Routine Foot Care     Other Visit Diagnoses       Varicose veins of leg with edema, bilateral        Enlarged and hypertrophic nails        Relevant Orders    Routine Foot Care    Pain due to onychomycosis of toenails of both feet        Relevant Orders    Routine Foot Care            Plan:      Diagnoses and all orders for this visit:    Type 2 diabetes mellitus with diabetic polyneuropathy, with long-term current use of insulin  -     Routine Foot Care    Varicose veins of leg with edema, bilateral    Enlarged and hypertrophic nails  -     Routine Foot Care    Pain due to onychomycosis of toenails of both feet  -     Routine Foot Care    I counseled the patient on her conditions, their implications and medical management.    - Diabetic Foot Education. Patient reminded of the importance of good nutrition and blood sugar control to help prevent podiatric complications of diabetes. We discussed wearing proper shoe gear, daily foot inspections, never walking without protective shoe gear, podiatric nail visits every 3-4 months, sooner prn.      - With patient's permission, nails were aggressively reduced and debrided x 10 to their soft tissue attachment, mechanically removing all offending nail and debris. Patient relates relief following the procedure.

## 2023-01-26 ENCOUNTER — TELEPHONE (OUTPATIENT)
Dept: DIABETES | Facility: CLINIC | Age: 63
End: 2023-01-26
Payer: MEDICARE

## 2023-01-27 ENCOUNTER — PATIENT OUTREACH (OUTPATIENT)
Dept: ADMINISTRATIVE | Facility: OTHER | Age: 63
End: 2023-01-27
Payer: MEDICARE

## 2023-01-27 NOTE — PROGRESS NOTES
CHW - Outreach Attempt    Community Health Worker left a voicemail message for 1st attempt to contact patient regarding: SDOH  Community Health Worker to attempt to contact patient on: 01/27/2023

## 2023-02-07 ENCOUNTER — PATIENT OUTREACH (OUTPATIENT)
Dept: ADMINISTRATIVE | Facility: OTHER | Age: 63
End: 2023-02-07
Payer: MEDICARE

## 2023-02-07 NOTE — PROGRESS NOTES
CHW - Follow Up    This Community Health Worker completed a follow up visit with patient via telephone today.  Pt/Caregiver reported: stated that she has not received a call from [facility] yet   Community Health Worker provided: chw gave patient the numbers so she can call and and try speed up the process for assistance  Follow up required:   Follow-up Outreach - Due: 3/10/2023

## 2023-02-08 ENCOUNTER — OFFICE VISIT (OUTPATIENT)
Dept: PRIMARY CARE CLINIC | Facility: CLINIC | Age: 63
End: 2023-02-08
Payer: COMMERCIAL

## 2023-02-08 VITALS
WEIGHT: 215.63 LBS | TEMPERATURE: 97 F | RESPIRATION RATE: 18 BRPM | SYSTOLIC BLOOD PRESSURE: 118 MMHG | HEART RATE: 82 BPM | OXYGEN SATURATION: 98 % | DIASTOLIC BLOOD PRESSURE: 74 MMHG | HEIGHT: 67 IN | BODY MASS INDEX: 33.84 KG/M2

## 2023-02-08 DIAGNOSIS — Z00.00 ANNUAL PHYSICAL EXAM: Primary | ICD-10-CM

## 2023-02-08 DIAGNOSIS — Z79.4 TYPE 2 DIABETES MELLITUS WITH STAGE 3A CHRONIC KIDNEY DISEASE, WITH LONG-TERM CURRENT USE OF INSULIN: ICD-10-CM

## 2023-02-08 DIAGNOSIS — N18.31 TYPE 2 DIABETES MELLITUS WITH STAGE 3A CHRONIC KIDNEY DISEASE, WITH LONG-TERM CURRENT USE OF INSULIN: ICD-10-CM

## 2023-02-08 DIAGNOSIS — E11.22 TYPE 2 DIABETES MELLITUS WITH STAGE 3A CHRONIC KIDNEY DISEASE, WITH LONG-TERM CURRENT USE OF INSULIN: ICD-10-CM

## 2023-02-08 DIAGNOSIS — Z12.31 ENCOUNTER FOR SCREENING MAMMOGRAM FOR BREAST CANCER: ICD-10-CM

## 2023-02-08 PROBLEM — E66.9 OBESITY (BMI 30.0-34.9): Status: ACTIVE | Noted: 2021-03-11

## 2023-02-08 PROCEDURE — 3066F PR DOCUMENTATION OF TREATMENT FOR NEPHROPATHY: ICD-10-PCS | Mod: CPTII,S$GLB,, | Performed by: FAMILY MEDICINE

## 2023-02-08 PROCEDURE — 1160F PR REVIEW ALL MEDS BY PRESCRIBER/CLIN PHARMACIST DOCUMENTED: ICD-10-PCS | Mod: CPTII,S$GLB,, | Performed by: FAMILY MEDICINE

## 2023-02-08 PROCEDURE — 1160F RVW MEDS BY RX/DR IN RCRD: CPT | Mod: CPTII,S$GLB,, | Performed by: FAMILY MEDICINE

## 2023-02-08 PROCEDURE — 3008F BODY MASS INDEX DOCD: CPT | Mod: CPTII,S$GLB,, | Performed by: FAMILY MEDICINE

## 2023-02-08 PROCEDURE — 3078F DIAST BP <80 MM HG: CPT | Mod: CPTII,S$GLB,, | Performed by: FAMILY MEDICINE

## 2023-02-08 PROCEDURE — 99396 PR PREVENTIVE VISIT,EST,40-64: ICD-10-PCS | Mod: S$GLB,,, | Performed by: FAMILY MEDICINE

## 2023-02-08 PROCEDURE — 3044F PR MOST RECENT HEMOGLOBIN A1C LEVEL <7.0%: ICD-10-PCS | Mod: CPTII,S$GLB,, | Performed by: FAMILY MEDICINE

## 2023-02-08 PROCEDURE — 3060F POS MICROALBUMINURIA REV: CPT | Mod: CPTII,S$GLB,, | Performed by: FAMILY MEDICINE

## 2023-02-08 PROCEDURE — 3074F PR MOST RECENT SYSTOLIC BLOOD PRESSURE < 130 MM HG: ICD-10-PCS | Mod: CPTII,S$GLB,, | Performed by: FAMILY MEDICINE

## 2023-02-08 PROCEDURE — 3044F HG A1C LEVEL LT 7.0%: CPT | Mod: CPTII,S$GLB,, | Performed by: FAMILY MEDICINE

## 2023-02-08 PROCEDURE — 3008F PR BODY MASS INDEX (BMI) DOCUMENTED: ICD-10-PCS | Mod: CPTII,S$GLB,, | Performed by: FAMILY MEDICINE

## 2023-02-08 PROCEDURE — 1159F PR MEDICATION LIST DOCUMENTED IN MEDICAL RECORD: ICD-10-PCS | Mod: CPTII,S$GLB,, | Performed by: FAMILY MEDICINE

## 2023-02-08 PROCEDURE — 99999 PR PBB SHADOW E&M-EST. PATIENT-LVL V: CPT | Mod: PBBFAC,,, | Performed by: FAMILY MEDICINE

## 2023-02-08 PROCEDURE — 3066F NEPHROPATHY DOC TX: CPT | Mod: CPTII,S$GLB,, | Performed by: FAMILY MEDICINE

## 2023-02-08 PROCEDURE — 3060F PR POS MICROALBUMINURIA RESULT DOCUMENTED/REVIEW: ICD-10-PCS | Mod: CPTII,S$GLB,, | Performed by: FAMILY MEDICINE

## 2023-02-08 PROCEDURE — 1159F MED LIST DOCD IN RCRD: CPT | Mod: CPTII,S$GLB,, | Performed by: FAMILY MEDICINE

## 2023-02-08 PROCEDURE — 99396 PREV VISIT EST AGE 40-64: CPT | Mod: S$GLB,,, | Performed by: FAMILY MEDICINE

## 2023-02-08 PROCEDURE — 3074F SYST BP LT 130 MM HG: CPT | Mod: CPTII,S$GLB,, | Performed by: FAMILY MEDICINE

## 2023-02-08 PROCEDURE — 3078F PR MOST RECENT DIASTOLIC BLOOD PRESSURE < 80 MM HG: ICD-10-PCS | Mod: CPTII,S$GLB,, | Performed by: FAMILY MEDICINE

## 2023-02-08 PROCEDURE — 99999 PR PBB SHADOW E&M-EST. PATIENT-LVL V: ICD-10-PCS | Mod: PBBFAC,,, | Performed by: FAMILY MEDICINE

## 2023-02-08 RX ORDER — INSULIN ASPART 100 [IU]/ML
INJECTION, SOLUTION INTRAVENOUS; SUBCUTANEOUS 3 TIMES DAILY
COMMUNITY
Start: 2023-01-16 | End: 2023-07-18

## 2023-02-08 RX ORDER — EZETIMIBE 10 MG/1
10 TABLET ORAL
COMMUNITY
Start: 2023-01-25 | End: 2023-07-18

## 2023-02-08 NOTE — PROGRESS NOTES
"Subjective:       Patient ID: Aishwarya Jensen is a 62 y.o. female.    Chief Complaint: Annual Exam    Aishwarya Jensen is a 62 y.o. female seen today for a routine checkup. The patient has no specific complaints or concerns at this time.  No recent illness or injury.  Compliant with all prescribed medications without adverse side effects.  Diabetes has been well controlled.  Due for mammogram later this month.  Screening colonoscopy scheduled for late March    Review of Systems   Constitutional:  Negative for chills, fatigue and fever.   HENT:  Negative for congestion.    Eyes:  Negative for visual disturbance.   Respiratory:  Negative for cough and shortness of breath.    Cardiovascular:  Negative for chest pain.   Gastrointestinal:  Negative for abdominal pain, nausea and vomiting.   Genitourinary:  Negative for difficulty urinating.   Musculoskeletal:  Negative for arthralgias.   Skin:  Negative for rash.   Neurological:  Negative for dizziness.   Psychiatric/Behavioral:  Negative for sleep disturbance.      Objective:      Vitals:    02/08/23 1044   BP: 118/74   BP Location: Left arm   Patient Position: Sitting   BP Method: Medium (Manual)   Pulse: 82   Resp: 18   Temp: 97.4 °F (36.3 °C)   TempSrc: Temporal   SpO2: 98%   Weight: 97.8 kg (215 lb 9.8 oz)   Height: 5' 7" (1.702 m)     Physical Exam  Vitals and nursing note reviewed.   Constitutional:       General: She is not in acute distress.     Appearance: Normal appearance. She is well-developed.   HENT:      Head: Normocephalic and atraumatic.   Neck:      Vascular: No carotid bruit.   Cardiovascular:      Rate and Rhythm: Normal rate and regular rhythm.      Heart sounds: Normal heart sounds.   Pulmonary:      Effort: Pulmonary effort is normal.      Breath sounds: Normal breath sounds.   Musculoskeletal:      Right lower leg: No edema.      Left lower leg: No edema.   Skin:     General: Skin is warm and dry.   Neurological:      Mental " Status: She is alert and oriented to person, place, and time.   Psychiatric:         Mood and Affect: Mood normal.         Behavior: Behavior normal.       Lab Results   Component Value Date    WBC 6.51 01/17/2023    HGB 10.7 (L) 01/17/2023    HCT 36.2 (L) 01/17/2023     01/17/2023    CHOL 233 (H) 01/17/2023    TRIG 115 01/17/2023    HDL 60 01/17/2023    ALT 20 01/17/2023    AST 21 01/17/2023     01/17/2023    K 3.8 01/17/2023     01/17/2023    CREATININE 1.1 01/17/2023    BUN 23 01/17/2023    CO2 22 (L) 01/17/2023    TSH 4.993 (H) 01/17/2023    HGBA1C 6.9 (H) 01/17/2023      Assessment:       1. Annual physical exam    2. Encounter for screening mammogram for breast cancer    3. Type 2 diabetes mellitus with stage 3a chronic kidney disease, with long-term current use of insulin          Plan:       Annual physical exam  All age-appropriate screening up-to-date or scheduled  Encounter for screening mammogram for breast cancer  -     Mammo Digital Screening Bilat w/ [place]; Future; Expected date: 02/18/2023    Type 2 diabetes mellitus with stage 3a chronic kidney disease, with long-term current use of insulin  Much improved.  Follow-up with diabetes management.  Needs to see Optometry for annual retinopathy screening    Medication List with Changes/Refills   Current Medications    AMLODIPINE (NORVASC) 5 MG TABLET    Take 1 tablet (5 mg total) by mouth once daily.    ASPIRIN (ECOTRIN) 81 MG EC TABLET    Take 1 tablet (81 mg total) by mouth once daily.    BLOOD SUGAR DIAGNOSTIC STRP    Use to test blood glucose four (4) times a day, to be used with insurance-preferred brand of glucometer/supplies.    BLOOD-GLUCOSE METER KIT    To check BG 4 times daily, to use with insurance preferred meter    BLOOD-GLUCOSE METER,CONTINUOUS (DEXCOM G6 ) MISC    1 Device by Misc.(Non-Drug; Combo Route) route once. for 1 dose    BLOOD-GLUCOSE SENSOR (DEXCOM G6 SENSOR) DAVID    1 sensor every 10 days     BLOOD-GLUCOSE TRANSMITTER (DEXCOM G6 TRANSMITTER) DAVID    1 transmitter every 3 months    CYCLOBENZAPRINE (FLEXERIL) 10 MG TABLET    Take 10 mg by mouth once daily. TAKE ONE TABLET BY MOUTH ONCE DAILY AS NEEDED    DAPAGLIFLOZIN (FARXIGA) 10 MG TABLET    Take 1 tablet (10 mg total) by mouth once daily.    EZETIMIBE (ZETIA) 10 MG TABLET    Take 10 mg by mouth.    FLUCONAZOLE (DIFLUCAN) 150 MG TAB    TAKE ONE TABLET BY MOUTH AS A ONE TIME DOSE MAY REPEAT IN 3 DAYS IF NEEDED FOR 1 DOSE.    HYDROXYZINE PAMOATE (VISTARIL) 50 MG CAP    Take 1 capsule (50 mg total) by mouth every 8 (eight) hours as needed (itching).    INSULIN ASPART U-100 (NOVOLOG) 100 UNIT/ML INJECTION    Inject into the skin 3 (three) times daily.    INSULIN ASPART, NIACINAMIDE, 100 UNIT/ML SOLN    To use with VGo 30- 5-6 clicks TID AC, Max TDD of 100 units. Vials only, Normal    LANCETS MISC    Use to test blood glucose four (4) times a day, discard lancet after each use    LISINOPRIL-HYDROCHLOROTHIAZIDE (PRINZIDE,ZESTORETIC) 20-12.5 MG PER TABLET    Take 1 tablet by mouth once daily    MELOXICAM (MOBIC) 15 MG TABLET    Take 1 tablet (15 mg total) by mouth once daily. With meals    METFORMIN (GLUCOPHAGE) 1000 MG TABLET    Take 1 tablet (1,000 mg total) by mouth daily with breakfast.    MUPIROCIN (BACTROBAN) 2 % OINTMENT    Apply topically 3 (three) times daily.    NABUMETONE (RELAFEN) 750 MG TABLET    Take 750 mg by mouth 2 (two) times daily.    ROSUVASTATIN (CRESTOR) 40 MG TAB    Take 1 tablet (40 mg total) by mouth every evening.    SEMAGLUTIDE (OZEMPIC) 2 MG/DOSE (8 MG/3 ML) PNIJ    Inject 2 mg into the skin every 7 days.    SUB-Q INSULIN DEVICE, 30 UNIT (V-GO 30) DAVID    Inject 1 Device into the skin once daily.    TRAZODONE (DESYREL) 50 MG TABLET    TAKE 1 TO 2 TABLETS BY MOUTH AT BEDTIME AS NEEDED FOR INSOMNIA

## 2023-02-24 ENCOUNTER — TELEPHONE (OUTPATIENT)
Dept: DIABETES | Facility: CLINIC | Age: 63
End: 2023-02-24
Payer: MEDICARE

## 2023-02-24 NOTE — TELEPHONE ENCOUNTER
----- Message from Sena Alfred sent at 2/24/2023  3:04 PM CST -----  Regarding: phx clarify  [facility] on Aishwarya Jensen MRN regarding her script for insulin aspart, niacinamide, 100 unit/mL Sol  states they gave her something else and wants to talk     Please return call at your earliest opportunity  [phone]

## 2023-03-06 ENCOUNTER — PATIENT OUTREACH (OUTPATIENT)
Dept: ADMINISTRATIVE | Facility: OTHER | Age: 63
End: 2023-03-06
Payer: COMMERCIAL

## 2023-03-06 NOTE — PROGRESS NOTES
CHW - Follow Up    This Community Health Worker completed a follow up visit with patient via telephone today.  Pt/Caregiver reported: patient stated that she called the number the chw gave her and got the help she needs and do not need   Community Health Worker provided: chw will closed case at this time   Follow up required:   No future outreach task assigned       CHW - Case Closure    This Community Health Worker spoke to patient via telephone today.   Pt/Caregiver denied any additional needs at this time and agrees with episode closure at this time.  Provided patient with Community Health Worker's contact information and encouraged him/her to contact this Community Health Worker if additional needs arise.

## 2023-04-11 ENCOUNTER — TELEPHONE (OUTPATIENT)
Dept: PRIMARY CARE CLINIC | Facility: CLINIC | Age: 63
End: 2023-04-11
Payer: COMMERCIAL

## 2023-04-11 NOTE — TELEPHONE ENCOUNTER
----- Message from Lakesha Dial sent at 4/11/2023 11:38 AM CDT -----  Contact: [phone]  Pt would like a callback to go over her recent shot records. Please call.              Thank you

## 2023-04-17 ENCOUNTER — TELEPHONE (OUTPATIENT)
Dept: PRIMARY CARE CLINIC | Facility: CLINIC | Age: 63
End: 2023-04-17
Payer: COMMERCIAL

## 2023-04-17 NOTE — TELEPHONE ENCOUNTER
----- Message from Eugenie Goodman sent at 4/17/2023  2:06 PM CDT -----  Contact: [phone]  Pt said she needs a call back to see if she due for her Covid shot. Please call pt back.

## 2023-04-18 ENCOUNTER — TELEPHONE (OUTPATIENT)
Dept: PRIMARY CARE CLINIC | Facility: CLINIC | Age: 63
End: 2023-04-18
Payer: COMMERCIAL

## 2023-04-18 NOTE — TELEPHONE ENCOUNTER
----- Message from Savita Rockwell sent at 4/18/2023  4:10 PM CDT -----  Contact: Patient, [phone]  Calling to speak with the nurse regarding her insulin. Please call her. Thanks.

## 2023-04-18 NOTE — TELEPHONE ENCOUNTER
Called pt regarding message. Pt stated she is in the process of changing insurance to [employer]. Pt stated she received a letter stating [employer] wouldn't cover V-GO. Pt would like provider to order an alternative.

## 2023-04-19 ENCOUNTER — TELEPHONE (OUTPATIENT)
Dept: DIABETES | Facility: CLINIC | Age: 63
End: 2023-04-19
Payer: COMMERCIAL

## 2023-04-19 NOTE — TELEPHONE ENCOUNTER
Spoke to pt, stated that she has not officially switched to [employer], stated to pt that v-go rep stated that v-go should still be covered once pt switches insurance, pt stated once she switches insurance she will update information with registration and clinic

## 2023-04-19 NOTE — TELEPHONE ENCOUNTER
I talked with V Go representative who reports that [employer] does cover the V Go  Has the patient actually switched to people[employer] insurance yet because it still says [employer] in her chart?  We would need her updated insurance information and we can send it to the pharmacy again and see what happens.  From what the V Go representative is telling me is that all Medicare plans cover the V Go some may require a prior authorization but they definitely cover the V Go  So from this it sounds like it may just require prior authorization

## 2023-04-25 ENCOUNTER — HOSPITAL ENCOUNTER (OUTPATIENT)
Dept: RADIOLOGY | Facility: HOSPITAL | Age: 63
Discharge: HOME | End: 2023-04-25
Attending: FAMILY MEDICINE
Payer: COMMERCIAL

## 2023-04-25 ENCOUNTER — PATIENT MESSAGE (OUTPATIENT)
Dept: PRIMARY CARE CLINIC | Facility: CLINIC | Age: 63
End: 2023-04-25
Payer: COMMERCIAL

## 2023-04-25 ENCOUNTER — OFFICE VISIT (OUTPATIENT)
Dept: PODIATRY | Facility: CLINIC | Age: 63
End: 2023-04-25
Payer: COMMERCIAL

## 2023-04-25 VITALS — BODY MASS INDEX: 34.69 KG/M2 | WEIGHT: 221 LBS | HEIGHT: 67 IN | HEART RATE: 60 BPM

## 2023-04-25 DIAGNOSIS — M79.675 PAIN DUE TO ONYCHOMYCOSIS OF TOENAILS OF BOTH FEET: ICD-10-CM

## 2023-04-25 DIAGNOSIS — M79.674 PAIN DUE TO ONYCHOMYCOSIS OF TOENAILS OF BOTH FEET: ICD-10-CM

## 2023-04-25 DIAGNOSIS — E11.42 TYPE 2 DIABETES MELLITUS WITH DIABETIC POLYNEUROPATHY, WITH LONG-TERM CURRENT USE OF INSULIN: Primary | ICD-10-CM

## 2023-04-25 DIAGNOSIS — B35.1 PAIN DUE TO ONYCHOMYCOSIS OF TOENAILS OF BOTH FEET: ICD-10-CM

## 2023-04-25 DIAGNOSIS — Z12.31 ENCOUNTER FOR SCREENING MAMMOGRAM FOR BREAST CANCER: ICD-10-CM

## 2023-04-25 DIAGNOSIS — Z79.4 TYPE 2 DIABETES MELLITUS WITH DIABETIC POLYNEUROPATHY, WITH LONG-TERM CURRENT USE OF INSULIN: Primary | ICD-10-CM

## 2023-04-25 DIAGNOSIS — I83.893 VARICOSE VEINS OF LEG WITH EDEMA, BILATERAL: ICD-10-CM

## 2023-04-25 PROCEDURE — 99999 PR PBB SHADOW E&M-EST. PATIENT-LVL III: CPT | Mod: PBBFAC,,, | Performed by: PODIATRIST

## 2023-04-25 PROCEDURE — 77067 SCR MAMMO BI INCL CAD: CPT | Mod: 26,,, | Performed by: RADIOLOGY

## 2023-04-25 PROCEDURE — 3008F PR BODY MASS INDEX (BMI) DOCUMENTED: ICD-10-PCS | Mod: CPTII,S$GLB,, | Performed by: PODIATRIST

## 2023-04-25 PROCEDURE — 3066F NEPHROPATHY DOC TX: CPT | Mod: CPTII,S$GLB,, | Performed by: PODIATRIST

## 2023-04-25 PROCEDURE — 77063 BREAST TOMOSYNTHESIS BI: CPT | Mod: 26,,, | Performed by: RADIOLOGY

## 2023-04-25 PROCEDURE — 99999 PR PBB SHADOW E&M-EST. PATIENT-LVL III: ICD-10-PCS | Mod: PBBFAC,,, | Performed by: PODIATRIST

## 2023-04-25 PROCEDURE — 3060F POS MICROALBUMINURIA REV: CPT | Mod: CPTII,S$GLB,, | Performed by: PODIATRIST

## 2023-04-25 PROCEDURE — 99499 UNLISTED E&M SERVICE: CPT | Mod: S$GLB,,, | Performed by: PODIATRIST

## 2023-04-25 PROCEDURE — 4010F PR ACE/ARB THEARPY RXD/TAKEN: ICD-10-PCS | Mod: CPTII,S$GLB,, | Performed by: PODIATRIST

## 2023-04-25 PROCEDURE — 77067 SCR MAMMO BI INCL CAD: CPT | Mod: TC

## 2023-04-25 PROCEDURE — 77067 MAMMO DIGITAL SCREENING BILAT WITH TOMO: ICD-10-PCS | Mod: 26,,, | Performed by: RADIOLOGY

## 2023-04-25 PROCEDURE — 3044F HG A1C LEVEL LT 7.0%: CPT | Mod: CPTII,S$GLB,, | Performed by: PODIATRIST

## 2023-04-25 PROCEDURE — 1159F MED LIST DOCD IN RCRD: CPT | Mod: CPTII,S$GLB,, | Performed by: PODIATRIST

## 2023-04-25 PROCEDURE — 77063 MAMMO DIGITAL SCREENING BILAT WITH TOMO: ICD-10-PCS | Mod: 26,,, | Performed by: RADIOLOGY

## 2023-04-25 PROCEDURE — 3066F PR DOCUMENTATION OF TREATMENT FOR NEPHROPATHY: ICD-10-PCS | Mod: CPTII,S$GLB,, | Performed by: PODIATRIST

## 2023-04-25 PROCEDURE — 11721 PR DEBRIDEMENT OF NAILS, 6 OR MORE: ICD-10-PCS | Mod: S$GLB,,, | Performed by: PODIATRIST

## 2023-04-25 PROCEDURE — 99499 NO LOS: ICD-10-PCS | Mod: S$GLB,,, | Performed by: PODIATRIST

## 2023-04-25 PROCEDURE — 3008F BODY MASS INDEX DOCD: CPT | Mod: CPTII,S$GLB,, | Performed by: PODIATRIST

## 2023-04-25 PROCEDURE — 11721 DEBRIDE NAIL 6 OR MORE: CPT | Mod: S$GLB,,, | Performed by: PODIATRIST

## 2023-04-25 PROCEDURE — 3044F PR MOST RECENT HEMOGLOBIN A1C LEVEL <7.0%: ICD-10-PCS | Mod: CPTII,S$GLB,, | Performed by: PODIATRIST

## 2023-04-25 PROCEDURE — 4010F ACE/ARB THERAPY RXD/TAKEN: CPT | Mod: CPTII,S$GLB,, | Performed by: PODIATRIST

## 2023-04-25 PROCEDURE — 1159F PR MEDICATION LIST DOCUMENTED IN MEDICAL RECORD: ICD-10-PCS | Mod: CPTII,S$GLB,, | Performed by: PODIATRIST

## 2023-04-25 PROCEDURE — 3060F PR POS MICROALBUMINURIA RESULT DOCUMENTED/REVIEW: ICD-10-PCS | Mod: CPTII,S$GLB,, | Performed by: PODIATRIST

## 2023-04-25 NOTE — PROGRESS NOTES
Subjective:      Patient ID: Aishwarya Jensen is a 62 y.o. female.    Chief Complaint: No chief complaint on file.    Aishwarya is a 62 y.o. female who presents to the clinic for mail care in high risk feet. Still concerned about color of toenails & thickness of 2nd toe L. Nails hurt in shoes w/ pressure when walking.  No other foot concerns currently.    Going on a cruise July w/ daughter & family.    PCP: Joey Cintron MD    Date Last Seen by PCP:  02/08/2023  DM management: Micki Knott NP  01/25/2023    Current shoe gear: tennis shoes    Aishwarya has a past medical history of Diabetes mellitus, type 2, Hypercholesteremia, and Hypertension.   Dx DM 30 yrs.ago.   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  Knee pain - ortho.referral last visit.   Patient Active Problem List   Diagnosis    Essential hypertension, benign    Dyslipidemia, goal LDL below 100    Microalbuminuria due to type 2 diabetes mellitus    Type 2 diabetes mellitus with hyperglycemia, with long-term current use of insulin    Type 2 diabetes mellitus with diabetic polyneuropathy, with long-term current use of insulin    Asymptomatic varicose veins of bilateral lower extremities    Onychomycosis due to dermatophyte    Obesity (BMI 30.0-34.9)    Stage 3a chronic kidney disease    Dyspnea on exertion    Primary osteoarthritis of right knee    Primary osteoarthritis of left knee    Type 2 diabetes mellitus with stage 3a chronic kidney disease, with long-term current use of insulin    Anemia      Hemoglobin A1C   Date Value Ref Range Status   01/17/2023 6.9 (H) 4.0 - 5.6 % Final     Comment:     ADA Screening Guidelines:  5.7-6.4%  Consistent with prediabetes  >or=6.5%  Consistent with diabetes    High levels of fetal hemoglobin interfere with the HbA1C  assay. Heterozygous hemoglobin variants (HbS, HgC, etc)do  not significantly interfere with this assay.   However, presence of multiple variants may affect accuracy.     10/18/2022 7.9 (H)  4.0 - 5.6 % Final     Comment:     ADA Screening Guidelines:  5.7-6.4%  Consistent with prediabetes  >or=6.5%  Consistent with diabetes    High levels of fetal hemoglobin interfere with the HbA1C  assay. Heterozygous hemoglobin variants (HbS, HgC, etc)do  not significantly interfere with this assay.   However, presence of multiple variants may affect accuracy.     05/11/2022 8.4 (H) 4.0 - 5.6 % Final     Comment:     ADA Screening Guidelines:  5.7-6.4%  Consistent with prediabetes  >or=6.5%  Consistent with diabetes    High levels of fetal hemoglobin interfere with the HbA1C  assay. Heterozygous hemoglobin variants (HbS, HgC, etc)do  not significantly interfere with this assay.   However, presence of multiple variants may affect accuracy.        Objective:      Physical Exam  Vitals signs reviewed.   Constitutional:       General: She is not in acute distress.     Appearance: Normal appearance. She is well-developed and  obese.  Cardiovascular:      Pulses: Dorsalis pedis pulses are 2+ BL.      Comments: Venous insufficiency d/t varicosities R>L; mild ankle edema BLE R>L & telangectasias, w/ mild stasis hyperpigmentation present.     Musculoskeletal:     General: No swelling, tenderness, deformity or signs of injury.      She exhibits normal ROM B/L foot & ankle.        Prominent metatarsal heads present. No bunion.      Comments: Flexible cavus B/L  Feet:      Skin integrity: Skin integrity normal. No skin breakdown, callus or dry skin.      Toenail Condition: toenails are hypertrophic, abnormally thick & gryphotic 2nd toe L w/ mycosis - Fungal disease present to nail plates: L 2nd toe nail>R, 3rd B/L >1st R & 5B/L w/ discoloration, dystrophic changes B/L sparing 4th B/L & most of L hallux except lateral edge.  Skin:     General: Skin is cool & dry toes w/ warmth proximally from midfoot to BK B/L.      Capillary Refill: < 2 seconds.      Findings: No bruising, signs of injury or lesion.      Neurological:       Mental Status: She is AAO x 3.      Sensory: Sensation is intact to light touch although states has numbness & parasthesias BLE.     Motor: Motor function is intact. No weakness or atrophy.      Gait: Gait is intact. Gait normal.   Psychiatric:         Mood and Affect: Mood and affect normal. Mood is not anxious.          Behavior: Behavior normal. Behavior is cooperative.      Assessment:      Encounter Diagnoses   Name Primary?    Type 2 diabetes mellitus with diabetic polyneuropathy, with long-term current use of insulin Yes    Varicose veins of leg with edema, bilateral     Pain due to onychomycosis of toenails of both feet      Problem List Items Addressed This Visit          Endocrine    Type 2 diabetes mellitus with diabetic polyneuropathy, with long-term current use of insulin - Primary    Relevant Orders    Routine Foot Care     Other Visit Diagnoses       Varicose veins of leg with edema, bilateral        Pain due to onychomycosis of toenails of both feet        Relevant Orders    Routine Foot Care            Plan:      Diagnoses and all orders for this visit:    Type 2 diabetes mellitus with diabetic polyneuropathy, with long-term current use of insulin  -     Routine Foot Care    Varicose veins of leg with edema, bilateral    Pain due to onychomycosis of toenails of both feet  -     Routine Foot Care    I counseled the patient on her conditions, their implications and medical management.    - Diabetic Foot Education. Patient reminded of the importance of good nutrition and blood sugar control to help prevent podiatric complications of diabetes. We discussed wearing proper shoe gear, daily foot inspections, never walking without protective shoe gear, podiatric nail visits every 3-4 months, sooner prn.      - With patient's permission, nails were aggressively reduced and debrided x 10 to their soft tissue attachment, mechanically removing all offending nail and debris. Patient relates relief following the  procedure.        A total of 22 mins.was spent on chart review, patient visit & documentation.

## 2023-05-02 ENCOUNTER — PATIENT OUTREACH (OUTPATIENT)
Dept: ADMINISTRATIVE | Facility: HOSPITAL | Age: 63
End: 2023-05-02
Payer: COMMERCIAL

## 2023-05-02 NOTE — PROGRESS NOTES
Health Maintenance Due   Topic Date Due    Low Dose Statin  Never done    COVID-19 Vaccine (4 - Booster for Moderna series) 04/14/2022    Eye Exam  03/14/2023        Chart review done.   HM updated.   Immunizations reviewed & updated.   Care Everywhere updated.

## 2023-05-02 NOTE — PROCEDURES
Routine Foot Care    Date/Time: 4/25/2023 1:30 PM  Performed by: Annmarie Keane DPM  Authorized by: Annmarie Keane DPM     Consent Done?:  Yes (Verbal)    Nail Care Type:  Debride  Location(s): All  (Left 1st Toe, Left 3rd Toe, Left 2nd Toe, Left 4th Toe, Left 5th Toe, Right 1st Toe, Right 2nd Toe, Right 3rd Toe, Right 4th Toe and Right 5th Toe)  Patient tolerance:  Patient tolerated the procedure well with no immediate complications

## 2023-05-04 ENCOUNTER — HOSPITAL ENCOUNTER (OUTPATIENT)
Facility: HOSPITAL | Age: 63
Discharge: HOME HEALTH | DRG: 329 | End: 2023-05-11
Attending: STUDENT IN AN ORGANIZED HEALTH CARE EDUCATION/TRAINING PROGRAM | Admitting: STUDENT IN AN ORGANIZED HEALTH CARE EDUCATION/TRAINING PROGRAM
Payer: MEDICAID

## 2023-05-04 ENCOUNTER — ANESTHESIA EVENT (OUTPATIENT)
Dept: SURGERY | Facility: HOSPITAL | Age: 63
DRG: 329 | End: 2023-05-04
Payer: MEDICAID

## 2023-05-04 ENCOUNTER — ANESTHESIA (OUTPATIENT)
Dept: SURGERY | Facility: HOSPITAL | Age: 63
DRG: 329 | End: 2023-05-04
Payer: MEDICAID

## 2023-05-04 DIAGNOSIS — I10 ESSENTIAL HYPERTENSION, BENIGN: ICD-10-CM

## 2023-05-04 DIAGNOSIS — K56.2 CECAL VOLVULUS: Primary | ICD-10-CM

## 2023-05-04 DIAGNOSIS — R53.1 WEAKNESS: ICD-10-CM

## 2023-05-04 DIAGNOSIS — N18.31 STAGE 3A CHRONIC KIDNEY DISEASE: ICD-10-CM

## 2023-05-04 DIAGNOSIS — M17.11 PRIMARY OSTEOARTHRITIS OF RIGHT KNEE: ICD-10-CM

## 2023-05-04 LAB
ESTIMATED AVG GLUCOSE: 151 MG/DL (ref 68–131)
HBA1C MFR BLD: 6.9 % (ref 4–5.6)
POCT GLUCOSE: 69 MG/DL (ref 70–110)

## 2023-05-04 PROCEDURE — 63600175 PHARM REV CODE 636 W HCPCS: Performed by: ANESTHESIOLOGY

## 2023-05-04 PROCEDURE — 63600175 PHARM REV CODE 636 W HCPCS: Performed by: STUDENT IN AN ORGANIZED HEALTH CARE EDUCATION/TRAINING PROGRAM

## 2023-05-04 PROCEDURE — 63600175 PHARM REV CODE 636 W HCPCS: Performed by: NURSE ANESTHETIST, CERTIFIED REGISTERED

## 2023-05-04 PROCEDURE — 37000008 HC ANESTHESIA 1ST 15 MINUTES: Performed by: STUDENT IN AN ORGANIZED HEALTH CARE EDUCATION/TRAINING PROGRAM

## 2023-05-04 PROCEDURE — 71000039 HC RECOVERY, EACH ADD'L HOUR: Performed by: STUDENT IN AN ORGANIZED HEALTH CARE EDUCATION/TRAINING PROGRAM

## 2023-05-04 PROCEDURE — 99285 EMERGENCY DEPT VISIT HI MDM: CPT | Mod: 25

## 2023-05-04 PROCEDURE — D9220A PRA ANESTHESIA: ICD-10-PCS | Mod: ANES,,, | Performed by: ANESTHESIOLOGY

## 2023-05-04 PROCEDURE — 99222 1ST HOSP IP/OBS MODERATE 55: CPT | Mod: 57,,, | Performed by: STUDENT IN AN ORGANIZED HEALTH CARE EDUCATION/TRAINING PROGRAM

## 2023-05-04 PROCEDURE — 36000708 HC OR TIME LEV III 1ST 15 MIN: Performed by: STUDENT IN AN ORGANIZED HEALTH CARE EDUCATION/TRAINING PROGRAM

## 2023-05-04 PROCEDURE — 36000709 HC OR TIME LEV III EA ADD 15 MIN: Performed by: STUDENT IN AN ORGANIZED HEALTH CARE EDUCATION/TRAINING PROGRAM

## 2023-05-04 PROCEDURE — 88307 TISSUE EXAM BY PATHOLOGIST: CPT | Mod: 26,,, | Performed by: PATHOLOGY

## 2023-05-04 PROCEDURE — 71000033 HC RECOVERY, INTIAL HOUR: Performed by: STUDENT IN AN ORGANIZED HEALTH CARE EDUCATION/TRAINING PROGRAM

## 2023-05-04 PROCEDURE — G0378 HOSPITAL OBSERVATION PER HR: HCPCS

## 2023-05-04 PROCEDURE — 88307 TISSUE EXAM BY PATHOLOGIST: CPT | Performed by: PATHOLOGY

## 2023-05-04 PROCEDURE — 37000009 HC ANESTHESIA EA ADD 15 MINS: Performed by: STUDENT IN AN ORGANIZED HEALTH CARE EDUCATION/TRAINING PROGRAM

## 2023-05-04 PROCEDURE — 12000002 HC ACUTE/MED SURGE SEMI-PRIVATE ROOM

## 2023-05-04 PROCEDURE — D9220A PRA ANESTHESIA: ICD-10-PCS | Mod: CRNA,,, | Performed by: NURSE ANESTHETIST, CERTIFIED REGISTERED

## 2023-05-04 PROCEDURE — 25000003 PHARM REV CODE 250: Performed by: NURSE ANESTHETIST, CERTIFIED REGISTERED

## 2023-05-04 PROCEDURE — 82962 GLUCOSE BLOOD TEST: CPT

## 2023-05-04 PROCEDURE — 83036 HEMOGLOBIN GLYCOSYLATED A1C: CPT | Performed by: STUDENT IN AN ORGANIZED HEALTH CARE EDUCATION/TRAINING PROGRAM

## 2023-05-04 PROCEDURE — 27201423 OPTIME MED/SURG SUP & DEVICES STERILE SUPPLY: Performed by: STUDENT IN AN ORGANIZED HEALTH CARE EDUCATION/TRAINING PROGRAM

## 2023-05-04 PROCEDURE — 99284 EMERGENCY DEPT VISIT MOD MDM: CPT | Mod: 25

## 2023-05-04 PROCEDURE — 96374 THER/PROPH/DIAG INJ IV PUSH: CPT

## 2023-05-04 PROCEDURE — 44160 REMOVAL OF COLON: CPT | Mod: ,,, | Performed by: STUDENT IN AN ORGANIZED HEALTH CARE EDUCATION/TRAINING PROGRAM

## 2023-05-04 RX ORDER — HYDROCODONE BITARTRATE AND ACETAMINOPHEN 5; 325 MG/1; MG/1
1 TABLET ORAL EVERY 4 HOURS PRN
Status: DISCONTINUED | OUTPATIENT
Start: 2023-05-04 | End: 2023-05-05

## 2023-05-04 RX ORDER — LIDOCAINE HYDROCHLORIDE 20 MG/ML
INJECTION INTRAVENOUS
Status: DISCONTINUED | OUTPATIENT
Start: 2023-05-04 | End: 2023-05-04

## 2023-05-04 RX ORDER — SUCCINYLCHOLINE CHLORIDE 20 MG/ML
INJECTION INTRAMUSCULAR; INTRAVENOUS
Status: DISCONTINUED | OUTPATIENT
Start: 2023-05-04 | End: 2023-05-04

## 2023-05-04 RX ORDER — ROCURONIUM BROMIDE 10 MG/ML
INJECTION, SOLUTION INTRAVENOUS
Status: DISCONTINUED | OUTPATIENT
Start: 2023-05-04 | End: 2023-05-04

## 2023-05-04 RX ORDER — ONDANSETRON HYDROCHLORIDE 2 MG/ML
8 INJECTION, SOLUTION INTRAVENOUS ONCE AS NEEDED
Status: DISCONTINUED | OUTPATIENT
Start: 2023-05-05 | End: 2023-05-05 | Stop reason: HOSPADM

## 2023-05-04 RX ORDER — AMLODIPINE BESYLATE 5 MG/1
5 TABLET ORAL DAILY
Status: DISCONTINUED | OUTPATIENT
Start: 2023-05-05 | End: 2023-05-11 | Stop reason: HOSPADM

## 2023-05-04 RX ORDER — TALC
6 POWDER (GRAM) TOPICAL NIGHTLY PRN
Status: DISCONTINUED | OUTPATIENT
Start: 2023-05-04 | End: 2023-05-11 | Stop reason: HOSPADM

## 2023-05-04 RX ORDER — MORPHINE SULFATE 4 MG/ML
4 INJECTION, SOLUTION INTRAMUSCULAR; INTRAVENOUS EVERY 4 HOURS PRN
Status: DISCONTINUED | OUTPATIENT
Start: 2023-05-04 | End: 2023-05-05

## 2023-05-04 RX ORDER — ACETAMINOPHEN 325 MG/1
650 TABLET ORAL EVERY 8 HOURS PRN
Status: DISCONTINUED | OUTPATIENT
Start: 2023-05-04 | End: 2023-05-11 | Stop reason: HOSPADM

## 2023-05-04 RX ORDER — ONDANSETRON HYDROCHLORIDE 2 MG/ML
4 INJECTION, SOLUTION INTRAVENOUS ONCE AS NEEDED
Status: DISCONTINUED | OUTPATIENT
Start: 2023-05-05 | End: 2023-05-04

## 2023-05-04 RX ORDER — OXYCODONE HYDROCHLORIDE 5 MG/1
5 TABLET ORAL EVERY 4 HOURS PRN
Status: DISCONTINUED | OUTPATIENT
Start: 2023-05-04 | End: 2023-05-05 | Stop reason: HOSPADM

## 2023-05-04 RX ORDER — MUPIROCIN 20 MG/G
OINTMENT TOPICAL 2 TIMES DAILY
Status: COMPLETED | OUTPATIENT
Start: 2023-05-05 | End: 2023-05-09

## 2023-05-04 RX ORDER — ACETAMINOPHEN 10 MG/ML
INJECTION, SOLUTION INTRAVENOUS
Status: DISCONTINUED | OUTPATIENT
Start: 2023-05-04 | End: 2023-05-04

## 2023-05-04 RX ORDER — HYDROMORPHONE HYDROCHLORIDE 2 MG/ML
0.2 INJECTION, SOLUTION INTRAMUSCULAR; INTRAVENOUS; SUBCUTANEOUS EVERY 5 MIN PRN
Status: DISCONTINUED | OUTPATIENT
Start: 2023-05-04 | End: 2023-05-05 | Stop reason: HOSPADM

## 2023-05-04 RX ORDER — GLUCAGON 1 MG
1 KIT INJECTION
Status: DISCONTINUED | OUTPATIENT
Start: 2023-05-04 | End: 2023-05-05

## 2023-05-04 RX ORDER — INSULIN ASPART 100 [IU]/ML
0-5 INJECTION, SOLUTION INTRAVENOUS; SUBCUTANEOUS EVERY 6 HOURS PRN
Status: DISCONTINUED | OUTPATIENT
Start: 2023-05-04 | End: 2023-05-05

## 2023-05-04 RX ORDER — SODIUM CHLORIDE, SODIUM LACTATE, POTASSIUM CHLORIDE, CALCIUM CHLORIDE 600; 310; 30; 20 MG/100ML; MG/100ML; MG/100ML; MG/100ML
INJECTION, SOLUTION INTRAVENOUS CONTINUOUS
Status: DISCONTINUED | OUTPATIENT
Start: 2023-05-05 | End: 2023-05-05

## 2023-05-04 RX ORDER — PHENYLEPHRINE HYDROCHLORIDE 10 MG/ML
INJECTION INTRAVENOUS
Status: DISCONTINUED | OUTPATIENT
Start: 2023-05-04 | End: 2023-05-04

## 2023-05-04 RX ORDER — ONDANSETRON 8 MG/1
8 TABLET, ORALLY DISINTEGRATING ORAL EVERY 8 HOURS PRN
Status: DISCONTINUED | OUTPATIENT
Start: 2023-05-04 | End: 2023-05-04

## 2023-05-04 RX ORDER — NEOSTIGMINE METHYLSULFATE 1 MG/ML
INJECTION, SOLUTION INTRAVENOUS
Status: DISCONTINUED | OUTPATIENT
Start: 2023-05-04 | End: 2023-05-04

## 2023-05-04 RX ORDER — MIDAZOLAM HYDROCHLORIDE 1 MG/ML
INJECTION INTRAMUSCULAR; INTRAVENOUS
Status: DISCONTINUED | OUTPATIENT
Start: 2023-05-04 | End: 2023-05-04

## 2023-05-04 RX ORDER — ONDANSETRON 2 MG/ML
INJECTION INTRAMUSCULAR; INTRAVENOUS
Status: DISCONTINUED | OUTPATIENT
Start: 2023-05-04 | End: 2023-05-04

## 2023-05-04 RX ORDER — PROPOFOL 10 MG/ML
VIAL (ML) INTRAVENOUS
Status: DISCONTINUED | OUTPATIENT
Start: 2023-05-04 | End: 2023-05-04

## 2023-05-04 RX ORDER — HYDRALAZINE HYDROCHLORIDE 20 MG/ML
10 INJECTION INTRAMUSCULAR; INTRAVENOUS EVERY 6 HOURS PRN
Status: DISCONTINUED | OUTPATIENT
Start: 2023-05-04 | End: 2023-05-11 | Stop reason: HOSPADM

## 2023-05-04 RX ORDER — SODIUM CHLORIDE 0.9 % (FLUSH) 0.9 %
3 SYRINGE (ML) INJECTION
Status: DISCONTINUED | OUTPATIENT
Start: 2023-05-05 | End: 2023-05-05 | Stop reason: HOSPADM

## 2023-05-04 RX ORDER — DEXAMETHASONE SODIUM PHOSPHATE 4 MG/ML
INJECTION, SOLUTION INTRA-ARTICULAR; INTRALESIONAL; INTRAMUSCULAR; INTRAVENOUS; SOFT TISSUE
Status: DISCONTINUED | OUTPATIENT
Start: 2023-05-04 | End: 2023-05-04

## 2023-05-04 RX ORDER — FENTANYL CITRATE 50 UG/ML
INJECTION, SOLUTION INTRAMUSCULAR; INTRAVENOUS
Status: DISCONTINUED | OUTPATIENT
Start: 2023-05-04 | End: 2023-05-04

## 2023-05-04 RX ORDER — PROMETHAZINE HYDROCHLORIDE 25 MG/1
25 TABLET ORAL EVERY 6 HOURS PRN
Status: DISCONTINUED | OUTPATIENT
Start: 2023-05-04 | End: 2023-05-11 | Stop reason: HOSPADM

## 2023-05-04 RX ADMIN — MIDAZOLAM HYDROCHLORIDE 2 MG: 1 INJECTION, SOLUTION INTRAMUSCULAR; INTRAVENOUS at 09:05

## 2023-05-04 RX ADMIN — SODIUM CHLORIDE, POTASSIUM CHLORIDE, SODIUM LACTATE AND CALCIUM CHLORIDE 1000 ML: 600; 310; 30; 20 INJECTION, SOLUTION INTRAVENOUS at 07:05

## 2023-05-04 RX ADMIN — LIDOCAINE HYDROCHLORIDE 100 MG: 20 INJECTION, SOLUTION INTRAVENOUS at 09:05

## 2023-05-04 RX ADMIN — CEFTRIAXONE 2 G: 1 INJECTION, SOLUTION INTRAVENOUS at 09:05

## 2023-05-04 RX ADMIN — PHENYLEPHRINE HYDROCHLORIDE 100 MCG: 10 INJECTION INTRAVENOUS at 10:05

## 2023-05-04 RX ADMIN — PHENYLEPHRINE HYDROCHLORIDE 100 MCG: 10 INJECTION INTRAVENOUS at 09:05

## 2023-05-04 RX ADMIN — HYDROMORPHONE HYDROCHLORIDE 0.2 MG: 2 INJECTION, SOLUTION INTRAMUSCULAR; INTRAVENOUS; SUBCUTANEOUS at 11:05

## 2023-05-04 RX ADMIN — SODIUM CHLORIDE, SODIUM LACTATE, POTASSIUM CHLORIDE, AND CALCIUM CHLORIDE: .6; .31; .03; .02 INJECTION, SOLUTION INTRAVENOUS at 09:05

## 2023-05-04 RX ADMIN — FENTANYL CITRATE 100 MCG: 50 INJECTION, SOLUTION INTRAMUSCULAR; INTRAVENOUS at 09:05

## 2023-05-04 RX ADMIN — ACETAMINOPHEN 1000 MG: 10 INJECTION, SOLUTION INTRAVENOUS at 09:05

## 2023-05-04 RX ADMIN — ROCURONIUM BROMIDE 45 MG: 10 INJECTION, SOLUTION INTRAVENOUS at 09:05

## 2023-05-04 RX ADMIN — NEOSTIGMINE METHYLSULFATE 5 MG: 1 INJECTION INTRAVENOUS at 11:05

## 2023-05-04 RX ADMIN — GLYCOPYRROLATE 0.6 MG: 0.2 INJECTION, SOLUTION INTRAMUSCULAR; INTRAVITREAL at 11:05

## 2023-05-04 RX ADMIN — FENTANYL CITRATE 50 MCG: 50 INJECTION, SOLUTION INTRAMUSCULAR; INTRAVENOUS at 09:05

## 2023-05-04 RX ADMIN — DEXAMETHASONE SODIUM PHOSPHATE 4 MG: 4 INJECTION, SOLUTION INTRA-ARTICULAR; INTRALESIONAL; INTRAMUSCULAR; INTRAVENOUS; SOFT TISSUE at 09:05

## 2023-05-04 RX ADMIN — MORPHINE SULFATE 4 MG: 4 INJECTION INTRAVENOUS at 07:05

## 2023-05-04 RX ADMIN — HYDRALAZINE HYDROCHLORIDE 10 MG: 20 INJECTION, SOLUTION INTRAMUSCULAR; INTRAVENOUS at 11:05

## 2023-05-04 RX ADMIN — PROPOFOL 180 MG: 10 INJECTION, EMULSION INTRAVENOUS at 09:05

## 2023-05-04 RX ADMIN — ONDANSETRON 4 MG: 2 INJECTION, SOLUTION INTRAMUSCULAR; INTRAVENOUS at 09:05

## 2023-05-04 RX ADMIN — SUCCINYLCHOLINE CHLORIDE 120 MG: 20 INJECTION, SOLUTION INTRAMUSCULAR; INTRAVENOUS at 09:05

## 2023-05-04 RX ADMIN — ROCURONIUM BROMIDE 5 MG: 10 INJECTION, SOLUTION INTRAVENOUS at 09:05

## 2023-05-05 LAB
ANION GAP SERPL CALC-SCNC: 13 MMOL/L (ref 8–16)
BASOPHILS # BLD AUTO: 0.02 K/UL (ref 0–0.2)
BASOPHILS NFR BLD: 0.1 % (ref 0–1.9)
BUN SERPL-MCNC: 21 MG/DL (ref 8–23)
CALCIUM SERPL-MCNC: 8.9 MG/DL (ref 8.7–10.5)
CHLORIDE SERPL-SCNC: 105 MMOL/L (ref 95–110)
CO2 SERPL-SCNC: 21 MMOL/L (ref 23–29)
CREAT SERPL-MCNC: 1.1 MG/DL (ref 0.5–1.4)
DIFFERENTIAL METHOD BLD: ABNORMAL
EOSINOPHIL # BLD AUTO: 0 K/UL (ref 0–0.5)
EOSINOPHIL NFR BLD: 0.1 % (ref 0–8)
ERYTHROCYTE [DISTWIDTH] IN BLOOD BY AUTOMATED COUNT: 14.9 % (ref 11.5–14.5)
EST. GFR  (NO RACE VARIABLE): 56 ML/MIN/1.73 M^2
GLUCOSE SERPL-MCNC: 202 MG/DL (ref 70–110)
HCT VFR BLD AUTO: 35.9 % (ref 37–48.5)
HGB BLD-MCNC: 10.6 G/DL (ref 12–16)
IMM GRANULOCYTES # BLD AUTO: 0.04 K/UL (ref 0–0.04)
IMM GRANULOCYTES NFR BLD AUTO: 0.3 % (ref 0–0.5)
LYMPHOCYTES # BLD AUTO: 0.9 K/UL (ref 1–4.8)
LYMPHOCYTES NFR BLD: 6.7 % (ref 18–48)
MAGNESIUM SERPL-MCNC: 1.3 MG/DL (ref 1.6–2.6)
MCH RBC QN AUTO: 22.6 PG (ref 27–31)
MCHC RBC AUTO-ENTMCNC: 29.5 G/DL (ref 32–36)
MCV RBC AUTO: 77 FL (ref 82–98)
MONOCYTES # BLD AUTO: 0.6 K/UL (ref 0.3–1)
MONOCYTES NFR BLD: 4 % (ref 4–15)
NEUTROPHILS # BLD AUTO: 12.5 K/UL (ref 1.8–7.7)
NEUTROPHILS NFR BLD: 88.8 % (ref 38–73)
NRBC BLD-RTO: 0 /100 WBC
PHOSPHATE SERPL-MCNC: 4.1 MG/DL (ref 2.7–4.5)
PLATELET # BLD AUTO: 265 K/UL (ref 150–450)
PMV BLD AUTO: 11.3 FL (ref 9.2–12.9)
POCT GLUCOSE: 146 MG/DL (ref 70–110)
POCT GLUCOSE: 183 MG/DL (ref 70–110)
POCT GLUCOSE: 187 MG/DL (ref 70–110)
POCT GLUCOSE: 188 MG/DL (ref 70–110)
POCT GLUCOSE: 216 MG/DL (ref 70–110)
POTASSIUM SERPL-SCNC: 3.5 MMOL/L (ref 3.5–5.1)
RBC # BLD AUTO: 4.68 M/UL (ref 4–5.4)
SODIUM SERPL-SCNC: 139 MMOL/L (ref 136–145)
WBC # BLD AUTO: 14.11 K/UL (ref 3.9–12.7)

## 2023-05-05 PROCEDURE — 25000003 PHARM REV CODE 250

## 2023-05-05 PROCEDURE — 63600175 PHARM REV CODE 636 W HCPCS: Performed by: STUDENT IN AN ORGANIZED HEALTH CARE EDUCATION/TRAINING PROGRAM

## 2023-05-05 PROCEDURE — 63600175 PHARM REV CODE 636 W HCPCS

## 2023-05-05 PROCEDURE — 63600175 PHARM REV CODE 636 W HCPCS: Performed by: ANESTHESIOLOGY

## 2023-05-05 PROCEDURE — G0378 HOSPITAL OBSERVATION PER HR: HCPCS

## 2023-05-05 PROCEDURE — 96361 HYDRATE IV INFUSION ADD-ON: CPT

## 2023-05-05 PROCEDURE — 94761 N-INVAS EAR/PLS OXIMETRY MLT: CPT

## 2023-05-05 PROCEDURE — 97530 THERAPEUTIC ACTIVITIES: CPT

## 2023-05-05 PROCEDURE — 25000003 PHARM REV CODE 250: Performed by: STUDENT IN AN ORGANIZED HEALTH CARE EDUCATION/TRAINING PROGRAM

## 2023-05-05 PROCEDURE — 97161 PT EVAL LOW COMPLEX 20 MIN: CPT

## 2023-05-05 PROCEDURE — 80048 BASIC METABOLIC PNL TOTAL CA: CPT | Performed by: STUDENT IN AN ORGANIZED HEALTH CARE EDUCATION/TRAINING PROGRAM

## 2023-05-05 PROCEDURE — 83735 ASSAY OF MAGNESIUM: CPT | Performed by: STUDENT IN AN ORGANIZED HEALTH CARE EDUCATION/TRAINING PROGRAM

## 2023-05-05 PROCEDURE — 21400001 HC TELEMETRY ROOM

## 2023-05-05 PROCEDURE — 97116 GAIT TRAINING THERAPY: CPT

## 2023-05-05 PROCEDURE — 36415 COLL VENOUS BLD VENIPUNCTURE: CPT | Performed by: STUDENT IN AN ORGANIZED HEALTH CARE EDUCATION/TRAINING PROGRAM

## 2023-05-05 PROCEDURE — 84100 ASSAY OF PHOSPHORUS: CPT | Performed by: STUDENT IN AN ORGANIZED HEALTH CARE EDUCATION/TRAINING PROGRAM

## 2023-05-05 PROCEDURE — 99900035 HC TECH TIME PER 15 MIN (STAT)

## 2023-05-05 PROCEDURE — 96372 THER/PROPH/DIAG INJ SC/IM: CPT

## 2023-05-05 PROCEDURE — 27000221 HC OXYGEN, UP TO 24 HOURS

## 2023-05-05 PROCEDURE — 85025 COMPLETE CBC W/AUTO DIFF WBC: CPT | Performed by: STUDENT IN AN ORGANIZED HEALTH CARE EDUCATION/TRAINING PROGRAM

## 2023-05-05 RX ORDER — DEXTROSE 40 %
15 GEL (GRAM) ORAL
Status: DISCONTINUED | OUTPATIENT
Start: 2023-05-05 | End: 2023-05-11 | Stop reason: HOSPADM

## 2023-05-05 RX ORDER — IBUPROFEN 200 MG
16 TABLET ORAL
Status: DISCONTINUED | OUTPATIENT
Start: 2023-05-05 | End: 2023-05-05 | Stop reason: RX

## 2023-05-05 RX ORDER — ACETAMINOPHEN 325 MG/1
650 TABLET ORAL EVERY 8 HOURS
Status: DISCONTINUED | OUTPATIENT
Start: 2023-05-05 | End: 2023-05-11 | Stop reason: HOSPADM

## 2023-05-05 RX ORDER — MORPHINE SULFATE 2 MG/ML
2 INJECTION, SOLUTION INTRAMUSCULAR; INTRAVENOUS EVERY 4 HOURS PRN
Status: DISCONTINUED | OUTPATIENT
Start: 2023-05-05 | End: 2023-05-11 | Stop reason: HOSPADM

## 2023-05-05 RX ORDER — GLUCAGON 1 MG
1 KIT INJECTION
Status: DISCONTINUED | OUTPATIENT
Start: 2023-05-05 | End: 2023-05-11 | Stop reason: HOSPADM

## 2023-05-05 RX ORDER — INSULIN ASPART 100 [IU]/ML
1-10 INJECTION, SOLUTION INTRAVENOUS; SUBCUTANEOUS
Status: DISCONTINUED | OUTPATIENT
Start: 2023-05-05 | End: 2023-05-11 | Stop reason: HOSPADM

## 2023-05-05 RX ORDER — DEXTROSE 40 %
30 GEL (GRAM) ORAL
Status: DISCONTINUED | OUTPATIENT
Start: 2023-05-05 | End: 2023-05-11 | Stop reason: HOSPADM

## 2023-05-05 RX ORDER — OXYCODONE HYDROCHLORIDE 5 MG/1
5 TABLET ORAL EVERY 6 HOURS PRN
Status: DISCONTINUED | OUTPATIENT
Start: 2023-05-05 | End: 2023-05-11 | Stop reason: HOSPADM

## 2023-05-05 RX ORDER — ENOXAPARIN SODIUM 100 MG/ML
40 INJECTION SUBCUTANEOUS EVERY 24 HOURS
Status: DISCONTINUED | OUTPATIENT
Start: 2023-05-05 | End: 2023-05-11 | Stop reason: HOSPADM

## 2023-05-05 RX ORDER — IBUPROFEN 200 MG
24 TABLET ORAL
Status: DISCONTINUED | OUTPATIENT
Start: 2023-05-05 | End: 2023-05-05 | Stop reason: RX

## 2023-05-05 RX ORDER — TRAMADOL HYDROCHLORIDE 50 MG/1
50 TABLET ORAL EVERY 6 HOURS PRN
Status: DISCONTINUED | OUTPATIENT
Start: 2023-05-05 | End: 2023-05-11 | Stop reason: HOSPADM

## 2023-05-05 RX ORDER — NAPROXEN SODIUM 220 MG/1
81 TABLET, FILM COATED ORAL DAILY
Status: DISCONTINUED | OUTPATIENT
Start: 2023-05-05 | End: 2023-05-11 | Stop reason: HOSPADM

## 2023-05-05 RX ADMIN — ACETAMINOPHEN 650 MG: 325 TABLET ORAL at 11:05

## 2023-05-05 RX ADMIN — INSULIN ASPART 4 UNITS: 100 INJECTION, SOLUTION INTRAVENOUS; SUBCUTANEOUS at 06:05

## 2023-05-05 RX ADMIN — PROMETHAZINE HYDROCHLORIDE 25 MG: 25 TABLET ORAL at 11:05

## 2023-05-05 RX ADMIN — HYDROMORPHONE HYDROCHLORIDE 0.2 MG: 2 INJECTION, SOLUTION INTRAMUSCULAR; INTRAVENOUS; SUBCUTANEOUS at 12:05

## 2023-05-05 RX ADMIN — HYDROCODONE BITARTRATE AND ACETAMINOPHEN 1 TABLET: 5; 325 TABLET ORAL at 06:05

## 2023-05-05 RX ADMIN — AMLODIPINE BESYLATE 5 MG: 5 TABLET ORAL at 09:05

## 2023-05-05 RX ADMIN — TRAMADOL HYDROCHLORIDE 50 MG: 50 TABLET, COATED ORAL at 08:05

## 2023-05-05 RX ADMIN — HYDROCODONE BITARTRATE AND ACETAMINOPHEN 1 TABLET: 5; 325 TABLET ORAL at 02:05

## 2023-05-05 RX ADMIN — ENOXAPARIN SODIUM 40 MG: 40 INJECTION SUBCUTANEOUS at 06:05

## 2023-05-05 RX ADMIN — ASPIRIN 81 MG CHEWABLE TABLET 81 MG: 81 TABLET CHEWABLE at 09:05

## 2023-05-05 RX ADMIN — ACETAMINOPHEN 650 MG: 325 TABLET ORAL at 09:05

## 2023-05-05 RX ADMIN — MUPIROCIN: 20 OINTMENT TOPICAL at 09:05

## 2023-05-05 RX ADMIN — Medication 6 MG: at 08:05

## 2023-05-05 RX ADMIN — MUPIROCIN: 20 OINTMENT TOPICAL at 08:05

## 2023-05-05 RX ADMIN — SODIUM CHLORIDE, POTASSIUM CHLORIDE, SODIUM LACTATE AND CALCIUM CHLORIDE: 600; 310; 30; 20 INJECTION, SOLUTION INTRAVENOUS at 02:05

## 2023-05-05 NOTE — ED NOTES
Pt sent here for surgery/ bowel obstruction. Pt states pain is to epigastric region. Pt also c/o headache.     Review of patient's allergies indicates:  No Known Allergies     Patient has verified the spelling of their name and  on armband.   APPEARANCE: Patient is alert, calm, oriented x 4, and does not appear distressed.  SKIN: Skin is normal for race, warm, and dry. Normal skin turgor and mucous membranes moist.  CARDIAC: Normal rate and rhythm, no murmur heard.   RESPIRATORY:Normal rate and effort. Breath sounds clear bilaterally throughout chest. Respirations are equal and unlabored.    GASTRO: Bowel sounds normal, abdomen is soft, epigastric tenderness, and no abdominal distention.  MUSCLE: Full ROM. No bony tenderness or soft tissue tenderness. No obvious deformity.  PERIPHERAL VASCULAR: peripheral pulses present. Normal cap refill. No edema. Warm to touch.  NEURO: 5/5 strength major flexors/extensors bilaterally. Sensory intact to light touch bilaterally. Kimberly coma scale: eyes open spontaneously-4, oriented & converses-5, obeys commands-6. No neurological abnormalities.   MENTAL STATUS: awake, alert and aware of environment.  : Voids without complication, per pt    Admit orders in progress. Pt SR up x 2. Bed in lowest position with wheels locked. Call bell within reach of pt.

## 2023-05-05 NOTE — ASSESSMENT & PLAN NOTE
Aishwarya Jensen is a 62 y.o. female T2DM, CKD3, HTN, and HLD who presented with imaging consistent with cecal volvulus. She is now s/p ileocecectomy on 5/4/23.     -Pain: Scheduled tylenol + PRN oxy for breakthrough  -Diet: Clears  -Continue gentle mIVF  -Resume home amlodipine, ASA  -SSI  -PRN anti-hypertensives, -emetics available   -PT/OT  -DVT: LVX    Dispo: Continue inpatient management

## 2023-05-05 NOTE — PROGRESS NOTES
[facility]  [facility]  Progress Note    Subjective:     History of Present Illness:  No notes on file    Post-Op Info:  Procedure(s) (LRB):  LAPAROTOMY, EXPLORATORY (N/A)  ILEOCECECTOMY    1 Day Post-Op     Interval History: Ms. Jensen states she is doing relatively well this morning. Pain is controlled with medications. She denies N/V and is tolerating PO medications with sips of water. VSS. Afebrile.     Medications:  Continuous Infusions:   lactated ringers 100 mL/hr at 05/05/23 0215     Scheduled Meds:   amLODIPine  5 mg Oral Daily    mupirocin   Nasal BID     PRN Meds:acetaminophen, dextrose 10%, dextrose 10%, glucagon (human recombinant), hydrALAZINE, HYDROcodone-acetaminophen, insulin aspart U-100, melatonin, morphine, promethazine     Review of patient's allergies indicates:  No Known Allergies  Objective:     Vital Signs (Most Recent):  Temp: 97.1 °F (36.2 °C) (05/05/23 0803)  Pulse: 87 (05/05/23 0803)  Resp: 18 (05/05/23 0803)  BP: (!) 160/70 (05/05/23 0803)  SpO2: (!) 93 % (05/05/23 0810)   Vital Signs (24h Range):  Temp:  [97.1 °F (36.2 °C)-98.2 °F (36.8 °C)] 97.1 °F (36.2 °C)  Pulse:  [62-92] 87  Resp:  [11-20] 18  SpO2:  [93 %-100 %] 93 %  BP: (133-215)/() 160/70     Weight: 102.5 kg (225 lb 15.5 oz)  Body mass index is 35.39 kg/m².    Intake/Output - Last 3 Shifts         05/03 0700  05/04 0659 05/04 0700  05/05 0659 05/05 0700  05/06 0659    Urine (mL/kg/hr)  1200     Total Output  1200     Net  -1200                     Physical Exam  Vitals and nursing note reviewed.   Constitutional:       Appearance: Normal appearance.   HENT:      Head: Normocephalic and atraumatic.   Eyes:      General: No scleral icterus.     Extraocular Movements: Extraocular movements intact.   Cardiovascular:      Rate and Rhythm: Normal rate and regular rhythm.   Pulmonary:      Effort: Pulmonary effort is normal. No respiratory distress.      Comments: On 2L by NC  Abdominal:      General: There is  no distension.      Palpations: Abdomen is soft.      Tenderness: There is no guarding or rebound.      Comments: Midline incision with overlying sterile dressing. Appropriately tender to palpation.    Skin:     General: Skin is warm and dry.   Neurological:      General: No focal deficit present.      Mental Status: She is alert and oriented to person, place, and time.        Significant Labs:  I have reviewed all pertinent lab results within the past 24 hours.    Significant Diagnostics:  I have reviewed all pertinent imaging results/findings within the past 24 hours.    Assessment/Plan:     * Cecal volvulus  Aishwarya Jensen is a 62 y.o. female T2DM, CKD3, HTN, and HLD who presented with imaging consistent with cecal volvulus. She is now s/p ileocecectomy on 5/4/23.     -Pain: Scheduled tylenol + PRN oxy for breakthrough  -Diet: Clears  -Continue gentle mIVF  -Resume home amlodipine, ASA  -SSI  -PRN anti-hypertensives, -emetics available   -PT/OT  -DVT: LVX    Dispo: Continue inpatient management        Houston Cerna MD  General Surgery  [place] - Telemetry

## 2023-05-05 NOTE — PT/OT/SLP EVAL
Physical Therapy Evaluation and Treatment    Patient Name:  Aishwarya Jensen   MRN:  [id number]    Recommendations:     Discharge Recommendations:  (TBD pending progress - anticipate HH PT/OT and 24/7 family assist)   Discharge Equipment Recommendations: walker, rolling (possibly BSC)   Barriers to discharge:  increased assist required    Assessment:     Aishwarya Jensen is a 62 y.o. female admitted with a medical diagnosis of Cecal volvulus.  She presents with the following impairments/functional limitations: weakness, gait instability, impaired balance, impaired endurance, impaired self care skills, impaired functional mobility, decreased lower extremity function, decreased ROM, pain, impaired skin, decreased coordination, impaired cardiopulmonary response to activity .Pt ambulated ~4 ft forward/backward and ~12 ft in room with RW and Poppy. Assist required for RW management. Pt limited by weakness, light-headedness, and pain. Pt would benefit from skilled acute PT services. D/c recs TBD pending progress - anticipate HH PT/OT and family assist. DME recs: RW, possible BSC pending progress.     Rehab Prognosis: Good; patient would benefit from acute skilled PT services to address these deficits and reach maximum level of function.    Recent Surgery: Procedure(s) (LRB):  LAPAROTOMY, EXPLORATORY (N/A)  ILEOCECECTOMY  1 Day Post-Op    Plan:     During this hospitalization, patient to be seen 5 x/week to address the identified rehab impairments via therapeutic activities, gait training, therapeutic exercises, neuromuscular re-education and progress toward the following goals:    Plan of Care Expires:  06/05/23    Subjective     Chief Complaint: weakness  Patient/Family Comments/goals: return to PLOF  Pain/Comfort:  Pain Rating 1: 6/10  Location 1: abdomen  Pain Addressed 1: Reposition, Distraction, Nurse notified, Cessation of Activity  Pain Rating Post-Intervention 1: 5/10    Patients cultural, spiritual,  Mandaen conflicts given the current situation: no    Living Environment:  Pt lives with spouse in a single story apt with 0 BENNY, and T/S  Prior to admission, patients level of function was Mod I with QC as needed for mobility and ADL's; denies falls; pt reports she does not drive and takes bus or gets rides; pt does the cooking and cleaning.  Equipment used at home: cane, quad, bath bench.  DME owned (not currently used): transfer tub bench.  Upon discharge, patient will have assistance from family.    Objective:     Communicated with nsg prior to session.  Patient found HOB elevated with oxygen, telemetry, peripheral IV  upon PT entry to room.    General Precautions: Standard, fall  Orthopedic Precautions:N/A   Braces: N/A  Respiratory Status: Nasal cannula, flow 1.5 L/min    Exams:  Cognitive Exam:  Patient is oriented to Person, Place, Time, and Situation  Postural Exam:  Patient presented with the following abnormalities:    -       Rounded shoulders  Skin Integrity/Edema:      -       Skin integrity: Wound surgical abdomen, varicose veins   -       Edema: Mild BLE  B UE/LE ROM / MMT: WFL and grossly 4 to 4+/5    Functional Mobility:  Bed Mobility:     Rolling Left:  moderate assistance  Scooting: stand by assistance  Supine to Sit: moderate assistance  Sit to Supine: minimum assistance  Transfers:     Sit to Stand:  minimum assistance with rolling walker  Gait: Pt ambulated ~4 ft forward/backward and ~12 ft in room with RW and Poppy. Assist required for RW management and encouraging larger step length B Pt demonstrating decreased speed and increased forward flexion 2/2 pain - VC's for upright       AM-PAC 6 CLICK MOBILITY  Total Score:14       Treatment & Education:  Pt educated on role of PT/POC, d/c recs, log rolling, bracing with pillow, assist from nsg/staff OOB, and overall safety  Pt assisted with log roll and HOB slightly elevated; use of bed rail  Pt ambulated forward/backward as reported  above  Seated rest break taken EOB due to pt reporting light-headedness  BP seated: 128/56  Pt ambulated ~12 ft as reported above  Pt educated on modified techniques to return supine  O2 doffed during session and pt remained >95% on RA; O2 re-donned at end of session  BLE elevated on pillow with heels floated    Patient left HOB elevated with all lines intact, call button in reach, bed alarm on, nsg notified, and son present.    GOALS:   Multidisciplinary Problems       Physical Therapy Goals          Problem: Physical Therapy    Goal Priority Disciplines Outcome Goal Variances Interventions   Physical Therapy Goal     PT, PT/OT Ongoing, Progressing     Description: Goals to be met by: 23     Patient will increase functional independence with mobility by performin. Supine to sit with Stand-by Assistance  2. Rolling to Left and Right with Stand-by Assistance.  3. Sit to stand transfer with Stand-by Assistance  4. Bed to chair transfer with Stand-by Assistance using Rolling Walker  5. Gait  x 120 feet with Stand-by Assistance using Rolling Walker.                          History:     Past Medical History:   Diagnosis Date    Diabetes mellitus, type 2     Hypercholesteremia     Hypertension        Past Surgical History:   Procedure Laterality Date    COLONOSCOPY  2023    COLONOSCOPY N/A 3/29/2023    Procedure: COLONOSCOPY;  Surgeon: Ashu Burns MD;  Location: [facility];  Service: Endoscopy;  Laterality: N/A;    HYSTERECTOMY      LAPAROTOMY, EXPLORATORY N/A 2023    Procedure: LAPAROTOMY, EXPLORATORY;  Surgeon: Flaco Arias MD;  Location: [facility] OR;  Service: General;  Laterality: N/A;    SURGICAL REMOVAL OF ILEUM WITH CECUM  2023    Procedure: ILEOCECECTOMY ;  Surgeon: Flaco Arias MD;  Location: [facility] OR;  Service: General;;       Time Tracking:     PT Received On: 23  PT Start Time: 1502     PT Stop Time: 1543  PT Total Time (min): 41 min     Billable Minutes: Evaluation  11, Gait Training 15, and Therapeutic Activity 15      05/05/2023

## 2023-05-05 NOTE — PLAN OF CARE
Problem: Physical Therapy  Goal: Physical Therapy Goal  Description: Goals to be met by: 23     Patient will increase functional independence with mobility by performin. Supine to sit with Stand-by Assistance  2. Rolling to Left and Right with Stand-by Assistance.  3. Sit to stand transfer with Stand-by Assistance  4. Bed to chair transfer with Stand-by Assistance using Rolling Walker  5. Gait  x 120 feet with Stand-by Assistance using Rolling Walker.     Outcome: Ongoing, Progressing     PT Eval completed, note to follow. Pt ambulated ~4 ft forward/backward and ~12 ft in room with RW and Poppy. Assist required for RW management. Pt limited by weakness, light-headedness, and pain. Pt would benefit from skilled acute PT services. D/c recs TBD pending progress - anticipate HH PT/OT and family assist. DME recs: RW, possible BSC pending progress.

## 2023-05-05 NOTE — ANESTHESIA PROCEDURE NOTES
Intubation    Date/Time: 5/4/2023 9:26 PM  Performed by: Susannah Winters CRNA  Authorized by: Hiren Conway MD     Intubation:     Induction:  Rapid sequence induction    Intubated:  Postinduction    Mask Ventilation:  N/a    Attempts:  1    Attempted By:  CRNA    Method of Intubation:  Video laryngoscopy    Blade:  Vides 3    Laryngeal View Grade: Grade I - full view of cords      Difficult Airway Encountered?: No      Complications:  None    Airway Device:  Oral endotracheal tube    Airway Device Size:  7.5    Style/Cuff Inflation:  Cuffed (inflated to minimal occlusive pressure)    Inflation Amount (mL):  6    Tube secured:  21    Secured at:  The lips    Placement Verified By:  Capnometry    Complicating Factors:  None    Findings Post-Intubation:  BS equal bilateral and atraumatic/condition of teeth unchanged

## 2023-05-05 NOTE — PLAN OF CARE
05/05/23 0128   Admission   Initial VN Admission Questions Complete   Communication Issues? None   Shift   Virtual Nurse - Rounding Complete   Pain Management Interventions quiet environment facilitated;relaxation techniques promoted   Virtual Nurse - Patient Verbalized Approval Of Camera Use;VN Rounding   Type of Frequent Check   Type Patient Rounds;Telemetry Monitoring   Safety/Activity   Patient Rounds bed in low position;bed wheels locked;call light in patient/parent reach;clutter free environment maintained;ID band on;visualized patient;placement of personal items at bedside   Safety Promotion/Fall Prevention assistive device/personal item within reach;bed alarm set;side rails raised x 2;high risk medications identified;Fall Risk reviewed with patient/family;family to remain at bedside;nonskid shoes/socks when out of bed;room near unit station;instructed to call staff for mobility;medications reviewed   Safety Precautions emergency equipment at bedside   Activity Management Rolling - L1   Positioning   Body Position position changed independently   Head of Bed (HOB) Positioning HOB elevated   Positioning/Transfer Devices pillows;in use    VN cued into room to complete admit assessment. VIP model introduced; VN working alongside bedside treatment team.  Plan of care reviewed with patient. Patient informed of fall risk, fall precautions, call light within reach, side rails x2 elevated. Patient notified to ask staff for assistance. Patient verbalized complete understanding. Time allowed for questions. Will continue to monitor and intervene as needed.

## 2023-05-05 NOTE — OP NOTE
[place] - Surgery ([facility])  General Surgery  Operative Note    SUMMARY     Date of Procedure: 5/4/2023     Procedure: Procedure(s) (LRB):  LAPAROTOMY, EXPLORATORY (N/A)     Partial colectomy with anastomosis - resection of distal ileum, cecum and proximal ascending colon    Surgeon(s) and Role:     * Flaco Arias MD - Primary    Assisting Surgeon: None    Pre-Operative Diagnosis: Cecal volvulus [K56.2]    Post-Operative Diagnosis: Post-Op Diagnosis Codes:     * Cecal volvulus [K56.2]    Anesthesia: General    Operative Findings (including complications, if any): cecal volvulus without perforation    Description of Technical Procedures:   Brought into OR and placed supine. A vertical midline laparotomy incsion was made. Cautery was used to dissect down to the fascia. The abdomen was entered. There was some serosanguinous fluid but no succus or stool was noted. There was a moderate amount of omental adhesions that were taken down using cautery. The cecum was identified and was severely dilated and torsed. It was rotated superiorly and medially. There were areas of ecchymosis and ischemia in the area of the cecum itself but no perforation. The remainder of the colon and small bowel were normal. The stomach and liver were grossly normal. The ileum was divided about 10cm from the IC valve using a blue load from the REBECCA stapler. The mid descending colon was the divided using the blue load from the REBECCA stapler. The proximal colon was very redundant. The mesentery was divided using ligasure and 0 silk ties. A stapled side to side ileocolic anastomosis was created and the common enterotomy was sutured closed using 0 vicryl. The mesenteric defect was closed using interrupted 0 vicryl sutures. The abdomen was irrigated using normal saline, several liters. The fascia was closed using running 1 loop PDS. SKin was closed using skin staples. She was hemodynamically stable throughout the case.       Significant Surgical  Tasks Conducted by the Assistant(s), if Applicable:     Estimated Blood Loss (EBL): 100ml           Implants: * No implants in log *    Specimens:   Specimen (24h ago, onward)       Start     Ordered    05/04/23 2255  Specimen to Pathology, Surgery General Surgery  Once        Comments: Pre-op Diagnosis: Cecal volvulus [K56.2]Procedure(s):LAPAROTOMY, EXPLORATORY Number of specimens: 1Name of specimens: 1. Ileum and cecum     References:    Click here for ordering Quick Tip   Question Answer Comment   Procedure Type: General Surgery    Specimen Class: Routine/Screening    Which provider would you like to cc? JENNA RODRIGUEZ    Release to patient Immediate        05/04/23 2257                            Condition: Stable    Disposition: PACU - hemodynamically stable.    Attestation: I was present and scrubbed for the entire procedure.

## 2023-05-05 NOTE — ANESTHESIA POSTPROCEDURE EVALUATION
Anesthesia Post Evaluation    Patient: Aishwarya Jensen    Procedure(s) Performed: Procedure(s) (LRB):  LAPAROTOMY, EXPLORATORY (N/A)  ILEOCECECTOMY     Final Anesthesia Type: general      Patient location during evaluation: PACU  Patient participation: Yes- Able to Participate  Level of consciousness: awake and alert and oriented  Post-procedure vital signs: reviewed and stable  Pain management: adequate  Airway patency: patent    PONV status at discharge: No PONV  Anesthetic complications: no      Cardiovascular status: blood pressure returned to baseline and hemodynamically stable  Respiratory status: unassisted, room air and spontaneous ventilation  Hydration status: euvolemic  Follow-up not needed.          Vitals Value Taken Time   /65 05/05/23 0438   Temp 36.8 °C (98.2 °F) 05/05/23 0438   Pulse 77 05/05/23 0500   Resp 16 05/05/23 0633   SpO2 93 % 05/05/23 0438         Event Time   Out of Recovery 05/05/2023 00:30:58         Pain/Lakesha Score: Pain Rating Prior to Med Admin: 6 (5/5/2023  6:33 AM)  Pain Rating Post Med Admin: 0 (5/5/2023  3:21 AM)  Lakesha Score: 9 (5/5/2023 12:15 AM)

## 2023-05-05 NOTE — SUBJECTIVE & OBJECTIVE
Interval History: Ms. Jensen states she is doing relatively well this morning. Pain is controlled with medications. She denies N/V and is tolerating PO medications with sips of water. VSS. Afebrile.     Medications:  Continuous Infusions:   lactated ringers 100 mL/hr at 05/05/23 0215     Scheduled Meds:   amLODIPine  5 mg Oral Daily    mupirocin   Nasal BID     PRN Meds:acetaminophen, dextrose 10%, dextrose 10%, glucagon (human recombinant), hydrALAZINE, HYDROcodone-acetaminophen, insulin aspart U-100, melatonin, morphine, promethazine     Review of patient's allergies indicates:  No Known Allergies  Objective:     Vital Signs (Most Recent):  Temp: 97.1 °F (36.2 °C) (05/05/23 0803)  Pulse: 87 (05/05/23 0803)  Resp: 18 (05/05/23 0803)  BP: (!) 160/70 (05/05/23 0803)  SpO2: (!) 93 % (05/05/23 0810)   Vital Signs (24h Range):  Temp:  [97.1 °F (36.2 °C)-98.2 °F (36.8 °C)] 97.1 °F (36.2 °C)  Pulse:  [62-92] 87  Resp:  [11-20] 18  SpO2:  [93 %-100 %] 93 %  BP: (133-215)/() 160/70     Weight: 102.5 kg (225 lb 15.5 oz)  Body mass index is 35.39 kg/m².    Intake/Output - Last 3 Shifts         05/03 0700  05/04 0659 05/04 0700  05/05 0659 05/05 0700  05/06 0659    Urine (mL/kg/hr)  1200     Total Output  1200     Net  -1200                     Physical Exam  Vitals and nursing note reviewed.   Constitutional:       Appearance: Normal appearance.   HENT:      Head: Normocephalic and atraumatic.   Eyes:      General: No scleral icterus.     Extraocular Movements: Extraocular movements intact.   Cardiovascular:      Rate and Rhythm: Normal rate and regular rhythm.   Pulmonary:      Effort: Pulmonary effort is normal. No respiratory distress.      Comments: On 2L by NC  Abdominal:      General: There is no distension.      Palpations: Abdomen is soft.      Tenderness: There is no guarding or rebound.      Comments: Midline incision with overlying sterile dressing. Appropriately tender to palpation.    Skin:     General:  Skin is warm and dry.   Neurological:      General: No focal deficit present.      Mental Status: She is alert and oriented to person, place, and time.        Significant Labs:  I have reviewed all pertinent lab results within the past 24 hours.    Significant Diagnostics:  I have reviewed all pertinent imaging results/findings within the past 24 hours.

## 2023-05-05 NOTE — NURSING
Hutton removed at 1830pm. Pt tolerated well. Pt educated on removal process and voiding expectations. Pt verbalized understanding.

## 2023-05-05 NOTE — ANESTHESIA PREPROCEDURE EVALUATION
05/05/2023  Pre-operative evaluation for Procedure(s) (LRB):  LAPAROTOMY, EXPLORATORY (N/A)  ILEOCECECTOMY     Aishwarya Jensen is a 62 y.o. female     Patient Active Problem List   Diagnosis    Essential hypertension, benign    Dyslipidemia, goal LDL below 100    Microalbuminuria due to type 2 diabetes mellitus    Type 2 diabetes mellitus with hyperglycemia, with long-term current use of insulin    Type 2 diabetes mellitus with diabetic polyneuropathy, with long-term current use of insulin    Asymptomatic varicose veins of bilateral lower extremities    Onychomycosis due to dermatophyte    Obesity (BMI 30.0-34.9)    Stage 3a chronic kidney disease    Dyspnea on exertion    Primary osteoarthritis of right knee    Primary osteoarthritis of left knee    Type 2 diabetes mellitus with stage 3a chronic kidney disease, with long-term current use of insulin    Anemia    Cecal volvulus       Review of patient's allergies indicates:  No Known Allergies    No current facility-administered medications on file prior to encounter.     Current Outpatient Medications on File Prior to Encounter   Medication Sig Dispense Refill    amLODIPine (NORVASC) 5 MG tablet Take 1 tablet (5 mg total) by mouth once daily. 90 tablet 1    aspirin (ECOTRIN) 81 MG EC tablet Take 1 tablet (81 mg total) by mouth once daily.      blood sugar diagnostic Strp Use to test blood glucose four (4) times a day, to be used with insurance-preferred brand of glucometer/supplies. 400 strip 3    blood-glucose meter kit To check BG 4 times daily, to use with insurance preferred meter 1 each 1    blood-glucose meter,continuous (DEXCOM G6 ) Misc 1 Device by Misc.(Non-Drug; Combo Route) route once. for 1 dose 1 each 0    blood-glucose sensor (DEXCOM G6 SENSOR) Katelyn 1 sensor every 10 days 3 each 11    blood-glucose  transmitter (DEXCOM G6 TRANSMITTER) Katelyn 1 transmitter every 3 months 1 each 4    cyclobenzaprine (FLEXERIL) 10 MG tablet Take 10 mg by mouth once daily. TAKE ONE TABLET BY MOUTH ONCE DAILY AS NEEDED      dapagliflozin (FARXIGA) 10 mg tablet Take 1 tablet (10 mg total) by mouth once daily. 90 tablet 2    ezetimibe (ZETIA) 10 mg tablet Take 10 mg by mouth.      fluconazole (DIFLUCAN) 150 MG Tab TAKE ONE TABLET BY MOUTH AS A ONE TIME DOSE MAY REPEAT IN 3 DAYS IF NEEDED FOR 1 DOSE.      hydrOXYzine pamoate (VISTARIL) 50 MG Cap Take 1 capsule (50 mg total) by mouth every 8 (eight) hours as needed (itching). 30 capsule 2    insulin aspart U-100 (NOVOLOG) 100 unit/mL injection Inject into the skin 3 (three) times daily.      insulin aspart, niacinamide, 100 unit/mL Soln To use with VGo 30- 5-6 clicks TID AC, Max TDD of 100 units. Vials only, Normal 30 mL 6    lancets Misc Use to test blood glucose four (4) times a day, discard lancet after each use 400 each 3    lisinopriL-hydrochlorothiazide (PRINZIDE,ZESTORETIC) 20-12.5 mg per tablet Take 1 tablet by mouth once daily 90 tablet 2    meloxicam (MOBIC) 15 MG tablet Take 1 tablet (15 mg total) by mouth once daily. With meals 30 tablet 0    metFORMIN (GLUCOPHAGE) 1000 MG tablet Take 1 tablet (1,000 mg total) by mouth daily with breakfast. 90 tablet 2    mupirocin (BACTROBAN) 2 % ointment Apply topically 3 (three) times daily. (Patient not taking: Reported on 5/17/2022) 22 g 1    nabumetone (RELAFEN) 750 MG tablet Take 750 mg by mouth 2 (two) times daily.      semaglutide (OZEMPIC) 2 mg/dose (8 mg/3 mL) PnIj Inject 2 mg into the skin every 7 days. 1 pen 6    sub-q insulin device, 30 unit (V-GO 30) Katelyn Inject 1 Device into the skin once daily. 90 each 2    traZODone (DESYREL) 50 MG tablet TAKE 1 TO 2 TABLETS BY MOUTH AT BEDTIME AS NEEDED FOR INSOMNIA 60 tablet 5       Past Surgical History:   Procedure Laterality Date    COLONOSCOPY  03/29/2023     COLONOSCOPY N/A 3/29/2023    Procedure: COLONOSCOPY;  Surgeon: Ashu Burns MD;  Location: [facility];  Service: Endoscopy;  Laterality: N/A;    HYSTERECTOMY         Social History     Socioeconomic History    Marital status:    Tobacco Use    Smoking status: Former     Types: Cigarettes     Start date:      Quit date:      Years since quittin.3    Smokeless tobacco: Never   Substance and Sexual Activity    Alcohol use: Yes     Comment: socially    Drug use: No    Sexual activity: Not Currently     Social Determinants of Health     Financial Resource Strain: High Risk    Difficulty of Paying Living Expenses: Hard   Food Insecurity: Food Insecurity Present    Worried About Running Out of Food in the Last Year: Sometimes true    Ran Out of Food in the Last Year: Sometimes true   Transportation Needs: Unmet Transportation Needs    Lack of Transportation (Medical): Yes    Lack of Transportation (Non-Medical): Yes   Physical Activity: Sufficiently Active    Days of Exercise per Week: 7 days    Minutes of Exercise per Session: 60 min   Stress: No Stress Concern Present    Feeling of Stress : Not at all   Social Connections: Moderately Isolated    Frequency of Communication with Friends and Family: More than three times a week    Frequency of Social Gatherings with Friends and Family: More than three times a week    Attends Spiritism Services: Never    Active Member of Clubs or Organizations: No    Attends Club or Organization Meetings: Never    Marital Status:    Housing Stability: Unknown    Unable to Pay for Housing in the Last Year: No    Unstable Housing in the Last Year: No         CBC:   Recent Labs     23  1420 23  0415   WBC 10.33 14.11*   RBC 4.79 4.68   HGB 10.9* 10.6*   HCT 36.8* 35.9*    265   MCV 77* 77*   MCH 22.8* 22.6*   MCHC 29.6* 29.5*       CMP:   Recent Labs     23  14223  0415    139   K 3.7 3.5    105    CO2 23 21*   BUN 31* 21   CREATININE 1.3 1.1   * 202*   MG  --  1.3*   PHOS  --  4.1   CALCIUM 10.2 8.9   ALBUMIN 4.1  --    PROT 8.3  --    ALKPHOS 91  --    ALT 16  --    AST 22  --    BILITOT 0.4  --        INR  No results for input(s): PT, INR, PROTIME, APTT in the last 72 hours.        Diagnostic Studies:      EKD Echo:  No results found for this or any previous visit.        Pre-op Assessment    I have reviewed the Patient Summary Reports.     I have reviewed the Nursing Notes. I have reviewed the NPO Status.   I have reviewed the Medications.     Review of Systems  Anesthesia Hx:  Denies Family Hx of Anesthesia complications.   Denies Personal Hx of Anesthesia complications.   Hematology/Oncology:     Oncology Normal     Cardiovascular:   Exercise tolerance: good Hypertension  Denies Angina.  Denies JANE.    Pulmonary:   Denies COPD.  Denies Asthma.    Renal/:   Chronic Renal Disease, CKD    Hepatic/GI:  Hepatic/GI Normal    Musculoskeletal:   Arthritis     Neurological:  Neurology Normal Denies Seizures.    Endocrine:   Diabetes, type 2    Dermatological:  Skin Normal        Physical Exam  General: Well nourished, Cooperative, Alert and Oriented    Airway:  Mallampati: I   Mouth Opening: Normal  TM Distance: Normal  Tongue: Normal  Neck ROM: Normal ROM        Anesthesia Plan  Type of Anesthesia, risks & benefits discussed:    Anesthesia Type: Gen ETT  Intra-op Monitoring Plan: Standard ASA Monitors  Post Op Pain Control Plan: multimodal analgesia and IV/PO Opioids PRN  Induction:  IV  Airway Plan: Direct, Post-Induction  Informed Consent: Informed consent signed with the Patient and all parties understand the risks and agree with anesthesia plan.  All questions answered.   ASA Score: 3  Day of Surgery Review of History & Physical: H&P Update referred to the surgeon/provider.    Ready For Surgery From Anesthesia Perspective.     .

## 2023-05-05 NOTE — PLAN OF CARE
went to meet with patient. Patient's son Sierra at bedside. Patient reports she is independent and lives at home with her spouse. She does not use any DME or have Home Health. She does not have a car. Patient tells me she recently started to use Medicaid Transportation. She is unsure if she will have transportation at discharge. I encouraged her to call family to make arrangements.  will request post-op follow-up with Dr. Arias from access navigator. Patient encouraged to call with any questions or concerns.  will continue to follow patient through transitions of care and assist with any discharge needs.     Patient Contacts    Name Relation Home Work Mobile   Bianca Jensen Daughter [phone]   sierra Jensen Son   [phone]     Future Appointments   Date Time Provider Department Center   5/8/2023  9:30 AM Joey Cintron MD [facility]   5/17/2023  2:00 PM Flaco Arias MD [facility]   5/24/2023 10:30 AM LAB, SBP SBP LAB [facility]   5/31/2023 11:30 AM Micki Knott NP [facility]   8/29/2023  9:30 AM Annmarie Keane DPM [facility] POD [facility]          05/05/23 0945   Discharge Assessment   Assessment Type Discharge Planning Assessment   Confirmed/corrected address, phone number and insurance Yes   Confirmed Demographics Correct on Facesheet   Source of Information family;patient   People in Home significant other;spouse   Facility Arrived From: Home   Do you expect to return to your current living situation? Yes   Do you have help at home or someone to help you manage your care at home? Yes   Who are your caregiver(s) and their phone number(s)? Sierra (Son) Phone#[phone]   Prior to hospitilization cognitive status: Alert/Oriented   Current cognitive status: Alert/Oriented   Equipment Currently Used at Home none   Do you take prescription medications? Yes   Do you have prescription coverage? Yes   Do you  have any problems affording any of your prescribed medications? No   Is the patient taking medications as prescribed? yes   How do you get to doctors appointments? health plan transportation;family or friend will provide   Are you on dialysis? No   Do you take coumadin? No   Discharge Plan A Home   Discharge Plan B Home with family   DME Needed Upon Discharge  none   Discharge Plan discussed with: Patient;Adult children   Discharge Barriers Identified None   Physical Activity   On average, how many days per week do you engage in moderate to strenuous exercise (like a brisk walk)? Patient refu   On average, how many minutes do you engage in exercise at this level? Patient refu   Financial Resource Strain   How hard is it for you to pay for the very basics like food, housing, medical care, and heating? Patient refu   Housing Stability   In the last 12 months, was there a time when you were not able to pay the mortgage or rent on time? N   In the last 12 months, was there a time when you did not have a steady place to sleep or slept in a shelter (including now)? N   Transportation Needs   In the past 12 months, has lack of transportation kept you from medical appointments or from getting medications? no   In the past 12 months, has lack of transportation kept you from meetings, work, or from getting things needed for daily living? No   Food Insecurity   Within the past 12 months, you worried that your food would run out before you got the money to buy more. Never true   Within the past 12 months, the food you bought just didn't last and you didn't have money to get more. Never true   Stress   Do you feel stress - tense, restless, nervous, or anxious, or unable to sleep at night because your mind is troubled all the time - these days? Patient refu   Social Connections   In a typical week, how many times do you talk on the phone with family, friends, or neighbors? More than 3   How often do you get together with friends  or relatives? More than 3   How often do you attend Evangelical or Yarsanism services? Patient refu   Do you belong to any clubs or organizations such as Evangelical groups, unions, fraternal or athletic groups, or school groups? Patient refu   How often do you attend meetings of the clubs or organizations you belong to? Patient refu   Are you , , , , never , or living with a partner?    Alcohol Use   Q1: How often do you have a drink containing alcohol? Patient refu   Q2: How many drinks containing alcohol do you have on a typical day when you are drinking? Patient refu   Q3: How often do you have six or more drinks on one occasion? Patient refu     Varsha Murray RN    [phone]

## 2023-05-05 NOTE — TRANSFER OF CARE
"Anesthesia Transfer of Care Note    Patient: Aishwarya Jensen    Procedure(s) Performed: Procedure(s) (LRB):  LAPAROTOMY, EXPLORATORY (N/A)  ILEOCECECTOMY     Patient location: PACU    Anesthesia Type: general    Transport from OR: Transported from OR on 6-10 L/min O2 by face mask with adequate spontaneous ventilation    Post pain: adequate analgesia    Post assessment: no apparent anesthetic complications    Post vital signs: stable    Level of consciousness: awake, alert and oriented    Nausea/Vomiting: no nausea/vomiting    Complications: none    Transfer of care protocol was followed      Last vitals:   Visit Vitals  BP (!) 197/83   Pulse 73   Temp 36.8 °C (98.2 °F) (Oral)   Resp 16   Ht 5' 7" (1.702 m)   Wt 102.5 kg (225 lb 15.5 oz)   SpO2 (!) 93%   BMI 35.39 kg/m²     "

## 2023-05-05 NOTE — H&P
Patient ID: Aishwarya Jensen is a 62 y.o. female.    Chief Complaint: Abdominal Pain (Abd pain and diarrhea since yesterday. Had CT done at [facility] that showed intestinal volvulus. Hugo MORA w/ surgery speaking to pt about possible surgery. Pt denies pain or nausea at this time. GCS 15.) and Nausea      HPI:  HPI  62F with abdominal pain centrally for the past day or two. Became severe yesterday, constant, worsening. Was having NV. Had some loose stools yesterday. Recent cscope unremarkable.   Hx of hysterectomy.     Review of Systems   Constitutional:  Negative for fever.   HENT:  Negative for trouble swallowing.    Respiratory:  Negative for shortness of breath.    Cardiovascular:  Negative for chest pain.   Gastrointestinal:  Positive for abdominal pain, nausea and vomiting. Negative for blood in stool.   Genitourinary:  Negative for dysuria.   Musculoskeletal:  Negative for gait problem.   Skin:  Negative for rash and wound.   Allergic/Immunologic: Negative for immunocompromised state.   Neurological:  Negative for weakness.   Hematological:  Does not bruise/bleed easily.   Psychiatric/Behavioral:  Negative for agitation.      Current Facility-Administered Medications   Medication Dose Route Frequency Provider Last Rate Last Admin    acetaminophen tablet 650 mg  650 mg Oral Q8H PRN Flaco Arias MD        HYDROcodone-acetaminophen 5-325 mg per tablet 1 tablet  1 tablet Oral Q4H PRN Flaco Arias MD        melatonin tablet 6 mg  6 mg Oral Nightly PRN Flaco Arias MD        morphine injection 4 mg  4 mg Intravenous Q4H PRN Flaco Arias MD        ondansetron disintegrating tablet 8 mg  8 mg Oral Q8H PRN Flaco Arias MD        promethazine tablet 25 mg  25 mg Oral Q6H PRN Flaco Arias MD         Current Outpatient Medications   Medication Sig Dispense Refill    amLODIPine (NORVASC) 5 MG tablet Take 1 tablet (5 mg total) by mouth once daily. 90 tablet 1     aspirin (ECOTRIN) 81 MG EC tablet Take 1 tablet (81 mg total) by mouth once daily.      blood sugar diagnostic Strp Use to test blood glucose four (4) times a day, to be used with insurance-preferred brand of glucometer/supplies. 400 strip 3    blood-glucose meter kit To check BG 4 times daily, to use with insurance preferred meter 1 each 1    blood-glucose meter,continuous (DEXCOM G6 ) Misc 1 Device by Misc.(Non-Drug; Combo Route) route once. for 1 dose 1 each 0    blood-glucose sensor (DEXCOM G6 SENSOR) Katelyn 1 sensor every 10 days 3 each 11    blood-glucose transmitter (DEXCOM G6 TRANSMITTER) Katelyn 1 transmitter every 3 months 1 each 4    cyclobenzaprine (FLEXERIL) 10 MG tablet Take 10 mg by mouth once daily. TAKE ONE TABLET BY MOUTH ONCE DAILY AS NEEDED      dapagliflozin (FARXIGA) 10 mg tablet Take 1 tablet (10 mg total) by mouth once daily. 90 tablet 2    ezetimibe (ZETIA) 10 mg tablet Take 10 mg by mouth.      fluconazole (DIFLUCAN) 150 MG Tab TAKE ONE TABLET BY MOUTH AS A ONE TIME DOSE MAY REPEAT IN 3 DAYS IF NEEDED FOR 1 DOSE.      hydrOXYzine pamoate (VISTARIL) 50 MG Cap Take 1 capsule (50 mg total) by mouth every 8 (eight) hours as needed (itching). 30 capsule 2    insulin aspart U-100 (NOVOLOG) 100 unit/mL injection Inject into the skin 3 (three) times daily.      insulin aspart, niacinamide, 100 unit/mL Soln To use with VGo 30- 5-6 clicks TID AC, Max TDD of 100 units. Vials only, Normal 30 mL 6    lancets Misc Use to test blood glucose four (4) times a day, discard lancet after each use 400 each 3    lisinopriL-hydrochlorothiazide (PRINZIDE,ZESTORETIC) 20-12.5 mg per tablet Take 1 tablet by mouth once daily 90 tablet 2    meloxicam (MOBIC) 15 MG tablet Take 1 tablet (15 mg total) by mouth once daily. With meals 30 tablet 0    metFORMIN (GLUCOPHAGE) 1000 MG tablet Take 1 tablet (1,000 mg total) by mouth daily with breakfast. 90 tablet 2    mupirocin (BACTROBAN) 2 % ointment Apply topically 3  (three) times daily. (Patient not taking: Reported on 2022) 22 g 1    nabumetone (RELAFEN) 750 MG tablet Take 750 mg by mouth 2 (two) times daily.      semaglutide (OZEMPIC) 2 mg/dose (8 mg/3 mL) PnIj Inject 2 mg into the skin every 7 days. 1 pen 6    sub-q insulin device, 30 unit (V-GO 30) Katelyn Inject 1 Device into the skin once daily. 90 each 2    traZODone (DESYREL) 50 MG tablet TAKE 1 TO 2 TABLETS BY MOUTH AT BEDTIME AS NEEDED FOR INSOMNIA 60 tablet 5       Review of patient's allergies indicates:  No Known Allergies    Past Medical History:   Diagnosis Date    Diabetes mellitus, type 2     Hypercholesteremia     Hypertension        Past Surgical History:   Procedure Laterality Date    COLONOSCOPY  2023    COLONOSCOPY N/A 3/29/2023    Procedure: COLONOSCOPY;  Surgeon: Ashu Burns MD;  Location: [facility];  Service: Endoscopy;  Laterality: N/A;    HYSTERECTOMY         Family History   Problem Relation Age of Onset    Diabetes Mother     Diabetes Father     Diabetes Brother     Cancer Maternal Grandmother     Breast cancer Paternal Grandmother        Social History     Socioeconomic History    Marital status:    Tobacco Use    Smoking status: Former     Types: Cigarettes     Start date:      Quit date:      Years since quittin.3    Smokeless tobacco: Never   Substance and Sexual Activity    Alcohol use: Yes     Comment: socially    Drug use: No    Sexual activity: Not Currently     Social Determinants of Health     Financial Resource Strain: High Risk    Difficulty of Paying Living Expenses: Hard   Food Insecurity: Food Insecurity Present    Worried About Running Out of Food in the Last Year: Sometimes true    Ran Out of Food in the Last Year: Sometimes true   Transportation Needs: Unmet Transportation Needs    Lack of Transportation (Medical): Yes    Lack of Transportation (Non-Medical): Yes   Physical Activity: Sufficiently Active    Days of Exercise per Week: 7 days     Minutes of Exercise per Session: 60 min   Stress: No Stress Concern Present    Feeling of Stress : Not at all   Social Connections: Moderately Isolated    Frequency of Communication with Friends and Family: More than three times a week    Frequency of Social Gatherings with Friends and Family: More than three times a week    Attends Congregation Services: Never    Active Member of Clubs or Organizations: No    Attends Club or Organization Meetings: Never    Marital Status:    Housing Stability: Unknown    Unable to Pay for Housing in the Last Year: No    Unstable Housing in the Last Year: No       Vitals:    05/04/23 1853   BP: (!) 155/112   Pulse: 80   Resp: 16   Temp: 98.2 °F (36.8 °C)       Physical Exam  Constitutional:       General: She is not in acute distress.     Appearance: She is well-developed.   HENT:      Head: Normocephalic and atraumatic.   Eyes:      General: No scleral icterus.  Cardiovascular:      Rate and Rhythm: Normal rate.   Pulmonary:      Effort: Pulmonary effort is normal.      Breath sounds: No stridor.   Abdominal:      General: There is no distension.      Palpations: Abdomen is soft.      Tenderness: There is abdominal tenderness.      Comments: TTP centrally   Lymphadenopathy:      Cervical: No cervical adenopathy.   Skin:     General: Skin is warm.      Findings: No erythema.   Neurological:      Mental Status: She is alert and oriented to person, place, and time.   Psychiatric:         Behavior: Behavior normal.   BMI 33  WBC 10  Hg 10.9  Lytes ok  Cr 1.3  CT shows dilated cecum and small bowel, consistent with cecal volvulus      Assessment & Plan:  62F with cecal volvulus  To OR as soon as available  Consents done  NPO

## 2023-05-06 LAB
POCT GLUCOSE: 156 MG/DL (ref 70–110)
POCT GLUCOSE: 169 MG/DL (ref 70–110)
POCT GLUCOSE: 176 MG/DL (ref 70–110)
POCT GLUCOSE: 184 MG/DL (ref 70–110)

## 2023-05-06 PROCEDURE — 97116 GAIT TRAINING THERAPY: CPT | Mod: CQ

## 2023-05-06 PROCEDURE — 97166 OT EVAL MOD COMPLEX 45 MIN: CPT

## 2023-05-06 PROCEDURE — 63600175 PHARM REV CODE 636 W HCPCS

## 2023-05-06 PROCEDURE — 99900035 HC TECH TIME PER 15 MIN (STAT)

## 2023-05-06 PROCEDURE — 96372 THER/PROPH/DIAG INJ SC/IM: CPT

## 2023-05-06 PROCEDURE — 97530 THERAPEUTIC ACTIVITIES: CPT

## 2023-05-06 PROCEDURE — 25000003 PHARM REV CODE 250: Performed by: STUDENT IN AN ORGANIZED HEALTH CARE EDUCATION/TRAINING PROGRAM

## 2023-05-06 PROCEDURE — G0378 HOSPITAL OBSERVATION PER HR: HCPCS

## 2023-05-06 PROCEDURE — 51701 INSERT BLADDER CATHETER: CPT

## 2023-05-06 PROCEDURE — 25000003 PHARM REV CODE 250

## 2023-05-06 PROCEDURE — 97530 THERAPEUTIC ACTIVITIES: CPT | Mod: CQ

## 2023-05-06 PROCEDURE — 94761 N-INVAS EAR/PLS OXIMETRY MLT: CPT

## 2023-05-06 PROCEDURE — 51798 US URINE CAPACITY MEASURE: CPT

## 2023-05-06 PROCEDURE — 96376 TX/PRO/DX INJ SAME DRUG ADON: CPT

## 2023-05-06 PROCEDURE — 97535 SELF CARE MNGMENT TRAINING: CPT

## 2023-05-06 PROCEDURE — 21400001 HC TELEMETRY ROOM

## 2023-05-06 PROCEDURE — 27000221 HC OXYGEN, UP TO 24 HOURS

## 2023-05-06 RX ORDER — HYDROCHLOROTHIAZIDE 12.5 MG/1
12.5 TABLET ORAL DAILY
Status: DISCONTINUED | OUTPATIENT
Start: 2023-05-06 | End: 2023-05-11 | Stop reason: HOSPADM

## 2023-05-06 RX ORDER — CALCIUM CARBONATE 200(500)MG
1000 TABLET,CHEWABLE ORAL 3 TIMES DAILY PRN
Status: DISCONTINUED | OUTPATIENT
Start: 2023-05-06 | End: 2023-05-11 | Stop reason: HOSPADM

## 2023-05-06 RX ADMIN — Medication 6 MG: at 08:05

## 2023-05-06 RX ADMIN — ASPIRIN 81 MG CHEWABLE TABLET 81 MG: 81 TABLET CHEWABLE at 09:05

## 2023-05-06 RX ADMIN — MUPIROCIN: 20 OINTMENT TOPICAL at 09:05

## 2023-05-06 RX ADMIN — ENOXAPARIN SODIUM 40 MG: 40 INJECTION SUBCUTANEOUS at 05:05

## 2023-05-06 RX ADMIN — MORPHINE SULFATE 2 MG: 2 INJECTION, SOLUTION INTRAMUSCULAR; INTRAVENOUS at 12:05

## 2023-05-06 RX ADMIN — MUPIROCIN: 20 OINTMENT TOPICAL at 08:05

## 2023-05-06 RX ADMIN — ACETAMINOPHEN 650 MG: 325 TABLET ORAL at 02:05

## 2023-05-06 RX ADMIN — ACETAMINOPHEN 650 MG: 325 TABLET ORAL at 11:05

## 2023-05-06 RX ADMIN — TRAMADOL HYDROCHLORIDE 50 MG: 50 TABLET, COATED ORAL at 08:05

## 2023-05-06 RX ADMIN — AMLODIPINE BESYLATE 5 MG: 5 TABLET ORAL at 09:05

## 2023-05-06 RX ADMIN — PROMETHAZINE HYDROCHLORIDE 25 MG: 25 TABLET ORAL at 05:05

## 2023-05-06 RX ADMIN — INSULIN ASPART 2 UNITS: 100 INJECTION, SOLUTION INTRAVENOUS; SUBCUTANEOUS at 05:05

## 2023-05-06 RX ADMIN — HYDROCHLOROTHIAZIDE 12.5 MG: 12.5 TABLET ORAL at 05:05

## 2023-05-06 NOTE — PLAN OF CARE
Problem: Occupational Therapy  Goal: Occupational Therapy Goal  Description: Goals to be met by: 06/06/2023      Patient will increase functional independence with ADLs by performing:    UE Dressing with Modified [place].  LE Dressing with Modified [place].  Grooming while standing with Modified [place].  Toileting from toilet with Modified [place] for hygiene and clothing management.   Toilet transfer to toilet with Modified [place].  Increased functional strength to WFL for self care.  Upper extremity exercise program x10 reps per handout, with independence.     Outcome: Ongoing, Progressing   Pt would benefit from continued OT to address deficits in self care and functional mobility. Recommending SNF as pt reports spouse unable to provide physical assistance and limited ability to provide light assistance such as assist for LE dressing; DME needs TBD pending progress with therapies.    Pt orthostatic in session but tolerated transfer to C well, able to urinate and nsg aware. Pt reported dizziness continued in seated and upon return to supine after stance.

## 2023-05-06 NOTE — PROGRESS NOTES
[facility] - Telemetry  General Surgery  Progress Note    Subjective:     Interval History:   Feeling ok  Pain controlled  Some nausea  Passing flatus  No bm  OOB some with pt    Post-Op Info:  Procedure(s) (LRB):  LAPAROTOMY, EXPLORATORY (N/A)  ILEOCECECTOMY    2 Days Post-Op      Medications:  Continuous Infusions:  Scheduled Meds:   acetaminophen  650 mg Oral Q8H    amLODIPine  5 mg Oral Daily    aspirin  81 mg Oral Daily    enoxaparin  40 mg Subcutaneous Daily    mupirocin   Nasal BID     PRN Meds:acetaminophen, calcium carbonate, dextrose 10%, dextrose 10%, dextrose, dextrose, glucagon (human recombinant), hydrALAZINE, insulin aspart U-100, melatonin, morphine, oxyCODONE, promethazine, traMADoL     Objective:     Vital Signs (Most Recent):  Temp: 98 °F (36.7 °C) (05/06/23 1146)  Pulse: 91 (05/06/23 1147)  Resp: 18 (05/06/23 1146)  BP: (!) 169/90 (05/06/23 1146)  SpO2: 98 % (05/06/23 1146) Vital Signs (24h Range):  Temp:  [98 °F (36.7 °C)-99.2 °F (37.3 °C)] 98 °F (36.7 °C)  Pulse:  [] 91  Resp:  [16-20] 18  SpO2:  [95 %-100 %] 98 %  BP: (165-180)/(70-90) 169/90       Intake/Output Summary (Last 24 hours) at 5/6/2023 1453  Last data filed at 5/6/2023 0935  Gross per 24 hour   Intake 240 ml   Output 1375 ml   Net -1135 ml       Physical Exam  Appropraite, some distended    Significant Labs:  CBC:   Recent Labs   Lab 05/05/23  0415   WBC 14.11*   RBC 4.68   HGB 10.6*   HCT 35.9*      MCV 77*   MCH 22.6*   MCHC 29.5*     CMP:   Recent Labs   Lab 05/05/23  0415   *   CALCIUM 8.9      K 3.5   CO2 21*      BUN 21   CREATININE 1.1       Significant Diagnostics:  I have reviewed all pertinent imaging results/findings within the past 24 hours.    Assessment/Plan:     Active Diagnoses:    Diagnosis Date Noted POA    PRINCIPAL PROBLEM:  Cecal volvulus [K56.2] 05/04/2023 Unknown      Problems Resolved During this Admission:     S/ pexlap ileocectomy  PT, oob as much as possible  Required in and  out. If unable to void again will replace mccormack  Some nausea, will leave on clears for now  Tums for relfux      Flaco Arias MD  General Surgery  [facility] - Telemetry

## 2023-05-06 NOTE — NURSING
Notified Dr. Arias bladder scan and in/out cath measurements. See flowsheets. No new order. NADN. Continue POC.

## 2023-05-06 NOTE — PLAN OF CARE
Problem: Physical Therapy  Goal: Physical Therapy Goal  Description: Goals to be met by: 23     Patient will increase functional independence with mobility by performin. Supine to sit with Stand-by Assistance  2. Rolling to Left and Right with Stand-by Assistance.  3. Sit to stand transfer with Stand-by Assistance  4. Bed to chair transfer with Stand-by Assistance using Rolling Walker  5. Gait  x 120 feet with Stand-by Assistance using Rolling Walker.     Outcome: Ongoing, Progressing   Pt continues toward all goals. Able to perform 4 sit to stand transfer trials with RW requiring min A. Took ~5-6 side steps with RW requiring min A and constant verbal/tactile cueing to decrease trunk flexion.

## 2023-05-06 NOTE — PLAN OF CARE
Problem: Adult Inpatient Plan of Care  Goal: Plan of Care Review  Outcome: Ongoing, Progressing  Goal: Absence of Hospital-Acquired Illness or Injury  Outcome: Ongoing, Progressing  Goal: Optimal Comfort and Wellbeing  Outcome: Ongoing, Progressing     Problem: Diabetes Comorbidity  Goal: Blood Glucose Level Within Targeted Range  Outcome: Ongoing, Progressing     Problem: Bleeding (Surgery Nonspecified)  Goal: Absence of Bleeding  Outcome: Ongoing, Progressing     Problem: Bowel Motility Impaired (Surgery Nonspecified)  Goal: Effective Bowel Elimination  Outcome: Ongoing, Progressing     Problem: Pain (Surgery Nonspecified)  Goal: Acceptable Pain Control  Outcome: Ongoing, Progressing     Problem: Postoperative Nausea and Vomiting (Surgery Nonspecified)  Goal: Nausea and Vomiting Relief  Outcome: Ongoing, Progressing     Problem: Postoperative Urinary Retention (Surgery Nonspecified)  Goal: Effective Urinary Elimination  Outcome: Ongoing, Progressing     Problem: Fall Injury Risk  Goal: Absence of Fall and Fall-Related Injury  Outcome: Ongoing, Progressing     Problem: Skin Injury Risk Increased  Goal: Skin Health and Integrity  Outcome: Ongoing, Progressing     POC reviewed with patient. All questions and concerns reviewed. Vital signs stable throughout shift. For full assessment please refer to flowsheet. Fall/ safety precautions implemented & maintained. Bed locked in lowest position, bed alarm activated & audible, & call light in reach. Will continue to monitor.

## 2023-05-06 NOTE — PT/OT/SLP EVAL
Occupational Therapy   Evaluation    Name: Aishwarya Jensen  MRN: [id number]  Admitting Diagnosis: Cecal volvulus  Recent Surgery: Procedure(s) (LRB):  LAPAROTOMY, EXPLORATORY (N/A)  ILEOCECECTOMY  2 Days Post-Op    Recommendations:     Discharge Recommendations: nursing facility, skilled  Discharge Equipment Recommendations:   (TBD, possible RW and BSC but will continue to assess)  Barriers to discharge:  Decreased caregiver support    Assessment:     Aishwarya Jensen is a 62 y.o. female with a medical diagnosis of Cecal volvulus.  She presents with pain at surgical site, symptomatic orthostatic hypotension but overall fairly good tolerance for [place] [facility] [place] dizziness; reported dizziness continued with return to supine as well. Tachycardia noted with BP readings. Performance deficits affecting function: weakness, impaired endurance, impaired self care skills, impaired functional mobility, impaired balance, gait instability, decreased upper extremity function, pain, impaired skin, impaired cardiopulmonary response to activity.      Rehab Prognosis: Good; patient would benefit from acute skilled OT services to address these deficits and reach maximum level of function.       Plan:     Patient to be seen 4 x/week to address the above listed problems via self-care/home management, therapeutic activities, therapeutic exercises  Plan of Care Expires: 06/06/23  Plan of Care Reviewed with: patient, son    Subjective     Chief Complaint: Pt reports dizziness in seated and stance  Patient/Family Comments/goals: To return to PLOF    Occupational Profile:  Living Environment: [facility] [place]/ with TTB  Previous level of function: Indep to Mod I with PRN use of QC but reports not often  Roles and Routines: Caretaker to self and home. Cooks, cleans, able to drive but does not currently own a car and uses both public transportation and has rides from friends/family completes own  "grocery shopping. Pt reports that she enjoys walking and watching TV  Equipment Used at Home: bath bench, cane, quad  Assistance upon Discharge: Limited per pt report, children do not live close; son works in steel industry and is gone for months at a time. Pt reports spouse unable to provide physical assistance or assistance with ADLs such as LE dressing. She states, "He's in a bad way."    Pain/Comfort:  Pain Rating 1:  (5-6/10)  Location - Orientation 1: generalized  Location 1: abdomen  Pain Addressed 1: Reposition, Distraction, Cessation of Activity  Pain Rating Post-Intervention 1: 5/10    Patients cultural, spiritual, Protestant conflicts given the current situation:      Objective:     Communicated with: nsg prior to session.  Patient found HOB elevated with bed alarm, peripheral IV, oxygen, telemetry, PureWick upon OT entry to room.    General Precautions: Standard, fall  Orthopedic Precautions: N/A  Braces: N/A  Respiratory Status: Nasal cannula, flow 1.5 L/min    Occupational Performance:    Bed Mobility:    Patient completed Rolling/Turning to Left with  minimum assistance and moderate assistance  Patient completed Scooting/Bridging with minimum assistance  Patient completed Supine to Sit with moderate assistance  Patient completed Sit to Supine with moderate assistance    Functional Mobility/Transfers:  Patient completed Sit <> Stand Transfer with minimum assistance and moderate assistance  with  rolling walker   Patient completed Toilet Transfer Stand Pivot technique to BSC with contact guard assistance with  rolling walker  Functional Mobility: Pt with fair dynamic seated and standing balance but dizziness which did not resolve with time or return to supine    Activities of Daily Living:  Grooming: stand by assistance to wipe hands with [place] after toileting  Upper Body Dressing: minimum assistance and moderate assistance to don gown as robe seated EOB  Lower Body Dressing: maximal assistance to " don undergarments seated on [facility]  Toileting: contact guard assistance for pericare and clothing management    Cognitive/Visual Perceptual:  Cognitive/Psychosocial Skills:     -       Oriented to: Person, Place, Time and Situation   -       Follows Commands/attention: Follows multistep  commands  -       Communication: clear/fluent  -       Memory: No Deficits noted  -       Safety awareness/insight to disability: intact   -       Mood/Affect/Coping skills/emotional control: Appropriate to situation    Physical Exam:  Postural examination/scapula alignment:    -       Rounded shoulders  Skin integrity: Surgical wound midline abdomen  Sensation:    -       Intact  Motor Planning:    -       WFL  Dominant hand:    -       R handed  Upper Extremity Range of Motion: BUE WFL     Upper Extremity Strength:  BUE grossly 3+ to 4-/5   Strength:  B hands WFL  Fine Motor Coordination:    -       Intact  Gross motor coordination:   WFL     AMPAC 6 Click ADL:  AMPAC Total Score: 17    Treatment & Education:  Pt educated on role of OT and POC.   Pt performing skills as listed above.     BP taken as follows:   BP AR MAP   Supine 177/78 97 110   Seated 145/66 106 95   Standing 116/68 125 80   Return to Supine 150/68 102 98        Patient left HOB elevated with all lines intact, call button in reach, bed alarm on, nsg notified, and son present    GOALS:   Multidisciplinary Problems       Occupational Therapy Goals          Problem: Occupational Therapy    Goal Priority Disciplines Outcome Interventions   Occupational Therapy Goal     OT, PT/OT Ongoing, Progressing    Description: Goals to be met by: 06/06/2023      Patient will increase functional independence with ADLs by performing:    UE Dressing with Modified [place].  LE Dressing with Modified [place].  Grooming while standing with Modified [place].  Toileting from toilet with Modified [place] for hygiene and clothing management.   Toilet transfer to toilet  with Modified [place].  Increased functional strength to WFL for self care.  Upper extremity exercise program x10 reps per handout, with independence.                          History:     Past Medical History:   Diagnosis Date    Diabetes mellitus, type 2     Hypercholesteremia     Hypertension          Past Surgical History:   Procedure Laterality Date    COLONOSCOPY  03/29/2023    COLONOSCOPY N/A 3/29/2023    Procedure: COLONOSCOPY;  Surgeon: Ashu Burns MD;  Location: [facility];  Service: Endoscopy;  Laterality: N/A;    HYSTERECTOMY      LAPAROTOMY, EXPLORATORY N/A 5/4/2023    Procedure: LAPAROTOMY, EXPLORATORY;  Surgeon: Flaco Arias MD;  Location: [facility] OR;  Service: General;  Laterality: N/A;    SURGICAL REMOVAL OF ILEUM WITH CECUM  5/4/2023    Procedure: ILEOCECECTOMY ;  Surgeon: Flaco Arias MD;  Location: [facility];  Service: General;;       Time Tracking:     OT Date of Treatment: 05/06/23  OT Start Time: 1641  OT Stop Time: 1727  OT Total Time (min): 46 min    Billable Minutes:Evaluation 15  Self Care/Home Management 15  Therapeutic Activity 16    5/6/2023

## 2023-05-06 NOTE — PT/OT/SLP PROGRESS
"Physical Therapy Treatment    Patient Name:  Aishwarya Jensen   MRN:  [id number]    Recommendations:     Discharge Recommendations:  (TBD pending progress- anticipating  PT/OT with 24/7 family assistance)  Discharge Equipment Recommendations: walker, rolling (Possibly a bedside commode)  Barriers to discharge:  Increased assistance needed for all bed and functional mobility    Assessment:     Aishwarya Jensen is a 62 y.o. female admitted with a medical diagnosis of Cecal volvulus.  She presents with the following impairments/functional limitations: weakness, impaired endurance, impaired functional mobility, impaired self care skills, impaired balance, gait instability, decreased coordination, decreased upper extremity function, decreased lower extremity function, decreased safety awareness, pain, decreased ROM, impaired coordination, impaired skin, edema, impaired cardiopulmonary response to activity. Pt able to perform 4 sit to stand transfer trials with RW requiring min A. Also, able to take ~5-6 side steps with RW requiring min A. Would benefit from continued PT services to increase pt's independent functional mobility to level prior to admission..    Rehab Prognosis: Good; patient would benefit from acute skilled PT services to address these deficits and reach maximum level of function.    Recent Surgery: Procedure(s) (LRB):  LAPAROTOMY, EXPLORATORY (N/A)  ILEOCECECTOMY  2 Days Post-Op    Plan:     During this hospitalization, patient to be seen 5 x/week to address the identified rehab impairments via gait training, therapeutic activities, therapeutic exercises, neuromuscular re-education and progress toward the following goals:    Plan of Care Expires:  06/05/23    Subjective     Chief Complaint: None Expressed  Patient/Family Comments/goals: "I did not sleep at all last night. I am sleepy".  Pain/Comfort:  Pain Rating 1:  (Did not rate)  Location - Orientation 1: generalized  Location 1: " abdomen  Pain Addressed 1: Reposition, Distraction, Cessation of Activity  Pain Rating Post-Intervention 1:  (Did not rate)      Objective:     Communicated with nurse prior to session.  Patient found supine with bed alarm, peripheral IV, PureWick, telemetry, oxygen upon PT entry to room.     General Precautions: Standard, fall  Orthopedic Precautions: N/A  Braces: N/A  Respiratory Status: Nasal cannula, flow 1.5 L/min     Functional Mobility:  Bed Mobility:     Rolling Left:  minimum assistance  Scooting: stand by assistance  Supine to Sit: minimum assistance and Instructed in log roll  Sit to Supine: minimum assistance and with assistance needed to advance BLE's  Transfers:     Sit to Stand:  minimum assistance with rolling walker  Gait: ~5-6 side steps with RW requiring min A      AM-PAC 6 CLICK MOBILITY  Turning over in bed (including adjusting bedclothes, sheets and blankets)?: 3  Sitting down on and standing up from a chair with arms (e.g., wheelchair, bedside commode, etc.): 3  Moving from lying on back to sitting on the side of the bed?: 3  Moving to and from a bed to a chair (including a wheelchair)?: 3  Need to walk in hospital room?: 3  Climbing 3-5 steps with a railing?: 1  Basic Mobility Total Score: 16       Treatment & Education:  Reiterated log roll to pt prior to performing supine to sit transfers in attempts to decrease abdominal pain. Pt able to perform 4 sit to stand transfer trials with RW requiring min A and verbal cueing to decrease trunk flexion. Also, took ~5-6 side steps with RW requiring min A. Pt requires seated rest breaks between activities performed. Verbal/tactile cueing needed to decrease trunk flexion when in standing and occasionally while sitting at EOB. Pt with complaint of dizziness at end of session with nurse (Danielle) notified.    Patient left HOB elevated with all lines intact, call button in reach, bed alarm on, nurse notified, and son present..    GOALS:    Multidisciplinary Problems       Physical Therapy Goals          Problem: Physical Therapy    Goal Priority Disciplines Outcome Goal Variances Interventions   Physical Therapy Goal     PT, PT/OT Ongoing, Progressing     Description: Goals to be met by: 23     Patient will increase functional independence with mobility by performin. Supine to sit with Stand-by Assistance  2. Rolling to Left and Right with Stand-by Assistance.  3. Sit to stand transfer with Stand-by Assistance  4. Bed to chair transfer with Stand-by Assistance using Rolling Walker  5. Gait  x 120 feet with Stand-by Assistance using Rolling Walker.                          Time Tracking:     PT Received On: 23  PT Start Time: 815     PT Stop Time: 858  PT Total Time (min): 43 min     Billable Minutes: Gait Training 8 and Therapeutic Activity 35    Treatment Type: Treatment  PT/PTA: PTA     Number of PTA visits since last PT visit: 2023

## 2023-05-07 LAB
ANION GAP SERPL CALC-SCNC: 9 MMOL/L (ref 8–16)
BUN SERPL-MCNC: 18 MG/DL (ref 8–23)
CALCIUM SERPL-MCNC: 8.5 MG/DL (ref 8.7–10.5)
CHLORIDE SERPL-SCNC: 105 MMOL/L (ref 95–110)
CO2 SERPL-SCNC: 25 MMOL/L (ref 23–29)
CREAT SERPL-MCNC: 0.8 MG/DL (ref 0.5–1.4)
EST. GFR  (NO RACE VARIABLE): >60 ML/MIN/1.73 M^2
GLUCOSE SERPL-MCNC: 106 MG/DL (ref 70–110)
POCT GLUCOSE: 114 MG/DL (ref 70–110)
POCT GLUCOSE: 131 MG/DL (ref 70–110)
POCT GLUCOSE: 97 MG/DL (ref 70–110)
POCT GLUCOSE: 99 MG/DL (ref 70–110)
POTASSIUM SERPL-SCNC: 3.4 MMOL/L (ref 3.5–5.1)
SODIUM SERPL-SCNC: 139 MMOL/L (ref 136–145)

## 2023-05-07 PROCEDURE — 25000003 PHARM REV CODE 250: Performed by: STUDENT IN AN ORGANIZED HEALTH CARE EDUCATION/TRAINING PROGRAM

## 2023-05-07 PROCEDURE — 11000001 HC ACUTE MED/SURG PRIVATE ROOM

## 2023-05-07 PROCEDURE — 63600175 PHARM REV CODE 636 W HCPCS

## 2023-05-07 PROCEDURE — 96372 THER/PROPH/DIAG INJ SC/IM: CPT

## 2023-05-07 PROCEDURE — 80048 BASIC METABOLIC PNL TOTAL CA: CPT | Performed by: STUDENT IN AN ORGANIZED HEALTH CARE EDUCATION/TRAINING PROGRAM

## 2023-05-07 PROCEDURE — 51702 INSERT TEMP BLADDER CATH: CPT

## 2023-05-07 PROCEDURE — G0378 HOSPITAL OBSERVATION PER HR: HCPCS

## 2023-05-07 PROCEDURE — 96375 TX/PRO/DX INJ NEW DRUG ADDON: CPT | Mod: 59

## 2023-05-07 PROCEDURE — 63600175 PHARM REV CODE 636 W HCPCS: Performed by: STUDENT IN AN ORGANIZED HEALTH CARE EDUCATION/TRAINING PROGRAM

## 2023-05-07 PROCEDURE — 99900035 HC TECH TIME PER 15 MIN (STAT)

## 2023-05-07 PROCEDURE — 25000003 PHARM REV CODE 250

## 2023-05-07 PROCEDURE — 36415 COLL VENOUS BLD VENIPUNCTURE: CPT | Performed by: STUDENT IN AN ORGANIZED HEALTH CARE EDUCATION/TRAINING PROGRAM

## 2023-05-07 RX ORDER — POTASSIUM CHLORIDE 20 MEQ/1
20 TABLET, EXTENDED RELEASE ORAL 2 TIMES DAILY
Status: COMPLETED | OUTPATIENT
Start: 2023-05-07 | End: 2023-05-08

## 2023-05-07 RX ADMIN — MUPIROCIN: 20 OINTMENT TOPICAL at 09:05

## 2023-05-07 RX ADMIN — ACETAMINOPHEN 650 MG: 325 TABLET ORAL at 09:05

## 2023-05-07 RX ADMIN — HYDRALAZINE HYDROCHLORIDE 10 MG: 20 INJECTION, SOLUTION INTRAMUSCULAR; INTRAVENOUS at 12:05

## 2023-05-07 RX ADMIN — POTASSIUM CHLORIDE 20 MEQ: 1500 TABLET, EXTENDED RELEASE ORAL at 11:05

## 2023-05-07 RX ADMIN — AMLODIPINE BESYLATE 5 MG: 5 TABLET ORAL at 09:05

## 2023-05-07 RX ADMIN — TRAMADOL HYDROCHLORIDE 50 MG: 50 TABLET, COATED ORAL at 08:05

## 2023-05-07 RX ADMIN — POTASSIUM CHLORIDE 20 MEQ: 1500 TABLET, EXTENDED RELEASE ORAL at 09:05

## 2023-05-07 RX ADMIN — ENOXAPARIN SODIUM 40 MG: 40 INJECTION SUBCUTANEOUS at 05:05

## 2023-05-07 RX ADMIN — ACETAMINOPHEN 650 MG: 325 TABLET ORAL at 01:05

## 2023-05-07 RX ADMIN — ASPIRIN 81 MG CHEWABLE TABLET 81 MG: 81 TABLET CHEWABLE at 09:05

## 2023-05-07 RX ADMIN — HYDROCHLOROTHIAZIDE 12.5 MG: 12.5 TABLET ORAL at 09:05

## 2023-05-07 RX ADMIN — ACETAMINOPHEN 650 MG: 325 TABLET ORAL at 06:05

## 2023-05-07 NOTE — PROGRESS NOTES
[facility]  [facility]  Progress Note    Subjective:     Interval History:   FEeling well  No nausea now  Passing flatus  OOB some  Hutton placed for urinary retention  Minimal pain  Afebrile, HR normal    Post-Op Info:  Procedure(s) (LRB):  LAPAROTOMY, EXPLORATORY (N/A)  ILEOCECECTOMY    3 Days Post-Op      Medications:  Continuous Infusions:  Scheduled Meds:   acetaminophen  650 mg Oral Q8H    amLODIPine  5 mg Oral Daily    aspirin  81 mg Oral Daily    enoxaparin  40 mg Subcutaneous Daily    hydroCHLOROthiazide  12.5 mg Oral Daily    mupirocin   Nasal BID    potassium chloride  20 mEq Oral BID     PRN Meds:acetaminophen, calcium carbonate, dextrose 10%, dextrose 10%, dextrose, dextrose, glucagon (human recombinant), hydrALAZINE, insulin aspart U-100, melatonin, morphine, oxyCODONE, promethazine, traMADoL     Objective:     Vital Signs (Most Recent):  Temp: 97 °F (36.1 °C) (05/07/23 0737)  Pulse: 80 (05/07/23 0737)  Resp: 17 (05/07/23 0833)  BP: 129/62 (05/07/23 0737)  SpO2: 96 % (05/07/23 0737) Vital Signs (24h Range):  Temp:  [97 °F (36.1 °C)-99.8 °F (37.7 °C)] 97 °F (36.1 °C)  Pulse:  [] 80  Resp:  [16-20] 17  SpO2:  [95 %-100 %] 96 %  BP: (129-177)/(62-90) 129/62       Intake/Output Summary (Last 24 hours) at 5/7/2023 1047  Last data filed at 5/6/2023 2339  Gross per 24 hour   Intake 60 ml   Output 780 ml   Net -720 ml       Physical Exam  Incision cdi  Ab soft    Significant Labs:  CBC: No results for input(s): WBC, RBC, HGB, HCT, PLT, MCV, MCH, MCHC in the last 48 hours.  CMP:   Recent Labs   Lab 05/07/23  0333      CALCIUM 8.5*      K 3.4*   CO2 25      BUN 18   CREATININE 0.8       Significant Diagnostics:  None    Assessment/Plan:     Active Diagnoses:    Diagnosis Date Noted POA    PRINCIPAL PROBLEM:  Cecal volvulus [K56.2] 05/04/2023 Unknown      Problems Resolved During this Admission:     S/p ex lap ileocectomy  Replace k  Oob  Advance to regular diet  Will try  again to get mccormack out in a day or two  Likely rehab     Flaco Arias MD  General Surgery  [facility] - Telemetry

## 2023-05-08 ENCOUNTER — TELEPHONE (OUTPATIENT)
Dept: PRIMARY CARE CLINIC | Facility: CLINIC | Age: 63
End: 2023-05-08
Payer: COMMERCIAL

## 2023-05-08 PROBLEM — R53.1 WEAKNESS: Status: ACTIVE | Noted: 2023-05-08

## 2023-05-08 LAB
ANION GAP SERPL CALC-SCNC: 11 MMOL/L (ref 8–16)
BUN SERPL-MCNC: 17 MG/DL (ref 8–23)
CALCIUM SERPL-MCNC: 9 MG/DL (ref 8.7–10.5)
CHLORIDE SERPL-SCNC: 102 MMOL/L (ref 95–110)
CO2 SERPL-SCNC: 26 MMOL/L (ref 23–29)
CREAT SERPL-MCNC: 0.9 MG/DL (ref 0.5–1.4)
EST. GFR  (NO RACE VARIABLE): >60 ML/MIN/1.73 M^2
GLUCOSE SERPL-MCNC: 105 MG/DL (ref 70–110)
POCT GLUCOSE: 113 MG/DL (ref 70–110)
POCT GLUCOSE: 126 MG/DL (ref 70–110)
POCT GLUCOSE: 137 MG/DL (ref 70–110)
POCT GLUCOSE: 198 MG/DL (ref 70–110)
POTASSIUM SERPL-SCNC: 3.8 MMOL/L (ref 3.5–5.1)
SODIUM SERPL-SCNC: 139 MMOL/L (ref 136–145)

## 2023-05-08 PROCEDURE — 80048 BASIC METABOLIC PNL TOTAL CA: CPT

## 2023-05-08 PROCEDURE — 11000001 HC ACUTE MED/SURG PRIVATE ROOM

## 2023-05-08 PROCEDURE — 63600175 PHARM REV CODE 636 W HCPCS

## 2023-05-08 PROCEDURE — 97116 GAIT TRAINING THERAPY: CPT | Mod: CQ

## 2023-05-08 PROCEDURE — 97530 THERAPEUTIC ACTIVITIES: CPT | Mod: CO

## 2023-05-08 PROCEDURE — G0378 HOSPITAL OBSERVATION PER HR: HCPCS

## 2023-05-08 PROCEDURE — 25000003 PHARM REV CODE 250: Performed by: STUDENT IN AN ORGANIZED HEALTH CARE EDUCATION/TRAINING PROGRAM

## 2023-05-08 PROCEDURE — 96372 THER/PROPH/DIAG INJ SC/IM: CPT | Mod: 59

## 2023-05-08 PROCEDURE — 25000003 PHARM REV CODE 250

## 2023-05-08 PROCEDURE — 97535 SELF CARE MNGMENT TRAINING: CPT | Mod: CO

## 2023-05-08 PROCEDURE — 36415 COLL VENOUS BLD VENIPUNCTURE: CPT

## 2023-05-08 PROCEDURE — 27000221 HC OXYGEN, UP TO 24 HOURS

## 2023-05-08 PROCEDURE — 51702 INSERT TEMP BLADDER CATH: CPT

## 2023-05-08 PROCEDURE — 94761 N-INVAS EAR/PLS OXIMETRY MLT: CPT

## 2023-05-08 PROCEDURE — 97110 THERAPEUTIC EXERCISES: CPT | Mod: CQ

## 2023-05-08 RX ADMIN — POTASSIUM CHLORIDE 20 MEQ: 1500 TABLET, EXTENDED RELEASE ORAL at 09:05

## 2023-05-08 RX ADMIN — ACETAMINOPHEN 650 MG: 325 TABLET ORAL at 11:05

## 2023-05-08 RX ADMIN — MUPIROCIN: 20 OINTMENT TOPICAL at 11:05

## 2023-05-08 RX ADMIN — MUPIROCIN: 20 OINTMENT TOPICAL at 09:05

## 2023-05-08 RX ADMIN — ACETAMINOPHEN 650 MG: 325 TABLET ORAL at 06:05

## 2023-05-08 RX ADMIN — POTASSIUM CHLORIDE 20 MEQ: 1500 TABLET, EXTENDED RELEASE ORAL at 11:05

## 2023-05-08 RX ADMIN — AMLODIPINE BESYLATE 5 MG: 5 TABLET ORAL at 09:05

## 2023-05-08 RX ADMIN — OXYCODONE HYDROCHLORIDE 5 MG: 5 TABLET ORAL at 11:05

## 2023-05-08 RX ADMIN — ACETAMINOPHEN 650 MG: 325 TABLET ORAL at 02:05

## 2023-05-08 RX ADMIN — ENOXAPARIN SODIUM 40 MG: 40 INJECTION SUBCUTANEOUS at 04:05

## 2023-05-08 RX ADMIN — ASPIRIN 81 MG CHEWABLE TABLET 81 MG: 81 TABLET CHEWABLE at 09:05

## 2023-05-08 RX ADMIN — HYDROCHLOROTHIAZIDE 12.5 MG: 12.5 TABLET ORAL at 09:05

## 2023-05-08 NOTE — PLAN OF CARE
Problem: Occupational Therapy  Goal: Occupational Therapy Goal  Description: Goals to be met by: 06/06/2023      Patient will increase functional independence with ADLs by performing:    UE Dressing with Modified [place].  LE Dressing with Modified [place].  Grooming while standing with Modified [place].  Toileting from toilet with Modified [place] for hygiene and clothing management.   Toilet transfer to toilet with Modified [place].  Increased functional strength to WFL for self care.  Upper extremity exercise program x10 reps per handout, with independence.     Outcome: Ongoing, Progressing   Aishwarya Jensen is a 62 y.o. female with a medical diagnosis of Cecal volvulus.  Performance deficits affecting function are weakness, impaired endurance, impaired self care skills, impaired functional mobility, gait instability, impaired balance, decreased lower extremity function, pain, impaired coordination. Pt found in bed,agreeable to therapy.  Pt is progressing well towards goals. Continue OT services to address functional goals, progressing as able.

## 2023-05-08 NOTE — TELEPHONE ENCOUNTER
Called pt regarding message. Pt wanted to inform staff she has emergency surgery last week. Pt stated she's still in hospital. Verbalized understanding.

## 2023-05-08 NOTE — ASSESSMENT & PLAN NOTE
Aishwarya Jensen is a 62 y.o. female T2DM, CKD3, HTN, and HLD who presented with imaging consistent with cecal volvulus. She is now s/p ileocecectomy on 5/4/23.     -Pain: MM  -Diet: regular  -Resume home amlodipine, ASA  -needs to ambulate  -remove mccormack today vs tomorrow  -SSI  -PRN anti-hypertensives, -emetics available   -PT/OT  -DVT: LVX    Dispo: Continue inpatient management

## 2023-05-08 NOTE — PT/OT/SLP PROGRESS
Occupational Therapy   Treatment    Name: Aishwarya Jensen  MRN: [id number]  Admitting Diagnosis:  Cecal volvulus  4 Days Post-Op    Recommendations:     Discharge Recommendations: home, home health PT, home health OT (assist from son and spouse)  Discharge Equipment Recommendations:  bedside commode, walker, rolling  Barriers to discharge:  Other (Comment) (increased level of assistance)    Assessment:     Aishwarya Jensen is a 62 y.o. female with a medical diagnosis of Cecal volvulus.  Performance deficits affecting function are weakness, impaired endurance, impaired self care skills, impaired functional mobility, gait instability, impaired balance, decreased lower extremity function, pain, impaired coordination. Pt found in bed,agreeable to therapy.  Pt is progressing well towards goals. Continue OT services to address functional goals, progressing as able.      Rehab Prognosis:  Good; patient would benefit from acute skilled OT services to address these deficits and reach maximum level of function.       Plan:     Patient to be seen 4 x/week to address the above listed problems via self-care/home management, therapeutic activities, therapeutic exercises  Plan of Care Expires: 06/06/23  Plan of Care Reviewed with: patient    Subjective       Pain/Comfort:  Pain Rating 1: 4/10  Location - Side 1: Bilateral  Location - Orientation 1: generalized  Location 1: abdomen  Pain Addressed 1: Distraction  Pain Rating Post-Intervention 1: 4/10    Objective:     Communicated with: RN prior to session.  Patient found HOB elevated with bed alarm, peripheral IV, telemetry, oxygen, PureWick upon OT entry to room.    General Precautions: Standard, fall    Orthopedic Precautions:N/A  Braces: N/A  Respiratory Status: Nasal cannula, flow 1 L/min     Occupational Performance:     Bed Mobility:    Patient completed Rolling/Turning to Left with  stand by assistance and with side rail  Patient completed Scooting/Bridging  with stand by assistance to scoot seated to EOB  Patient completed Supine to Sit with minimum assistance HOB elevated, increased time and effort, vc's for effective technique    Functional Mobility/Transfers:  Patient completed Sit <> Stand Transfer with contact guard assistance  with  rolling walker   Patient completed Bed <> Chair Transfer using Stand Pivot technique with contact guard assistance with rolling walker  Functional Mobility: Pt ambulated 4 steps to chair with CGA using RW.     Activities of Daily Living:  Grooming: contact guard assistance for balance only standing at bedside table   Bathing: stand by assistance (seated) to contact guard assistance (standing) for sponge bath in chair.   Upper Body Dressing: modified independence dof/don gown       [facility] 6 Click ADL: 21    Treatment & Education:  Educated pt on effective transfers especially sit<>stand portion.   Discussed DME needs (RW, BSC) and how to use BSC over toilet or at bedside.  Pt reports her son will be home for next 2 months and will be able to assist her as needed.  She also reports  can assist with light tasks as in cooking meals, light housework, assisting pt with dressign as needed however he is unable to assist physically with lifting.  Pt feels she is able to safely go home at this level of assistance.     Patient left up in chair with all lines intact, call button in reach, chair alarm on, and RN notified    GOALS:   Multidisciplinary Problems       Occupational Therapy Goals          Problem: Occupational Therapy    Goal Priority Disciplines Outcome Interventions   Occupational Therapy Goal     OT, PT/OT Ongoing, Progressing    Description: Goals to be met by: 06/06/2023      Patient will increase functional independence with ADLs by performing:    UE Dressing with Modified [place].  LE Dressing with Modified [place].  Grooming while standing with Modified [place].  Toileting from toilet with Modified  [place] for hygiene and clothing management.   Toilet transfer to toilet with Modified [place].  Increased functional strength to WFL for self care.  Upper extremity exercise program x10 reps per handout, with independence.                          Time Tracking:     OT Date of Treatment: 05/08/23  OT Start Time: 1045  OT Stop Time: 1124  OT Total Time (min): 39 min    Billable Minutes:Self Care/Home Management 24  Therapeutic Activity 15            5/8/2023

## 2023-05-08 NOTE — PLAN OF CARE
Problem: Adult Inpatient Plan of Care  Goal: Plan of Care Review  Outcome: Ongoing, Progressing  Goal: Patient-Specific Goal (Individualized)  Outcome: Ongoing, Progressing  Goal: Absence of Hospital-Acquired Illness or Injury  Outcome: Ongoing, Progressing  Goal: Optimal Comfort and Wellbeing  Outcome: Ongoing, Progressing  Goal: Readiness for Transition of Care  Outcome: Ongoing, Progressing     Problem: Infection (Surgery Nonspecified)  Goal: Absence of Infection Signs and Symptoms  Outcome: Ongoing, Progressing     Problem: Pain (Surgery Nonspecified)  Goal: Acceptable Pain Control  Outcome: Ongoing, Progressing

## 2023-05-08 NOTE — TELEPHONE ENCOUNTER
----- Message from Tonia Sood sent at 5/8/2023  6:41 AM CDT -----  Regarding: Medical Assistance  Contact: Patient  Patient stated she would like to update physician on current medical condition   Patient was scheduled to be seen on 05/08/2023   Patient cancelled appt due to currently being in the hospital due to an emergency surgery pt had completed on 05/03/2023   Patient stated she will return call to reschedule   Please Assist     Patient can be reached at [phone]

## 2023-05-08 NOTE — PLAN OF CARE
Problem: Physical Therapy  Goal: Physical Therapy Goal  Description: Goals to be met by: 23     Patient will increase functional independence with mobility by performin. Supine to sit with Stand-by Assistance  2. Rolling to Left and Right with Stand-by Assistance.  3. Sit to stand transfer with Stand-by Assistance  4. Bed to chair transfer with Stand-by Assistance using Rolling Walker  5. Gait  x 120 feet with Stand-by Assistance using Rolling Walker.     Outcome: Ongoing, Progressing

## 2023-05-08 NOTE — SUBJECTIVE & OBJECTIVE
Interval History: NAEO. Remains AF and HDS. She is tolerating diet without N/V. Passing flatus, no BM. Not ambulating much    Medications:  Continuous Infusions:  Scheduled Meds:   acetaminophen  650 mg Oral Q8H    amLODIPine  5 mg Oral Daily    aspirin  81 mg Oral Daily    enoxaparin  40 mg Subcutaneous Daily    hydroCHLOROthiazide  12.5 mg Oral Daily    mupirocin   Nasal BID    potassium chloride  20 mEq Oral BID     PRN Meds:acetaminophen, calcium carbonate, dextrose 10%, dextrose 10%, dextrose, dextrose, glucagon (human recombinant), hydrALAZINE, insulin aspart U-100, melatonin, morphine, oxyCODONE, promethazine, traMADoL     Review of patient's allergies indicates:  No Known Allergies  Objective:     Vital Signs (Most Recent):  Temp: 98.2 °F (36.8 °C) (05/08/23 0628)  Pulse: 95 (05/08/23 0518)  Resp: 17 (05/08/23 0518)  BP: (!) 118/55 (05/08/23 0518)  SpO2: 95 % (05/08/23 0518) Vital Signs (24h Range):  Temp:  [96.8 °F (36 °C)-98.2 °F (36.8 °C)] 98.2 °F (36.8 °C)  Pulse:  [] 95  Resp:  [16-18] 17  SpO2:  [87 %-99 %] 95 %  BP: (118-171)/(55-72) 118/55     Weight: 100.3 kg (221 lb 1.9 oz)  Body mass index is 34.63 kg/m².    Intake/Output - Last 3 Shifts         05/06 0700  05/07 0659 05/07 0700 05/08 0659 05/08 0700 05/09 0659    P.O. 180 180     Total Intake(mL/kg) 180 (1.8) 180 (1.8)     Urine (mL/kg/hr) 1130 (0.5) 1650 (0.7)     Total Output 1130 1650     Net -950 -1470                     Physical Exam  Vitals and nursing note reviewed.   Constitutional:       Appearance: Normal appearance.   HENT:      Head: Normocephalic and atraumatic.   Eyes:      General: No scleral icterus.     Extraocular Movements: Extraocular movements intact.   Cardiovascular:      Rate and Rhythm: Normal rate and regular rhythm.   Pulmonary:      Effort: Pulmonary effort is normal. No respiratory distress.      Comments: On 2L by NC  Abdominal:      General: There is no distension.      Palpations: Abdomen is soft.       Tenderness: There is no guarding or rebound.      Comments: Midline incision CDI with staples, no bleeding, drainage or signs of infection. Appropriately tender to palpation.    Skin:     General: Skin is warm and dry.   Neurological:      General: No focal deficit present.      Mental Status: She is alert and oriented to person, place, and time.        Significant Labs:  I have reviewed all pertinent lab results within the past 24 hours.  CBC:   Recent Labs   Lab 05/05/23  0415   WBC 14.11*   RBC 4.68   HGB 10.6*   HCT 35.9*      MCV 77*   MCH 22.6*   MCHC 29.5*     CMP:   Recent Labs   Lab 05/04/23  1420 05/05/23  0415 05/07/23  0333   *   < > 106   CALCIUM 10.2   < > 8.5*   ALBUMIN 4.1  --   --    PROT 8.3  --   --       < > 139   K 3.7   < > 3.4*   CO2 23   < > 25      < > 105   BUN 31*   < > 18   CREATININE 1.3   < > 0.8   ALKPHOS 91  --   --    ALT 16  --   --    AST 22  --   --    BILITOT 0.4  --   --     < > = values in this interval not displayed.       Significant Diagnostics:  I have reviewed all pertinent imaging results/findings within the past 24 hours.

## 2023-05-08 NOTE — PT/OT/SLP PROGRESS
Physical Therapy Treatment    Patient Name:  Aishwarya Jensen   MRN:  [id number]    Recommendations:     Discharge Recommendations: home health PT  Discharge Equipment Recommendations: walker, rolling  Barriers to discharge:  decreased mobility,strength and endurance    Assessment:     Aishwarya Jensen is a 62 y.o. female admitted with a medical diagnosis of Cecal volvulus.  She presents with the following impairments/functional limitations: weakness, impaired endurance, impaired functional mobility, gait instability, decreased upper extremity function, decreased lower extremity function, pain, decreased ROM, impaired coordination, impaired skin, impaired cardiopulmonary response to activity,pt with some fatigue from being up in chair,pt will benefit from  services upon discharge to address above functional limitations.    Rehab Prognosis: Fair; patient would benefit from acute skilled PT services to address these deficits and reach maximum level of function.    Recent Surgery: Procedure(s) (LRB):  LAPAROTOMY, EXPLORATORY (N/A)  ILEOCECECTOMY  4 Days Post-Op    Plan:     During this hospitalization, patient to be seen 5 x/week to address the identified rehab impairments via gait training, therapeutic activities, therapeutic exercises, neuromuscular re-education and progress toward the following goals:    Plan of Care Expires:  06/05/23    Subjective     Chief Complaint: n/a  Patient/Family Comments/goals: pt sat up in chair for a while.  Pain/Comfort:  Pain Rating 1:  (no rating)  Location - Orientation 1: generalized  Location 1: abdomen  Pain Addressed 1: Reposition, Distraction      Objective:     Communicated with nsg prior to session.  Patient found supine with bed alarm, mccormack catheter, oxygen, peripheral IV, telemetry upon PT entry to room.     General Precautions: Standard, fall  Orthopedic Precautions: N/A  Braces: N/A  Respiratory Status: Nasal cannula, flow 1 L/min     Functional  Mobility:  Bed Mobility:     Supine to Sit: supervision and stand by assistance  Sit to Supine: minimum assistance and at le's  Transfers:     Sit to Stand:  contact guard assistance with rolling walker  Gait: amb ~12' with RW and Min A  Balance: fair standing balance with RW      AM-PAC 6 CLICK MOBILITY  Turning over in bed (including adjusting bedclothes, sheets and blankets)?: 4  Sitting down on and standing up from a chair with arms (e.g., wheelchair, bedside commode, etc.): 3  Moving from lying on back to sitting on the side of the bed?: 3  Moving to and from a bed to a chair (including a wheelchair)?: 3  Need to walk in hospital room?: 3  Climbing 3-5 steps with a railing?: 1  Basic Mobility Total Score: 17       Treatment & Education: le seated ex's X 10-12 reps inc: ap,laq and hip flex.      Patient left supine with all lines intact, call button in reach, and bed alarm on..    GOALS: see general POC  Multidisciplinary Problems       Physical Therapy Goals          Problem: Physical Therapy    Goal Priority Disciplines Outcome Goal Variances Interventions   Physical Therapy Goal     PT, PT/OT Ongoing, Progressing     Description: Goals to be met by: 23     Patient will increase functional independence with mobility by performin. Supine to sit with Stand-by Assistance  2. Rolling to Left and Right with Stand-by Assistance.  3. Sit to stand transfer with Stand-by Assistance  4. Bed to chair transfer with Stand-by Assistance using Rolling Walker  5. Gait  x 120 feet with Stand-by Assistance using Rolling Walker.                          Time Tracking:     PT Received On: 23  PT Start Time: 1333     PT Stop Time: 1357  PT Total Time (min): 24 min     Billable Minutes: Gait Training 14 and Therapeutic Exercise 10    Treatment Type: Treatment  PT/PTA: PTA     Number of PTA visits since last PT visit: 2     2023

## 2023-05-08 NOTE — PLAN OF CARE
sent message to therapists to clarify discharge recommendations. Awaiting response.    Per OT Gwendolyn: Home with HH OT/PT. Pt reports her son and spouse will be home 24/7 to assist as needed.  DME: JESUS MANUEL WADE thanks.    --[facility] has accepted patient in [facility].  Rolling walker and BSC ordered being reviewed by Tatiana with [facility].    1213--Per Tatiana, diagnosis (osteoarthritis) does not qualify since not documented as problem in MD note. Orders placed again, will see if qualifies.    --Tatiana stated patient is approved for RW/BSC (will deliver closer to discharge). MD anticipates discharge tomorrow or Wednesday.    Discharge needs discussed with patient. She is agreeable to Home Health and patient choice form signed. DME to be delivered closer to discharge. All questions answered. Patient encouraged to call with any questions or concerns.  will continue to follow patient through transitions of care and assist with any discharge needs.     Patient Contacts    Name Relation Home Work Mobile   Bianca Jensen Daughter [phone]   sierra Jensen Son   [phone]       Future Appointments   Date Time Provider Department Center   5/17/2023  2:00 PM Flaco Arias MD [facility]   5/24/2023 10:30 AM LAB, [facility] LAB [facility]   5/31/2023 11:30 AM Micki Knott NP [facility]   8/29/2023  9:30 AM DONALD WaldropM [facility] POD [facility]      [facility]  Home Health Services, Home Therapy Services, Home Living Aide Services [phone] [address] [facility] [place] [address]      Next Steps: Follow up  Appointment:   Instructions: [employer]     [facility] [phone] [address] [facility] [place] [address]      Next Steps: Follow up  Appointment:   Instructions: [employer]      05/08/23 1505   Discharge Reassessment   Assessment Type Discharge Planning Reassessment   Did the patient's condition or plan change since previous assessment? No   Discharge Plan discussed with: Patient   Discharge Plan A Home;Home Health   Discharge Plan B Home with family;Home Health   DME Needed Upon Discharge  walker, rolling;bedside commode   Discharge Barriers Identified None   Why the patient remains in the hospital Requires continued medical care   Post-Acute Status   Post-Acute Authorization Home Health;HME   HME Status (!) Pending Delivery   Home Health Status Pending Payor Medical Review   Hospital Resources/Appts/Education Provided Appointments scheduled by Navigator/Coordinator   Discharge Delays None known at this time     Varsha Murray RN    [phone]

## 2023-05-08 NOTE — PROGRESS NOTES
[facility]  General Surgery  Progress Note    Subjective:     History of Present Illness:  No notes on file    Post-Op Info:  Procedure(s) (LRB):  LAPAROTOMY, EXPLORATORY (N/A)  ILEOCECECTOMY    4 Days Post-Op     Interval History: NAEO. Remains AF and HDS. She is tolerating diet without N/V. Passing flatus, no BM. Not ambulating much    Medications:  Continuous Infusions:  Scheduled Meds:   acetaminophen  650 mg Oral Q8H    amLODIPine  5 mg Oral Daily    aspirin  81 mg Oral Daily    enoxaparin  40 mg Subcutaneous Daily    hydroCHLOROthiazide  12.5 mg Oral Daily    mupirocin   Nasal BID    potassium chloride  20 mEq Oral BID     PRN Meds:acetaminophen, calcium carbonate, dextrose 10%, dextrose 10%, dextrose, dextrose, glucagon (human recombinant), hydrALAZINE, insulin aspart U-100, melatonin, morphine, oxyCODONE, promethazine, traMADoL     Review of patient's allergies indicates:  No Known Allergies  Objective:     Vital Signs (Most Recent):  Temp: 98.2 °F (36.8 °C) (05/08/23 0628)  Pulse: 95 (05/08/23 0518)  Resp: 17 (05/08/23 0518)  BP: (!) 118/55 (05/08/23 0518)  SpO2: 95 % (05/08/23 0518) Vital Signs (24h Range):  Temp:  [96.8 °F (36 °C)-98.2 °F (36.8 °C)] 98.2 °F (36.8 °C)  Pulse:  [] 95  Resp:  [16-18] 17  SpO2:  [87 %-99 %] 95 %  BP: (118-171)/(55-72) 118/55     Weight: 100.3 kg (221 lb 1.9 oz)  Body mass index is 34.63 kg/m².    Intake/Output - Last 3 Shifts         05/06 0700  05/07 0659 05/07 0700 05/08 0659 05/08 0700  05/09 0659    P.O. 180 180     Total Intake(mL/kg) 180 (1.8) 180 (1.8)     Urine (mL/kg/hr) 1130 (0.5) 1650 (0.7)     Total Output 1130 1650     Net -950 -1470                     Physical Exam  Vitals and nursing note reviewed.   Constitutional:       Appearance: Normal appearance.   HENT:      Head: Normocephalic and atraumatic.   Eyes:      General: No scleral icterus.     Extraocular Movements: Extraocular movements intact.   Cardiovascular:      Rate and Rhythm:  Normal rate and regular rhythm.   Pulmonary:      Effort: Pulmonary effort is normal. No respiratory distress.      Comments: On 2L by NC  Abdominal:      General: There is no distension.      Palpations: Abdomen is soft.      Tenderness: There is no guarding or rebound.      Comments: Midline incision CDI with staples, no bleeding, drainage or signs of infection. Appropriately tender to palpation.    Skin:     General: Skin is warm and dry.   Neurological:      General: No focal deficit present.      Mental Status: She is alert and oriented to person, place, and time.        Significant Labs:  I have reviewed all pertinent lab results within the past 24 hours.  CBC:   Recent Labs   Lab 05/05/23  0415   WBC 14.11*   RBC 4.68   HGB 10.6*   HCT 35.9*      MCV 77*   MCH 22.6*   MCHC 29.5*     CMP:   Recent Labs   Lab 05/04/23  1420 05/05/23  0415 05/07/23  0333   *   < > 106   CALCIUM 10.2   < > 8.5*   ALBUMIN 4.1  --   --    PROT 8.3  --   --       < > 139   K 3.7   < > 3.4*   CO2 23   < > 25      < > 105   BUN 31*   < > 18   CREATININE 1.3   < > 0.8   ALKPHOS 91  --   --    ALT 16  --   --    AST 22  --   --    BILITOT 0.4  --   --     < > = values in this interval not displayed.       Significant Diagnostics:  I have reviewed all pertinent imaging results/findings within the past 24 hours.    Assessment/Plan:     * Cecal volvulus  Aishwarya Jensen is a 62 y.o. female T2DM, CKD3, HTN, and HLD who presented with imaging consistent with cecal volvulus. She is now s/p ileocecectomy on 5/4/23.     -Pain: MM  -Diet: regular  -Resume home amlodipine, ASA  -needs to ambulate  -remove mccormack today vs tomorrow  -SSI  -PRN anti-hypertensives, -emetics available   -PT/OT  -DVT: LVX    Dispo: Continue inpatient management        Michael Aviles MD  General Surgery  [place] - Telemetry

## 2023-05-09 LAB
POCT GLUCOSE: 146 MG/DL (ref 70–110)
POCT GLUCOSE: 147 MG/DL (ref 70–110)
POCT GLUCOSE: 159 MG/DL (ref 70–110)
POCT GLUCOSE: 208 MG/DL (ref 70–110)

## 2023-05-09 PROCEDURE — G0378 HOSPITAL OBSERVATION PER HR: HCPCS

## 2023-05-09 PROCEDURE — 25000003 PHARM REV CODE 250

## 2023-05-09 PROCEDURE — 97535 SELF CARE MNGMENT TRAINING: CPT | Mod: CO

## 2023-05-09 PROCEDURE — 96375 TX/PRO/DX INJ NEW DRUG ADDON: CPT

## 2023-05-09 PROCEDURE — 94761 N-INVAS EAR/PLS OXIMETRY MLT: CPT

## 2023-05-09 PROCEDURE — 96372 THER/PROPH/DIAG INJ SC/IM: CPT

## 2023-05-09 PROCEDURE — 97116 GAIT TRAINING THERAPY: CPT | Mod: CQ

## 2023-05-09 PROCEDURE — 27000221 HC OXYGEN, UP TO 24 HOURS

## 2023-05-09 PROCEDURE — 97530 THERAPEUTIC ACTIVITIES: CPT | Mod: CQ

## 2023-05-09 PROCEDURE — 63600175 PHARM REV CODE 636 W HCPCS

## 2023-05-09 PROCEDURE — 25000003 PHARM REV CODE 250: Performed by: STUDENT IN AN ORGANIZED HEALTH CARE EDUCATION/TRAINING PROGRAM

## 2023-05-09 PROCEDURE — 97530 THERAPEUTIC ACTIVITIES: CPT | Mod: CO

## 2023-05-09 PROCEDURE — 99900035 HC TECH TIME PER 15 MIN (STAT)

## 2023-05-09 PROCEDURE — 11000001 HC ACUTE MED/SURG PRIVATE ROOM

## 2023-05-09 RX ORDER — AMOXICILLIN 250 MG
1 CAPSULE ORAL DAILY
Status: DISCONTINUED | OUTPATIENT
Start: 2023-05-09 | End: 2023-05-11 | Stop reason: HOSPADM

## 2023-05-09 RX ORDER — ONDANSETRON HYDROCHLORIDE 2 MG/ML
8 INJECTION, SOLUTION INTRAVENOUS ONCE
Status: COMPLETED | OUTPATIENT
Start: 2023-05-10 | End: 2023-05-09

## 2023-05-09 RX ORDER — ONDANSETRON 8 MG/1
8 TABLET, ORALLY DISINTEGRATING ORAL EVERY 12 HOURS PRN
Status: DISCONTINUED | OUTPATIENT
Start: 2023-05-09 | End: 2023-05-11 | Stop reason: HOSPADM

## 2023-05-09 RX ORDER — POLYETHYLENE GLYCOL 3350 17 G/17G
17 POWDER, FOR SOLUTION ORAL DAILY
Status: DISCONTINUED | OUTPATIENT
Start: 2023-05-09 | End: 2023-05-11 | Stop reason: HOSPADM

## 2023-05-09 RX ORDER — ONDANSETRON HYDROCHLORIDE 2 MG/ML
8 INJECTION, SOLUTION INTRAVENOUS ONCE
Status: DISCONTINUED | OUTPATIENT
Start: 2023-05-10 | End: 2023-05-09 | Stop reason: SDUPTHER

## 2023-05-09 RX ADMIN — MUPIROCIN: 20 OINTMENT TOPICAL at 09:05

## 2023-05-09 RX ADMIN — Medication 6 MG: at 09:05

## 2023-05-09 RX ADMIN — DOCUSATE SODIUM AND SENNOSIDES 1 TABLET: 8.6; 5 TABLET, FILM COATED ORAL at 03:05

## 2023-05-09 RX ADMIN — ACETAMINOPHEN 650 MG: 325 TABLET ORAL at 05:05

## 2023-05-09 RX ADMIN — MUPIROCIN: 20 OINTMENT TOPICAL at 10:05

## 2023-05-09 RX ADMIN — ENOXAPARIN SODIUM 40 MG: 40 INJECTION SUBCUTANEOUS at 05:05

## 2023-05-09 RX ADMIN — ACETAMINOPHEN 650 MG: 325 TABLET ORAL at 03:05

## 2023-05-09 RX ADMIN — ACETAMINOPHEN 650 MG: 325 TABLET ORAL at 09:05

## 2023-05-09 RX ADMIN — AMLODIPINE BESYLATE 5 MG: 5 TABLET ORAL at 10:05

## 2023-05-09 RX ADMIN — HYDROCHLOROTHIAZIDE 12.5 MG: 12.5 TABLET ORAL at 10:05

## 2023-05-09 RX ADMIN — INSULIN ASPART 2 UNITS: 100 INJECTION, SOLUTION INTRAVENOUS; SUBCUTANEOUS at 05:05

## 2023-05-09 RX ADMIN — POLYETHYLENE GLYCOL 3350 17 G: 17 POWDER, FOR SOLUTION ORAL at 03:05

## 2023-05-09 RX ADMIN — ONDANSETRON 8 MG: 8 TABLET, ORALLY DISINTEGRATING ORAL at 09:05

## 2023-05-09 RX ADMIN — ONDANSETRON HYDROCHLORIDE 8 MG: 2 INJECTION, SOLUTION INTRAMUSCULAR; INTRAVENOUS at 11:05

## 2023-05-09 RX ADMIN — ASPIRIN 81 MG CHEWABLE TABLET 81 MG: 81 TABLET CHEWABLE at 10:05

## 2023-05-09 RX ADMIN — INSULIN ASPART 4 UNITS: 100 INJECTION, SOLUTION INTRAVENOUS; SUBCUTANEOUS at 12:05

## 2023-05-09 NOTE — PT/OT/SLP PROGRESS
Occupational Therapy   Treatment    Name: Aishwarya Jensen  MRN: [id number]  Admitting Diagnosis:  Cecal volvulus  5 Days Post-Op    Recommendations:     Discharge Recommendations: home, home health PT, home health OT (assistance as needed)  Discharge Equipment Recommendations:  walker, rolling, bedside commode  Barriers to discharge:  None    Assessment:     Aishwarya Jensen is a 62 y.o. female with a medical diagnosis of Cecal volvulus.  Performance deficits affecting function are weakness, impaired endurance, impaired self care skills, impaired functional mobility, gait instability, impaired balance, decreased upper extremity function, decreased lower extremity function, impaired coordination. Pt found in bed, agreeable to therapy.  Pt tolerated tx well and is progressing well towards goals. Continue OT services to address functional goals, progressing as able.       Rehab Prognosis:  Good; patient would benefit from acute skilled OT services to address these deficits and reach maximum level of function.       Plan:     Patient to be seen 4 x/week to address the above listed problems via self-care/home management, therapeutic activities, therapeutic exercises  Plan of Care Expires: 06/06/23  Plan of Care Reviewed with: patient    Subjective     Chief Complaint: unable to have BM  Patient/Family Comments/goals: to go home  Pain/Comfort:  Pain Rating 1: 0/10  Pain Rating Post-Intervention 1: 0/10    Objective:     Communicated with: RN prior to session.  Patient found up in chair with bed alarm, mccormack catheter, peripheral IV, oxygen upon OT entry to room.    General Precautions: Standard, fall    Orthopedic Precautions:N/A  Braces: N/A  Respiratory Status: Nasal cannula, flow 1 L/min. Removed O2-pt % on RA at rest and 96-97% during mobility. RN notified and pt left on RA.      Occupational Performance:     Functional Mobility/Transfers:  Patient completed Sit <> Stand Transfer with stand by  assistance  with  rolling walker, vcs for hand placement  Patient completed Bed <> Chair Transfer using Stand Pivot technique with stand by assistance with rolling walker  Patient completed Toilet Transfer Stand Pivot technique with stand by assistance and contact guard assistance with  rolling walker  Functional Mobility: Pt ambulated extended distances in preparation for ambulatory level ADL/IADL and endurance training with CGA/SBA using RW. Slow pace, and vcs for RW proximity.  Pt tends to step too close to front of RW.      Activities of Daily Living:  Grooming: stand by assistance standing at sink  Toileting: stand by assistance        [facility] 6 Click ADL: 21      Patient left up in chair with all lines intact, call button in reach, chair alarm on, and RN notified    GOALS:   Multidisciplinary Problems       Occupational Therapy Goals          Problem: Occupational Therapy    Goal Priority Disciplines Outcome Interventions   Occupational Therapy Goal     OT, PT/OT Ongoing, Progressing    Description: Goals to be met by: 06/06/2023      Patient will increase functional independence with ADLs by performing:    UE Dressing with Modified [place].  LE Dressing with Modified [place].  Grooming while standing with Modified [place].  Toileting from toilet with Modified [place] for hygiene and clothing management.   Toilet transfer to toilet with Modified [place].  Increased functional strength to WFL for self care.  Upper extremity exercise program x10 reps per handout, with independence.                          Time Tracking:     OT Date of Treatment: 05/09/23  OT Start Time: 1010  OT Stop Time: 1040  OT Total Time (min): 30 min    Billable Minutes:Self Care/Home Management 20  Therapeutic Activity 10            5/9/2023

## 2023-05-09 NOTE — PLAN OF CARE
Problem: Occupational Therapy  Goal: Occupational Therapy Goal  Description: Goals to be met by: 06/06/2023      Patient will increase functional independence with ADLs by performing:    UE Dressing with Modified [place].  LE Dressing with Modified [place].  Grooming while standing with Modified [place].  Toileting from toilet with Modified [place] for hygiene and clothing management.   Toilet transfer to toilet with Modified [place].  Increased functional strength to WFL for self care.  Upper extremity exercise program x10 reps per handout, with independence.     Outcome: Ongoing, Progressing   Aishwarya Jensen is a 62 y.o. female with a medical diagnosis of Cecal volvulus.  Performance deficits affecting function are weakness, impaired endurance, impaired self care skills, impaired functional mobility, gait instability, impaired balance, decreased upper extremity function, decreased lower extremity function, impaired coordination.   Pt found in bed, agreeable to therapy.  Pt tolerated tx well and is progressing well towards goals. Continue OT services to address functional goals, progressing as able.

## 2023-05-09 NOTE — PLAN OF CARE
"[place] - Telemetry  Discharge Final Note    Primary Care Provider: Joey Cintron MD    Expected Discharge Date:     Pharmacist will go over home medications and reasons for medications. VN and bedside nurse to reiterate final discharge instructions.     Cleared from CM . Bedside Nurse and VN notified.    HH orders sent via [facility] to [facility].  Pending "discharge patient" order from attending team for DC today.    Final Discharge Note (most recent)       Final Note - 05/09/23 1434          Final Note    Assessment Type Final Discharge Note (P)      Anticipated Discharge Disposition Home-Health Care Svc (P)      Hospital Resources/Appts/Education Provided Appointments scheduled and added to AVS;Post-Acute resouces added to AVS (P)         Post-Acute Status    Post-Acute Authorization Home Health;HME (P)      HME Status Set-up Complete/Auth obtained (P)      Home Health Status Set-up Complete/Auth obtained (P)      Discharge Delays None known at this time (P)                    Future Appointments   Date Time Provider Department Center   5/17/2023  2:00 PM Flaco Arias MD [facility]   5/24/2023 10:30 AM LAB, SBPH [facility]PH LAB [facility]   5/31/2023 11:30 AM Micki Knott NP [facility]   8/29/2023  9:30 AM Annmarie Keane DPM [facility] POD [facility]     BP (!) 148/67 (BP Location: Left arm, Patient Position: Sitting)   Pulse 74   Temp 98.9 °F (37.2 °C) (Oral)   Resp 19   Ht 5' 7" (1.702 m)   Wt 100.3 kg (221 lb 1.9 oz)   SpO2 97%   BMI 34.63 kg/m²       Contact Info       [facility]   Specialty: Home Health Services, Home Therapy Services, Home Living Aide Services    [address]  [facility] [place] [address]   Phone: [phone]       Next Steps: Follow up    Instructions: [facility]   Specialty: DME Provider    [address]  [facility] [place] [address]   Phone: [phone]       Next Steps: Follow up    " Instructions: [facility] [employer]             Medication List        ASK your doctor about these medications      amLODIPine 5 MG tablet  Commonly known as: NORVASC  Take 1 tablet (5 mg total) by mouth once daily.     aspirin 81 MG EC tablet  Commonly known as: ECOTRIN  Take 1 tablet (81 mg total) by mouth once daily.     blood sugar diagnostic Strp  Use to test blood glucose four (4) times a day, to be used with insurance-preferred brand of glucometer/supplies.     blood-glucose meter kit  To check BG 4 times daily, to use with insurance preferred meter     cyclobenzaprine 10 MG tablet  Commonly known as: FLEXERIL     DEXCOM G6  Misc  Generic drug: blood-glucose meter,continuous  1 Device by Misc.(Non-Drug; Combo Route) route once. for 1 dose     DEXCOM G6 SENSOR Katelyn  Generic drug: blood-glucose sensor  1 sensor every 10 days     DEXCOM G6 TRANSMITTER Katelyn  Generic drug: blood-glucose transmitter  1 transmitter every 3 months     ezetimibe 10 mg tablet  Commonly known as: ZETIA     FARXIGA 10 mg tablet  Generic drug: dapagliflozin  Take 1 tablet (10 mg total) by mouth once daily.     fluconazole 150 MG Tab  Commonly known as: DIFLUCAN     hydrOXYzine pamoate 50 MG Cap  Commonly known as: VISTARIL  Take 1 capsule (50 mg total) by mouth every 8 (eight) hours as needed (itching).     insulin aspart (niacinamide) 100 unit/mL Soln  To use with VGo 30- 5-6 clicks TID AC, Max TDD of 100 units. Vials only, Normal     insulin aspart U-100 100 unit/mL injection  Commonly known as: NovoLOG     lancets Misc  Use to test blood glucose four (4) times a day, discard lancet after each use     lisinopriL-hydrochlorothiazide 20-12.5 mg per tablet  Commonly known as: PRINZIDE,ZESTORETIC  Take 1 tablet by mouth once daily     meloxicam 15 MG tablet  Commonly known as: MOBIC  Take 1 tablet (15 mg total) by mouth once daily. With meals     metFORMIN 1000 MG tablet  Commonly known as: GLUCOPHAGE  Take 1 tablet (1,000  mg total) by mouth daily with breakfast.     mupirocin 2 % ointment  Commonly known as: BACTROBAN  Apply topically 3 (three) times daily.     nabumetone 750 MG tablet  Commonly known as: RELAFEN     OZEMPIC 2 mg/dose (8 mg/3 mL) Pnij  Generic drug: semaglutide  Inject 2 mg into the skin every 7 days.     traZODone 50 MG tablet  Commonly known as: DESYREL  TAKE 1 TO 2 TABLETS BY MOUTH AT BEDTIME AS NEEDED FOR INSOMNIA     V-GO 30 Katelyn  Generic drug: sub-q insulin device, 30 unit  Inject 1 Device into the skin once daily.

## 2023-05-09 NOTE — ASSESSMENT & PLAN NOTE
Aishwarya Jensen is a 62 y.o. female T2DM, CKD3, HTN, and HLD who presented with imaging consistent with cecal volvulus. She is now s/p ileocecectomy on 5/4/23.     -Pain: MM  -Diet: regular  -Resume home amlodipine, ASA  -needs to ambulate  -remove mccormack  -SSI  -PRN anti-hypertensives, -emetics available   -PT/OT  -DVT: LVX    Dispo:possible DCthis afternoon vs tomorrow

## 2023-05-09 NOTE — SUBJECTIVE & OBJECTIVE
Interval History: NAEO. Continues to tolerate diet without N/V. Still passing flatus, no BM. On and off 2L NC. AF, HDS     Medications:  Continuous Infusions:  Scheduled Meds:   acetaminophen  650 mg Oral Q8H    amLODIPine  5 mg Oral Daily    aspirin  81 mg Oral Daily    enoxaparin  40 mg Subcutaneous Daily    hydroCHLOROthiazide  12.5 mg Oral Daily    mupirocin   Nasal BID     PRN Meds:acetaminophen, calcium carbonate, dextrose 10%, dextrose 10%, dextrose, dextrose, glucagon (human recombinant), hydrALAZINE, insulin aspart U-100, melatonin, morphine, oxyCODONE, promethazine, traMADoL     Review of patient's allergies indicates:  No Known Allergies  Objective:     Vital Signs (Most Recent):  Temp: 97.2 °F (36.2 °C) (05/09/23 0810)  Pulse: 83 (05/09/23 0810)  Resp: 19 (05/09/23 0810)  BP: (!) 158/68 (05/09/23 0810)  SpO2: 99 % (05/09/23 0810) Vital Signs (24h Range):  Temp:  [97.1 °F (36.2 °C)-98.5 °F (36.9 °C)] 97.2 °F (36.2 °C)  Pulse:  [83-97] 83  Resp:  [16-20] 19  SpO2:  [95 %-99 %] 99 %  BP: (131-160)/(60-68) 158/68     Weight: 100.3 kg (221 lb 1.9 oz)  Body mass index is 34.63 kg/m².    Intake/Output - Last 3 Shifts         05/07 0700  05/08 0659 05/08 0700  05/09 0659 05/09 0700  05/10 0659    P.O. 180      Total Intake(mL/kg) 180 (1.8)      Urine (mL/kg/hr) 1650 (0.7) 1100 (0.5)     Total Output 1650 1100     Net -1470 -1100                     Physical Exam  Vitals and nursing note reviewed.   Constitutional:       Appearance: Normal appearance.   HENT:      Head: Normocephalic and atraumatic.   Eyes:      General: No scleral icterus.     Extraocular Movements: Extraocular movements intact.   Cardiovascular:      Rate and Rhythm: Normal rate and regular rhythm.   Pulmonary:      Effort: Pulmonary effort is normal. No respiratory distress.      Comments: On 2L by NC  Abdominal:      General: There is no distension.      Palpations: Abdomen is soft.      Tenderness: There is no guarding or rebound.       Comments: Midline incision CDI with staples, no bleeding, drainage or signs of infection. Appropriately tender to palpation.    Skin:     General: Skin is warm and dry.   Neurological:      General: No focal deficit present.      Mental Status: She is alert and oriented to person, place, and time.        Significant Labs:  I have reviewed all pertinent lab results within the past 24 hours.  CBC:   Recent Labs   Lab 05/05/23  0415   WBC 14.11*   RBC 4.68   HGB 10.6*   HCT 35.9*      MCV 77*   MCH 22.6*   MCHC 29.5*     CMP:   Recent Labs   Lab 05/04/23  1420 05/05/23  0415 05/08/23  0650   *   < > 105   CALCIUM 10.2   < > 9.0   ALBUMIN 4.1  --   --    PROT 8.3  --   --       < > 139   K 3.7   < > 3.8   CO2 23   < > 26      < > 102   BUN 31*   < > 17   CREATININE 1.3   < > 0.9   ALKPHOS 91  --   --    ALT 16  --   --    AST 22  --   --    BILITOT 0.4  --   --     < > = values in this interval not displayed.       Significant Diagnostics:  I have reviewed all pertinent imaging results/findings within the past 24 hours.

## 2023-05-09 NOTE — PROGRESS NOTES
[facility]  General Surgery  Progress Note    Subjective:     History of Present Illness:  No notes on file    Post-Op Info:  Procedure(s) (LRB):  LAPAROTOMY, EXPLORATORY (N/A)  ILEOCECECTOMY    5 Days Post-Op     Interval History: NAEO. Continues to tolerate diet without N/V. Still passing flatus, no BM. On and off 2L NC. AF, HDS     Medications:  Continuous Infusions:  Scheduled Meds:   acetaminophen  650 mg Oral Q8H    amLODIPine  5 mg Oral Daily    aspirin  81 mg Oral Daily    enoxaparin  40 mg Subcutaneous Daily    hydroCHLOROthiazide  12.5 mg Oral Daily    mupirocin   Nasal BID     PRN Meds:acetaminophen, calcium carbonate, dextrose 10%, dextrose 10%, dextrose, dextrose, glucagon (human recombinant), hydrALAZINE, insulin aspart U-100, melatonin, morphine, oxyCODONE, promethazine, traMADoL     Review of patient's allergies indicates:  No Known Allergies  Objective:     Vital Signs (Most Recent):  Temp: 97.2 °F (36.2 °C) (05/09/23 0810)  Pulse: 83 (05/09/23 0810)  Resp: 19 (05/09/23 0810)  BP: (!) 158/68 (05/09/23 0810)  SpO2: 99 % (05/09/23 0810) Vital Signs (24h Range):  Temp:  [97.1 °F (36.2 °C)-98.5 °F (36.9 °C)] 97.2 °F (36.2 °C)  Pulse:  [83-97] 83  Resp:  [16-20] 19  SpO2:  [95 %-99 %] 99 %  BP: (131-160)/(60-68) 158/68     Weight: 100.3 kg (221 lb 1.9 oz)  Body mass index is 34.63 kg/m².    Intake/Output - Last 3 Shifts         05/07 0700  05/08 0659 05/08 0700 05/09 0659 05/09 0700  05/10 0659    P.O. 180      Total Intake(mL/kg) 180 (1.8)      Urine (mL/kg/hr) 1650 (0.7) 1100 (0.5)     Total Output 1650 1100     Net -1470 -1100                     Physical Exam  Vitals and nursing note reviewed.   Constitutional:       Appearance: Normal appearance.   HENT:      Head: Normocephalic and atraumatic.   Eyes:      General: No scleral icterus.     Extraocular Movements: Extraocular movements intact.   Cardiovascular:      Rate and Rhythm: Normal rate and regular rhythm.   Pulmonary:       Effort: Pulmonary effort is normal. No respiratory distress.      Comments: On 2L by NC  Abdominal:      General: There is no distension.      Palpations: Abdomen is soft.      Tenderness: There is no guarding or rebound.      Comments: Midline incision CDI with staples, no bleeding, drainage or signs of infection. Appropriately tender to palpation.    Skin:     General: Skin is warm and dry.   Neurological:      General: No focal deficit present.      Mental Status: She is alert and oriented to person, place, and time.        Significant Labs:  I have reviewed all pertinent lab results within the past 24 hours.  CBC:   Recent Labs   Lab 05/05/23  0415   WBC 14.11*   RBC 4.68   HGB 10.6*   HCT 35.9*      MCV 77*   MCH 22.6*   MCHC 29.5*     CMP:   Recent Labs   Lab 05/04/23  1420 05/05/23  0415 05/08/23  0650   *   < > 105   CALCIUM 10.2   < > 9.0   ALBUMIN 4.1  --   --    PROT 8.3  --   --       < > 139   K 3.7   < > 3.8   CO2 23   < > 26      < > 102   BUN 31*   < > 17   CREATININE 1.3   < > 0.9   ALKPHOS 91  --   --    ALT 16  --   --    AST 22  --   --    BILITOT 0.4  --   --     < > = values in this interval not displayed.       Significant Diagnostics:  I have reviewed all pertinent imaging results/findings within the past 24 hours.    Assessment/Plan:     * Cecal volvulus  Aishwarya Jensen is a 62 y.o. female T2DM, CKD3, HTN, and HLD who presented with imaging consistent with cecal volvulus. She is now s/p ileocecectomy on 5/4/23.     -Pain: MM  -Diet: regular  -Resume home amlodipine, ASA  -needs to ambulate  -remove mccormack  -SSI  -PRN anti-hypertensives, -emetics available   -PT/OT  -DVT: LVX    Dispo:possible DCthis afternoon vs tomorrow        Michael Aviles MD  General Surgery  [place] - Telemetry

## 2023-05-09 NOTE — PT/OT/SLP PROGRESS
Physical Therapy Treatment    Patient Name:  Aishwarya Jensen   MRN:  [id number]    Recommendations:     Discharge Recommendations: home health PT  Discharge Equipment Recommendations: walker, rolling, bedside commode  Barriers to discharge:  decreased mobility,strength and endurance    Assessment:     Aishwarya Jensen is a 62 y.o. female admitted with a medical diagnosis of Cecal volvulus.  She presents with the following impairments/functional limitations: weakness, impaired endurance, impaired functional mobility, gait instability, impaired balance, decreased upper extremity function, decreased lower extremity function, decreased ROM, impaired coordination,pt with improving status and increased endurance,pt will benefit from  services upon discharge.    Rehab Prognosis: Good; patient would benefit from acute skilled PT services to address these deficits and reach maximum level of function.    Recent Surgery: Procedure(s) (LRB):  LAPAROTOMY, EXPLORATORY (N/A)  ILEOCECECTOMY  5 Days Post-Op    Plan:     During this hospitalization, patient to be seen 5 x/week to address the identified rehab impairments via gait training, therapeutic activities, therapeutic exercises, neuromuscular re-education and progress toward the following goals:    Plan of Care Expires:  06/05/23    Subjective     Chief Complaint: n/a  Patient/Family Comments/goals: pt pleased with increased gait.  Pain/Comfort:  Pain Rating 1: 0/10      Objective:     Communicated with nsg prior to session.  Patient found up in chair with mccormack catheter, oxygen, peripheral IV upon PT entry to room.     General Precautions: Standard, fall  Orthopedic Precautions: N/A  Braces: N/A  Respiratory Status: Room air     Functional Mobility:  Bed Mobility:     Sit to Supine: contact guard assistance  Transfers:     Sit to Stand:  stand by assistance and contact guard assistance with rolling walker  Gait: amb ~100' with RW and SBA  Balance: fair standing  balance with RW      AM-PAC 6 CLICK MOBILITY  Turning over in bed (including adjusting bedclothes, sheets and blankets)?: 4  Sitting down on and standing up from a chair with arms (e.g., wheelchair, bedside commode, etc.): 3  Moving from lying on back to sitting on the side of the bed?: 3  Moving to and from a bed to a chair (including a wheelchair)?: 3  Need to walk in hospital room?: 3  Climbing 3-5 steps with a railing?: 2  Basic Mobility Total Score: 18       Treatment & Education: pt's needs and requests met,removed soiled pads.      Patient left supine with all lines intact, call button in reach, bed alarm on, nsg notified, and friends present..    GOALS:   Multidisciplinary Problems       Physical Therapy Goals          Problem: Physical Therapy    Goal Priority Disciplines Outcome Goal Variances Interventions   Physical Therapy Goal     PT, PT/OT Ongoing, Progressing     Description: Goals to be met by: 23     Patient will increase functional independence with mobility by performin. Supine to sit with Stand-by Assistance  2. Rolling to Left and Right with Stand-by Assistance.  3. Sit to stand transfer with Stand-by Assistance  4. Bed to chair transfer with Stand-by Assistance using Rolling Walker  5. Gait  x 120 feet with Stand-by Assistance using Rolling Walker.                          Time Tracking:     PT Received On: 23  PT Start Time: 1310     PT Stop Time: 1334  PT Total Time (min): 24 min     Billable Minutes: Gait Training 14 and Therapeutic Activity 10    Treatment Type: Treatment  PT/PTA: PTA     Number of PTA visits since last PT visit: 3     2023

## 2023-05-09 NOTE — PLAN OF CARE
[facility] - Telemetry      HOME HEALTH ORDERS  FACE TO FACE ENCOUNTER    Patient Name: Aishwarya Jensen  YOB: 1960    PCP: Joey Cintron MD   PCP Address: [address] / [place] [address]  PCP Phone Number: [phone]  PCP Fax: [phone]    Encounter Date: 5/4/23    Admit to Home Health    Diagnoses:  Active Hospital Problems    Diagnosis  POA    *Cecal volvulus [K56.2]  Unknown      Resolved Hospital Problems   No resolved problems to display.       Follow Up Appointments:  Future Appointments   Date Time Provider Department Center   5/17/2023  2:00 PM Flaco Arias MD [facility]   5/24/2023 10:30 AM LAB, [facility]   5/31/2023 11:30 AM Micki Knott NP [facility]   8/29/2023  9:30 AM DONALD [facility]drop[facility]       Allergies:Review of patient's allergies indicates:  No Known Allergies    Medications: Review discharge medications with patient and family and provide education.    Current Facility-Administered Medications   Medication Dose Route Frequency Provider Last Rate Last Admin    acetaminophen tablet 650 mg  650 mg Oral Q8H PRN Flaco Arias MD   650 mg at 05/08/23 0628    acetaminophen tablet 650 mg  650 mg Oral Q8H Houston Cerna MD   650 mg at 05/09/23 0519    amLODIPine tablet 5 mg  5 mg Oral Daily Flaco Arias MD   5 mg at 05/09/23 1051    aspirin chewable tablet 81 mg  81 mg Oral Daily Houston Cerna MD   81 mg at 05/09/23 1051    calcium carbonate 200 mg calcium (500 mg) chewable tablet 1,000 mg  1,000 mg Oral TID PRN Flaco Arias MD        dextrose 10% bolus 125 mL 125 mL  12.5 g Intravenous PRN Flaco Arias MD        dextrose 10% bolus 250 mL 250 mL  25 g Intravenous PRN Flaco Arias MD        dextrose 40 % gel 15,000 mg  15 g Oral PRN Flaco Arias MD        dextrose 40 % gel 30,000 mg  30 g Oral PRN Flaco Arias MD        enoxaparin  injection 40 mg  40 mg Subcutaneous Daily Houston Cerna MD   40 mg at 05/08/23 1605    glucagon (human recombinant) injection 1 mg  1 mg Intramuscular PRN Houston Cerna MD        hydrALAZINE injection 10 mg  10 mg Intravenous Q6H PRN Flaco Arias MD   10 mg at 05/07/23 1202    hydroCHLOROthiazide tablet 12.5 mg  12.5 mg Oral Daily Flaco Arias MD   12.5 mg at 05/09/23 1051    insulin aspart U-100 pen 1-10 Units  1-10 Units Subcutaneous QID (AC + HS) PRN Houston Cerna MD   4 Units at 05/09/23 1226    melatonin tablet 6 mg  6 mg Oral Nightly PRN Flaco Arias MD   6 mg at 05/06/23 2051    morphine injection 2 mg  2 mg Intravenous Q4H PRN Houston Cerna MD   2 mg at 05/06/23 0029    mupirocin 2 % ointment   Nasal BID Flaco Arias MD   Given at 05/09/23 1051    oxyCODONE immediate release tablet 5 mg  5 mg Oral Q6H PRN Houston Cerna MD   5 mg at 05/08/23 2347    promethazine tablet 25 mg  25 mg Oral Q6H PRN Flaco Arias MD   25 mg at 05/06/23 0553    traMADoL tablet 50 mg  50 mg Oral Q6H PRN Houston Cerna MD   50 mg at 05/07/23 0833     Current Discharge Medication List        CONTINUE these medications which have NOT CHANGED    Details   amLODIPine (NORVASC) 5 MG tablet Take 1 tablet (5 mg total) by mouth once daily.  Qty: 90 tablet, Refills: 1    Comments: .  Associated Diagnoses: Essential hypertension, benign      aspirin (ECOTRIN) 81 MG EC tablet Take 1 tablet (81 mg total) by mouth once daily.      blood sugar diagnostic Strp Use to test blood glucose four (4) times a day, to be used with insurance-preferred brand of glucometer/supplies.  Qty: 400 strip, Refills: 3    Comments: Pharmacist: patient has been using Accu-Chek Guide brand of diabetic supplies, however if the insurance-preferred brand changes with the new year, you may use brand best for patient.  Associated Diagnoses: Type 2 diabetes mellitus with hyperglycemia, with long-term current use of insulin       blood-glucose meter kit To check BG 4 times daily, to use with insurance preferred meter  Qty: 1 each, Refills: 1    Comments: Pharmacist: patient has been using Accu-Chek Guide brand of diabetic supplies, however if the insurance-preferred brand changes with the new year, you may use brand best for patient.  Associated Diagnoses: Type 2 diabetes mellitus with hyperglycemia, with long-term current use of insulin      blood-glucose meter,continuous (DEXCOM G6 ) Misc 1 Device by Misc.(Non-Drug; Combo Route) route once. for 1 dose  Qty: 1 each, Refills: 0    Associated Diagnoses: Type 2 diabetes mellitus with hyperglycemia, with long-term current use of insulin      blood-glucose sensor (DEXCOM G6 SENSOR) Katelyn 1 sensor every 10 days  Qty: 3 each, Refills: 11    Associated Diagnoses: Type 2 diabetes mellitus with hyperglycemia, with long-term current use of insulin      blood-glucose transmitter (DEXCOM G6 TRANSMITTER) Katelyn 1 transmitter every 3 months  Qty: 1 each, Refills: 4    Associated Diagnoses: Type 2 diabetes mellitus with hyperglycemia, with long-term current use of insulin      cyclobenzaprine (FLEXERIL) 10 MG tablet Take 10 mg by mouth once daily. TAKE ONE TABLET BY MOUTH ONCE DAILY AS NEEDED      dapagliflozin (FARXIGA) 10 mg tablet Take 1 tablet (10 mg total) by mouth once daily.  Qty: 90 tablet, Refills: 2    Associated Diagnoses: Type 2 diabetes mellitus with hyperglycemia, with long-term current use of insulin      ezetimibe (ZETIA) 10 mg tablet Take 10 mg by mouth.      fluconazole (DIFLUCAN) 150 MG Tab TAKE ONE TABLET BY MOUTH AS A ONE TIME DOSE MAY REPEAT IN 3 DAYS IF NEEDED FOR 1 DOSE.      hydrOXYzine pamoate (VISTARIL) 50 MG Cap Take 1 capsule (50 mg total) by mouth every 8 (eight) hours as needed (itching).  Qty: 30 capsule, Refills: 2      insulin aspart U-100 (NOVOLOG) 100 unit/mL injection Inject into the skin 3 (three) times daily.      insulin aspart, niacinamide, 100 unit/mL Soln  To use with VGo 30- 5-6 clicks TID AC, Max TDD of 100 units. Vials only, Normal  Qty: 30 mL, Refills: 6    Associated Diagnoses: Type 2 diabetes mellitus with hyperglycemia, with long-term current use of insulin      lancets Misc Use to test blood glucose four (4) times a day, discard lancet after each use  Qty: 400 each, Refills: 3    Comments: Pharmacist: patient has been using Accu-Chek Guide brand of diabetic supplies, however if the insurance-preferred brand changes with the new year, you may use brand best for patient.  Associated Diagnoses: Type 2 diabetes mellitus with hyperglycemia, with long-term current use of insulin      lisinopriL-hydrochlorothiazide (PRINZIDE,ZESTORETIC) 20-12.5 mg per tablet Take 1 tablet by mouth once daily  Qty: 90 tablet, Refills: 2    Associated Diagnoses: Essential hypertension, benign      meloxicam (MOBIC) 15 MG tablet Take 1 tablet (15 mg total) by mouth once daily. With meals  Qty: 30 tablet, Refills: 0      metFORMIN (GLUCOPHAGE) 1000 MG tablet Take 1 tablet (1,000 mg total) by mouth daily with breakfast.  Qty: 90 tablet, Refills: 2    Associated Diagnoses: Type 2 diabetes mellitus with hyperglycemia, with long-term current use of insulin      mupirocin (BACTROBAN) 2 % ointment Apply topically 3 (three) times daily.  Qty: 22 g, Refills: 1      nabumetone (RELAFEN) 750 MG tablet Take 750 mg by mouth 2 (two) times daily.      semaglutide (OZEMPIC) 2 mg/dose (8 mg/3 mL) PnIj Inject 2 mg into the skin every 7 days.  Qty: 1 pen, Refills: 6    Associated Diagnoses: Type 2 diabetes mellitus with hyperglycemia, with long-term current use of insulin      sub-q insulin device, 30 unit (V-GO 30) Katelyn Inject 1 Device into the skin once daily.  Qty: 90 each, Refills: 2    Associated Diagnoses: Type 2 diabetes mellitus with hyperglycemia, with long-term current use of insulin      traZODone (DESYREL) 50 MG tablet TAKE 1 TO 2 TABLETS BY MOUTH AT BEDTIME AS NEEDED FOR INSOMNIA  Qty: 60  tablet, Refills: 5    Associated Diagnoses: Primary insomnia               I have seen and examined this patient within the last 30 days. My clinical findings that support the need for the home health skilled services and home bound status are the following:no   Weakness/numbness causing balance and gait disturbance due to Surgery making it taxing to leave home.     Diet:   regular diet    Referrals/ Consults  Physical Therapy to evaluate and treat. Evaluate for home safety and equipment needs; Establish/upgrade home exercise program. Perform / instruct on therapeutic exercises, gait training, transfer training, and Range of Motion.  Occupational Therapy to evaluate and treat. Evaluate home environment for safety and equipment needs. Perform/Instruct on transfers, ADL training, ROM, and therapeutic exercises.    Activities:   activity as tolerated    Nursing:   Agency to admit patient within 24 hours of hospital discharge unless specified on physician order or at patient request    SN to complete comprehensive assessment including routine vital signs. Instruct on disease process and s/s of complications to report to MD. Review/verify medication list sent home with the patient at time of discharge  and instruct patient/caregiver as needed. Frequency may be adjusted depending on start of care date.     Skilled nurse to perform up to 3 visits PRN for symptoms related to diagnosis    Notify MD if SBP > 160 or < 90; DBP > 90 or < 50; HR > 120 or < 50; Temp > 101; O2 < 88%;   Ok to schedule additional visits based on staff availability and patient request on consecutive days within the home health episode.    When multiple disciplines ordered:    Start of Care occurs on Sunday - Wednesday schedule remaining discipline evaluations as ordered on separate consecutive days following the start of care.    Thursday SOC -schedule subsequent evaluations Friday and Monday the following week.     Friday - Saturday SOC - schedule  subsequent discipline evaluations on consecutive days starting Monday of the following week.    For all post-discharge communication and subsequent orders please contact patient's primary care physician. If unable to reach primary care physician or do not receive response within 30 minutes, please contact [facility] surgery clinic for clinical staff order clarification      Home Health Aide:  Physical Therapy Three times weekly and Occupational Therapy Three times weekly    Wound Care Orders  no    I certify that this patient is confined to her home and needs physical therapy and occupational therapy.

## 2023-05-10 LAB
FINAL PATHOLOGIC DIAGNOSIS: NORMAL
Lab: NORMAL
POCT GLUCOSE: 152 MG/DL (ref 70–110)
POCT GLUCOSE: 202 MG/DL (ref 70–110)
POCT GLUCOSE: 217 MG/DL (ref 70–110)
POCT GLUCOSE: 228 MG/DL (ref 70–110)

## 2023-05-10 PROCEDURE — 94761 N-INVAS EAR/PLS OXIMETRY MLT: CPT

## 2023-05-10 PROCEDURE — 97530 THERAPEUTIC ACTIVITIES: CPT

## 2023-05-10 PROCEDURE — 63600175 PHARM REV CODE 636 W HCPCS

## 2023-05-10 PROCEDURE — 96372 THER/PROPH/DIAG INJ SC/IM: CPT

## 2023-05-10 PROCEDURE — 25000003 PHARM REV CODE 250

## 2023-05-10 PROCEDURE — 63600175 PHARM REV CODE 636 W HCPCS: Performed by: STUDENT IN AN ORGANIZED HEALTH CARE EDUCATION/TRAINING PROGRAM

## 2023-05-10 PROCEDURE — 11000001 HC ACUTE MED/SURG PRIVATE ROOM

## 2023-05-10 PROCEDURE — 97530 THERAPEUTIC ACTIVITIES: CPT | Mod: CO

## 2023-05-10 PROCEDURE — 97110 THERAPEUTIC EXERCISES: CPT

## 2023-05-10 PROCEDURE — G0378 HOSPITAL OBSERVATION PER HR: HCPCS

## 2023-05-10 PROCEDURE — 25000003 PHARM REV CODE 250: Performed by: STUDENT IN AN ORGANIZED HEALTH CARE EDUCATION/TRAINING PROGRAM

## 2023-05-10 PROCEDURE — 97535 SELF CARE MNGMENT TRAINING: CPT | Mod: CO

## 2023-05-10 PROCEDURE — 99900035 HC TECH TIME PER 15 MIN (STAT)

## 2023-05-10 RX ADMIN — ACETAMINOPHEN 650 MG: 325 TABLET ORAL at 06:05

## 2023-05-10 RX ADMIN — ACETAMINOPHEN 650 MG: 325 TABLET ORAL at 09:05

## 2023-05-10 RX ADMIN — INSULIN ASPART 2 UNITS: 100 INJECTION, SOLUTION INTRAVENOUS; SUBCUTANEOUS at 09:05

## 2023-05-10 RX ADMIN — AMLODIPINE BESYLATE 5 MG: 5 TABLET ORAL at 09:05

## 2023-05-10 RX ADMIN — ASPIRIN 81 MG CHEWABLE TABLET 81 MG: 81 TABLET CHEWABLE at 09:05

## 2023-05-10 RX ADMIN — DOCUSATE SODIUM AND SENNOSIDES 1 TABLET: 8.6; 5 TABLET, FILM COATED ORAL at 09:05

## 2023-05-10 RX ADMIN — ENOXAPARIN SODIUM 40 MG: 40 INJECTION SUBCUTANEOUS at 05:05

## 2023-05-10 RX ADMIN — ACETAMINOPHEN 650 MG: 325 TABLET ORAL at 03:05

## 2023-05-10 RX ADMIN — POLYETHYLENE GLYCOL 3350 17 G: 17 POWDER, FOR SOLUTION ORAL at 09:05

## 2023-05-10 RX ADMIN — HYDROCHLOROTHIAZIDE 12.5 MG: 12.5 TABLET ORAL at 09:05

## 2023-05-10 RX ADMIN — INSULIN ASPART 4 UNITS: 100 INJECTION, SOLUTION INTRAVENOUS; SUBCUTANEOUS at 05:05

## 2023-05-10 NOTE — PT/OT/SLP PROGRESS
Physical Therapy Treatment    Patient Name:  Aishwarya Jensen   MRN:  [id number]    Recommendations:     Discharge Recommendations: home health OT, home health PT (family assist)  Discharge Equipment Recommendations: bedside commode, walker, rolling  Barriers to discharge: None    Assessment:     Aishwarya Jensen is a 62 y.o. female admitted with a medical diagnosis of Cecal volvulus.  She presents with the following impairments/functional limitations: weakness, gait instability, impaired balance, impaired endurance, impaired self care skills, impaired functional mobility, decreased lower extremity function, decreased coordination, pain, decreased upper extremity function, impaired skin . Pt is progressing well towards goals, however, reporting feeling warm after toilet t/f and ambulating to sink for self-care activities. Pt returned seated in chair and cool washcloth provided. Ambulation distance limited by symptoms and pt reporting lack of sleep due to vomiting and feel ill last night. MD in room and aware.     Rehab Prognosis: Good; patient would benefit from acute skilled PT services to address these deficits and reach maximum level of function.    Recent Surgery: Procedure(s) (LRB):  LAPAROTOMY, EXPLORATORY (N/A)  ILEOCECECTOMY  6 Days Post-Op    Plan:     During this hospitalization, patient to be seen 5 x/week to address the identified rehab impairments via therapeutic activities, gait training, therapeutic exercises, neuromuscular re-education and progress toward the following goals:    Plan of Care Expires:  06/05/23    Subjective     Chief Complaint: feeling warm  Patient/Family Comments/goals: pt reporting vomiting last night and lack of sleep  Pain/Comfort:  Pain Rating 1: 3/10  Location - Orientation 1: generalized  Location 1: abdomen  Pain Addressed 1: Reposition, Distraction, Cessation of Activity, Nurse notified  Pain Rating Post-Intervention 1: 4/10      Objective:     Communicated  with nsg prior to session.  Patient found  sitting on BSC  with peripheral IV, telemetry upon PT entry to room.     General Precautions: Standard, fall  Orthopedic Precautions: N/A  Braces: N/A  Respiratory Status: Room air     Functional Mobility:  Transfers:     Sit to Stand:  stand by assistance with bedside commode and use of bed rail in front of pt  Toilet Transfer: stand by assistance with  bedside commode  Gait: pt ambulated ~30 ft x 2 with SBA and use of RW; pt ambulates with decreased speed/gina; VC's for upright posture and refraining from stepping too far forward inside the RW      AM-PAC 6 CLICK MOBILITY  Turning over in bed (including adjusting bedclothes, sheets and blankets)?: 4  Sitting down on and standing up from a chair with arms (e.g., wheelchair, bedside commode, etc.): 3  Moving from lying on back to sitting on the side of the bed?: 3  Moving to and from a bed to a chair (including a wheelchair)?: 3  Need to walk in hospital room?: 3  Climbing 3-5 steps with a railing?: 2  Basic Mobility Total Score: 18       Treatment & Education:  Pt found on BSC  Pt performed sit > stand from BSC and leaning forward on bed rail in front pt to perform hygiene with SBA from therapist  Pt ambulated to sink to perform G&H and pt reporting feeling warm.  Pt ambulated to chair.   Pt returned seated in chair and cool washcloth provided.   Ambulation distance limited by symptoms and pt reporting lack of sleep due to vomiting and feel ill last night.   MD in room and aware.  BLE therex performed X 10-15 reps seated in chair: ankle pumps, LAQs, hip abduction/adduction with knee extended, and hip flexion / marches    Patient left up in chair with all lines intact, call button in reach, chair alarm on, and nsg notified..    GOALS:   Multidisciplinary Problems       Physical Therapy Goals          Problem: Physical Therapy    Goal Priority Disciplines Outcome Goal Variances Interventions   Physical Therapy Goal      PT, PT/OT Ongoing, Progressing     Description: Goals to be met by: 23     Patient will increase functional independence with mobility by performin. Supine to sit with Stand-by Assistance  2. Rolling to Left and Right with Stand-by Assistance.  3. Sit to stand transfer with Stand-by Assistance  4. Bed to chair transfer with Stand-by Assistance using Rolling Walker  5. Gait  x 120 feet with Stand-by Assistance using Rolling Walker.                          Time Tracking:     PT Received On: 05/10/23  PT Start Time: 1016     PT Stop Time: 1051  PT Total Time (min): 35 min     Billable Minutes: Therapeutic Activity 20 and Therapeutic Exercise 15    Treatment Type: Treatment  PT/PTA: PT     Number of PTA visits since last PT visit: 0     05/10/2023

## 2023-05-10 NOTE — PLAN OF CARE
"   05/10/23 1312   Post-Acute Status   Post-Acute Authorization Home Health   HME Status Pending medical clearance/testing  (Pending medical clearance. Discussed with pt at bedside. Pt will need transportation home at DC. Pt due to have full BM before DC.)   Home Health Status Pending medical clearance/testing   Hospital Resources/Appts/Education Provided Appointments scheduled and added to AVS;Post-Acute resouces added to AVS   Discharge Delays None known at this time   Discharge Plan   Discharge Plan A Home;Home with family;Home Health       Future Appointments   Date Time Provider Department Center   5/17/2023  2:00 PM Flaco Arias MD [facility]   5/24/2023 10:30 AM LAB, SBPH [facility] LAB . [facility]   5/31/2023 11:30 AM Micki Knott NP [facility]   8/29/2023  9:30 AM DONALD WaldropM [facility] POD [facility]     BP (!) 121/58 (BP Location: Left arm, Patient Position: Lying)   Pulse 94   Temp 98.1 °F (36.7 °C) (Oral)   Resp 18   Ht 5' 7" (1.702 m)   Wt 100.3 kg (221 lb 1.9 oz)   SpO2 98%   BMI 34.63 kg/m²      acetaminophen  650 mg Oral Q8H    amLODIPine  5 mg Oral Daily    aspirin  81 mg Oral Daily    enoxaparin  40 mg Subcutaneous Daily    hydroCHLOROthiazide  12.5 mg Oral Daily    polyethylene glycol  17 g Oral Daily    senna-docusate 8.6-50 mg  1 tablet Oral Daily       "

## 2023-05-10 NOTE — PT/OT/SLP PROGRESS
Occupational Therapy   Treatment    Name: Aishwarya Jensen  MRN: [id number]  Admitting Diagnosis:  Cecal volvulus  6 Days Post-Op    Recommendations:     Discharge Recommendations: home, home health OT, home health PT  Discharge Equipment Recommendations:  bedside commode, walker, rolling  Barriers to discharge:  None    Assessment:     Aishwarya Jensen is a 62 y.o. female with a medical diagnosis of Cecal volvulus.  Performance deficits affecting function are weakness, impaired endurance, impaired self care skills, impaired functional mobility, gait instability, impaired balance, decreased upper extremity function, decreased lower extremity function, impaired coordination. Pt found in bed, agreeable to therapy. Pt is progressing towards goals. Continue OT services to address functional goals, progressing as able.      Rehab Prognosis:  Good; patient would benefit from acute skilled OT services to address these deficits and reach maximum level of function.       Plan:     Patient to be seen 4 x/week to address the above listed problems via self-care/home management, therapeutic activities, therapeutic exercises  Plan of Care Expires: 06/06/23  Plan of Care Reviewed with: patient    Subjective     Chief Complaint: difficulty having BM  Patient/Family Comments/goals: to go home  Pain/Comfort:  Pain Rating 1: 0/10  Pain Rating Post-Intervention 1: 0/10    Objective:     Communicated with: RN prior to session.  Patient found HOB elevated with peripheral IV upon OT entry to room.    General Precautions: Standard, fall    Orthopedic Precautions:N/A  Braces: N/A  Respiratory Status: Room air     Occupational Performance:     Bed Mobility:    Patient completed Rolling/Turning to Right with stand by assistance  Patient completed Scooting/Bridging with stand by assistance  Patient completed Supine to Sit with stand by assistance, HOB elevated, increased effort  Patient completed Sit to Supine with stand by  assistance     Functional Mobility/Transfers:  Patient completed Sit <> Stand Transfer with stand by assistance  with  rolling walker   Patient completed Toilet Transfer Stand Pivot technique with stand by assistance with  rolling walker  Functional Mobility: Pt ambulated extended distances, in preparation for ambulatory level ADL/IADL and endurance training, with SBA using RW. Slow, steady pace. No LOB. 2-3 standing rest breaks.     Activities of Daily Living:  Grooming: stand by assistance standing at sink   Toileting: stand by assistance        [facility] 6 Click ADL: 21      Patient left HOB elevated with all lines intact, call button in reach, and bed alarm on    GOALS:   Multidisciplinary Problems       Occupational Therapy Goals          Problem: Occupational Therapy    Goal Priority Disciplines Outcome Interventions   Occupational Therapy Goal     OT, PT/OT Ongoing, Progressing    Description: Goals to be met by: 06/06/2023      Patient will increase functional independence with ADLs by performing:    UE Dressing with Modified [place].  LE Dressing with Modified [place].  Grooming while standing with Modified [place].  Toileting from toilet with Modified [place] for hygiene and clothing management.   Toilet transfer to toilet with Modified [place].  Increased functional strength to WFL for self care.  Upper extremity exercise program x10 reps per handout, with independence.                          Time Tracking:     OT Date of Treatment: 05/10/23  OT Start Time: 1400  OT Stop Time: 1431  OT Total Time (min): 31 min    Billable Minutes:Self Care/Home Management 16  Therapeutic Activity 15          5/10/2023

## 2023-05-10 NOTE — PROGRESS NOTES
[place] [facility]  [facility]  Progress Note    Subjective:     Interval History:   Vomited last night. Feels better this morning, ate some fruit for breakfast  Hasnt had lunch yet  No nausea now  OOB better  Pain continues to improve  Afebrile  Voiding without mccormack  Had small BM    Post-Op Info:  Procedure(s) (LRB):  LAPAROTOMY, EXPLORATORY (N/A)  ILEOCECECTOMY    6 Days Post-Op      Medications:  Continuous Infusions:  Scheduled Meds:   acetaminophen  650 mg Oral Q8H    amLODIPine  5 mg Oral Daily    aspirin  81 mg Oral Daily    enoxaparin  40 mg Subcutaneous Daily    hydroCHLOROthiazide  12.5 mg Oral Daily    polyethylene glycol  17 g Oral Daily    senna-docusate 8.6-50 mg  1 tablet Oral Daily     PRN Meds:acetaminophen, calcium carbonate, dextrose 10%, dextrose 10%, dextrose, dextrose, glucagon (human recombinant), hydrALAZINE, insulin aspart U-100, melatonin, morphine, ondansetron, oxyCODONE, promethazine, traMADoL     Objective:     Vital Signs (Most Recent):  Temp: 98.1 °F (36.7 °C) (05/10/23 1059)  Pulse: 94 (05/10/23 1059)  Resp: 18 (05/10/23 1059)  BP: (!) 121/58 (05/10/23 1059)  SpO2: 98 % (05/10/23 1059) Vital Signs (24h Range):  Temp:  [98.1 °F (36.7 °C)-99.8 °F (37.7 °C)] 98.1 °F (36.7 °C)  Pulse:  [] 94  Resp:  [18-20] 18  SpO2:  [91 %-98 %] 98 %  BP: (121-188)/(58-77) 121/58       Intake/Output Summary (Last 24 hours) at 5/10/2023 1255  Last data filed at 5/9/2023 2000  Gross per 24 hour   Intake --   Output 700 ml   Net -700 ml       Physical Exam  Incision good, cdi  Ab appropriate    Significant Labs:  CBC: No results for input(s): WBC, RBC, HGB, HCT, PLT, MCV, MCH, MCHC in the last 48 hours.  CMP: No results for input(s): GLU, CALCIUM, ALBUMIN, PROT, NA, K, CO2, CL, BUN, CREATININE, ALKPHOS, ALT, AST, BILITOT in the last 48 hours.    Significant Diagnostics:  None    Assessment/Plan:     Active Diagnoses:    Diagnosis Date Noted POA    PRINCIPAL PROBLEM:  Cecal volvulus [K56.2]  05/04/2023 Unknown      Problems Resolved During this Admission:     S/p ileocecal resection  Continuing to improve  Continue reg diet  If tolerates diet today likely ok for home, plan for home with home health  PT coming later today        Flaco Arias MD  General Surgery  [place] - Telemetry

## 2023-05-10 NOTE — PLAN OF CARE
Problem: Occupational Therapy  Goal: Occupational Therapy Goal  Description: Goals to be met by: 06/06/2023      Patient will increase functional independence with ADLs by performing:    UE Dressing with Modified [place].  LE Dressing with Modified [place].  Grooming while standing with Modified [place].  Toileting from toilet with Modified [place] for hygiene and clothing management.   Toilet transfer to toilet with Modified [place].  Increased functional strength to WFL for self care.  Upper extremity exercise program x10 reps per handout, with independence.     Outcome: Ongoing, Progressing   Aishwarya Jensen is a 62 y.o. female with a medical diagnosis of Cecal volvulus.  Performance deficits affecting function are weakness, impaired endurance, impaired self care skills, impaired functional mobility, gait instability, impaired balance, decreased upper extremity function, decreased lower extremity function, impaired coordination. Pt found in bed, agreeable to therapy. Pt is progressing towards goals. Continue OT services to address functional goals, progressing as able.

## 2023-05-11 VITALS
OXYGEN SATURATION: 97 % | BODY MASS INDEX: 34.71 KG/M2 | DIASTOLIC BLOOD PRESSURE: 69 MMHG | HEIGHT: 67 IN | SYSTOLIC BLOOD PRESSURE: 160 MMHG | TEMPERATURE: 99 F | RESPIRATION RATE: 18 BRPM | HEART RATE: 81 BPM | WEIGHT: 221.13 LBS

## 2023-05-11 LAB — POCT GLUCOSE: 143 MG/DL (ref 70–110)

## 2023-05-11 PROCEDURE — 25000003 PHARM REV CODE 250

## 2023-05-11 PROCEDURE — 25000003 PHARM REV CODE 250: Performed by: STUDENT IN AN ORGANIZED HEALTH CARE EDUCATION/TRAINING PROGRAM

## 2023-05-11 PROCEDURE — G0378 HOSPITAL OBSERVATION PER HR: HCPCS

## 2023-05-11 RX ORDER — OXYCODONE HYDROCHLORIDE 5 MG/1
5 TABLET ORAL EVERY 8 HOURS PRN
Qty: 15 TABLET | Refills: 0 | Status: SHIPPED | OUTPATIENT
Start: 2023-05-11 | End: 2024-05-13

## 2023-05-11 RX ORDER — OXYCODONE HYDROCHLORIDE 5 MG/1
5 TABLET ORAL EVERY 8 HOURS PRN
Qty: 15 TABLET | Refills: 0 | Status: SHIPPED | OUTPATIENT
Start: 2023-05-11 | End: 2023-05-11 | Stop reason: SDUPTHER

## 2023-05-11 RX ADMIN — AMLODIPINE BESYLATE 5 MG: 5 TABLET ORAL at 08:05

## 2023-05-11 RX ADMIN — HYDROCHLOROTHIAZIDE 12.5 MG: 12.5 TABLET ORAL at 08:05

## 2023-05-11 RX ADMIN — DOCUSATE SODIUM AND SENNOSIDES 1 TABLET: 8.6; 5 TABLET, FILM COATED ORAL at 08:05

## 2023-05-11 RX ADMIN — ACETAMINOPHEN 650 MG: 325 TABLET ORAL at 05:05

## 2023-05-11 RX ADMIN — ASPIRIN 81 MG CHEWABLE TABLET 81 MG: 81 TABLET CHEWABLE at 08:05

## 2023-05-11 RX ADMIN — POLYETHYLENE GLYCOL 3350 17 G: 17 POWDER, FOR SOLUTION ORAL at 08:05

## 2023-05-11 NOTE — PLAN OF CARE
Problem: Adult Inpatient Plan of Care  Goal: Plan of Care Review  5/11/2023 1150 by Annmarie Whalen RN  Outcome: Met  5/11/2023 0915 by Annmarie Whalen RN  Outcome: Ongoing, Progressing  Goal: Patient-Specific Goal (Individualized)  Outcome: Met  Goal: Absence of Hospital-Acquired Illness or Injury  Outcome: Met  Goal: Optimal Comfort and Wellbeing  Outcome: Met  Goal: Readiness for Transition of Care  Outcome: Met     Problem: Diabetes Comorbidity  Goal: Blood Glucose Level Within Targeted Range  Outcome: Met     Problem: Bleeding (Surgery Nonspecified)  Goal: Absence of Bleeding  Outcome: Met     Problem: Bowel Motility Impaired (Surgery Nonspecified)  Goal: Effective Bowel Elimination  Outcome: Met     Problem: Fluid and Electrolyte Imbalance (Surgery Nonspecified)  Goal: Fluid and Electrolyte Balance  Outcome: Met     Problem: Glycemic Control Impaired (Surgery Nonspecified)  Goal: Blood Glucose Level Within Targeted Range  Outcome: Met     Problem: Infection (Surgery Nonspecified)  Goal: Absence of Infection Signs and Symptoms  Outcome: Met     Problem: Ongoing Anesthesia Effects (Surgery Nonspecified)  Goal: Anesthesia/Sedation Recovery  Outcome: Met     Problem: Pain (Surgery Nonspecified)  Goal: Acceptable Pain Control  Outcome: Met     Problem: Postoperative Nausea and Vomiting (Surgery Nonspecified)  Goal: Nausea and Vomiting Relief  Outcome: Met     Problem: Postoperative Urinary Retention (Surgery Nonspecified)  Goal: Effective Urinary Elimination  Outcome: Met     Problem: Respiratory Compromise (Surgery Nonspecified)  Goal: Effective Oxygenation and Ventilation  Outcome: Met     Problem: Hypertension Comorbidity  Goal: Blood Pressure in Desired Range  Outcome: Met     Problem: Osteoarthritis Comorbidity  Goal: Maintenance of Osteoarthritis Symptom Control  Outcome: Met     Problem: Fall Injury Risk  Goal: Absence of Fall and Fall-Related Injury  Outcome: Met     Problem: Skin Injury Risk Increased  Goal: Skin  Health and Integrity  Outcome: Met     Problem: Occupational Therapy  Goal: Occupational Therapy Goal  Description: Goals to be met by: 2023      Patient will increase functional independence with ADLs by performing:    UE Dressing with Modified [place].  LE Dressing with Modified [place].  Grooming while standing with Modified [place].  Toileting from toilet with Modified [place] for hygiene and clothing management.   Toilet transfer to toilet with Modified [place].  Increased functional strength to WFL for self care.  Upper extremity exercise program x10 reps per handout, with independence.     Outcome: Met     Problem: Physical Therapy  Goal: Physical Therapy Goal  Description: Goals to be met by: 23     Patient will increase functional independence with mobility by performin. Supine to sit with Stand-by Assistance  2. Rolling to Left and Right with Stand-by Assistance.  3. Sit to stand transfer with Stand-by Assistance  4. Bed to chair transfer with Stand-by Assistance using Rolling Walker  5. Gait  x 120 feet with Stand-by Assistance using Rolling Walker.     Outcome: Met     Problem: Infection  Goal: Absence of Infection Signs and Symptoms  Outcome: Met

## 2023-05-11 NOTE — PHYSICIAN QUERY
PT Name: Aishwarya Jensen  MR #: [id number]     Documentation Clarification      CDS: Cleopatra CHACKO RN  Contact information: [email or web address]    This form is a permanent document in the medical record.     Query Date: May 11, 2023    By submitting this query, we are merely seeking further clarification of documentation. Please utilize your independent clinical judgment when addressing the question(s) below.    The Medical Record reflects the following:    Supporting Clinical Findings Location in Medical Record   Procedure: Procedure(s) (LRB):  LAPAROTOMY, EXPLORATORY (N/A)     Partial colectomy with anastomosis - resection of distal ileum, cecum and proximal ascending colon     The cecum was identified and was severely dilated and torsed. It was rotated superiorly and medially. There were areas of ecchymosis and ischemia in the area of the cecum itself but no perforation.     Final Pathologic Diagnosis RELIAPATH DIAGNOSIS:   ILEUM, CECUM AND APPENDIX, RESECTION:   - Dilated portion of colon with mural wall thinning, prominent vascular congestion and serosal adhesions (clinical volvulus).  Op Note 5/4/2023          Op Note 5/4/2023        Surgical Pathology 5/4/2023                                                                            Doctor, please further specify the documentation of ischemic cecum    [ c  ] Acute focal (segmental) ischemic cecum   [   ] Acute diffuse ischemic cecum   [   ] Chronic ischemic cecum   [   ] Other (please specify): ____________   [  ] Clinically undetermined

## 2023-05-11 NOTE — PROGRESS NOTES
TN contacted [facility]. 11 am  requested for pt for today. Bedside staff notified.

## 2023-05-11 NOTE — PLAN OF CARE
Patient's discharged held until this morning. [facility] transport already set up from previous  Nisha for this morning. Patient's Home Health set up. Tatiana with [facility] notified patient's BSC and rolling walker will be delivered to her home today. I contacted patient to updated. She verbalized understanding. No further Case Management needs.    --I sent message to Dr. Arias and Dr. Aviles requesting discharge orders be placed again.    Patient Contacts    Name Relation Home Work Mobile   Bianca Jensen Daughter [phone]   sierra Jensen Son   [phone]     Future Appointments   Date Time Provider Department Center   5/17/2023  2:00 PM Flaco Arias MD [facility]   5/24/2023 10:30 AM LAB, SBPH SBPH LAB [facility]   5/31/2023 11:30 AM Micki Knott, BRENT [facility]   8/29/2023  9:30 AM Annmarie Keane DPM [facility] POD [facility]      [facility] [place]  Home Health Services, Home Therapy Services, Home Living Aide Services [phone] [address] [facility] [place] [address]      Next Steps: Follow up  Appointment:   Instructions: [facility]  DME Provider [phone] [address] [facility] [place] [address]     Next Steps: Follow up  Appointment:   Instructions: [facility]        05/11/23 0948   Final Note   Assessment Type Final Discharge Note   Anticipated Discharge Disposition Home-Health   Hospital Resources/Appts/Education Provided Appointments scheduled by Navigator/Coordinator   Post-Acute Status   Post-Acute Authorization Home Health;HME   HME Status Set-up Complete/Auth obtained   Home Health Status Set-up Complete/Auth obtained   Discharge Delays None known at this time     Varsha Murray RN    [phone]

## 2023-05-11 NOTE — PROGRESS NOTES
Discharge orders noted. Additional clinical references attached. Patient's discharge instructions given by bedside RN and reviewed via this VN.  Education provided on new and previous medications, diagnosis, and follow-up appointments.  New medications delivered by pharmacy. Patient verbalized understanding and teach back method was used. Patient's ride/transportation home has been scheduled All questions answered. Floor nurse notified.              05/11/23 2983    Notification    Notified Of Discharge Status   Admission   Communication Issues? None   Shift   Virtual Nurse - Rounding Complete  (discharge)   Virtual Nurse - Patient Verbalized Approval Of Camera Use   Safety/Activity   Patient Rounds bed in low position;call light in patient/parent reach;clutter free environment maintained;visualized patient   Safety Promotion/Fall Prevention side rails raised x 2   Positioning   Body Position supine   Head of Bed (HOB) Positioning HOB elevated

## 2023-05-12 PROCEDURE — G0179 PR HOME HEALTH MD RECERTIFICATION: ICD-10-PCS | Mod: ,,, | Performed by: STUDENT IN AN ORGANIZED HEALTH CARE EDUCATION/TRAINING PROGRAM

## 2023-05-12 PROCEDURE — G0179 MD RECERTIFICATION HHA PT: HCPCS | Mod: ,,, | Performed by: STUDENT IN AN ORGANIZED HEALTH CARE EDUCATION/TRAINING PROGRAM

## 2023-05-17 ENCOUNTER — HOSPITAL ENCOUNTER (EMERGENCY)
Facility: HOSPITAL | Age: 63
Discharge: HOME | End: 2023-05-18
Attending: EMERGENCY MEDICINE
Payer: COMMERCIAL

## 2023-05-17 ENCOUNTER — OFFICE VISIT (OUTPATIENT)
Dept: SURGERY | Facility: CLINIC | Age: 63
End: 2023-05-17
Payer: COMMERCIAL

## 2023-05-17 VITALS
TEMPERATURE: 98 F | DIASTOLIC BLOOD PRESSURE: 70 MMHG | HEART RATE: 63 BPM | SYSTOLIC BLOOD PRESSURE: 135 MMHG | OXYGEN SATURATION: 96 % | RESPIRATION RATE: 18 BRPM

## 2023-05-17 VITALS
SYSTOLIC BLOOD PRESSURE: 149 MMHG | BODY MASS INDEX: 34.36 KG/M2 | DIASTOLIC BLOOD PRESSURE: 87 MMHG | HEART RATE: 118 BPM | WEIGHT: 218.94 LBS | HEIGHT: 67 IN

## 2023-05-17 DIAGNOSIS — K56.2 CECAL VOLVULUS: Primary | ICD-10-CM

## 2023-05-17 DIAGNOSIS — Z48.89 ENCOUNTER FOR POST SURGICAL WOUND CHECK: Primary | ICD-10-CM

## 2023-05-17 LAB
ALBUMIN SERPL BCP-MCNC: 3.4 G/DL (ref 3.5–5.2)
ALP SERPL-CCNC: 124 U/L (ref 55–135)
ALT SERPL W/O P-5'-P-CCNC: 24 U/L (ref 10–44)
ANION GAP SERPL CALC-SCNC: 12 MMOL/L (ref 8–16)
AST SERPL-CCNC: 14 U/L (ref 10–40)
BASOPHILS # BLD AUTO: 0.05 K/UL (ref 0–0.2)
BASOPHILS NFR BLD: 0.7 % (ref 0–1.9)
BILIRUB SERPL-MCNC: 0.2 MG/DL (ref 0.1–1)
BUN SERPL-MCNC: 21 MG/DL (ref 8–23)
CALCIUM SERPL-MCNC: 9.8 MG/DL (ref 8.7–10.5)
CHLORIDE SERPL-SCNC: 104 MMOL/L (ref 95–110)
CO2 SERPL-SCNC: 24 MMOL/L (ref 23–29)
CREAT SERPL-MCNC: 1.3 MG/DL (ref 0.5–1.4)
DIFFERENTIAL METHOD: ABNORMAL
EOSINOPHIL # BLD AUTO: 0.2 K/UL (ref 0–0.5)
EOSINOPHIL NFR BLD: 2.5 % (ref 0–8)
ERYTHROCYTE [DISTWIDTH] IN BLOOD BY AUTOMATED COUNT: 14.5 % (ref 11.5–14.5)
EST. GFR  (NO RACE VARIABLE): 46 ML/MIN/1.73 M^2
GLUCOSE SERPL-MCNC: 200 MG/DL (ref 70–110)
HCT VFR BLD AUTO: 32.4 % (ref 37–48.5)
HGB BLD-MCNC: 9.7 G/DL (ref 12–16)
IMM GRANULOCYTES # BLD AUTO: 0.02 K/UL (ref 0–0.04)
IMM GRANULOCYTES NFR BLD AUTO: 0.3 % (ref 0–0.5)
LACTATE SERPL-SCNC: 0.7 MMOL/L (ref 0.5–2.2)
LYMPHOCYTES # BLD AUTO: 1.9 K/UL (ref 1–4.8)
LYMPHOCYTES NFR BLD: 26 % (ref 18–48)
MCH RBC QN AUTO: 22.8 PG (ref 27–31)
MCHC RBC AUTO-ENTMCNC: 29.9 G/DL (ref 32–36)
MCV RBC AUTO: 76 FL (ref 82–98)
MONOCYTES # BLD AUTO: 0.5 K/UL (ref 0.3–1)
MONOCYTES NFR BLD: 6.3 % (ref 4–15)
NEUTROPHILS # BLD AUTO: 4.7 K/UL (ref 1.8–7.7)
NEUTROPHILS NFR BLD: 64.2 % (ref 38–73)
NRBC BLD-RTO: 0 /100 WBC
PLATELET # BLD AUTO: 434 K/UL (ref 150–450)
PMV BLD AUTO: 10.8 FL (ref 9.2–12.9)
POTASSIUM SERPL-SCNC: 3.6 MMOL/L (ref 3.5–5.1)
PROT SERPL-MCNC: 7.7 G/DL (ref 6–8.4)
RBC # BLD AUTO: 4.25 M/UL (ref 4–5.4)
SODIUM SERPL-SCNC: 140 MMOL/L (ref 136–145)
WBC # BLD AUTO: 7.3 K/UL (ref 3.9–12.7)

## 2023-05-17 PROCEDURE — 4010F PR ACE/ARB THEARPY RXD/TAKEN: ICD-10-PCS | Mod: CPTII,S$GLB,, | Performed by: STUDENT IN AN ORGANIZED HEALTH CARE EDUCATION/TRAINING PROGRAM

## 2023-05-17 PROCEDURE — 1159F MED LIST DOCD IN RCRD: CPT | Mod: CPTII,S$GLB,, | Performed by: STUDENT IN AN ORGANIZED HEALTH CARE EDUCATION/TRAINING PROGRAM

## 2023-05-17 PROCEDURE — 4010F ACE/ARB THERAPY RXD/TAKEN: CPT | Mod: CPTII,S$GLB,, | Performed by: STUDENT IN AN ORGANIZED HEALTH CARE EDUCATION/TRAINING PROGRAM

## 2023-05-17 PROCEDURE — 3008F PR BODY MASS INDEX (BMI) DOCUMENTED: ICD-10-PCS | Mod: CPTII,S$GLB,, | Performed by: STUDENT IN AN ORGANIZED HEALTH CARE EDUCATION/TRAINING PROGRAM

## 2023-05-17 PROCEDURE — 3079F DIAST BP 80-89 MM HG: CPT | Mod: CPTII,S$GLB,, | Performed by: STUDENT IN AN ORGANIZED HEALTH CARE EDUCATION/TRAINING PROGRAM

## 2023-05-17 PROCEDURE — 3066F NEPHROPATHY DOC TX: CPT | Mod: CPTII,S$GLB,, | Performed by: STUDENT IN AN ORGANIZED HEALTH CARE EDUCATION/TRAINING PROGRAM

## 2023-05-17 PROCEDURE — 99999 PR PBB SHADOW E&M-EST. PATIENT-LVL IV: CPT | Mod: PBBFAC,,, | Performed by: STUDENT IN AN ORGANIZED HEALTH CARE EDUCATION/TRAINING PROGRAM

## 2023-05-17 PROCEDURE — 3044F HG A1C LEVEL LT 7.0%: CPT | Mod: CPTII,S$GLB,, | Performed by: STUDENT IN AN ORGANIZED HEALTH CARE EDUCATION/TRAINING PROGRAM

## 2023-05-17 PROCEDURE — 3077F PR MOST RECENT SYSTOLIC BLOOD PRESSURE >= 140 MM HG: ICD-10-PCS | Mod: CPTII,S$GLB,, | Performed by: STUDENT IN AN ORGANIZED HEALTH CARE EDUCATION/TRAINING PROGRAM

## 2023-05-17 PROCEDURE — 3060F POS MICROALBUMINURIA REV: CPT | Mod: CPTII,S$GLB,, | Performed by: STUDENT IN AN ORGANIZED HEALTH CARE EDUCATION/TRAINING PROGRAM

## 2023-05-17 PROCEDURE — 1159F PR MEDICATION LIST DOCUMENTED IN MEDICAL RECORD: ICD-10-PCS | Mod: CPTII,S$GLB,, | Performed by: STUDENT IN AN ORGANIZED HEALTH CARE EDUCATION/TRAINING PROGRAM

## 2023-05-17 PROCEDURE — 3079F PR MOST RECENT DIASTOLIC BLOOD PRESSURE 80-89 MM HG: ICD-10-PCS | Mod: CPTII,S$GLB,, | Performed by: STUDENT IN AN ORGANIZED HEALTH CARE EDUCATION/TRAINING PROGRAM

## 2023-05-17 PROCEDURE — 3044F PR MOST RECENT HEMOGLOBIN A1C LEVEL <7.0%: ICD-10-PCS | Mod: CPTII,S$GLB,, | Performed by: STUDENT IN AN ORGANIZED HEALTH CARE EDUCATION/TRAINING PROGRAM

## 2023-05-17 PROCEDURE — 99283 EMERGENCY DEPT VISIT LOW MDM: CPT

## 2023-05-17 PROCEDURE — 85025 COMPLETE CBC W/AUTO DIFF WBC: CPT | Performed by: EMERGENCY MEDICINE

## 2023-05-17 PROCEDURE — 80053 COMPREHEN METABOLIC PANEL: CPT | Performed by: EMERGENCY MEDICINE

## 2023-05-17 PROCEDURE — 99999 PR PBB SHADOW E&M-EST. PATIENT-LVL IV: ICD-10-PCS | Mod: PBBFAC,,, | Performed by: STUDENT IN AN ORGANIZED HEALTH CARE EDUCATION/TRAINING PROGRAM

## 2023-05-17 PROCEDURE — 3066F PR DOCUMENTATION OF TREATMENT FOR NEPHROPATHY: ICD-10-PCS | Mod: CPTII,S$GLB,, | Performed by: STUDENT IN AN ORGANIZED HEALTH CARE EDUCATION/TRAINING PROGRAM

## 2023-05-17 PROCEDURE — 3008F BODY MASS INDEX DOCD: CPT | Mod: CPTII,S$GLB,, | Performed by: STUDENT IN AN ORGANIZED HEALTH CARE EDUCATION/TRAINING PROGRAM

## 2023-05-17 PROCEDURE — 3077F SYST BP >= 140 MM HG: CPT | Mod: CPTII,S$GLB,, | Performed by: STUDENT IN AN ORGANIZED HEALTH CARE EDUCATION/TRAINING PROGRAM

## 2023-05-17 PROCEDURE — 3060F PR POS MICROALBUMINURIA RESULT DOCUMENTED/REVIEW: ICD-10-PCS | Mod: CPTII,S$GLB,, | Performed by: STUDENT IN AN ORGANIZED HEALTH CARE EDUCATION/TRAINING PROGRAM

## 2023-05-17 PROCEDURE — 99024 POSTOP FOLLOW-UP VISIT: CPT | Mod: S$GLB,,, | Performed by: STUDENT IN AN ORGANIZED HEALTH CARE EDUCATION/TRAINING PROGRAM

## 2023-05-17 PROCEDURE — 25000003 PHARM REV CODE 250: Performed by: EMERGENCY MEDICINE

## 2023-05-17 PROCEDURE — 83605 ASSAY OF LACTIC ACID: CPT | Performed by: EMERGENCY MEDICINE

## 2023-05-17 PROCEDURE — 99024 PR POST-OP FOLLOW-UP VISIT: ICD-10-PCS | Mod: S$GLB,,, | Performed by: STUDENT IN AN ORGANIZED HEALTH CARE EDUCATION/TRAINING PROGRAM

## 2023-05-17 RX ORDER — AMLODIPINE BESYLATE 5 MG/1
10 TABLET ORAL ONCE
Status: COMPLETED | OUTPATIENT
Start: 2023-05-17 | End: 2023-05-17

## 2023-05-17 RX ADMIN — AMLODIPINE BESYLATE 10 MG: 5 TABLET ORAL at 05:05

## 2023-05-17 NOTE — PROVIDER PROGRESS NOTES - EMERGENCY DEPT.
Encounter Date: 5/17/2023    ED Physician Progress Notes          **This is an assumption of care note**    Case accepted from Dr. Burnette at 6am, pending labs, re-eval and disposition.  I agree with previous physician's history/physical and overall assessment.   Wound does not appear infection. Post-surgical changes as expected without erythema, induration, marie pus.  Minimal amount of serous fluid in the periumbilical region with deep palpation.        LABS:  Labs Reviewed   CBC W/ AUTO DIFFERENTIAL - Abnormal; Notable for the following components:       Result Value    Hemoglobin 9.7 (*)     Hematocrit 32.4 (*)     MCV 76 (*)     MCH 22.8 (*)     MCHC 29.9 (*)     All other components within normal limits   COMPREHENSIVE METABOLIC PANEL - Abnormal; Notable for the following components:    Glucose 200 (*)     Albumin 3.4 (*)     eGFR 46 (*)     All other components within normal limits   LACTIC ACID, PLASMA         IMAGING:    Imaging Results    None         MEDS:  Medications   amLODIPine tablet 10 mg (10 mg Oral Given 5/17/23 0529)         ED COURSE:       CBC, CMP, Lactated reviewed which showed mild anemia and hyperglycemia, neg lactate.  Plan: discharge with f/u today as scheduled with Dr. Arias, Gen Surg.       Discharge process:  -- Clinical impression and plan including any treatment was discussed with patient.   -- Pt is to call for follow up with PCP or referred physician in 7 days.   -- Pt is to return to the ED immediately for any new or worsening symptoms.  -- This may include fever, chills, pus coming from wound or change in color to wound (red).  -- They are always welcome to return for any emergent concerns.    -- The patient had time for ask questions to which they were addressed.   -- The patient expressed understanding and agrees with my plan.       DIAGNOSIS:    ICD-10-CM ICD-9-CM   1. Encounter for post surgical wound check  Z48.89 V58.49         DISPOSITION:    ED Disposition Condition     Discharge Stable              ED Prescriptions    None           Follow-up Information       Follow up With Specialties Details Why Contact Info    Flaco Arias MD Surgery, General Surgery Go today as scheduled [address] [facility]  [address]  [facility] [address] [phone]

## 2023-05-17 NOTE — DISCHARGE INSTRUCTIONS
You wound looks okay, and some clear/yellow fluid coming out is not concerning. If your wound looks RED, gets MORE PAINFUL and starts having PUS, you may need to return to the ER right away to have it looked at again. If you have a FEVER, that may also be a reason to come back to the ER to get checked.    Thank you for coming in to see us at [facility]. It was nice to meet you, and I hope you feel better soon. Please feel free to return to the ER at any time should your symptoms get worse, or if you have different emergent concerns.    Our goal in the emergency department is to always give you outstanding care and exceptional service. You may receive a survey by mail or e-mail in the next week regarding your experience in our ED. We would greatly appreciate your completing and returning the survey. Your feedback provides us with a way to recognize our staff who give very good care and it helps us learn how to improve as well. We appreciate your time and coming to see us today.      Sincerely,    Ryne Hopkins MD  Medical Director  Emergency Department  [facility]

## 2023-05-17 NOTE — ED PROVIDER NOTES
Encounter Date: 5/17/2023       History     Chief Complaint   Patient presents with    Wound Check     Patient brought in by EMS from home. States she had abdominal surgery on May 4th. Elmer drainage at incision site this morning. Denies abdominal pain.      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  Patient reports since discharge she is been compliant with her meds normal postop course no pain fever shortness of breath.  Patient reports she woke up with the sensation of feeling wet and noticed that there was blood tinged fluid in her abdomen which concern to come to the ER for further evaluation.  She has no other complaints.  She denies chest pain shortness of breath fever chills nausea vomiting abdominal pain.    Review of patient's allergies indicates:  No Known Allergies  Past Medical History:   Diagnosis Date    Diabetes mellitus, type 2     Hypercholesteremia     Hypertension      Past Surgical History:   Procedure Laterality Date    COLONOSCOPY  03/29/2023    COLONOSCOPY N/A 3/29/2023    Procedure: COLONOSCOPY;  Surgeon: Ashu Burns MD;  Location: [facility];  Service: Endoscopy;  Laterality: N/A;    HYSTERECTOMY      LAPAROTOMY, EXPLORATORY N/A 5/4/2023    Procedure: LAPAROTOMY, EXPLORATORY;  Surgeon: Flaco Arias MD;  Location: [facility] OR;  Service: General;  Laterality: N/A;    SURGICAL REMOVAL OF ILEUM WITH CECUM  5/4/2023    Procedure: ILEOCECECTOMY ;  Surgeon: Flaco Arias MD;  Location: [facility] OR;  Service: General;;     Family History   Problem Relation Age of Onset    Diabetes Mother     Diabetes Father     Diabetes Brother     Cancer Maternal Grandmother     Breast cancer Paternal Grandmother      Social History     Tobacco Use    Smoking status: Former     Types: Cigarettes     Start date: 1980      Quit date:      Years since quittin.3    Smokeless tobacco: Never   Substance Use Topics    Alcohol use: Yes     Comment: socially    Drug use: No     Review of Systems   Cardiovascular:  Negative for chest pain.   Gastrointestinal:  Negative for abdominal pain, nausea and vomiting.   All other systems reviewed and are negative.    Physical Exam     Initial Vitals [23 0521]   BP Pulse Resp Temp SpO2   (!) 227/92 70 17 98.3 °F (36.8 °C) 100 %      MAP       --         Physical Exam    Nursing note and vitals reviewed.  Constitutional: She appears well-developed and well-nourished.   HENT:   Head: Normocephalic and atraumatic.   Eyes: Pupils are equal, round, and reactive to light.   Neck:   Normal range of motion.  Cardiovascular:  Normal rate, regular rhythm and normal heart sounds.           Pulmonary/Chest: Breath sounds normal. No respiratory distress.   Abdominal: Abdomen is soft. She exhibits no distension. There is no abdominal tenderness.   Well-healing post incisional abdominal wound noted, staples in place no sign of wound dehiscence no cellulitic changes no warmth no purulent discharge upon palpation of the inferior portion of the incision, red tinge serous fluid expressed   Musculoskeletal:         General: Normal range of motion.      Cervical back: Normal range of motion.     Neurological: She is alert and oriented to person, place, and time. She has normal strength. GCS score is 15. GCS eye subscore is 4. GCS verbal subscore is 5. GCS motor subscore is 6.   Skin: Skin is warm and dry. Capillary refill takes less than 2 seconds.   Psychiatric: She has a normal mood and affect. Thought content normal.       ED Course   Procedures  Labs Reviewed   CBC W/ AUTO DIFFERENTIAL - Abnormal; Notable for the following components:       Result Value    Hemoglobin 9.7 (*)     Hematocrit 32.4 (*)     MCV 76 (*)     MCH 22.8 (*)     MCHC 29.9 (*)     All other components within normal limits    COMPREHENSIVE METABOLIC PANEL - Abnormal; Notable for the following components:    Glucose 200 (*)     Albumin 3.4 (*)     eGFR 46 (*)     All other components within normal limits   LACTIC ACID, PLASMA          Imaging Results    None          Medications   amLODIPine tablet 10 mg (10 mg Oral Given 5/17/23 0529)     Medical Decision Making:   Initial Assessment:   This is a pleasant 62-year-old female with recent ex lap for cecal volvulus presents the ER for abdominal wound check.  Patient reports normal postop course, but today woke up with wetness and noticed that she was leaking from her incision which concern to come the ER.  No fever chills significant pain shortness of breath nausea vomiting.  She denies any complaints prior to this.  She has a well-healing post incisional abdominal wound, there is no dehiscence no cellulitic changes.  Upon palpation of the inferior portion there is serous fluid that is not malodorous that is expressed.  Low suspicion for postop leak postop infection postop abscess at this time.  Will plan blood work observation and reassess.                        Clinical Impression:   Final diagnoses:  [Z48.89] Encounter for post surgical wound check (Primary)        ED Disposition Condition    Discharge Stable          ED Prescriptions    None       Follow-up Information       Follow up With Specialties Details Why Contact Info    Flcao Arias MD Surgery, General Surgery Go today as scheduled [address]  [address]  [facility] [address]  [phone]               Bri Burnette MD  05/18/23 0043

## 2023-05-17 NOTE — ED TRIAGE NOTES
Patient brought in by EMS from home. States she had surgery on colon on . Noticed drainage from incision site this morning. Patient denies fever or abdominal pain. States she has follow up appt today at 2 pm. Patient is hypertensive. States she is due for BP medication this morning.    Review of patient's allergies indicates:  No Known Allergies     Patient has verified the spelling of their name and  on armband.   APPEARANCE: Patient is alert, calm, oriented x 4, and does not appear distressed.  SKIN: Skin is normal for race, warm, and dry. Normal skin turgor and mucous membranes moist. Incision site down mid abdomen. Staples intact. Skin around incision site appears without infection. Serous drainage collecting by belly button  CARDIAC: Normal rate and rhythm, no murmur heard. +hypertensive  RESPIRATORY:Normal rate and effort. Breath sounds clear bilaterally throughout chest. Respirations are equal and unlabored.    GASTRO: Bowel sounds normal, abdomen is soft, no tenderness, and no abdominal distention.

## 2023-05-17 NOTE — ED NOTES
Dr. Arias office contacted to see if appointment could be moved up as pt. Is waiting in ED since discharge. No alternate appointment available.

## 2023-05-17 NOTE — ED NOTES
MD did push on abdomen. Serous fluid did drain by belly button as MD palpated all along incision site. Patient denied any increase in pain. States she just felt pressure. No odorous smell noted.

## 2023-05-18 NOTE — PROGRESS NOTES
S/p ex lap, ileocectomy for cecal volvulus  Went to ED this morning with clear drainage from incision  Wound looks good, no purulence, no erythema  Half staples removed, probed between, no signs of infection  Eating ok  Ambulating  RTC next week for wound check, removal of remainder of staples

## 2023-05-23 ENCOUNTER — OFFICE VISIT (OUTPATIENT)
Dept: SURGERY | Facility: CLINIC | Age: 63
End: 2023-05-23
Payer: COMMERCIAL

## 2023-05-23 VITALS
WEIGHT: 206.13 LBS | SYSTOLIC BLOOD PRESSURE: 129 MMHG | DIASTOLIC BLOOD PRESSURE: 73 MMHG | HEIGHT: 67 IN | HEART RATE: 86 BPM | BODY MASS INDEX: 32.35 KG/M2

## 2023-05-23 DIAGNOSIS — K56.2 CECAL VOLVULUS: Primary | ICD-10-CM

## 2023-05-23 PROCEDURE — 99024 PR POST-OP FOLLOW-UP VISIT: ICD-10-PCS | Mod: S$GLB,,, | Performed by: STUDENT IN AN ORGANIZED HEALTH CARE EDUCATION/TRAINING PROGRAM

## 2023-05-23 PROCEDURE — 99024 POSTOP FOLLOW-UP VISIT: CPT | Mod: S$GLB,,, | Performed by: STUDENT IN AN ORGANIZED HEALTH CARE EDUCATION/TRAINING PROGRAM

## 2023-05-23 PROCEDURE — 3060F POS MICROALBUMINURIA REV: CPT | Mod: CPTII,S$GLB,, | Performed by: STUDENT IN AN ORGANIZED HEALTH CARE EDUCATION/TRAINING PROGRAM

## 2023-05-23 PROCEDURE — 3008F PR BODY MASS INDEX (BMI) DOCUMENTED: ICD-10-PCS | Mod: CPTII,S$GLB,, | Performed by: STUDENT IN AN ORGANIZED HEALTH CARE EDUCATION/TRAINING PROGRAM

## 2023-05-23 PROCEDURE — 3074F SYST BP LT 130 MM HG: CPT | Mod: CPTII,S$GLB,, | Performed by: STUDENT IN AN ORGANIZED HEALTH CARE EDUCATION/TRAINING PROGRAM

## 2023-05-23 PROCEDURE — 99999 PR PBB SHADOW E&M-EST. PATIENT-LVL IV: ICD-10-PCS | Mod: PBBFAC,,, | Performed by: STUDENT IN AN ORGANIZED HEALTH CARE EDUCATION/TRAINING PROGRAM

## 2023-05-23 PROCEDURE — 1160F PR REVIEW ALL MEDS BY PRESCRIBER/CLIN PHARMACIST DOCUMENTED: ICD-10-PCS | Mod: CPTII,S$GLB,, | Performed by: STUDENT IN AN ORGANIZED HEALTH CARE EDUCATION/TRAINING PROGRAM

## 2023-05-23 PROCEDURE — 3066F NEPHROPATHY DOC TX: CPT | Mod: CPTII,S$GLB,, | Performed by: STUDENT IN AN ORGANIZED HEALTH CARE EDUCATION/TRAINING PROGRAM

## 2023-05-23 PROCEDURE — 1159F PR MEDICATION LIST DOCUMENTED IN MEDICAL RECORD: ICD-10-PCS | Mod: CPTII,S$GLB,, | Performed by: STUDENT IN AN ORGANIZED HEALTH CARE EDUCATION/TRAINING PROGRAM

## 2023-05-23 PROCEDURE — 1160F RVW MEDS BY RX/DR IN RCRD: CPT | Mod: CPTII,S$GLB,, | Performed by: STUDENT IN AN ORGANIZED HEALTH CARE EDUCATION/TRAINING PROGRAM

## 2023-05-23 PROCEDURE — 1159F MED LIST DOCD IN RCRD: CPT | Mod: CPTII,S$GLB,, | Performed by: STUDENT IN AN ORGANIZED HEALTH CARE EDUCATION/TRAINING PROGRAM

## 2023-05-23 PROCEDURE — 4010F ACE/ARB THERAPY RXD/TAKEN: CPT | Mod: CPTII,S$GLB,, | Performed by: STUDENT IN AN ORGANIZED HEALTH CARE EDUCATION/TRAINING PROGRAM

## 2023-05-23 PROCEDURE — 3066F PR DOCUMENTATION OF TREATMENT FOR NEPHROPATHY: ICD-10-PCS | Mod: CPTII,S$GLB,, | Performed by: STUDENT IN AN ORGANIZED HEALTH CARE EDUCATION/TRAINING PROGRAM

## 2023-05-23 PROCEDURE — 3008F BODY MASS INDEX DOCD: CPT | Mod: CPTII,S$GLB,, | Performed by: STUDENT IN AN ORGANIZED HEALTH CARE EDUCATION/TRAINING PROGRAM

## 2023-05-23 PROCEDURE — 4010F PR ACE/ARB THEARPY RXD/TAKEN: ICD-10-PCS | Mod: CPTII,S$GLB,, | Performed by: STUDENT IN AN ORGANIZED HEALTH CARE EDUCATION/TRAINING PROGRAM

## 2023-05-23 PROCEDURE — 3074F PR MOST RECENT SYSTOLIC BLOOD PRESSURE < 130 MM HG: ICD-10-PCS | Mod: CPTII,S$GLB,, | Performed by: STUDENT IN AN ORGANIZED HEALTH CARE EDUCATION/TRAINING PROGRAM

## 2023-05-23 PROCEDURE — 3078F DIAST BP <80 MM HG: CPT | Mod: CPTII,S$GLB,, | Performed by: STUDENT IN AN ORGANIZED HEALTH CARE EDUCATION/TRAINING PROGRAM

## 2023-05-23 PROCEDURE — 3044F HG A1C LEVEL LT 7.0%: CPT | Mod: CPTII,S$GLB,, | Performed by: STUDENT IN AN ORGANIZED HEALTH CARE EDUCATION/TRAINING PROGRAM

## 2023-05-23 PROCEDURE — 3044F PR MOST RECENT HEMOGLOBIN A1C LEVEL <7.0%: ICD-10-PCS | Mod: CPTII,S$GLB,, | Performed by: STUDENT IN AN ORGANIZED HEALTH CARE EDUCATION/TRAINING PROGRAM

## 2023-05-23 PROCEDURE — 99999 PR PBB SHADOW E&M-EST. PATIENT-LVL IV: CPT | Mod: PBBFAC,,, | Performed by: STUDENT IN AN ORGANIZED HEALTH CARE EDUCATION/TRAINING PROGRAM

## 2023-05-23 PROCEDURE — 3078F PR MOST RECENT DIASTOLIC BLOOD PRESSURE < 80 MM HG: ICD-10-PCS | Mod: CPTII,S$GLB,, | Performed by: STUDENT IN AN ORGANIZED HEALTH CARE EDUCATION/TRAINING PROGRAM

## 2023-05-23 PROCEDURE — 3060F PR POS MICROALBUMINURIA RESULT DOCUMENTED/REVIEW: ICD-10-PCS | Mod: CPTII,S$GLB,, | Performed by: STUDENT IN AN ORGANIZED HEALTH CARE EDUCATION/TRAINING PROGRAM

## 2023-05-26 NOTE — PROGRESS NOTES
S/p ex lap, cecal resection  Incision healing, staples removed  Mild pain  Improving activity  Reg BM  Slightly open inferiorly from previous  RTC in two weeks

## 2023-05-29 ENCOUNTER — TELEPHONE (OUTPATIENT)
Dept: DIABETES | Facility: CLINIC | Age: 63
End: 2023-05-29
Payer: COMMERCIAL

## 2023-05-29 ENCOUNTER — EXTERNAL HOME HEALTH (OUTPATIENT)
Dept: HOME HEALTH SERVICES | Facility: HOSPITAL | Age: 63
End: 2023-05-29
Payer: COMMERCIAL

## 2023-05-29 NOTE — DISCHARGE SUMMARY
[facility]  DISCHARGE SUMMARY  General Surgery      Admit Date:  5/4/2023    Discharge Date and Time:  5/11/2023 12:00 PM    Attending Physician:  No att. providers found     Discharge Provider:  Michael Aviles MD     Reason for Admission:  Cecal volvulus     Procedures Performed:  Procedure(s) (LRB):  LAPAROTOMY, EXPLORATORY (N/A)  ILEOCECECTOMY     Hospital Course:  Please see the preoperative H&P and other available documentation for full details related to history prior to this admission.  Briefly, Aishwarya Jensen is a 62 y.o. female who was admitted following Cecal volvulus    Following a complete preoperative discussion of the risks and benefits of surgery with signed informed consent, the patient was taken to the operating room on 5/4/2023 and underwent the above stated procedures.  The patient tolerated surgery well and there were no complications.  Please see the operative report for full intraoperative findings and details.  Postoperatively, the patient did well and was transferred from the PACU to the floor in stable condition where they had a stable and uncomplicated hospital course. She had some delayed ROBF which eventually resolved. Labs and vital signs remained stable and appropriate throughout course.  Diet was advanced as tolerated and the patient's pain was controlled on oral pain medications without problem.  Currently, the patient is doing well and is stable and appropriate for discharge home at this time.      Consults:  None.    Significant Diagnostic Studies:   No results for input(s): WBC, HGB, HCT, PLT, BAND, METAMYELOCYT, MYELOPCT, HGBA1C in the last 72 hours.No results for input(s): NA, K, CL, CO2, BUN, CREATININE, GLU, CALCIUM, CAION, MG, PHOS, AST, ALT, ALKPHOS, BILITOT, BILIDIR, PROT, ALBUMIN, PREALBUMIN, AMYLASE, LIPASE, CRP, HSCRP, SEDRATE, PROCAL in the last 72 hours.No results for input(s): INR, PTT, LABHEPA, LACTATE, TROPONINI, CPK, CPKMB, MB, BNP in the last 72  hours.No results for input(s): PH, PCO2, PO2, HCO3 in the last 72 hours.      Final Diagnoses:   Principal Problem:  Cecal volvulus   Secondary Diagnoses:    Active Hospital Problems    Diagnosis  POA    *Cecal volvulus [K56.2]  Unknown      Resolved Hospital Problems   No resolved problems to display.       Discharged Condition:  Good    Disposition:  Home or Self Care    Follow Up/Patient Instructions:     Medications:  Reconciled Home Medications:    Discharge Medication List as of 5/11/2023 10:39 AM        START taking these medications    Details   oxyCODONE (ROXICODONE) 5 MG immediate release tablet Take 1 tablet (5 mg total) by mouth every 8 (eight) hours as needed for Pain., Starting Thu 5/11/2023, Normal           CONTINUE these medications which have NOT CHANGED    Details   amLODIPine (NORVASC) 5 MG tablet Take 1 tablet (5 mg total) by mouth once daily., Starting Mon 5/2/2022, Normal      aspirin (ECOTRIN) 81 MG EC tablet Take 1 tablet (81 mg total) by mouth once daily., Starting Thu 2/1/2018, OTC      blood sugar diagnostic Strp Use to test blood glucose four (4) times a day, to be used with insurance-preferred brand of glucometer/supplies., Normal      blood-glucose meter kit To check BG 4 times daily, to use with insurance preferred meter, Normal      blood-glucose meter,continuous (DEXCOM G6 ) Misc 1 Device by Misc.(Non-Drug; Combo Route) route once. for 1 dose, Starting Tue 10/25/2022, Normal      blood-glucose sensor (DEXCOM G6 SENSOR) Katelyn 1 sensor every 10 days, Normal      blood-glucose transmitter (DEXCOM G6 TRANSMITTER) Katelyn 1 transmitter every 3 months, Normal      cyclobenzaprine (FLEXERIL) 10 MG tablet Take 10 mg by mouth once daily. TAKE ONE TABLET BY MOUTH ONCE DAILY AS NEEDED, Historical Med      dapagliflozin (FARXIGA) 10 mg tablet Take 1 tablet (10 mg total) by mouth once daily., Starting Wed 1/25/2023, Normal      ezetimibe (ZETIA) 10 mg tablet Take 10 mg by mouth., Starting  "Wed 1/25/2023, Historical Med      fluconazole (DIFLUCAN) 150 MG Tab TAKE ONE TABLET BY MOUTH AS A ONE TIME DOSE MAY REPEAT IN 3 DAYS IF NEEDED FOR 1 DOSE., Historical Med      hydrOXYzine pamoate (VISTARIL) 50 MG Cap Take 1 capsule (50 mg total) by mouth every 8 (eight) hours as needed (itching)., Starting Mon 1/31/2022, Normal      insulin aspart U-100 (NOVOLOG) 100 unit/mL injection Inject into the skin 3 (three) times daily., Starting Mon 1/16/2023, Historical Med      insulin aspart, niacinamide, 100 unit/mL Soln To use with VGo 30- 5-6 clicks TID AC, Max TDD of 100 units. Vials only, Normal, Normal      lancets Misc Use to test blood glucose four (4) times a day, discard lancet after each use, Normal      lisinopriL-hydrochlorothiazide (PRINZIDE,ZESTORETIC) 20-12.5 mg per tablet Take 1 tablet by mouth once daily, Starting Wed 12/21/2022, Normal      meloxicam (MOBIC) 15 MG tablet Take 1 tablet (15 mg total) by mouth once daily. With meals, Starting Tue 4/5/2022, Normal      metFORMIN (GLUCOPHAGE) 1000 MG tablet Take 1 tablet (1,000 mg total) by mouth daily with breakfast., Starting Wed 1/25/2023, Normal      mupirocin (BACTROBAN) 2 % ointment Apply topically 3 (three) times daily., Starting Thu 1/28/2021, Normal      nabumetone (RELAFEN) 750 MG tablet Take 750 mg by mouth 2 (two) times daily., Starting Tue 7/19/2022, Historical Med      semaglutide (OZEMPIC) 2 mg/dose (8 mg/3 mL) PnIj Inject 2 mg into the skin every 7 days., Starting Wed 1/25/2023, Normal      sub-q insulin device, 30 unit (V-GO 30) Katelyn Inject 1 Device into the skin once daily., Starting Wed 1/25/2023, Normal      traZODone (DESYREL) 50 MG tablet TAKE 1 TO 2 TABLETS BY MOUTH AT BEDTIME AS NEEDED FOR INSOMNIA, Normal           Discharge Procedure Orders   WALKER FOR HOME USE     Order Specific Question Answer Comments   Type of Walker: Adult (5'4"-6'6")    With wheels? Yes    Height: 5' 7" (1.702 m)    Weight: 102.5 kg (225 lb 15.5 oz)  " "  Length of need (1-99 months): 99    Does patient have medical equipment at home? cane, quad    Does patient have medical equipment at home? bath bench    Please check all that apply: Patient's condition impairs ambulation.    Please check all that apply: Patient is unable to safely ambulate without equipment.      COMMODE FOR HOME USE     Order Specific Question Answer Comments   Type: Standard    Height: 5' 7" (1.702 m)    Weight: 102.5 kg (225 lb 15.5 oz)    Does patient have medical equipment at home? cane, quad    Does patient have medical equipment at home? bath bench    Length of need (1-99 months): 99      WALKER FOR HOME USE     Order Specific Question Answer Comments   Type of Walker: Adult (5'4"-6'6")    With wheels? Yes    Height: 5' 7" (1.702 m)    Weight: 100.3 kg (221 lb 1.9 oz)    Length of need (1-99 months): 99    Does patient have medical equipment at home? bath bench    Does patient have medical equipment at home? cane, quad    Please check all that apply: Patient's condition impairs ambulation.    Please check all that apply: Patient is unable to safely ambulate without equipment.      COMMODE FOR HOME USE     Order Specific Question Answer Comments   Type: Standard    Height: 5' 7" (1.702 m)    Weight: 100.3 kg (221 lb 1.9 oz)    Does patient have medical equipment at home? bath bench    Does patient have medical equipment at home? cane, quad    Length of need (1-99 months): 99      Diet Adult Regular     Lifting restrictions   Order Comments: Do not lift anything >10 lbs (about a gallon of milk) for 6 weeks     No driving until:   Order Comments: No longer taking narcotic pain medication and can turn around safely without pain.     Notify your health care provider if you experience any of the following:  temperature >100.4     Notify your health care provider if you experience any of the following:  persistent nausea and vomiting or diarrhea     Notify your health care provider if you " experience any of the following:  severe uncontrolled pain     Notify your health care provider if you experience any of the following:  redness, tenderness, or signs of infection (pain, swelling, redness, odor or green/yellow discharge around incision site)     Notify your health care provider if you experience any of the following:  increased confusion or weakness     Activity as tolerated   Order Comments: Okay to shower the day after surgery. Please do not submerge wounds underwater (no bath, no pool, no lake, etc.) for at least 2 weeks.      Follow-up Information       [facility] Follow up.    Specialties: Home Health Services, Home Therapy Services, Home Living Aide Services  Why: Home Health [place]  Contact information:  [address]  [facility] [id number] [phone]             [facility].    Specialty: DME Provider  Why: [employer]  Contact information:  [address] [facility] [address]  [phone]                             Michael Aviles MD

## 2023-05-30 ENCOUNTER — TELEPHONE (OUTPATIENT)
Dept: PHARMACY | Facility: CLINIC | Age: 63
End: 2023-05-30
Payer: COMMERCIAL

## 2023-05-31 DIAGNOSIS — E11.65 TYPE 2 DIABETES MELLITUS WITH HYPERGLYCEMIA, WITH LONG-TERM CURRENT USE OF INSULIN: ICD-10-CM

## 2023-05-31 DIAGNOSIS — Z79.4 TYPE 2 DIABETES MELLITUS WITH HYPERGLYCEMIA, WITH LONG-TERM CURRENT USE OF INSULIN: ICD-10-CM

## 2023-05-31 RX ORDER — BLOOD-GLUCOSE,RECEIVER,CONT
1 EACH MISCELLANEOUS ONCE
Qty: 1 EACH | Refills: 0 | Status: SHIPPED | OUTPATIENT
Start: 2023-05-31 | End: 2023-07-18

## 2023-06-12 ENCOUNTER — OFFICE VISIT (OUTPATIENT)
Dept: SURGERY | Facility: CLINIC | Age: 63
End: 2023-06-12
Payer: COMMERCIAL

## 2023-06-12 VITALS
SYSTOLIC BLOOD PRESSURE: 146 MMHG | HEART RATE: 68 BPM | BODY MASS INDEX: 33.13 KG/M2 | WEIGHT: 211.06 LBS | HEIGHT: 67 IN | DIASTOLIC BLOOD PRESSURE: 68 MMHG

## 2023-06-12 DIAGNOSIS — K56.2 CECAL VOLVULUS: Primary | ICD-10-CM

## 2023-06-12 PROCEDURE — 1159F PR MEDICATION LIST DOCUMENTED IN MEDICAL RECORD: ICD-10-PCS | Mod: CPTII,S$GLB,, | Performed by: STUDENT IN AN ORGANIZED HEALTH CARE EDUCATION/TRAINING PROGRAM

## 2023-06-12 PROCEDURE — 3066F PR DOCUMENTATION OF TREATMENT FOR NEPHROPATHY: ICD-10-PCS | Mod: CPTII,S$GLB,, | Performed by: STUDENT IN AN ORGANIZED HEALTH CARE EDUCATION/TRAINING PROGRAM

## 2023-06-12 PROCEDURE — 3060F POS MICROALBUMINURIA REV: CPT | Mod: CPTII,S$GLB,, | Performed by: STUDENT IN AN ORGANIZED HEALTH CARE EDUCATION/TRAINING PROGRAM

## 2023-06-12 PROCEDURE — 99024 POSTOP FOLLOW-UP VISIT: CPT | Mod: S$GLB,,, | Performed by: STUDENT IN AN ORGANIZED HEALTH CARE EDUCATION/TRAINING PROGRAM

## 2023-06-12 PROCEDURE — 99999 PR PBB SHADOW E&M-EST. PATIENT-LVL IV: ICD-10-PCS | Mod: PBBFAC,,, | Performed by: STUDENT IN AN ORGANIZED HEALTH CARE EDUCATION/TRAINING PROGRAM

## 2023-06-12 PROCEDURE — 3051F HG A1C>EQUAL 7.0%<8.0%: CPT | Mod: CPTII,S$GLB,, | Performed by: STUDENT IN AN ORGANIZED HEALTH CARE EDUCATION/TRAINING PROGRAM

## 2023-06-12 PROCEDURE — 3077F PR MOST RECENT SYSTOLIC BLOOD PRESSURE >= 140 MM HG: ICD-10-PCS | Mod: CPTII,S$GLB,, | Performed by: STUDENT IN AN ORGANIZED HEALTH CARE EDUCATION/TRAINING PROGRAM

## 2023-06-12 PROCEDURE — 3066F NEPHROPATHY DOC TX: CPT | Mod: CPTII,S$GLB,, | Performed by: STUDENT IN AN ORGANIZED HEALTH CARE EDUCATION/TRAINING PROGRAM

## 2023-06-12 PROCEDURE — 1101F PT FALLS ASSESS-DOCD LE1/YR: CPT | Mod: CPTII,S$GLB,, | Performed by: STUDENT IN AN ORGANIZED HEALTH CARE EDUCATION/TRAINING PROGRAM

## 2023-06-12 PROCEDURE — 1101F PR PT FALLS ASSESS DOC 0-1 FALLS W/OUT INJ PAST YR: ICD-10-PCS | Mod: CPTII,S$GLB,, | Performed by: STUDENT IN AN ORGANIZED HEALTH CARE EDUCATION/TRAINING PROGRAM

## 2023-06-12 PROCEDURE — 3051F PR MOST RECENT HEMOGLOBIN A1C LEVEL 7.0 - < 8.0%: ICD-10-PCS | Mod: CPTII,S$GLB,, | Performed by: STUDENT IN AN ORGANIZED HEALTH CARE EDUCATION/TRAINING PROGRAM

## 2023-06-12 PROCEDURE — 99024 PR POST-OP FOLLOW-UP VISIT: ICD-10-PCS | Mod: S$GLB,,, | Performed by: STUDENT IN AN ORGANIZED HEALTH CARE EDUCATION/TRAINING PROGRAM

## 2023-06-12 PROCEDURE — 3060F PR POS MICROALBUMINURIA RESULT DOCUMENTED/REVIEW: ICD-10-PCS | Mod: CPTII,S$GLB,, | Performed by: STUDENT IN AN ORGANIZED HEALTH CARE EDUCATION/TRAINING PROGRAM

## 2023-06-12 PROCEDURE — 3288F PR FALLS RISK ASSESSMENT DOCUMENTED: ICD-10-PCS | Mod: CPTII,S$GLB,, | Performed by: STUDENT IN AN ORGANIZED HEALTH CARE EDUCATION/TRAINING PROGRAM

## 2023-06-12 PROCEDURE — 1159F MED LIST DOCD IN RCRD: CPT | Mod: CPTII,S$GLB,, | Performed by: STUDENT IN AN ORGANIZED HEALTH CARE EDUCATION/TRAINING PROGRAM

## 2023-06-12 PROCEDURE — 3008F BODY MASS INDEX DOCD: CPT | Mod: CPTII,S$GLB,, | Performed by: STUDENT IN AN ORGANIZED HEALTH CARE EDUCATION/TRAINING PROGRAM

## 2023-06-12 PROCEDURE — 3077F SYST BP >= 140 MM HG: CPT | Mod: CPTII,S$GLB,, | Performed by: STUDENT IN AN ORGANIZED HEALTH CARE EDUCATION/TRAINING PROGRAM

## 2023-06-12 PROCEDURE — 3078F PR MOST RECENT DIASTOLIC BLOOD PRESSURE < 80 MM HG: ICD-10-PCS | Mod: CPTII,S$GLB,, | Performed by: STUDENT IN AN ORGANIZED HEALTH CARE EDUCATION/TRAINING PROGRAM

## 2023-06-12 PROCEDURE — 3008F PR BODY MASS INDEX (BMI) DOCUMENTED: ICD-10-PCS | Mod: CPTII,S$GLB,, | Performed by: STUDENT IN AN ORGANIZED HEALTH CARE EDUCATION/TRAINING PROGRAM

## 2023-06-12 PROCEDURE — 99999 PR PBB SHADOW E&M-EST. PATIENT-LVL IV: CPT | Mod: PBBFAC,,, | Performed by: STUDENT IN AN ORGANIZED HEALTH CARE EDUCATION/TRAINING PROGRAM

## 2023-06-12 PROCEDURE — 3078F DIAST BP <80 MM HG: CPT | Mod: CPTII,S$GLB,, | Performed by: STUDENT IN AN ORGANIZED HEALTH CARE EDUCATION/TRAINING PROGRAM

## 2023-06-12 PROCEDURE — 3288F FALL RISK ASSESSMENT DOCD: CPT | Mod: CPTII,S$GLB,, | Performed by: STUDENT IN AN ORGANIZED HEALTH CARE EDUCATION/TRAINING PROGRAM

## 2023-06-12 PROCEDURE — 4010F ACE/ARB THERAPY RXD/TAKEN: CPT | Mod: CPTII,S$GLB,, | Performed by: STUDENT IN AN ORGANIZED HEALTH CARE EDUCATION/TRAINING PROGRAM

## 2023-06-12 PROCEDURE — 4010F PR ACE/ARB THEARPY RXD/TAKEN: ICD-10-PCS | Mod: CPTII,S$GLB,, | Performed by: STUDENT IN AN ORGANIZED HEALTH CARE EDUCATION/TRAINING PROGRAM

## 2023-06-12 NOTE — PROGRESS NOTES
S/p ex lap, cecal volvulus  Doing well  No pain  No drainage  Incision dry  Good appetite  Reg BM  Eating well  Ambulating well  Rtc prn

## 2023-06-14 ENCOUNTER — PATIENT OUTREACH (OUTPATIENT)
Dept: ADMINISTRATIVE | Facility: HOSPITAL | Age: 63
End: 2023-06-14
Payer: MEDICARE

## 2023-06-14 ENCOUNTER — PATIENT MESSAGE (OUTPATIENT)
Dept: ADMINISTRATIVE | Facility: HOSPITAL | Age: 63
End: 2023-06-14
Payer: MEDICARE

## 2023-06-14 NOTE — PROGRESS NOTES
Health Maintenance Due   Topic Date Due    Low Dose Statin  Never done    DEXA Scan  Never done    COVID-19 Vaccine (4 - Moderna series) 04/14/2022    Eye Exam  03/14/2023    Pneumococcal Vaccines (Age 65+) (3 - PPSV23 if available, else PCV20) 06/05/2023       Immunizations - reviewed and updated  Care Everywhere - triggered   Care Teams - updated  Outreach - statin therapy review complete, portal message sent.

## 2023-06-26 ENCOUNTER — TELEPHONE (OUTPATIENT)
Dept: DIABETES | Facility: CLINIC | Age: 63
End: 2023-06-26
Payer: MEDICARE

## 2023-06-26 ENCOUNTER — PATIENT OUTREACH (OUTPATIENT)
Dept: ADMINISTRATIVE | Facility: HOSPITAL | Age: 63
End: 2023-06-26
Payer: MEDICARE

## 2023-06-26 NOTE — PROGRESS NOTES
Health Maintenance Due   Topic Date Due    Low Dose Statin  Never done    DEXA Scan  Never done    COVID-19 Vaccine (4 - Moderna series) 04/14/2022    Eye Exam  03/14/2023    Pneumococcal Vaccines (Age 65+) (3 - PPSV23 if available, else PCV20) 06/05/2023     Chart Review Done     Immunizations - reviewed and updated   Care Everywhere - triggered   Care Teams - updated   Outreach - Received half of patient's eye exam, TONJA sent to Dr. Juárez for eye exam records. Once received will upload to patient chart.

## 2023-07-17 ENCOUNTER — TELEPHONE (OUTPATIENT)
Dept: DIABETES | Facility: CLINIC | Age: 63
End: 2023-07-17
Payer: MEDICARE

## 2023-07-18 ENCOUNTER — TELEPHONE (OUTPATIENT)
Dept: DIABETES | Facility: CLINIC | Age: 63
End: 2023-07-18

## 2023-07-18 ENCOUNTER — OFFICE VISIT (OUTPATIENT)
Dept: DIABETES | Facility: CLINIC | Age: 63
End: 2023-07-18
Payer: MEDICARE

## 2023-07-18 VITALS
SYSTOLIC BLOOD PRESSURE: 142 MMHG | DIASTOLIC BLOOD PRESSURE: 68 MMHG | HEIGHT: 67 IN | HEART RATE: 80 BPM | OXYGEN SATURATION: 95 % | WEIGHT: 220 LBS | BODY MASS INDEX: 34.53 KG/M2

## 2023-07-18 DIAGNOSIS — N18.31 TYPE 2 DIABETES MELLITUS WITH STAGE 3A CHRONIC KIDNEY DISEASE, WITH LONG-TERM CURRENT USE OF INSULIN: ICD-10-CM

## 2023-07-18 DIAGNOSIS — E66.09 CLASS 1 OBESITY DUE TO EXCESS CALORIES WITH SERIOUS COMORBIDITY AND BODY MASS INDEX (BMI) OF 34.0 TO 34.9 IN ADULT: ICD-10-CM

## 2023-07-18 DIAGNOSIS — Z59.9 FINANCIAL DIFFICULTIES: ICD-10-CM

## 2023-07-18 DIAGNOSIS — Z91.148 NONCOMPLIANCE WITH MEDICATIONS: ICD-10-CM

## 2023-07-18 DIAGNOSIS — Z91.148 NONCOMPLIANCE W/MEDICATION TREATMENT DUE TO INTERMIT USE OF MEDICATION: ICD-10-CM

## 2023-07-18 DIAGNOSIS — Z79.4 TYPE 2 DIABETES MELLITUS WITH DIABETIC POLYNEUROPATHY, WITH LONG-TERM CURRENT USE OF INSULIN: ICD-10-CM

## 2023-07-18 DIAGNOSIS — D64.9 ANEMIA, UNSPECIFIED TYPE: ICD-10-CM

## 2023-07-18 DIAGNOSIS — R80.9 MICROALBUMINURIA DUE TO TYPE 2 DIABETES MELLITUS: ICD-10-CM

## 2023-07-18 DIAGNOSIS — I10 ELEVATED BLOOD PRESSURE READING IN OFFICE WITH DIAGNOSIS OF HYPERTENSION: ICD-10-CM

## 2023-07-18 DIAGNOSIS — E78.5 DYSLIPIDEMIA, GOAL LDL BELOW 100: ICD-10-CM

## 2023-07-18 DIAGNOSIS — E11.29 MICROALBUMINURIA DUE TO TYPE 2 DIABETES MELLITUS: ICD-10-CM

## 2023-07-18 DIAGNOSIS — E11.22 TYPE 2 DIABETES MELLITUS WITH STAGE 3A CHRONIC KIDNEY DISEASE, WITH LONG-TERM CURRENT USE OF INSULIN: ICD-10-CM

## 2023-07-18 DIAGNOSIS — Z79.4 TYPE 2 DIABETES MELLITUS WITH STAGE 3A CHRONIC KIDNEY DISEASE, WITH LONG-TERM CURRENT USE OF INSULIN: ICD-10-CM

## 2023-07-18 DIAGNOSIS — E11.42 TYPE 2 DIABETES MELLITUS WITH DIABETIC POLYNEUROPATHY, WITH LONG-TERM CURRENT USE OF INSULIN: ICD-10-CM

## 2023-07-18 DIAGNOSIS — Z91.199 NONCOMPLIANCE WITH DIABETES TREATMENT: ICD-10-CM

## 2023-07-18 DIAGNOSIS — E11.65 TYPE 2 DIABETES MELLITUS WITH HYPERGLYCEMIA, WITH LONG-TERM CURRENT USE OF INSULIN: Primary | ICD-10-CM

## 2023-07-18 DIAGNOSIS — I10 ESSENTIAL HYPERTENSION, BENIGN: ICD-10-CM

## 2023-07-18 DIAGNOSIS — Z79.4 TYPE 2 DIABETES MELLITUS WITH HYPERGLYCEMIA, WITH LONG-TERM CURRENT USE OF INSULIN: Primary | ICD-10-CM

## 2023-07-18 DIAGNOSIS — Z71.9 HEALTH EDUCATION/COUNSELING: ICD-10-CM

## 2023-07-18 DIAGNOSIS — N18.31 STAGE 3A CHRONIC KIDNEY DISEASE: ICD-10-CM

## 2023-07-18 PROCEDURE — 3051F HG A1C>EQUAL 7.0%<8.0%: CPT | Mod: CPTII,S$GLB,, | Performed by: NURSE PRACTITIONER

## 2023-07-18 PROCEDURE — 1101F PR PT FALLS ASSESS DOC 0-1 FALLS W/OUT INJ PAST YR: ICD-10-PCS | Mod: CPTII,S$GLB,, | Performed by: NURSE PRACTITIONER

## 2023-07-18 PROCEDURE — 1160F RVW MEDS BY RX/DR IN RCRD: CPT | Mod: CPTII,S$GLB,, | Performed by: NURSE PRACTITIONER

## 2023-07-18 PROCEDURE — 3077F PR MOST RECENT SYSTOLIC BLOOD PRESSURE >= 140 MM HG: ICD-10-PCS | Mod: CPTII,S$GLB,, | Performed by: NURSE PRACTITIONER

## 2023-07-18 PROCEDURE — 99999 PR PBB SHADOW E&M-EST. PATIENT-LVL V: CPT | Mod: PBBFAC,,, | Performed by: NURSE PRACTITIONER

## 2023-07-18 PROCEDURE — 3078F PR MOST RECENT DIASTOLIC BLOOD PRESSURE < 80 MM HG: ICD-10-PCS | Mod: CPTII,S$GLB,, | Performed by: NURSE PRACTITIONER

## 2023-07-18 PROCEDURE — 3051F PR MOST RECENT HEMOGLOBIN A1C LEVEL 7.0 - < 8.0%: ICD-10-PCS | Mod: CPTII,S$GLB,, | Performed by: NURSE PRACTITIONER

## 2023-07-18 PROCEDURE — 1159F MED LIST DOCD IN RCRD: CPT | Mod: CPTII,S$GLB,, | Performed by: NURSE PRACTITIONER

## 2023-07-18 PROCEDURE — 3008F BODY MASS INDEX DOCD: CPT | Mod: CPTII,S$GLB,, | Performed by: NURSE PRACTITIONER

## 2023-07-18 PROCEDURE — 3008F PR BODY MASS INDEX (BMI) DOCUMENTED: ICD-10-PCS | Mod: CPTII,S$GLB,, | Performed by: NURSE PRACTITIONER

## 2023-07-18 PROCEDURE — 3060F POS MICROALBUMINURIA REV: CPT | Mod: CPTII,S$GLB,, | Performed by: NURSE PRACTITIONER

## 2023-07-18 PROCEDURE — 3288F PR FALLS RISK ASSESSMENT DOCUMENTED: ICD-10-PCS | Mod: CPTII,S$GLB,, | Performed by: NURSE PRACTITIONER

## 2023-07-18 PROCEDURE — 1160F PR REVIEW ALL MEDS BY PRESCRIBER/CLIN PHARMACIST DOCUMENTED: ICD-10-PCS | Mod: CPTII,S$GLB,, | Performed by: NURSE PRACTITIONER

## 2023-07-18 PROCEDURE — 3077F SYST BP >= 140 MM HG: CPT | Mod: CPTII,S$GLB,, | Performed by: NURSE PRACTITIONER

## 2023-07-18 PROCEDURE — 3066F PR DOCUMENTATION OF TREATMENT FOR NEPHROPATHY: ICD-10-PCS | Mod: CPTII,S$GLB,, | Performed by: NURSE PRACTITIONER

## 2023-07-18 PROCEDURE — 3078F DIAST BP <80 MM HG: CPT | Mod: CPTII,S$GLB,, | Performed by: NURSE PRACTITIONER

## 2023-07-18 PROCEDURE — 1101F PT FALLS ASSESS-DOCD LE1/YR: CPT | Mod: CPTII,S$GLB,, | Performed by: NURSE PRACTITIONER

## 2023-07-18 PROCEDURE — 99214 PR OFFICE/OUTPT VISIT, EST, LEVL IV, 30-39 MIN: ICD-10-PCS | Mod: S$GLB,,, | Performed by: NURSE PRACTITIONER

## 2023-07-18 PROCEDURE — 99999 PR PBB SHADOW E&M-EST. PATIENT-LVL V: ICD-10-PCS | Mod: PBBFAC,,, | Performed by: NURSE PRACTITIONER

## 2023-07-18 PROCEDURE — 1159F PR MEDICATION LIST DOCUMENTED IN MEDICAL RECORD: ICD-10-PCS | Mod: CPTII,S$GLB,, | Performed by: NURSE PRACTITIONER

## 2023-07-18 PROCEDURE — 4010F PR ACE/ARB THEARPY RXD/TAKEN: ICD-10-PCS | Mod: CPTII,S$GLB,, | Performed by: NURSE PRACTITIONER

## 2023-07-18 PROCEDURE — 99214 OFFICE O/P EST MOD 30 MIN: CPT | Mod: S$GLB,,, | Performed by: NURSE PRACTITIONER

## 2023-07-18 PROCEDURE — 3060F PR POS MICROALBUMINURIA RESULT DOCUMENTED/REVIEW: ICD-10-PCS | Mod: CPTII,S$GLB,, | Performed by: NURSE PRACTITIONER

## 2023-07-18 PROCEDURE — 3066F NEPHROPATHY DOC TX: CPT | Mod: CPTII,S$GLB,, | Performed by: NURSE PRACTITIONER

## 2023-07-18 PROCEDURE — 4010F ACE/ARB THERAPY RXD/TAKEN: CPT | Mod: CPTII,S$GLB,, | Performed by: NURSE PRACTITIONER

## 2023-07-18 PROCEDURE — 3288F FALL RISK ASSESSMENT DOCD: CPT | Mod: CPTII,S$GLB,, | Performed by: NURSE PRACTITIONER

## 2023-07-18 RX ORDER — SEMAGLUTIDE 1.34 MG/ML
0.5 INJECTION, SOLUTION SUBCUTANEOUS
Qty: 1 EACH | Refills: 6 | Status: SHIPPED | OUTPATIENT
Start: 2023-07-18 | End: 2023-11-14 | Stop reason: ALTCHOICE

## 2023-07-18 RX ORDER — ROSUVASTATIN CALCIUM 40 MG/1
40 TABLET, COATED ORAL NIGHTLY
Qty: 90 TABLET | Refills: 3 | Status: SHIPPED | OUTPATIENT
Start: 2023-07-18 | End: 2023-11-14 | Stop reason: SDUPTHER

## 2023-07-18 RX ORDER — BLOOD-GLUCOSE,RECEIVER,CONT
1 EACH MISCELLANEOUS CONTINUOUS
Qty: 1 EACH | Refills: 0 | Status: SHIPPED | OUTPATIENT
Start: 2023-07-18 | End: 2023-11-14

## 2023-07-18 RX ORDER — BLOOD-GLUCOSE SENSOR
1 EACH MISCELLANEOUS
Qty: 3 EACH | Refills: 11 | Status: SHIPPED | OUTPATIENT
Start: 2023-07-18 | End: 2023-11-14

## 2023-07-18 RX ORDER — SUB-Q INSULIN DEVICE, 40 UNIT
1 EACH MISCELLANEOUS DAILY
Qty: 90 EACH | Refills: 2 | Status: SHIPPED | OUTPATIENT
Start: 2023-07-18 | End: 2023-11-14 | Stop reason: SDUPTHER

## 2023-07-18 RX ORDER — METFORMIN HYDROCHLORIDE 1000 MG/1
1000 TABLET ORAL
Qty: 90 TABLET | Refills: 2 | Status: SHIPPED | OUTPATIENT
Start: 2023-07-18 | End: 2023-11-14 | Stop reason: SDUPTHER

## 2023-07-18 SDOH — SOCIAL DETERMINANTS OF HEALTH (SDOH): PROBLEM RELATED TO HOUSING AND ECONOMIC CIRCUMSTANCES, UNSPECIFIED: Z59.9

## 2023-07-18 NOTE — ASSESSMENT & PLAN NOTE
Uncontrolled   Reportedly BG readings are above goal   Get back on Dexcom as it was helping  with self awareness and self accountability  Will change to dexcom G7 -- easier to use   Long discussion about VGo again.- cannot wear for more than 24 hours         -- Medication Changes:     Check your kidneys today     Continue Metformin 1000 mg daily     Continue Farxiga 10 mg daily   -- Please let us know if you have nausea, vomiting, or weakness with a normal BG. This could be euglycemic DKA.  -- please hold medication 3 days prior to surgery or if you are sick and have decreased intake.   -- Please drink lots of water daily while on this medication. -- 6-8 glasses of water per day   --This medication could also give you a yeast infection- please let us know if this occurs and we can treat it.   -- NO KETO DIET     Cut back on the ozempic to 0.5 mg weekly -- I am concerned that maybe this caused some of the cecal volvulus  If you have diarrhea or constipation - let me know and we will discontinue  -- low threshold to discontinue   Please notify me for any abdominal pain, nausea, vomiting, diarrhea, acid     Continue the VGo30 with Fiasp insulin   Change the VGO every 24 hours!!!!! -- same time every day   5-6 clicks with meals     Get back on the dexcom -- we will change to dexcom G7   Prescription sent to your [facility] pharmacy!       -- Reviewed goals of therapy are to get the best control we can without hypoglycemia.  -- Reviewed patient's current insulin regimen. Clarified proper insulin dose and timing in relation to meals, etc. Insulin injection sites and proper rotation instructed.    -- Advised frequent self blood glucose monitoring.  Patient encouraged to document glucose results and bring them to every clinic visit. Get back on dexcom personal CGM will change to dexcom G7-- supplies sent to pharmacy --helps with self awareness and self accountability  -- Hypoglycemia precautions discussed. Instructed on  precautions before driving.    -- Call for Bg repeatedly < 70 or > 200.   -- Close adherence to lifestyle changes recommended.   -- Periodic follow ups for eye evaluations, foot care and dental care suggested.  -- diabetes education- dexocm G7 start   See education 10 days later for download and change out       patient will benefit from a Dexcom G6 to prevent hypoglycemia due to patient experiences level 2 or hypoglycemic event <54   Patient has diabetes mellitus and manages diabetes with intensive insulin regimen and uses prandial and basal insulin daily-- on the VGO   Patient requires a therapeutic CGM and is willing to use therapeutic CGM for the necessary frequent adjustments of insulin therapy.  I have completed an in-person visit during the previous 6 months and will continue to have in-person visits every 6 months to assess adherence to their CGM regimen and diabetes treatment plan.  Due to COVID pandemic and need for remote monitoring this tool is medically necessary

## 2023-07-18 NOTE — ASSESSMENT & PLAN NOTE
Body mass index is 34.46 kg/m².  Increases insulin resistance.   Discussed DM diet and exercise.

## 2023-07-18 NOTE — PATIENT INSTRUCTIONS
Check your kidneys today     Continue Metformin 1000 mg daily     Continue Farxiga 10 mg daily   -- Please let us know if you have nausea, vomiting, or weakness with a normal BG. This could be euglycemic DKA.  -- please hold medication 3 days prior to surgery or if you are sick and have decreased intake.   -- Please drink lots of water daily while on this medication. -- 6-8 glasses of water per day   --This medication could also give you a yeast infection- please let us know if this occurs and we can treat it.   -- NO KETO DIET     Cut back on the ozempic to 0.5 mg weekly -- I am concerned that maybe this caused some of the cecal volvulus  If you have diarrhea or constipation - let me know and we will discontinue    Please notify me for any abdominal pain, nausea, vomiting, diarrhea, acid     Continue the VGo30 with Fiasp insulin   Change the VGO every 24 hours!!!!! -- same time every day   5-6 clicks with meals     Get back on the dexcom -- we will change to dexcom G7   Prescription sent to your [facility] pharmacy!     Restart the crestor for your cholesterol!!!

## 2023-07-18 NOTE — PROGRESS NOTES
CC:   Chief Complaint   Patient presents with    Diabetes Mellitus       HPI: Aishwarya Jensen is a 63 y.o. female presents for a follow up visit today for the management of T2DM   She was diagnosed with Type 2 diabetes in her early 50's on routine lab work. She was initially started on Metformin. Insulin therapy was started soon after diagnosis.     Family hx of diabetes: Mother, father, brothers   Hospitalized for diabetes: denies     No personal or FH of thyroid cancer or personal of pancreatic cancer or pancreatitis.     She is retired now.       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   Staff helped her with placing her dexcom G6 sensor in clinic today   She is interested in trying the dexcom g7 instead   Noncompliance with crestor   Poor historian   Did not bring a list or medication bottles with her today       DIABETES COMPLICATIONS: nephropathy and peripheral neuropathy      Diabetes Management Status    ASA:  Yes - 81 mg- taking it every day     Statin: unsure if she is taking crestor?? It it not on her medication list? -- unsure if she is taking the Zetia as well   ACE/ARB: Taking- lisinopril 20 mg     Screening or Prevention Patient's value Goal Complete/Controlled?   HgA1C Testing and Control   Lab Results   Component Value Date    HGBA1C 7.4 (H) 05/26/2023      Annually/Less than 8% Yes   Lipid profile : 01/17/2023 Annually Yes   LDL control Lab Results    Component Value Date    LDLCALC 150.0 01/17/2023    Annually/Less than 100 mg/dl  No   Nephropathy screening Lab Results   Component Value Date    LABMICR 401.0 01/17/2023     Lab Results   Component Value Date    PROTEINUA Negative 12/05/2018    Annually Yes   Blood pressure BP Readings from Last 1 Encounters:   07/18/23 (!) 142/68    Less than 140/90 No   Dilated retinal exam : 03/14/2022 Annually No   Foot exam   : 01/25/2023 Annually Yes       CURRENT A1C:    Hemoglobin A1C   Date Value Ref Range Status   05/26/2023 7.4 (H) 4.0 - 5.6 % Final     Comment:     ADA Screening Guidelines:  5.7-6.4%  Consistent with prediabetes  >or=6.5%  Consistent with diabetes    High levels of fetal hemoglobin interfere with the HbA1C  assay. Heterozygous hemoglobin variants (HbS, HgC, etc)do  not significantly interfere with this assay.   However, presence of multiple variants may affect accuracy.     05/04/2023 6.9 (H) 4.0 - 5.6 % Final     Comment:     ADA Screening Guidelines:  5.7-6.4%  Consistent with prediabetes  >or=6.5%  Consistent with diabetes    High levels of fetal hemoglobin interfere with the HbA1C  assay. Heterozygous hemoglobin variants (HbS, HgC, etc)do  not significantly interfere with this assay.   However, presence of multiple variants may affect accuracy.     01/17/2023 6.9 (H) 4.0 - 5.6 % Final     Comment:     ADA Screening Guidelines:  5.7-6.4%  Consistent with prediabetes  >or=6.5%  Consistent with diabetes    High levels of fetal hemoglobin interfere with the HbA1C  assay. Heterozygous hemoglobin variants (HbS, HgC, etc)do  not significantly interfere with this assay.   However, presence of multiple variants may affect accuracy.         GOAL A1C: less than 8%     DM MEDICATIONS USED IN THE PAST: Metformin   Victoza   Lantus   VGo  Novolog    Freestyle manisha -- didn't like it   Ozempic   Fiasp   Dexcom       CURRENT DIABETES MEDICATIONS: Metformin 1000 mg daily  VGo30 with Fiasp 5-6 clicks with meals  "  Giving clicks before the meals    Farxiga 10 mg daily   Ozempic 2 mg weekly on Mondays -- just restarted about 2 weeks ago post surgery   Insulin:VGo  Missed doses:  denies   Usually wearing the VGo to her abdomen   She is adamant that she is changing her VGo every 24 hours.   giving her VGo clicks before she eats   Denies missing medication doses       BLOOD GLUCOSE MONITORING:   Sensor type: Dexcom G 6   Off dexcom since her surgery in May     Pricking her finger   Per oral recall: 200's       HYPOGLYCEMIA:  the other day - 85 - lowest   once to 30??- she "felt bad"    Maybe some hypoglycemia unawareness or error with meter   Some in the 70's       MEALS: eating 3 meals per day   drinking OJ sometimes   Drinks: water    Sparking water- SF -- ICE - SF       CURRENT EXERCISE:  No      Review of Systems  Review of Systems   Constitutional:  Negative for appetite change, fatigue and unexpected weight change.   HENT:  Negative for trouble swallowing.    Eyes:  Negative for visual disturbance.   Respiratory:  Negative for shortness of breath.    Cardiovascular:  Negative for chest pain.   Gastrointestinal:  Positive for diarrhea. Negative for abdominal pain, constipation, nausea and vomiting.   Endocrine: Negative for polydipsia, polyphagia and polyuria.   Genitourinary:         + Nocturia 2 times per night    Musculoskeletal:  Negative for arthralgias.   Skin:  Negative for wound.   Neurological:  Negative for numbness.   Psychiatric/Behavioral:  Positive for sleep disturbance.      Physical Exam   Physical Exam  Vitals and nursing note reviewed.   Constitutional:       Appearance: She is well-developed. She is obese.   HENT:      Head: Normocephalic and atraumatic.      Right Ear: External ear normal.      Left Ear: External ear normal.      Nose: Nose normal.   Neck:      Thyroid: No thyromegaly.      Trachea: No tracheal deviation.   Cardiovascular:      Rate and Rhythm: Normal rate and regular rhythm.      Heart " sounds: No murmur heard.  Pulmonary:      Effort: Pulmonary effort is normal. No respiratory distress.      Breath sounds: Normal breath sounds.   Abdominal:      Palpations: Abdomen is soft.      Tenderness: There is no abdominal tenderness.      Hernia: No hernia is present.   Musculoskeletal:      Cervical back: Normal range of motion and neck supple.   Skin:     General: Skin is warm and dry.      Capillary Refill: Capillary refill takes less than 2 seconds.      Findings: No rash.      Comments: She is wearing the VGo to her abdomen- -but it does NOT have insulin in it.    Neurological:      Mental Status: She is alert and oriented to person, place, and time.      Cranial Nerves: No cranial nerve deficit.   Psychiatric:         Behavior: Behavior normal.         Judgment: Judgment normal.         FOOT EXAMINATION: Appropriate footwear.         Lab Results   Component Value Date    TSH 4.993 (H) 01/17/2023         Type 2 diabetes mellitus with hyperglycemia, with long-term current use of insulin  Uncontrolled   Reportedly BG readings are above goal   Get back on Dexcom as it was helping  with self awareness and self accountability  Will change to dexcom G7 -- easier to use   Long discussion about VGo again.- cannot wear for more than 24 hours         -- Medication Changes:     Check your kidneys today     Continue Metformin 1000 mg daily     Continue Farxiga 10 mg daily   -- Please let us know if you have nausea, vomiting, or weakness with a normal BG. This could be euglycemic DKA.  -- please hold medication 3 days prior to surgery or if you are sick and have decreased intake.   -- Please drink lots of water daily while on this medication. -- 6-8 glasses of water per day   --This medication could also give you a yeast infection- please let us know if this occurs and we can treat it.   -- NO KETO DIET     Cut back on the ozempic to 0.5 mg weekly -- I am concerned that maybe this caused some of the cecal  volvulus  If you have diarrhea or constipation - let me know and we will discontinue  -- low threshold to discontinue   Please notify me for any abdominal pain, nausea, vomiting, diarrhea, acid     Continue the VGo30 with Fiasp insulin   Change the VGO every 24 hours!!!!! -- same time every day   5-6 clicks with meals     Get back on the dexcom -- we will change to dexcom G7   Prescription sent to your [facility] pharmacy!       -- Reviewed goals of therapy are to get the best control we can without hypoglycemia.  -- Reviewed patient's current insulin regimen. Clarified proper insulin dose and timing in relation to meals, etc. Insulin injection sites and proper rotation instructed.    -- Advised frequent self blood glucose monitoring.  Patient encouraged to document glucose results and bring them to every clinic visit. Get back on dexcom personal CGM will change to dexcom G7-- supplies sent to pharmacy --helps with self awareness and self accountability  -- Hypoglycemia precautions discussed. Instructed on precautions before driving.    -- Call for Bg repeatedly < 70 or > 200.   -- Close adherence to lifestyle changes recommended.   -- Periodic follow ups for eye evaluations, foot care and dental care suggested.  -- diabetes education- dexocm G7 start   See education 10 days later for download and change out       patient will benefit from a Dexcom G6 to prevent hypoglycemia due to patient experiences level 2 or hypoglycemic event <54   Patient has diabetes mellitus and manages diabetes with intensive insulin regimen and uses prandial and basal insulin daily-- on the VGO   Patient requires a therapeutic CGM and is willing to use therapeutic CGM for the necessary frequent adjustments of insulin therapy.  I have completed an in-person visit during the previous 6 months and will continue to have in-person visits every 6 months to assess adherence to their CGM regimen and diabetes treatment plan.  Due to COVID pandemic and  need for remote monitoring this tool is medically necessary        Type 2 diabetes mellitus with diabetic polyneuropathy, with long-term current use of insulin  Optimize BG readings.   See above.       Educated patient to check feet daily for any foreign objects and/or wounds. Discussed with patient the importance of wearing appropriate footwear at all times, not to walk barefoot ever, and to check shoes before putting them on feet. Instructed patient to keep feet dry by regularly changing shoes and socks and drying feet after baths and exercises. Also, instructed patient to report any new lesions, discolorations, or swelling to a healthcare professional.        Microalbuminuria due to type 2 diabetes mellitus  Optimize BG readings.   See above.   On ACEi and SGLT2       Type 2 diabetes mellitus with stage 3a chronic kidney disease, with long-term current use of insulin  Optimize BG readings without insulin stacking or hypoglycemia   See above.   On Farxiga and Lisinopril     Essential hypertension, benign  BP goal is < 140/90.   Tolerating ACEi  Blood pressure goals discussed with patient  Above goal today in clinic as she has not had her medications    Dyslipidemia, goal LDL below 100  Get back on Crestor 40 mg nightly   Will readdress Zetia with RTC   Would like her to be compliant with Statin first   Lipids with RTC     Class 1 obesity due to excess calories with serious comorbidity and body mass index (BMI) of 34.0 to 34.9 in adult  Body mass index is 34.46 kg/m².  Increases insulin resistance.   Discussed DM diet and exercise.       Anemia  Cbc with RTC           I spent a total of 30 minutes on the day of the visit.This includes face to face time and non-face to face time preparing to see the patient (eg, review of tests), obtaining and/or reviewing separately obtained history, documenting clinical information in the electronic or other health record, independently interpreting results and communicating  results to the patient/family/caregiver, or care coordinator.        Follow up in about 3 months (around 10/18/2023).  BMP today   Schedule with zoltan for dexocm g7 start   See zoltan 10 days later for change out and download   Follow up with me in 3 months with labs prior   External eye records from Dr. Juárez please         Orders Placed This Encounter   Procedures    Basic Metabolic Panel     Standing Status:   Future     Number of Occurrences:   1     Standing Expiration Date:   1/18/2025    Comprehensive Metabolic Panel     Standing Status:   Future     Standing Expiration Date:   1/18/2025    Hemoglobin A1C     Standing Status:   Future     Standing Expiration Date:   1/18/2025    Lipid Panel     Standing Status:   Future     Standing Expiration Date:   1/18/2025    Microalbumin/Creatinine Ratio, Urine     Standing Status:   Future     Standing Expiration Date:   1/18/2025     Order Specific Question:   Specimen Source     Answer:   Urine    TSH     Standing Status:   Future     Standing Expiration Date:   1/18/2025    CBC Auto Differential     Standing Status:   Future     Standing Expiration Date:   1/18/2025    Ambulatory referral/consult to Diabetes Education     Standing Status:   Future     Standing Expiration Date:   1/18/2025     Referral Priority:   Routine     Referral Type:   Consultation     Referral Reason:   Specialty Services Required     Requested Specialty:   Diabetes     Number of Visits Requested:   1         Recommendations were discussed with the patient in detail  The patient verbalized understanding and agrees with the plan outlined as above.

## 2023-07-18 NOTE — ASSESSMENT & PLAN NOTE
BP goal is < 140/90.   Tolerating ACEi  Blood pressure goals discussed with patient  Above goal today in clinic as she has not had her medications

## 2023-07-18 NOTE — ASSESSMENT & PLAN NOTE
Get back on Crestor 40 mg nightly   Will readdress Zetia with RTC   Would like her to be compliant with Statin first   Lipids with RTC

## 2023-07-19 ENCOUNTER — TELEPHONE (OUTPATIENT)
Dept: DIABETES | Facility: CLINIC | Age: 63
End: 2023-07-19
Payer: MEDICARE

## 2023-07-19 NOTE — TELEPHONE ENCOUNTER
Can we call the pharmacy to see what is going on.  All other [employer] patients are 0 dollars  So I am wondering if there is an issue at the pharmacy side  Let me know what happens

## 2023-07-19 NOTE — TELEPHONE ENCOUNTER
Spoke to pharmacy updated pharmacy on pt having new insurance which is [employer], stated they would reach out to pt to  get all of the insurance information to update pt's profile

## 2023-07-19 NOTE — TELEPHONE ENCOUNTER
Spoke to pt informed pt to reach out to the pharmacy to provider updated insurance information for [employer] , pt verbalized understanding

## 2023-07-19 NOTE — TELEPHONE ENCOUNTER
----- Message from Anai Etienne LPN sent at 7/19/2023 11:24 AM CDT -----  Pt is stating that her dexcom g7 supplies is $214 and she can't afford that

## 2023-07-19 NOTE — TELEPHONE ENCOUNTER
Pt called stating that she cannot afford her dexcom supplies , stated that the supplies is $214 dollars , stated to pt I would inform provider

## 2023-08-16 NOTE — ED NOTES
Care hand off from SKYE Pino RN. Pt. Is awake and resting quietly without distress. Abdominal surgical wounds incision clean, dry with staples intact. No redness or drainage noted at this time. The patient has a post surgical follow-up appointment at 2pm with Dr. Arias. She states she lives in [place] with her  and they do not have a vehicle. Medicare transported was arranged to pick her up at her residence at noon to transport to appointment. Pt. Does not have transportation  to home at this time.    PT order placed per Anai Alvarez PA-C. Patient updated via [employer].

## 2023-08-23 ENCOUNTER — NUTRITION (OUTPATIENT)
Dept: DIABETES | Facility: CLINIC | Age: 63
End: 2023-08-23
Payer: MEDICARE

## 2023-08-23 DIAGNOSIS — E11.65 TYPE 2 DIABETES MELLITUS WITH HYPERGLYCEMIA, WITH LONG-TERM CURRENT USE OF INSULIN: ICD-10-CM

## 2023-08-23 DIAGNOSIS — Z79.4 TYPE 2 DIABETES MELLITUS WITH HYPERGLYCEMIA, WITH LONG-TERM CURRENT USE OF INSULIN: ICD-10-CM

## 2023-08-23 PROCEDURE — G0108 PR DIAB MANAGE TRN  PER INDIV: ICD-10-PCS | Mod: S$GLB,,, | Performed by: DIETITIAN, REGISTERED

## 2023-08-23 PROCEDURE — G0108 DIAB MANAGE TRN  PER INDIV: HCPCS | Mod: S$GLB,,, | Performed by: DIETITIAN, REGISTERED

## 2023-08-24 NOTE — PROGRESS NOTES
Diabetes Care Specialist Progress Note  Author: Marla Stein RD, CDE  Date: 8/24/2023    Program Intake  Reason for Diabetes Program Visit:: Intervention  Type of Intervention:: Individual  Individual: Education, Device Training  Education: Self-Management Skill Review, Pattern Management  Device Training: Personal CGM  Current diabetes risk level:: high  In the last 12 months, have you:: none  Permission to speak with others about care:: no    Lab Results   Component Value Date    HGBA1C 7.4 (H) 05/26/2023       Clinical  Nutritional Status  Diet: Regular  Meal Plan 24 Hour Recall: Breakfast, Lunch, Dinner, Snack  Meal Plan 24 Hour Recall - Breakfast: Toast and Eggs, coffee with sugar and cream  Meal Plan 24 Hour Recall - Lunch: chicken noodle soup with crackers  Meal Plan 24 Hour Recall - Dinner: [employer] pizza  Meal Plan 24 Hour Recall - Snack: peanut butter crackers, almonds, cheerios, drinks water or sparkling water body armour lyte  Change in appetite?: No    Additional Social History  Raising granddaughter again, she just turned 2.    Diabetes Self-Management Skills Assessment    Diabetes Disease Process/Treatment Options  Diabetes Disease Process/Treatment Options: Skills Assessment Completed: No  Assessment indicates:: Adequate understanding  Deferred due to:: Other (comment)  Area of need?: No    Nutrition/Healthy Eating  Nutrition/Healthy Eating Skills Assessment Completed:: No  Assessment indicates:: Adequate understanding  Deffered due to:: Other (comment) (previously completed, there has been no change and patient has adequate understanding)  Area of need?: No    Physical Activity/Exercise  Physical Activity/Exercise Skills Assessment Completed: : No  Assessment indicates:: Adequate understanding  Deffered due to:: Other (comment)  Area of need?: No    Medications  Medication Skills Assessment Completed:: No  Assessment indicates:: Adequate understanding  Deffered due to:: Other (comment)  (previously completed, there has been no change and patient has adequate understanding)  Area of need?: No    Home Blood Glucose Monitoring  Patient states that blood sugar is checked at home daily.: yes  Monitoring Method:: home glucometer, personal continuous glucose monitor (patient was supposed to start the dexcom but has not picked up from the pharmacy - dexcom is free of charge)  Home glucometer meter type:: accu-chek guide  How often do you check your blood sugar?: Occasionally  When do you check your blood sugar?: Before breakfast, Before dinner, Before bedtime, Other  Blood glucose logs:: no (patient remembered to bring glucometer to visit, I transcribed data from glucometer to logs for provider visit and scanned into media)  Blood glucose logs reviewed today?: no  Personal CGM type:: Dexcom G6  Patient is able to use personal CGM appropriately.: no  CGM Report reviewed?: no  Unable to download personal CGM: restarting at time of visit - was supposed to be starting the G7 at today's visit but does not have it  Home Blood Glucose Monitoring Skills Assessment Completed: : Yes  Assessment indicates:: Instruction Needed  Area of need?: Yes    Acute Complications  Patient is able to identify types of acute complications: Yes  Patient Identified:: Hypoglycemia, Hyperglycemia  Patient is able to state the basic meaning of hypoglycemia?: Yes  Able to state the blood sugar range for hypoglycemia?: yes  Patient stated range:: <70  Patient can identify general symptoms of hypoglycemia: yes  Patient identified:: other (see comments) (feels chilled)  Able to state proper treatment of hypoglycemia?: yes  Patient identified:: other (see comments), 1/2 can soda/fruit juice, 5-6 pieces of hard candy (eat something like a sandwich - instructed on proper treatment options)  Patient is able to state the basic meaning of hyperglycemia?: Yes  Able to state the blood sugar range for hyperglycemia?: yes (feels dizzy with  hyperglycemia)  Patient stated range:: >250  Patient able to state proper treatment of hyperglycemia?: yes  Patient identified:: take medication as recommended, monitor blood sugar  Patient able to verbalize sick day plan?: yes  Patient-stated sick day plan:: drink more fluids, check blood sugar every 2-3 hours, seek medical attention for nausea, diarrhea, vomiting, fever with high blood sugar  Acute Complications Skills Assessment Completed: : Yes  Assessment indicates:: Adequate understanding  Area of need?: No    Chronic Complications  Patient can identify major chronic complications of diabetes.: yes  Stated chronic complications:: kidney disease, neuropathy/nerve damage, retinopathy, heart disease/heart attack, stroke  Patient can identify ways to prevent or delay diabetes complications.: yes  Stated ways to prevent complications:: controlling blood sugar  Patient is aware that having diabetes increases risk of heart disease?: Yes  Patient is aware that heart disease is the leading cause of death and disability in people with diabetes?: Yes  Patient able to state risk factors for heart disease?: Yes  Patient is taking statin?: Yes  Chronic Complications Skills Assessment Completed: : No  Assessment indicates:: Adequate understanding  Deferred due to:: Other (comment) (previously completed, there has been no change and patient has adequate understanding)  Area of need?: No    Psychosocial/Coping  Psychosocial/Coping Skills Assessment Completed: : No  Assessment indicates:: Adequate understanding  Deffered due to:: Other (comment) (previously completed, there has been no change and patient has adequate understanding)  Area of need?: No      Diabetes Self Support Plan    Diabetes Self-Management Support Plan  Stress management:: family  Medication:: Medication Assistance  Review status:: Patient has selected and agrees to support plan., Patient was provided Diabetes Self-Management Support Plan document that  includes support options.    Assessment Summary and Plan    Based on today's diabetes care assessment, the following areas of need were identified:          1/17/2023    12:01 AM   Social   Support No   Access to Mass Media/Tech No   Cognitive/Behavioral Health No   Culture/Nondenominational No   Communication No   Health Literacy No            1/17/2023    12:01 AM   Clinical   Medication Adherence Yes   Lab Compliance No   Nutritional Status Yes            8/23/2023    12:01 AM   Diabetes Self-Management Skills   Diabetes Disease Process/Treatment Options No   Nutrition/Healthy Eating No   Physical Activity/Exercise No   Medication No   Home Blood Glucose Monitoring Yes, patient did not receive the G7 supplies and has not had the G6 supplies either. Restarted G6 at time of visit   Acute Complications No   Chronic Complications No   Psychosocial/Coping No          Today's interventions were provided through individual discussion, instruction, and written materials were provided.      Patient verbalized understanding of instruction and written materials.  Pt was able to return back demonstration of instructions today. Patient understood key points, needs reinforcement and further instruction.     Diabetes Self-Management Care Plan:    Today's Diabetes Self-Management Care Plan was developed with Aishwarya's input. Aishwarya has agreed to work toward the following goal(s) to improve his/her overall diabetes control.      Care Plan: Diabetes Management   Updates made since 7/25/2023 12:00 AM        Problem: Blood Glucose Self-Monitoring         Goal: Patient agrees to check and record blood sugars 3 times per day.    Start Date: 11/1/2022   Expected End Date: 11/1/2023   This Visit's Progress: On track   Recent Progress: On track   Priority: High   Barriers: Lack of Supplies   Note:    Patient was instructed how to use a meter. Discussed signs and symptoms, and causes and treatment of hypoglycemia. Covered blood sugar and A1C  "goals. Instructed patient to use provided logbooks to record blood sugars. Instructed patient to follow-up with their physician. Patient verbalized understanding of all instructions.  Prescription for meter and testing supplies pended to PCP    1/6/2023  Dexcom G6 Education  Patient is here in clinic today for initial start of Dexcom continuous glucose monitoring system (CGMA). Reviewed with patient how to insert sensor and transmitter. Patient inserted sensor on right abdomen and inserted transmitter. Time and date was set on monitor and settings were set. Hypoglycemia trigger set for 70 and hyperglycemia set for 300. Patient provided with phone number for 24-hour help line if needed. Discussed various types of possible alarms and what to do. Questions addressed. Initialization finished. No futher questions.  Patient referred to clinic today for Dexcom G6 continuous glucose sensor system training.  Education was provided using "Quick Start Guide" per Dexcom protocol.    Overview:  5min glucose reading updates, trending arrows, BG graph screens, battery life indicator, Blue Tooth Symbol.  Menus: trend Graph, start sensor, enter BG, events, Alerts, Settings, Shutdown, Stop Sensor   Settings:                          * Urgent Low: 55 mg/dL                          * Urgent Low Soon: Off                          * Low alert: 70                          * High alert: 300                          * Rise rate: Off                          * Fall Rate: Off                          * Signal Loss: 30 min                          * No Reading: Off                          * Always sound: On                             Reviewed additional setting options with patient, including Graph Height and Transmitter ID. Transmitter was paired with .    Reviewed where to find sensor insertion time and sensor expiration date.   Discussed no need to calibrate sensor during the entirety of the 10 day wear. Discussed that pt " can calibrate sensor if desired, but at that time she will need to continue calibrating every 12 hours for sensor to remain accurate. Reviewed appropriate calibration techniques.  Reviewed sensor site selection. Site selected and prepped using aseptic technique Inserted to left abdomen. Transmitter placed in pod and secured.  Practiced sensor pod/transmitter removal from site, and removal of transmitter from sensor pod.  Patient able to demonstrate without difficulty.  Encouraged to review manual prior to starting another sensor.   Reviewed problem solving aspects of sensor transmission/variables that can disrupt RF transmission.  range 20 feet, but the first 3 hrs keep within 3 feet of transmitter.  Pt instructed on lag time of interstitial fluid from CBG and was advised to tx hypoglycemia and dose insulin based on SMBG values.  Link to video provided and written instructions provided for patient to review before 10 day sensor change  Dexcom technical support contact number given and examples of when to contact them discussed.      1/17 Changing the Sensor (every 10 days)  Remove the whole thing from your body  Hold the sensor by the skinny part and break the clear piece  Remove the gray transmitter  In the reader touch start sensor  Touch yes you want to start sensor  Touch the square you want to put the number in for   There will be a 4-digit code on the sensor (like 5937)  Enter the code from the sensor  Touch ok  Touch yes, it is correct  Insert Sensor  Remove orange piece and paper tabs  Place on skin and hold in place  Push the orange button  Insert the gray transmitter into the sensor  Put the small tongue like side in first and push down  You should hear 2 clicks  Touch confirm once sensor is secure on body  Touch start Sensor  Touch 1 then 2  Sensor will take 2 hours to warm up    Changing Transmitter (every 90 days) in the reader  Touch the =   Touch transmitter  Touch pair new  Go through the  process to start sensor  Touch yes you want to start sensor  Touch the square you want to put the number in for   There will be a 4-digit code on the sensor (like 5937)  Enter the code from the sensor  Touch ok  Touch yes, it is correct  Touch the square you want to put the number in for   There will be a 6-digit code on the transmitter box (like [id number])  Enter the code from the transmitter box  Touch ok  Touch yes, it is correct  Insert Sensor  Remove orange piece and paper tabs  Place on skin and hold in place  Push the orange button  Insert the gray transmitter into the sensor  Put the small tongue like side in first and push down  You should hear 2 clicks  Touch confirm once sensor is secure on body  Touch start Sensor  Touch 1 then 2  Sensor will take 2 hours to warm up             Follow Up Plan     Follow up in about 4 weeks (around 9/20/2023) for Personal CGM Start, Personal CGM Upload.    Today's care plan and follow up schedule was discussed with patient.  Aishwarya verbalized understanding of the care plan, goals, and agrees to follow up plan.        The patient was encouraged to communicate with his/her health care provider/physician and care team regarding his/her condition(s) and treatment.  I provided the patient with my contact information today and encouraged to contact me via phone or [facility] Patient Portal as needed.     Length of Visit   Total Time: 60 Minutes

## 2023-09-01 ENCOUNTER — CLINICAL SUPPORT (OUTPATIENT)
Dept: DIABETES | Facility: CLINIC | Age: 63
End: 2023-09-01
Payer: COMMERCIAL

## 2023-09-01 DIAGNOSIS — E11.65 TYPE 2 DIABETES MELLITUS WITH HYPERGLYCEMIA, WITH LONG-TERM CURRENT USE OF INSULIN: Primary | ICD-10-CM

## 2023-09-01 DIAGNOSIS — Z79.4 TYPE 2 DIABETES MELLITUS WITH DIABETIC POLYNEUROPATHY, WITH LONG-TERM CURRENT USE OF INSULIN: ICD-10-CM

## 2023-09-01 DIAGNOSIS — N18.31 TYPE 2 DIABETES MELLITUS WITH STAGE 3A CHRONIC KIDNEY DISEASE, WITH LONG-TERM CURRENT USE OF INSULIN: ICD-10-CM

## 2023-09-01 DIAGNOSIS — E11.22 TYPE 2 DIABETES MELLITUS WITH STAGE 3A CHRONIC KIDNEY DISEASE, WITH LONG-TERM CURRENT USE OF INSULIN: ICD-10-CM

## 2023-09-01 DIAGNOSIS — Z79.4 TYPE 2 DIABETES MELLITUS WITH HYPERGLYCEMIA, WITH LONG-TERM CURRENT USE OF INSULIN: Primary | ICD-10-CM

## 2023-09-01 DIAGNOSIS — E11.42 TYPE 2 DIABETES MELLITUS WITH DIABETIC POLYNEUROPATHY, WITH LONG-TERM CURRENT USE OF INSULIN: ICD-10-CM

## 2023-09-01 DIAGNOSIS — Z79.4 TYPE 2 DIABETES MELLITUS WITH STAGE 3A CHRONIC KIDNEY DISEASE, WITH LONG-TERM CURRENT USE OF INSULIN: ICD-10-CM

## 2023-09-01 PROCEDURE — G0108 DIAB MANAGE TRN  PER INDIV: HCPCS | Mod: S$GLB,,, | Performed by: DIETITIAN, REGISTERED

## 2023-09-01 PROCEDURE — G0108 PR DIAB MANAGE TRN  PER INDIV: ICD-10-PCS | Mod: S$GLB,,, | Performed by: DIETITIAN, REGISTERED

## 2023-09-05 NOTE — PROGRESS NOTES
Diabetes Care Specialist Progress Note  Author: Marla Stein RD, CDE  Date: 9/5/2023    Program Intake  Reason for Diabetes Program Visit:: Intervention  Type of Intervention:: Individual  Individual: Education, Device Training  Education: Pattern Management  Device Training: Personal CGM  Current diabetes risk level:: high  In the last 12 months, have you:: none  Permission to speak with others about care:: no    Lab Results   Component Value Date    HGBA1C 7.4 (H) 05/26/2023       Clinical  Nutritional Status  Meal Plan 24 Hour Recall: Breakfast, Lunch, Dinner, Snack  Meal Plan 24 Hour Recall - Breakfast: Toast and Eggs, coffee with sugar and cream  Meal Plan 24 Hour Recall - Lunch: chicken noodle soup with crackers  Meal Plan 24 Hour Recall - Dinner: [employer] pizza or burger and fries  Meal Plan 24 Hour Recall - Snack: peanut butter crackers, almonds, cheerios, drinks water or sparkling water body armour lyte  Change in appetite?: No    Additional Social History  Has granddaughter again, she is 7 and not currently enrolled in "[employer]  Still has not received the G7 - will look into this    Diabetes Self-Management Skills Assessment    Diabetes Disease Process/Treatment Options  Diabetes Disease Process/Treatment Options: Skills Assessment Completed: No  Assessment indicates:: Adequate understanding  Deferred due to:: Other (comment)  Area of need?: No    Nutrition/Healthy Eating  Nutrition/Healthy Eating Skills Assessment Completed:: No  Assessment indicates:: Adequate understanding  Deffered due to:: Other (comment) (previously completed, there has been no change and patient has adequate understanding)  Area of need?: No    Physical Activity/Exercise  Physical Activity/Exercise Skills Assessment Completed: : No  Assessment indicates:: Adequate understanding  Deffered due to:: Other (comment)  Area of need?: No    Medications  Medication Skills Assessment Completed:: No  Assessment indicates:: Adequate  understanding  Deffered due to:: Other (comment) (previously completed, there has been no change and patient has adequate understanding)  Area of need?: No    Home Blood Glucose Monitoring  Patient states that blood sugar is checked at home daily.: yes  Monitoring Method:: home glucometer, personal continuous glucose monitor (patient was supposed to start the dexcom but has not picked up from the pharmacy - dexcom is free of charge)  Home glucometer meter type:: accu-chek guide  How often do you check your blood sugar?: Occasionally  Blood glucose logs:: no (patient remembered to bring glucometer to visit, I transcribed data from glucometer to logs for provider visit and scanned into media)  Blood glucose logs reviewed today?: no  Personal CGM type:: Dexcom G6  Patient is able to use personal CGM appropriately.: no  CGM Report reviewed?: no  Unable to download personal CGM: restarting at time of visit - was supposed to be starting the G7 at today's visit but still does not have it  Home Blood Glucose Monitoring Skills Assessment Completed: : Yes  Assessment indicates:: Instruction Needed  Area of need?: Yes    Acute Complications  Patient identified::  (feels chilled)  Patient identified::  (eat something like a sandwich - instructed on proper treatment options)  Able to state the blood sugar range for hyperglycemia?:  (feels dizzy with hyperglycemia)  Acute Complications Skills Assessment Completed: : No  Assessment indicates:: Adequate understanding  Deffered due to:: Other (comment) (previously completed, there has been no change and patient has adequate understanding)  Area of need?: No    Chronic Complications  Chronic Complications Skills Assessment Completed: : No  Assessment indicates:: Adequate understanding  Deferred due to:: Other (comment) (previously completed, there has been no change and patient has adequate understanding)  Area of need?: No    Psychosocial/Coping  Psychosocial/Coping Skills  Assessment Completed: : No  Assessment indicates:: Adequate understanding  Deffered due to:: Other (comment) (previously completed, there has been no change and patient has adequate understanding)  Area of need?: No      Diabetes Self Support Plan    Diabetes Self-Management Support Plan  Medication:: Medication Assistance  Review status:: Patient has selected and agrees to support plan., Patient was provided Diabetes Self-Management Support Plan document that includes support options.    Assessment Summary and Plan    Based on today's diabetes care assessment, the following areas of need were identified:          1/17/2023    12:01 AM   Social   Support No   Access to [employer] No   Cognitive/Behavioral Health No   Culture/Sikh No   Communication No   Health Literacy No            1/17/2023    12:01 AM   Clinical   Medication Adherence Yes   Lab Compliance No   Nutritional Status Yes            9/1/2023    12:01 AM   Diabetes Self-Management Skills   Diabetes Disease Process/Treatment Options No   Nutrition/Healthy Eating No   Physical Activity/Exercise No   Medication No   Home Blood Glucose Monitoring Yes, still no dexcom G7, found the transmitter for the G6 and is restarting it at time of visit    Acute Complications No   Chronic Complications No   Psychosocial/Coping No          Today's interventions were provided through individual discussion, instruction, and written materials were provided.      Patient verbalized understanding of instruction and written materials.  Pt was able to return back demonstration of instructions today. Patient understood key points, needs reinforcement and further instruction.     Diabetes Self-Management Care Plan:    Today's Diabetes Self-Management Care Plan was developed with Aishwarya's input. Aishwarya has agreed to work toward the following goal(s) to improve his/her overall diabetes control.      Care Plan: Diabetes Management   Updates made since 8/6/2023 12:00 AM  "       Problem: Blood Glucose Self-Monitoring         Goal: Patient agrees to check and record blood sugars 3 times per day.    Start Date: 11/1/2022   Expected End Date: 11/1/2023   This Visit's Progress: On track   Recent Progress: On track   Priority: High   Barriers: Lack of Supplies   Note:    Patient was instructed how to use a meter. Discussed signs and symptoms, and causes and treatment of hypoglycemia. Covered blood sugar and A1C goals. Instructed patient to use provided logbooks to record blood sugars. Instructed patient to follow-up with their physician. Patient verbalized understanding of all instructions.  Prescription for meter and testing supplies pended to PCP    1/6/2023  Dexcom G6 Education  Patient is here in clinic today for initial start of Dexcom continuous glucose monitoring system (CGMA). Reviewed with patient how to insert sensor and transmitter. Patient inserted sensor on right abdomen and inserted transmitter. Time and date was set on monitor and settings were set. Hypoglycemia trigger set for 70 and hyperglycemia set for 300. Patient provided with phone number for 24-hour help line if needed. Discussed various types of possible alarms and what to do. Questions addressed. Initialization finished. No futher questions.  Patient referred to clinic today for Dexcom G6 continuous glucose sensor system training.  Education was provided using "Quick Start Guide" per Dexcom protocol.    Overview:  5min glucose reading updates, trending arrows, BG graph screens, battery life indicator, Blue Tooth Symbol.  Menus: trend Graph, start sensor, enter BG, events, Alerts, Settings, Shutdown, Stop Sensor   Settings:                          * Urgent Low: 55 mg/dL                          * Urgent Low Soon: Off                          * Low alert: 70                          * High alert: 300                          * Rise rate: Off                          * Fall Rate: Off                          " * Signal Loss: 30 min                          * No Reading: Off                          * Always sound: On                             Reviewed additional setting options with patient, including Graph Height and Transmitter ID. Transmitter was paired with .    Reviewed where to find sensor insertion time and sensor expiration date.   Discussed no need to calibrate sensor during the entirety of the 10 day wear. Discussed that pt can calibrate sensor if desired, but at that time she will need to continue calibrating every 12 hours for sensor to remain accurate. Reviewed appropriate calibration techniques.  Reviewed sensor site selection. Site selected and prepped using aseptic technique Inserted to left abdomen. Transmitter placed in pod and secured.  Practiced sensor pod/transmitter removal from site, and removal of transmitter from sensor pod.  Patient able to demonstrate without difficulty.  Encouraged to review manual prior to starting another sensor.   Reviewed problem solving aspects of sensor transmission/variables that can disrupt RF transmission.  range 20 feet, but the first 3 hrs keep within 3 feet of transmitter.  Pt instructed on lag time of interstitial fluid from CBG and was advised to tx hypoglycemia and dose insulin based on SMBG values.  Link to video provided and written instructions provided for patient to review before 10 day sensor change  Dexcom technical support contact number given and examples of when to contact them discussed.      1/17 Changing the Sensor (every 10 days)  Remove the whole thing from your body  Hold the sensor by the skinny part and break the clear piece  Remove the gray transmitter  In the reader touch start sensor  Touch yes you want to start sensor  Touch the square you want to put the number in for   There will be a 4-digit code on the sensor (like 5937)  Enter the code from the sensor  Touch ok  Touch yes, it is correct  Insert Sensor  Remove orange  piece and paper tabs  Place on skin and hold in place  Push the orange button  Insert the gray transmitter into the sensor  Put the small tongue like side in first and push down  You should hear 2 clicks  Touch confirm once sensor is secure on body  Touch start Sensor  Touch 1 then 2  Sensor will take 2 hours to warm up    Changing Transmitter (every 90 days) in the reader  Touch the =   Touch transmitter  Touch pair new  Go through the process to start sensor  Touch yes you want to start sensor  Touch the square you want to put the number in for   There will be a 4-digit code on the sensor (like 5937)  Enter the code from the sensor  Touch ok  Touch yes, it is correct  Touch the square you want to put the number in for   There will be a 6-digit code on the transmitter box (like [id number])  Enter the code from the transmitter box  Touch ok  Touch yes, it is correct  Insert Sensor  Remove orange piece and paper tabs  Place on skin and hold in place  Push the orange button  Insert the gray transmitter into the sensor  Put the small tongue like side in first and push down  You should hear 2 clicks  Touch confirm once sensor is secure on body  Touch start Sensor  Touch 1 then 2  Sensor will take 2 hours to warm up             Follow Up Plan     Follow up in about 4 weeks (around 9/29/2023) for Personal CGM Start, Personal CGM Upload.    Today's care plan and follow up schedule was discussed with patient.  Aishwarya verbalized understanding of the care plan, goals, and agrees to follow up plan.        The patient was encouraged to communicate with his/her health care provider/physician and care team regarding his/her condition(s) and treatment.  I provided the patient with my contact information today and encouraged to contact me via phone or [facility] Patient Portal as needed.     Length of Visit   Total Time: 30 Minutes

## 2023-09-12 DIAGNOSIS — E11.65 TYPE 2 DIABETES MELLITUS WITH HYPERGLYCEMIA, WITH LONG-TERM CURRENT USE OF INSULIN: ICD-10-CM

## 2023-09-12 DIAGNOSIS — Z79.4 TYPE 2 DIABETES MELLITUS WITH HYPERGLYCEMIA, WITH LONG-TERM CURRENT USE OF INSULIN: ICD-10-CM

## 2023-09-12 NOTE — TELEPHONE ENCOUNTER
----- Message from Annmarie BEATA Lau sent at 9/12/2023  1:28 PM CDT -----  Contact: [phone]  Requesting an RX refill or new RX.  Is this a refill or new RX: Refill 1  RX name and strength (copy/paste from chart):  aspart - insulin  Is this a 30 day or 90 day RX:   Pharmacy name and phone # (copy/paste from chart): [id number] [place] [facility] [address]  Phone:  [phone]  Fax:  [phone]         The doctors have asked that we provide their patients with the following 2 reminders -- prescription refills can take up to 72 hours, and a friendly reminder that in the future you can use your [facility] account to request refills:     Patient said that on the box the rx is call Aspart. (Unable to find it). Please advise. Thank you

## 2023-09-12 NOTE — TELEPHONE ENCOUNTER
Refill Request Routed to the [facility] Review Pool for the pharmacist to review.

## 2023-09-12 NOTE — TELEPHONE ENCOUNTER
No care due was identified.  [facility] Embedded Care Due Messages. Reference number: [id number].   9/12/2023 1:42:16 PM CDT

## 2023-09-13 ENCOUNTER — TELEPHONE (OUTPATIENT)
Dept: DIABETES | Facility: CLINIC | Age: 63
End: 2023-09-13
Payer: MEDICARE

## 2023-09-13 DIAGNOSIS — Z79.4 TYPE 2 DIABETES MELLITUS WITH HYPERGLYCEMIA, WITH LONG-TERM CURRENT USE OF INSULIN: Primary | ICD-10-CM

## 2023-09-13 DIAGNOSIS — E11.65 TYPE 2 DIABETES MELLITUS WITH HYPERGLYCEMIA, WITH LONG-TERM CURRENT USE OF INSULIN: Primary | ICD-10-CM

## 2023-09-13 RX ORDER — INSULIN LISPRO 100 [IU]/ML
INJECTION, SOLUTION INTRAVENOUS; SUBCUTANEOUS
Qty: 30 ML | Refills: 6 | Status: SHIPPED | OUTPATIENT
Start: 2023-09-13 | End: 2023-11-14 | Stop reason: SDUPTHER

## 2023-09-13 NOTE — TELEPHONE ENCOUNTER
----- Message from Anai Etienne LPN sent at 9/13/2023 10:16 AM CDT -----  Fiasp not covered, insurance prefers humalog and lyumjev

## 2023-09-13 NOTE — TELEPHONE ENCOUNTER
Refill Routing Note   Medication(s) are not appropriate for processing by [facility] for the following reason(s):      Patient seen in ED/Hospital since LOV with provider    [facility] action(s):  Defer Care Due:  None identified          Appointments  past 12m or future 3m with PCP    Date Provider   Last Visit   2/8/2023 Joey Cintron MD   Next Visit   Visit date not found Joey Cintron MD   ED visits in past 90 days: 1        Note composed:7:52 PM 09/12/2023

## 2023-09-14 DIAGNOSIS — I10 ESSENTIAL HYPERTENSION, BENIGN: ICD-10-CM

## 2023-09-14 NOTE — TELEPHONE ENCOUNTER
No care due was identified.  [facility] Embedded Care Due Messages. Reference number: [id number].   9/14/2023 12:21:27 PM CDT

## 2023-09-15 RX ORDER — AMLODIPINE BESYLATE 5 MG/1
5 TABLET ORAL
Qty: 90 TABLET | Refills: 1 | Status: SHIPPED | OUTPATIENT
Start: 2023-09-15 | End: 2024-01-29

## 2023-09-15 NOTE — TELEPHONE ENCOUNTER
Refill Routing Note   Medication(s) are not appropriate for processing by [facility] for the following reason(s):      Patient seen in ED/Hospital since LOV with provider  Required vitals abnormal    [facility] action(s):  Defer Care Due:  None identified            Appointments  past 12m or future 3m with PCP    Date Provider   Last Visit   2/8/2023 Joey Cintron MD   Next Visit   Visit date not found Joey Cintron MD   ED visits in past 90 days: 1        Note composed:8:00 AM 09/15/2023

## 2023-09-18 ENCOUNTER — PATIENT MESSAGE (OUTPATIENT)
Dept: PRIMARY CARE CLINIC | Facility: CLINIC | Age: 63
End: 2023-09-18
Payer: MEDICARE

## 2023-09-20 ENCOUNTER — TELEPHONE (OUTPATIENT)
Dept: DIABETES | Facility: CLINIC | Age: 63
End: 2023-09-20
Payer: MEDICARE

## 2023-09-20 ENCOUNTER — TELEPHONE (OUTPATIENT)
Dept: NEUROLOGY | Facility: CLINIC | Age: 63
End: 2023-09-20
Payer: MEDICARE

## 2023-09-20 DIAGNOSIS — E11.42 TYPE 2 DIABETES MELLITUS WITH DIABETIC POLYNEUROPATHY, WITH LONG-TERM CURRENT USE OF INSULIN: Primary | ICD-10-CM

## 2023-09-20 DIAGNOSIS — Z79.4 TYPE 2 DIABETES MELLITUS WITH DIABETIC POLYNEUROPATHY, WITH LONG-TERM CURRENT USE OF INSULIN: Primary | ICD-10-CM

## 2023-09-20 DIAGNOSIS — Z78.0 MENOPAUSE: ICD-10-CM

## 2023-09-20 NOTE — TELEPHONE ENCOUNTER
Called pt, pt stated she has not received dexcom g7 yet, stated she would call pharmacy to see what is going on    I agree with the information discussed

## 2023-09-20 NOTE — TELEPHONE ENCOUNTER
Check with patient this afternoon to see if she received the dexcom G7-- she was going to call her pharmacy   We need to get her scheduled with zoltan too please

## 2023-09-28 ENCOUNTER — OFFICE VISIT (OUTPATIENT)
Dept: PODIATRY | Facility: CLINIC | Age: 63
End: 2023-09-28
Payer: MEDICARE

## 2023-09-28 VITALS
BODY MASS INDEX: 34.17 KG/M2 | DIASTOLIC BLOOD PRESSURE: 61 MMHG | WEIGHT: 217.69 LBS | HEART RATE: 59 BPM | HEIGHT: 67 IN | SYSTOLIC BLOOD PRESSURE: 150 MMHG

## 2023-09-28 DIAGNOSIS — M20.41 HAMMERTOE, BILATERAL: ICD-10-CM

## 2023-09-28 DIAGNOSIS — S90.121A CONTUSION OF SECOND TOE OF RIGHT FOOT, INITIAL ENCOUNTER: ICD-10-CM

## 2023-09-28 DIAGNOSIS — M79.674 PAIN DUE TO ONYCHOMYCOSIS OF TOENAILS OF BOTH FEET: ICD-10-CM

## 2023-09-28 DIAGNOSIS — E11.42 TYPE 2 DIABETES MELLITUS WITH DIABETIC POLYNEUROPATHY, WITH LONG-TERM CURRENT USE OF INSULIN: Primary | ICD-10-CM

## 2023-09-28 DIAGNOSIS — M76.61 ACHILLES TENDINITIS, RIGHT LEG: ICD-10-CM

## 2023-09-28 DIAGNOSIS — I83.893 VARICOSE VEINS OF LEG WITH EDEMA, BILATERAL: ICD-10-CM

## 2023-09-28 DIAGNOSIS — Z79.4 TYPE 2 DIABETES MELLITUS WITH DIABETIC POLYNEUROPATHY, WITH LONG-TERM CURRENT USE OF INSULIN: Primary | ICD-10-CM

## 2023-09-28 DIAGNOSIS — L60.2 HYPERTROPHY OF NAIL: ICD-10-CM

## 2023-09-28 DIAGNOSIS — M20.42 HAMMERTOE, BILATERAL: ICD-10-CM

## 2023-09-28 DIAGNOSIS — M79.675 PAIN DUE TO ONYCHOMYCOSIS OF TOENAILS OF BOTH FEET: ICD-10-CM

## 2023-09-28 DIAGNOSIS — B35.1 PAIN DUE TO ONYCHOMYCOSIS OF TOENAILS OF BOTH FEET: ICD-10-CM

## 2023-09-28 PROCEDURE — 3077F SYST BP >= 140 MM HG: CPT | Mod: CPTII,S$GLB,, | Performed by: PODIATRIST

## 2023-09-28 PROCEDURE — 4010F PR ACE/ARB THEARPY RXD/TAKEN: ICD-10-PCS | Mod: CPTII,S$GLB,, | Performed by: PODIATRIST

## 2023-09-28 PROCEDURE — 3008F BODY MASS INDEX DOCD: CPT | Mod: CPTII,S$GLB,, | Performed by: PODIATRIST

## 2023-09-28 PROCEDURE — 3008F PR BODY MASS INDEX (BMI) DOCUMENTED: ICD-10-PCS | Mod: CPTII,S$GLB,, | Performed by: PODIATRIST

## 2023-09-28 PROCEDURE — 1101F PT FALLS ASSESS-DOCD LE1/YR: CPT | Mod: CPTII,S$GLB,, | Performed by: PODIATRIST

## 2023-09-28 PROCEDURE — 1159F PR MEDICATION LIST DOCUMENTED IN MEDICAL RECORD: ICD-10-PCS | Mod: CPTII,S$GLB,, | Performed by: PODIATRIST

## 2023-09-28 PROCEDURE — 3051F PR MOST RECENT HEMOGLOBIN A1C LEVEL 7.0 - < 8.0%: ICD-10-PCS | Mod: CPTII,S$GLB,, | Performed by: PODIATRIST

## 2023-09-28 PROCEDURE — 3288F PR FALLS RISK ASSESSMENT DOCUMENTED: ICD-10-PCS | Mod: CPTII,S$GLB,, | Performed by: PODIATRIST

## 2023-09-28 PROCEDURE — 99999 PR PBB SHADOW E&M-EST. PATIENT-LVL IV: ICD-10-PCS | Mod: PBBFAC,,, | Performed by: PODIATRIST

## 2023-09-28 PROCEDURE — 3288F FALL RISK ASSESSMENT DOCD: CPT | Mod: CPTII,S$GLB,, | Performed by: PODIATRIST

## 2023-09-28 PROCEDURE — 3078F PR MOST RECENT DIASTOLIC BLOOD PRESSURE < 80 MM HG: ICD-10-PCS | Mod: CPTII,S$GLB,, | Performed by: PODIATRIST

## 2023-09-28 PROCEDURE — 99214 OFFICE O/P EST MOD 30 MIN: CPT | Mod: S$GLB,,, | Performed by: PODIATRIST

## 2023-09-28 PROCEDURE — 3051F HG A1C>EQUAL 7.0%<8.0%: CPT | Mod: CPTII,S$GLB,, | Performed by: PODIATRIST

## 2023-09-28 PROCEDURE — 3066F NEPHROPATHY DOC TX: CPT | Mod: CPTII,S$GLB,, | Performed by: PODIATRIST

## 2023-09-28 PROCEDURE — 99499 UNLISTED E&M SERVICE: CPT | Mod: S$GLB,,, | Performed by: PODIATRIST

## 2023-09-28 PROCEDURE — 4010F ACE/ARB THERAPY RXD/TAKEN: CPT | Mod: CPTII,S$GLB,, | Performed by: PODIATRIST

## 2023-09-28 PROCEDURE — 3060F POS MICROALBUMINURIA REV: CPT | Mod: CPTII,S$GLB,, | Performed by: PODIATRIST

## 2023-09-28 PROCEDURE — 3078F DIAST BP <80 MM HG: CPT | Mod: CPTII,S$GLB,, | Performed by: PODIATRIST

## 2023-09-28 PROCEDURE — 99214 PR OFFICE/OUTPT VISIT, EST, LEVL IV, 30-39 MIN: ICD-10-PCS | Mod: S$GLB,,, | Performed by: PODIATRIST

## 2023-09-28 PROCEDURE — 99999 PR PBB SHADOW E&M-EST. PATIENT-LVL IV: CPT | Mod: PBBFAC,,, | Performed by: PODIATRIST

## 2023-09-28 PROCEDURE — 3060F PR POS MICROALBUMINURIA RESULT DOCUMENTED/REVIEW: ICD-10-PCS | Mod: CPTII,S$GLB,, | Performed by: PODIATRIST

## 2023-09-28 PROCEDURE — 3077F PR MOST RECENT SYSTOLIC BLOOD PRESSURE >= 140 MM HG: ICD-10-PCS | Mod: CPTII,S$GLB,, | Performed by: PODIATRIST

## 2023-09-28 PROCEDURE — 3066F PR DOCUMENTATION OF TREATMENT FOR NEPHROPATHY: ICD-10-PCS | Mod: CPTII,S$GLB,, | Performed by: PODIATRIST

## 2023-09-28 PROCEDURE — 1159F MED LIST DOCD IN RCRD: CPT | Mod: CPTII,S$GLB,, | Performed by: PODIATRIST

## 2023-09-28 PROCEDURE — 1101F PR PT FALLS ASSESS DOC 0-1 FALLS W/OUT INJ PAST YR: ICD-10-PCS | Mod: CPTII,S$GLB,, | Performed by: PODIATRIST

## 2023-09-28 RX ORDER — DICLOFENAC SODIUM 10 MG/G
2 GEL TOPICAL 4 TIMES DAILY
Qty: 350 G | Refills: 2 | Status: SHIPPED | OUTPATIENT
Start: 2023-09-28

## 2023-09-28 NOTE — PROGRESS NOTES
Subjective:      Patient ID: Aishwarya Jensen is a 63 y.o. female.    Chief Complaint: Nail Care    Aishwarya is a 63 y.o. female who presents after >5months absence for diabetic foot & nail care in high risk feet. Nails hurt in shoes w/ pressure when walking. Also heel pain R foot.     PCP: Joey Cintron MD    Date Last Seen by PCP:  02/08/2023  DM management: Micki Knott NP 7/18/23 due 11/14/23    Current shoe gear: flat casual; uses non-slip hospital socks in home.    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    Aishwarya has a past medical history of Diabetes mellitus, type 2, Hypercholesteremia, and Hypertension.   Dx DM 30 yrs.ago.   Knee pain - ortho.referral last visit.   Patient Active Problem List   Diagnosis    Essential hypertension, benign    Dyslipidemia, goal LDL below 100    Microalbuminuria due to type 2 diabetes mellitus    Type 2 diabetes mellitus with hyperglycemia, with long-term current use of insulin    Type 2 diabetes mellitus with diabetic polyneuropathy, with long-term current use of insulin    Asymptomatic varicose veins of bilateral lower extremities    Onychomycosis due to dermatophyte    Class 1 obesity due to excess calories with serious comorbidity and body mass index (BMI) of 34.0 to 34.9 in adult    Stage 3a chronic kidney disease    Dyspnea on exertion    Primary osteoarthritis of right knee    Primary osteoarthritis of left knee    Type 2 diabetes mellitus with stage 3a chronic kidney disease, with long-term current use of insulin    Anemia    Cecal volvulus    Weakness      Hemoglobin A1C   Date Value Ref Range Status   05/26/2023 7.4 (H) 4.0 - 5.6 % Final     Comment:     ADA Screening Guidelines:  5.7-6.4%  Consistent with prediabetes  >or=6.5%  Consistent with diabetes    High levels of fetal hemoglobin interfere with the HbA1C  assay. Heterozygous hemoglobin variants (HbS, HgC, etc)do  not significantly interfere with this assay.   However, presence of multiple  variants may affect accuracy.     05/04/2023 6.9 (H) 4.0 - 5.6 % Final     Comment:     ADA Screening Guidelines:  5.7-6.4%  Consistent with prediabetes  >or=6.5%  Consistent with diabetes    High levels of fetal hemoglobin interfere with the HbA1C  assay. Heterozygous hemoglobin variants (HbS, HgC, etc)do  not significantly interfere with this assay.   However, presence of multiple variants may affect accuracy.     01/17/2023 6.9 (H) 4.0 - 5.6 % Final     Comment:     ADA Screening Guidelines:  5.7-6.4%  Consistent with prediabetes  >or=6.5%  Consistent with diabetes    High levels of fetal hemoglobin interfere with the HbA1C  assay. Heterozygous hemoglobin variants (HbS, HgC, etc)do  not significantly interfere with this assay.   However, presence of multiple variants may affect accuracy.        Objective:      Physical Exam  Vitals reviewed.   Constitutional:       General: She is not in acute distress.     Appearance: She is well-developed. She is obese.   Cardiovascular:      Pulses: Normal pulses.           Dorsalis pedis pulses are 2+ on the right side and 2+ on the left side.      Comments: Varicose veins R>L, w/ mild ankle edema BLE, & telangectasias.  Musculoskeletal:         General: Tenderness present. No swelling or signs of injury.      Right lower leg: Edema present.      Left lower leg: Edema present.      Right ankle: Tenderness present.      Right Achilles Tendon: Tenderness present. No defects. Menchaca's test negative.      Left ankle: No tenderness.      Left Achilles Tendon: No tenderness or defects. Menchaca's test negative.      Right foot: Deformity (hammertoe B/L) present.      Left foot: Deformity (cavus B/L) present.        Feet:    Feet:      Right foot:      Skin integrity: Warmth present. No erythema.      Comments: Equinus B/L ankles w/ < 90 deg foot to leg noted w/ knees extended.      MS strength of extrinsics to foot & ankle B/L + 5/5 in DF/PF/Inv/Ev to resistance w/ no  reproduction of pain in any direction, including Achilles insertion R.  TTP distal lateral insertion of Achilles into posterior heel.     Passive ROM of ankle & pedal joints is painless & w/out crepitation B/L.     Toenails 1st, 2nd, 3rd, 4th, 5th  B/L are hypertrophic, thickened, dystrophic, discolored, w/ crumbly subungual debris sparing 4 L.    Skin:     General: Skin is cool and dry.      Capillary Refill: Capillary refill takes less than 2 seconds.      Comments: Subungual dried blood R 2nd toe w/out nail bed disruption.    Stasis hyperpigmentation B/L.   Neurological:      Mental Status: She is alert and oriented to person, place, and time.      Sensory: Sensory deficit present.      Motor: Motor function is intact. No weakness or abnormal muscle tone.      Gait: Gait is intact. Gait normal.      Comments: Paresthesias including numbness & parasthesias B/L feet w/ no clearly identified trigger or source; no hyperemia.   Psychiatric:         Mood and Affect: Mood and affect normal.         Behavior: Behavior normal. Behavior is cooperative.       Assessment:      Encounter Diagnoses   Name Primary?    Type 2 diabetes mellitus with diabetic polyneuropathy, with long-term current use of insulin Yes    Achilles tendinitis, right leg     Hammertoe, bilateral     Hypertrophy of nail     Pain due to onychomycosis of toenails of both feet     Varicose veins of leg with edema, bilateral     Contusion of second toe of right foot, initial encounter      Problem List Items Addressed This Visit          Endocrine    Type 2 diabetes mellitus with diabetic polyneuropathy, with long-term current use of insulin - Primary    Relevant Orders    DIABETIC SHOES FOR HOME USE     Other Visit Diagnoses       Achilles tendinitis, right leg        Relevant Medications    diclofenac sodium (VOLTAREN) 1 % Gel    Other Relevant Orders    HEEL LIFTS FOR HOME USE    DIABETIC SHOES FOR HOME USE    Hammertoe, bilateral        Relevant Orders     DIABETIC SHOES FOR HOME USE    Hypertrophy of nail        Pain due to onychomycosis of toenails of both feet        Varicose veins of leg with edema, bilateral        Contusion of second toe of right foot, initial encounter                Plan:      Aishwarya was seen today for nail care.    Diagnoses and all orders for this visit:    Type 2 diabetes mellitus with diabetic polyneuropathy, with long-term current use of insulin  -     DIABETIC SHOES FOR HOME USE    Achilles tendinitis, right leg  -     HEEL LIFTS FOR HOME USE  -     diclofenac sodium (VOLTAREN) 1 % Gel; Apply 2 g topically 4 (four) times daily.  -     DIABETIC SHOES FOR HOME USE    Hammertoe, bilateral  -     DIABETIC SHOES FOR HOME USE    Hypertrophy of nail    Pain due to onychomycosis of toenails of both feet    Varicose veins of leg with edema, bilateral    Contusion of second toe of right foot, initial encounter    I counseled the patient on her conditions, their implications and medical management.    - Diabetic Foot Education. Patient reminded of the importance of good nutrition & blood sugar control to help prevent podiatric complications of diabetes. We discussed wearing proper shoe gear, daily foot inspections, never walking w/out protective shoe gear, podiatric nail visits every 6 months, sooner prn.      - With patient's permission, nails were aggressively reduced & debrided x 10 to their soft tissue attachment, mechanically removing all offending nail & debris. Patient relates relief following the procedure.    Discussed general issues surrounding Achilles tendinitis, along w/ the advantages & disadvantages of various tx strategies:    -Discussed shoe choice.    -Discussed non-prescription inserts   OR PPT heel lifts/ heel cups @ all times WB.   PPT heel lift dispensed for all shoe gear including @ home.    -Rx/ OTC topical NSAID Voltaren gel up to qid prn vs PO NSAID. (Other options include PO Medrol dosepak prior to NSAID not  recommended d/t DM;  OR injection of LA & steroid w/ PO NSAID).   Rx/ OTC Voltaren gel up to qid prn provided.    F/u 3-4 wks., sooner prn.         A total of 30 mins.was spent on chart review, patient visit & documentation.

## 2023-10-09 NOTE — PROCEDURES
Routine Foot Care    Date/Time: 9/28/2023 1:30 PM    Performed by: Annmarie Keane DPM  Authorized by: Annmarie Keane DPM    Consent Done?:  Yes (Verbal)    Nail Care Type:  Debride  Location(s): All  (Left 1st Toe, Left 3rd Toe, Left 2nd Toe, Left 4th Toe, Left 5th Toe, Right 1st Toe, Right 2nd Toe, Right 3rd Toe, Right 4th Toe and Right 5th Toe)  Patient tolerance:  Patient tolerated the procedure well with no immediate complications

## 2023-10-17 ENCOUNTER — PATIENT OUTREACH (OUTPATIENT)
Dept: ADMINISTRATIVE | Facility: HOSPITAL | Age: 63
End: 2023-10-17
Payer: MEDICARE

## 2023-10-17 DIAGNOSIS — E11.65 TYPE 2 DIABETES MELLITUS WITH HYPERGLYCEMIA, WITH LONG-TERM CURRENT USE OF INSULIN: ICD-10-CM

## 2023-10-17 DIAGNOSIS — Z79.4 TYPE 2 DIABETES MELLITUS WITH HYPERGLYCEMIA, WITH LONG-TERM CURRENT USE OF INSULIN: ICD-10-CM

## 2023-10-18 ENCOUNTER — PATIENT MESSAGE (OUTPATIENT)
Dept: CARDIOLOGY | Facility: CLINIC | Age: 63
End: 2023-10-18
Payer: MEDICARE

## 2023-10-25 DIAGNOSIS — I10 ESSENTIAL HYPERTENSION, BENIGN: ICD-10-CM

## 2023-10-25 NOTE — TELEPHONE ENCOUNTER
Refill Routing Note   Medication(s) are not appropriate for processing by [facility] for the following reason(s):      Patient seen in ED/Hospital since LOV with provider  Required vitals abnormal: BP (!) 150/61    [facility] action(s):  Defer Care Due:  None identified   Medication Therapy Plan:         Appointments  past 12m or future 3m with PCP    Date Provider   Last Visit   2/8/2023 Joey Cintron MD   Next Visit   Visit date not found Joey Cintron MD   ED visits in past 90 days: 1        Note composed:4:52 PM 10/25/2023

## 2023-10-25 NOTE — TELEPHONE ENCOUNTER
No care due was identified.  [facility] Embedded Care Due Messages. Reference number: [id number].   10/25/2023 1:31:08 PM CDT

## 2023-10-26 RX ORDER — LISINOPRIL AND HYDROCHLOROTHIAZIDE 12.5; 2 MG/1; MG/1
1 TABLET ORAL DAILY
Qty: 90 TABLET | Refills: 0 | Status: SHIPPED | OUTPATIENT
Start: 2023-10-26 | End: 2024-01-29

## 2023-10-27 ENCOUNTER — CLINICAL SUPPORT (OUTPATIENT)
Dept: DIABETES | Facility: CLINIC | Age: 63
End: 2023-10-27
Payer: MEDICARE

## 2023-10-27 VITALS — HEIGHT: 67 IN | WEIGHT: 218 LBS | BODY MASS INDEX: 34.21 KG/M2

## 2023-10-27 DIAGNOSIS — Z79.4 TYPE 2 DIABETES MELLITUS WITH DIABETIC POLYNEUROPATHY, WITH LONG-TERM CURRENT USE OF INSULIN: ICD-10-CM

## 2023-10-27 DIAGNOSIS — E11.42 TYPE 2 DIABETES MELLITUS WITH DIABETIC POLYNEUROPATHY, WITH LONG-TERM CURRENT USE OF INSULIN: ICD-10-CM

## 2023-10-27 PROCEDURE — G0108 DIAB MANAGE TRN  PER INDIV: HCPCS | Mod: S$GLB,,, | Performed by: DIETITIAN, REGISTERED

## 2023-10-27 PROCEDURE — 99999 PR PBB SHADOW E&M-EST. PATIENT-LVL III: CPT | Mod: PBBFAC,,, | Performed by: DIETITIAN, REGISTERED

## 2023-10-27 PROCEDURE — 99999 PR PBB SHADOW E&M-EST. PATIENT-LVL III: ICD-10-PCS | Mod: PBBFAC,,, | Performed by: DIETITIAN, REGISTERED

## 2023-10-27 PROCEDURE — G0108 PR DIAB MANAGE TRN  PER INDIV: ICD-10-PCS | Mod: S$GLB,,, | Performed by: DIETITIAN, REGISTERED

## 2023-10-27 RX ORDER — EZETIMIBE 10 MG/1
10 TABLET ORAL
COMMUNITY
Start: 2023-09-14 | End: 2023-11-14 | Stop reason: SDUPTHER

## 2023-10-27 NOTE — PROGRESS NOTES
"Diabetes Care Specialist Follow-up Note  Author: Marla Stein RD, CDE  Date: 10/27/2023    Program Intake  Reason for Diabetes Program Visit:: Intervention  Type of Intervention:: Individual  Individual: Device Training  Device Training: Personal CGM  Current diabetes risk level:: high  In the last 12 months, have you:: none  Permission to speak with others about care:: no    Lab Results   Component Value Date    HGBA1C 7.4 (H) 05/26/2023     A1c Pre Diabetes Care Specialist Intervention:  >14.0%    Clinical    Weight: 98.9 kg (218 lb)   Height: 5' 7" (170.2 cm)   Body mass index is 34.14 kg/m².    Medication Information  How do you obtain your medications?: Patient drives, Family picks up  How many days a week do you miss your medications?: Never  Do you use a pill box or medication chart to help you manage your medications?: Pill box  Do you sometimes have difficulty refilling your medications?: No  Medication adherence impacting ability to self-manage diabetes?: No    Labs  Lab Compliance Barriers: No    Nutritional Status  Diet: Regular  Meal Plan 24 Hour Recall: Breakfast, Lunch, Dinner, Snack  Meal Plan 24 Hour Recall - Breakfast: Toast and Eggs, coffee with sugar and cream  Meal Plan 24 Hour Recall - Lunch: chicken noodle soup with crackers  Meal Plan 24 Hour Recall - Dinner: yesterday a burger and some fries, sometimes gets pizza or something home cooked  Meal Plan 24 Hour Recall - Snack: peanut butter crackers, almonds, cheerios, drinks water or sparkling water body armour lyte  Change in appetite?: No  Current nutritional status an area of need that is impacting patient's ability to self-manage diabetes?: No    Physical activity/Exercise:   Not much time to exercise, running after granddaughter    SMBG: starting the Dexcom G7 today    Additional Social History    Support  Is Support an area impacting ability to self-manage diabetes?: No    Access to Mass Media & Technology  Media or technology needs " impacting ability to self-manage diabetes?: No    Cognitive/Behavioral Health  Cognitive or behavioral barriers impacting ability to self-manage diabetes?: No    Culture/Anabaptism  Culture or Religion beliefs that may impact ability to access healthcare: No    Communication  Communication needs impacting ability to self-manage diabetes?: No    Health Literacy  Health literacy needs impacting ability to self-manage diabetes?: No      Diabetes Self-Management Skills Assessment     Diabetes Disease Process/Treatment Options  Diabetes Disease Process/Treatment Options: Skills Assessment Completed: No  Assessment indicates:: Adequate understanding  Deferred due to:: Other (comment) (previously completed, there has been no change and patient has adequate understanding)  Area of need?: No    Nutrition/Healthy Eating  Nutrition/Healthy Eating Skills Assessment Completed:: No  Assessment indicates:: Adequate understanding  Deffered due to:: Other (comment) (previously completed, there has been no change and patient has adequate understanding)  Area of need?: No    Physical Activity/Exercise  Physical Activity/Exercise Skills Assessment Completed: : No  Assessment indicates:: Adequate understanding  Deffered due to:: Other (comment)  Area of need?: No    Medications  Medication Skills Assessment Completed:: No  Assessment indicates:: Adequate understanding  Deffered due to:: Other (comment) (previously completed, there has been no change and patient has adequate understanding)  Area of need?: No    Home Blood Glucose Monitoring  Patient states that blood sugar is checked at home daily.: yes  Monitoring Method:: home glucometer, personal continuous glucose monitor (patient was supposed to start the dexcom but has not picked up from the pharmacy - dexcom is free of charge)  Home glucometer meter type:: accu-chek guide  Blood glucose logs:: no (patient remembered to bring glucometer to visit, I transcribed data from glucometer  to logs for provider visit and scanned into media)  Blood glucose logs reviewed today?: no  Personal CGM type:: Dexcom G6  Patient is able to use personal CGM appropriately.: no  CGM Report reviewed?: no  Unable to download personal CGM: starting the Dexcom G7  Home Blood Glucose Monitoring Skills Assessment Completed: : Yes  Assessment indicates:: Instruction Needed  Area of need?: Yes    Acute Complications  Patient identified::  (feels chilled)  Patient identified::  (eat something like a sandwich - instructed on proper treatment options)  Able to state the blood sugar range for hyperglycemia?:  (feels dizzy with hyperglycemia)  Acute Complications Skills Assessment Completed: : No  Assessment indicates:: Adequate understanding  Deffered due to:: Other (comment) (previously completed, there has been no change and patient has adequate understanding)  Area of need?: No    Chronic Complications  Chronic Complications Skills Assessment Completed: : No  Assessment indicates:: Adequate understanding  Deferred due to:: Other (comment) (previously completed, there has been no change and patient has adequate understanding)  Area of need?: No    Psychosocial/Coping  Psychosocial/Coping Skills Assessment Completed: : No  Assessment indicates:: Adequate understanding  Deffered due to:: Other (comment) (previously completed, there has been no change and patient has adequate understanding)  Area of need?: No      During today's follow-up visit,  the following areas required further assessment and content was provided/reviewed.    Based on today's diabetes care assessment, the following areas of need were identified:          10/27/2023    12:01 AM   Social   Support No   Access to Mass Media/Tech No   Cognitive/Behavioral Health No   Culture/Evangelical No   Communication No   Health Literacy No            10/27/2023    12:01 AM   Clinical   Medication Adherence No   Lab Compliance No   Nutritional Status No            10/27/2023  "   12:01 AM   Diabetes Self-Management Skills   Diabetes Disease Process/Treatment Options No   Nutrition/Healthy Eating No   Physical Activity/Exercise No   Medication No   Home Blood Glucose Monitoring Yes, Dexcom G7 Education  Patient is here in clinic today for initial start of Dexcom continuous glucose monitoring system (CGMA). Reviewed with patient how to insert sensor. Patient inserted sensor on right upper arm. Time and date was set on monitor and settings were set. Hypoglycemia trigger set for 70 and hyperglycemia set for 250. Patient provided with phone number for 24-hour help line if needed. Discussed various types of possible alarms and what to do. Questions addressed. Initialization finished. No futher questions.  Education was provided using "Quick Start Guide" per Dexcom protocol.     Pt will be using their phone/ as the primary .  Overview:  5min glucose reading updates, trending arrows, BG graph screens, battery life indicator, Blue Tooth Symbol.  Menus: trend Graph, start sensor, enter BG, events, Alerts, Settings, Shutdown, Stop Sensor   Settings:                          * Urgent Low: 55 mg/dL                          * Urgent Low Soon: Off                          * Low alert: 70                          * High alert: 250                          * Rise rate: Off                          * Fall Rate: Off                          * Signal Loss: 30 min                          * No Reading: Off                          * Always sound: On                             Reviewed additional setting options with patient, including Graph Height. Sensor was paired with /phone.    Reviewed where to find sensor insertion time and sensor expiration date.   Discussed no need to calibrate sensor during the entirety of the 10 day wear. Discussed that pt can calibrate sensor if desired, Reviewed appropriate calibration techniques.  Reviewed sensor site selection. Site selected and " prepped using aseptic technique Inserted to right upper arm.  Practiced sensor removal from site and disposal.  Patient able to demonstrate without difficulty.  Encouraged to follow-up prior to starting another sensor.   Reviewed problem solving aspects of sensor transmission/variables that can disrupt RF transmission.  range 20 feet, but the first 3 hrs keep within 3 feet of transmitter.  Pt instructed on lag time of interstitial fluid from CBG and was advised to tx hypoglycemia and dose insulin based on SMBG values.  Link to video provided and written instructions provided for patient to review before 10 day sensor change  Dexcom technical support contact number given and examples of when to contact them discussed.    Acute Complications No   Chronic Complications No   Psychosocial/Coping No        Today's interventions were provided through individual discussion, instruction, and written materials were provided.    Patient verbalized understanding of instruction and written materials.  Pt was able to return back demonstration of instructions today. Patient understood key points, needs reinforcement and further instruction.     Diabetes Self-Management Care Plan Review and Evaluation of Progress:    During today's follow-up Aishwarya's Diabetes Self-Management Care Plan progress was reviewed and progress was evaluated including his/her input. Aishwarya has agreed to continue his/her journey to improve/maintain overall diabetes control by continuing to set health goals. See care plan progress below.      Care Plan: Diabetes Management   Updates made since 9/27/2023 12:00 AM        Problem: Blood Glucose Self-Monitoring         Goal: Patient agrees to check and record blood sugars 3 times per day using the dexcom G7 for the next 3 months    Start Date: 10/27/2023   Expected End Date: 1/27/2024   This Visit's Progress: Deferred   Recent Progress: On track   Priority: High   Barriers: Lack of Supplies   Note:   "  Patient was instructed how to use a meter. Discussed signs and symptoms, and causes and treatment of hypoglycemia. Covered blood sugar and A1C goals. Instructed patient to use provided logbooks to record blood sugars. Instructed patient to follow-up with their physician. Patient verbalized understanding of all instructions.  Prescription for meter and testing supplies pended to PCP    1/6/2023  Dexcom G6 Education  Patient is here in clinic today for initial start of Dexcom continuous glucose monitoring system (CGMA). Reviewed with patient how to insert sensor and transmitter. Patient inserted sensor on right abdomen and inserted transmitter. Time and date was set on monitor and settings were set. Hypoglycemia trigger set for 70 and hyperglycemia set for 300. Patient provided with phone number for 24-hour help line if needed. Discussed various types of possible alarms and what to do. Questions addressed. Initialization finished. No futher questions.  Patient referred to clinic today for Dexcom G6 continuous glucose sensor system training.  Education was provided using "Quick Start Guide" per Dexcom protocol.    Overview:  5min glucose reading updates, trending arrows, BG graph screens, battery life indicator, Blue Tooth Symbol.  Menus: trend Graph, start sensor, enter BG, events, Alerts, Settings, Shutdown, Stop Sensor   Settings:                          * Urgent Low: 55 mg/dL                          * Urgent Low Soon: Off                          * Low alert: 70                          * High alert: 300                          * Rise rate: Off                          * Fall Rate: Off                          * Signal Loss: 30 min                          * No Reading: Off                          * Always sound: On                             Reviewed additional setting options with patient, including Graph Height and Transmitter ID. Transmitter was paired with .    Reviewed where to find " sensor insertion time and sensor expiration date.   Discussed no need to calibrate sensor during the entirety of the 10 day wear. Discussed that pt can calibrate sensor if desired, but at that time she will need to continue calibrating every 12 hours for sensor to remain accurate. Reviewed appropriate calibration techniques.  Reviewed sensor site selection. Site selected and prepped using aseptic technique Inserted to left abdomen. Transmitter placed in pod and secured.  Practiced sensor pod/transmitter removal from site, and removal of transmitter from sensor pod.  Patient able to demonstrate without difficulty.  Encouraged to review manual prior to starting another sensor.   Reviewed problem solving aspects of sensor transmission/variables that can disrupt RF transmission.  range 20 feet, but the first 3 hrs keep within 3 feet of transmitter.  Pt instructed on lag time of interstitial fluid from CBG and was advised to tx hypoglycemia and dose insulin based on SMBG values.  Link to video provided and written instructions provided for patient to review before 10 day sensor change  Dexcom technical support contact number given and examples of when to contact them discussed.      1/17 Changing the Sensor (every 10 days)  Remove the whole thing from your body  Hold the sensor by the skinny part and break the clear piece  Remove the gray transmitter  In the reader touch start sensor  Touch yes you want to start sensor  Touch the square you want to put the number in for   There will be a 4-digit code on the sensor (like 5937)  Enter the code from the sensor  Touch ok  Touch yes, it is correct  Insert Sensor  Remove orange piece and paper tabs  Place on skin and hold in place  Push the orange button  Insert the gray transmitter into the sensor  Put the small tongue like side in first and push down  You should hear 2 clicks  Touch confirm once sensor is secure on body  Touch start Sensor  Touch 1 then 2  Sensor  will take 2 hours to warm up    Changing Transmitter (every 90 days) in the reader  Touch the =   Touch transmitter  Touch pair new  Go through the process to start sensor  Touch yes you want to start sensor  Touch the square you want to put the number in for   There will be a 4-digit code on the sensor (like 5937)  Enter the code from the sensor  Touch ok  Touch yes, it is correct  Touch the square you want to put the number in for   There will be a 6-digit code on the transmitter box (like [id number])  Enter the code from the transmitter box  Touch ok  Touch yes, it is correct  Insert Sensor  Remove orange piece and paper tabs  Place on skin and hold in place  Push the orange button  Insert the gray transmitter into the sensor  Put the small tongue like side in first and push down  You should hear 2 clicks  Touch confirm once sensor is secure on body  Touch start Sensor  Touch 1 then 2  Sensor will take 2 hours to warm up           Task: Discussed when to use FSG to for treatment rather than CGM - when there is no arrow, when symptoms don't match the reading, and when no data is available. Completed 10/27/2023        Task: Instructed on how to self-monitor blood glucose using a personal CGM, how to properly dispose of used sensor and transmitter, and how to appropriately store supplies. Completed 10/27/2023        Task: Educated on parameters on when to notify provider and advised patient to bring reader/ to all appts with Endocrinologist and Diabetes Care Specialist. Completed 10/27/2023        Task: Provided patient will schedule of when to change the sensor (every 10 days) Completed 10/27/2023        Task: Provided patient with reference to understand the trend arrows and CGM value, reviewed the fact that the CGM probe is inserted into the interstitial fluid not the blood. Completed 10/27/2023        Task: Instructed on trend arrows and CGM value vs FSG value that with the arrow to the side FSG could be as  much as a 20-point difference and if arrow is up or down there could be as much as a 50-point difference Completed 10/27/2023        Task: Instructed to choose events, blood glucose, then use to calibrate, add FSG value and confirm. This will help Dexcom to be more in sync with FSG values. Stressed to only calibrate if arrow is ? Completed 10/27/2023        Task: Discussed appropriate insertion sites to place the sensor (i.e. upper arm, abdomen) Completed 10/27/2023        Task: Instructed to bring  to all visits with DM management team for download. Completed 10/27/2023        Task: Reviewed Dexcom download with patient and provider, adjustments made to treatment plan by provider and communicated to patient as directed, patient states understanding.         Task: Reviewed Dexcom download with patient and provider, adjustments made to treatment plan by provider and communicated to patient as directed, patient states understanding.         Task: Reviewed Dexcom download with patient and provider, adjustments made to treatment plan by provider and communicated to patient as directed, patient states understanding.         Task: Reviewed Dexcom download with patient and provider, adjustments made to treatment plan by provider and communicated to patient as directed, patient states understanding.         Task: Reviewed Dexcom download with patient and provider, adjustments made to treatment plan by provider and communicated to patient as directed, patient states understanding.           Follow Up Plan     Follow up for Change Personal CGM and Upload Data.    Today's care plan and follow up schedule was discussed with patient.  Aishwarya verbalized understanding of the care plan, goals, and agrees to follow up plan.        The patient was encouraged to communicate with his/her health care provider/physician and care team regarding his/her condition(s) and treatment.  I provided the patient with my contact  information today and encouraged to contact me via phone or [facility] Patient Portal as needed.     Length of Visit   Total Time: 60 Minutes

## 2023-11-02 ENCOUNTER — OFFICE VISIT (OUTPATIENT)
Dept: PODIATRY | Facility: CLINIC | Age: 63
End: 2023-11-02
Payer: MEDICARE

## 2023-11-02 VITALS
DIASTOLIC BLOOD PRESSURE: 67 MMHG | WEIGHT: 222 LBS | SYSTOLIC BLOOD PRESSURE: 138 MMHG | BODY MASS INDEX: 34.84 KG/M2 | HEART RATE: 67 BPM | HEIGHT: 67 IN

## 2023-11-02 DIAGNOSIS — Z79.4 TYPE 2 DIABETES MELLITUS WITH DIABETIC POLYNEUROPATHY, WITH LONG-TERM CURRENT USE OF INSULIN: ICD-10-CM

## 2023-11-02 DIAGNOSIS — E11.42 TYPE 2 DIABETES MELLITUS WITH DIABETIC POLYNEUROPATHY, WITH LONG-TERM CURRENT USE OF INSULIN: ICD-10-CM

## 2023-11-02 DIAGNOSIS — M79.671 PAIN OF RIGHT HEEL: Primary | ICD-10-CM

## 2023-11-02 DIAGNOSIS — M76.61 ACHILLES TENDINITIS, RIGHT LEG: ICD-10-CM

## 2023-11-02 DIAGNOSIS — I83.893 VARICOSE VEINS OF LEG WITH EDEMA, BILATERAL: ICD-10-CM

## 2023-11-02 PROCEDURE — 4010F ACE/ARB THERAPY RXD/TAKEN: CPT | Mod: CPTII,S$GLB,, | Performed by: PODIATRIST

## 2023-11-02 PROCEDURE — 3075F PR MOST RECENT SYSTOLIC BLOOD PRESS GE 130-139MM HG: ICD-10-PCS | Mod: CPTII,S$GLB,, | Performed by: PODIATRIST

## 2023-11-02 PROCEDURE — 3288F FALL RISK ASSESSMENT DOCD: CPT | Mod: CPTII,S$GLB,, | Performed by: PODIATRIST

## 2023-11-02 PROCEDURE — 4010F PR ACE/ARB THEARPY RXD/TAKEN: ICD-10-PCS | Mod: CPTII,S$GLB,, | Performed by: PODIATRIST

## 2023-11-02 PROCEDURE — 99214 PR OFFICE/OUTPT VISIT, EST, LEVL IV, 30-39 MIN: ICD-10-PCS | Mod: S$GLB,,, | Performed by: PODIATRIST

## 2023-11-02 PROCEDURE — 99999 PR PBB SHADOW E&M-EST. PATIENT-LVL IV: CPT | Mod: PBBFAC,,, | Performed by: PODIATRIST

## 2023-11-02 PROCEDURE — 3078F DIAST BP <80 MM HG: CPT | Mod: CPTII,S$GLB,, | Performed by: PODIATRIST

## 2023-11-02 PROCEDURE — 3060F PR POS MICROALBUMINURIA RESULT DOCUMENTED/REVIEW: ICD-10-PCS | Mod: CPTII,S$GLB,, | Performed by: PODIATRIST

## 2023-11-02 PROCEDURE — 3075F SYST BP GE 130 - 139MM HG: CPT | Mod: CPTII,S$GLB,, | Performed by: PODIATRIST

## 2023-11-02 PROCEDURE — 3051F PR MOST RECENT HEMOGLOBIN A1C LEVEL 7.0 - < 8.0%: ICD-10-PCS | Mod: CPTII,S$GLB,, | Performed by: PODIATRIST

## 2023-11-02 PROCEDURE — 1159F PR MEDICATION LIST DOCUMENTED IN MEDICAL RECORD: ICD-10-PCS | Mod: CPTII,S$GLB,, | Performed by: PODIATRIST

## 2023-11-02 PROCEDURE — 3078F PR MOST RECENT DIASTOLIC BLOOD PRESSURE < 80 MM HG: ICD-10-PCS | Mod: CPTII,S$GLB,, | Performed by: PODIATRIST

## 2023-11-02 PROCEDURE — 3060F POS MICROALBUMINURIA REV: CPT | Mod: CPTII,S$GLB,, | Performed by: PODIATRIST

## 2023-11-02 PROCEDURE — 3066F NEPHROPATHY DOC TX: CPT | Mod: CPTII,S$GLB,, | Performed by: PODIATRIST

## 2023-11-02 PROCEDURE — 3008F PR BODY MASS INDEX (BMI) DOCUMENTED: ICD-10-PCS | Mod: CPTII,S$GLB,, | Performed by: PODIATRIST

## 2023-11-02 PROCEDURE — 1159F MED LIST DOCD IN RCRD: CPT | Mod: CPTII,S$GLB,, | Performed by: PODIATRIST

## 2023-11-02 PROCEDURE — 3008F BODY MASS INDEX DOCD: CPT | Mod: CPTII,S$GLB,, | Performed by: PODIATRIST

## 2023-11-02 PROCEDURE — 1101F PT FALLS ASSESS-DOCD LE1/YR: CPT | Mod: CPTII,S$GLB,, | Performed by: PODIATRIST

## 2023-11-02 PROCEDURE — 3066F PR DOCUMENTATION OF TREATMENT FOR NEPHROPATHY: ICD-10-PCS | Mod: CPTII,S$GLB,, | Performed by: PODIATRIST

## 2023-11-02 PROCEDURE — 3288F PR FALLS RISK ASSESSMENT DOCUMENTED: ICD-10-PCS | Mod: CPTII,S$GLB,, | Performed by: PODIATRIST

## 2023-11-02 PROCEDURE — 99999 PR PBB SHADOW E&M-EST. PATIENT-LVL IV: ICD-10-PCS | Mod: PBBFAC,,, | Performed by: PODIATRIST

## 2023-11-02 PROCEDURE — 99214 OFFICE O/P EST MOD 30 MIN: CPT | Mod: S$GLB,,, | Performed by: PODIATRIST

## 2023-11-02 PROCEDURE — 1101F PR PT FALLS ASSESS DOC 0-1 FALLS W/OUT INJ PAST YR: ICD-10-PCS | Mod: CPTII,S$GLB,, | Performed by: PODIATRIST

## 2023-11-02 PROCEDURE — 3051F HG A1C>EQUAL 7.0%<8.0%: CPT | Mod: CPTII,S$GLB,, | Performed by: PODIATRIST

## 2023-11-02 NOTE — PROGRESS NOTES
Subjective:      Patient ID: Aishwarya Jensen is a 63 y.o. female.    Chief Complaint: Follow-up    Patient is here for f/u R heel pain. Last here for DM foot exam & scheduled q 6 months. Instructed on appropriate shoes as well as dispensed Tulis heel cups (in current flat casual slip-ons) & PPT heel lifts for others. Rx Dm shoes written. States has some swelling in BLE R>L.  9/28/23  Aishwarya is a 63 y.o. female who presents after >5months absence for diabetic foot & nail care in high risk feet. Nails hurt in shoes w/ pressure when walking. Also heel pain R foot.     PCP: Joey Cintron MD    Date Last Seen by PCP:  02/08/2023  DM management: Micki Knott NP 7/18/23 due 11/14/23    Current shoe gear: flat casual; uses non-slip hospital socks in home.    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    Aishwarya has a past medical history of Diabetes mellitus, type 2, Hypercholesteremia, and Hypertension.   Dx DM 30 yrs.ago.   Knee pain - ortho.referral last visit.   Patient Active Problem List   Diagnosis    Essential hypertension, benign    Dyslipidemia, goal LDL below 100    Microalbuminuria due to type 2 diabetes mellitus    Type 2 diabetes mellitus with hyperglycemia, with long-term current use of insulin    Type 2 diabetes mellitus with diabetic polyneuropathy, with long-term current use of insulin    Asymptomatic varicose veins of bilateral lower extremities    Onychomycosis due to dermatophyte    Class 1 obesity due to excess calories with serious comorbidity and body mass index (BMI) of 34.0 to 34.9 in adult    Stage 3a chronic kidney disease    Dyspnea on exertion    Primary osteoarthritis of right knee    Primary osteoarthritis of left knee    Type 2 diabetes mellitus with stage 3a chronic kidney disease, with long-term current use of insulin    Anemia    Cecal volvulus    Weakness      Hemoglobin A1C   Date Value Ref Range Status   05/26/2023 7.4 (H) 4.0 - 5.6 % Final     Comment:     ADA Screening  Guidelines:  5.7-6.4%  Consistent with prediabetes  >or=6.5%  Consistent with diabetes    High levels of fetal hemoglobin interfere with the HbA1C  assay. Heterozygous hemoglobin variants (HbS, HgC, etc)do  not significantly interfere with this assay.   However, presence of multiple variants may affect accuracy.     05/04/2023 6.9 (H) 4.0 - 5.6 % Final     Comment:     ADA Screening Guidelines:  5.7-6.4%  Consistent with prediabetes  >or=6.5%  Consistent with diabetes    High levels of fetal hemoglobin interfere with the HbA1C  assay. Heterozygous hemoglobin variants (HbS, HgC, etc)do  not significantly interfere with this assay.   However, presence of multiple variants may affect accuracy.     01/17/2023 6.9 (H) 4.0 - 5.6 % Final     Comment:     ADA Screening Guidelines:  5.7-6.4%  Consistent with prediabetes  >or=6.5%  Consistent with diabetes    High levels of fetal hemoglobin interfere with the HbA1C  assay. Heterozygous hemoglobin variants (HbS, HgC, etc)do  not significantly interfere with this assay.   However, presence of multiple variants may affect accuracy.        Objective:      Physical Exam  Vitals reviewed.   Constitutional:       Appearance: She is well-developed. She is obese.   Cardiovascular:      Pulses: Normal pulses.           Dorsalis pedis pulses are 2+ on the right side and 2+ on the left side.      Comments: Varicose veins R>L, w/ edema BLE, & telangectasias.  Musculoskeletal:         General: Tenderness present. No swelling.      Right lower leg: Edema present.      Left lower leg: Edema present.      Right ankle: Tenderness present.      Right Achilles Tendon: Tenderness present. No defects. Menchaca's test negative.      Left ankle: No tenderness.      Left Achilles Tendon: No tenderness or defects. Menchaca's test negative.      Right foot: Deformity (hammertoe B/L) present.      Left foot: Deformity (cavus B/L) present.        Feet:    Feet:      Right foot:      Skin integrity:  Warmth present. No erythema.      Comments: Equinus B/L ankles w/ < 90 deg foot to leg noted w/ knees extended.      MS strength of extrinsics to foot & ankle B/L + 5/5 in DF/PF/Inv/Ev to resistance w/ no reproduction of pain in any direction, including Achilles insertion R.  Improved TTP distal lateral insertion of Achilles into posterior heel.     Passive ROM of ankle & pedal joints is painless & w/out crepitation B/L.     Toenails 1st, 2nd, 3rd, 4th, 5th  B/L are thickened, dystrophic, discolored, w/ crumbly subungual debris sparing 4 L.    Skin:     General: Skin is cool and dry.      Capillary Refill: Capillary refill takes less than 2 seconds.      Comments: Stasis hyperpigmentation B/L.   Neurological:      Mental Status: She is alert and oriented to person, place, and time.      Sensory: Sensory deficit present.      Motor: Motor function is intact. No weakness or abnormal muscle tone.      Gait: Gait is intact. Gait normal.      Comments: Paresthesias including numbness & parasthesias B/L feet w/ no clearly identified trigger or source; no hyperemia.   Psychiatric:         Mood and Affect: Mood and affect normal.         Behavior: Behavior normal. Behavior is cooperative.       Assessment:      Encounter Diagnoses   Name Primary?    Achilles tendinitis, right leg     Varicose veins of leg with edema, bilateral     Type 2 diabetes mellitus with diabetic polyneuropathy, with long-term current use of insulin     Pain of right heel Yes       Problem List Items Addressed This Visit          Endocrine    Type 2 diabetes mellitus with diabetic polyneuropathy, with long-term current use of insulin     Other Visit Diagnoses       Pain of right heel    -  Primary    Achilles tendinitis, right leg        Varicose veins of leg with edema, bilateral        Relevant Orders    SURGITUBE FOR HOME USE            Plan:      Aishwarya was seen today for follow-up.    Diagnoses and all orders for this visit:    Pain of right  heel    Achilles tendinitis, right leg    Varicose veins of leg with edema, bilateral  -     SURGITUBE FOR HOME USE    Type 2 diabetes mellitus with diabetic polyneuropathy, with long-term current use of insulin    I counseled the patient on her conditions, their implications & medical mgmt.    - Diabetic Foot Education. Patient reminded of the importance of good nutrition & blood sugar control to help prevent podiatric complications of diabetes. We discussed wearing proper shoe gear, daily foot inspections, never walking w/out protective shoe gear, podiatric nail visits every 6 months, sooner prn.      -Continue PPT heel lifts or Tulis heel cups @ all times WB including @ home despite patient stating she goes right to bed (has heel cups in current shoes).     Continue OTC Voltaren gel up to qid prn.    Dispensed size E Tubigrip for RLE & size D for LLE.    F/u prn. Otherwise, to return for scheduled DM foot care next yr.         A total of 31 mins.was spent on chart review, patient visit & documentation.

## 2023-11-12 ENCOUNTER — PATIENT MESSAGE (OUTPATIENT)
Dept: DIABETES | Facility: CLINIC | Age: 63
End: 2023-11-12
Payer: MEDICARE

## 2023-11-13 ENCOUNTER — TELEPHONE (OUTPATIENT)
Dept: DIABETES | Facility: CLINIC | Age: 63
End: 2023-11-13
Payer: MEDICARE

## 2023-11-14 ENCOUNTER — OFFICE VISIT (OUTPATIENT)
Dept: DIABETES | Facility: CLINIC | Age: 63
End: 2023-11-14
Payer: MEDICARE

## 2023-11-14 ENCOUNTER — CLINICAL SUPPORT (OUTPATIENT)
Dept: DIABETES | Facility: CLINIC | Age: 63
End: 2023-11-14
Payer: MEDICARE

## 2023-11-14 VITALS
DIASTOLIC BLOOD PRESSURE: 70 MMHG | HEART RATE: 73 BPM | HEIGHT: 67 IN | WEIGHT: 225 LBS | OXYGEN SATURATION: 95 % | BODY MASS INDEX: 35.31 KG/M2 | SYSTOLIC BLOOD PRESSURE: 124 MMHG

## 2023-11-14 DIAGNOSIS — E78.5 DYSLIPIDEMIA, GOAL LDL BELOW 100: ICD-10-CM

## 2023-11-14 DIAGNOSIS — E11.22 TYPE 2 DIABETES MELLITUS WITH STAGE 3A CHRONIC KIDNEY DISEASE, WITH LONG-TERM CURRENT USE OF INSULIN: ICD-10-CM

## 2023-11-14 DIAGNOSIS — R80.9 MICROALBUMINURIA DUE TO TYPE 2 DIABETES MELLITUS: ICD-10-CM

## 2023-11-14 DIAGNOSIS — I10 ESSENTIAL HYPERTENSION, BENIGN: ICD-10-CM

## 2023-11-14 DIAGNOSIS — Z79.4 TYPE 2 DIABETES MELLITUS WITH HYPERGLYCEMIA, WITH LONG-TERM CURRENT USE OF INSULIN: Primary | ICD-10-CM

## 2023-11-14 DIAGNOSIS — E11.65 TYPE 2 DIABETES MELLITUS WITH HYPERGLYCEMIA, WITH LONG-TERM CURRENT USE OF INSULIN: Primary | ICD-10-CM

## 2023-11-14 DIAGNOSIS — E11.29 MICROALBUMINURIA DUE TO TYPE 2 DIABETES MELLITUS: ICD-10-CM

## 2023-11-14 DIAGNOSIS — N18.31 TYPE 2 DIABETES MELLITUS WITH STAGE 3A CHRONIC KIDNEY DISEASE, WITH LONG-TERM CURRENT USE OF INSULIN: ICD-10-CM

## 2023-11-14 DIAGNOSIS — Z71.9 HEALTH EDUCATION/COUNSELING: ICD-10-CM

## 2023-11-14 DIAGNOSIS — Z79.4 TYPE 2 DIABETES MELLITUS WITH STAGE 3A CHRONIC KIDNEY DISEASE, WITH LONG-TERM CURRENT USE OF INSULIN: ICD-10-CM

## 2023-11-14 DIAGNOSIS — Z79.4 TYPE 2 DIABETES MELLITUS WITH DIABETIC POLYNEUROPATHY, WITH LONG-TERM CURRENT USE OF INSULIN: ICD-10-CM

## 2023-11-14 DIAGNOSIS — Z91.199 NONCOMPLIANCE WITH DIABETES TREATMENT: ICD-10-CM

## 2023-11-14 DIAGNOSIS — E66.01 CLASS 2 SEVERE OBESITY DUE TO EXCESS CALORIES WITH SERIOUS COMORBIDITY AND BODY MASS INDEX (BMI) OF 35.0 TO 35.9 IN ADULT: ICD-10-CM

## 2023-11-14 DIAGNOSIS — E11.42 TYPE 2 DIABETES MELLITUS WITH DIABETIC POLYNEUROPATHY, WITH LONG-TERM CURRENT USE OF INSULIN: ICD-10-CM

## 2023-11-14 PROCEDURE — 3078F DIAST BP <80 MM HG: CPT | Mod: CPTII,S$GLB,, | Performed by: NURSE PRACTITIONER

## 2023-11-14 PROCEDURE — 1159F MED LIST DOCD IN RCRD: CPT | Mod: CPTII,S$GLB,, | Performed by: NURSE PRACTITIONER

## 2023-11-14 PROCEDURE — 3066F NEPHROPATHY DOC TX: CPT | Mod: CPTII,S$GLB,, | Performed by: NURSE PRACTITIONER

## 2023-11-14 PROCEDURE — 4010F ACE/ARB THERAPY RXD/TAKEN: CPT | Mod: CPTII,S$GLB,, | Performed by: NURSE PRACTITIONER

## 2023-11-14 PROCEDURE — 3008F BODY MASS INDEX DOCD: CPT | Mod: CPTII,S$GLB,, | Performed by: NURSE PRACTITIONER

## 2023-11-14 PROCEDURE — 3052F HG A1C>EQUAL 8.0%<EQUAL 9.0%: CPT | Mod: CPTII,S$GLB,, | Performed by: NURSE PRACTITIONER

## 2023-11-14 PROCEDURE — 3288F PR FALLS RISK ASSESSMENT DOCUMENTED: ICD-10-PCS | Mod: CPTII,S$GLB,, | Performed by: NURSE PRACTITIONER

## 2023-11-14 PROCEDURE — 3052F PR MOST RECENT HEMOGLOBIN A1C LEVEL 8.0 - < 9.0%: ICD-10-PCS | Mod: CPTII,S$GLB,, | Performed by: NURSE PRACTITIONER

## 2023-11-14 PROCEDURE — 3066F PR DOCUMENTATION OF TREATMENT FOR NEPHROPATHY: ICD-10-PCS | Mod: CPTII,S$GLB,, | Performed by: NURSE PRACTITIONER

## 2023-11-14 PROCEDURE — 99999 PR PBB SHADOW E&M-EST. PATIENT-LVL V: ICD-10-PCS | Mod: PBBFAC,,, | Performed by: NURSE PRACTITIONER

## 2023-11-14 PROCEDURE — 1160F RVW MEDS BY RX/DR IN RCRD: CPT | Mod: CPTII,S$GLB,, | Performed by: NURSE PRACTITIONER

## 2023-11-14 PROCEDURE — 95251 PR GLUCOSE MONITOR, 72 HOUR, PHYS INTERP: ICD-10-PCS | Mod: S$GLB,,, | Performed by: NURSE PRACTITIONER

## 2023-11-14 PROCEDURE — 99999 PR PBB SHADOW E&M-EST. PATIENT-LVL V: CPT | Mod: PBBFAC,,, | Performed by: NURSE PRACTITIONER

## 2023-11-14 PROCEDURE — 99214 OFFICE O/P EST MOD 30 MIN: CPT | Mod: S$GLB,,, | Performed by: NURSE PRACTITIONER

## 2023-11-14 PROCEDURE — 3074F SYST BP LT 130 MM HG: CPT | Mod: CPTII,S$GLB,, | Performed by: NURSE PRACTITIONER

## 2023-11-14 PROCEDURE — 3008F PR BODY MASS INDEX (BMI) DOCUMENTED: ICD-10-PCS | Mod: CPTII,S$GLB,, | Performed by: NURSE PRACTITIONER

## 2023-11-14 PROCEDURE — 3062F PR POS MACROALBUMINURIA RESULT DOCUMENTED/REVIEW: ICD-10-PCS | Mod: CPTII,S$GLB,, | Performed by: NURSE PRACTITIONER

## 2023-11-14 PROCEDURE — 3074F PR MOST RECENT SYSTOLIC BLOOD PRESSURE < 130 MM HG: ICD-10-PCS | Mod: CPTII,S$GLB,, | Performed by: NURSE PRACTITIONER

## 2023-11-14 PROCEDURE — 99499 UNLISTED E&M SERVICE: CPT | Mod: S$GLB,,, | Performed by: NURSE PRACTITIONER

## 2023-11-14 PROCEDURE — 4010F PR ACE/ARB THEARPY RXD/TAKEN: ICD-10-PCS | Mod: CPTII,S$GLB,, | Performed by: NURSE PRACTITIONER

## 2023-11-14 PROCEDURE — 3062F POS MACROALBUMINURIA REV: CPT | Mod: CPTII,S$GLB,, | Performed by: NURSE PRACTITIONER

## 2023-11-14 PROCEDURE — 3078F PR MOST RECENT DIASTOLIC BLOOD PRESSURE < 80 MM HG: ICD-10-PCS | Mod: CPTII,S$GLB,, | Performed by: NURSE PRACTITIONER

## 2023-11-14 PROCEDURE — 1101F PR PT FALLS ASSESS DOC 0-1 FALLS W/OUT INJ PAST YR: ICD-10-PCS | Mod: CPTII,S$GLB,, | Performed by: NURSE PRACTITIONER

## 2023-11-14 PROCEDURE — 95251 CONT GLUC MNTR ANALYSIS I&R: CPT | Mod: S$GLB,,, | Performed by: NURSE PRACTITIONER

## 2023-11-14 PROCEDURE — 1101F PT FALLS ASSESS-DOCD LE1/YR: CPT | Mod: CPTII,S$GLB,, | Performed by: NURSE PRACTITIONER

## 2023-11-14 PROCEDURE — 1160F PR REVIEW ALL MEDS BY PRESCRIBER/CLIN PHARMACIST DOCUMENTED: ICD-10-PCS | Mod: CPTII,S$GLB,, | Performed by: NURSE PRACTITIONER

## 2023-11-14 PROCEDURE — 1159F PR MEDICATION LIST DOCUMENTED IN MEDICAL RECORD: ICD-10-PCS | Mod: CPTII,S$GLB,, | Performed by: NURSE PRACTITIONER

## 2023-11-14 PROCEDURE — 3288F FALL RISK ASSESSMENT DOCD: CPT | Mod: CPTII,S$GLB,, | Performed by: NURSE PRACTITIONER

## 2023-11-14 PROCEDURE — 99214 PR OFFICE/OUTPT VISIT, EST, LEVL IV, 30-39 MIN: ICD-10-PCS | Mod: S$GLB,,, | Performed by: NURSE PRACTITIONER

## 2023-11-14 RX ORDER — EZETIMIBE 10 MG/1
10 TABLET ORAL NIGHTLY
Qty: 90 TABLET | Refills: 2 | Status: SHIPPED | OUTPATIENT
Start: 2023-11-14

## 2023-11-14 RX ORDER — BLOOD-GLUCOSE TRANSMITTER
EACH MISCELLANEOUS
Qty: 1 EACH | Refills: 4 | Status: SHIPPED | OUTPATIENT
Start: 2023-11-14

## 2023-11-14 RX ORDER — METFORMIN HYDROCHLORIDE 1000 MG/1
1000 TABLET ORAL
Qty: 90 TABLET | Refills: 2 | Status: SHIPPED | OUTPATIENT
Start: 2023-11-14

## 2023-11-14 RX ORDER — INSULIN LISPRO 100 [IU]/ML
INJECTION, SOLUTION INTRAVENOUS; SUBCUTANEOUS
Qty: 30 ML | Refills: 6 | Status: SHIPPED | OUTPATIENT
Start: 2023-11-14

## 2023-11-14 RX ORDER — BLOOD-GLUCOSE SENSOR
EACH MISCELLANEOUS
Qty: 3 EACH | Refills: 11 | Status: SHIPPED | OUTPATIENT
Start: 2023-11-14

## 2023-11-14 RX ORDER — SUB-Q INSULIN DEVICE, 40 UNIT
1 EACH MISCELLANEOUS DAILY
Qty: 90 EACH | Refills: 2 | Status: SHIPPED | OUTPATIENT
Start: 2023-11-14

## 2023-11-14 RX ORDER — TIRZEPATIDE 2.5 MG/.5ML
2.5 INJECTION, SOLUTION SUBCUTANEOUS
Qty: 4 PEN | Refills: 0 | Status: SHIPPED | OUTPATIENT
Start: 2023-11-14 | End: 2023-12-14

## 2023-11-14 RX ORDER — DAPAGLIFLOZIN 10 MG/1
10 TABLET, FILM COATED ORAL DAILY
Qty: 90 TABLET | Refills: 2 | Status: SHIPPED | OUTPATIENT
Start: 2023-11-14

## 2023-11-14 RX ORDER — ROSUVASTATIN CALCIUM 40 MG/1
40 TABLET, COATED ORAL NIGHTLY
Qty: 90 TABLET | Refills: 3 | Status: SHIPPED | OUTPATIENT
Start: 2023-11-14 | End: 2024-11-13

## 2023-11-14 NOTE — PROGRESS NOTES
Pt came into the clinic for dexcom g6 help, using aseptic technique dexcom g6 sensor applied to abdomen, sensor and transmitter paired to  , pt will change sensor out in 10 days

## 2023-11-14 NOTE — PATIENT INSTRUCTIONS
Continue Metformin 1000 mg daily      Continue Farxiga 10 mg daily   -- Please let us know if you have nausea, vomiting, or weakness with a normal BG. This could be euglycemic DKA.  -- please hold medication 3 days prior to surgery or if you are sick and have decreased intake.   -- Please drink lots of water daily while on this medication. -- 6-8 glasses of water per day   --This medication could also give you a yeast infection- please let us know if this occurs and we can treat it.   -- NO KETO DIET      Start Mounjaro weekly   This is taking the place of ozempic   Start with 2.5 mg weekly x4 weeks-- then we will increase to 5 mg weekly -- from there we will alk about increasing if needed       Continue the VGo30 with Fiasp insulin   Change the VGO every 24 hours!!!!! -- same time every day   6 clicks with meals      Use the dexcom G6 consistently

## 2023-11-14 NOTE — Clinical Note
Patient says that she hasn't been able to get her lisinopril HCTZ- but PCP sent over on 10/26 Please complete PA for mounjaro- let me know if denied

## 2023-11-14 NOTE — PROGRESS NOTES
CC:   Chief Complaint   Patient presents with    Diabetes Mellitus       HPI: Aishwarya Jensen is a 63 y.o. female presents for a follow up visit today for the management of T2DM   She was diagnosed with Type 2 diabetes in her early 50's on routine lab work. She was initially started on Metformin. Insulin therapy was started soon after diagnosis.     Family hx of diabetes: Mother, father, brothers   Hospitalized for diabetes: denies     No personal or FH of thyroid cancer or personal of pancreatic cancer or pancreatitis.     She is retired now.       Our last visit was in July of 2023  At that visit we continued the metformin, continued the Farxiga,  continued the V Go 30  We cut back on the Ozempic ot 0.5 mg weekly from 2 mg weekly due to cecal volvulus -- but she never restarted the ozempic at the 10.5 mg weekly dose?? Not sure why??? She was confused   We encouraged her to get back on the Dexcom and we changed her to the Dexcom G7--she says that the Dexcom G7 was not covered so she has stayed on the Dexcom G6  Today we do not have any data after September 11, 2023.  Please see attached data as I reviewed August 29, 2023 through September 11, 2023  Blood sugar readings are above goal  She has [employer] and Medicaid  A1c went from 7.4% to -8.7%  She has not been taking the ozempic                 DIABETES COMPLICATIONS: nephropathy and peripheral neuropathy      Diabetes Management Status    ASA:  Yes - 81 mg- taking it every day     Statin: Taking Crestor 40 mg nightly and taking the Zetia 10 mg daily   ACE/ARB: Taking- lisinopril 20 mg     Screening or Prevention Patient's value Goal Complete/Controlled?   HgA1C Testing and Control   Lab Results   Component Value Date    HGBA1C 8.7 (H) 11/09/2023      Annually/Less than 8% Yes   Lipid profile : 11/09/2023 Annually Yes   LDL control Lab Results   Component Value Date    LDLCALC 56.8 (L) 11/09/2023    Annually/Less than 100 mg/dl  No   Nephropathy  screening Lab Results   Component Value Date    LABMICR 1732.0 11/09/2023     Lab Results   Component Value Date    PROTEINUA Trace (A) 08/10/2023    Annually Yes   Blood pressure BP Readings from Last 1 Encounters:   11/14/23 124/70    Less than 140/90 No   Dilated retinal exam : 03/14/2023 Annually No   Foot exam   : 01/25/2023 Annually Yes       CURRENT A1C:    Hemoglobin A1C   Date Value Ref Range Status   11/09/2023 8.7 (H) 4.0 - 5.6 % Final     Comment:     ADA Screening Guidelines:  5.7-6.4%  Consistent with prediabetes  >or=6.5%  Consistent with diabetes    High levels of fetal hemoglobin interfere with the HbA1C  assay. Heterozygous hemoglobin variants (HbS, HgC, etc)do  not significantly interfere with this assay.   However, presence of multiple variants may affect accuracy.     05/26/2023 7.4 (H) 4.0 - 5.6 % Final     Comment:     ADA Screening Guidelines:  5.7-6.4%  Consistent with prediabetes  >or=6.5%  Consistent with diabetes    High levels of fetal hemoglobin interfere with the HbA1C  assay. Heterozygous hemoglobin variants (HbS, HgC, etc)do  not significantly interfere with this assay.   However, presence of multiple variants may affect accuracy.     05/04/2023 6.9 (H) 4.0 - 5.6 % Final     Comment:     ADA Screening Guidelines:  5.7-6.4%  Consistent with prediabetes  >or=6.5%  Consistent with diabetes    High levels of fetal hemoglobin interfere with the HbA1C  assay. Heterozygous hemoglobin variants (HbS, HgC, etc)do  not significantly interfere with this assay.   However, presence of multiple variants may affect accuracy.         GOAL A1C: less than 8%     DM MEDICATIONS USED IN THE PAST: Metformin   Victoza   Lantus   VGo  Novolog    Freestyle manisha -- didn't like it   Ozempic   Fiasp   Dexcom       CURRENT DIABETES MEDICATIONS: Metformin 1000 mg daily  VGo30 with Fiasp 5 clicks with meals   Giving clicks before the meals    Farxiga 10 mg daily   She stopped the Ozempic altogether- she never got  "back on   Insulin:VGo  Missed doses:  yes- off the Ozempic   Usually wearing the VGo to her abdomen   She is adamant that she is changing her VGo every 24 hours.   giving her VGo clicks before she eats   Denies missing medication doses   Has her VGo on today and it has insulin in it     BLOOD GLUCOSE MONITORING:   Sensor type: Dexcom G 6   Reviewed data from Tuesday August 29, 2023 through September 11, 2023  Average BG is 223  Estimated A1c is 8.6%  In range 31% of the time  35% high, 33% very high, less than 1% low, 0% very low    Two weeks prior to that her average blood sugar was 227 in range 39% of the time  Significant prandial excursions  See attached downloads      HYPOGLYCEMIA:  Rarely  once to 30??- she "felt bad"    Maybe some hypoglycemia unawareness or error with meter   Some in the 70's       MEALS: eating 3 meals per day   drinking OJ sometimes   Drinks: water    Sparking water- SF -- ICE - SF       CURRENT EXERCISE:  No      Review of Systems  Review of Systems   Constitutional:  Negative for appetite change, fatigue and unexpected weight change.   HENT:  Negative for trouble swallowing.    Eyes:  Negative for visual disturbance.   Respiratory:  Negative for shortness of breath.    Cardiovascular:  Negative for chest pain.   Gastrointestinal:  Positive for diarrhea. Negative for abdominal pain, constipation, nausea and vomiting.   Endocrine: Negative for polydipsia, polyphagia and polyuria.   Genitourinary:         + Nocturia 2 times per night    Musculoskeletal:  Negative for arthralgias.   Skin:  Negative for wound.   Neurological:  Negative for numbness.   Psychiatric/Behavioral:  Positive for sleep disturbance.        Physical Exam   Physical Exam  Vitals and nursing note reviewed.   Constitutional:       Appearance: She is well-developed. She is obese.   HENT:      Head: Normocephalic and atraumatic.      Right Ear: External ear normal.      Left Ear: External ear normal.      Nose: Nose normal. "   Neck:      Thyroid: No thyromegaly.      Trachea: No tracheal deviation.   Cardiovascular:      Rate and Rhythm: Normal rate and regular rhythm.      Heart sounds: No murmur heard.  Pulmonary:      Effort: Pulmonary effort is normal. No respiratory distress.      Breath sounds: Normal breath sounds.   Abdominal:      Palpations: Abdomen is soft.      Tenderness: There is no abdominal tenderness.      Hernia: No hernia is present.   Musculoskeletal:      Cervical back: Normal range of motion and neck supple.   Skin:     General: Skin is warm and dry.      Capillary Refill: Capillary refill takes less than 2 seconds.      Findings: No rash.      Comments: She is wearing the VGo to her abdomen- -but it does NOT have insulin in it.    Neurological:      Mental Status: She is alert and oriented to person, place, and time.      Cranial Nerves: No cranial nerve deficit.   Psychiatric:         Behavior: Behavior normal.         Judgment: Judgment normal.           FOOT EXAMINATION: Appropriate footwear.         Lab Results   Component Value Date    TSH 5.712 (H) 11/09/2023         Type 2 diabetes mellitus with hyperglycemia, with long-term current use of insulin  Uncontrolled   Continue to use the Dexcom as it was helping  with self awareness and self accountability  She is been off the Ozempic??  Misunderstanding  Needs to get back on GLP 1 as it helps with prandial excursions      -- Medication Changes:   Continue Metformin 1000 mg daily      Continue Farxiga 10 mg daily   -- Please let us know if you have nausea, vomiting, or weakness with a normal BG. This could be euglycemic DKA.  -- please hold medication 3 days prior to surgery or if you are sick and have decreased intake.   -- Please drink lots of water daily while on this medication. -- 6-8 glasses of water per day   --This medication could also give you a yeast infection- please let us know if this occurs and we can treat it.   -- NO KETO DIET      Start  Mounjaro weekly   This is taking the place of ozempic   Start with 2.5 mg weekly x4 weeks-- then we will increase to 5 mg weekly -- from there we will alk about increasing if needed       Continue the VGo30 with Fiasp insulin   Change the VGO every 24 hours!!!!! -- same time every day   6 clicks with meals      Use the dexcom G6 consistently       -- Reviewed goals of therapy are to get the best control we can without hypoglycemia.  -- Reviewed patient's current insulin regimen. Clarified proper insulin dose and timing in relation to meals, etc. Insulin injection sites and proper rotation instructed.    -- Advised frequent self blood glucose monitoring.  Patient encouraged to document glucose results and bring them to every clinic visit. Get back on dexcom personal CGM -- use the dexcom G6 consistently   -- Hypoglycemia precautions discussed. Instructed on precautions before driving.    -- Call for Bg repeatedly < 70 or > 200.   -- Close adherence to lifestyle changes recommended.   -- Periodic follow ups for eye evaluations, foot care and dental care suggested.  -- diabetes education- Schedule with zoltan in 4-5 weeks to make sure her blood sugar readings are improving       patient will benefit from a Dexcom G6 to prevent hypoglycemia due to patient experiences level 2 or hypoglycemic event <54   Patient has diabetes mellitus and manages diabetes with intensive insulin regimen and uses prandial and basal insulin daily-- on the VGO   Patient requires a therapeutic CGM and is willing to use therapeutic CGM for the necessary frequent adjustments of insulin therapy.  I have completed an in-person visit during the previous 6 months and will continue to have in-person visits every 6 months to assess adherence to their CGM regimen and diabetes treatment plan.  Due to COVID pandemic and need for remote monitoring this tool is medically necessary        Type 2 diabetes mellitus with diabetic polyneuropathy, with long-term  current use of insulin  Optimize BG readings.   See above.       Educated patient to check feet daily for any foreign objects and/or wounds. Discussed with patient the importance of wearing appropriate footwear at all times, not to walk barefoot ever, and to check shoes before putting them on feet. Instructed patient to keep feet dry by regularly changing shoes and socks and drying feet after baths and exercises. Also, instructed patient to report any new lesions, discolorations, or swelling to a healthcare professional.        Microalbuminuria due to type 2 diabetes mellitus  Optimize BG readings.   See above.   On ACEi and SGLT2     Reports that she is been off her lisinopril for a little while because she is been having some issues with getting a refill from the pharmacy???    Type 2 diabetes mellitus with stage 3a chronic kidney disease, with long-term current use of insulin  Optimize BG readings without insulin stacking or hypoglycemia   See above.   On Farxiga and Lisinopril     Essential hypertension, benign  BP goal is < 140/90.   Tolerating ACEi  Blood pressure goals discussed with patient  Controlled today    Dyslipidemia, goal LDL below 100  On statin per ADA recommendations  LDL goal < 100. LDL at goal. LFTs WNL. Continue statin.   On Crestor 40 mg nightly and Zetia 10 mg daily--reports compliance    Class 2 severe obesity due to excess calories with serious comorbidity and body mass index (BMI) of 35.0 to 35.9 in adult  Body mass index is 35.24 kg/m².  Increases insulin resistance.   Discussed DM diet and exercise.           I spent a total of 30 minutes on the day of the visit.This includes face to face time and non-face to face time preparing to see the patient (eg, review of tests), obtaining and/or reviewing separately obtained history, documenting clinical information in the electronic or other health record, independently interpreting results and communicating results to the  patient/family/caregiver, or care coordinator.    Noncompliance hindering overall management      Follow up in about 3 months (around 2/14/2024).  Patient says that she hasn't been able to get her lisinopril HCTZ- but PCP sent over on 10/26  Schedule with zoltan in 4-5 weeks to make sure her blood sugar readings are improving   Follow up with me in 3 months with labs prior   Please schedule DEXA scan       Orders Placed This Encounter   Procedures    Hemoglobin A1C     Standing Status:   Future     Standing Expiration Date:   5/14/2025    Basic Metabolic Panel     Standing Status:   Future     Standing Expiration Date:   5/14/2025    Microalbumin/Creatinine Ratio, Urine     Standing Status:   Future     Standing Expiration Date:   5/14/2025     Order Specific Question:   Specimen Source     Answer:   Urine    Ambulatory referral/consult to Diabetes Education     Standing Status:   Future     Standing Expiration Date:   5/14/2025     Referral Priority:   Routine     Referral Type:   Consultation     Referral Reason:   Specialty Services Required     Referred to Provider:   Zoltan Stein, AMISHA, CDE     Requested Specialty:   Diabetes     Number of Visits Requested:   1         Recommendations were discussed with the patient in detail  The patient verbalized understanding and agrees with the plan outlined as above.

## 2023-11-15 ENCOUNTER — TELEPHONE (OUTPATIENT)
Dept: DIABETES | Facility: CLINIC | Age: 63
End: 2023-11-15
Payer: MEDICARE

## 2023-11-15 NOTE — ASSESSMENT & PLAN NOTE
Body mass index is 35.24 kg/m².  Increases insulin resistance.   Discussed DM diet and exercise.

## 2023-11-15 NOTE — ASSESSMENT & PLAN NOTE
BP goal is < 140/90.   Tolerating ACEi  Blood pressure goals discussed with patient  Controlled today

## 2023-11-15 NOTE — ASSESSMENT & PLAN NOTE
Optimize BG readings.   See above.   On ACEi and SGLT2     Reports that she is been off her lisinopril for a little while because she is been having some issues with getting a refill from the pharmacy???

## 2023-11-15 NOTE — ASSESSMENT & PLAN NOTE
On statin per ADA recommendations  LDL goal < 100. LDL at goal. LFTs WNL. Continue statin.   On Crestor 40 mg nightly and Zetia 10 mg daily--reports compliance

## 2023-11-16 ENCOUNTER — TELEPHONE (OUTPATIENT)
Dept: DIABETES | Facility: CLINIC | Age: 63
End: 2023-11-16
Payer: MEDICARE

## 2023-11-16 NOTE — TELEPHONE ENCOUNTER
Please call lab results to patient  Her A1c has increased from 7.4% to 8.7%.  We are moving in the wrong direction and need to get under 8% in closer 7%  Her thyroid level is slightly elevated  Cholesterol has improved  Her urine results showed that she spilling a large amount of protein in the urine this can be a sign of damage to the kidneys from her uncontrolled diabetes.  Make sure that she follows up with me as scheduled we will review all the labs together

## 2023-12-13 ENCOUNTER — CLINICAL SUPPORT (OUTPATIENT)
Dept: DIABETES | Facility: CLINIC | Age: 63
End: 2023-12-13
Payer: MEDICARE

## 2023-12-13 VITALS — HEIGHT: 67 IN | BODY MASS INDEX: 35.63 KG/M2 | WEIGHT: 227 LBS

## 2023-12-13 DIAGNOSIS — E11.65 TYPE 2 DIABETES MELLITUS WITH HYPERGLYCEMIA, WITH LONG-TERM CURRENT USE OF INSULIN: ICD-10-CM

## 2023-12-13 DIAGNOSIS — Z79.4 TYPE 2 DIABETES MELLITUS WITH HYPERGLYCEMIA, WITH LONG-TERM CURRENT USE OF INSULIN: ICD-10-CM

## 2023-12-13 DIAGNOSIS — E11.42 TYPE 2 DIABETES MELLITUS WITH DIABETIC POLYNEUROPATHY, WITH LONG-TERM CURRENT USE OF INSULIN: Primary | ICD-10-CM

## 2023-12-13 DIAGNOSIS — Z79.4 TYPE 2 DIABETES MELLITUS WITH DIABETIC POLYNEUROPATHY, WITH LONG-TERM CURRENT USE OF INSULIN: Primary | ICD-10-CM

## 2023-12-13 PROCEDURE — 99999 PR PBB SHADOW E&M-EST. PATIENT-LVL III: CPT | Mod: PBBFAC,,, | Performed by: DIETITIAN, REGISTERED

## 2023-12-13 PROCEDURE — G0108 DIAB MANAGE TRN  PER INDIV: HCPCS | Mod: S$GLB,,, | Performed by: DIETITIAN, REGISTERED

## 2023-12-13 PROCEDURE — G0108 PR DIAB MANAGE TRN  PER INDIV: ICD-10-PCS | Mod: S$GLB,,, | Performed by: DIETITIAN, REGISTERED

## 2023-12-13 PROCEDURE — 99999 PR PBB SHADOW E&M-EST. PATIENT-LVL III: ICD-10-PCS | Mod: PBBFAC,,, | Performed by: DIETITIAN, REGISTERED

## 2023-12-14 DIAGNOSIS — Z79.4 TYPE 2 DIABETES MELLITUS WITH HYPERGLYCEMIA, WITH LONG-TERM CURRENT USE OF INSULIN: ICD-10-CM

## 2023-12-14 DIAGNOSIS — E11.65 TYPE 2 DIABETES MELLITUS WITH HYPERGLYCEMIA, WITH LONG-TERM CURRENT USE OF INSULIN: ICD-10-CM

## 2023-12-14 RX ORDER — TIRZEPATIDE 2.5 MG/.5ML
INJECTION, SOLUTION SUBCUTANEOUS
Qty: 4 PEN | Refills: 0 | Status: SHIPPED | OUTPATIENT
Start: 2023-12-14

## 2023-12-19 NOTE — PROGRESS NOTES
"Diabetes Care Specialist Follow-up Note  Author: Marla Stein RD, CDE  Date: 12/19/2023    Program Intake  Reason for Diabetes Program Visit:: Intervention  Type of Intervention:: Individual  Individual: Education  Education: Pattern Management  Current diabetes risk level:: high  In the last 12 months, have you:: none  Permission to speak with others about care:: no    Lab Results   Component Value Date    HGBA1C 8.7 (H) 11/09/2023     A1c Pre Diabetes Care Specialist Intervention:  >14.0%    Clinical    Weight: 103 kg (227 lb)   Height: 5' 7" (170.2 cm)   Body mass index is 35.55 kg/m².  Weight increase of 9 lbs since last visit 10/27/2023    Nutritional Status  Diet: Regular  Meal Plan 24 Hour Recall: Breakfast, Lunch, Dinner, Snack  Meal Plan 24 Hour Recall - Breakfast: Toast and Eggs, coffee with sugar and cream  Meal Plan 24 Hour Recall - Lunch: chicken noodle soup with crackers  Meal Plan 24 Hour Recall - Dinner: yesterday a burger and some fries, sometimes gets pizza or something home cooked  Meal Plan 24 Hour Recall - Snack: peanut butter crackers, almonds, cheerios, drinks water or sparkling water body armour lyte  Change in appetite?: No  Current nutritional status an area of need that is impacting patient's ability to self-manage diabetes?: No    Physical activity/Exercise:   No change    SMBG: using the sensor typically, not currently wearing a sensor and is unsure how to start a new one  Unable to download most recent data - data unavailable (likely not wearing device)    Additional Social History    Support  Is Support an area impacting ability to self-manage diabetes?: No    Access to [employer]  Media or technology needs impacting ability to self-manage diabetes?: No    Cognitive/Behavioral Health  Cognitive or behavioral barriers impacting ability to self-manage diabetes?: No    Culture/Sabianist  Culture or Hinduism beliefs that may impact ability to access healthcare: " No    Communication  Communication needs impacting ability to self-manage diabetes?: No    Health Literacy  Health literacy needs impacting ability to self-manage diabetes?: No      Diabetes Self-Management Skills Assessment     Diabetes Disease Process/Treatment Options  Diabetes Disease Process/Treatment Options: Skills Assessment Completed: No  Assessment indicates:: Adequate understanding  Deferred due to:: Other (comment) (previously completed, there has been no change and patient has adequate understanding)  Area of need?: No    Nutrition/Healthy Eating  Nutrition/Healthy Eating Skills Assessment Completed:: No  Assessment indicates:: Adequate understanding  Deffered due to:: Other (comment) (previously completed, there has been no change and patient has adequate understanding)  Area of need?: No    Physical Activity/Exercise  Physical Activity/Exercise Skills Assessment Completed: : No  Assessment indicates:: Adequate understanding  Deffered due to:: Other (comment)  Area of need?: No    Medications  Medication Skills Assessment Completed:: No  Assessment indicates:: Adequate understanding  Deffered due to:: Other (comment) (previously completed, there has been no change and patient has adequate understanding)  Area of need?: No    Home Blood Glucose Monitoring  Patient states that blood sugar is checked at home daily.: yes  Monitoring Method:: home glucometer, personal continuous glucose monitor (patient was supposed to start the dexcom but has not picked up from the pharmacy - dexcom is free of charge)  Home glucometer meter type:: accu-chek guide  How often do you check your blood sugar?: Occasionally  Blood glucose logs:: no (patient remembered to bring glucometer to visit, I transcribed data from glucometer to logs for provider visit and scanned into media)  Blood glucose logs reviewed today?: no  Personal CGM type:: Dexcom G7  Patient is able to use personal CGM appropriately.: no  CGM Report reviewed?:  no  Home Blood Glucose Monitoring Skills Assessment Completed: : Yes  Assessment indicates:: Instruction Needed  Area of need?: Yes    Acute Complications  Patient identified::  (feels chilled)  Patient identified::  (eat something like a sandwich - instructed on proper treatment options)  Able to state the blood sugar range for hyperglycemia?:  (feels dizzy with hyperglycemia)  Acute Complications Skills Assessment Completed: : No  Assessment indicates:: Adequate understanding  Deffered due to:: Other (comment) (previously completed, there has been no change and patient has adequate understanding)  Area of need?: No    Chronic Complications  Chronic Complications Skills Assessment Completed: : No  Assessment indicates:: Adequate understanding  Deferred due to:: Other (comment) (previously completed, there has been no change and patient has adequate understanding)  Area of need?: No    Psychosocial/Coping  Psychosocial/Coping Skills Assessment Completed: : No  Assessment indicates:: Adequate understanding  Deffered due to:: Other (comment) (previously completed, there has been no change and patient has adequate understanding)  Area of need?: No      During today's follow-up visit,  the following areas required further assessment and content was provided/reviewed.    Based on today's diabetes care assessment, the following areas of need were identified:          12/13/2023    12:01 AM   Social   Support No   Access to [employer] No   Cognitive/Behavioral Health No   Culture/Nondenominational No   Communication No   Health Literacy No            12/13/2023    12:01 AM   Clinical   Nutritional Status No            12/13/2023    12:01 AM   Diabetes Self-Management Skills   Diabetes Disease Process/Treatment Options No   Nutrition/Healthy Eating No   Physical Activity/Exercise No   Medication No   Home Blood Glucose Monitoring Yes, restarting the G7   Acute Complications No   Chronic Complications No   Psychosocial/Coping No         Today's interventions were provided through individual discussion, instruction, and written materials were provided.    Patient verbalized understanding of instruction and written materials.  Pt was able to return back demonstration of instructions today. Patient understood key points, needs reinforcement and further instruction.     Diabetes Self-Management Care Plan Review and Evaluation of Progress:    During today's follow-up Agustin Diabetes Self-Management Care Plan progress was reviewed and progress was evaluated including his/her input. Aishwarya has agreed to continue his/her journey to improve/maintain overall diabetes control by continuing to set health goals. See care plan progress below.      Care Plan: Diabetes Management   Updates made since 11/19/2023 12:00 AM        Problem: Blood Glucose Self-Monitoring         Goal: Patient agrees to check and record blood sugars 3 times per day using the dexcom G7 for the next 3 months    Start Date: 10/27/2023   Expected End Date: 1/27/2024   This Visit's Progress: Not met   Recent Progress: Deferred   Priority: High   Barriers: Lack of Supplies   Note:    Patient was instructed how to use a meter. Discussed signs and symptoms, and causes and treatment of hypoglycemia. Covered blood sugar and A1C goals. Instructed patient to use provided logbooks to record blood sugars. Instructed patient to follow-up with their physician. Patient verbalized understanding of all instructions.  Prescription for meter and testing supplies pended to PCP    1/6/2023  Dexcom G6 Education  Patient is here in clinic today for initial start of Dexcom continuous glucose monitoring system (CGMA). Reviewed with patient how to insert sensor and transmitter. Patient inserted sensor on right abdomen and inserted transmitter. Time and date was set on monitor and settings were set. Hypoglycemia trigger set for 70 and hyperglycemia set for 300. Patient provided with phone number for  "24-hour help line if needed. Discussed various types of possible alarms and what to do. Questions addressed. Initialization finished. No futher questions.  Patient referred to clinic today for Dexcom G6 continuous glucose sensor system training.  Education was provided using "Quick Start Guide" per Dexcom protocol.    Overview:  5min glucose reading updates, trending arrows, BG graph screens, battery life indicator, Blue Tooth Symbol.  Menus: trend Graph, start sensor, enter BG, events, Alerts, Settings, Shutdown, Stop Sensor   Settings:                          * Urgent Low: 55 mg/dL                          * Urgent Low Soon: Off                          * Low alert: 70                          * High alert: 300                          * Rise rate: Off                          * Fall Rate: Off                          * Signal Loss: 30 min                          * No Reading: Off                          * Always sound: On                             Reviewed additional setting options with patient, including Graph Height and Transmitter ID. Transmitter was paired with .    Reviewed where to find sensor insertion time and sensor expiration date.   Discussed no need to calibrate sensor during the entirety of the 10 day wear. Discussed that pt can calibrate sensor if desired, but at that time she will need to continue calibrating every 12 hours for sensor to remain accurate. Reviewed appropriate calibration techniques.  Reviewed sensor site selection. Site selected and prepped using aseptic technique Inserted to left abdomen. Transmitter placed in pod and secured.  Practiced sensor pod/transmitter removal from site, and removal of transmitter from sensor pod.  Patient able to demonstrate without difficulty.  Encouraged to review manual prior to starting another sensor.   Reviewed problem solving aspects of sensor transmission/variables that can disrupt RF transmission.  range 20 feet, but " the first 3 hrs keep within 3 feet of transmitter.  Pt instructed on lag time of interstitial fluid from CBG and was advised to tx hypoglycemia and dose insulin based on SMBG values.  Link to video provided and written instructions provided for patient to review before 10 day sensor change  Dexcom technical support contact number given and examples of when to contact them discussed.      1/17 Changing the Sensor (every 10 days)  Remove the whole thing from your body  Hold the sensor by the skinny part and break the clear piece  Remove the gray transmitter  In the reader touch start sensor  Touch yes you want to start sensor  Touch the square you want to put the number in for   There will be a 4-digit code on the sensor (like 5937)  Enter the code from the sensor  Touch ok  Touch yes, it is correct  Insert Sensor  Remove orange piece and paper tabs  Place on skin and hold in place  Push the orange button  Insert the gray transmitter into the sensor  Put the small tongue like side in first and push down  You should hear 2 clicks  Touch confirm once sensor is secure on body  Touch start Sensor  Touch 1 then 2  Sensor will take 2 hours to warm up    Changing Transmitter (every 90 days) in the reader  Touch the =   Touch transmitter  Touch pair new  Go through the process to start sensor  Touch yes you want to start sensor  Touch the square you want to put the number in for   There will be a 4-digit code on the sensor (like 5937)  Enter the code from the sensor  Touch ok  Touch yes, it is correct  Touch the square you want to put the number in for   There will be a 6-digit code on the transmitter box (like [id number])  Enter the code from the transmitter box  Touch ok  Touch yes, it is correct  Insert Sensor  Remove orange piece and paper tabs  Place on skin and hold in place  Push the orange button  Insert the gray transmitter into the sensor  Put the small tongue like side in first and push down  You should hear 2  clicks  Touch confirm once sensor is secure on body  Touch start Sensor  Touch 1 then 2  Sensor will take 2 hours to warm up             Follow Up Plan     Follow up in about 10 days (around 12/23/2023) for Change Personal CGM and Upload Data.    Today's care plan and follow up schedule was discussed with patient.  Aishwarya verbalized understanding of the care plan, goals, and agrees to follow up plan.        The patient was encouraged to communicate with his/her health care provider/physician and care team regarding his/her condition(s) and treatment.  I provided the patient with my contact information today and encouraged to contact me via phone or [facility] Patient Portal as needed.     Length of Visit   Total Time: 30 Minutes

## 2023-12-27 ENCOUNTER — NUTRITION (OUTPATIENT)
Dept: DIABETES | Facility: CLINIC | Age: 63
End: 2023-12-27
Payer: MEDICARE

## 2023-12-27 DIAGNOSIS — E11.42 TYPE 2 DIABETES MELLITUS WITH DIABETIC POLYNEUROPATHY, WITH LONG-TERM CURRENT USE OF INSULIN: ICD-10-CM

## 2023-12-27 DIAGNOSIS — Z79.4 TYPE 2 DIABETES MELLITUS WITH DIABETIC POLYNEUROPATHY, WITH LONG-TERM CURRENT USE OF INSULIN: ICD-10-CM

## 2023-12-27 DIAGNOSIS — E11.65 TYPE 2 DIABETES MELLITUS WITH HYPERGLYCEMIA, WITH LONG-TERM CURRENT USE OF INSULIN: Primary | ICD-10-CM

## 2023-12-27 DIAGNOSIS — Z79.4 TYPE 2 DIABETES MELLITUS WITH HYPERGLYCEMIA, WITH LONG-TERM CURRENT USE OF INSULIN: Primary | ICD-10-CM

## 2023-12-27 PROCEDURE — G0108 PR DIAB MANAGE TRN  PER INDIV: ICD-10-PCS | Mod: S$GLB,,, | Performed by: DIETITIAN, REGISTERED

## 2023-12-27 PROCEDURE — G0108 DIAB MANAGE TRN  PER INDIV: HCPCS | Mod: S$GLB,,, | Performed by: DIETITIAN, REGISTERED

## 2023-12-27 NOTE — PROGRESS NOTES
Diabetes Care Specialist Follow-up Note  Author: Marla Stein RD, CDE  Date: 12/27/2023    Program Intake  Reason for Diabetes Program Visit:: Intervention  Type of Intervention:: Individual  Individual: Education, Medication Training  Medication Training: Injectable Medication  Device Training: Personal CGM  Current diabetes risk level:: high  In the last 12 months, have you:: none  Permission to speak with others about care:: no    Lab Results   Component Value Date    HGBA1C 8.7 (H) 11/09/2023     A1c Pre Diabetes Care Specialist Intervention:  >14.0%    Clinical    Medication Information  Medication adherence impacting ability to self-manage diabetes?: No    Labs  Lab Compliance Barriers: No    Nutritional Status  Diet: Regular  Meal Plan 24 Hour Recall: Breakfast, Lunch, Dinner, Snack  Meal Plan 24 Hour Recall - Breakfast: Toast and Eggs, coffee with sugar and cream  Meal Plan 24 Hour Recall - Lunch: chicken noodle soup with crackers  Meal Plan 24 Hour Recall - Dinner: yesterday a burger and some fries, sometimes gets pizza or something home cooked  Meal Plan 24 Hour Recall - Snack: peanut butter crackers, almonds, cheerios, drinks water or sparkling water body armour lyte  Current nutritional status an area of need that is impacting patient's ability to self-manage diabetes?: No    Physical activity/Exercise:   No change, just moved    SMBG: restarting the Dexcom G6 at time of visit  Unsure what happened to the G7 supplies    Additional Social History    Support  Is Support an area impacting ability to self-manage diabetes?: No    Access to Mass Media & Technology  Media or technology needs impacting ability to self-manage diabetes?: No    Cognitive/Behavioral Health  Cognitive or behavioral barriers impacting ability to self-manage diabetes?: No    Culture/Amish  Culture or Religion beliefs that may impact ability to access healthcare: No    Communication  Communication needs impacting ability to  self-manage diabetes?: No    Health Literacy  Health literacy needs impacting ability to self-manage diabetes?: No      Diabetes Self-Management Skills Assessment     Diabetes Disease Process/Treatment Options  Diabetes Disease Process/Treatment Options: Skills Assessment Completed: No  Assessment indicates:: Adequate understanding  Deferred due to:: Other (comment) (previously completed, there has been no change and patient has adequate understanding)  Area of need?: No    Nutrition/Healthy Eating  Nutrition/Healthy Eating Skills Assessment Completed:: No  Assessment indicates:: Adequate understanding  Deffered due to:: Other (comment) (previously completed, there has been no change and patient has adequate understanding)  Area of need?: No    Physical Activity/Exercise  Physical Activity/Exercise Skills Assessment Completed: : No  Assessment indicates:: Adequate understanding  Deffered due to:: Other (comment)  Area of need?: No    Medications  Patient is able to describe current diabetes management routine.: yes  Diabetes management routine:: insulin, oral medications, injectable medications  Patient is able to identify current diabetes medications, dosages, and appropriate timing of medications.: yes  Patient understands the purpose of the medications taken for diabetes.: no  Patient reports problems or concerns with current medication regimen.: yes  Medication regimen problems/concerns:: lack of training  Medication Skills Assessment Completed:: Yes  Assessment indicates:: Adequate understanding, Instruction Needed  Deffered due to:: Other (comment) (previously completed, there has been no change and patient has adequate understanding)  Area of need?: Yes    Home Blood Glucose Monitoring  Patient states that blood sugar is checked at home daily.: yes  Monitoring Method:: home glucometer, personal continuous glucose monitor (patient was supposed to start the dexcom but has not picked up from the pharmacy -  dexcom is free of charge)  Reasons for not monitoring:: meter broken  Home glucometer meter type:: accu-chek guide  How often do you check your blood sugar?: Occasionally  When do you check your blood sugar?: Before breakfast, Before lunch, Before dinner, Other, Before bedtime  Blood glucose logs:: no (patient remembered to bring glucometer to visit, I transcribed data from glucometer to logs for provider visit and scanned into media)  Blood glucose logs reviewed today?: no  Personal CGM type:: Dexcom G6  Patient is able to use personal CGM appropriately.: no  CGM Report reviewed?: no  Unable to download personal CGM: showed up today with the dexcom G6 supplies, not sure what happened to the G7 supplies  Home Blood Glucose Monitoring Skills Assessment Completed: : Yes  Assessment indicates:: Instruction Needed  Area of need?: Yes    Acute Complications  Patient identified::  (feels chilled)  Patient identified::  (eat something like a sandwich - instructed on proper treatment options)  Able to state the blood sugar range for hyperglycemia?:  (feels dizzy with hyperglycemia)  Acute Complications Skills Assessment Completed: : No  Assessment indicates:: Adequate understanding  Deffered due to:: Other (comment) (previously completed, there has been no change and patient has adequate understanding)  Area of need?: No    Chronic Complications  Chronic Complications Skills Assessment Completed: : No  Assessment indicates:: Adequate understanding  Deferred due to:: Other (comment) (previously completed, there has been no change and patient has adequate understanding)  Area of need?: No    Psychosocial/Coping  Psychosocial/Coping Skills Assessment Completed: : No  Assessment indicates:: Adequate understanding  Deffered due to:: Other (comment) (previously completed, there has been no change and patient has adequate understanding)  Area of need?: No      During today's follow-up visit,  the following areas required further  "assessment and content was provided/reviewed.    Based on today's diabetes care assessment, the following areas of need were identified:          12/27/2023    12:01 AM   Social   Support No   Access to Mass Media/Tech No   Cognitive/Behavioral Health No   Culture/Confucianism No   Communication No   Health Literacy No            12/27/2023    12:01 AM   Clinical   Medication Adherence No   Lab Compliance No   Nutritional Status No            12/27/2023    12:01 AM   Diabetes Self-Management Skills   Diabetes Disease Process/Treatment Options No   Nutrition/Healthy Eating No   Physical Activity/Exercise No   Medication Yes, mounjaro teaching: the patient was instructed on supplies needed, storage of mounjaro, precautions related to hyper/hypoglycemia and potential side effects. Patient was instructed on how to unlock device and how to administer/inject the medication. Patient was instructed on proper disposal. Patient was given instructions on when/who to call with problems. Injection sites were discussed and patient was instructed on importance of rotating sites. Discussed once weekly dosing and when to increase dosage based on providers recommendations   Home Blood Glucose Monitoring Yes, Dexcom G6 Education  Patient is here in clinic today for initial start of Dexcom continuous glucose monitoring system (CGMA). Reviewed with patient how to insert sensor and transmitter. Patient inserted sensor on right abdomen and inserted transmitter. Time and date was set on monitor and settings were set. Hypoglycemia trigger set for 70 and hyperglycemia set for 250. Patient provided with phone number for 24-hour help line if needed. Discussed various types of possible alarms and what to do. Questions addressed. Initialization finished. No futher questions.  Patient referred to clinic today for Dexcom G6 continuous glucose sensor system training.  Education was provided using "Quick Start Guide" per Dexcom protocol.    Overview:  " 5min glucose reading updates, trending arrows, BG graph screens, battery life indicator, Blue Tooth Symbol.  Menus: trend Graph, start sensor, enter BG, events, Alerts, Settings, Shutdown, Stop Sensor   Settings:                          * Urgent Low: 55 mg/dL                          * Urgent Low Soon: Off                          * Low alert: 70                          * High alert: 250                          * Rise rate: Off                          * Fall Rate: Off                          * Signal Loss: 30 min                          * No Reading: Off                          * Always sound: On                             Reviewed additional setting options with patient, including Graph Height and Transmitter ID. Transmitter was paired with .    Reviewed where to find sensor insertion time and sensor expiration date.   Discussed no need to calibrate sensor during the entirety of the 10 day wear. Discussed that pt can calibrate sensor if desired, but at that time she will need to continue calibrating every 12 hours for sensor to remain accurate. Reviewed appropriate calibration techniques.  Reviewed sensor site selection. Site selected and prepped using aseptic technique Inserted to left abdomen. Transmitter placed in pod and secured.  Practiced sensor pod/transmitter removal from site, and removal of transmitter from sensor pod.  Patient able to demonstrate without difficulty.  Encouraged to review manual prior to starting another sensor.   Reviewed problem solving aspects of sensor transmission/variables that can disrupt RF transmission.  range 20 feet, but the first 3 hrs keep within 3 feet of transmitter.  Pt instructed on lag time of interstitial fluid from CBG and was advised to tx hypoglycemia and dose insulin based on SMBG values.  Link to video provided and written instructions provided for patient to review before 10 day sensor change  Dexcom technical support contact number  given and examples of when to contact them discussed.    Acute Complications No   Chronic Complications No   Psychosocial/Coping No        Today's interventions were provided through individual discussion, instruction, and written materials were provided.    Patient verbalized understanding of instruction and written materials.  Pt was able to return back demonstration of instructions today. Patient understood key points, needs reinforcement and further instruction.     Diabetes Self-Management Care Plan Review and Evaluation of Progress:    During today's follow-up Agustin Diabetes Self-Management Care Plan progress was reviewed and progress was evaluated including his/her input. Aishwarya has agreed to continue his/her journey to improve/maintain overall diabetes control by continuing to set health goals. See care plan progress below.      Care Plan: Diabetes Management   Updates made since 11/27/2023 12:00 AM        Problem: Blood Glucose Self-Monitoring         Goal: Patient agrees to check and record blood sugars 3 times per day using the dexcom G7 for the next 3 months    Start Date: 12/27/2023   Expected End Date: 3/27/2024   This Visit's Progress: Not met   Recent Progress: Not met   Priority: High   Barriers: Lack of Supplies   Note:    Patient was instructed how to use a meter. Discussed signs and symptoms, and causes and treatment of hypoglycemia. Covered blood sugar and A1C goals. Instructed patient to use provided logbooks to record blood sugars. Instructed patient to follow-up with their physician. Patient verbalized understanding of all instructions.  Prescription for meter and testing supplies pended to PCP    1/6/2023  Dexcom G6 Education  Patient is here in clinic today for initial start of Dexcom continuous glucose monitoring system (CGMA). Reviewed with patient how to insert sensor and transmitter. Patient inserted sensor on right abdomen and inserted transmitter. Time and date was set on  "monitor and settings were set. Hypoglycemia trigger set for 70 and hyperglycemia set for 300. Patient provided with phone number for 24-hour help line if needed. Discussed various types of possible alarms and what to do. Questions addressed. Initialization finished. No futher questions.  Patient referred to clinic today for Dexcom G6 continuous glucose sensor system training.  Education was provided using "Quick Start Guide" per Dexcom protocol.    Overview:  5min glucose reading updates, trending arrows, BG graph screens, battery life indicator, Blue Tooth Symbol.  Menus: trend Graph, start sensor, enter BG, events, Alerts, Settings, Shutdown, Stop Sensor   Settings:                          * Urgent Low: 55 mg/dL                          * Urgent Low Soon: Off                          * Low alert: 70                          * High alert: 300                          * Rise rate: Off                          * Fall Rate: Off                          * Signal Loss: 30 min                          * No Reading: Off                          * Always sound: On                             Reviewed additional setting options with patient, including Graph Height and Transmitter ID. Transmitter was paired with .    Reviewed where to find sensor insertion time and sensor expiration date.   Discussed no need to calibrate sensor during the entirety of the 10 day wear. Discussed that pt can calibrate sensor if desired, but at that time she will need to continue calibrating every 12 hours for sensor to remain accurate. Reviewed appropriate calibration techniques.  Reviewed sensor site selection. Site selected and prepped using aseptic technique Inserted to left abdomen. Transmitter placed in pod and secured.  Practiced sensor pod/transmitter removal from site, and removal of transmitter from sensor pod.  Patient able to demonstrate without difficulty.  Encouraged to review manual prior to starting another " sensor.   Reviewed problem solving aspects of sensor transmission/variables that can disrupt RF transmission.  range 20 feet, but the first 3 hrs keep within 3 feet of transmitter.  Pt instructed on lag time of interstitial fluid from CBG and was advised to tx hypoglycemia and dose insulin based on SMBG values.  Link to video provided and written instructions provided for patient to review before 10 day sensor change  Dexcom technical support contact number given and examples of when to contact them discussed.      1/17 Changing the Sensor (every 10 days)  Remove the whole thing from your body  Hold the sensor by the skinny part and break the clear piece  Remove the gray transmitter  In the reader touch start sensor  Touch yes you want to start sensor  Touch the square you want to put the number in for   There will be a 4-digit code on the sensor (like 5937)  Enter the code from the sensor  Touch ok  Touch yes, it is correct  Insert Sensor  Remove orange piece and paper tabs  Place on skin and hold in place  Push the orange button  Insert the gray transmitter into the sensor  Put the small tongue like side in first and push down  You should hear 2 clicks  Touch confirm once sensor is secure on body  Touch start Sensor  Touch 1 then 2  Sensor will take 2 hours to warm up    Changing Transmitter (every 90 days) in the reader  Touch the =   Touch transmitter  Touch pair new  Go through the process to start sensor  Touch yes you want to start sensor  Touch the square you want to put the number in for   There will be a 4-digit code on the sensor (like 5937)  Enter the code from the sensor  Touch ok  Touch yes, it is correct  Touch the square you want to put the number in for   There will be a 6-digit code on the transmitter box (like [id number])  Enter the code from the transmitter box  Touch ok  Touch yes, it is correct  Insert Sensor  Remove orange piece and paper tabs  Place on skin and hold in place  Push the  orange button  Insert the gray transmitter into the sensor  Put the small tongue like side in first and push down  You should hear 2 clicks  Touch confirm once sensor is secure on body  Touch start Sensor  Touch 1 then 2  Sensor will take 2 hours to warm up             Follow Up Plan     Follow up in about 10 days (around 1/6/2024) for Change Personal CGM and Upload Data.    Today's care plan and follow up schedule was discussed with patient.  Aishwarya verbalized understanding of the care plan, goals, and agrees to follow up plan.        The patient was encouraged to communicate with his/her health care provider/physician and care team regarding his/her condition(s) and treatment.  I provided the patient with my contact information today and encouraged to contact me via phone or [facility] Patient Portal as needed.     Length of Visit   Total Time: 60 Minutes

## 2023-12-29 DIAGNOSIS — E11.65 TYPE 2 DIABETES MELLITUS WITH HYPERGLYCEMIA, WITH LONG-TERM CURRENT USE OF INSULIN: Primary | ICD-10-CM

## 2023-12-29 DIAGNOSIS — Z79.4 TYPE 2 DIABETES MELLITUS WITH HYPERGLYCEMIA, WITH LONG-TERM CURRENT USE OF INSULIN: Primary | ICD-10-CM

## 2023-12-29 RX ORDER — BLOOD-GLUCOSE METER
1 EACH MISCELLANEOUS DAILY
Qty: 1 EACH | Refills: 0 | Status: SHIPPED | OUTPATIENT
Start: 2023-12-29 | End: 2024-12-28

## 2023-12-29 NOTE — TELEPHONE ENCOUNTER
----- Message from Savita Rockwell sent at 12/28/2023  1:03 PM CST -----  Contact: Patient, [phone]  The patient is calling because she was seen yesterday and wants to know if she left her bag with the Dexcom in your office. Please call her. Thanks.

## 2023-12-29 NOTE — TELEPHONE ENCOUNTER
Staff and I checked the office and the waiting area and did not find anything.    Spoke to patient, she found it at her brother's house.    Patient needs a glucometer to have when she is unable to use the dexcom or if her symptoms are different than the CGM value reflects. She has supplies for the accu-chek guide

## 2024-01-27 DIAGNOSIS — I10 ESSENTIAL HYPERTENSION, BENIGN: ICD-10-CM

## 2024-01-29 ENCOUNTER — TELEPHONE (OUTPATIENT)
Dept: DIABETES | Facility: CLINIC | Age: 64
End: 2024-01-29
Payer: MEDICARE

## 2024-01-29 RX ORDER — AMLODIPINE BESYLATE 5 MG/1
5 TABLET ORAL
Qty: 90 TABLET | Refills: 1 | Status: SHIPPED | OUTPATIENT
Start: 2024-01-29

## 2024-01-29 NOTE — TELEPHONE ENCOUNTER
Refill Routing Note   Medication(s) are not appropriate for processing by [facility] for the following reason(s):        ED/Hospital Visit since last OV with provider    [facility] action(s):  Defer               Appointments  past 12m or future 3m with PCP    Date Provider   Last Visit   2/8/2023 Joey Cintron MD   Next Visit   Visit date not found Joey Cintron MD   ED visits in past 90 days: 0        Note composed:7:32 AM 01/29/2024

## 2024-02-21 ENCOUNTER — CLINICAL SUPPORT (OUTPATIENT)
Dept: DIABETES | Facility: CLINIC | Age: 64
End: 2024-02-21
Payer: MEDICARE

## 2024-02-21 VITALS — WEIGHT: 227.19 LBS | BODY MASS INDEX: 35.66 KG/M2 | HEIGHT: 67 IN

## 2024-02-21 DIAGNOSIS — Z79.4 TYPE 2 DIABETES MELLITUS WITH HYPERGLYCEMIA, WITH LONG-TERM CURRENT USE OF INSULIN: Primary | ICD-10-CM

## 2024-02-21 DIAGNOSIS — E11.65 TYPE 2 DIABETES MELLITUS WITH HYPERGLYCEMIA, WITH LONG-TERM CURRENT USE OF INSULIN: Primary | ICD-10-CM

## 2024-02-21 DIAGNOSIS — Z79.4 TYPE 2 DIABETES MELLITUS WITH DIABETIC POLYNEUROPATHY, WITH LONG-TERM CURRENT USE OF INSULIN: ICD-10-CM

## 2024-02-21 DIAGNOSIS — E11.42 TYPE 2 DIABETES MELLITUS WITH DIABETIC POLYNEUROPATHY, WITH LONG-TERM CURRENT USE OF INSULIN: ICD-10-CM

## 2024-02-21 PROCEDURE — 99999 PR PBB SHADOW E&M-EST. PATIENT-LVL III: CPT | Mod: PBBFAC,,, | Performed by: DIETITIAN, REGISTERED

## 2024-02-21 PROCEDURE — G0108 DIAB MANAGE TRN  PER INDIV: HCPCS | Mod: S$GLB,,, | Performed by: DIETITIAN, REGISTERED

## 2024-02-21 NOTE — PROGRESS NOTES
"Diabetes Care Specialist Follow-up Note  Author: Marla Stein RD, CDE  Date: 2/21/2024    Program Intake  Reason for Diabetes Program Visit:: Intervention  Type of Intervention:: Individual  Individual: Device Training  Device Training: Personal CGM  Current diabetes risk level:: high  In the last 12 months, have you:: none  Permission to speak with others about care:: no  Continuous Glucose Monitoring  Patient has CGM: Yes  Personal CGM type:: Dexcom G6    Lab Results   Component Value Date    HGBA1C 8.7 (H) 11/09/2023     A1c Pre Diabetes Care Specialist Intervention:  8.7%    Clinical    Weight: 103.1 kg (227 lb 3.2 oz)   Height: 5' 7" (170.2 cm)   Body mass index is 35.58 kg/m².  Wt change of 0 lbs since last visit on 12/27/2023         Problem Review  Reviewed health maintenance: yes         Medication Information  Medication adherence impacting ability to self-manage diabetes?: No    Labs  Lab Compliance Barriers: No    Nutritional Status  Diet: Regular  Meal Plan 24 Hour Recall: Breakfast, Lunch, Dinner, Snack  Meal Plan 24 Hour Recall - Breakfast: Toast and Eggs, coffee with sugar and cream  Meal Plan 24 Hour Recall - Lunch: chicken noodle soup with crackers or a sandwich  Meal Plan 24 Hour Recall - Dinner: yesterday some roast and potatoes, some times a burger and some fries, sometimes gets pizza or something home cooked  Meal Plan 24 Hour Recall - Snack: peanut butter crackers, almonds, cheerios, drinks water or sparkling water body armour lyte  Current nutritional status an area of need that is impacting patient's ability to self-manage diabetes?: No    Physical activity/Exercise:   No change    SMBG: not testing and has not had the dexcom on for the past 2 months  Unable to obtain data    Additional Social History    Support  Is Support an area impacting ability to self-manage diabetes?: No    Access to Mass Media & Technology  Media or technology needs impacting ability to self-manage diabetes?: " No    Cognitive/Behavioral Health  Cognitive or behavioral barriers impacting ability to self-manage diabetes?: No    Culture/Mosque  Culture or Yazidism beliefs that may impact ability to access healthcare: No    Communication  Communication needs impacting ability to self-manage diabetes?: No    Health Literacy  Health literacy needs impacting ability to self-manage diabetes?: No      Diabetes Self-Management Skills Assessment     Diabetes Disease Process/Treatment Options  Diabetes Disease Process/Treatment Options: Skills Assessment Completed: No  Assessment indicates:: Adequate understanding  Deferred due to:: Other (comment) (previously completed, there has been no change and patient has adequate understanding)  Area of need?: No    Nutrition/Healthy Eating  Nutrition/Healthy Eating Skills Assessment Completed:: No  Assessment indicates:: Adequate understanding  Deffered due to:: Other (comment) (previously completed, there has been no change and patient has adequate understanding)  Area of need?: No    Physical Activity/Exercise  Physical Activity/Exercise Skills Assessment Completed: : No  Assessment indicates:: Adequate understanding  Deffered due to:: Other (comment)  Area of need?: No    Medications  Patient is able to describe current diabetes management routine.: yes  Diabetes management routine:: insulin, oral medications, injectable medications  Patient is able to identify current diabetes medications, dosages, and appropriate timing of medications.: yes  Patient understands the purpose of the medications taken for diabetes.: no  Patient reports problems or concerns with current medication regimen.: no  Medication Skills Assessment Completed:: Yes  Assessment indicates:: Adequate understanding  Deffered due to:: Other (comment) (previously completed, there has been no change and patient has adequate understanding)  Area of need?: No    Home Blood Glucose Monitoring  Patient states that blood sugar  is checked at home daily.: yes  Monitoring Method:: home glucometer, personal continuous glucose monitor  Reasons for not monitoring:: meter broken  Home glucometer meter type:: accu-chek guide  How often do you check your blood sugar?: Occasionally  Blood glucose logs:: no (patient remembered to bring glucometer to visit, I transcribed data from glucometer to logs for provider visit and scanned into media)  Blood glucose logs reviewed today?: no  Personal CGM type:: Dexcom G6  Patient is able to use personal CGM appropriately.: no  CGM Report reviewed?: no  Home Blood Glucose Monitoring Skills Assessment Completed: : Yes  Assessment indicates:: Instruction Needed  Area of need?: Yes    Acute Complications  Patient identified::  (feels chilled)  Patient identified::  (eat something like a sandwich - instructed on proper treatment options)  Able to state the blood sugar range for hyperglycemia?:  (feels dizzy with hyperglycemia)  Acute Complications Skills Assessment Completed: : No  Assessment indicates:: Adequate understanding  Deffered due to:: Other (comment) (previously completed, there has been no change and patient has adequate understanding)  Area of need?: No    Chronic Complications  Chronic Complications Skills Assessment Completed: : No  Assessment indicates:: Adequate understanding  Deferred due to:: Other (comment) (previously completed, there has been no change and patient has adequate understanding)  Area of need?: No    Psychosocial/Coping  Psychosocial/Coping Skills Assessment Completed: : No  Assessment indicates:: Adequate understanding  Deffered due to:: Other (comment) (previously completed, there has been no change and patient has adequate understanding)  Area of need?: No      During today's follow-up visit,  the following areas required further assessment and content was provided/reviewed.    Based on today's diabetes care assessment, the following areas of need were identified:           2/21/2024    12:01 AM   Social   Support No   Access to Mass Media/Tech No   Cognitive/Behavioral Health No   Culture/Judaism No   Communication No   Health Literacy No            2/21/2024    12:01 AM   Clinical   Medication Adherence No   Lab Compliance No   Nutritional Status No            2/21/2024    12:01 AM   Diabetes Self-Management Skills   Diabetes Disease Process/Treatment Options No   Nutrition/Healthy Eating No   Physical Activity/Exercise No   Medication No   Home Blood Glucose Monitoring Yes, started the dexcom G6 at time of visit, recommend going back to the dexcom G7, I believe it will be easier for patient    Acute Complications No   Chronic Complications No   Psychosocial/Coping No        Today's interventions were provided through individual discussion, instruction, and written materials were provided.    Patient verbalized understanding of instruction and written materials.  Pt was able to return back demonstration of instructions today. Patient understood key points, needs reinforcement and further instruction.     Diabetes Self-Management Care Plan Review and Evaluation of Progress:    During today's follow-up [facility] Diabetes Self-Management Care Plan progress was reviewed and progress was evaluated including his/her input. Aishwarya has agreed to continue his/her journey to improve/maintain overall diabetes control by continuing to set health goals. See care plan progress below.      Care Plan: Diabetes Management   Updates made since 1/22/2024 12:00 AM        Problem: Blood Glucose Self-Monitoring         Goal: Patient agrees to check and record blood sugars 3 times per day using the dexcom G6/G7 for the next 3 months    Start Date: 12/27/2023   Expected End Date: 3/27/2024   This Visit's Progress: On track   Recent Progress: Not met   Priority: High   Barriers: Lack of Supplies   Note:    Patient was instructed how to use a meter. Discussed signs and symptoms, and causes and treatment  "of hypoglycemia. Covered blood sugar and A1C goals. Instructed patient to use provided logbooks to record blood sugars. Instructed patient to follow-up with their physician. Patient verbalized understanding of all instructions.  Prescription for meter and testing supplies pended to PCP    1/6/2023  Dexcom G6 Education  Patient is here in clinic today for initial start of Dexcom continuous glucose monitoring system (CGMA). Reviewed with patient how to insert sensor and transmitter. Patient inserted sensor on right abdomen and inserted transmitter. Time and date was set on monitor and settings were set. Hypoglycemia trigger set for 70 and hyperglycemia set for 300. Patient provided with phone number for 24-hour help line if needed. Discussed various types of possible alarms and what to do. Questions addressed. Initialization finished. No futher questions.  Patient referred to clinic today for Dexcom G6 continuous glucose sensor system training.  Education was provided using "Quick Start Guide" per Dexcom protocol.    Overview:  5min glucose reading updates, trending arrows, BG graph screens, battery life indicator, Blue Tooth Symbol.  Menus: trend Graph, start sensor, enter BG, events, Alerts, Settings, Shutdown, Stop Sensor   Settings:                          * Urgent Low: 55 mg/dL                          * Urgent Low Soon: Off                          * Low alert: 70                          * High alert: 300                          * Rise rate: Off                          * Fall Rate: Off                          * Signal Loss: 30 min                          * No Reading: Off                          * Always sound: On                             Reviewed additional setting options with patient, including Graph Height and Transmitter ID. Transmitter was paired with .    Reviewed where to find sensor insertion time and sensor expiration date.   Discussed no need to calibrate sensor during the " entirety of the 10 day wear. Discussed that pt can calibrate sensor if desired, but at that time she will need to continue calibrating every 12 hours for sensor to remain accurate. Reviewed appropriate calibration techniques.  Reviewed sensor site selection. Site selected and prepped using aseptic technique Inserted to left abdomen. Transmitter placed in pod and secured.  Practiced sensor pod/transmitter removal from site, and removal of transmitter from sensor pod.  Patient able to demonstrate without difficulty.  Encouraged to review manual prior to starting another sensor.   Reviewed problem solving aspects of sensor transmission/variables that can disrupt RF transmission.  range 20 feet, but the first 3 hrs keep within 3 feet of transmitter.  Pt instructed on lag time of interstitial fluid from CBG and was advised to tx hypoglycemia and dose insulin based on SMBG values.  Link to video provided and written instructions provided for patient to review before 10 day sensor change  Dexcom technical support contact number given and examples of when to contact them discussed.      1/17 Changing the Sensor (every 10 days)  Remove the whole thing from your body  Hold the sensor by the skinny part and break the clear piece  Remove the gray transmitter  In the reader touch start sensor  Touch yes you want to start sensor  Touch the square you want to put the number in for   There will be a 4-digit code on the sensor (like 5937)  Enter the code from the sensor  Touch ok  Touch yes, it is correct  Insert Sensor  Remove orange piece and paper tabs  Place on skin and hold in place  Push the orange button  Insert the gray transmitter into the sensor  Put the small tongue like side in first and push down  You should hear 2 clicks  Touch confirm once sensor is secure on body  Touch start Sensor  Touch 1 then 2  Sensor will take 2 hours to warm up    Changing Transmitter (every 90 days) in the reader  Touch the =   Touch  transmitter  Touch pair new  Go through the process to start sensor  Touch yes you want to start sensor  Touch the square you want to put the number in for   There will be a 4-digit code on the sensor (like 5937)  Enter the code from the sensor  Touch ok  Touch yes, it is correct  Touch the square you want to put the number in for   There will be a 6-digit code on the transmitter box (like [id number])  Enter the code from the transmitter box  Touch ok  Touch yes, it is correct  Insert Sensor  Remove orange piece and paper tabs  Place on skin and hold in place  Push the orange button  Insert the gray transmitter into the sensor  Put the small tongue like side in first and push down  You should hear 2 clicks  Touch confirm once sensor is secure on body  Touch start Sensor  Touch 1 then 2  Sensor will take 2 hours to warm up             Follow Up Plan     Follow up in about 10 days (around 3/2/2024) for Change Personal CGM and Upload Data.    Today's care plan and follow up schedule was discussed with patient.  Aishwarya verbalized understanding of the care plan, goals, and agrees to follow up plan.        The patient was encouraged to communicate with his/her health care provider/physician and care team regarding his/her condition(s) and treatment.  I provided the patient with my contact information today and encouraged to contact me via phone or [facility] Patient Portal as needed.     Length of Visit   Total Time: 35 Minutes

## 2024-03-06 ENCOUNTER — NUTRITION (OUTPATIENT)
Dept: DIABETES | Facility: CLINIC | Age: 64
End: 2024-03-06
Payer: MEDICARE

## 2024-03-06 DIAGNOSIS — E11.65 TYPE 2 DIABETES MELLITUS WITH HYPERGLYCEMIA, WITH LONG-TERM CURRENT USE OF INSULIN: Primary | ICD-10-CM

## 2024-03-06 DIAGNOSIS — Z79.4 TYPE 2 DIABETES MELLITUS WITH HYPERGLYCEMIA, WITH LONG-TERM CURRENT USE OF INSULIN: Primary | ICD-10-CM

## 2024-03-06 DIAGNOSIS — E11.42 TYPE 2 DIABETES MELLITUS WITH DIABETIC POLYNEUROPATHY, WITH LONG-TERM CURRENT USE OF INSULIN: ICD-10-CM

## 2024-03-06 DIAGNOSIS — Z79.4 TYPE 2 DIABETES MELLITUS WITH DIABETIC POLYNEUROPATHY, WITH LONG-TERM CURRENT USE OF INSULIN: ICD-10-CM

## 2024-03-06 NOTE — PROGRESS NOTES
Diabetes Care Specialist Follow-up Note  Author: Marla Stein RD, CDE  Date: 3/6/2024    Program Intake  Reason for Diabetes Program Visit:: Intervention  Type of Intervention:: Individual  Individual: Device Training  Device Training: Personal CGM  Current diabetes risk level:: high  In the last 12 months, have you:: none  Permission to speak with others about care:: no  Continuous Glucose Monitoring  Patient has CGM: Yes  Personal CGM type:: Dexcom G6    Lab Results   Component Value Date    HGBA1C 8.7 (H) 11/09/2023     Clinical    Problem Review  Reviewed health maintenance: yes         Medication Information  Medication adherence impacting ability to self-manage diabetes?: No    Labs  Lab Compliance Barriers: No    Nutritional Status  Diet: Regular  Meal Plan 24 Hour Recall: Breakfast, Lunch, Dinner, Snack  Meal Plan 24 Hour Recall - Breakfast: Toast and Eggs, coffee with sugar and cream  Meal Plan 24 Hour Recall - Lunch: chicken noodle soup with crackers or a sandwich  Meal Plan 24 Hour Recall - Dinner: yesterday some roast and potatoes, some times a burger and some fries, sometimes gets pizza or something home cooked  Meal Plan 24 Hour Recall - Snack: peanut butter crackers, almonds, cheerios, drinks water or sparkling water body armour lyte  Current nutritional status an area of need that is impacting patient's ability to self-manage diabetes?: No    Physical activity/Exercise:   No change    SMBG: using the dexcom G6    Additional Social History    Support  Is Support an area impacting ability to self-manage diabetes?: No    Access to Mass Media & Technology  Media or technology needs impacting ability to self-manage diabetes?: No    Cognitive/Behavioral Health  Cognitive or behavioral barriers impacting ability to self-manage diabetes?: No    Culture/Scientology  Culture or [employer] beliefs that may impact ability to access healthcare: No    Communication  Communication needs impacting ability to  self-manage diabetes?: No    Health Literacy  Health literacy needs impacting ability to self-manage diabetes?: No      Diabetes Self-Management Skills Assessment     Diabetes Disease Process/Treatment Options  Diabetes Disease Process/Treatment Options: Skills Assessment Completed: No  Assessment indicates:: Adequate understanding  Deferred due to:: Other (comment) (previously completed, there has been no change and patient has adequate understanding)  Area of need?: No    Nutrition/Healthy Eating  Nutrition/Healthy Eating Skills Assessment Completed:: No  Assessment indicates:: Adequate understanding  Deffered due to:: Other (comment) (previously completed, there has been no change and patient has adequate understanding)  Area of need?: No    Physical Activity/Exercise  Physical Activity/Exercise Skills Assessment Completed: : No  Assessment indicates:: Adequate understanding  Deffered due to:: Other (comment)  Area of need?: No    Medications  Medication Skills Assessment Completed:: No  Assessment indicates:: Adequate understanding  Deffered due to:: Other (comment) (previously completed, there has been no change and patient has adequate understanding)  Area of need?: No    Home Blood Glucose Monitoring  Patient states that blood sugar is checked at home daily.: yes  Monitoring Method:: personal continuous glucose monitor, home glucometer  Reasons for not monitoring:: meter broken  Home glucometer meter type:: accu-chek guide  How often do you check your blood sugar?: Occasionally  Blood glucose logs:: no (patient remembered to bring glucometer to visit, I transcribed data from glucometer to logs for provider visit and scanned into media)  Blood glucose logs reviewed today?: no  Personal CGM type:: Dexcom G6  Patient is able to use personal CGM appropriately.: no  CGM Report reviewed?: no  Home Blood Glucose Monitoring Skills Assessment Completed: : Yes  Assessment indicates:: Instruction Needed  Area of need?:  Yes    Acute Complications  Patient identified::  (feels chilled)  Patient identified::  (eat something like a sandwich - instructed on proper treatment options)  Able to state the blood sugar range for hyperglycemia?:  (feels dizzy with hyperglycemia)  Acute Complications Skills Assessment Completed: : No  Assessment indicates:: Adequate understanding  Deffered due to:: Other (comment) (previously completed, there has been no change and patient has adequate understanding)  Area of need?: No    Chronic Complications  Chronic Complications Skills Assessment Completed: : No  Assessment indicates:: Adequate understanding  Deferred due to:: Other (comment) (previously completed, there has been no change and patient has adequate understanding)  Area of need?: No    Psychosocial/Coping  Psychosocial/Coping Skills Assessment Completed: : No  Assessment indicates:: Adequate understanding  Deffered due to:: Other (comment) (previously completed, there has been no change and patient has adequate understanding)  Area of need?: No      During today's follow-up visit,  the following areas required further assessment and content was provided/reviewed.    Based on today's diabetes care assessment, the following areas of need were identified:          3/6/2024    12:01 AM   Social   Support No   Access to Mass Media/Tech No   Cognitive/Behavioral Health No   Culture/Spiritism No   Communication No   Health Literacy No            3/6/2024    12:01 AM   Clinical   Medication Adherence No   Lab Compliance No   Nutritional Status No            3/6/2024    12:01 AM   Diabetes Self-Management Skills   Diabetes Disease Process/Treatment Options No   Nutrition/Healthy Eating No   Physical Activity/Exercise No   Medication No   Home Blood Glucose Monitoring Yes, patient came by yesterday and we put sensor on but forgot reader, today patient is back with reader to enter the sensor number and start the sensor. Sensor number [id number] entered.    Acute Complications No   Chronic Complications No   Psychosocial/Coping No        Today's interventions were provided through individual discussion, instruction, and written materials were provided.    Patient verbalized understanding of instruction and written materials.  Pt was able to return back demonstration of instructions today. Patient understood key points, needs reinforcement and further instruction.     Diabetes Self-Management Care Plan Review and Evaluation of Progress:    During today's follow-up Agustin Diabetes Self-Management Care Plan progress was reviewed and progress was evaluated including his/her input. Aishwarya has agreed to continue his/her journey to improve/maintain overall diabetes control by continuing to set health goals. See care plan progress below.      Care Plan: Diabetes Management   Updates made since 2/5/2024 12:00 AM        Problem: Blood Glucose Self-Monitoring         Goal: Patient agrees to check and record blood sugars 3 times per day using the dexcom G6/G7 for the next 3 months    Start Date: 12/27/2023   Expected End Date: 3/27/2024   This Visit's Progress: No change   Recent Progress: On track   Priority: High   Barriers: Lack of Supplies   Note:    Patient was instructed how to use a meter. Discussed signs and symptoms, and causes and treatment of hypoglycemia. Covered blood sugar and A1C goals. Instructed patient to use provided logbooks to record blood sugars. Instructed patient to follow-up with their physician. Patient verbalized understanding of all instructions.  Prescription for meter and testing supplies pended to PCP    1/6/2023  Dexcom G6 Education  Patient is here in clinic today for initial start of Dexcom continuous glucose monitoring system (CGMA). Reviewed with patient how to insert sensor and transmitter. Patient inserted sensor on right abdomen and inserted transmitter. Time and date was set on monitor and settings were set. Hypoglycemia trigger set  "for 70 and hyperglycemia set for 300. Patient provided with phone number for 24-hour help line if needed. Discussed various types of possible alarms and what to do. Questions addressed. Initialization finished. No futher questions.  Patient referred to clinic today for Dexcom G6 continuous glucose sensor system training.  Education was provided using "Quick Start Guide" per Dexcom protocol.    Overview:  5min glucose reading updates, trending arrows, BG graph screens, battery life indicator, Blue Tooth Symbol.  Menus: trend Graph, start sensor, enter BG, events, Alerts, Settings, Shutdown, Stop Sensor   Settings:                          * Urgent Low: 55 mg/dL                          * Urgent Low Soon: Off                          * Low alert: 70                          * High alert: 300                          * Rise rate: Off                          * Fall Rate: Off                          * Signal Loss: 30 min                          * No Reading: Off                          * Always sound: On                             Reviewed additional setting options with patient, including Graph Height and Transmitter ID. Transmitter was paired with .    Reviewed where to find sensor insertion time and sensor expiration date.   Discussed no need to calibrate sensor during the entirety of the 10 day wear. Discussed that pt can calibrate sensor if desired, but at that time she will need to continue calibrating every 12 hours for sensor to remain accurate. Reviewed appropriate calibration techniques.  Reviewed sensor site selection. Site selected and prepped using aseptic technique Inserted to left abdomen. Transmitter placed in pod and secured.  Practiced sensor pod/transmitter removal from site, and removal of transmitter from sensor pod.  Patient able to demonstrate without difficulty.  Encouraged to review manual prior to starting another sensor.   Reviewed problem solving aspects of sensor " transmission/variables that can disrupt RF transmission.  range 20 feet, but the first 3 hrs keep within 3 feet of transmitter.  Pt instructed on lag time of interstitial fluid from CBG and was advised to tx hypoglycemia and dose insulin based on SMBG values.  Link to video provided and written instructions provided for patient to review before 10 day sensor change  Dexcom technical support contact number given and examples of when to contact them discussed.      1/17 Changing the Sensor (every 10 days)  Remove the whole thing from your body  Hold the sensor by the skinny part and break the clear piece  Remove the gray transmitter  In the reader touch start sensor  Touch yes you want to start sensor  Touch the square you want to put the number in for   There will be a 4-digit code on the sensor (like 5937)  Enter the code from the sensor  Touch ok  Touch yes, it is correct  Insert Sensor  Remove orange piece and paper tabs  Place on skin and hold in place  Push the orange button  Insert the gray transmitter into the sensor  Put the small tongue like side in first and push down  You should hear 2 clicks  Touch confirm once sensor is secure on body  Touch start Sensor  Touch 1 then 2  Sensor will take 2 hours to warm up    Changing Transmitter (every 90 days) in the reader  Touch the =   Touch transmitter  Touch pair new  Go through the process to start sensor  Touch yes you want to start sensor  Touch the square you want to put the number in for   There will be a 4-digit code on the sensor (like 5937)  Enter the code from the sensor  Touch ok  Touch yes, it is correct  Touch the square you want to put the number in for   There will be a 6-digit code on the transmitter box (like [id number])  Enter the code from the transmitter box  Touch ok  Touch yes, it is correct  Insert Sensor  Remove orange piece and paper tabs  Place on skin and hold in place  Push the orange button  Insert the gray transmitter into the  sensor  Put the small tongue like side in first and push down  You should hear 2 clicks  Touch confirm once sensor is secure on body  Touch start Sensor  Touch 1 then 2  Sensor will take 2 hours to warm up             Follow Up Plan     Follow up in about 10 days (around 3/16/2024).    Today's care plan and follow up schedule was discussed with patient.  Aishwarya verbalized understanding of the care plan, goals, and agrees to follow up plan.        The patient was encouraged to communicate with his/her health care provider/physician and care team regarding his/her condition(s) and treatment.  I provided the patient with my contact information today and encouraged to contact me via phone or [facility] Patient Portal as needed.     Length of Visit   Total Time: 20 Minutes

## 2024-03-07 ENCOUNTER — TELEPHONE (OUTPATIENT)
Dept: DIABETES | Facility: CLINIC | Age: 64
End: 2024-03-07
Payer: MEDICARE

## 2024-03-18 ENCOUNTER — NUTRITION (OUTPATIENT)
Dept: DIABETES | Facility: CLINIC | Age: 64
End: 2024-03-18
Payer: MEDICARE

## 2024-03-18 VITALS — BODY MASS INDEX: 35.68 KG/M2 | HEIGHT: 67 IN | WEIGHT: 227.31 LBS

## 2024-03-18 DIAGNOSIS — E11.42 TYPE 2 DIABETES MELLITUS WITH DIABETIC POLYNEUROPATHY, WITH LONG-TERM CURRENT USE OF INSULIN: ICD-10-CM

## 2024-03-18 DIAGNOSIS — E11.65 TYPE 2 DIABETES MELLITUS WITH HYPERGLYCEMIA, WITH LONG-TERM CURRENT USE OF INSULIN: Primary | ICD-10-CM

## 2024-03-18 DIAGNOSIS — Z79.4 TYPE 2 DIABETES MELLITUS WITH HYPERGLYCEMIA, WITH LONG-TERM CURRENT USE OF INSULIN: Primary | ICD-10-CM

## 2024-03-18 DIAGNOSIS — Z79.4 TYPE 2 DIABETES MELLITUS WITH DIABETIC POLYNEUROPATHY, WITH LONG-TERM CURRENT USE OF INSULIN: ICD-10-CM

## 2024-03-18 PROCEDURE — 99999 PR PBB SHADOW E&M-EST. PATIENT-LVL III: CPT | Mod: PBBFAC,,, | Performed by: DIETITIAN, REGISTERED

## 2024-03-18 PROCEDURE — G0108 DIAB MANAGE TRN  PER INDIV: HCPCS | Mod: S$GLB,,, | Performed by: DIETITIAN, REGISTERED

## 2024-03-18 NOTE — PROGRESS NOTES
"Diabetes Care Specialist Follow-up Note  Author: Marla Stein RD, CDE  Date: 3/18/2024    Program Intake  Reason for Diabetes Program Visit:: Intervention  Type of Intervention:: Individual  Individual: Device Training  Device Training: Personal CGM  Current diabetes risk level:: high  In the last 12 months, have you:: none  Permission to speak with others about care:: no  Continuous Glucose Monitoring  Patient has CGM: Yes  Personal CGM type:: Dexcom G6    Lab Results   Component Value Date    HGBA1C 8.7 (H) 11/09/2023     A1c Pre Diabetes Care Specialist Intervention:  8.7%    Clinical    Weight: 103.1 kg (227 lb 4.7 oz)   Height: 5' 7" (170.2 cm)   Body mass index is 35.6 kg/m².    Problem Review  Reviewed health maintenance: yes    Medication Information  Medication adherence impacting ability to self-manage diabetes?: No    Labs  Lab Compliance Barriers: No    Nutritional Status  Diet: Regular  Meal Plan 24 Hour Recall: Breakfast, Lunch, Dinner, Snack  Meal Plan 24 Hour Recall - Breakfast: Toast and Eggs, coffee with sugar and cream  Meal Plan 24 Hour Recall - Lunch: chicken noodle soup with crackers or a sandwich  Meal Plan 24 Hour Recall - Dinner: yesterday some roast and potatoes, some times a burger and some fries, sometimes gets pizza or something home cooked  Meal Plan 24 Hour Recall - Snack: peanut butter crackers, almonds, cheerios, drinks water or sparkling water body armour lyte  Change in appetite?: No  Current nutritional status an area of need that is impacting patient's ability to self-manage diabetes?: No    Physical activity/Exercise:   No change    SMBG: using the dexcom G6 - will update at follow-up    Additional Social History    Support  Is Support an area impacting ability to self-manage diabetes?: No    Access to Mass Media & Technology  Media or technology needs impacting ability to self-manage diabetes?: No    Cognitive/Behavioral Health  Cognitive or behavioral barriers impacting " ability to self-manage diabetes?: No    Culture/Yarsanism  Culture or Jainism beliefs that may impact ability to access healthcare: No    Communication  Communication needs impacting ability to self-manage diabetes?: No    Health Literacy  Health literacy needs impacting ability to self-manage diabetes?: No      Diabetes Self-Management Skills Assessment     Diabetes Disease Process/Treatment Options  Diabetes Disease Process/Treatment Options: Skills Assessment Completed: No  Assessment indicates:: Adequate understanding  Deferred due to:: Other (comment) (previously completed, there has been no change and patient has adequate understanding)  Area of need?: No    Nutrition/Healthy Eating  Nutrition/Healthy Eating Skills Assessment Completed:: No  Assessment indicates:: Adequate understanding  Deffered due to:: Other (comment) (previously completed, there has been no change and patient has adequate understanding)  Area of need?: No    Physical Activity/Exercise  Physical Activity/Exercise Skills Assessment Completed: : No  Assessment indicates:: Adequate understanding  Deffered due to:: Other (comment)  Area of need?: No    Medications  Medication Skills Assessment Completed:: No  Assessment indicates:: Adequate understanding  Deffered due to:: Other (comment) (previously completed, there has been no change and patient has adequate understanding)  Area of need?: No    Home Blood Glucose Monitoring  Patient states that blood sugar is checked at home daily.: yes  Monitoring Method:: personal continuous glucose monitor  Home glucometer meter type:: accu-chek guide  Blood glucose logs::  (patient remembered to bring glucometer to visit, I transcribed data from glucometer to logs for provider visit and scanned into media)  Personal CGM type:: Dexcom G6  Patient is able to use personal CGM appropriately.: no  CGM Report reviewed?: no  Home Blood Glucose Monitoring Skills Assessment Completed: : Yes  Assessment  indicates:: Instruction Needed  Area of need?: Yes    Acute Complications  Patient identified::  (feels chilled)  Patient identified::  (eat something like a sandwich - instructed on proper treatment options)  Able to state the blood sugar range for hyperglycemia?:  (feels dizzy with hyperglycemia)  Acute Complications Skills Assessment Completed: : No  Assessment indicates:: Adequate understanding  Deffered due to:: Other (comment) (previously completed, there has been no change and patient has adequate understanding)  Area of need?: No    Chronic Complications  Chronic Complications Skills Assessment Completed: : No  Assessment indicates:: Adequate understanding  Deferred due to:: Other (comment) (previously completed, there has been no change and patient has adequate understanding)  Area of need?: No    Psychosocial/Coping  Psychosocial/Coping Skills Assessment Completed: : No  Assessment indicates:: Adequate understanding  Deffered due to:: Other (comment) (previously completed, there has been no change and patient has adequate understanding)  Area of need?: No      During today's follow-up visit,  the following areas required further assessment and content was provided/reviewed.    Based on today's diabetes care assessment, the following areas of need were identified:          3/18/2024    12:01 AM   Social   Support No   Access to Mass Media/Tech No   Cognitive/Behavioral Health No   Culture/Restorationism No   Communication No   Health Literacy No            3/18/2024    12:01 AM   Clinical   Medication Adherence No   Lab Compliance No   Nutritional Status No            3/18/2024    12:01 AM   Diabetes Self-Management Skills   Diabetes Disease Process/Treatment Options No   Nutrition/Healthy Eating No   Physical Activity/Exercise No   Medication No   Home Blood Glucose Monitoring Yes, changed the dexcom G6 sensor and transmitter at time of visit   Acute Complications No   Chronic Complications No    Psychosocial/Coping No        Today's interventions were provided through individual discussion, instruction, and written materials were provided.    Patient verbalized understanding of instruction and written materials.  Pt was able to return back demonstration of instructions today. Patient understood key points, needs reinforcement and further instruction.     Diabetes Self-Management Care Plan Review and Evaluation of Progress:    During today's follow-up Agustin Diabetes Self-Management Care Plan progress was reviewed and progress was evaluated including his/her input. Aishwarya has agreed to continue his/her journey to improve/maintain overall diabetes control by continuing to set health goals. See care plan progress below.      Care Plan: Diabetes Management   Updates made since 2/17/2024 12:00 AM        Problem: Blood Glucose Self-Monitoring         Goal: Patient agrees to check and record blood sugars 3 times per day using the dexcom G6/G7 for the next 3 months    Start Date: 12/27/2023   Expected End Date: 3/27/2024   This Visit's Progress: On track   Recent Progress: No change   Priority: High   Barriers: Lack of Supplies   Note:    Patient was instructed how to use a meter. Discussed signs and symptoms, and causes and treatment of hypoglycemia. Covered blood sugar and A1C goals. Instructed patient to use provided logbooks to record blood sugars. Instructed patient to follow-up with their physician. Patient verbalized understanding of all instructions.  Prescription for meter and testing supplies pended to PCP    1/6/2023  Dexcom G6 Education  Patient is here in clinic today for initial start of Dexcom continuous glucose monitoring system (CGMA). Reviewed with patient how to insert sensor and transmitter. Patient inserted sensor on right abdomen and inserted transmitter. Time and date was set on monitor and settings were set. Hypoglycemia trigger set for 70 and hyperglycemia set for 300. Patient  "provided with phone number for 24-hour help line if needed. Discussed various types of possible alarms and what to do. Questions addressed. Initialization finished. No futher questions.  Patient referred to clinic today for Dexcom G6 continuous glucose sensor system training.  Education was provided using "Quick Start Guide" per Dexcom protocol.    Overview:  5min glucose reading updates, trending arrows, BG graph screens, battery life indicator, Blue Tooth Symbol.  Menus: trend Graph, start sensor, enter BG, events, Alerts, Settings, Shutdown, Stop Sensor   Settings:                          * Urgent Low: 55 mg/dL                          * Urgent Low Soon: Off                          * Low alert: 70                          * High alert: 300                          * Rise rate: Off                          * Fall Rate: Off                          * Signal Loss: 30 min                          * No Reading: Off                          * Always sound: On                             Reviewed additional setting options with patient, including Graph Height and Transmitter ID. Transmitter was paired with .    Reviewed where to find sensor insertion time and sensor expiration date.   Discussed no need to calibrate sensor during the entirety of the 10 day wear. Discussed that pt can calibrate sensor if desired, but at that time she will need to continue calibrating every 12 hours for sensor to remain accurate. Reviewed appropriate calibration techniques.  Reviewed sensor site selection. Site selected and prepped using aseptic technique Inserted to left abdomen. Transmitter placed in pod and secured.  Practiced sensor pod/transmitter removal from site, and removal of transmitter from sensor pod.  Patient able to demonstrate without difficulty.  Encouraged to review manual prior to starting another sensor.   Reviewed problem solving aspects of sensor transmission/variables that can disrupt RF " transmission.  range 20 feet, but the first 3 hrs keep within 3 feet of transmitter.  Pt instructed on lag time of interstitial fluid from CBG and was advised to tx hypoglycemia and dose insulin based on SMBG values.  Link to video provided and written instructions provided for patient to review before 10 day sensor change  Dexcom technical support contact number given and examples of when to contact them discussed.      1/17 Changing the Sensor (every 10 days)  Remove the whole thing from your body  Hold the sensor by the skinny part and break the clear piece  Remove the gray transmitter  In the reader touch start sensor  Touch yes you want to start sensor  Touch the square you want to put the number in for   There will be a 4-digit code on the sensor (like 5937)  Enter the code from the sensor  Touch ok  Touch yes, it is correct  Insert Sensor  Remove orange piece and paper tabs  Place on skin and hold in place  Push the orange button  Insert the gray transmitter into the sensor  Put the small tongue like side in first and push down  You should hear 2 clicks  Touch confirm once sensor is secure on body  Touch start Sensor  Touch 1 then 2  Sensor will take 2 hours to warm up    Changing Transmitter (every 90 days) in the reader  Touch the =   Touch transmitter  Touch pair new  Go through the process to start sensor  Touch yes you want to start sensor  Touch the square you want to put the number in for   There will be a 4-digit code on the sensor (like 5937)  Enter the code from the sensor  Touch ok  Touch yes, it is correct  Touch the square you want to put the number in for   There will be a 6-digit code on the transmitter box (like [id number])  Enter the code from the transmitter box  Touch ok  Touch yes, it is correct  Insert Sensor  Remove orange piece and paper tabs  Place on skin and hold in place  Push the orange button  Insert the gray transmitter into the sensor  Put the small tongue like side in first  and push down  You should hear 2 clicks  Touch confirm once sensor is secure on body  Touch start Sensor  Touch 1 then 2  Sensor will take 2 hours to warm up             Follow Up Plan     Follow up in about 10 days (around 3/28/2024) for Change Personal CGM and Upload Data.    Today's care plan and follow up schedule was discussed with patient.  Aishwarya verbalized understanding of the care plan, goals, and agrees to follow up plan.        The patient was encouraged to communicate with his/her health care provider/physician and care team regarding his/her condition(s) and treatment.  I provided the patient with my contact information today and encouraged to contact me via phone or [facility] Patient Portal as needed.     Length of Visit   Total Time: 30 Minutes

## 2024-03-28 DIAGNOSIS — E11.9 TYPE 2 DIABETES MELLITUS WITHOUT COMPLICATION, UNSPECIFIED WHETHER LONG TERM INSULIN USE: ICD-10-CM

## 2024-04-03 ENCOUNTER — NUTRITION (OUTPATIENT)
Dept: DIABETES | Facility: CLINIC | Age: 64
End: 2024-04-03
Payer: MEDICARE

## 2024-04-03 DIAGNOSIS — E11.65 TYPE 2 DIABETES MELLITUS WITH HYPERGLYCEMIA, WITH LONG-TERM CURRENT USE OF INSULIN: Primary | ICD-10-CM

## 2024-04-03 DIAGNOSIS — Z79.4 TYPE 2 DIABETES MELLITUS WITH STAGE 3A CHRONIC KIDNEY DISEASE, WITH LONG-TERM CURRENT USE OF INSULIN: ICD-10-CM

## 2024-04-03 DIAGNOSIS — E11.42 TYPE 2 DIABETES MELLITUS WITH DIABETIC POLYNEUROPATHY, WITH LONG-TERM CURRENT USE OF INSULIN: ICD-10-CM

## 2024-04-03 DIAGNOSIS — N18.31 TYPE 2 DIABETES MELLITUS WITH STAGE 3A CHRONIC KIDNEY DISEASE, WITH LONG-TERM CURRENT USE OF INSULIN: ICD-10-CM

## 2024-04-03 DIAGNOSIS — E11.22 TYPE 2 DIABETES MELLITUS WITH STAGE 3A CHRONIC KIDNEY DISEASE, WITH LONG-TERM CURRENT USE OF INSULIN: ICD-10-CM

## 2024-04-03 DIAGNOSIS — Z79.4 TYPE 2 DIABETES MELLITUS WITH HYPERGLYCEMIA, WITH LONG-TERM CURRENT USE OF INSULIN: Primary | ICD-10-CM

## 2024-04-03 DIAGNOSIS — Z79.4 TYPE 2 DIABETES MELLITUS WITH DIABETIC POLYNEUROPATHY, WITH LONG-TERM CURRENT USE OF INSULIN: ICD-10-CM

## 2024-04-04 ENCOUNTER — TELEPHONE (OUTPATIENT)
Dept: DIABETES | Facility: CLINIC | Age: 64
End: 2024-04-04
Payer: MEDICARE

## 2024-04-04 DIAGNOSIS — Z79.4 TYPE 2 DIABETES MELLITUS WITH HYPERGLYCEMIA, WITH LONG-TERM CURRENT USE OF INSULIN: Primary | ICD-10-CM

## 2024-04-04 DIAGNOSIS — E11.65 TYPE 2 DIABETES MELLITUS WITH HYPERGLYCEMIA, WITH LONG-TERM CURRENT USE OF INSULIN: Primary | ICD-10-CM

## 2024-04-04 NOTE — PROGRESS NOTES
Diabetes Care Specialist Follow-up Note  Author: Marla Stein RD, CDE  Date: 4/4/2024    Program Intake  Reason for Diabetes Program Visit:: Intervention  Type of Intervention:: Individual  Individual: Device Training  Education: Self-Management Skill Review  Device Training: Personal CGM  Current diabetes risk level:: high  In the last 12 months, have you:: none  Permission to speak with others about care:: no  Continuous Glucose Monitoring  Patient has CGM: Yes  Personal CGM type:: Dexcom G6    Lab Results   Component Value Date    HGBA1C 8.1 (H) 04/03/2024     A1c Pre Diabetes Care Specialist Intervention:  8.7%    Clinical    Patient Health Rating  Compared to other people your age, how would you rate your health?: Good    Problem Review  Reviewed Problem List with Patient: yes  Active comorbidities affecting diabetes self-care.: yes  Comorbidities: Cardiovascular Disease, Hypertension, Neuropathy  Reviewed health maintenance: yes    Clinical Assessment  Have you ever experienced hyperglycemia (high blood sugar)?: no    Medication Information  Medication adherence impacting ability to self-manage diabetes?: No    Labs  Lab Compliance Barriers: No    Nutritional Status  Diet: Regular  Meal Plan 24 Hour Recall: Breakfast, Lunch, Dinner, Snack  Meal Plan 24 Hour Recall - Breakfast: Toast and Eggs, coffee with sugar and cream  Meal Plan 24 Hour Recall - Lunch: chicken noodle soup with crackers or a sandwich  Meal Plan 24 Hour Recall - Dinner: yesterday some roast and potatoes, some times a burger and some fries, sometimes gets pizza or something home cooked  Meal Plan 24 Hour Recall - Snack: peanut butter crackers, almonds, cheerios, drinks water or sparkling water body armour lyte  Current nutritional status an area of need that is impacting patient's ability to self-manage diabetes?: No    Physical activity/Exercise:   No change    SMBG: using the dexcom G6    Additional Social History    Support  Does anyone  support you with your diabetes care?: yes  Does the current support meet the patient's needs?: Yes  Is Support an area impacting ability to self-manage diabetes?: No    Access to Mass Media & Technology  Media or technology needs impacting ability to self-manage diabetes?: No    Cognitive/Behavioral Health  Cognitive or behavioral barriers impacting ability to self-manage diabetes?: No    Culture/Mandaen  Culture or Yazidism beliefs that may impact ability to access healthcare: No    Communication  Communication needs impacting ability to self-manage diabetes?: No    Health Literacy  Health literacy needs impacting ability to self-manage diabetes?: No      Diabetes Self-Management Skills Assessment     Diabetes Disease Process/Treatment Options  Diabetes Disease Process/Treatment Options: Skills Assessment Completed: No  Assessment indicates:: Adequate understanding  Deferred due to:: Other (comment) (previously completed, there has been no change and patient has adequate understanding)  Area of need?: No    Nutrition/Healthy Eating  Nutrition/Healthy Eating Skills Assessment Completed:: No  Assessment indicates:: Adequate understanding  Deffered due to:: Other (comment) (previously completed, there has been no change and patient has adequate understanding)  Area of need?: No    Physical Activity/Exercise  Physical Activity/Exercise Skills Assessment Completed: : No  Assessment indicates:: Adequate understanding  Deffered due to:: Other (comment)  Area of need?: No    Medications  Medication Skills Assessment Completed:: No  Assessment indicates:: Adequate understanding  Deffered due to:: Other (comment) (previously completed, there has been no change and patient has adequate understanding)  Area of need?: No    Home Blood Glucose Monitoring  Patient states that blood sugar is checked at home daily.: yes  Monitoring Method:: personal continuous glucose monitor  Home glucometer meter type:: accu-chek guide  Blood  glucose logs::  (patient remembered to bring glucometer to visit, I transcribed data from glucometer to logs for provider visit and scanned into media)  Personal CGM type:: Dexcom G6  Patient is able to use personal CGM appropriately.: no  CGM Report reviewed?: no  Home Blood Glucose Monitoring Skills Assessment Completed: : Yes  Assessment indicates:: Instruction Needed  Area of need?: Yes    Acute Complications  Patient identified::  (feels chilled)  Patient identified::  (eat something like a sandwich - instructed on proper treatment options)  Able to state the blood sugar range for hyperglycemia?:  (feels dizzy with hyperglycemia)  Acute Complications Skills Assessment Completed: : No  Assessment indicates:: Adequate understanding  Deffered due to:: Other (comment) (previously completed, there has been no change and patient has adequate understanding)  Area of need?: No    Chronic Complications  Chronic Complications Skills Assessment Completed: : No  Assessment indicates:: Adequate understanding  Deferred due to:: Other (comment) (previously completed, there has been no change and patient has adequate understanding)  Area of need?: No    Psychosocial/Coping  Psychosocial/Coping Skills Assessment Completed: : No  Assessment indicates:: Adequate understanding  Deffered due to:: Other (comment) (previously completed, there has been no change and patient has adequate understanding)  Area of need?: No      During today's follow-up visit,  the following areas required further assessment and content was provided/reviewed.    Based on today's diabetes care assessment, the following areas of need were identified:          4/3/2024    12:03 AM   Social   Support No   Access to Mass Media/Tech No   Cognitive/Behavioral Health No   Culture/Yarsani No   Communication No   Health Literacy No            4/3/2024    12:03 AM   Clinical   Medication Adherence No   Lab Compliance No   Nutritional Status No            4/3/2024     12:03 AM   Diabetes Self-Management Skills   Diabetes Disease Process/Treatment Options No   Nutrition/Healthy Eating No   Physical Activity/Exercise No   Medication No   Home Blood Glucose Monitoring Yes, changed transmitter and sensor at time of visit   Acute Complications No   Chronic Complications No   Psychosocial/Coping No        Today's interventions were provided through individual discussion, instruction, and written materials were provided.    Patient verbalized understanding of instruction and written materials.  Pt was able to return back demonstration of instructions today. Patient understood key points, needs reinforcement and further instruction.     Diabetes Self-Management Care Plan Review and Evaluation of Progress:    During today's follow-up Agustin Diabetes Self-Management Care Plan progress was reviewed and progress was evaluated including his/her input. Aishwarya has agreed to continue his/her journey to improve/maintain overall diabetes control by continuing to set health goals. See care plan progress below.      Care Plan: Diabetes Management   Updates made since 3/5/2024 12:00 AM        Problem: Blood Glucose Self-Monitoring         Goal: Patient agrees to check and record blood sugars 3 times per day using the dexcom G6/G7 for the next 3 months    Start Date: 12/27/2023   Expected End Date: 3/27/2024   This Visit's Progress: On track   Recent Progress: On track   Priority: High   Barriers: Lack of Supplies   Note:    Patient was instructed how to use a meter. Discussed signs and symptoms, and causes and treatment of hypoglycemia. Covered blood sugar and A1C goals. Instructed patient to use provided logbooks to record blood sugars. Instructed patient to follow-up with their physician. Patient verbalized understanding of all instructions.  Prescription for meter and testing supplies pended to PCP    1/6/2023  Dexcom G6 Education  Patient is here in clinic today for initial start of  "Dexcom continuous glucose monitoring system (CGMA). Reviewed with patient how to insert sensor and transmitter. Patient inserted sensor on right abdomen and inserted transmitter. Time and date was set on monitor and settings were set. Hypoglycemia trigger set for 70 and hyperglycemia set for 300. Patient provided with phone number for 24-hour help line if needed. Discussed various types of possible alarms and what to do. Questions addressed. Initialization finished. No futher questions.  Patient referred to clinic today for Dexcom G6 continuous glucose sensor system training.  Education was provided using "Quick Start Guide" per Dexcom protocol.    Overview:  5min glucose reading updates, trending arrows, BG graph screens, battery life indicator, Blue Tooth Symbol.  Menus: trend Graph, start sensor, enter BG, events, Alerts, Settings, Shutdown, Stop Sensor   Settings:                          * Urgent Low: 55 mg/dL                          * Urgent Low Soon: Off                          * Low alert: 70                          * High alert: 300                          * Rise rate: Off                          * Fall Rate: Off                          * Signal Loss: 30 min                          * No Reading: Off                          * Always sound: On                             Reviewed additional setting options with patient, including Graph Height and Transmitter ID. Transmitter was paired with .    Reviewed where to find sensor insertion time and sensor expiration date.   Discussed no need to calibrate sensor during the entirety of the 10 day wear. Discussed that pt can calibrate sensor if desired, but at that time she will need to continue calibrating every 12 hours for sensor to remain accurate. Reviewed appropriate calibration techniques.  Reviewed sensor site selection. Site selected and prepped using aseptic technique Inserted to left abdomen. Transmitter placed in pod and " secured.  Practiced sensor pod/transmitter removal from site, and removal of transmitter from sensor pod.  Patient able to demonstrate without difficulty.  Encouraged to review manual prior to starting another sensor.   Reviewed problem solving aspects of sensor transmission/variables that can disrupt RF transmission.  range 20 feet, but the first 3 hrs keep within 3 feet of transmitter.  Pt instructed on lag time of interstitial fluid from CBG and was advised to tx hypoglycemia and dose insulin based on SMBG values.  Link to video provided and written instructions provided for patient to review before 10 day sensor change  Dexcom technical support contact number given and examples of when to contact them discussed.      1/17 Changing the Sensor (every 10 days)  Remove the whole thing from your body  Hold the sensor by the skinny part and break the clear piece  Remove the gray transmitter  In the reader touch start sensor  Touch yes you want to start sensor  Touch the square you want to put the number in for   There will be a 4-digit code on the sensor (like 5937)  Enter the code from the sensor  Touch ok  Touch yes, it is correct  Insert Sensor  Remove orange piece and paper tabs  Place on skin and hold in place  Push the orange button  Insert the gray transmitter into the sensor  Put the small tongue like side in first and push down  You should hear 2 clicks  Touch confirm once sensor is secure on body  Touch start Sensor  Touch 1 then 2  Sensor will take 2 hours to warm up    Changing Transmitter (every 90 days) in the reader  Touch the =   Touch transmitter  Touch pair new  Go through the process to start sensor  Touch yes you want to start sensor  Touch the square you want to put the number in for   There will be a 4-digit code on the sensor (like 5937)  Enter the code from the sensor  Touch ok  Touch yes, it is correct  Touch the square you want to put the number in for   There will be a 6-digit code  on the transmitter box (like [id number])  Enter the code from the transmitter box  Touch ok  Touch yes, it is correct  Insert Sensor  Remove orange piece and paper tabs  Place on skin and hold in place  Push the orange button  Insert the gray transmitter into the sensor  Put the small tongue like side in first and push down  You should hear 2 clicks  Touch confirm once sensor is secure on body  Touch start Sensor  Touch 1 then 2  Sensor will take 2 hours to warm up             Follow Up Plan     Follow up in about 10 days (around 4/13/2024) for Change Personal CGM and Upload Data.    Today's care plan and follow up schedule was discussed with patient.  Aishwarya verbalized understanding of the care plan, goals, and agrees to follow up plan.        The patient was encouraged to communicate with his/her health care provider/physician and care team regarding his/her condition(s) and treatment.  I provided the patient with my contact information today and encouraged to contact me via phone or [facility] Patient Portal as needed.     Length of Visit   Total Time: 20 Minutes

## 2024-04-08 ENCOUNTER — TELEPHONE (OUTPATIENT)
Dept: DIABETES | Facility: CLINIC | Age: 64
End: 2024-04-08
Payer: MEDICARE

## 2024-04-09 ENCOUNTER — TELEPHONE (OUTPATIENT)
Dept: DIABETES | Facility: CLINIC | Age: 64
End: 2024-04-09
Payer: MEDICARE

## 2024-04-09 NOTE — TELEPHONE ENCOUNTER
Rescheduled pt visit, stated that she will be out of town until the 13th , scheduled for next available

## 2024-04-12 ENCOUNTER — TELEPHONE (OUTPATIENT)
Dept: DIABETES | Facility: CLINIC | Age: 64
End: 2024-04-12
Payer: MEDICARE

## 2024-04-15 ENCOUNTER — TELEPHONE (OUTPATIENT)
Dept: DIABETES | Facility: CLINIC | Age: 64
End: 2024-04-15
Payer: MEDICARE

## 2024-04-16 ENCOUNTER — TELEPHONE (OUTPATIENT)
Dept: PRIMARY CARE CLINIC | Facility: CLINIC | Age: 64
End: 2024-04-16
Payer: MEDICARE

## 2024-04-16 ENCOUNTER — OFFICE VISIT (OUTPATIENT)
Dept: PODIATRY | Facility: CLINIC | Age: 64
End: 2024-04-16
Payer: MEDICARE

## 2024-04-16 VITALS
WEIGHT: 221.81 LBS | DIASTOLIC BLOOD PRESSURE: 56 MMHG | HEIGHT: 67 IN | SYSTOLIC BLOOD PRESSURE: 142 MMHG | HEART RATE: 72 BPM | BODY MASS INDEX: 34.81 KG/M2

## 2024-04-16 DIAGNOSIS — E11.42 TYPE 2 DIABETES MELLITUS WITH DIABETIC POLYNEUROPATHY, WITH LONG-TERM CURRENT USE OF INSULIN: Primary | ICD-10-CM

## 2024-04-16 DIAGNOSIS — I87.2 EDEMA OF BOTH LOWER EXTREMITIES DUE TO PERIPHERAL VENOUS INSUFFICIENCY: ICD-10-CM

## 2024-04-16 DIAGNOSIS — M79.671 PAIN OF RIGHT HEEL: ICD-10-CM

## 2024-04-16 DIAGNOSIS — Z12.31 BREAST CANCER SCREENING BY MAMMOGRAM: Primary | ICD-10-CM

## 2024-04-16 DIAGNOSIS — L60.2 THICKENED NAILS: ICD-10-CM

## 2024-04-16 DIAGNOSIS — Z79.4 TYPE 2 DIABETES MELLITUS WITH DIABETIC POLYNEUROPATHY, WITH LONG-TERM CURRENT USE OF INSULIN: Primary | ICD-10-CM

## 2024-04-16 DIAGNOSIS — M76.61 ACHILLES TENDINITIS, RIGHT LEG: ICD-10-CM

## 2024-04-16 PROCEDURE — 99213 OFFICE O/P EST LOW 20 MIN: CPT | Mod: S$GLB,,, | Performed by: PODIATRIST

## 2024-04-16 PROCEDURE — 1126F AMNT PAIN NOTED NONE PRSNT: CPT | Mod: CPTII,S$GLB,, | Performed by: PODIATRIST

## 2024-04-16 PROCEDURE — 3077F SYST BP >= 140 MM HG: CPT | Mod: CPTII,S$GLB,, | Performed by: PODIATRIST

## 2024-04-16 PROCEDURE — 99999 PR PBB SHADOW E&M-EST. PATIENT-LVL IV: CPT | Mod: PBBFAC,,, | Performed by: PODIATRIST

## 2024-04-16 PROCEDURE — 3008F BODY MASS INDEX DOCD: CPT | Mod: CPTII,S$GLB,, | Performed by: PODIATRIST

## 2024-04-16 PROCEDURE — 3066F NEPHROPATHY DOC TX: CPT | Mod: CPTII,S$GLB,, | Performed by: PODIATRIST

## 2024-04-16 PROCEDURE — 4010F ACE/ARB THERAPY RXD/TAKEN: CPT | Mod: CPTII,S$GLB,, | Performed by: PODIATRIST

## 2024-04-16 PROCEDURE — 3062F POS MACROALBUMINURIA REV: CPT | Mod: CPTII,S$GLB,, | Performed by: PODIATRIST

## 2024-04-16 PROCEDURE — 3078F DIAST BP <80 MM HG: CPT | Mod: CPTII,S$GLB,, | Performed by: PODIATRIST

## 2024-04-16 PROCEDURE — 3052F HG A1C>EQUAL 8.0%<EQUAL 9.0%: CPT | Mod: CPTII,S$GLB,, | Performed by: PODIATRIST

## 2024-04-16 NOTE — PROGRESS NOTES
Subjective:      Patient ID: Aishwarya Jensen is a 63 y.o. female.    Chief Complaint: Nail Care    Patient is here for f/u heel pain & DM foot care. States her heel pain has gone but lost one of Tulis heel cups so in flat slip-ons - wants another pair toprevent recurrence.  Has thick toenails but goes to pedicures regularly. Questions re: Vicks for toenails or other OTC topical antifungals.  Patient last in 5 months ago. Was visiting daughter in [facility] for 2 wks.  11/2/23  Patient is here for f/u R heel pain. Last here for DM foot exam & scheduled q 6 months. Instructed on appropriate shoes as well as dispensed Tulis heel cups (in current flat casual slip-ons) & PPT heel lifts for others. Rx Dm shoes written. States has some swelling in BLE R>L.  9/28/23  Aishwarya is a 63 y.o. female who presents after >5months absence for diabetic foot & nail care in high risk feet. Nails hurt in shoes w/ pressure when walking. Also heel pain R foot.     PCP: Joey Cintron MD    Date Last Seen by PCP:  02/08/2023 due 5/13/24  DM mgmt: Micki Knott NP 11/14/23    Current shoe gear: flat casual; uses non-slip hospital socks in home.    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    Aishwarya has a past medical history of Diabetes mellitus, type 2, Hypercholesteremia, and Hypertension.   Dx DM 30+ yrs.ago.   Knee pain - ortho.referral  Patient Active Problem List   Diagnosis    Essential hypertension, benign    Dyslipidemia, goal LDL below 100    Microalbuminuria due to type 2 diabetes mellitus    Type 2 diabetes mellitus with hyperglycemia, with long-term current use of insulin    Type 2 diabetes mellitus with diabetic polyneuropathy, with long-term current use of insulin    Asymptomatic varicose veins of bilateral lower extremities    Onychomycosis due to dermatophyte    Class 1 obesity due to excess calories with serious comorbidity and body mass index (BMI) of 34.0 to 34.9 in adult    Stage 3a chronic kidney disease     Dyspnea on exertion    Primary osteoarthritis of right knee    Primary osteoarthritis of left knee    Type 2 diabetes mellitus with stage 3a chronic kidney disease, with long-term current use of insulin    Anemia    Cecal volvulus    Weakness      Hemoglobin A1C   Date Value Ref Range Status   04/03/2024 8.1 (H) 4.0 - 5.6 % Final     Comment:     ADA Screening Guidelines:  5.7-6.4%  Consistent with prediabetes  >or=6.5%  Consistent with diabetes    High levels of fetal hemoglobin interfere with the HbA1C  assay. Heterozygous hemoglobin variants (HbS, HgC, etc)do  not significantly interfere with this assay.   However, presence of multiple variants may affect accuracy.     11/09/2023 8.7 (H) 4.0 - 5.6 % Final     Comment:     ADA Screening Guidelines:  5.7-6.4%  Consistent with prediabetes  >or=6.5%  Consistent with diabetes    High levels of fetal hemoglobin interfere with the HbA1C  assay. Heterozygous hemoglobin variants (HbS, HgC, etc)do  not significantly interfere with this assay.   However, presence of multiple variants may affect accuracy.     05/26/2023 7.4 (H) 4.0 - 5.6 % Final     Comment:     ADA Screening Guidelines:  5.7-6.4%  Consistent with prediabetes  >or=6.5%  Consistent with diabetes    High levels of fetal hemoglobin interfere with the HbA1C  assay. Heterozygous hemoglobin variants (HbS, HgC, etc)do  not significantly interfere with this assay.   However, presence of multiple variants may affect accuracy.        Objective:      Physical Exam  Vitals reviewed.   Constitutional:       Appearance: She is well-developed. She is obese.   Cardiovascular:      Pulses: Normal pulses.           Dorsalis pedis pulses are 2+ on the right side and 2+ on the left side.      Comments: Varicose veins R>L, w/ edema BLE, & telangectasias.  Musculoskeletal:         General: No swelling or tenderness.      Right lower leg: Edema present.      Left lower leg: Edema present.      Right ankle:      Right Achilles  Tendon: Tenderness present. No defects. Menchaca's test negative.      Left ankle: No tenderness.      Left Achilles Tendon: No tenderness or defects. Menchaca's test negative.      Right foot: Deformity (hammertoe B/L) present.      Left foot: Deformity (cavus B/L) present.        Feet:    Feet:      Right foot:      Skin integrity: Warmth present. No erythema.      Toenail Condition: Fungal disease present.     Left foot:      Toenail Condition: Fungal disease present.     Comments: Equinus B/L ankles w/ < 90 deg foot to leg noted w/ knees extended.      MS strength of extrinsics to foot & ankle B/L + 5/5 in DF/PF/Inv/Ev to resistance w/ no reproduction of pain in any direction, including Achilles insertion R.  No residual TTP distal lateral heel insertion of Achilles.  Passive ROM of ankle & pedal joints is painless & w/out crepitation B/L.     Toenails 1st, 2nd, 3rd, 4th, 5th  B/L are thickened, dystrophic, discolored, w/ crumbly subungual debris sparing 4 L.    Skin:     General: Skin is cool and dry.      Capillary Refill: Capillary refill takes less than 2 seconds.      Findings: No bruising, erythema or lesion.      Comments: Stasis hyperpigmentation B/L.   Neurological:      Mental Status: She is alert and oriented to person, place, and time.      Sensory: Sensory deficit present.      Motor: Motor function is intact. No weakness or abnormal muscle tone.      Gait: Gait is intact. Gait normal.      Comments: Paresthesias including numbness & parasthesias B/L feet w/ no clearly identified trigger or source; no hyperemia.   Psychiatric:         Mood and Affect: Mood and affect normal.         Behavior: Behavior normal. Behavior is cooperative.       Assessment:      Encounter Diagnoses   Name Primary?    Type 2 diabetes mellitus with diabetic polyneuropathy, with long-term current use of insulin Yes    Achilles tendinitis, right leg     Pain of right heel     Edema of both lower extremities due to peripheral  venous insufficiency     Thickened nails      Problem List Items Addressed This Visit          Endocrine    Type 2 diabetes mellitus with diabetic polyneuropathy, with long-term current use of insulin - Primary     Other Visit Diagnoses       Achilles tendinitis, right leg        Relevant Orders    HEEL LIFTS FOR HOME USE    Pain of right heel        Relevant Orders    HEEL LIFTS FOR HOME USE    Edema of both lower extremities due to peripheral venous insufficiency        Thickened nails                Plan:      Aishwarya was seen today for nail care.    Diagnoses and all orders for this visit:    Type 2 diabetes mellitus with diabetic polyneuropathy, with long-term current use of insulin    Achilles tendinitis, right leg  -     HEEL LIFTS FOR HOME USE    Pain of right heel  -     HEEL LIFTS FOR HOME USE    Edema of both lower extremities due to peripheral venous insufficiency    Thickened nails    I counseled the patient on her conditions, their implications & medical mgmt.    - Diabetic Foot Education. Patient reminded of the importance of goodblood sugar control to help prevent podiatric complications of diabetes. We discussed wearing proper shoe gear, daily foot inspections, never walking w/out protective shoe gear, podiatric visits every 6 months, sooner prn.      -Continue PPT heel lifts or Tulis heel cups all times WB including @ home.     Continue OTC Voltaren gel up to qid prn.    Continue size E Tubigrip for R & size D for LLE.        A total of 29 mins.was spent on chart review, patient visit & documentation.

## 2024-04-16 NOTE — TELEPHONE ENCOUNTER
----- Message from Oksana Edwards sent at 4/16/2024  3:02 PM CDT -----  Regarding: ORDER  Patient is requesting a order be put in for her yearly mammogram.    Thanks  Oksana

## 2024-04-22 ENCOUNTER — OFFICE VISIT (OUTPATIENT)
Dept: DIABETES | Facility: CLINIC | Age: 64
End: 2024-04-22
Payer: MEDICARE

## 2024-04-22 ENCOUNTER — TELEPHONE (OUTPATIENT)
Dept: DIABETES | Facility: CLINIC | Age: 64
End: 2024-04-22

## 2024-04-22 VITALS
BODY MASS INDEX: 34.79 KG/M2 | HEIGHT: 67 IN | SYSTOLIC BLOOD PRESSURE: 148 MMHG | DIASTOLIC BLOOD PRESSURE: 80 MMHG | HEART RATE: 61 BPM | OXYGEN SATURATION: 98 % | WEIGHT: 221.63 LBS

## 2024-04-22 DIAGNOSIS — Z59.9 FINANCIAL DIFFICULTIES: ICD-10-CM

## 2024-04-22 DIAGNOSIS — R80.9 MICROALBUMINURIA DUE TO TYPE 2 DIABETES MELLITUS: ICD-10-CM

## 2024-04-22 DIAGNOSIS — E11.42 TYPE 2 DIABETES MELLITUS WITH DIABETIC POLYNEUROPATHY, WITH LONG-TERM CURRENT USE OF INSULIN: ICD-10-CM

## 2024-04-22 DIAGNOSIS — I10 ELEVATED BLOOD PRESSURE READING IN OFFICE WITH DIAGNOSIS OF HYPERTENSION: ICD-10-CM

## 2024-04-22 DIAGNOSIS — E78.5 DYSLIPIDEMIA, GOAL LDL BELOW 100: ICD-10-CM

## 2024-04-22 DIAGNOSIS — Z79.4 TYPE 2 DIABETES MELLITUS WITH HYPERGLYCEMIA, WITH LONG-TERM CURRENT USE OF INSULIN: Primary | ICD-10-CM

## 2024-04-22 DIAGNOSIS — N18.31 TYPE 2 DIABETES MELLITUS WITH STAGE 3A CHRONIC KIDNEY DISEASE, WITH LONG-TERM CURRENT USE OF INSULIN: ICD-10-CM

## 2024-04-22 DIAGNOSIS — D64.9 ANEMIA, UNSPECIFIED TYPE: ICD-10-CM

## 2024-04-22 DIAGNOSIS — E11.65 TYPE 2 DIABETES MELLITUS WITH HYPERGLYCEMIA, WITH LONG-TERM CURRENT USE OF INSULIN: Primary | ICD-10-CM

## 2024-04-22 DIAGNOSIS — Z71.9 HEALTH EDUCATION/COUNSELING: ICD-10-CM

## 2024-04-22 DIAGNOSIS — E11.29 MICROALBUMINURIA DUE TO TYPE 2 DIABETES MELLITUS: ICD-10-CM

## 2024-04-22 DIAGNOSIS — Z91.148 NONCOMPLIANCE W/MEDICATION TREATMENT DUE TO INTERMIT USE OF MEDICATION: ICD-10-CM

## 2024-04-22 DIAGNOSIS — E66.09 CLASS 1 OBESITY DUE TO EXCESS CALORIES WITH SERIOUS COMORBIDITY AND BODY MASS INDEX (BMI) OF 34.0 TO 34.9 IN ADULT: ICD-10-CM

## 2024-04-22 DIAGNOSIS — Z79.4 TYPE 2 DIABETES MELLITUS WITH DIABETIC POLYNEUROPATHY, WITH LONG-TERM CURRENT USE OF INSULIN: ICD-10-CM

## 2024-04-22 DIAGNOSIS — I10 ESSENTIAL HYPERTENSION, BENIGN: ICD-10-CM

## 2024-04-22 DIAGNOSIS — Z79.4 TYPE 2 DIABETES MELLITUS WITH STAGE 3A CHRONIC KIDNEY DISEASE, WITH LONG-TERM CURRENT USE OF INSULIN: ICD-10-CM

## 2024-04-22 DIAGNOSIS — E11.22 TYPE 2 DIABETES MELLITUS WITH STAGE 3A CHRONIC KIDNEY DISEASE, WITH LONG-TERM CURRENT USE OF INSULIN: ICD-10-CM

## 2024-04-22 PROCEDURE — 3077F SYST BP >= 140 MM HG: CPT | Mod: CPTII,S$GLB,, | Performed by: NURSE PRACTITIONER

## 2024-04-22 PROCEDURE — 3066F NEPHROPATHY DOC TX: CPT | Mod: CPTII,S$GLB,, | Performed by: NURSE PRACTITIONER

## 2024-04-22 PROCEDURE — 3288F FALL RISK ASSESSMENT DOCD: CPT | Mod: CPTII,S$GLB,, | Performed by: NURSE PRACTITIONER

## 2024-04-22 PROCEDURE — 1159F MED LIST DOCD IN RCRD: CPT | Mod: CPTII,S$GLB,, | Performed by: NURSE PRACTITIONER

## 2024-04-22 PROCEDURE — 3079F DIAST BP 80-89 MM HG: CPT | Mod: CPTII,S$GLB,, | Performed by: NURSE PRACTITIONER

## 2024-04-22 PROCEDURE — 99214 OFFICE O/P EST MOD 30 MIN: CPT | Mod: S$GLB,,, | Performed by: NURSE PRACTITIONER

## 2024-04-22 PROCEDURE — 1160F RVW MEDS BY RX/DR IN RCRD: CPT | Mod: CPTII,S$GLB,, | Performed by: NURSE PRACTITIONER

## 2024-04-22 PROCEDURE — 1126F AMNT PAIN NOTED NONE PRSNT: CPT | Mod: CPTII,S$GLB,, | Performed by: NURSE PRACTITIONER

## 2024-04-22 PROCEDURE — 3052F HG A1C>EQUAL 8.0%<EQUAL 9.0%: CPT | Mod: CPTII,S$GLB,, | Performed by: NURSE PRACTITIONER

## 2024-04-22 PROCEDURE — 3062F POS MACROALBUMINURIA REV: CPT | Mod: CPTII,S$GLB,, | Performed by: NURSE PRACTITIONER

## 2024-04-22 PROCEDURE — 1101F PT FALLS ASSESS-DOCD LE1/YR: CPT | Mod: CPTII,S$GLB,, | Performed by: NURSE PRACTITIONER

## 2024-04-22 PROCEDURE — 4010F ACE/ARB THERAPY RXD/TAKEN: CPT | Mod: CPTII,S$GLB,, | Performed by: NURSE PRACTITIONER

## 2024-04-22 PROCEDURE — 3008F BODY MASS INDEX DOCD: CPT | Mod: CPTII,S$GLB,, | Performed by: NURSE PRACTITIONER

## 2024-04-22 PROCEDURE — 99999 PR PBB SHADOW E&M-EST. PATIENT-LVL V: CPT | Mod: PBBFAC,,, | Performed by: NURSE PRACTITIONER

## 2024-04-22 RX ORDER — SEMAGLUTIDE 0.68 MG/ML
0.5 INJECTION, SOLUTION SUBCUTANEOUS
Qty: 1 EACH | Refills: 6 | Status: SHIPPED | OUTPATIENT
Start: 2024-04-22

## 2024-04-22 SDOH — SOCIAL DETERMINANTS OF HEALTH (SDOH): PROBLEM RELATED TO HOUSING AND ECONOMIC CIRCUMSTANCES, UNSPECIFIED: Z59.9

## 2024-04-22 NOTE — TELEPHONE ENCOUNTER
----- Message from Eliza Izaguirre MA sent at 4/22/2024  1:05 PM CDT -----  Contact: Karina with [facility] phone [phone]  Pharmacy says that they have Mounajro 2.5mg in stock. Would you like them to fill the Mounjaro instead of the Ozempic?  ----- Message -----  From: Savita Rockwell  Sent: 4/22/2024  12:37 PM CDT  To: [facility] Staff    Pharmacy is calling to clarify an RX.  RX name:  semaglutide (OZEMPIC) 0.25 mg or 0.5 mg (2 mg/3 mL) pen injector  What do they need to clarify:  Dosage  Comments: Should it me 0.25 mg

## 2024-04-22 NOTE — ASSESSMENT & PLAN NOTE
Body mass index is 34.71 kg/m².  Increases insulin resistance.   Discussed DM diet and exercise.

## 2024-04-22 NOTE — ASSESSMENT & PLAN NOTE
BP goal is < 140/90.   Tolerating ACEi  Blood pressure goals discussed with patient  Nurse visit for repeat BP   If still elevated -- will increased Norvasc to 10 mg daily   She reports compliance with BP medications-- Lisinopril/HCTZ and Norvasc

## 2024-04-22 NOTE — ASSESSMENT & PLAN NOTE
Uncontrolled   Needs to get back on the Dexcom as it was helping  with self awareness and self accountability-- use the dexcom consistently -- needs additional help   Needs to get back on GLP 1 as it helps with prandial excursions  Mounjaro is on national back order-- sounds like she had just started it. Last RX is from December with no refills.         -- Medication Changes:   Continue metformin 1000 mg daily -- unable to tolerate BID     Continue Farxiga 10 mg daily     Start Ozempic weekly since Mounjaro is on national back order   Ozempic 0.5 mg weekly     Continue VGo30 with Fiasp   5-6 clicks with meals   1-2 clicks with snacks   Clicks BEFORE MEALS   CHANGE THE VGO EVERY 24 HOURS -- SAME TIME EVERY DAY   ALWAYS WEAR THE VGO!!!!!!     Use the dexcom G6 consistently       -- Reviewed goals of therapy are to get the best control we can without hypoglycemia.  -- Reviewed patient's current insulin regimen. Clarified proper insulin dose and timing in relation to meals, etc. Insulin injection sites and proper rotation instructed.    -- Advised frequent self blood glucose monitoring.  Patient encouraged to document glucose results and bring them to every clinic visit. Get back on dexcom personal CGM -- use the dexcom G6 consistently   -- Hypoglycemia precautions discussed. Instructed on precautions before driving.    -- Call for Bg repeatedly < 70 or > 200.   -- Close adherence to lifestyle changes recommended.   -- Periodic follow ups for eye evaluations, foot care and dental care suggested.  Follow up closely with nurse schedule for dexcom Help       patient will benefit from a Dexcom G6 to prevent hypoglycemia due to patient experiences level 2 or hypoglycemic event <54   Patient has diabetes mellitus and manages diabetes with intensive insulin regimen and uses prandial and basal insulin daily-- on the VGO   Patient requires a therapeutic CGM and is willing to use therapeutic CGM for the necessary frequent  adjustments of insulin therapy.  I have completed an in-person visit during the previous 6 months and will continue to have in-person visits every 6 months to assess adherence to their CGM regimen and diabetes treatment plan.  Due to COVID pandemic and need for remote monitoring this tool is medically necessary

## 2024-04-22 NOTE — PATIENT INSTRUCTIONS
Continue metformin 1000 mg daily -- unable to tolerate BID     Continue Farxiga 10 mg daily     Start Ozempic weekly since Mounjaro is on national back order   Ozempic 0.5 mg weekly     Continue VGo30 with Fiasp   5-6 clicks with meals   1-2 clicks with snacks   Clicks BEFORE MEALS   CHANGE THE VGO EVERY 24 HOURS -- SAME TIME EVERY DAY   ALWAYS WEAR THE VGO!!!!!!

## 2024-04-22 NOTE — PROGRESS NOTES
CC:   Chief Complaint   Patient presents with    Diabetes Mellitus       HPI: Aishwarya Jensen is a 63 y.o. female presents for a follow up visit today for the management of T2DM   She was diagnosed with Type 2 diabetes in her early 50's on routine lab work. She was initially started on Metformin. Insulin therapy was started soon after diagnosis.     Family hx of diabetes: Mother, father, brothers   Hospitalized for diabetes: denies     No personal or FH of thyroid cancer or personal of pancreatic cancer or pancreatitis.     She is retired now.       Our last visit was in November 2023  A1c at 8.1%   Bg was 3.91 on l abs   Not wearing dexcom today -- reports issue with the pharmacy and the transmitter.   Did not bring her  with her today for me to download   She does NOT want the dexcom on her phone.   She wants to use the  only     At our last visit we continued her metformin, continued the farxiga   Start mounjaro   Continued the Vgo30 with fiasp   She has been using Mounjaro 2.5 mg weekly -- but I only sent in 4 pens in December with NO refills. So not sure how she has been getting the mounjaro-- suspect noncompliance   Eran MOLINA upset with the mounjaro   Mounjaro is on national back order   Denies any issues with the ozempic      DIABETES COMPLICATIONS: nephropathy and peripheral neuropathy      Diabetes Management Status    ASA:  Yes - 81 mg- taking it every day     Statin: Taking Crestor 40 mg nightly and taking the Zetia 10 mg daily   ACE/ARB: Taking- lisinopril 20 mg     Screening or Prevention Patient's value Goal Complete/Controlled?   HgA1C Testing and Control   Lab Results   Component Value Date    HGBA1C 8.1 (H) 04/03/2024      Annually/Less than 8% Yes   Lipid profile : 11/09/2023 Annually Yes   LDL control Lab Results   Component Value Date    LDLCALC 56.8 (L) 11/09/2023    Annually/Less than 100 mg/dl  No   Nephropathy screening Lab Results   Component Value Date    LABMICR  266.0 04/03/2024     Lab Results   Component Value Date    PROTEINUA Trace (A) 08/10/2023    Annually Yes   Blood pressure BP Readings from Last 1 Encounters:   04/22/24 (!) 148/80    Less than 140/90 No   Dilated retinal exam : 03/14/2023 Annually No   Foot exam   : 04/22/2024 Annually Yes       CURRENT A1C:    Hemoglobin A1C   Date Value Ref Range Status   04/03/2024 8.1 (H) 4.0 - 5.6 % Final     Comment:     ADA Screening Guidelines:  5.7-6.4%  Consistent with prediabetes  >or=6.5%  Consistent with diabetes    High levels of fetal hemoglobin interfere with the HbA1C  assay. Heterozygous hemoglobin variants (HbS, HgC, etc)do  not significantly interfere with this assay.   However, presence of multiple variants may affect accuracy.     11/09/2023 8.7 (H) 4.0 - 5.6 % Final     Comment:     ADA Screening Guidelines:  5.7-6.4%  Consistent with prediabetes  >or=6.5%  Consistent with diabetes    High levels of fetal hemoglobin interfere with the HbA1C  assay. Heterozygous hemoglobin variants (HbS, HgC, etc)do  not significantly interfere with this assay.   However, presence of multiple variants may affect accuracy.     05/26/2023 7.4 (H) 4.0 - 5.6 % Final     Comment:     ADA Screening Guidelines:  5.7-6.4%  Consistent with prediabetes  >or=6.5%  Consistent with diabetes    High levels of fetal hemoglobin interfere with the HbA1C  assay. Heterozygous hemoglobin variants (HbS, HgC, etc)do  not significantly interfere with this assay.   However, presence of multiple variants may affect accuracy.         GOAL A1C: less than 8%     DM MEDICATIONS USED IN THE PAST: Metformin   Victoza   Lantus   VGo  Novolog    Freestyle manisha -- didn't like it   Ozempic   Fiasp   Dexcom G6      CURRENT DIABETES MEDICATIONS: Metformin 1000 mg daily  VGo30 with Fiasp 5-6 clicks with meals   Giving clicks before the meals    Farxiga 10 mg daily   Mounjaro 2.5 mg weekly-- only took for 4 weeks-- reports that she took her last pen-- last week  "  Insulin:VGo  Missed doses:  yes-missing the mounjaro   Usually wearing the VGo to her abdomen   She is adamant that she is changing her VGo every 24 hours. -- changes in the evening   giving her VGo clicks before she eats   Denies missing medication doses   Has her VGo on today and it has insulin in it -- wearing to her abdomen         BLOOD GLUCOSE MONITORING:   Sensor type: Dexcom G 6   Site changed every 10 days     Did not bring her dexcom  with her today   Unable to download today       HYPOGLYCEMIA: 2 times since last visit -- due to taking clicks and not eating   once to 30??- she "felt bad"    Maybe some hypoglycemia unawareness or error with meter   Some in the 70's       MEALS: eating 3 meals per day   drinking OJ sometimes   Drinks: water    Sparking water- SF -- ICE - SF       CURRENT EXERCISE:  No      Review of Systems  Review of Systems   Constitutional:  Negative for appetite change, fatigue and unexpected weight change.   HENT:  Negative for trouble swallowing.    Eyes:  Negative for visual disturbance.   Respiratory:  Negative for shortness of breath.    Cardiovascular:  Negative for chest pain and leg swelling.   Gastrointestinal:  Negative for abdominal pain, constipation, diarrhea, nausea and vomiting.   Endocrine: Negative for polydipsia, polyphagia and polyuria.   Genitourinary:         + Nocturia 2 times per night    Musculoskeletal:  Negative for arthralgias.   Skin:  Negative for wound.   Neurological:  Negative for numbness.   Psychiatric/Behavioral:  Positive for sleep disturbance.        Physical Exam   Physical Exam  Vitals and nursing note reviewed.   Constitutional:       Appearance: She is well-developed. She is obese.   HENT:      Head: Normocephalic and atraumatic.      Right Ear: External ear normal.      Left Ear: External ear normal.      Nose: Nose normal.   Neck:      Thyroid: No thyromegaly.      Trachea: No tracheal deviation.   Cardiovascular:      Rate and " Rhythm: Normal rate and regular rhythm.      Heart sounds: No murmur heard.     Comments: Slight edema to right leg-- nonpitting   No erythema   Pulmonary:      Effort: Pulmonary effort is normal. No respiratory distress.      Breath sounds: Normal breath sounds.   Abdominal:      Palpations: Abdomen is soft.      Tenderness: There is no abdominal tenderness.      Hernia: No hernia is present.   Musculoskeletal:      Cervical back: Normal range of motion and neck supple.   Skin:     General: Skin is warm and dry.      Capillary Refill: Capillary refill takes less than 2 seconds.      Findings: No rash.      Comments: She is wearing the VGo to her abdomen- -it HAS insulin in it   No complications    Neurological:      Mental Status: She is alert and oriented to person, place, and time.      Cranial Nerves: No cranial nerve deficit.   Psychiatric:         Behavior: Behavior normal.         Judgment: Judgment normal.           FOOT EXAMINATION: Appropriate footwear.       Protective Sensation (w/ 10 gram monofilament):  Right: Intact  Left: Intact    Visual Inspection:  Normal -  Bilateral and Nails Intact - without Evidence of Foot Deformity- Bilateral    Pedal Pulses:   Right: Present  Left: Present    Posterior Tibialis Pulses:   Right:Present  Left: Present            Lab Results   Component Value Date    TSH 5.712 (H) 11/09/2023         Type 2 diabetes mellitus with hyperglycemia, with long-term current use of insulin  Uncontrolled   Needs to get back on the Dexcom as it was helping  with self awareness and self accountability-- use the dexcom consistently -- needs additional help   Needs to get back on GLP 1 as it helps with prandial excursions  Mounjaro is on national back order-- sounds like she had just started it. Last RX is from December with no refills.         -- Medication Changes:   Continue metformin 1000 mg daily -- unable to tolerate BID     Continue Farxiga 10 mg daily     Start Ozempic weekly since  Mounjaro is on national back order   Ozempic 0.5 mg weekly     Continue VGo30 with Fiasp   5-6 clicks with meals   1-2 clicks with snacks   Clicks BEFORE MEALS   CHANGE THE VGO EVERY 24 HOURS -- SAME TIME EVERY DAY   ALWAYS WEAR THE VGO!!!!!!     Use the dexcom G6 consistently       -- Reviewed goals of therapy are to get the best control we can without hypoglycemia.  -- Reviewed patient's current insulin regimen. Clarified proper insulin dose and timing in relation to meals, etc. Insulin injection sites and proper rotation instructed.    -- Advised frequent self blood glucose monitoring.  Patient encouraged to document glucose results and bring them to every clinic visit. Get back on dexcom personal CGM -- use the dexcom G6 consistently   -- Hypoglycemia precautions discussed. Instructed on precautions before driving.    -- Call for Bg repeatedly < 70 or > 200.   -- Close adherence to lifestyle changes recommended.   -- Periodic follow ups for eye evaluations, foot care and dental care suggested.  Follow up closely with nurse schedule for dexcom Help       patient will benefit from a Dexcom G6 to prevent hypoglycemia due to patient experiences level 2 or hypoglycemic event <54   Patient has diabetes mellitus and manages diabetes with intensive insulin regimen and uses prandial and basal insulin daily-- on the VGO   Patient requires a therapeutic CGM and is willing to use therapeutic CGM for the necessary frequent adjustments of insulin therapy.  I have completed an in-person visit during the previous 6 months and will continue to have in-person visits every 6 months to assess adherence to their CGM regimen and diabetes treatment plan.  Due to COVID pandemic and need for remote monitoring this tool is medically necessary        Type 2 diabetes mellitus with diabetic polyneuropathy, with long-term current use of insulin  Optimize BG readings.   See above.       Educated patient to check feet daily for any foreign  objects and/or wounds. Discussed with patient the importance of wearing appropriate footwear at all times, not to walk barefoot ever, and to check shoes before putting them on feet. Instructed patient to keep feet dry by regularly changing shoes and socks and drying feet after baths and exercises. Also, instructed patient to report any new lesions, discolorations, or swelling to a healthcare professional.        Microalbuminuria due to type 2 diabetes mellitus  Optimize BG readings.   See above.   On ACEi and SGLT2       Type 2 diabetes mellitus with stage 3a chronic kidney disease, with long-term current use of insulin  Optimize BG readings without insulin stacking or hypoglycemia   See above.   On Farxiga and Lisinopril     Essential hypertension, benign  BP goal is < 140/90.   Tolerating ACEi  Blood pressure goals discussed with patient  Nurse visit for repeat BP   If still elevated -- will increased Norvasc to 10 mg daily   She reports compliance with BP medications-- Lisinopril/HCTZ and Norvasc     Dyslipidemia, goal LDL below 100  On statin per ADA recommendations  LDL goal < 100. LDL at goal. LFTs WNL. Continue statin.   On Crestor 40 mg nightly and Zetia 10 mg daily--reports compliance    Class 1 obesity due to excess calories with serious comorbidity and body mass index (BMI) of 34.0 to 34.9 in adult  Body mass index is 34.71 kg/m².  Increases insulin resistance.   Discussed DM diet and exercise.           I spent a total of 30 minutes on the day of the visit.This includes face to face time and non-face to face time preparing to see the patient (eg, review of tests), obtaining and/or reviewing separately obtained history, documenting clinical information in the electronic or other health record, independently interpreting results and communicating results to the patient/family/caregiver, or care coordinator.        Follow up in about 3 months (around 7/22/2024).  Nurse visit on Thursday for dexcom help  please-- check BP at that time -- if still high -- will adjust medications    Can she come back for another nurse visit in 10 days after that for dexocm download   Follow up with me in 3 months with labs prior       Orders Placed This Encounter   Procedures    Hemoglobin A1C     Standing Status:   Future     Standing Expiration Date:   10/22/2025    Microalbumin/Creatinine Ratio, Urine     Standing Status:   Future     Standing Expiration Date:   10/22/2025     Order Specific Question:   Specimen Source     Answer:   Urine    TSH     Standing Status:   Future     Standing Expiration Date:   10/22/2025    Comprehensive Metabolic Panel     Standing Status:   Future     Standing Expiration Date:   10/22/2025         Recommendations were discussed with the patient in detail  The patient verbalized understanding and agrees with the plan outlined as above.

## 2024-04-22 NOTE — TELEPHONE ENCOUNTER
----- Message from Savita Rockwell sent at 4/22/2024 12:36 PM CDT -----  Contact: Karina with [facility] phone [phone]  Pharmacy is calling to clarify an RX.  RX name:  semaglutide (OZEMPIC) 0.25 mg or 0.5 mg (2 mg/3 mL) pen injector  What do they need to clarify:  Dosage  Comments: Should it me 0.25 mg

## 2024-04-25 ENCOUNTER — CLINICAL SUPPORT (OUTPATIENT)
Dept: DIABETES | Facility: CLINIC | Age: 64
End: 2024-04-25
Payer: MEDICARE

## 2024-04-25 ENCOUNTER — TELEPHONE (OUTPATIENT)
Dept: DIABETES | Facility: CLINIC | Age: 64
End: 2024-04-25
Payer: MEDICARE

## 2024-04-25 VITALS — DIASTOLIC BLOOD PRESSURE: 78 MMHG | SYSTOLIC BLOOD PRESSURE: 138 MMHG

## 2024-04-25 DIAGNOSIS — I10 HYPERTENSION, UNSPECIFIED TYPE: Primary | ICD-10-CM

## 2024-04-25 NOTE — TELEPHONE ENCOUNTER
----- Message from Anai Etienne LPN sent at 4/25/2024 10:28 AM CDT -----  Pt came into clinic, BP taken on right arm while in the sitting position, /78, pt reports she is taking lisinopril-hydrochlorothiazide 20-12.5mg 1 tablet daily,

## 2024-04-25 NOTE — PROGRESS NOTES
Pt came into clinic, BP taken on right arm while in the sitting position, /78, pt reports she is taking lisinopril-hydrochlorothiazide 20-12.5mg 1 tablet daily,

## 2024-05-02 ENCOUNTER — HOSPITAL ENCOUNTER (OUTPATIENT)
Dept: RADIOLOGY | Facility: HOSPITAL | Age: 64
Discharge: HOME | End: 2024-05-02
Attending: FAMILY MEDICINE
Payer: MEDICARE

## 2024-05-02 DIAGNOSIS — Z12.31 BREAST CANCER SCREENING BY MAMMOGRAM: ICD-10-CM

## 2024-05-02 PROCEDURE — 77063 BREAST TOMOSYNTHESIS BI: CPT | Mod: TC

## 2024-05-02 PROCEDURE — 77067 SCR MAMMO BI INCL CAD: CPT | Mod: 26,,, | Performed by: RADIOLOGY

## 2024-05-02 PROCEDURE — 77063 BREAST TOMOSYNTHESIS BI: CPT | Mod: 26,,, | Performed by: RADIOLOGY

## 2024-05-03 ENCOUNTER — TELEPHONE (OUTPATIENT)
Dept: DIABETES | Facility: CLINIC | Age: 64
End: 2024-05-03
Payer: MEDICARE

## 2024-05-03 DIAGNOSIS — I10 ESSENTIAL HYPERTENSION, BENIGN: ICD-10-CM

## 2024-05-03 RX ORDER — LISINOPRIL AND HYDROCHLOROTHIAZIDE 12.5; 2 MG/1; MG/1
1 TABLET ORAL DAILY
Qty: 90 TABLET | Refills: 2 | Status: SHIPPED | OUTPATIENT
Start: 2024-05-03

## 2024-05-03 NOTE — TELEPHONE ENCOUNTER
Refill Routing Note   Medication(s) are not appropriate for processing by [facility] for the following reason(s):        ED/Hospital Visit since last OV with provider    [facility] action(s):  Defer      Medication Therapy Plan: 8/10 ED visit      Appointments  past 12m or future 3m with PCP    Date Provider   Last Visit   2/8/2023 Joey Cintron MD   Next Visit   5/13/2024 Joey Cintron MD   ED visits in past 90 days: 1        Note composed:11:27 AM 05/03/2024

## 2024-05-03 NOTE — TELEPHONE ENCOUNTER
No care due was identified.  [facility] Embedded Care Due Messages. Reference number: [id number].   5/03/2024 7:03:53 AM CDT

## 2024-05-06 ENCOUNTER — TELEPHONE (OUTPATIENT)
Dept: DIABETES | Facility: CLINIC | Age: 64
End: 2024-05-06
Payer: MEDICARE

## 2024-05-06 ENCOUNTER — NUTRITION (OUTPATIENT)
Dept: DIABETES | Facility: CLINIC | Age: 64
End: 2024-05-06
Payer: MEDICARE

## 2024-05-06 DIAGNOSIS — E11.22 TYPE 2 DIABETES MELLITUS WITH STAGE 3A CHRONIC KIDNEY DISEASE, WITH LONG-TERM CURRENT USE OF INSULIN: ICD-10-CM

## 2024-05-06 DIAGNOSIS — N18.31 TYPE 2 DIABETES MELLITUS WITH STAGE 3A CHRONIC KIDNEY DISEASE, WITH LONG-TERM CURRENT USE OF INSULIN: ICD-10-CM

## 2024-05-06 DIAGNOSIS — Z79.4 TYPE 2 DIABETES MELLITUS WITH DIABETIC POLYNEUROPATHY, WITH LONG-TERM CURRENT USE OF INSULIN: ICD-10-CM

## 2024-05-06 DIAGNOSIS — Z79.4 TYPE 2 DIABETES MELLITUS WITH HYPERGLYCEMIA, WITH LONG-TERM CURRENT USE OF INSULIN: Primary | ICD-10-CM

## 2024-05-06 DIAGNOSIS — E11.65 TYPE 2 DIABETES MELLITUS WITH HYPERGLYCEMIA, WITH LONG-TERM CURRENT USE OF INSULIN: Primary | ICD-10-CM

## 2024-05-06 DIAGNOSIS — E11.42 TYPE 2 DIABETES MELLITUS WITH DIABETIC POLYNEUROPATHY, WITH LONG-TERM CURRENT USE OF INSULIN: ICD-10-CM

## 2024-05-06 DIAGNOSIS — Z79.4 TYPE 2 DIABETES MELLITUS WITH STAGE 3A CHRONIC KIDNEY DISEASE, WITH LONG-TERM CURRENT USE OF INSULIN: ICD-10-CM

## 2024-05-06 PROCEDURE — G0108 DIAB MANAGE TRN  PER INDIV: HCPCS | Mod: S$GLB,,, | Performed by: DIETITIAN, REGISTERED

## 2024-05-06 NOTE — TELEPHONE ENCOUNTER
----- Message from Annmarie Lau sent at 5/6/2024 12:30 PM CDT -----  Contact: [phone]  Cc: Marla Stein  Patient called, to inform that she will be running a little bit late. Thank you.

## 2024-05-13 ENCOUNTER — OFFICE VISIT (OUTPATIENT)
Dept: PODIATRY | Facility: CLINIC | Age: 64
End: 2024-05-13
Payer: MEDICARE

## 2024-05-13 ENCOUNTER — OFFICE VISIT (OUTPATIENT)
Dept: PRIMARY CARE CLINIC | Facility: CLINIC | Age: 64
End: 2024-05-13
Payer: MEDICARE

## 2024-05-13 ENCOUNTER — CLINICAL SUPPORT (OUTPATIENT)
Dept: PRIMARY CARE CLINIC | Facility: CLINIC | Age: 64
End: 2024-05-13
Attending: FAMILY MEDICINE
Payer: MEDICARE

## 2024-05-13 VITALS
DIASTOLIC BLOOD PRESSURE: 66 MMHG | TEMPERATURE: 97 F | WEIGHT: 222.56 LBS | HEIGHT: 67 IN | HEART RATE: 60 BPM | OXYGEN SATURATION: 100 % | SYSTOLIC BLOOD PRESSURE: 132 MMHG | BODY MASS INDEX: 34.93 KG/M2 | RESPIRATION RATE: 18 BRPM

## 2024-05-13 VITALS — BODY MASS INDEX: 34.93 KG/M2 | WEIGHT: 222.56 LBS | HEIGHT: 67 IN

## 2024-05-13 DIAGNOSIS — E11.65 TYPE 2 DIABETES MELLITUS WITH HYPERGLYCEMIA, WITH LONG-TERM CURRENT USE OF INSULIN: ICD-10-CM

## 2024-05-13 DIAGNOSIS — E11.42 TYPE 2 DIABETES MELLITUS WITH DIABETIC POLYNEUROPATHY, WITH LONG-TERM CURRENT USE OF INSULIN: ICD-10-CM

## 2024-05-13 DIAGNOSIS — E11.9 TYPE 2 DIABETES MELLITUS WITHOUT COMPLICATION, UNSPECIFIED WHETHER LONG TERM INSULIN USE: ICD-10-CM

## 2024-05-13 DIAGNOSIS — I70.0 AORTIC ATHEROSCLEROSIS: ICD-10-CM

## 2024-05-13 DIAGNOSIS — Z00.00 ANNUAL PHYSICAL EXAM: Primary | ICD-10-CM

## 2024-05-13 DIAGNOSIS — Z23 NEED FOR VACCINATION: ICD-10-CM

## 2024-05-13 DIAGNOSIS — Z79.4 TYPE 2 DIABETES MELLITUS WITH DIABETIC POLYNEUROPATHY, WITH LONG-TERM CURRENT USE OF INSULIN: ICD-10-CM

## 2024-05-13 DIAGNOSIS — I10 ESSENTIAL HYPERTENSION, BENIGN: ICD-10-CM

## 2024-05-13 DIAGNOSIS — Z79.4 TYPE 2 DIABETES MELLITUS WITH HYPERGLYCEMIA, WITH LONG-TERM CURRENT USE OF INSULIN: ICD-10-CM

## 2024-05-13 DIAGNOSIS — N18.31 STAGE 3A CHRONIC KIDNEY DISEASE: ICD-10-CM

## 2024-05-13 DIAGNOSIS — M79.671 PAIN OF RIGHT HEEL: Primary | ICD-10-CM

## 2024-05-13 DIAGNOSIS — M76.61 ACHILLES TENDINITIS, RIGHT LEG: ICD-10-CM

## 2024-05-13 PROBLEM — K56.2 CECAL VOLVULUS: Status: RESOLVED | Noted: 2023-05-04 | Resolved: 2024-05-13

## 2024-05-13 PROBLEM — R53.1 WEAKNESS: Status: RESOLVED | Noted: 2023-05-08 | Resolved: 2024-05-13

## 2024-05-13 PROCEDURE — 99213 OFFICE O/P EST LOW 20 MIN: CPT | Mod: S$GLB,,, | Performed by: PODIATRIST

## 2024-05-13 PROCEDURE — 4010F ACE/ARB THERAPY RXD/TAKEN: CPT | Mod: CPTII,S$GLB,, | Performed by: FAMILY MEDICINE

## 2024-05-13 PROCEDURE — 3052F HG A1C>EQUAL 8.0%<EQUAL 9.0%: CPT | Mod: CPTII,S$GLB,, | Performed by: FAMILY MEDICINE

## 2024-05-13 PROCEDURE — 3288F FALL RISK ASSESSMENT DOCD: CPT | Mod: CPTII,S$GLB,, | Performed by: PODIATRIST

## 2024-05-13 PROCEDURE — 90677 PCV20 VACCINE IM: CPT | Mod: S$GLB,,, | Performed by: FAMILY MEDICINE

## 2024-05-13 PROCEDURE — 99999 PR PBB SHADOW E&M-EST. PATIENT-LVL IV: CPT | Mod: PBBFAC,,, | Performed by: FAMILY MEDICINE

## 2024-05-13 PROCEDURE — 1101F PT FALLS ASSESS-DOCD LE1/YR: CPT | Mod: CPTII,S$GLB,, | Performed by: FAMILY MEDICINE

## 2024-05-13 PROCEDURE — 1160F RVW MEDS BY RX/DR IN RCRD: CPT | Mod: CPTII,S$GLB,, | Performed by: FAMILY MEDICINE

## 2024-05-13 PROCEDURE — 3075F SYST BP GE 130 - 139MM HG: CPT | Mod: CPTII,S$GLB,, | Performed by: FAMILY MEDICINE

## 2024-05-13 PROCEDURE — 3288F FALL RISK ASSESSMENT DOCD: CPT | Mod: CPTII,S$GLB,, | Performed by: FAMILY MEDICINE

## 2024-05-13 PROCEDURE — 92228 IMG RTA DETC/MNTR DS PHY/QHP: CPT | Mod: 26,S$GLB,, | Performed by: OPHTHALMOLOGY

## 2024-05-13 PROCEDURE — 3008F BODY MASS INDEX DOCD: CPT | Mod: CPTII,S$GLB,, | Performed by: FAMILY MEDICINE

## 2024-05-13 PROCEDURE — 3066F NEPHROPATHY DOC TX: CPT | Mod: CPTII,S$GLB,, | Performed by: FAMILY MEDICINE

## 2024-05-13 PROCEDURE — 3062F POS MACROALBUMINURIA REV: CPT | Mod: CPTII,S$GLB,, | Performed by: PODIATRIST

## 2024-05-13 PROCEDURE — 1126F AMNT PAIN NOTED NONE PRSNT: CPT | Mod: CPTII,S$GLB,, | Performed by: PODIATRIST

## 2024-05-13 PROCEDURE — 99999 PR PBB SHADOW E&M-EST. PATIENT-LVL II: CPT | Mod: PBBFAC,,, | Performed by: PODIATRIST

## 2024-05-13 PROCEDURE — 2025F 7 FLD RTA PHOTO W/O RTNOPTHY: CPT | Mod: CPTII,S$GLB,, | Performed by: OPHTHALMOLOGY

## 2024-05-13 PROCEDURE — 3078F DIAST BP <80 MM HG: CPT | Mod: CPTII,S$GLB,, | Performed by: FAMILY MEDICINE

## 2024-05-13 PROCEDURE — 3008F BODY MASS INDEX DOCD: CPT | Mod: CPTII,S$GLB,, | Performed by: PODIATRIST

## 2024-05-13 PROCEDURE — 3066F NEPHROPATHY DOC TX: CPT | Mod: CPTII,S$GLB,, | Performed by: PODIATRIST

## 2024-05-13 PROCEDURE — 1159F MED LIST DOCD IN RCRD: CPT | Mod: CPTII,S$GLB,, | Performed by: FAMILY MEDICINE

## 2024-05-13 PROCEDURE — 3052F HG A1C>EQUAL 8.0%<EQUAL 9.0%: CPT | Mod: CPTII,S$GLB,, | Performed by: PODIATRIST

## 2024-05-13 PROCEDURE — 3062F POS MACROALBUMINURIA REV: CPT | Mod: CPTII,S$GLB,, | Performed by: FAMILY MEDICINE

## 2024-05-13 PROCEDURE — 99397 PER PM REEVAL EST PAT 65+ YR: CPT | Mod: S$GLB,,, | Performed by: FAMILY MEDICINE

## 2024-05-13 PROCEDURE — G0009 ADMIN PNEUMOCOCCAL VACCINE: HCPCS | Mod: S$GLB,,, | Performed by: FAMILY MEDICINE

## 2024-05-13 PROCEDURE — 1126F AMNT PAIN NOTED NONE PRSNT: CPT | Mod: CPTII,S$GLB,, | Performed by: FAMILY MEDICINE

## 2024-05-13 PROCEDURE — 1101F PT FALLS ASSESS-DOCD LE1/YR: CPT | Mod: CPTII,S$GLB,, | Performed by: PODIATRIST

## 2024-05-13 PROCEDURE — 92228 IMG RTA DETC/MNTR DS PHY/QHP: CPT | Mod: TC,S$GLB,, | Performed by: FAMILY MEDICINE

## 2024-05-13 PROCEDURE — 4010F ACE/ARB THERAPY RXD/TAKEN: CPT | Mod: CPTII,S$GLB,, | Performed by: PODIATRIST

## 2024-05-13 NOTE — PROGRESS NOTES
Aishwarya Jensen is a 63 y.o. female here for a diabetic eye screening with non-dilated fundus photos per Dr. Cintron.    Patient cooperative?: Yes  Small pupils?: No  Last eye exam: > 1 year    For exam results, see Encounter Report.

## 2024-05-13 NOTE — PROGRESS NOTES
"Subjective:       Patient ID: Aishwarya Jensen is a 63 y.o. female.    Chief Complaint: Annual Exam    Here for annual checkup.  No specific complaints or concerns at this time.  Admits that she has been eating suboptimally, blood glucose has been consistently over 200.  Recently saw diabetes management who switched her to Ozempic, which she recently just started.  No adverse side effects noted thus far.      Review of Systems   Constitutional:  Negative for chills, fatigue and fever.   HENT:  Negative for congestion.    Eyes:  Negative for visual disturbance.   Respiratory:  Negative for cough and shortness of breath.    Cardiovascular:  Negative for chest pain.   Gastrointestinal:  Negative for abdominal pain, nausea and vomiting.   Genitourinary:  Negative for difficulty urinating.   Musculoskeletal:  Negative for arthralgias.   Skin:  Negative for rash.   Neurological:  Negative for dizziness.   Psychiatric/Behavioral:  Negative for sleep disturbance.        Objective:      Vitals:    05/13/24 0949   BP: 132/66   BP Location: Left arm   Patient Position: Sitting   BP Method: Large (Manual)   Pulse: 60   Resp: 18   Temp: 97 °F (36.1 °C)   TempSrc: Temporal   SpO2: 100%   Weight: 101 kg (222 lb 8.9 oz)   Height: 5' 7" (1.702 m)     BP Readings from Last 5 Encounters:   05/13/24 132/66   04/25/24 138/78   04/22/24 (!) 148/80   04/16/24 (!) 142/56   04/03/24 (!) 166/78     Wt Readings from Last 5 Encounters:   05/13/24 101 kg (222 lb 8.9 oz)   04/22/24 100.5 kg (221 lb 9.6 oz)   04/16/24 100.6 kg (221 lb 12.5 oz)   04/03/24 99.8 kg (220 lb 0.3 oz)   03/18/24 103.1 kg (227 lb 4.7 oz)     Physical Exam  Vitals and nursing note reviewed.   Constitutional:       General: She is not in acute distress.     Appearance: Normal appearance. She is well-developed.   HENT:      Head: Normocephalic and atraumatic.   Cardiovascular:      Rate and Rhythm: Normal rate and regular rhythm.      Heart sounds: Normal heart " sounds.   Pulmonary:      Effort: Pulmonary effort is normal.      Breath sounds: Normal breath sounds.   Musculoskeletal:      Right lower leg: No edema.      Left lower leg: No edema.   Skin:     General: Skin is warm and dry.   Neurological:      Mental Status: She is alert and oriented to person, place, and time.   Psychiatric:         Mood and Affect: Mood normal.         Behavior: Behavior normal.         Lab Results   Component Value Date    WBC 6.07 11/09/2023    HGB 10.9 (L) 11/09/2023    HCT 37.7 11/09/2023     11/09/2023    CHOL 135 11/09/2023    TRIG 76 11/09/2023    HDL 63 11/09/2023    ALT 24 11/09/2023    AST 22 11/09/2023     04/15/2024    K 4.5 04/15/2024     04/15/2024    CREATININE 1.3 04/15/2024    BUN 28 (H) 04/15/2024    CO2 24 04/15/2024    TSH 5.712 (H) 11/09/2023    HGBA1C 8.1 (H) 04/03/2024      Assessment:       1. Annual physical exam    2. Aortic atherosclerosis    3. Essential hypertension, benign    4. Stage 3a chronic kidney disease    5. Type 2 diabetes mellitus with hyperglycemia, with long-term current use of insulin    6. Need for vaccination        Plan:       Annual physical exam  Age-appropriate screening up-to-date  Aortic atherosclerosis  Medical management, continue aspirin and statin  Essential hypertension, benign  Stable on current regimen  Stage 3a chronic kidney disease  Stable, avoid nephrotoxins  Type 2 diabetes mellitus with hyperglycemia, with long-term current use of insulin  Needs much tighter glycemic control.  Stressed importance of dietary modification.  Follow-up with diabetes management as scheduled  Need for vaccination  -     pneumoc 20-olegario conj-dip cr(PF) (PREVNAR-20 (PF)) injection Syrg 0.5 mL      Medication List with Changes/Refills   Current Medications    ACCU-CHEK GUIDE GLUCOSE METER MISC    1 kit by Misc.(Non-Drug; Combo Route) route once daily.    ACETAMINOPHEN (TYLENOL) 500 MG TABLET    Take 2 tablets (1,000 mg total) by mouth  every 6 to 8 hours as needed for Pain.    AMLODIPINE (NORVASC) 5 MG TABLET    Take 1 tablet by mouth once daily    ASPIRIN (ECOTRIN) 81 MG EC TABLET    Take 1 tablet (81 mg total) by mouth once daily.    BLOOD SUGAR DIAGNOSTIC STRP    Use to test blood glucose four (4) times a day, to be used with insurance-preferred brand of glucometer/supplies.    BLOOD-GLUCOSE METER KIT    To check BG 4 times daily, to use with insurance preferred meter    BLOOD-GLUCOSE SENSOR (DEXCOM G6 SENSOR) DAVID    1 sensor every 10 days    BLOOD-GLUCOSE TRANSMITTER (DEXCOM G6 TRANSMITTER) DAVID    1 transmitter every 3 months    DAPAGLIFLOZIN PROPANEDIOL (FARXIGA) 10 MG TABLET    Take 1 tablet (10 mg total) by mouth once daily.    EZETIMIBE (ZETIA) 10 MG TABLET    Take 1 tablet (10 mg total) by mouth every evening.    INSULIN LISPRO (HUMALOG U-100 INSULIN) 100 UNIT/ML INJECTION    To use with VGo 30- 5-6 clicks TID AC, Max TDD of 100 units    LANCETS MISC    Use to test blood glucose four (4) times a day, discard lancet after each use    LISINOPRIL-HYDROCHLOROTHIAZIDE (PRINZIDE,ZESTORETIC) 20-12.5 MG PER TABLET    Take 1 tablet by mouth once daily    MELOXICAM (MOBIC) 15 MG TABLET    Take 1 tablet (15 mg total) by mouth once daily. With meals    METFORMIN (GLUCOPHAGE) 1000 MG TABLET    Take 1 tablet (1,000 mg total) by mouth daily with breakfast.    ROSUVASTATIN (CRESTOR) 40 MG TAB    Take 1 tablet (40 mg total) by mouth every evening.    SEMAGLUTIDE (OZEMPIC) 0.25 MG OR 0.5 MG (2 MG/3 ML) PEN INJECTOR    Inject 0.5 mg into the skin every 7 days.    SUB-Q INSULIN DEVICE, 30 UNIT (V-GO 30) DAVID    Inject 1 Device into the skin once daily.   Discontinued Medications    ALBUTEROL (PROVENTIL/VENTOLIN HFA) 90 MCG/ACTUATION INHALER    Inhale 1-2 puffs into the lungs every 6 (six) hours as needed for Wheezing. Rescue    BENZOCAINE-MENTHOL 6-10 MG LOZENGE    Take 1 lozenge by mouth every 2 (two) hours as needed for Pain.    CYCLOBENZAPRINE  (FLEXERIL) 10 MG TABLET    Take 10 mg by mouth once daily. TAKE ONE TABLET BY MOUTH ONCE DAILY AS NEEDED    DICLOFENAC SODIUM (VOLTAREN) 1 % GEL    Apply 2 g topically 4 (four) times daily.    FLUCONAZOLE (DIFLUCAN) 150 MG TAB    TAKE ONE TABLET BY MOUTH AS A ONE TIME DOSE MAY REPEAT IN 3 DAYS IF NEEDED FOR 1 DOSE.    HYDROXYZINE PAMOATE (VISTARIL) 50 MG CAP    Take 1 capsule (50 mg total) by mouth every 8 (eight) hours as needed (itching).    MUPIROCIN (BACTROBAN) 2 % OINTMENT    Apply topically 3 (three) times daily.    NABUMETONE (RELAFEN) 750 MG TABLET    Take 750 mg by mouth 2 (two) times daily.    OXYCODONE (ROXICODONE) 5 MG IMMEDIATE RELEASE TABLET    Take 1 tablet (5 mg total) by mouth every 8 (eight) hours as needed for Pain.

## 2024-05-14 NOTE — PROGRESS NOTES
Diabetes Care Specialist Follow-up Note  Author: Marla Stein RD, CDE  Date: 5/14/2024    Program Intake  Reason for Diabetes Program Visit:: Intervention  Type of Intervention:: Individual  Individual: Device Training  Device Training: Personal CGM  Current diabetes risk level:: high  In the last 12 months, have you:: none  Permission to speak with others about care:: no  Continuous Glucose Monitoring  Patient has CGM: Yes  Personal CGM type:: Dexcom G6    Lab Results   Component Value Date    HGBA1C 8.1 (H) 04/03/2024     A1c Pre Diabetes Care Specialist Intervention:  8.7%    Clinical  Patient Health Rating  Compared to other people your age, how would you rate your health?: Good    Problem Review  Reviewed Problem List with Patient: yes  Active comorbidities affecting diabetes self-care.: yes  Comorbidities: Cardiovascular Disease, Hypertension, Neuropathy  Reviewed health maintenance: yes    Clinical Assessment  Have you ever experienced hyperglycemia (high blood sugar)?: no    Medication Information  Medication adherence impacting ability to self-manage diabetes?: No    Labs  Lab Compliance Barriers: No    Nutritional Status  Diet: Regular  Meal Plan 24 Hour Recall: Breakfast, Lunch, Dinner, Snack  Meal Plan 24 Hour Recall - Breakfast: Toast and Eggs, coffee with sugar and cream  Meal Plan 24 Hour Recall - Lunch: chicken noodle soup with crackers or a sandwich  Meal Plan 24 Hour Recall - Dinner: yesterday some roast and potatoes, some times a burger and some fries, sometimes gets pizza or something home cooked  Meal Plan 24 Hour Recall - Snack: peanut butter crackers, almonds, cheerios, drinks water or sparkling water body armour lyte  Current nutritional status an area of need that is impacting patient's ability to self-manage diabetes?: No    Physical activity/Exercise:   No change    SMBG: using the dexcom G6  Was out of supplies, here to restart    Additional Social History    Support  Does anyone  support you with your diabetes care?: yes  Is Support an area impacting ability to self-manage diabetes?: No    Access to Mass Media & Technology  Media or technology needs impacting ability to self-manage diabetes?: No    Cognitive/Behavioral Health  Cognitive or behavioral barriers impacting ability to self-manage diabetes?: No    Culture/Anabaptist  Culture or Mormonism beliefs that may impact ability to access healthcare: No    Communication  Communication needs impacting ability to self-manage diabetes?: No    Health Literacy  Health literacy needs impacting ability to self-manage diabetes?: No      Diabetes Self-Management Skills Assessment     Diabetes Disease Process/Treatment Options  Diabetes Disease Process/Treatment Options: Skills Assessment Completed: No  Assessment indicates:: Adequate understanding  Deferred due to:: Other (comment) (previously completed, there has been no change and patient has adequate understanding)  Area of need?: No    Nutrition/Healthy Eating  Nutrition/Healthy Eating Skills Assessment Completed:: No  Assessment indicates:: Adequate understanding  Deffered due to:: Other (comment) (previously completed, there has been no change and patient has adequate understanding)  Area of need?: No    Physical Activity/Exercise  Physical Activity/Exercise Skills Assessment Completed: : No  Assessment indicates:: Adequate understanding  Deffered due to:: Other (comment)  Area of need?: No    Medications  Medication Skills Assessment Completed:: No  Assessment indicates:: Adequate understanding  Deffered due to:: Other (comment) (previously completed, there has been no change and patient has adequate understanding)  Area of need?: No    Home Blood Glucose Monitoring  Monitoring Method:: personal continuous glucose monitor  Home glucometer meter type:: accu-chek guide  Blood glucose logs::  (patient remembered to bring glucometer to visit, I transcribed data from glucometer to logs for provider  visit and scanned into media)  Personal CGM type:: Dexcom G6  Patient is able to use personal CGM appropriately.: no  CGM Report reviewed?: no  Home Blood Glucose Monitoring Skills Assessment Completed: : Yes  Assessment indicates:: Instruction Needed  Area of need?: Yes    Acute Complications  Patient identified::  (feels chilled)  Patient identified::  (eat something like a sandwich - instructed on proper treatment options)  Able to state the blood sugar range for hyperglycemia?:  (feels dizzy with hyperglycemia)  Acute Complications Skills Assessment Completed: : No  Assessment indicates:: Adequate understanding  Deffered due to:: Other (comment) (previously completed, there has been no change and patient has adequate understanding)  Area of need?: No    Chronic Complications  Chronic Complications Skills Assessment Completed: : No  Assessment indicates:: Adequate understanding  Deferred due to:: Other (comment) (previously completed, there has been no change and patient has adequate understanding)  Area of need?: No    Psychosocial/Coping  Psychosocial/Coping Skills Assessment Completed: : No  Assessment indicates:: Adequate understanding  Deffered due to:: Other (comment) (previously completed, there has been no change and patient has adequate understanding)  Area of need?: No      During today's follow-up visit,  the following areas required further assessment and content was provided/reviewed.    Based on today's diabetes care assessment, the following areas of need were identified:          5/6/2024    12:01 AM   Social   Support No   Access to Mass Media/Tech No   Cognitive/Behavioral Health No   Culture/Church No   Communication No   Health Literacy No            5/6/2024    12:01 AM   Clinical   Medication Adherence No   Lab Compliance No   Nutritional Status No            5/6/2024    12:01 AM   Diabetes Self-Management Skills   Diabetes Disease Process/Treatment Options No   Nutrition/Healthy Eating  No, discussed current changes patient has made to diet and offered suggestions to help better control sugars   Physical Activity/Exercise No   Medication No, reviewed current medication regimen   Home Blood Glucose Monitoring Yes, changed dexcom G6 sensor and transmitter in office   Acute Complications No   Chronic Complications No   Psychosocial/Coping No        Today's interventions were provided through individual discussion, instruction, and written materials were provided.    Patient verbalized understanding of instruction and written materials.  Pt was able to return back demonstration of instructions today. Patient understood key points, needs reinforcement and further instruction.     Diabetes Self-Management Care Plan Review and Evaluation of Progress:    During today's follow-up Agustin Diabetes Self-Management Care Plan progress was reviewed and progress was evaluated including his/her input. Aishwarya has agreed to continue his/her journey to improve/maintain overall diabetes control by continuing to set health goals. See care plan progress below.      Care Plan: Diabetes Management   Updates made since 4/14/2024 12:00 AM        Problem: Blood Glucose Self-Monitoring         Goal: Patient agrees to check and record blood sugars 3 times per day using the dexcom G6/G7 for the next 3 months    Start Date: 12/27/2023   Expected End Date: 3/27/2024   This Visit's Progress: On track   Recent Progress: On track   Priority: High   Barriers: Lack of Supplies   Note:    Patient was instructed how to use a meter. Discussed signs and symptoms, and causes and treatment of hypoglycemia. Covered blood sugar and A1C goals. Instructed patient to use provided logbooks to record blood sugars. Instructed patient to follow-up with their physician. Patient verbalized understanding of all instructions.  Prescription for meter and testing supplies pended to PCP    1/6/2023  Dexcom G6 Education  Patient is here in clinic today  "for initial start of Dexcom continuous glucose monitoring system (CGMA). Reviewed with patient how to insert sensor and transmitter. Patient inserted sensor on right abdomen and inserted transmitter. Time and date was set on monitor and settings were set. Hypoglycemia trigger set for 70 and hyperglycemia set for 300. Patient provided with phone number for 24-hour help line if needed. Discussed various types of possible alarms and what to do. Questions addressed. Initialization finished. No futher questions.  Patient referred to clinic today for Dexcom G6 continuous glucose sensor system training.  Education was provided using "Quick Start Guide" per Dexcom protocol.    Overview:  5min glucose reading updates, trending arrows, BG graph screens, battery life indicator, Blue Tooth Symbol.  Menus: trend Graph, start sensor, enter BG, events, Alerts, Settings, Shutdown, Stop Sensor   Settings:                          * Urgent Low: 55 mg/dL                          * Urgent Low Soon: Off                          * Low alert: 70                          * High alert: 300                          * Rise rate: Off                          * Fall Rate: Off                          * Signal Loss: 30 min                          * No Reading: Off                          * Always sound: On                             Reviewed additional setting options with patient, including Graph Height and Transmitter ID. Transmitter was paired with .    Reviewed where to find sensor insertion time and sensor expiration date.   Discussed no need to calibrate sensor during the entirety of the 10 day wear. Discussed that pt can calibrate sensor if desired, but at that time she will need to continue calibrating every 12 hours for sensor to remain accurate. Reviewed appropriate calibration techniques.  Reviewed sensor site selection. Site selected and prepped using aseptic technique Inserted to left abdomen. Transmitter placed " in pod and secured.  Practiced sensor pod/transmitter removal from site, and removal of transmitter from sensor pod.  Patient able to demonstrate without difficulty.  Encouraged to review manual prior to starting another sensor.   Reviewed problem solving aspects of sensor transmission/variables that can disrupt RF transmission.  range 20 feet, but the first 3 hrs keep within 3 feet of transmitter.  Pt instructed on lag time of interstitial fluid from CBG and was advised to tx hypoglycemia and dose insulin based on SMBG values.  Link to video provided and written instructions provided for patient to review before 10 day sensor change  Dexcom technical support contact number given and examples of when to contact them discussed.      1/17 Changing the Sensor (every 10 days)  Remove the whole thing from your body  Hold the sensor by the skinny part and break the clear piece  Remove the gray transmitter  In the reader touch start sensor  Touch yes you want to start sensor  Touch the square you want to put the number in for   There will be a 4-digit code on the sensor (like 5937)  Enter the code from the sensor  Touch ok  Touch yes, it is correct  Insert Sensor  Remove orange piece and paper tabs  Place on skin and hold in place  Push the orange button  Insert the gray transmitter into the sensor  Put the small tongue like side in first and push down  You should hear 2 clicks  Touch confirm once sensor is secure on body  Touch start Sensor  Touch 1 then 2  Sensor will take 2 hours to warm up    Changing Transmitter (every 90 days) in the reader  Touch the =   Touch transmitter  Touch pair new  Go through the process to start sensor  Touch yes you want to start sensor  Touch the square you want to put the number in for   There will be a 4-digit code on the sensor (like 5937)  Enter the code from the sensor  Touch ok  Touch yes, it is correct  Touch the square you want to put the number in for   There will be a  6-digit code on the transmitter box (like [id number])  Enter the code from the transmitter box  Touch ok  Touch yes, it is correct  Insert Sensor  Remove orange piece and paper tabs  Place on skin and hold in place  Push the orange button  Insert the gray transmitter into the sensor  Put the small tongue like side in first and push down  You should hear 2 clicks  Touch confirm once sensor is secure on body  Touch start Sensor  Touch 1 then 2  Sensor will take 2 hours to warm up             Follow Up Plan     Follow up in about 10 days (around 5/16/2024) for Change Personal CGM and Upload Data.    Today's care plan and follow up schedule was discussed with patient.  Aishwarya verbalized understanding of the care plan, goals, and agrees to follow up plan.        The patient was encouraged to communicate with his/her health care provider/physician and care team regarding his/her condition(s) and treatment.  I provided the patient with my contact information today and encouraged to contact me via phone or [facility] Patient Portal as needed.     Length of Visit   Total Time: 30 Minutes

## 2024-05-15 ENCOUNTER — TELEPHONE (OUTPATIENT)
Dept: DIABETES | Facility: CLINIC | Age: 64
End: 2024-05-15
Payer: MEDICARE

## 2024-05-20 LAB
LEFT EYE DM RETINOPATHY: NEGATIVE
RIGHT EYE DM RETINOPATHY: NEGATIVE

## 2024-05-21 ENCOUNTER — PATIENT OUTREACH (OUTPATIENT)
Dept: ADMINISTRATIVE | Facility: HOSPITAL | Age: 64
End: 2024-05-21
Payer: MEDICARE

## 2024-05-21 NOTE — PROGRESS NOTES
There are no preventive care reminders to display for this patient.    Immunizations - reviewed and updated   Care Everywhere - triggered   Care Teams -   Outreach - Diabetic eye exam done 5/20/2024, uploaded to . [facility]

## 2024-05-28 ENCOUNTER — NUTRITION (OUTPATIENT)
Dept: DIABETES | Facility: CLINIC | Age: 64
End: 2024-05-28
Payer: MEDICARE

## 2024-05-28 DIAGNOSIS — E11.22 TYPE 2 DIABETES MELLITUS WITH STAGE 3A CHRONIC KIDNEY DISEASE, WITH LONG-TERM CURRENT USE OF INSULIN: ICD-10-CM

## 2024-05-28 DIAGNOSIS — E11.42 TYPE 2 DIABETES MELLITUS WITH DIABETIC POLYNEUROPATHY, WITH LONG-TERM CURRENT USE OF INSULIN: ICD-10-CM

## 2024-05-28 DIAGNOSIS — Z79.4 TYPE 2 DIABETES MELLITUS WITH HYPERGLYCEMIA, WITH LONG-TERM CURRENT USE OF INSULIN: Primary | ICD-10-CM

## 2024-05-28 DIAGNOSIS — Z79.4 TYPE 2 DIABETES MELLITUS WITH STAGE 3A CHRONIC KIDNEY DISEASE, WITH LONG-TERM CURRENT USE OF INSULIN: ICD-10-CM

## 2024-05-28 DIAGNOSIS — E11.65 TYPE 2 DIABETES MELLITUS WITH HYPERGLYCEMIA, WITH LONG-TERM CURRENT USE OF INSULIN: Primary | ICD-10-CM

## 2024-05-28 DIAGNOSIS — Z79.4 TYPE 2 DIABETES MELLITUS WITH DIABETIC POLYNEUROPATHY, WITH LONG-TERM CURRENT USE OF INSULIN: ICD-10-CM

## 2024-05-28 DIAGNOSIS — N18.31 TYPE 2 DIABETES MELLITUS WITH STAGE 3A CHRONIC KIDNEY DISEASE, WITH LONG-TERM CURRENT USE OF INSULIN: ICD-10-CM

## 2024-05-28 NOTE — PROGRESS NOTES
Diabetes Care Specialist Follow-up Note  Author: Marla Stein RD, CDE  Date: 5/28/2024    Program Intake  Reason for Diabetes Program Visit:: Intervention  Type of Intervention:: Individual  Individual: Device Training  Device Training: Personal CGM  Current diabetes risk level:: high  In the last 12 months, have you:: none  Permission to speak with others about care:: no  Continuous Glucose Monitoring  Patient has CGM: Yes  Personal CGM type:: Dexcom G6    Lab Results   Component Value Date    HGBA1C 8.1 (H) 04/03/2024     A1c Pre Diabetes Care Specialist Intervention:  8.7%    Clinical    Patient Health Rating  Compared to other people your age, how would you rate your health?: Good    Problem Review  Reviewed Problem List with Patient: yes  Active comorbidities affecting diabetes self-care.: yes  Comorbidities: Cardiovascular Disease, Hypertension, Neuropathy  Reviewed health maintenance: yes    Clinical Assessment  Have you ever experienced hyperglycemia (high blood sugar)?: no    Medication Information  Medication adherence impacting ability to self-manage diabetes?: No    Labs  Lab Compliance Barriers: No    Nutritional Status  Diet: Regular  Meal Plan 24 Hour Recall: Breakfast, Lunch, Dinner, Snack  Meal Plan 24 Hour Recall - Breakfast: Toast and Eggs, coffee with sugar and cream  Meal Plan 24 Hour Recall - Lunch: chicken noodle soup with crackers or a sandwich  Meal Plan 24 Hour Recall - Dinner: yesterday some roast and potatoes, some times a burger and some fries, sometimes gets pizza or something home cooked  Meal Plan 24 Hour Recall - Snack: peanut butter crackers, almonds, cheerios, drinks water or sparkling water body armour lyte  Current nutritional status an area of need that is impacting patient's ability to self-manage diabetes?: No    Physical activity/Exercise:   No change    SMBG: using the dexcom when it is working, unable to download at time of visit    Additional Social  History    Support  Is Support an area impacting ability to self-manage diabetes?: No    Access to Mass Media & Technology  Media or technology needs impacting ability to self-manage diabetes?: No    Cognitive/Behavioral Health  Cognitive or behavioral barriers impacting ability to self-manage diabetes?: No    Culture/Orthodox  Culture or Roman Catholic beliefs that may impact ability to access healthcare: No    Communication  Communication needs impacting ability to self-manage diabetes?: No    Health Literacy  Health literacy needs impacting ability to self-manage diabetes?: No      Diabetes Self-Management Skills Assessment     Diabetes Disease Process/Treatment Options  Diabetes Disease Process/Treatment Options: Skills Assessment Completed: No  Assessment indicates:: Adequate understanding  Deferred due to:: Other (comment) (previously completed, there has been no change and patient has adequate understanding)  Area of need?: No    Nutrition/Healthy Eating  Nutrition/Healthy Eating Skills Assessment Completed:: No  Assessment indicates:: Adequate understanding  Deffered due to:: Other (comment) (previously completed, there has been no change and patient has adequate understanding)  Area of need?: No    Physical Activity/Exercise  Physical Activity/Exercise Skills Assessment Completed: : No  Assessment indicates:: Adequate understanding  Deffered due to:: Other (comment)  Area of need?: No    Medications  Medication Skills Assessment Completed:: No  Assessment indicates:: Adequate understanding  Deffered due to:: Other (comment) (previously completed, there has been no change and patient has adequate understanding)  Area of need?: No    Home Blood Glucose Monitoring  Patient states that blood sugar is checked at home daily.: yes  Monitoring Method:: personal continuous glucose monitor  Home glucometer meter type:: accu-chek guide  Blood glucose logs::  (patient remembered to bring glucometer to visit, I transcribed  data from glucometer to logs for provider visit and scanned into media)  Personal CGM type:: Dexcom G6  Patient is able to use personal CGM appropriately.: no  CGM Report reviewed?: no  Home Blood Glucose Monitoring Skills Assessment Completed: : Yes  Assessment indicates:: Instruction Needed  Area of need?: Yes    Acute Complications  Patient identified::  (feels chilled)  Patient identified::  (eat something like a sandwich - instructed on proper treatment options)  Able to state the blood sugar range for hyperglycemia?:  (feels dizzy with hyperglycemia)  Acute Complications Skills Assessment Completed: : No  Assessment indicates:: Adequate understanding  Deffered due to:: Other (comment) (previously completed, there has been no change and patient has adequate understanding)  Area of need?: No    Chronic Complications  Chronic Complications Skills Assessment Completed: : No  Assessment indicates:: Adequate understanding  Deferred due to:: Other (comment) (previously completed, there has been no change and patient has adequate understanding)  Area of need?: No    Psychosocial/Coping  Psychosocial/Coping Skills Assessment Completed: : No  Assessment indicates:: Adequate understanding  Deffered due to:: Other (comment) (previously completed, there has been no change and patient has adequate understanding)  Area of need?: No      During today's follow-up visit,  the following areas required further assessment and content was provided/reviewed.    Based on today's diabetes care assessment, the following areas of need were identified:          5/28/2024    12:01 AM   Social   Support No   Access to Mass Media/Tech No   Cognitive/Behavioral Health No   Culture/Jainism No   Communication No   Health Literacy No            5/28/2024    12:01 AM   Clinical   Medication Adherence No   Lab Compliance No   Nutritional Status No            5/28/2024    12:01 AM   Diabetes Self-Management Skills   Diabetes Disease  Process/Treatment Options No   Nutrition/Healthy Eating No   Physical Activity/Exercise No   Medication No   Home Blood Glucose Monitoring Yes, changed dexcom at time of visit, plan to download at follow-up in 10 days   Acute Complications No   Chronic Complications No   Psychosocial/Coping No        Today's interventions were provided through individual discussion, instruction, and written materials were provided.    Patient verbalized understanding of instruction and written materials.  Pt was able to return back demonstration of instructions today. Patient understood key points, needs reinforcement and further instruction.     Diabetes Self-Management Care Plan Review and Evaluation of Progress:    During today's follow-up Agustin Diabetes Self-Management Care Plan progress was reviewed and progress was evaluated including his/her input. Aishwarya has agreed to continue his/her journey to improve/maintain overall diabetes control by continuing to set health goals. See care plan progress below.      Care Plan: Diabetes Management   Updates made since 4/28/2024 12:00 AM        Problem: Blood Glucose Self-Monitoring         Goal: Patient agrees to check and record blood sugars 3 times per day using the dexcom G6/G7 for the next 3 months    Start Date: 12/27/2023   Expected End Date: 3/27/2024   This Visit's Progress: On track   Recent Progress: On track   Priority: High   Barriers: Lack of Supplies   Note:    Patient was instructed how to use a meter. Discussed signs and symptoms, and causes and treatment of hypoglycemia. Covered blood sugar and A1C goals. Instructed patient to use provided logbooks to record blood sugars. Instructed patient to follow-up with their physician. Patient verbalized understanding of all instructions.  Prescription for meter and testing supplies pended to PCP    1/6/2023  Dexcom G6 Education  Patient is here in clinic today for initial start of Dexcom continuous glucose monitoring  "system (CGMA). Reviewed with patient how to insert sensor and transmitter. Patient inserted sensor on right abdomen and inserted transmitter. Time and date was set on monitor and settings were set. Hypoglycemia trigger set for 70 and hyperglycemia set for 300. Patient provided with phone number for 24-hour help line if needed. Discussed various types of possible alarms and what to do. Questions addressed. Initialization finished. No futher questions.  Patient referred to clinic today for Dexcom G6 continuous glucose sensor system training.  Education was provided using "Quick Start Guide" per Dexcom protocol.    Overview:  5min glucose reading updates, trending arrows, BG graph screens, battery life indicator, Blue Tooth Symbol.  Menus: trend Graph, start sensor, enter BG, events, Alerts, Settings, Shutdown, Stop Sensor   Settings:                          * Urgent Low: 55 mg/dL                          * Urgent Low Soon: Off                          * Low alert: 70                          * High alert: 300                          * Rise rate: Off                          * Fall Rate: Off                          * Signal Loss: 30 min                          * No Reading: Off                          * Always sound: On                             Reviewed additional setting options with patient, including Graph Height and Transmitter ID. Transmitter was paired with .    Reviewed where to find sensor insertion time and sensor expiration date.   Discussed no need to calibrate sensor during the entirety of the 10 day wear. Discussed that pt can calibrate sensor if desired, but at that time she will need to continue calibrating every 12 hours for sensor to remain accurate. Reviewed appropriate calibration techniques.  Reviewed sensor site selection. Site selected and prepped using aseptic technique Inserted to left abdomen. Transmitter placed in pod and secured.  Practiced sensor pod/transmitter " removal from site, and removal of transmitter from sensor pod.  Patient able to demonstrate without difficulty.  Encouraged to review manual prior to starting another sensor.   Reviewed problem solving aspects of sensor transmission/variables that can disrupt RF transmission.  range 20 feet, but the first 3 hrs keep within 3 feet of transmitter.  Pt instructed on lag time of interstitial fluid from CBG and was advised to tx hypoglycemia and dose insulin based on SMBG values.  Link to video provided and written instructions provided for patient to review before 10 day sensor change  Dexcom technical support contact number given and examples of when to contact them discussed.      1/17 Changing the Sensor (every 10 days)  Remove the whole thing from your body  Hold the sensor by the skinny part and break the clear piece  Remove the gray transmitter  In the reader touch start sensor  Touch yes you want to start sensor  Touch the square you want to put the number in for   There will be a 4-digit code on the sensor (like 5937)  Enter the code from the sensor  Touch ok  Touch yes, it is correct  Insert Sensor  Remove orange piece and paper tabs  Place on skin and hold in place  Push the orange button  Insert the gray transmitter into the sensor  Put the small tongue like side in first and push down  You should hear 2 clicks  Touch confirm once sensor is secure on body  Touch start Sensor  Touch 1 then 2  Sensor will take 2 hours to warm up    Changing Transmitter (every 90 days) in the reader  Touch the =   Touch transmitter  Touch pair new  Go through the process to start sensor  Touch yes you want to start sensor  Touch the square you want to put the number in for   There will be a 4-digit code on the sensor (like 5937)  Enter the code from the sensor  Touch ok  Touch yes, it is correct  Touch the square you want to put the number in for   There will be a 6-digit code on the transmitter box (like [id number])  Enter  the code from the transmitter box  Touch ok  Touch yes, it is correct  Insert Sensor  Remove orange piece and paper tabs  Place on skin and hold in place  Push the orange button  Insert the gray transmitter into the sensor  Put the small tongue like side in first and push down  You should hear 2 clicks  Touch confirm once sensor is secure on body  Touch start Sensor  Touch 1 then 2  Sensor will take 2 hours to warm up             Follow Up Plan     Follow up in about 10 days (around 6/7/2024) for Change Personal CGM and Upload Data.    Today's care plan and follow up schedule was discussed with patient.  Aishwarya verbalized understanding of the care plan, goals, and agrees to follow up plan.        The patient was encouraged to communicate with his/her health care provider/physician and care team regarding his/her condition(s) and treatment.  I provided the patient with my contact information today and encouraged to contact me via phone or [facility] Patient Portal as needed.     Length of Visit   Total Time: 15 Minutes

## 2024-06-06 ENCOUNTER — TELEPHONE (OUTPATIENT)
Dept: DIABETES | Facility: CLINIC | Age: 64
End: 2024-06-06
Payer: MEDICARE

## 2024-06-07 ENCOUNTER — NUTRITION (OUTPATIENT)
Dept: DIABETES | Facility: CLINIC | Age: 64
End: 2024-06-07
Payer: MEDICARE

## 2024-06-07 DIAGNOSIS — N18.31 TYPE 2 DIABETES MELLITUS WITH STAGE 3A CHRONIC KIDNEY DISEASE, WITH LONG-TERM CURRENT USE OF INSULIN: ICD-10-CM

## 2024-06-07 DIAGNOSIS — E11.42 TYPE 2 DIABETES MELLITUS WITH DIABETIC POLYNEUROPATHY, WITH LONG-TERM CURRENT USE OF INSULIN: ICD-10-CM

## 2024-06-07 DIAGNOSIS — E11.22 TYPE 2 DIABETES MELLITUS WITH STAGE 3A CHRONIC KIDNEY DISEASE, WITH LONG-TERM CURRENT USE OF INSULIN: ICD-10-CM

## 2024-06-07 DIAGNOSIS — E11.65 TYPE 2 DIABETES MELLITUS WITH HYPERGLYCEMIA, WITH LONG-TERM CURRENT USE OF INSULIN: Primary | ICD-10-CM

## 2024-06-07 DIAGNOSIS — Z79.4 TYPE 2 DIABETES MELLITUS WITH DIABETIC POLYNEUROPATHY, WITH LONG-TERM CURRENT USE OF INSULIN: ICD-10-CM

## 2024-06-07 DIAGNOSIS — Z79.4 TYPE 2 DIABETES MELLITUS WITH STAGE 3A CHRONIC KIDNEY DISEASE, WITH LONG-TERM CURRENT USE OF INSULIN: ICD-10-CM

## 2024-06-07 DIAGNOSIS — Z79.4 TYPE 2 DIABETES MELLITUS WITH HYPERGLYCEMIA, WITH LONG-TERM CURRENT USE OF INSULIN: Primary | ICD-10-CM

## 2024-06-07 PROCEDURE — G0108 DIAB MANAGE TRN  PER INDIV: HCPCS | Mod: S$GLB,,, | Performed by: DIETITIAN, REGISTERED

## 2024-06-07 NOTE — PROGRESS NOTES
Diabetes Care Specialist Follow-up Note  Author: Marla Stein RD, CDE  Date: 6/7/2024    Program Intake  Reason for Diabetes Program Visit:: Intervention  Type of Intervention:: Individual  Individual: Device Training  Education: Self-Management Skill Review  Device Training: Personal CGM  Current diabetes risk level:: high  Permission to speak with others about care:: no  Continuous Glucose Monitoring  Patient has CGM: Yes  Personal CGM type:: Dexcom G6    Lab Results   Component Value Date    HGBA1C 8.1 (H) 04/03/2024     A1c Pre Diabetes Care Specialist Intervention:  8.7%    Clinical    Patient Health Rating  Compared to other people your age, how would you rate your health?: Good    Problem Review  Reviewed Problem List with Patient: yes  Active comorbidities affecting diabetes self-care.: yes  Comorbidities: Cardiovascular Disease, Hypertension, Neuropathy  Reviewed health maintenance: yes    Clinical Assessment  Have you ever experienced hyperglycemia (high blood sugar)?: no    Medication Information  Medication adherence impacting ability to self-manage diabetes?: No    Labs  Lab Compliance Barriers: No    Nutritional Status  Meal Plan 24 Hour Recall: Breakfast, Lunch, Dinner, Snack  Meal Plan 24 Hour Recall - Breakfast: Toast and Eggs, coffee with sugar and cream  Meal Plan 24 Hour Recall - Lunch: chicken noodle soup with crackers or a sandwich  Meal Plan 24 Hour Recall - Dinner: yesterday some roast and potatoes, some times a burger and some fries, sometimes gets pizza or something home cooked  Meal Plan 24 Hour Recall - Snack: peanut butter crackers, almonds, cheerios, drinks water or sparkling water body armour lyte  Current nutritional status an area of need that is impacting patient's ability to self-manage diabetes?: No    Physical activity/Exercise:   No change    SMBG: dexcom G6 with     Additional Social History    Support  Is Support an area impacting ability to self-manage diabetes?:  No    Access to Mass Media & Technology  Media or technology needs impacting ability to self-manage diabetes?: No    Cognitive/Behavioral Health  Cognitive or behavioral barriers impacting ability to self-manage diabetes?: No    Culture/Buddhist  Culture or Scientologist beliefs that may impact ability to access healthcare: No    Communication  Communication needs impacting ability to self-manage diabetes?: No    Health Literacy  Health literacy needs impacting ability to self-manage diabetes?: No      Diabetes Self-Management Skills Assessment     Diabetes Disease Process/Treatment Options  Diabetes Disease Process/Treatment Options: Skills Assessment Completed: No  Assessment indicates:: Adequate understanding  Deferred due to:: Other (comment) (previously completed, there has been no change and patient has adequate understanding)  Area of need?: No    Nutrition/Healthy Eating  Nutrition/Healthy Eating Skills Assessment Completed:: No  Assessment indicates:: Adequate understanding  Deffered due to:: Other (comment) (previously completed, there has been no change and patient has adequate understanding)  Area of need?: No    Physical Activity/Exercise  Physical Activity/Exercise Skills Assessment Completed: : No  Assessment indicates:: Adequate understanding  Deffered due to:: Other (comment)  Area of need?: No    Medications  Medication Skills Assessment Completed:: No  Assessment indicates:: Adequate understanding  Deffered due to:: Other (comment) (previously completed, there has been no change and patient has adequate understanding)  Area of need?: No    Home Blood Glucose Monitoring  Patient states that blood sugar is checked at home daily.: yes  Monitoring Method:: personal continuous glucose monitor  Home glucometer meter type:: accu-chek guide  Blood glucose logs::  (patient remembered to bring glucometer to visit, I transcribed data from glucometer to logs for provider visit and scanned into media)  Personal  CGM type:: Dexcom G6  Patient is able to use personal CGM appropriately.: no  CGM Report reviewed?: no  Home Blood Glucose Monitoring Skills Assessment Completed: : Yes  Assessment indicates:: Instruction Needed  Area of need?: Yes    Acute Complications  Patient identified::  (feels chilled)  Patient identified::  (eat something like a sandwich - instructed on proper treatment options)  Able to state the blood sugar range for hyperglycemia?:  (feels dizzy with hyperglycemia)  Acute Complications Skills Assessment Completed: : No  Assessment indicates:: Adequate understanding  Deffered due to:: Other (comment) (previously completed, there has been no change and patient has adequate understanding)  Area of need?: No    Chronic Complications  Chronic Complications Skills Assessment Completed: : No  Assessment indicates:: Adequate understanding  Deferred due to:: Other (comment) (previously completed, there has been no change and patient has adequate understanding)  Area of need?: No    Psychosocial/Coping  Assessment indicates:: Adequate understanding  Deffered due to:: Other (comment) (previously completed, there has been no change and patient has adequate understanding)  Area of need?: No      During today's follow-up visit,  the following areas required further assessment and content was provided/reviewed.    Based on today's diabetes care assessment, the following areas of need were identified:          6/7/2024    12:01 AM   Social   Support No   Access to Mass Media/Tech No   Cognitive/Behavioral Health No   Culture/Taoism No   Communication No   Health Literacy No            6/7/2024    12:01 AM   Clinical   Medication Adherence No   Lab Compliance No   Nutritional Status No            6/7/2024    12:01 AM   Diabetes Self-Management Skills   Diabetes Disease Process/Treatment Options No   Nutrition/Healthy Eating No   Physical Activity/Exercise No   Medication No   Home Blood Glucose Monitoring Yes, changed  sensor, discussed exercise and medications for DM   Acute Complications No   Chronic Complications No   Psychosocial/Coping No        Today's interventions were provided through individual discussion, instruction, and written materials were provided.    Patient verbalized understanding of instruction and written materials.  Pt was able to return back demonstration of instructions today. Patient understood key points, needs reinforcement and further instruction.     Diabetes Self-Management Care Plan Review and Evaluation of Progress:    During today's follow-up Agustin Diabetes Self-Management Care Plan progress was reviewed and progress was evaluated including his/her input. Aishwarya has agreed to continue his/her journey to improve/maintain overall diabetes control by continuing to set health goals. See care plan progress below.      Care Plan: Diabetes Management   Updates made since 5/8/2024 12:00 AM        Problem: Blood Glucose Self-Monitoring         Goal: Patient agrees to check and record blood sugars 3 times per day using the dexcom G6/G7 for the next 3 months    Start Date: 12/27/2023   Expected End Date: 3/27/2024   This Visit's Progress: On track   Recent Progress: On track   Priority: High   Barriers: Lack of Supplies   Note:    Patient was instructed how to use a meter. Discussed signs and symptoms, and causes and treatment of hypoglycemia. Covered blood sugar and A1C goals. Instructed patient to use provided logbooks to record blood sugars. Instructed patient to follow-up with their physician. Patient verbalized understanding of all instructions.  Prescription for meter and testing supplies pended to PCP    1/6/2023  Dexcom G6 Education  Patient is here in clinic today for initial start of Dexcom continuous glucose monitoring system (CGMA). Reviewed with patient how to insert sensor and transmitter. Patient inserted sensor on right abdomen and inserted transmitter. Time and date was set on monitor  "and settings were set. Hypoglycemia trigger set for 70 and hyperglycemia set for 300. Patient provided with phone number for 24-hour help line if needed. Discussed various types of possible alarms and what to do. Questions addressed. Initialization finished. No futher questions.  Patient referred to clinic today for Dexcom G6 continuous glucose sensor system training.  Education was provided using "Quick Start Guide" per Dexcom protocol.    Overview:  5min glucose reading updates, trending arrows, BG graph screens, battery life indicator, Blue Tooth Symbol.  Menus: trend Graph, start sensor, enter BG, events, Alerts, Settings, Shutdown, Stop Sensor   Settings:                          * Urgent Low: 55 mg/dL                          * Urgent Low Soon: Off                          * Low alert: 70                          * High alert: 300                          * Rise rate: Off                          * Fall Rate: Off                          * Signal Loss: 30 min                          * No Reading: Off                          * Always sound: On                             Reviewed additional setting options with patient, including Graph Height and Transmitter ID. Transmitter was paired with .    Reviewed where to find sensor insertion time and sensor expiration date.   Discussed no need to calibrate sensor during the entirety of the 10 day wear. Discussed that pt can calibrate sensor if desired, but at that time she will need to continue calibrating every 12 hours for sensor to remain accurate. Reviewed appropriate calibration techniques.  Reviewed sensor site selection. Site selected and prepped using aseptic technique Inserted to left abdomen. Transmitter placed in pod and secured.  Practiced sensor pod/transmitter removal from site, and removal of transmitter from sensor pod.  Patient able to demonstrate without difficulty.  Encouraged to review manual prior to starting another sensor. "   Reviewed problem solving aspects of sensor transmission/variables that can disrupt RF transmission.  range 20 feet, but the first 3 hrs keep within 3 feet of transmitter.  Pt instructed on lag time of interstitial fluid from CBG and was advised to tx hypoglycemia and dose insulin based on SMBG values.  Link to video provided and written instructions provided for patient to review before 10 day sensor change  Dexcom technical support contact number given and examples of when to contact them discussed.      1/17 Changing the Sensor (every 10 days)  Remove the whole thing from your body  Hold the sensor by the skinny part and break the clear piece  Remove the gray transmitter  In the reader touch start sensor  Touch yes you want to start sensor  Touch the square you want to put the number in for   There will be a 4-digit code on the sensor (like 5937)  Enter the code from the sensor  Touch ok  Touch yes, it is correct  Insert Sensor  Remove orange piece and paper tabs  Place on skin and hold in place  Push the orange button  Insert the gray transmitter into the sensor  Put the small tongue like side in first and push down  You should hear 2 clicks  Touch confirm once sensor is secure on body  Touch start Sensor  Touch 1 then 2  Sensor will take 2 hours to warm up    Changing Transmitter (every 90 days) in the reader  Touch the =   Touch transmitter  Touch pair new  Go through the process to start sensor  Touch yes you want to start sensor  Touch the square you want to put the number in for   There will be a 4-digit code on the sensor (like 5937)  Enter the code from the sensor  Touch ok  Touch yes, it is correct  Touch the square you want to put the number in for   There will be a 6-digit code on the transmitter box (like [id number])  Enter the code from the transmitter box  Touch ok  Touch yes, it is correct  Insert Sensor  Remove orange piece and paper tabs  Place on skin and hold in place  Push the orange  button  Insert the gray transmitter into the sensor  Put the small tongue like side in first and push down  You should hear 2 clicks  Touch confirm once sensor is secure on body  Touch start Sensor  Touch 1 then 2  Sensor will take 2 hours to warm up             Follow Up Plan     Follow up in about 10 days (around 6/17/2024) for Change Personal CGM and Upload Data.    Today's care plan and follow up schedule was discussed with patient.  Aishwarya verbalized understanding of the care plan, goals, and agrees to follow up plan.        The patient was encouraged to communicate with his/her health care provider/physician and care team regarding his/her condition(s) and treatment.  I provided the patient with my contact information today and encouraged to contact me via phone or [facility] Patient Portal as needed.     Length of Visit   Total Time: 30 Minutes

## 2024-06-26 ENCOUNTER — PATIENT MESSAGE (OUTPATIENT)
Dept: DIABETES | Facility: CLINIC | Age: 64
End: 2024-06-26
Payer: MEDICARE

## 2024-07-01 ENCOUNTER — TELEPHONE (OUTPATIENT)
Dept: DIABETES | Facility: CLINIC | Age: 64
End: 2024-07-01
Payer: MEDICARE

## 2024-07-08 ENCOUNTER — TELEPHONE (OUTPATIENT)
Dept: DIABETES | Facility: CLINIC | Age: 64
End: 2024-07-08
Payer: MEDICARE

## 2024-07-09 ENCOUNTER — CLINICAL SUPPORT (OUTPATIENT)
Dept: DIABETES | Facility: CLINIC | Age: 64
End: 2024-07-09
Payer: MEDICARE

## 2024-07-09 ENCOUNTER — OFFICE VISIT (OUTPATIENT)
Dept: DIABETES | Facility: CLINIC | Age: 64
End: 2024-07-09
Payer: MEDICARE

## 2024-07-09 VITALS
WEIGHT: 215.63 LBS | OXYGEN SATURATION: 97 % | HEART RATE: 67 BPM | BODY MASS INDEX: 33.84 KG/M2 | DIASTOLIC BLOOD PRESSURE: 67 MMHG | HEIGHT: 67 IN | SYSTOLIC BLOOD PRESSURE: 123 MMHG

## 2024-07-09 DIAGNOSIS — N18.31 TYPE 2 DIABETES MELLITUS WITH STAGE 3A CHRONIC KIDNEY DISEASE, WITH LONG-TERM CURRENT USE OF INSULIN: ICD-10-CM

## 2024-07-09 DIAGNOSIS — Z79.4 TYPE 2 DIABETES MELLITUS WITH HYPERGLYCEMIA, WITH LONG-TERM CURRENT USE OF INSULIN: Primary | ICD-10-CM

## 2024-07-09 DIAGNOSIS — E11.42 TYPE 2 DIABETES MELLITUS WITH DIABETIC POLYNEUROPATHY, WITH LONG-TERM CURRENT USE OF INSULIN: ICD-10-CM

## 2024-07-09 DIAGNOSIS — Z91.199 NONCOMPLIANCE WITH DIABETES TREATMENT: ICD-10-CM

## 2024-07-09 DIAGNOSIS — I10 ESSENTIAL HYPERTENSION, BENIGN: ICD-10-CM

## 2024-07-09 DIAGNOSIS — R80.9 MICROALBUMINURIA DUE TO TYPE 2 DIABETES MELLITUS: ICD-10-CM

## 2024-07-09 DIAGNOSIS — E66.09 CLASS 1 OBESITY DUE TO EXCESS CALORIES WITH SERIOUS COMORBIDITY AND BODY MASS INDEX (BMI) OF 33.0 TO 33.9 IN ADULT: ICD-10-CM

## 2024-07-09 DIAGNOSIS — E11.22 TYPE 2 DIABETES MELLITUS WITH STAGE 3A CHRONIC KIDNEY DISEASE, WITH LONG-TERM CURRENT USE OF INSULIN: ICD-10-CM

## 2024-07-09 DIAGNOSIS — E11.65 TYPE 2 DIABETES MELLITUS WITH HYPERGLYCEMIA, WITH LONG-TERM CURRENT USE OF INSULIN: Primary | ICD-10-CM

## 2024-07-09 DIAGNOSIS — E11.29 MICROALBUMINURIA DUE TO TYPE 2 DIABETES MELLITUS: ICD-10-CM

## 2024-07-09 DIAGNOSIS — N17.9 AKI (ACUTE KIDNEY INJURY): ICD-10-CM

## 2024-07-09 DIAGNOSIS — Z59.9 FINANCIAL DIFFICULTIES: ICD-10-CM

## 2024-07-09 DIAGNOSIS — Z71.9 HEALTH EDUCATION/COUNSELING: ICD-10-CM

## 2024-07-09 DIAGNOSIS — Z79.4 TYPE 2 DIABETES MELLITUS WITH STAGE 3A CHRONIC KIDNEY DISEASE, WITH LONG-TERM CURRENT USE OF INSULIN: ICD-10-CM

## 2024-07-09 DIAGNOSIS — E78.5 DYSLIPIDEMIA, GOAL LDL BELOW 100: ICD-10-CM

## 2024-07-09 DIAGNOSIS — Z79.4 TYPE 2 DIABETES MELLITUS WITH DIABETIC POLYNEUROPATHY, WITH LONG-TERM CURRENT USE OF INSULIN: ICD-10-CM

## 2024-07-09 LAB — GLUCOSE SERPL-MCNC: 300 MG/DL (ref 70–110)

## 2024-07-09 PROCEDURE — 99999 PR PBB SHADOW E&M-EST. PATIENT-LVL III: CPT | Mod: PBBFAC,,, | Performed by: NURSE PRACTITIONER

## 2024-07-09 PROCEDURE — 3008F BODY MASS INDEX DOCD: CPT | Mod: CPTII,S$GLB,, | Performed by: NURSE PRACTITIONER

## 2024-07-09 PROCEDURE — 1160F RVW MEDS BY RX/DR IN RCRD: CPT | Mod: CPTII,S$GLB,, | Performed by: NURSE PRACTITIONER

## 2024-07-09 PROCEDURE — 3051F HG A1C>EQUAL 7.0%<8.0%: CPT | Mod: CPTII,S$GLB,, | Performed by: NURSE PRACTITIONER

## 2024-07-09 PROCEDURE — 4010F ACE/ARB THERAPY RXD/TAKEN: CPT | Mod: CPTII,S$GLB,, | Performed by: NURSE PRACTITIONER

## 2024-07-09 PROCEDURE — 99214 OFFICE O/P EST MOD 30 MIN: CPT | Mod: S$GLB,,, | Performed by: NURSE PRACTITIONER

## 2024-07-09 PROCEDURE — 3066F NEPHROPATHY DOC TX: CPT | Mod: CPTII,S$GLB,, | Performed by: NURSE PRACTITIONER

## 2024-07-09 PROCEDURE — 1159F MED LIST DOCD IN RCRD: CPT | Mod: CPTII,S$GLB,, | Performed by: NURSE PRACTITIONER

## 2024-07-09 PROCEDURE — 3074F SYST BP LT 130 MM HG: CPT | Mod: CPTII,S$GLB,, | Performed by: NURSE PRACTITIONER

## 2024-07-09 PROCEDURE — 3078F DIAST BP <80 MM HG: CPT | Mod: CPTII,S$GLB,, | Performed by: NURSE PRACTITIONER

## 2024-07-09 PROCEDURE — 82962 GLUCOSE BLOOD TEST: CPT | Mod: S$GLB,,, | Performed by: NURSE PRACTITIONER

## 2024-07-09 PROCEDURE — 1101F PT FALLS ASSESS-DOCD LE1/YR: CPT | Mod: CPTII,S$GLB,, | Performed by: NURSE PRACTITIONER

## 2024-07-09 PROCEDURE — 3288F FALL RISK ASSESSMENT DOCD: CPT | Mod: CPTII,S$GLB,, | Performed by: NURSE PRACTITIONER

## 2024-07-09 PROCEDURE — 3060F POS MICROALBUMINURIA REV: CPT | Mod: CPTII,S$GLB,, | Performed by: NURSE PRACTITIONER

## 2024-07-09 PROCEDURE — 1126F AMNT PAIN NOTED NONE PRSNT: CPT | Mod: CPTII,S$GLB,, | Performed by: NURSE PRACTITIONER

## 2024-07-09 RX ORDER — SUB-Q INSULIN DEVICE, 40 UNIT
1 EACH MISCELLANEOUS DAILY
Qty: 90 EACH | Refills: 2 | Status: SHIPPED | OUTPATIENT
Start: 2024-07-09

## 2024-07-09 RX ORDER — BLOOD-GLUCOSE SENSOR
1 EACH MISCELLANEOUS
Qty: 3 EACH | Refills: 11 | Status: SHIPPED | OUTPATIENT
Start: 2024-07-09 | End: 2025-07-09

## 2024-07-09 RX ORDER — LANCETS
EACH MISCELLANEOUS
Qty: 100 EACH | Refills: 11 | Status: SHIPPED | OUTPATIENT
Start: 2024-07-09

## 2024-07-09 RX ORDER — HYDROXYZINE HYDROCHLORIDE 25 MG/1
25 TABLET, FILM COATED ORAL NIGHTLY PRN
Qty: 30 TABLET | Refills: 4 | Status: SHIPPED | OUTPATIENT
Start: 2024-07-09

## 2024-07-09 RX ORDER — DAPAGLIFLOZIN 10 MG/1
10 TABLET, FILM COATED ORAL DAILY
Qty: 90 TABLET | Refills: 2 | Status: SHIPPED | OUTPATIENT
Start: 2024-07-09

## 2024-07-09 RX ORDER — EZETIMIBE 10 MG/1
10 TABLET ORAL NIGHTLY
Qty: 90 TABLET | Refills: 2 | Status: SHIPPED | OUTPATIENT
Start: 2024-07-09

## 2024-07-09 RX ORDER — INSULIN LISPRO 100 [IU]/ML
INJECTION, SOLUTION INTRAVENOUS; SUBCUTANEOUS
Qty: 30 ML | Refills: 6 | Status: SHIPPED | OUTPATIENT
Start: 2024-07-09

## 2024-07-09 RX ORDER — INSULIN PUMP SYRINGE, 3 ML
EACH MISCELLANEOUS
Qty: 1 EACH | Refills: 0 | Status: SHIPPED | OUTPATIENT
Start: 2024-07-09

## 2024-07-09 RX ORDER — ROSUVASTATIN CALCIUM 40 MG/1
40 TABLET, COATED ORAL NIGHTLY
Qty: 90 TABLET | Refills: 3 | Status: SHIPPED | OUTPATIENT
Start: 2024-07-09 | End: 2025-07-09

## 2024-07-09 SDOH — SOCIAL DETERMINANTS OF HEALTH (SDOH): PROBLEM RELATED TO HOUSING AND ECONOMIC CIRCUMSTANCES, UNSPECIFIED: Z59.9

## 2024-07-09 NOTE — PROGRESS NOTES
CC:   Chief Complaint   Patient presents with    Diabetes Mellitus       HPI: Aishwarya Jensen is a 64 y.o. female presents for a follow up visit today for the management of T2DM   She was diagnosed with Type 2 diabetes in her early 50's on routine lab work. She was initially started on Metformin. Insulin therapy was started soon after diagnosis.     Family hx of diabetes: Mother, father, brothers   Hospitalized for diabetes: denies     No personal or FH of thyroid cancer or personal of pancreatic cancer or pancreatitis.     She is retired now.       Our last visit was in April 2024   At that visit we continued the Metformin 1000 mg daily   Continued the Farxiga   Started Ozempic since Mounjaro was on back order   Continued the Vgo30 with Fiasp insulin   Continued the dexcom G6   She has been off her dexcom G6 for a little while.   She left her  for the dexcom at home   No GI upset with ozempic -- but then reports occ diarrhea while we are talking   No dexcom today   No dexcom    Wearing the VGo in clinic today but no insulin in the VGo device   Needs to get a repeat BMP today -- denies NSAID use, may not be drinking enough water         DIABETES COMPLICATIONS: nephropathy and peripheral neuropathy      Diabetes Management Status    ASA:  not taking the ASA     Statin: Taking Crestor 40 mg nightly and taking the Zetia 10 mg daily   ACE/ARB: Taking- lisinopril 20 mg     Screening or Prevention Patient's value Goal Complete/Controlled?   HgA1C Testing and Control   Lab Results   Component Value Date    HGBA1C 7.2 (H) 06/25/2024      Annually/Less than 8% Yes   Lipid profile : 11/09/2023 Annually Yes   LDL control Lab Results   Component Value Date    LDLCALC 56.8 (L) 11/09/2023    Annually/Less than 100 mg/dl  No   Nephropathy screening Lab Results   Component Value Date    LABMICR 301.0 06/25/2024     Lab Results   Component Value Date    PROTEINUA Trace (A) 08/10/2023    Annually Yes    Blood pressure BP Readings from Last 1 Encounters:   07/09/24 123/67    Less than 140/90 No   Dilated retinal exam : 05/23/2024 Annually No   Foot exam   : 04/22/2024 Annually Yes       CURRENT A1C:    Hemoglobin A1C   Date Value Ref Range Status   06/25/2024 7.2 (H) 4.0 - 5.6 % Final     Comment:     ADA Screening Guidelines:  5.7-6.4%  Consistent with prediabetes  >or=6.5%  Consistent with diabetes    High levels of fetal hemoglobin interfere with the HbA1C  assay. Heterozygous hemoglobin variants (HbS, HgC, etc)do  not significantly interfere with this assay.   However, presence of multiple variants may affect accuracy.     04/03/2024 8.1 (H) 4.0 - 5.6 % Final     Comment:     ADA Screening Guidelines:  5.7-6.4%  Consistent with prediabetes  >or=6.5%  Consistent with diabetes    High levels of fetal hemoglobin interfere with the HbA1C  assay. Heterozygous hemoglobin variants (HbS, HgC, etc)do  not significantly interfere with this assay.   However, presence of multiple variants may affect accuracy.     11/09/2023 8.7 (H) 4.0 - 5.6 % Final     Comment:     ADA Screening Guidelines:  5.7-6.4%  Consistent with prediabetes  >or=6.5%  Consistent with diabetes    High levels of fetal hemoglobin interfere with the HbA1C  assay. Heterozygous hemoglobin variants (HbS, HgC, etc)do  not significantly interfere with this assay.   However, presence of multiple variants may affect accuracy.         GOAL A1C: less than 8%     DM MEDICATIONS USED IN THE PAST: Metformin   Victoza   Lantus   VGo  Novolog    Freestyle manisha -- didn't like it   Ozempic   Fiasp   Dexcom G6      CURRENT DIABETES MEDICATIONS: Metformin 1000 mg daily  VGo30 with Fiasp 5-6 clicks with meals   Giving clicks before the meals    Farxiga 10 mg daily   Ozempic 0.5 mg weekly on Sundays     Insulin:VGo  Missed doses:    Usually wearing the VGo to her abdomen   She is adamant that she is changing her VGo every 24 hours. -- changes in the evening   giving her  VGo clicks before she eats   Denies missing medication doses   Has her VGo on today - wearing to her abdomen -- empty and due to change         BLOOD GLUCOSE MONITORING:   Sensor type: Dexcom G 6   Site changed every 10 days     Did not bring her dexcom  with her today   Unable to download today   Off her dexcom today         Results for orders placed or performed in visit on 07/09/24   POCT Glucose, Hand-Held Device   Result Value Ref Range    POC Glucose 300 (A) 70 - 110 MG/DL             HYPOGLYCEMIA: denies recent   Maybe some hypoglycemia unawareness or error with meter   Some in the 70's         MEALS: eating 3 meals per day   drinking OJ sometimes   Drinks: water    Sparking water- SF -- ICE - SF         CURRENT EXERCISE:  No      Review of Systems  Review of Systems   Constitutional:  Negative for appetite change, fatigue and unexpected weight change.   HENT:  Negative for trouble swallowing.    Eyes:  Negative for visual disturbance.   Respiratory:  Negative for shortness of breath.    Cardiovascular:  Negative for chest pain and leg swelling.   Gastrointestinal:  Positive for diarrhea (occ). Negative for abdominal pain, constipation, nausea and vomiting.   Endocrine: Negative for polydipsia, polyphagia and polyuria.   Genitourinary:         + Nocturia 2 times per night    Musculoskeletal:  Negative for arthralgias.   Skin:  Negative for wound.   Neurological:  Negative for numbness.   Psychiatric/Behavioral:  Positive for sleep disturbance.        Physical Exam   Physical Exam  Vitals and nursing note reviewed.   Constitutional:       Appearance: She is well-developed. She is obese.   HENT:      Head: Normocephalic and atraumatic.      Right Ear: External ear normal.      Left Ear: External ear normal.      Nose: Nose normal.   Neck:      Thyroid: No thyromegaly.      Trachea: No tracheal deviation.   Cardiovascular:      Rate and Rhythm: Normal rate and regular rhythm.      Heart sounds: No murmur  heard.  Pulmonary:      Effort: Pulmonary effort is normal. No respiratory distress.      Breath sounds: Normal breath sounds.   Abdominal:      Palpations: Abdomen is soft.      Tenderness: There is no abdominal tenderness.      Hernia: No hernia is present.   Musculoskeletal:      Cervical back: Normal range of motion and neck supple.   Skin:     General: Skin is warm and dry.      Capillary Refill: Capillary refill takes less than 2 seconds.      Findings: No rash.      Comments: She is wearing the VGo to her abdomen-  No complications   NO INSULIN IN VGO DEVICE    Neurological:      Mental Status: She is alert and oriented to person, place, and time.      Cranial Nerves: No cranial nerve deficit.   Psychiatric:         Behavior: Behavior normal.         Judgment: Judgment normal.           FOOT EXAMINATION: Appropriate footwear.       Protective Sensation (w/ 10 gram monofilament):  Right: Intact  Left: Intact    Visual Inspection:  Normal -  Bilateral and Nails Intact - without Evidence of Foot Deformity- Bilateral    Pedal Pulses:   Right: Present  Left: Present    Posterior Tibialis Pulses:   Right:Present  Left: Present            Lab Results   Component Value Date    TSH 1.281 06/25/2024         Type 2 diabetes mellitus with hyperglycemia, with long-term current use of insulin  Uncontrolled   Her A1c has improved to 7.2% which is good  She did not bring her Dexcom  for me to look at her readings today which makes it very difficult  She is wearing her V Go today and it does not have insulin in it  Her blood sugar on fingerstick was 300  She needs to get a repeat BMP--as I am concerned about VIJAY-based on her recent BMP 2 weeks ago  She is vague on whether or not she is having diarrhea with the Ozempic  We may need to consider trying to change her back to Mounjaro if possible        -- Medication Changes:   After her BMP resulted after the visit  I instructed her to hold the metformin due to VIJAY  Hold  the Farxiga due to VIJAY      For now continue the Ozempic at 0.5 mg weekly---but may need to consider changing back to Mounjaro if VIJAY does not resolved  Also asked that she monitor closely for side effects-- diarrhea/ volume loss       Continue VGo30 with Fiasp   5-6 clicks with meals   1-2 clicks with snacks   Clicks BEFORE MEALS   CHANGE THE VGO EVERY 24 HOURS -- SAME TIME EVERY DAY   ALWAYS WEAR THE VGO!!!!!!     Use the dexcom consistently --will try to change the Dexcom G7 to make it a little easier for her      -- Reviewed goals of therapy are to get the best control we can without hypoglycemia.  -- Reviewed patient's current insulin regimen. Clarified proper insulin dose and timing in relation to meals, etc. Insulin injection sites and proper rotation instructed.    -- Advised frequent self blood glucose monitoring.  Patient encouraged to document glucose results and bring them to every clinic visit. Get back on dexcom personal CGM -- use the dexcom consistently*---changed the Dexcom G7  Today in off psych gave her a sample Dexcom G7 sensor and I gave her a Dexcom G7    -- Hypoglycemia precautions discussed. Instructed on precautions before driving.    -- Call for Bg repeatedly < 70 or > 200.   -- Close adherence to lifestyle changes recommended.   -- Periodic follow ups for eye evaluations, foot care and dental care suggested.  2. Schedule with zoltan for a dexcom G7 start 10 days later   3. See zoltan 10 days after that to make sure she is doing ok with dexcom g7      patient will benefit from a Dexcom G6 to prevent hypoglycemia due to patient experiences level 2 or hypoglycemic event <54   Patient has diabetes mellitus and manages diabetes with intensive insulin regimen and uses prandial and basal insulin daily-- on the VGO   Patient requires a therapeutic CGM and is willing to use therapeutic CGM for the necessary frequent adjustments of insulin therapy.  I have completed an in-person visit during  the previous 6 months and will continue to have in-person visits every 6 months to assess adherence to their CGM regimen and diabetes treatment plan.  Due to COVID pandemic and need for remote monitoring this tool is medically necessary        Type 2 diabetes mellitus with diabetic polyneuropathy, with long-term current use of insulin  Optimize BG readings.   See above.       Educated patient to check feet daily for any foreign objects and/or wounds. Discussed with patient the importance of wearing appropriate footwear at all times, not to walk barefoot ever, and to check shoes before putting them on feet. Instructed patient to keep feet dry by regularly changing shoes and socks and drying feet after baths and exercises. Also, instructed patient to report any new lesions, discolorations, or swelling to a healthcare professional.        Microalbuminuria due to type 2 diabetes mellitus  Optimize BG readings.   See above.   On ACEi and SGLT2       Type 2 diabetes mellitus with stage 3a chronic kidney disease, with long-term current use of insulin  Optimize BG readings without insulin stacking or hypoglycemia   See above.   On Farxiga and Lisinopril ---holding over these right now due to VIJAY    Essential hypertension, benign  BP goal is < 140/90.   Tolerating ACEi  Blood pressure goals discussed with patient  Controlled today in office    Dyslipidemia, goal LDL below 100  On statin per ADA recommendations  LDL goal < 100. LDL at goal. LFTs WNL. Continue statin.   On Crestor 40 mg nightly and Zetia 10 mg daily--reports compliance    Class 1 obesity due to excess calories with serious comorbidity and body mass index (BMI) of 33.0 to 33.9 in adult  Body mass index is 33.77 kg/m².  Increases insulin resistance.   Discussed DM diet and exercise.           Addendum 7/9/2024- BMP resulted   Please call lab results to patient  Her kidney function has acutely worsened from 2 weeks ago  We need to make sure that she has not  taking any NSAIDs AT ALL.  I discussed this with her at the visit today but please reinforce  For right now need her to hold the metformin, hold her lisinopril hydrochlorothiazide pill, and hold her Farxiga  Make sure that she is wearing her V Go and getting the blood sugars down because the higher the blood sugars the more likely she becomes dehydrated and her kidney function declines  Force hydration  Lots of water  Repeat a BMP in a week  If were not back to baseline then we may need to consider her seeing the kidney specialist        I spent a total of 30 minutes on the day of the visit.This includes face to face time and non-face to face time preparing to see the patient (eg, review of tests), obtaining and/or reviewing separately obtained history, documenting clinical information in the electronic or other health record, independently interpreting results and communicating results to the patient/family/caregiver, or care coordinator.        Follow up in about 4 months (around 11/9/2024).   1. Dexcom G7 sample today   2. Schedule with zoltan for a dexcom G7 start 10 days later   3. See zoltan 10 days after that to make sure she is doing ok with dexcom g7  4. See me in 4 months with labs prior       Orders Placed This Encounter   Procedures    Hemoglobin A1C     Standing Status:   Future     Standing Expiration Date:   1/9/2026    Comprehensive Metabolic Panel     Standing Status:   Future     Standing Expiration Date:   1/9/2026    CBC Auto Differential     Standing Status:   Future     Standing Expiration Date:   1/9/2026    Microalbumin/Creatinine Ratio, Urine     Standing Status:   Future     Standing Expiration Date:   1/9/2026     Order Specific Question:   Specimen Source     Answer:   Urine    Lipid Panel     Standing Status:   Future     Standing Expiration Date:   1/9/2026    POCT Glucose, Hand-Held Device         Recommendations were discussed with the patient in detail  The patient verbalized  understanding and agrees with the plan outlined as above.

## 2024-07-09 NOTE — PROGRESS NOTES
"Dexcom G7 Training     Patient was seen in clinic today for start of sample Dexcom continuous glucose monitoring system . Reviewed with patient how to insert sensor. Assisted patient with inserting sensor on the back of the left arm. Time and date was updated on phone and settings were set. Hypoglycemia trigger set for 70 and hyperglycemia set for 250. Patient provided with phone number for dexcom technical support if needed. Reviewed types of possible alarms and what to do. Questions addressed. No futher questions.   Patient referred to clinic today for Dexcom g7 sample continuous glucose sensor system training.   Education was provided using "Quick Start Guide" per Dexcom protocol.       Overview:  5min glucose reading updates, trending arrows, BG graph screens, battery life indicator, Blue Tooth Symbol.   Menus: trend Graph, start sensor, enter BG, events, Alerts, Settings, Shutdown, Stop Sensor    Settings:                           * Urgent Low: 55 mg/dL                           * Urgent Low Soon: Off                           * Low alert: 70                           * High alert: 250                           * Rise rate: Off                           * Fall Rate: Off                          * Signal Loss:                                                  * No Reading: Off                          * Always sound: On      Reviewed additional setting options.  ?          Pt paired sensor with /phone.   ?          Reviewed where to find sensor insertion time and expiration date.   ?          Reviewed appropriate calibration techniques.  ?          Reviewed sensor site selection. Pt selected the right arm and prepped site using aseptic technique, inserted sensor, applied overlay tape and started session.   ?          Reviewed sensor removal from site. Discussed times to remove sensor per  guidelines include MRI or diatherapy.   ?          Patient able to demonstrate without " difficulty. Encouraged to review manual and/or training videos prior to starting another sensor.   ?          Reviewed problem solving aspects of sensor transmission/variables that can disrupt RF transmission.  range 20 feet, but the first 3 hrs keep within 3 feet of transmitter.  ?          Pt instructed on lag time of interstitial fluid from CBG and was advised to tx hypoglycemia and dose insulin based on SMBG values.  ?          Dexcom technical support contact number given and examples of when to contact them discussed.

## 2024-07-09 NOTE — ASSESSMENT & PLAN NOTE
Optimize BG readings without insulin stacking or hypoglycemia   See above.   On Farxiga and Lisinopril ---holding over these right now due to VIJAY

## 2024-07-09 NOTE — ASSESSMENT & PLAN NOTE
BP goal is < 140/90.   Tolerating ACEi  Blood pressure goals discussed with patient  Controlled today in office

## 2024-07-09 NOTE — ASSESSMENT & PLAN NOTE
Uncontrolled   Her A1c has improved to 7.2% which is good  She did not bring her Dexcom  for me to look at her readings today which makes it very difficult  She is wearing her V Go today and it does not have insulin in it  Her blood sugar on fingerstick was 300  She needs to get a repeat BMP--as I am concerned about VIJAY-based on her recent BMP 2 weeks ago  She is vague on whether or not she is having diarrhea with the Ozempic  We may need to consider trying to change her back to Mounjaro if possible        -- Medication Changes:   After her BMP resulted after the visit  I instructed her to hold the metformin due to VIJAY  Hold the Farxiga due to VIJAY      For now continue the Ozempic at 0.5 mg weekly---but may need to consider changing back to Mounjaro if VIJAY does not resolved  Also asked that she monitor closely for side effects-- diarrhea/ volume loss       Continue VGo30 with Fiasp   5-6 clicks with meals   1-2 clicks with snacks   Clicks BEFORE MEALS   CHANGE THE VGO EVERY 24 HOURS -- SAME TIME EVERY DAY   ALWAYS WEAR THE VGO!!!!!!     Use the dexcom consistently --will try to change the Dexcom G7 to make it a little easier for her      -- Reviewed goals of therapy are to get the best control we can without hypoglycemia.  -- Reviewed patient's current insulin regimen. Clarified proper insulin dose and timing in relation to meals, etc. Insulin injection sites and proper rotation instructed.    -- Advised frequent self blood glucose monitoring.  Patient encouraged to document glucose results and bring them to every clinic visit. Get back on dexcom personal CGM -- use the dexcom consistently*---changed the Dexcom G7  Today in off psych gave her a sample Dexcom G7 sensor and I gave her a Dexcom G7    -- Hypoglycemia precautions discussed. Instructed on precautions before driving.    -- Call for Bg repeatedly < 70 or > 200.   -- Close adherence to lifestyle changes recommended.   -- Periodic follow ups  for eye evaluations, foot care and dental care suggested.  2. Schedule with zoltan for a dexcom G7 start 10 days later   3. See zoltan 10 days after that to make sure she is doing ok with dexcom g7      patient will benefit from a Dexcom G6 to prevent hypoglycemia due to patient experiences level 2 or hypoglycemic event <54   Patient has diabetes mellitus and manages diabetes with intensive insulin regimen and uses prandial and basal insulin daily-- on the VGO   Patient requires a therapeutic CGM and is willing to use therapeutic CGM for the necessary frequent adjustments of insulin therapy.  I have completed an in-person visit during the previous 6 months and will continue to have in-person visits every 6 months to assess adherence to their CGM regimen and diabetes treatment plan.  Due to COVID pandemic and need for remote monitoring this tool is medically necessary

## 2024-07-11 ENCOUNTER — TELEPHONE (OUTPATIENT)
Dept: DIABETES | Facility: CLINIC | Age: 64
End: 2024-07-11
Payer: MEDICARE

## 2024-07-12 ENCOUNTER — TELEPHONE (OUTPATIENT)
Dept: DIABETES | Facility: CLINIC | Age: 64
End: 2024-07-12
Payer: MEDICARE

## 2024-07-19 ENCOUNTER — TELEPHONE (OUTPATIENT)
Dept: DIABETES | Facility: CLINIC | Age: 64
End: 2024-07-19
Payer: MEDICARE

## 2024-07-26 ENCOUNTER — CLINICAL SUPPORT (OUTPATIENT)
Dept: DIABETES | Facility: CLINIC | Age: 64
End: 2024-07-26
Payer: MEDICARE

## 2024-07-26 DIAGNOSIS — Z79.4 TYPE 2 DIABETES MELLITUS WITH HYPERGLYCEMIA, WITH LONG-TERM CURRENT USE OF INSULIN: Primary | ICD-10-CM

## 2024-07-26 DIAGNOSIS — Z79.4 TYPE 2 DIABETES MELLITUS WITH STAGE 3A CHRONIC KIDNEY DISEASE, WITH LONG-TERM CURRENT USE OF INSULIN: ICD-10-CM

## 2024-07-26 DIAGNOSIS — N18.31 TYPE 2 DIABETES MELLITUS WITH STAGE 3A CHRONIC KIDNEY DISEASE, WITH LONG-TERM CURRENT USE OF INSULIN: ICD-10-CM

## 2024-07-26 DIAGNOSIS — Z79.4 TYPE 2 DIABETES MELLITUS WITH DIABETIC POLYNEUROPATHY, WITH LONG-TERM CURRENT USE OF INSULIN: ICD-10-CM

## 2024-07-26 DIAGNOSIS — E11.22 TYPE 2 DIABETES MELLITUS WITH STAGE 3A CHRONIC KIDNEY DISEASE, WITH LONG-TERM CURRENT USE OF INSULIN: ICD-10-CM

## 2024-07-26 DIAGNOSIS — E11.42 TYPE 2 DIABETES MELLITUS WITH DIABETIC POLYNEUROPATHY, WITH LONG-TERM CURRENT USE OF INSULIN: ICD-10-CM

## 2024-07-26 DIAGNOSIS — E11.65 TYPE 2 DIABETES MELLITUS WITH HYPERGLYCEMIA, WITH LONG-TERM CURRENT USE OF INSULIN: Primary | ICD-10-CM

## 2024-07-26 PROCEDURE — 99999 PR PBB SHADOW E&M-EST. PATIENT-LVL II: CPT | Mod: PBBFAC,,, | Performed by: DIETITIAN, REGISTERED

## 2024-07-26 RX ORDER — LANCETS 33 GAUGE
EACH MISCELLANEOUS 3 TIMES DAILY
COMMUNITY
Start: 2024-07-10

## 2024-07-26 RX ORDER — LANCETS 30 GAUGE
EACH MISCELLANEOUS
COMMUNITY
Start: 2024-07-09

## 2024-07-26 NOTE — PROGRESS NOTES
Diabetes Care Specialist Follow-up Note  Author: Marla Stein RD, CDE  Date: 7/26/2024    Intake    Program Intake  Reason for Diabetes Program Visit:: Intervention  Type of Intervention:: Individual  Individual: Device Training  Education: Self-Management Skill Review  Device Training: Personal CGM  Current diabetes risk level:: high  In the last 12 months, have you:: none  Permission to speak with others about care:: no    Current Diabetes Treatment: Insulin, DM Injectables, Oral Medications  Oral Medication Type/Dose: farxiga 10 mg once a day  DM Injectables Type/Dose: ozempic 0.5 mg once a week  Method of insulin delivery?: Insulin Device  Insulin Device Type/Dose: humalog via VGo30    Continuous Glucose Monitoring  Patient has CGM: Yes  Personal CGM type:: Dexcom G6    Lab Results   Component Value Date    HGBA1C 7.2 (H) 06/25/2024     A1c Pre Diabetes Care Specialist Intervention:  8.7%    Physical activity/Exercise:   No change    SMBG: using the dexcom G6/G7 - has an old G7 on from visit with Micki Knott NP on 7/9/2024  Has a G6 sensor but does not have a transmitter nor the G6  with her  She has the G7  with her, we will use a sample dexcom G7 from the office at time of visit for education    Lifestyle Coping Support & Clinical    Lifestyle/Coping/Support  Compared to other people your age, how would you rate your health?: Good  Psychosocial/Coping Skills Assessment Completed: : No  Assessment indicates:: Adequate understanding  Deffered due to:: Other (comment) (previously completed, there has been no change and patient has adequate understanding)  Area of need?: No    Problem Review  Active Comorbidities: Cardiovascular Disease, Hypertension, Neuropathy    Diabetes Self-Management Skills Assessment    Medication Skills Assessment  Patient is able to identify current diabetes medications, dosages, and appropriate timing of medications.: yes  Patient reports problems or concerns with  current medication regimen.: no  Medication Skills Assessment Completed:: No  Assessment indicates:: Adequate understanding  Deffered due to:: Other (comment) (previously completed, there has been no change and patient has adequate understanding)  Area of need?: No    Diabetes Disease Process/Treatment Options  Diabetes Disease Process/Treatment Options: Skills Assessment Completed: No  Assessment indicates:: Adequate understanding  Deferred due to:: Other (comment) (previously completed, there has been no change and patient has adequate understanding)  Area of need?: No    Nutrition/Healthy Eating  Meal Plan 24 Hour Recall - Breakfast: Toast and Eggs, coffee with sugar and cream  Meal Plan 24 Hour Recall - Lunch: chicken noodle soup with crackers or a sandwich  Meal Plan 24 Hour Recall - Dinner: yesterday some roast and potatoes, some times a burger and some fries, sometimes gets pizza or something home cooked  Meal Plan 24 Hour Recall - Snack: peanut butter crackers, almonds, cheerios, drinks water or sparkling water body armour lyte  Nutrition/Healthy Eating Skills Assessment Completed:: No  Assessment indicates:: Adequate understanding  Deffered due to:: Other (comment) (previously completed, there has been no change and patient has adequate understanding)  Area of need?: No    Physical Activity/Exercise  Assessment indicates:: Adequate understanding  Deffered due to:: Other (comment)  Area of need?: No    Home Blood Glucose Monitoring  Patient states that blood sugar is checked at home daily.: yes  Monitoring Method:: personal continuous glucose monitor  Home glucometer meter type:: accu-chek guide  Personal CGM type:: Dexcom G6  Home Blood Glucose Monitoring Skills Assessment Completed: : Yes  Assessment indicates:: Instruction Needed  Area of need?: Yes    Acute Complications  Acute Complications Skills Assessment Completed: : No  Assessment indicates:: Adequate understanding  Deffered due to:: Other  "(comment) (previously completed, there has been no change and patient has adequate understanding)  Area of need?: No    Chronic Complications  Reviewed health maintenance: yes  Chronic Complications Skills Assessment Completed: : No  Assessment indicates:: Adequate understanding  Deferred due to:: Other (comment) (previously completed, there has been no change and patient has adequate understanding)  Area of need?: No      During today's follow-up visit,  the following areas required further assessment and content was provided/reviewed.    Based on today's diabetes care assessment, the following areas of need were identified:          7/26/2024    12:01 AM   Areas of Need   Medications/Current Diabetes Treatment No   Lifestyle Coping Support No   Diabetes Disease Process/Treatment Options No   Nutrition/Healthy Eating No   Physical Activity/Exercise No   Home Blood Glucose Monitoring Yes, Dexcom G7 Education  Patient is here in clinic today for initial start of Dexcom continuous glucose monitoring system (CGMA). Reviewed with patient how to insert sensor. Patient inserted sensor on right upper arm. Time and date was set on monitor and settings were set. Hypoglycemia trigger set for 70 and hyperglycemia set for 250. Patient provided with phone number for 24-hour help line if needed. Discussed various types of possible alarms and what to do. Questions addressed. Initialization finished. No futher questions.  Dexcom [employer]7 mobile sylvia downloaded to phone. Education was provided using "Quick Start Guide" per Dexcom protocol.     Pt will be using their phone/ as the primary .  Overview:  5min glucose reading updates, trending arrows, BG graph screens, battery life indicator, Blue Tooth Symbol.  Menus: trend Graph, start sensor, enter BG, events, Alerts, Settings, Shutdown, Stop Sensor   Settings:                          * Urgent Low: 55 mg/dL                          * Urgent Low Soon: Off             "              * Low alert: 70                          * High alert: 250                          * Rise rate: Off                          * Fall Rate: Off                          * Signal Loss: 30 min                          * No Reading: Off                          * Always sound: On                             Reviewed additional setting options with patient, including Graph Height. Sensor was paired with /phone.    Reviewed where to find sensor insertion time and sensor expiration date.   Discussed no need to calibrate sensor during the entirety of the 10 day wear. Discussed that pt can calibrate sensor if desired, Reviewed appropriate calibration techniques.  Reviewed sensor site selection. Site selected and prepped using aseptic technique Inserted to right upper arm.  Practiced sensor removal from site and disposal.  Patient able to demonstrate without difficulty.  Encouraged to follow-up prior to starting another sensor.   Reviewed problem solving aspects of sensor transmission/variables that can disrupt RF transmission.  range 20 feet, but the first 3 hrs keep within 3 feet of transmitter.  Pt instructed on lag time of interstitial fluid from CBG and was advised to tx hypoglycemia and dose insulin based on SMBG values.  Link to video provided and written instructions provided for patient to review before 10 day sensor change  Dexcom technical support contact number given and examples of when to contact them discussed.      Acute Complications No   Chronic Complications No        Today's interventions were provided through individual discussion, instruction, and written materials were provided.    Patient verbalized understanding of instruction and written materials.  Pt was able to return back demonstration of instructions today. Patient understood key points, needs reinforcement and further instruction.     Diabetes Self-Management Care Plan Review and Evaluation of Progress:    During  "today's follow-up Aishwarya's Diabetes Self-Management Care Plan progress was reviewed and progress was evaluated including his/her input. Aishwarya has agreed to continue his/her journey to improve/maintain overall diabetes control by continuing to set health goals. See care plan progress below.      Care Plan: Diabetes Management   Updates made since 6/26/2024 12:00 AM        Problem: Blood Glucose Self-Monitoring         Goal: Patient agrees to check and record blood sugars 3 times per day using the dexcom G6/G7 for the next 3 months    Start Date: 12/27/2023   Expected End Date: 3/27/2024   This Visit's Progress: On track   Recent Progress: On track   Priority: High   Barriers: Lack of Supplies   Note:    Patient was instructed how to use a meter. Discussed signs and symptoms, and causes and treatment of hypoglycemia. Covered blood sugar and A1C goals. Instructed patient to use provided logbooks to record blood sugars. Instructed patient to follow-up with their physician. Patient verbalized understanding of all instructions.  Prescription for meter and testing supplies pended to PCP    1/6/2023  Dexcom G6 Education  Patient is here in clinic today for initial start of Dexcom continuous glucose monitoring system (CGMA). Reviewed with patient how to insert sensor and transmitter. Patient inserted sensor on right abdomen and inserted transmitter. Time and date was set on monitor and settings were set. Hypoglycemia trigger set for 70 and hyperglycemia set for 300. Patient provided with phone number for 24-hour help line if needed. Discussed various types of possible alarms and what to do. Questions addressed. Initialization finished. No futher questions.  Patient referred to clinic today for Dexcom G6 continuous glucose sensor system training.  Education was provided using "Quick Start Guide" per Dexcom protocol.    Overview:  5min glucose reading updates, trending arrows, BG graph screens, battery life indicator, " Blue Tooth Symbol.  Menus: trend Graph, start sensor, enter BG, events, Alerts, Settings, Shutdown, Stop Sensor   Settings:                          * Urgent Low: 55 mg/dL                          * Urgent Low Soon: Off                          * Low alert: 70                          * High alert: 300                          * Rise rate: Off                          * Fall Rate: Off                          * Signal Loss: 30 min                          * No Reading: Off                          * Always sound: On                             Reviewed additional setting options with patient, including Graph Height and Transmitter ID. Transmitter was paired with .    Reviewed where to find sensor insertion time and sensor expiration date.   Discussed no need to calibrate sensor during the entirety of the 10 day wear. Discussed that pt can calibrate sensor if desired, but at that time she will need to continue calibrating every 12 hours for sensor to remain accurate. Reviewed appropriate calibration techniques.  Reviewed sensor site selection. Site selected and prepped using aseptic technique Inserted to left abdomen. Transmitter placed in pod and secured.  Practiced sensor pod/transmitter removal from site, and removal of transmitter from sensor pod.  Patient able to demonstrate without difficulty.  Encouraged to review manual prior to starting another sensor.   Reviewed problem solving aspects of sensor transmission/variables that can disrupt RF transmission.  range 20 feet, but the first 3 hrs keep within 3 feet of transmitter.  Pt instructed on lag time of interstitial fluid from CBG and was advised to tx hypoglycemia and dose insulin based on SMBG values.  Link to video provided and written instructions provided for patient to review before 10 day sensor change  Dexcom technical support contact number given and examples of when to contact them discussed.      1/17 Changing the Sensor  (every 10 days)  Remove the whole thing from your body  Hold the sensor by the skinny part and break the clear piece  Remove the gray transmitter  In the reader touch start sensor  Touch yes you want to start sensor  Touch the square you want to put the number in for   There will be a 4-digit code on the sensor (like 5937)  Enter the code from the sensor  Touch ok  Touch yes, it is correct  Insert Sensor  Remove orange piece and paper tabs  Place on skin and hold in place  Push the orange button  Insert the gray transmitter into the sensor  Put the small tongue like side in first and push down  You should hear 2 clicks  Touch confirm once sensor is secure on body  Touch start Sensor  Touch 1 then 2  Sensor will take 2 hours to warm up    Changing Transmitter (every 90 days) in the reader  Touch the =   Touch transmitter  Touch pair new  Go through the process to start sensor  Touch yes you want to start sensor  Touch the square you want to put the number in for   There will be a 4-digit code on the sensor (like 5937)  Enter the code from the sensor  Touch ok  Touch yes, it is correct  Touch the square you want to put the number in for   There will be a 6-digit code on the transmitter box (like [id number])  Enter the code from the transmitter box  Touch ok  Touch yes, it is correct  Insert Sensor  Remove orange piece and paper tabs  Place on skin and hold in place  Push the orange button  Insert the gray transmitter into the sensor  Put the small tongue like side in first and push down  You should hear 2 clicks  Touch confirm once sensor is secure on body  Touch start Sensor  Touch 1 then 2  Sensor will take 2 hours to warm up             Follow Up Plan     Follow up in about 10 days (around 8/5/2024) for Change Personal CGM and Upload Data.    Today's care plan and follow up schedule was discussed with patient.  Aishwarya verbalized understanding of the care plan, goals, and agrees to follow up plan.        The patient  was encouraged to communicate with his/her health care provider/physician and care team regarding his/her condition(s) and treatment.  I provided the patient with my contact information today and encouraged to contact me via phone or [facility] Patient Portal as needed.     Length of Visit   Total Time: 75 Minutes

## 2024-08-02 ENCOUNTER — PATIENT MESSAGE (OUTPATIENT)
Dept: DIABETES | Facility: CLINIC | Age: 64
End: 2024-08-02
Payer: MEDICARE

## 2024-08-02 ENCOUNTER — TELEPHONE (OUTPATIENT)
Dept: DIABETES | Facility: CLINIC | Age: 64
End: 2024-08-02
Payer: MEDICARE

## 2024-08-05 PROBLEM — M72.2 PLANTAR FASCIITIS OF LEFT FOOT: Status: ACTIVE | Noted: 2024-08-05

## 2024-08-13 ENCOUNTER — OFFICE VISIT (OUTPATIENT)
Dept: PODIATRY | Facility: CLINIC | Age: 64
End: 2024-08-13
Payer: MEDICARE

## 2024-08-13 VITALS
BODY MASS INDEX: 34.87 KG/M2 | SYSTOLIC BLOOD PRESSURE: 177 MMHG | WEIGHT: 222.13 LBS | HEART RATE: 78 BPM | DIASTOLIC BLOOD PRESSURE: 77 MMHG | HEIGHT: 67 IN

## 2024-08-13 DIAGNOSIS — I83.93 ASYMPTOMATIC VARICOSE VEINS OF BILATERAL LOWER EXTREMITIES: ICD-10-CM

## 2024-08-13 DIAGNOSIS — M79.672 PAIN OF LEFT HEEL: ICD-10-CM

## 2024-08-13 DIAGNOSIS — Z79.4 TYPE 2 DIABETES MELLITUS WITH DIABETIC POLYNEUROPATHY, WITH LONG-TERM CURRENT USE OF INSULIN: Primary | ICD-10-CM

## 2024-08-13 DIAGNOSIS — E11.42 TYPE 2 DIABETES MELLITUS WITH DIABETIC POLYNEUROPATHY, WITH LONG-TERM CURRENT USE OF INSULIN: Primary | ICD-10-CM

## 2024-08-13 DIAGNOSIS — M76.62 LEFT ACHILLES TENDINITIS: ICD-10-CM

## 2024-08-13 PROCEDURE — 3078F DIAST BP <80 MM HG: CPT | Mod: CPTII,S$GLB,, | Performed by: PODIATRIST

## 2024-08-13 PROCEDURE — 4010F ACE/ARB THERAPY RXD/TAKEN: CPT | Mod: CPTII,S$GLB,, | Performed by: PODIATRIST

## 2024-08-13 PROCEDURE — 3060F POS MICROALBUMINURIA REV: CPT | Mod: CPTII,S$GLB,, | Performed by: PODIATRIST

## 2024-08-13 PROCEDURE — 3077F SYST BP >= 140 MM HG: CPT | Mod: CPTII,S$GLB,, | Performed by: PODIATRIST

## 2024-08-13 PROCEDURE — 3008F BODY MASS INDEX DOCD: CPT | Mod: CPTII,S$GLB,, | Performed by: PODIATRIST

## 2024-08-13 PROCEDURE — 3051F HG A1C>EQUAL 7.0%<8.0%: CPT | Mod: CPTII,S$GLB,, | Performed by: PODIATRIST

## 2024-08-13 PROCEDURE — 3066F NEPHROPATHY DOC TX: CPT | Mod: CPTII,S$GLB,, | Performed by: PODIATRIST

## 2024-08-13 PROCEDURE — 1159F MED LIST DOCD IN RCRD: CPT | Mod: CPTII,S$GLB,, | Performed by: PODIATRIST

## 2024-08-13 PROCEDURE — 3288F FALL RISK ASSESSMENT DOCD: CPT | Mod: CPTII,S$GLB,, | Performed by: PODIATRIST

## 2024-08-13 PROCEDURE — 99999 PR PBB SHADOW E&M-EST. PATIENT-LVL III: CPT | Mod: PBBFAC,,, | Performed by: PODIATRIST

## 2024-08-13 PROCEDURE — 99213 OFFICE O/P EST LOW 20 MIN: CPT | Mod: S$GLB,,, | Performed by: PODIATRIST

## 2024-08-13 PROCEDURE — 1101F PT FALLS ASSESS-DOCD LE1/YR: CPT | Mod: CPTII,S$GLB,, | Performed by: PODIATRIST

## 2024-08-13 PROCEDURE — 1125F AMNT PAIN NOTED PAIN PRSNT: CPT | Mod: CPTII,S$GLB,, | Performed by: PODIATRIST

## 2024-08-13 RX ORDER — DICLOFENAC SODIUM 10 MG/G
2 GEL TOPICAL 4 TIMES DAILY
Qty: 350 G | Refills: 2 | Status: SHIPPED | OUTPATIENT
Start: 2024-08-13

## 2024-08-13 NOTE — PROGRESS NOTES
Subjective:      Patient ID: Aishwarya Jensen is a 64 y.o. female.    Chief Complaint: Heel Pain (left)    Patient states L heel started to hurt after granddaughter played w/ her shoes & does not know where heel pads are. Went to ED a week ago; given a shot & IB. Pain level 3/10. Here in flat slip-on shoes & uses Crocs on tile in house.  5/13/24  Patient is here for F/U R heel pain.  Doing well.  Using Tuli's heel cups in all her shoes. Helps.  Not having much pain on a regular basis.  Switch to Ozempic per DM mgmt.just recently. Had her annual checkup W/ PCP earlier today.  4/16/24  Patient is here for f/u heel pain & DM foot care. States her heel pain has gone but lost one of Tulis heel cups so in flat slip-ons - wants another pair toprevent recurrence.  Has thick toenails but goes to pedicures regularly. Questions re: Vicks for toenails or other OTC topical antifungals.  Patient last in 5 months ago. Was visiting daughter in [facility] for 2 wks.  11/2/23  Patient is here for f/u R heel pain. Last here for DM foot exam & scheduled q 6 months. Instructed on appropriate shoes as well as dispensed Tulis heel cups (in current flat casual slip-ons) & PPT heel lifts for others. Rx Dm shoes written. States has some swelling in BLE R>L.  9/28/23  Aishwarya is a 64 y.o. female who presents after >5months absence for diabetic foot & nail care in high risk feet. Nails hurt in shoes w/ pressure when walking. Also heel pain R foot.     PCP: Joey Cintron MD 5/13/24  DM mgmt: Micki Knott NP 4th when he 224    Current shoe gear: flat casual; uses non-slip hospital socks in home.    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    Past Medical History:   Diagnosis Date    Cecal volvulus 05/04/2023    Diabetes mellitus, type 2     Hypercholesteremia     Hypertension    Dx DM 30+ yrs.ago.   Knee pain - ortho.referral  Patient Active Problem List   Diagnosis    Essential hypertension, benign    Dyslipidemia, goal LDL below  100    Microalbuminuria due to type 2 diabetes mellitus    Type 2 diabetes mellitus with hyperglycemia, with long-term current use of insulin    Type 2 diabetes mellitus with diabetic polyneuropathy, with long-term current use of insulin    Asymptomatic varicose veins of bilateral lower extremities    Onychomycosis due to dermatophyte    Class 1 obesity due to excess calories with serious comorbidity and body mass index (BMI) of 33.0 to 33.9 in adult    Stage 3a chronic kidney disease    Dyspnea on exertion    Primary osteoarthritis of right knee    Primary osteoarthritis of left knee    Type 2 diabetes mellitus with stage 3a chronic kidney disease, with long-term current use of insulin    Anemia    Aortic atherosclerosis    Plantar fasciitis of left foot      Hemoglobin A1C   Date Value Ref Range Status   06/25/2024 7.2 (H) 4.0 - 5.6 % Final     Comment:     ADA Screening Guidelines:  5.7-6.4%  Consistent with prediabetes  >or=6.5%  Consistent with diabetes    High levels of fetal hemoglobin interfere with the HbA1C  assay. Heterozygous hemoglobin variants (HbS, HgC, etc)do  not significantly interfere with this assay.   However, presence of multiple variants may affect accuracy.     04/03/2024 8.1 (H) 4.0 - 5.6 % Final     Comment:     ADA Screening Guidelines:  5.7-6.4%  Consistent with prediabetes  >or=6.5%  Consistent with diabetes    High levels of fetal hemoglobin interfere with the HbA1C  assay. Heterozygous hemoglobin variants (HbS, HgC, etc)do  not significantly interfere with this assay.   However, presence of multiple variants may affect accuracy.     11/09/2023 8.7 (H) 4.0 - 5.6 % Final     Comment:     ADA Screening Guidelines:  5.7-6.4%  Consistent with prediabetes  >or=6.5%  Consistent with diabetes    High levels of fetal hemoglobin interfere with the HbA1C  assay. Heterozygous hemoglobin variants (HbS, HgC, etc)do  not significantly interfere with this assay.   However, presence of multiple  variants may affect accuracy.        Objective:      Physical Exam  Vitals reviewed.   Constitutional:       Appearance: She is well-developed. She is obese.   Cardiovascular:      Pulses: Normal pulses.           Dorsalis pedis pulses are 2+ on the right side and 2+ on the left side.      Comments: Varicose veins R>L, w/ edema BLE, & telangectasias.  Musculoskeletal:         General: No swelling, tenderness or signs of injury.      Right lower leg: Edema present.      Left lower leg: Edema present.      Right ankle:      Right Achilles Tendon: Tenderness present. No defects. Menchaca's test negative.      Left ankle: No tenderness.      Left Achilles Tendon: No tenderness or defects. Menchaca's test negative.      Right foot: Deformity (hammertoe B/L) present.      Left foot: Deformity (cavus B/L) present.        Feet:    Feet:      Right foot:      Skin integrity: No erythema or warmth.      Toenail Condition: Fungal disease present.     Left foot:      Skin integrity: No erythema or warmth.      Toenail Condition: Fungal disease present.     Comments: Equinus B/L ankles w/ < 90 deg foot to leg noted w/ knees extended.      MS strength of extrinsics to foot & ankle B/L + 5/5 in DF/PF/Inv/Ev to resistance w/ no reproduction of pain in any direction, including Achilles insertion R. Pain distal heel insertion of Achilles L nor palpable fullness; resolution of calor.    Passive ROM of ankle & pedal joints is painless & w/out crepitation B/L.     Toenails 1st, 2nd, 3rd, 4th, 5th  B/L are thickened, dystrophic, discolored, w/ crumbly subungual debris sparing 4 L.    Skin:     General: Skin is cool and dry.      Capillary Refill: Capillary refill takes less than 2 seconds.      Findings: No bruising, erythema or lesion.      Comments: Stasis hyperpigmentation B/L.   Neurological:      Mental Status: She is alert and oriented to person, place, and time.      Sensory: Sensory deficit present.      Motor: Motor function is  intact. No weakness or abnormal muscle tone.      Gait: Gait is intact. Gait normal.      Comments: Paresthesias including numbness & parasthesias B/L feet w/ no clearly identified trigger or source; no hyperemia.   Psychiatric:         Mood and Affect: Mood and affect normal.         Behavior: Behavior normal. Behavior is cooperative.       Assessment:      Encounter Diagnoses   Name Primary?    Type 2 diabetes mellitus with diabetic polyneuropathy, with long-term current use of insulin Yes    Asymptomatic varicose veins of bilateral lower extremities     Left Achilles tendinitis     Pain of left heel      Problem List Items Addressed This Visit          Cardiac/Vascular    Asymptomatic varicose veins of bilateral lower extremities       Endocrine    Type 2 diabetes mellitus with diabetic polyneuropathy, with long-term current use of insulin - Primary     Other Visit Diagnoses       Left Achilles tendinitis        Relevant Medications    diclofenac sodium (VOLTAREN) 1 % Gel    Pain of left heel                Plan:      Aishwarya was seen today for heel pain.    Diagnoses and all orders for this visit:    Type 2 diabetes mellitus with diabetic polyneuropathy, with long-term current use of insulin    Asymptomatic varicose veins of bilateral lower extremities    Left Achilles tendinitis  -     diclofenac sodium (VOLTAREN) 1 % Gel; Apply 2 g topically 4 (four) times daily.    Pain of left heel    I counseled the patient on her conditions, their implications & medical mgmt.    - Diabetic Foot Education. Patient reminded of the importance of good blood sugar control to help prevent podiatric complications of diabetes. We discussed wearing proper shoe gear, daily foot inspections, never walking w/out protective shoe gear, podiatric visits every 6-12 months, sooner prn.     Recommended shoes w/ adequate arch supports to alleviate abnormal pressure & improve stability of foot while walking.   Avoid flat shoes or barefoot  walking as these will exacerbate or worsen symptoms.   -Dispensed new PPT heel lifts & Tulis heel cupsfor use in all shoe gear, all times WB including @ home.     Continue OTC Voltaren gel up to qid prn.    Continue Tubigrip BLE prn.    Patient will call p.r.n. .        A total of 25 mins.was spent on chart review, patient visit & documentation.

## 2024-08-19 ENCOUNTER — TELEPHONE (OUTPATIENT)
Dept: DIABETES | Facility: CLINIC | Age: 64
End: 2024-08-19
Payer: MEDICARE

## 2024-08-20 ENCOUNTER — TELEPHONE (OUTPATIENT)
Dept: DIABETES | Facility: CLINIC | Age: 64
End: 2024-08-20
Payer: MEDICARE

## 2024-08-28 ENCOUNTER — PATIENT MESSAGE (OUTPATIENT)
Dept: DIABETES | Facility: CLINIC | Age: 64
End: 2024-08-28
Payer: MEDICARE

## 2024-09-07 DIAGNOSIS — I10 ESSENTIAL HYPERTENSION, BENIGN: ICD-10-CM

## 2024-09-07 NOTE — TELEPHONE ENCOUNTER
No care due was identified.  [facility] Embedded Care Due Messages. Reference number: [id number].   9/07/2024 7:03:33 AM CDT   Stuart Jimenes)

## 2024-09-09 RX ORDER — AMLODIPINE BESYLATE 5 MG/1
5 TABLET ORAL
Qty: 90 TABLET | Refills: 2 | Status: SHIPPED | OUTPATIENT
Start: 2024-09-09

## 2024-09-09 NOTE — TELEPHONE ENCOUNTER
Refill Routing Note   Medication(s) are not appropriate for processing by [facility] for the following reason(s):        ED/Hospital Visit since last OV with provider  Required vitals abnormal    [facility] action(s):  Defer             Extended chart review required: Yes     Appointments  past 12m or future 3m with PCP    Date Provider   Last Visit   5/13/2024 Joey Cintron MD   Next Visit   Visit date not found Joey Cintron MD   ED visits in past 90 days: 1        Note composed:8:17 AM 09/09/2024

## 2024-09-13 ENCOUNTER — TELEPHONE (OUTPATIENT)
Dept: DIABETES | Facility: CLINIC | Age: 64
End: 2024-09-13
Payer: MEDICARE

## 2024-09-16 DIAGNOSIS — U07.1 COVID-19 VIRUS DETECTED: ICD-10-CM

## 2024-09-17 ENCOUNTER — TELEPHONE (OUTPATIENT)
Dept: DIABETES | Facility: CLINIC | Age: 64
End: 2024-09-17
Payer: MEDICARE

## 2024-09-17 NOTE — TELEPHONE ENCOUNTER
----- Message from Racquel Cochran sent at 9/17/2024  9:33 AM CDT -----  Contact: [phone]  Pt states she needs a diabetic testing meter and requesting medical necessity be sent through [employer] for her. Please call pt when completed. Thanks

## 2024-09-19 ENCOUNTER — PATIENT MESSAGE (OUTPATIENT)
Dept: PRIMARY CARE CLINIC | Facility: CLINIC | Age: 64
End: 2024-09-19
Payer: MEDICARE

## 2024-09-25 ENCOUNTER — PATIENT MESSAGE (OUTPATIENT)
Dept: DIABETES | Facility: CLINIC | Age: 64
End: 2024-09-25
Payer: MEDICARE

## 2024-09-25 ENCOUNTER — CLINICAL SUPPORT (OUTPATIENT)
Dept: DIABETES | Facility: CLINIC | Age: 64
End: 2024-09-25
Payer: MEDICARE

## 2024-09-25 DIAGNOSIS — Z79.4 TYPE 2 DIABETES MELLITUS WITH HYPERGLYCEMIA, WITH LONG-TERM CURRENT USE OF INSULIN: Primary | ICD-10-CM

## 2024-09-25 DIAGNOSIS — N18.31 TYPE 2 DIABETES MELLITUS WITH STAGE 3A CHRONIC KIDNEY DISEASE, WITH LONG-TERM CURRENT USE OF INSULIN: ICD-10-CM

## 2024-09-25 DIAGNOSIS — Z79.4 TYPE 2 DIABETES MELLITUS WITH HYPERGLYCEMIA, WITH LONG-TERM CURRENT USE OF INSULIN: ICD-10-CM

## 2024-09-25 DIAGNOSIS — E11.65 TYPE 2 DIABETES MELLITUS WITH HYPERGLYCEMIA, WITH LONG-TERM CURRENT USE OF INSULIN: ICD-10-CM

## 2024-09-25 DIAGNOSIS — E11.42 TYPE 2 DIABETES MELLITUS WITH DIABETIC POLYNEUROPATHY, WITH LONG-TERM CURRENT USE OF INSULIN: ICD-10-CM

## 2024-09-25 DIAGNOSIS — Z79.4 TYPE 2 DIABETES MELLITUS WITH STAGE 3A CHRONIC KIDNEY DISEASE, WITH LONG-TERM CURRENT USE OF INSULIN: ICD-10-CM

## 2024-09-25 DIAGNOSIS — E11.65 TYPE 2 DIABETES MELLITUS WITH HYPERGLYCEMIA, WITH LONG-TERM CURRENT USE OF INSULIN: Primary | ICD-10-CM

## 2024-09-25 DIAGNOSIS — Z79.4 TYPE 2 DIABETES MELLITUS WITH DIABETIC POLYNEUROPATHY, WITH LONG-TERM CURRENT USE OF INSULIN: ICD-10-CM

## 2024-09-25 DIAGNOSIS — E11.22 TYPE 2 DIABETES MELLITUS WITH STAGE 3A CHRONIC KIDNEY DISEASE, WITH LONG-TERM CURRENT USE OF INSULIN: ICD-10-CM

## 2024-09-25 PROCEDURE — G0108 DIAB MANAGE TRN  PER INDIV: HCPCS | Mod: S$GLB,,, | Performed by: DIETITIAN, REGISTERED

## 2024-09-25 NOTE — TELEPHONE ENCOUNTER
----- Message from Maxwell Childhop sent at 9/25/2024  2:03 PM CDT -----  Regarding: Dexcom 7 Request  Contact: Pt [phone]  Requesting an RX refill or new RX.    Is this a refill or new RX: New    RX name and strength (copy/paste from chart):  Dexcom 7 (She says she got a dexcom 6)    Is this a 30 day or 90 day RX:     Pharmacy name and phone # (copy/paste from chart):    [id number] - [place] - [address]  [address] [place] [address]  Phone: [phone] Fax: [phone]

## 2024-09-25 NOTE — TELEPHONE ENCOUNTER
No care due was identified.  [facility] Embedded Care Due Messages. Reference number: [id number].   9/25/2024 2:22:59 PM CDT

## 2024-09-26 RX ORDER — BLOOD-GLUCOSE SENSOR
1 EACH MISCELLANEOUS
Qty: 3 EACH | Refills: 11 | Status: SHIPPED | OUTPATIENT
Start: 2024-09-26 | End: 2025-09-26

## 2024-09-26 NOTE — TELEPHONE ENCOUNTER
Refill Routing Note   Medication(s) are not appropriate for processing by [facility] for the following reason(s):        ED/Hospital Visit since last OV with provider    [facility] action(s):  Defer             Extended chart review required: Yes     Appointments  past 12m or future 3m with PCP    Date Provider   Last Visit   5/13/2024 Joey Cintron MD   Next Visit   Visit date not found Joey Cintron MD   ED visits in past 90 days: 2        Note composed:8:12 PM 09/25/2024

## 2024-10-01 NOTE — PROGRESS NOTES
Diabetes Care Specialist Follow-up Note  Author: Marla Stein RD, CDE  Date: 10/1/2024    Intake    Program Intake  Reason for Diabetes Program Visit:: Intervention  Type of Intervention:: Individual  Individual: Device Training  Education: Self-Management Skill Review  Medication Training: Injectable Medication  Device Training: Personal CGM  Current diabetes risk level:: high  In the last 12 months, have you:: none  Permission to speak with others about care:: no    Current Diabetes Treatment: Insulin, DM Injectables, Oral Medications  Oral Medication Type/Dose: farxiga 10 mg once a day  DM Injectables Type/Dose: ozempic 0.5 mg once a week  Method of insulin delivery?: Insulin Device  Insulin Device Type/Dose: humalog via VGo30    Continuous Glucose Monitoring  Patient has CGM: Yes  Personal CGM type:: Dexcom G6    Lab Results   Component Value Date    HGBA1C 7.2 (H) 06/25/2024     A1c Pre Diabetes Care Specialist Intervention:  8.7%    Physical activity/Exercise:   No change    SMBG: using the dexcom G6/G7 - once again patient does not have the supplies she needs. She is very confused as to what she is doing  Has a G6 sensor but does not have a transmitter nor the G6  with her  She has the G7  with her, we will use a sample dexcom G7 from the office at time of visit for education    Lifestyle Coping Support & Clinical    Lifestyle/Coping/Support  Compared to other people your age, how would you rate your health?: Good  Psychosocial/Coping Skills Assessment Completed: : No  Assessment indicates:: Adequate understanding  Deffered due to:: Other (comment) (previously completed, there has been no change and patient has adequate understanding)  Area of need?: No    Problem Review  Active Comorbidities: Cardiovascular Disease, Hypertension, Neuropathy    Diabetes Self-Management Skills Assessment    Medication Skills Assessment  Patient is able to identify current diabetes medications, dosages, and  appropriate timing of medications.: yes  Patient reports problems or concerns with current medication regimen.: no  Patient is  aware that some diabetes medications can cause low blood sugar?: Yes  Medication Skills Assessment Completed:: No  Assessment indicates:: Adequate understanding  Deffered due to:: Other (comment) (previously completed, there has been no change and patient has adequate understanding)  Area of need?: No    Diabetes Disease Process/Treatment Options  Diabetes Type?: Type II  Diabetes Disease Process/Treatment Options: Skills Assessment Completed: No  Assessment indicates:: Adequate understanding  Deferred due to:: Other (comment) (previously completed, there has been no change and patient has adequate understanding)  Area of need?: No    Nutrition/Healthy Eating  Meal Plan 24 Hour Recall - Breakfast: Toast and Eggs, coffee with sugar and cream  Meal Plan 24 Hour Recall - Lunch: chicken noodle soup with crackers or a sandwich  Meal Plan 24 Hour Recall - Dinner: yesterday some roast and potatoes, some times a burger and some fries, sometimes gets pizza or something home cooked  Meal Plan 24 Hour Recall - Snack: peanut butter crackers, almonds, cheerios, drinks water or sparkling water body armour lyte  Nutrition/Healthy Eating Skills Assessment Completed:: No  Assessment indicates:: Adequate understanding  Deffered due to:: Other (comment) (previously completed, there has been no change and patient has adequate understanding)  Area of need?: No    Physical Activity/Exercise  Physical Activity/Exercise Skills Assessment Completed: : No  Assessment indicates:: Adequate understanding  Deffered due to:: Other (comment)  Area of need?: No    Home Blood Glucose Monitoring  Patient states that blood sugar is checked at home daily.: yes  Monitoring Method:: personal continuous glucose monitor  Home glucometer meter type:: accu-chek guide  Personal CGM type:: Dexcom G6  Home Blood Glucose Monitoring  "Skills Assessment Completed: : Yes  Assessment indicates:: Instruction Needed  Area of need?: Yes    Acute Complications  Acute Complications Skills Assessment Completed: : No  Assessment indicates:: Adequate understanding  Deffered due to:: Other (comment) (previously completed, there has been no change and patient has adequate understanding)  Area of need?: No    Chronic Complications  Reviewed health maintenance: yes  Chronic Complications Skills Assessment Completed: : No  Assessment indicates:: Adequate understanding  Deferred due to:: Other (comment) (previously completed, there has been no change and patient has adequate understanding)  Area of need?: No      During today's follow-up visit,  the following areas required further assessment and content was provided/reviewed.    Based on today's diabetes care assessment, the following areas of need were identified:          7/26/2024    12:01 AM   Areas of Need   Medications/Current Diabetes Treatment No   Lifestyle Coping Support No   Diabetes Disease Process/Treatment Options No   Nutrition/Healthy Eating No   Physical Activity/Exercise No   Home Blood Glucose Monitoring Yes, Dexcom G7 Education  Patient is here in clinic today for initial start of Dexcom continuous glucose monitoring system (CGMA). Reviewed with patient how to insert sensor. Patient inserted sensor on right upper arm. Time and date was set on monitor and settings were set. Hypoglycemia trigger set for 70 and hyperglycemia set for 250. Patient provided with phone number for 24-hour help line if needed. Discussed various types of possible alarms and what to do. Questions addressed. Initialization finished. No futher questions.  Dexcom G7 mobile sylvia downloaded to phone. Education was provided using "Quick Start Guide" per Dexcom protocol.     Pt will be using their phone/ as the primary .  Overview:  5min glucose reading updates, trending arrows, BG graph screens, battery life " indicator, Blue Tooth Symbol.  Menus: trend Graph, start sensor, enter BG, events, Alerts, Settings, Shutdown, Stop Sensor   Settings:                          * Urgent Low: 55 mg/dL                          * Urgent Low Soon: Off                          * Low alert: 70                          * High alert: 250                          * Rise rate: Off                          * Fall Rate: Off                          * Signal Loss: 30 min                          * No Reading: Off                          * Always sound: On                             Reviewed additional setting options with patient, including Graph Height. Sensor was paired with /phone.    Reviewed where to find sensor insertion time and sensor expiration date.   Discussed no need to calibrate sensor during the entirety of the 10 day wear. Discussed that pt can calibrate sensor if desired, Reviewed appropriate calibration techniques.  Reviewed sensor site selection. Site selected and prepped using aseptic technique Inserted to right upper arm.  Practiced sensor removal from site and disposal.  Patient able to demonstrate without difficulty.  Encouraged to follow-up prior to starting another sensor.   Reviewed problem solving aspects of sensor transmission/variables that can disrupt RF transmission.  range 20 feet, but the first 3 hrs keep within 3 feet of transmitter.  Pt instructed on lag time of interstitial fluid from CBG and was advised to tx hypoglycemia and dose insulin based on SMBG values.  Link to video provided and written instructions provided for patient to review before 10 day sensor change  Dexcom technical support contact number given and examples of when to contact them discussed.      Acute Complications No   Chronic Complications No        Today's interventions were provided through individual discussion, instruction, and written materials were provided.    Patient verbalized understanding of instruction  and written materials.  Pt was able to return back demonstration of instructions today. Patient understood key points, needs reinforcement and further instruction.     Diabetes Self-Management Care Plan Review and Evaluation of Progress:    During today's follow-up [place] Diabetes Self-Management Care Plan progress was reviewed and progress was evaluated including his/her input. Aishwarya has agreed to continue his/her journey to improve/maintain overall diabetes control by continuing to set health goals. See care plan progress below.      Care Plan: Diabetes Management   Updates made since 9/1/2024 12:00 AM        Problem: Blood Glucose Self-Monitoring         Goal: Patient agrees to check and record blood sugars 3 times per day using the dexcom G6/G7 for the next 3 months    Start Date: 12/27/2023   Expected End Date: 3/27/2024   This Visit's Progress: Not met   Recent Progress: On track   Priority: High   Barriers: Lack of Supplies   Note:    Patient was instructed how to use a meter. Discussed signs and symptoms, and causes and treatment of hypoglycemia. Covered blood sugar and A1C goals. Instructed patient to use provided logbooks to record blood sugars. Instructed patient to follow-up with their physician. Patient verbalized understanding of all instructions.  Prescription for meter and testing supplies pended to PCP    1/6/2023  Dexcom G6 Education  Patient is here in clinic today for initial start of Dexcom continuous glucose monitoring system (CGMA). Reviewed with patient how to insert sensor and transmitter. Patient inserted sensor on right abdomen and inserted transmitter. Time and date was set on monitor and settings were set. Hypoglycemia trigger set for 70 and hyperglycemia set for 300. Patient provided with phone number for 24-hour help line if needed. Discussed various types of possible alarms and what to do. Questions addressed. Initialization finished. No futher questions.  Patient referred to  "clinic today for Dexcom G6 continuous glucose sensor system training.  Education was provided using "Quick Start Guide" per Dexcom protocol.    Overview:  5min glucose reading updates, trending arrows, BG graph screens, battery life indicator, Blue Tooth Symbol.  Menus: trend Graph, start sensor, enter BG, events, Alerts, Settings, Shutdown, Stop Sensor   Settings:                          * Urgent Low: 55 mg/dL                          * Urgent Low Soon: Off                          * Low alert: 70                          * High alert: 300                          * Rise rate: Off                          * Fall Rate: Off                          * Signal Loss: 30 min                          * No Reading: Off                          * Always sound: On                             Reviewed additional setting options with patient, including Graph Height and Transmitter ID. Transmitter was paired with .    Reviewed where to find sensor insertion time and sensor expiration date.   Discussed no need to calibrate sensor during the entirety of the 10 day wear. Discussed that pt can calibrate sensor if desired, but at that time she will need to continue calibrating every 12 hours for sensor to remain accurate. Reviewed appropriate calibration techniques.  Reviewed sensor site selection. Site selected and prepped using aseptic technique Inserted to left abdomen. Transmitter placed in pod and secured.  Practiced sensor pod/transmitter removal from site, and removal of transmitter from sensor pod.  Patient able to demonstrate without difficulty.  Encouraged to review manual prior to starting another sensor.   Reviewed problem solving aspects of sensor transmission/variables that can disrupt RF transmission.  range 20 feet, but the first 3 hrs keep within 3 feet of transmitter.  Pt instructed on lag time of interstitial fluid from CBG and was advised to tx hypoglycemia and dose insulin based on SMBG " values.  Link to video provided and written instructions provided for patient to review before 10 day sensor change  Dexcom technical support contact number given and examples of when to contact them discussed.      1/17 Changing the Sensor (every 10 days)  Remove the whole thing from your body  Hold the sensor by the skinny part and break the clear piece  Remove the gray transmitter  In the reader touch start sensor  Touch yes you want to start sensor  Touch the square you want to put the number in for   There will be a 4-digit code on the sensor (like 5937)  Enter the code from the sensor  Touch ok  Touch yes, it is correct  Insert Sensor  Remove orange piece and paper tabs  Place on skin and hold in place  Push the orange button  Insert the gray transmitter into the sensor  Put the small tongue like side in first and push down  You should hear 2 clicks  Touch confirm once sensor is secure on body  Touch start Sensor  Touch 1 then 2  Sensor will take 2 hours to warm up    Changing Transmitter (every 90 days) in the reader  Touch the =   Touch transmitter  Touch pair new  Go through the process to start sensor  Touch yes you want to start sensor  Touch the square you want to put the number in for   There will be a 4-digit code on the sensor (like 5937)  Enter the code from the sensor  Touch ok  Touch yes, it is correct  Touch the square you want to put the number in for   There will be a 6-digit code on the transmitter box (like [id number])  Enter the code from the transmitter box  Touch ok  Touch yes, it is correct  Insert Sensor  Remove orange piece and paper tabs  Place on skin and hold in place  Push the orange button  Insert the gray transmitter into the sensor  Put the small tongue like side in first and push down  You should hear 2 clicks  Touch confirm once sensor is secure on body  Touch start Sensor  Touch 1 then 2  Sensor will take 2 hours to warm up             Follow Up Plan     Follow up in about 10 days  (around 10/5/2024) for Change Personal CGM and Upload Data.    Today's care plan and follow up schedule was discussed with patient.  Aishwarya verbalized understanding of the care plan, goals, and agrees to follow up plan.        The patient was encouraged to communicate with his/her health care provider/physician and care team regarding his/her condition(s) and treatment.  I provided the patient with my contact information today and encouraged to contact me via phone or [facility] as needed.     Length of Visit   Total Time: 40 Minutes

## 2024-10-03 DIAGNOSIS — E11.65 TYPE 2 DIABETES MELLITUS WITH HYPERGLYCEMIA, WITH LONG-TERM CURRENT USE OF INSULIN: ICD-10-CM

## 2024-10-03 DIAGNOSIS — Z79.4 TYPE 2 DIABETES MELLITUS WITH HYPERGLYCEMIA, WITH LONG-TERM CURRENT USE OF INSULIN: ICD-10-CM

## 2024-10-04 RX ORDER — BLOOD-GLUCOSE,RECEIVER,CONT
1 EACH MISCELLANEOUS CONTINUOUS
Qty: 1 EACH | Refills: 0 | Status: SHIPPED | OUTPATIENT
Start: 2024-10-04 | End: 2025-10-04

## 2024-10-04 RX ORDER — BLOOD-GLUCOSE SENSOR
EACH MISCELLANEOUS
Qty: 3 EACH | Refills: 11 | Status: SHIPPED | OUTPATIENT
Start: 2024-10-04

## 2024-10-15 ENCOUNTER — NUTRITION (OUTPATIENT)
Dept: DIABETES | Facility: CLINIC | Age: 64
End: 2024-10-15
Payer: MEDICARE

## 2024-10-15 ENCOUNTER — PATIENT MESSAGE (OUTPATIENT)
Dept: DIABETES | Facility: CLINIC | Age: 64
End: 2024-10-15
Payer: MEDICARE

## 2024-10-15 ENCOUNTER — OFFICE VISIT (OUTPATIENT)
Dept: PODIATRY | Facility: CLINIC | Age: 64
End: 2024-10-15
Payer: MEDICARE

## 2024-10-15 VITALS
DIASTOLIC BLOOD PRESSURE: 60 MMHG | WEIGHT: 221.88 LBS | SYSTOLIC BLOOD PRESSURE: 113 MMHG | HEIGHT: 67 IN | BODY MASS INDEX: 34.82 KG/M2 | HEART RATE: 80 BPM

## 2024-10-15 DIAGNOSIS — M79.673 HEEL PAIN, UNSPECIFIED LATERALITY: ICD-10-CM

## 2024-10-15 DIAGNOSIS — Z79.4 TYPE 2 DIABETES MELLITUS WITH DIABETIC POLYNEUROPATHY, WITH LONG-TERM CURRENT USE OF INSULIN: Primary | ICD-10-CM

## 2024-10-15 DIAGNOSIS — M76.61 ACHILLES TENDINITIS OF BOTH LOWER EXTREMITIES: ICD-10-CM

## 2024-10-15 DIAGNOSIS — E11.65 TYPE 2 DIABETES MELLITUS WITH HYPERGLYCEMIA, WITH LONG-TERM CURRENT USE OF INSULIN: Primary | ICD-10-CM

## 2024-10-15 DIAGNOSIS — Z79.4 TYPE 2 DIABETES MELLITUS WITH HYPERGLYCEMIA, WITH LONG-TERM CURRENT USE OF INSULIN: Primary | ICD-10-CM

## 2024-10-15 DIAGNOSIS — Z79.4 TYPE 2 DIABETES MELLITUS WITH STAGE 3A CHRONIC KIDNEY DISEASE, WITH LONG-TERM CURRENT USE OF INSULIN: ICD-10-CM

## 2024-10-15 DIAGNOSIS — I83.893 VARICOSE VEINS OF LEG WITH EDEMA, BILATERAL: ICD-10-CM

## 2024-10-15 DIAGNOSIS — N18.31 TYPE 2 DIABETES MELLITUS WITH STAGE 3A CHRONIC KIDNEY DISEASE, WITH LONG-TERM CURRENT USE OF INSULIN: ICD-10-CM

## 2024-10-15 DIAGNOSIS — M76.62 ACHILLES TENDINITIS OF BOTH LOWER EXTREMITIES: ICD-10-CM

## 2024-10-15 DIAGNOSIS — E11.22 TYPE 2 DIABETES MELLITUS WITH STAGE 3A CHRONIC KIDNEY DISEASE, WITH LONG-TERM CURRENT USE OF INSULIN: ICD-10-CM

## 2024-10-15 DIAGNOSIS — E11.42 TYPE 2 DIABETES MELLITUS WITH DIABETIC POLYNEUROPATHY, WITH LONG-TERM CURRENT USE OF INSULIN: Primary | ICD-10-CM

## 2024-10-15 PROCEDURE — 4010F ACE/ARB THERAPY RXD/TAKEN: CPT | Mod: CPTII,S$GLB,, | Performed by: PODIATRIST

## 2024-10-15 PROCEDURE — 3051F HG A1C>EQUAL 7.0%<8.0%: CPT | Mod: CPTII,S$GLB,, | Performed by: PODIATRIST

## 2024-10-15 PROCEDURE — 3066F NEPHROPATHY DOC TX: CPT | Mod: CPTII,S$GLB,, | Performed by: PODIATRIST

## 2024-10-15 PROCEDURE — 3060F POS MICROALBUMINURIA REV: CPT | Mod: CPTII,S$GLB,, | Performed by: PODIATRIST

## 2024-10-15 PROCEDURE — 99999 PR PBB SHADOW E&M-EST. PATIENT-LVL III: CPT | Mod: PBBFAC,,, | Performed by: PODIATRIST

## 2024-10-15 PROCEDURE — 3288F FALL RISK ASSESSMENT DOCD: CPT | Mod: CPTII,S$GLB,, | Performed by: PODIATRIST

## 2024-10-15 PROCEDURE — 3078F DIAST BP <80 MM HG: CPT | Mod: CPTII,S$GLB,, | Performed by: PODIATRIST

## 2024-10-15 PROCEDURE — 3074F SYST BP LT 130 MM HG: CPT | Mod: CPTII,S$GLB,, | Performed by: PODIATRIST

## 2024-10-15 PROCEDURE — 99213 OFFICE O/P EST LOW 20 MIN: CPT | Mod: S$GLB,,, | Performed by: PODIATRIST

## 2024-10-15 PROCEDURE — 3008F BODY MASS INDEX DOCD: CPT | Mod: CPTII,S$GLB,, | Performed by: PODIATRIST

## 2024-10-15 PROCEDURE — 1101F PT FALLS ASSESS-DOCD LE1/YR: CPT | Mod: CPTII,S$GLB,, | Performed by: PODIATRIST

## 2024-10-15 PROCEDURE — 1159F MED LIST DOCD IN RCRD: CPT | Mod: CPTII,S$GLB,, | Performed by: PODIATRIST

## 2024-10-15 PROCEDURE — 1125F AMNT PAIN NOTED PAIN PRSNT: CPT | Mod: CPTII,S$GLB,, | Performed by: PODIATRIST

## 2024-10-15 NOTE — PROGRESS NOTES
Diabetes Care Specialist Follow-up Note  Author: Marla Stein RD, CDE  Date: 10/15/2024    Intake    Program Intake  Reason for Diabetes Program Visit:: Intervention  Type of Intervention:: Individual  Individual: Device Training  Current diabetes risk level:: high  In the last 12 months, have you:: none  Permission to speak with others about care:: no    Current Diabetes Treatment: Insulin, DM Injectables, Oral Medications  Oral Medication Type/Dose: farxiga 10 mg once a day  DM Injectables Type/Dose: ozempic 0.5 mg once a week  Method of insulin delivery?: Insulin Device  Insulin Device Type/Dose: humalog via [id number]    Continuous Glucose Monitoring  Patient has CGM: Yes  Personal CGM type:: Dexcom G6    Lab Results   Component Value Date    HGBA1C 7.2 (H) 06/25/2024     A1c Pre Diabetes Care Specialist Intervention:  8.7%    Physical activity/Exercise:   No change    SMBG: using the dexcom G6/G7 - patient has both the G6 and G7 receivers, states she finally got the G7 sensors, in the box the new dexcom G7 prescription is the  not sensors. Patient has a transmitter for the G6 and sensors with a prescription label from 11/23/2023 on them. She is less confused today  She is finally feeling better after having covid  The G6  is not charged, charged and reprogrammed at time of visit, this  does not have recent data  Patient has the new G7  and not the one we used last visit so I am unable to download sensor data to review    Lifestyle Coping Support & Clinical    Lifestyle/Coping/Support  Compared to other people your age, how would you rate your health?: Good  Culture or Restoration beliefs that may impact ability to access healthcare: No  Psychosocial/Coping Skills Assessment Completed: : No  Assessment indicates:: Adequate understanding  Deffered due to:: Other (comment) (previously completed, there has been no change and patient has adequate understanding)  Area of need?:  No    Problem Review  Active Comorbidities: Cardiovascular Disease, Hypertension, Neuropathy    Diabetes Self-Management Skills Assessment    Medication Skills Assessment  Patient is able to identify current diabetes medications, dosages, and appropriate timing of medications.: yes  Patient reports problems or concerns with current medication regimen.: no  Patient is  aware that some diabetes medications can cause low blood sugar?: Yes  Medication Skills Assessment Completed:: No  Assessment indicates:: Adequate understanding  Deffered due to:: Other (comment) (previously completed, there has been no change and patient has adequate understanding)  Area of need?: No    Diabetes Disease Process/Treatment Options  Diabetes Type?: Type II  Diabetes Disease Process/Treatment Options: Skills Assessment Completed: No  Assessment indicates:: Adequate understanding  Deferred due to:: Other (comment) (previously completed, there has been no change and patient has adequate understanding)  Area of need?: No    Nutrition/Healthy Eating  Meal Plan 24 Hour Recall - Breakfast: Toast and Eggs, coffee with sugar and cream  Meal Plan 24 Hour Recall - Lunch: chicken noodle soup with crackers or a sandwich  Meal Plan 24 Hour Recall - Dinner: yesterday some spaghetti and meatsauce, some times roast and potatoes, some times a burger and some fries, sometimes gets pizza or something home cooked  Meal Plan 24 Hour Recall - Snack: peanut butter crackers, almonds, cheerios  Meal Plan 24 Hour Recall - Beverage: water or sparkling water  Who shops/cooks?: self  Patient can identify foods that impact blood sugar.: yes  Nutrition/Healthy Eating Skills Assessment Completed:: No  Assessment indicates:: Adequate understanding  Deffered due to:: Other (comment) (previously completed, there has been no change and patient has adequate understanding)  Area of need?: No    Physical Activity/Exercise  Physical Activity/Exercise Skills Assessment  Completed: : No  Assessment indicates:: Adequate understanding  Deffered due to:: Other (comment)  Area of need?: No    Home Blood Glucose Monitoring  Patient states that blood sugar is checked at home daily.: yes  Monitoring Method:: personal continuous glucose monitor  Home glucometer meter type:: accu-chek guide  Personal CGM type:: Dexcom G6  What is your A1c Target?: 7.0 with no hypoglycemia  Home Blood Glucose Monitoring Skills Assessment Completed: : Yes  Assessment indicates:: Instruction Needed  Area of need?: Yes    Acute Complications  Acute Complications Skills Assessment Completed: : No  Assessment indicates:: Adequate understanding  Deffered due to:: Other (comment) (previously completed, there has been no change and patient has adequate understanding)  Area of need?: No    Chronic Complications  Reviewed health maintenance: yes  Chronic Complications Skills Assessment Completed: : No  Assessment indicates:: Adequate understanding  Deferred due to:: Other (comment) (previously completed, there has been no change and patient has adequate understanding)  Area of need?: No      During today's follow-up visit,  the following areas required further assessment and content was provided/reviewed.    Based on today's diabetes care assessment, the following areas of need were identified:          7/26/2024    12:01 AM   Areas of Need   Medications/Current Diabetes Treatment No   Lifestyle Coping Support No   Diabetes Disease Process/Treatment Options No   Nutrition/Healthy Eating No   Physical Activity/Exercise No   Home Blood Glucose Monitoring Yes, changed out the dexcom G7 sensor and started a dexcom G6 sensor   Acute Complications No   Chronic Complications No        Today's interventions were provided through individual discussion, instruction, and written materials were provided.    Patient verbalized understanding of instruction and written materials.  Pt was able to return back demonstration of  instructions today. Patient understood key points, needs reinforcement and further instruction.     Diabetes Self-Management Care Plan Review and Evaluation of Progress:    During today's follow-up Aishwarya's Diabetes Self-Management Care Plan progress was reviewed and progress was evaluated including his/her input. Aishwarya has agreed to continue his/her journey to improve/maintain overall diabetes control by continuing to set health goals. See care plan progress below.      Care Plan: Diabetes Management   Updates made since 9/15/2024 12:00 AM        Problem: Blood Glucose Self-Monitoring         Goal: Patient agrees to check and record blood sugars 3 times per day using the dexcom G6/G7 for the next 3 months    Start Date: 12/27/2023   Expected End Date: 3/27/2024   This Visit's Progress: Not met   Recent Progress: Not met   Priority: High   Barriers: Lack of Supplies   Note:    Patient was instructed how to use a meter. Discussed signs and symptoms, and causes and treatment of hypoglycemia. Covered blood sugar and A1C goals. Instructed patient to use provided logbooks to record blood sugars. Instructed patient to follow-up with their physician. Patient verbalized understanding of all instructions.  Prescription for meter and testing supplies pended to PCP    1/6/2023  Dexcom G6 Education  Patient is here in clinic today for initial start of Dexcom continuous glucose monitoring system (CGMA). Reviewed with patient how to insert sensor and transmitter. Patient inserted sensor on right abdomen and inserted transmitter. Time and date was set on monitor and settings were set. Hypoglycemia trigger set for 70 and hyperglycemia set for 300. Patient provided with phone number for 24-hour help line if needed. Discussed various types of possible alarms and what to do. Questions addressed. Initialization finished. No futher questions.  Patient referred to clinic today for Dexcom G6 continuous glucose sensor system  "training.  Education was provided using "Quick Start Guide" per Dexcom protocol.    Overview:  5min glucose reading updates, trending arrows, BG graph screens, battery life indicator, Blue Tooth Symbol.  Menus: trend Graph, start sensor, enter BG, events, Alerts, Settings, Shutdown, Stop Sensor   Settings:                          * Urgent Low: 55 mg/dL                          * Urgent Low Soon: Off                          * Low alert: 70                          * High alert: 300                          * Rise rate: Off                          * Fall Rate: Off                          * Signal Loss: 30 min                          * No Reading: Off                          * Always sound: On                             Reviewed additional setting options with patient, including Graph Height and Transmitter ID. Transmitter was paired with .    Reviewed where to find sensor insertion time and sensor expiration date.   Discussed no need to calibrate sensor during the entirety of the 10 day wear. Discussed that pt can calibrate sensor if desired, but at that time she will need to continue calibrating every 12 hours for sensor to remain accurate. Reviewed appropriate calibration techniques.  Reviewed sensor site selection. Site selected and prepped using aseptic technique Inserted to left abdomen. Transmitter placed in pod and secured.  Practiced sensor pod/transmitter removal from site, and removal of transmitter from sensor pod.  Patient able to demonstrate without difficulty.  Encouraged to review manual prior to starting another sensor.   Reviewed problem solving aspects of sensor transmission/variables that can disrupt RF transmission.  range 20 feet, but the first 3 hrs keep within 3 feet of transmitter.  Pt instructed on lag time of interstitial fluid from CBG and was advised to tx hypoglycemia and dose insulin based on SMBG values.  Link to video provided and written instructions " provided for patient to review before 10 day sensor change  Dexcom technical support contact number given and examples of when to contact them discussed.      1/17 Changing the Sensor (every 10 days)  Remove the whole thing from your body  Hold the sensor by the skinny part and break the clear piece  Remove the gray transmitter  In the reader touch start sensor  Touch yes you want to start sensor  Touch the square you want to put the number in for   There will be a 4-digit code on the sensor (like 5937)  Enter the code from the sensor  Touch ok  Touch yes, it is correct  Insert Sensor  Remove orange piece and paper tabs  Place on skin and hold in place  Push the orange button  Insert the gray transmitter into the sensor  Put the small tongue like side in first and push down  You should hear 2 clicks  Touch confirm once sensor is secure on body  Touch start Sensor  Touch 1 then 2  Sensor will take 2 hours to warm up    Changing Transmitter (every 90 days) in the reader  Touch the =   Touch transmitter  Touch pair new  Go through the process to start sensor  Touch yes you want to start sensor  Touch the square you want to put the number in for   There will be a 4-digit code on the sensor (like 5937)  Enter the code from the sensor  Touch ok  Touch yes, it is correct  Touch the square you want to put the number in for   There will be a 6-digit code on the transmitter box (like [id number])  Enter the code from the transmitter box  Touch ok  Touch yes, it is correct  Insert Sensor  Remove orange piece and paper tabs  Place on skin and hold in place  Push the orange button  Insert the gray transmitter into the sensor  Put the small tongue like side in first and push down  You should hear 2 clicks  Touch confirm once sensor is secure on body  Touch start Sensor  Touch 1 then 2  Sensor will take 2 hours to warm up             Follow Up Plan     Follow up for Change Personal CGM and Upload Data.    Today's care plan and follow  up schedule was discussed with patient.  Aishwarya verbalized understanding of the care plan, goals, and agrees to follow up plan.        The patient was encouraged to communicate with his/her health care provider/physician and care team regarding his/her condition(s) and treatment.  I provided the patient with my contact information today and encouraged to contact me via phone or [facility] Patient Portal as needed.     Length of Visit   Total Time: 20 Minutes

## 2024-10-15 NOTE — PROGRESS NOTES
Subjective:      Patient ID: Aishwarya Jensen is a 64 y.o. female.    Chief Complaint: Foot Pain (Back of heel )    Patient is here for f/u B/L posterior heel pain. States helped by heel lift a lot as well as Voltaren gel. Most of her pain is R knee, pain level 4/10.  Last visit, dispensed new PPT heel lifts & Tulis heel cups for use all times WB including @ home. Was to continue OTC Voltaren gel up to qid prn & Tubigrip BLE prn.  8/13/24  Patient states L heel started to hurt after granddaughter played w/ her shoes & does not know where heel pads are. Went to ED a week ago; given a shot & IB. Pain level 3/10. Here in flat slip-on shoes & uses Crocs on tile in house.  5/13/24  Patient is here for F/U R heel pain.  Doing well.  Using Tuli's heel cups in all her shoes. Helps.  Not having much pain on a regular basis.  Switch to Ozempic per DM mgmt.just recently. Had her annual checkup W/ PCP earlier today.  4/16/24  Patient is here for f/u heel pain & DM foot care. States her heel pain has gone but lost one of Tulis heel cups so in flat slip-ons - wants another pair toprevent recurrence.  Has thick toenails but goes to pedicures regularly. Questions re: Vicks for toenails or other OTC topical antifungals.  Patient last in 5 months ago. Was visiting daughter in [facility] for 2 wks.  11/2/23  Patient is here for f/u R heel pain. Last here for DM foot exam & scheduled q 6 months. Instructed on appropriate shoes as well as dispensed Tulis heel cups (in current flat casual slip-ons) & PPT heel lifts for others. Rx Dm shoes written. States has some swelling in BLE R>L.  9/28/23  Aishwarya is a 64 y.o. female who presents after >5months absence for diabetic foot & nail care in high risk feet. Nails hurt in shoes w/ pressure when walking. Also heel pain R foot.     PCP: Joey Cintron MD 5/13/24  DM mgmt: Micki Knott NP 7/9/24    Current shoe gear: flat casual; uses non-slip hospital socks in home.    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    Past Medical History:   Diagnosis Date    Cecal volvulus 05/04/2023    Diabetes mellitus, type 2     Hypercholesteremia     Hypertension    Dx DM 30+ yrs.ago.   Knee pain - ortho.referral  Patient Active Problem List   Diagnosis    Essential hypertension, benign    Dyslipidemia, goal LDL below 100    Microalbuminuria due to type 2 diabetes mellitus    Type 2 diabetes mellitus with hyperglycemia, with long-term current use of insulin    Type 2 diabetes mellitus with diabetic polyneuropathy, with long-term current use of insulin    Asymptomatic varicose veins of bilateral lower extremities    Onychomycosis due to dermatophyte    Class 1 obesity due to excess calories with serious comorbidity and body mass index (BMI) of 33.0 to 33.9 in adult    Stage 3a chronic kidney disease    Dyspnea on exertion    Primary osteoarthritis of right knee    Primary osteoarthritis of left knee    Type 2 diabetes mellitus with stage 3a chronic kidney disease, with long-term current use of insulin    Anemia    Aortic atherosclerosis    Plantar fasciitis of left foot      Hemoglobin A1C   Date Value Ref Range Status   06/25/2024 7.2 (H) 4.0 - 5.6 % Final     Comment:     ADA Screening Guidelines:  5.7-6.4%  Consistent with prediabetes  >or=6.5%  Consistent with diabetes    High levels of fetal hemoglobin interfere with the HbA1C  assay. Heterozygous hemoglobin variants (HbS, HgC, etc)do  not significantly interfere with this assay.   However, presence of multiple variants may affect accuracy.     04/03/2024 8.1 (H) 4.0 - 5.6 % Final     Comment:     ADA Screening Guidelines:  5.7-6.4%  Consistent with prediabetes  >or=6.5%  Consistent with diabetes    High levels of fetal hemoglobin interfere with the HbA1C  assay. Heterozygous hemoglobin variants (HbS, HgC, etc)do  not significantly interfere with this assay.   However, presence of multiple variants may affect accuracy.     11/09/2023 8.7 (H) 4.0 - 5.6 %  Final     Comment:     ADA Screening Guidelines:  5.7-6.4%  Consistent with prediabetes  >or=6.5%  Consistent with diabetes    High levels of fetal hemoglobin interfere with the HbA1C  assay. Heterozygous hemoglobin variants (HbS, HgC, etc)do  not significantly interfere with this assay.   However, presence of multiple variants may affect accuracy.        Objective:      Physical Exam  Vitals reviewed.   Constitutional:       Appearance: She is well-developed. She is obese.   Cardiovascular:      Pulses: Normal pulses.           Dorsalis pedis pulses are 2+ on the right side and 2+ on the left side.      Comments: Varicose veins R>L, w/ edema BLE, & telangectasias.  Musculoskeletal:         General: Tenderness present. No swelling or signs of injury.      Right lower leg: Edema present.      Left lower leg: Edema present.      Right ankle:      Right Achilles Tendon: Tenderness present. No defects. Menchaca's test negative.      Left ankle: No tenderness.      Left Achilles Tendon: No tenderness or defects. Menchaca's test negative.      Right foot: Deformity (hammertoe B/L) present.      Left foot: Deformity (cavus B/L) present.        Feet:    Feet:      Right foot:      Skin integrity: No erythema or warmth.      Toenail Condition: Fungal disease present.     Left foot:      Skin integrity: No erythema or warmth.      Toenail Condition: Fungal disease present.     Comments: Equinus B/L ankles w/ < 90 deg foot to leg noted w/ knees extended.      MS strength of extrinsics to foot & ankle B/L + 5/5 in DF/PF/Inv/Ev to resistance w/ no reproduction of pain in any direction, including Achilles insertion R.   Resolution of pain distal heel insertion of Achilles L w/ no remaining palpable fullness; resolution of calor.    Passive ROM of ankle & pedal joints is painless & w/out crepitation B/L.     Toenails 1st, 2nd, 3rd, 4th, 5th  B/L are thickened, dystrophic, discolored, w/ crumbly subungual debris sparing 4 L.     Skin:     General: Skin is cool and dry.      Capillary Refill: Capillary refill takes less than 2 seconds.      Findings: No bruising or erythema.      Comments: Stasis hyperpigmentation B/L.   Neurological:      Mental Status: She is alert and oriented to person, place, and time.      Sensory: Sensory deficit present.      Motor: Motor function is intact. No weakness or abnormal muscle tone.      Gait: Gait is intact. Gait normal.      Comments: Paresthesias including numbness & parasthesias B/L feet w/ no clearly identified trigger or source; no hyperemia.   Psychiatric:         Mood and Affect: Mood and affect normal.         Behavior: Behavior normal. Behavior is cooperative.       Assessment:      Encounter Diagnoses   Name Primary?    Type 2 diabetes mellitus with diabetic polyneuropathy, with long-term current use of insulin Yes    Achilles tendinitis of both lower extremities     Heel pain, unspecified laterality     Varicose veins of leg with edema, bilateral      Problem List Items Addressed This Visit          Endocrine    Type 2 diabetes mellitus with diabetic polyneuropathy, with long-term current use of insulin - Primary     Other Visit Diagnoses       Achilles tendinitis of both lower extremities        Relevant Orders    HEEL LIFTS FOR HOME USE    Heel pain, unspecified laterality        Varicose veins of leg with edema, bilateral                Plan:      Aishwarya was seen today for foot pain.    Diagnoses and all orders for this visit:    Type 2 diabetes mellitus with diabetic polyneuropathy, with long-term current use of insulin    Achilles tendinitis of both lower extremities  -     HEEL LIFTS FOR HOME USE    Heel pain, unspecified laterality    Varicose veins of leg with edema, bilateral    I counseled the patient on her conditions, their implications & medical mgmt.    - Diabetic Foot Education. Patient reminded of the importance of good blood sugar control to help prevent podiatric  complications of diabetes. We discussed wearing proper shoe gear, daily foot inspections, never walking w/out protective shoe gear, podiatric visits every 6-12 months, sooner prn.     Recommended shoes w/ adequate lift to to alleviate abnormal pressure & improve stability of foot while walking.   Avoid flat shoes or barefoot walking as these will exacerbate or worsen symptoms.   -Continue PPT heel lifts or Tulis heel cups in all shoe gear, all times WB including @ home.     Continue OTC Voltaren gel up to qid prn (may use for R knee as well).    Patient will call p.r.n.or return for DM foot care in 6 months, sooner prn.        A total of 23 mins.was spent on chart review, patient visit & documentation.

## 2024-10-25 ENCOUNTER — NUTRITION (OUTPATIENT)
Dept: DIABETES | Facility: CLINIC | Age: 64
End: 2024-10-25
Payer: MEDICARE

## 2024-10-25 DIAGNOSIS — E11.22 TYPE 2 DIABETES MELLITUS WITH STAGE 3A CHRONIC KIDNEY DISEASE, WITH LONG-TERM CURRENT USE OF INSULIN: ICD-10-CM

## 2024-10-25 DIAGNOSIS — E11.42 TYPE 2 DIABETES MELLITUS WITH DIABETIC POLYNEUROPATHY, WITH LONG-TERM CURRENT USE OF INSULIN: ICD-10-CM

## 2024-10-25 DIAGNOSIS — Z79.4 TYPE 2 DIABETES MELLITUS WITH HYPERGLYCEMIA, WITH LONG-TERM CURRENT USE OF INSULIN: Primary | ICD-10-CM

## 2024-10-25 DIAGNOSIS — Z79.4 TYPE 2 DIABETES MELLITUS WITH DIABETIC POLYNEUROPATHY, WITH LONG-TERM CURRENT USE OF INSULIN: ICD-10-CM

## 2024-10-25 DIAGNOSIS — E11.65 TYPE 2 DIABETES MELLITUS WITH HYPERGLYCEMIA, WITH LONG-TERM CURRENT USE OF INSULIN: Primary | ICD-10-CM

## 2024-10-25 DIAGNOSIS — N18.31 TYPE 2 DIABETES MELLITUS WITH STAGE 3A CHRONIC KIDNEY DISEASE, WITH LONG-TERM CURRENT USE OF INSULIN: ICD-10-CM

## 2024-10-25 DIAGNOSIS — Z79.4 TYPE 2 DIABETES MELLITUS WITH STAGE 3A CHRONIC KIDNEY DISEASE, WITH LONG-TERM CURRENT USE OF INSULIN: ICD-10-CM

## 2024-10-25 NOTE — PROGRESS NOTES
Diabetes Care Specialist Follow-up Note  Author: Marla Stein RD, CDE  Date: 10/25/2024    Intake    Program Intake  Reason for Diabetes Program Visit:: Intervention  Type of Intervention:: Individual  Individual: Device Training  Education: Self-Management Skill Review  Current diabetes risk level:: high  In the last 12 months, have you:: none  Permission to speak with others about care:: no    Current Diabetes Treatment: Insulin, DM Injectables, Oral Medications    Continuous Glucose Monitoring  Patient has CGM: Yes  Personal CGM type:: Dexcom G6    Lab Results   Component Value Date    HGBA1C 7.2 (H) 06/25/2024     A1c Pre Diabetes Care Specialist Intervention:  8.7%    Physical activity/Exercise:   No change    SMBG: using the dexcom G6/G7 - patient has both the G6 and G7 receivers, states she finally got the G7 sensors, in the box the new dexcom G7 prescription is the  not sensors. Patient has a transmitter for the G6 and sensors with a prescription label from 11/23/2023 on them. She is less confused today  She is finally feeling better after having covid  The G6  is not charged, charged and reprogrammed at time of visit, this  does not have recent data  Patient has the new G7  and not the one we used last visit so I am unable to download sensor data to review    Lifestyle Coping Support & Clinical    Lifestyle/Coping/Support  Compared to other people your age, how would you rate your health?: Good  Psychosocial/Coping Skills Assessment Completed: : No  Assessment indicates:: Adequate understanding  Deffered due to:: Other (comment) (previously completed, there has been no change and patient has adequate understanding)  Area of need?: No    Problem Review  Active Comorbidities: Cardiovascular Disease, Hypertension, Neuropathy    Diabetes Self-Management Skills Assessment    Medication Skills Assessment  Patient is able to identify current diabetes medications, dosages, and  appropriate timing of medications.: yes  Patient reports problems or concerns with current medication regimen.: no  Medication regimen problems/concerns:: lack of training  Medication Skills Assessment Completed:: No  Assessment indicates:: Adequate understanding  Deffered due to:: Other (comment) (previously completed, there has been no change and patient has adequate understanding)  Area of need?: No    Diabetes Disease Process/Treatment Options  Diabetes Type?: Type II  Diabetes Disease Process/Treatment Options: Skills Assessment Completed: No  Assessment indicates:: Adequate understanding  Deferred due to:: Other (comment)  Area of need?: No    Nutrition/Healthy Eating  Meal Plan 24 Hour Recall - Breakfast: Toast and Eggs, coffee with sugar and cream  Meal Plan 24 Hour Recall - Lunch: chicken noodle soup with crackers or a sandwich  Meal Plan 24 Hour Recall - Dinner: yesterday some spaghetti and meatsauce, some times roast and potatoes, some times a burger and some fries, sometimes gets pizza or something home cooked  Meal Plan 24 Hour Recall - Snack: peanut butter crackers, almonds, cheerios  Meal Plan 24 Hour Recall - Beverage: water or sparkling water  Who shops/cooks?: self  Nutrition/Healthy Eating Skills Assessment Completed:: No  Assessment indicates:: Adequate understanding  Deffered due to:: Other (comment)  Area of need?: No    Physical Activity/Exercise  Physical Activity/Exercise Skills Assessment Completed: : No  Assessment indicates:: Adequate understanding  Deffered due to:: Other (comment)  Area of need?: No    Home Blood Glucose Monitoring  Patient states that blood sugar is checked at home daily.: yes  Monitoring Method:: personal continuous glucose monitor  Personal CGM type:: Dexcom G6  What is your A1c Target?: 7.0 with no hypoglycemia  Home Blood Glucose Monitoring Skills Assessment Completed: : Yes  Assessment indicates:: Instruction Needed  Area of need?: Yes    Acute  Complications  Acute Complications Skills Assessment Completed: : No  Assessment indicates:: Adequate understanding  Deffered due to:: Other (comment)  Area of need?: No    Chronic Complications  Chronic Complications Skills Assessment Completed: : No  Assessment indicates:: Adequate understanding  Deferred due to:: Other (comment)  Area of need?: No      During today's follow-up visit,  the following areas required further assessment and content was provided/reviewed.    Based on today's diabetes care assessment, the following areas of need were identified:          7/26/2024    12:01 AM   Areas of Need   Medications/Current Diabetes Treatment No   Lifestyle Coping Support No   Diabetes Disease Process/Treatment Options No   Nutrition/Healthy Eating No   Physical Activity/Exercise No   Home Blood Glucose Monitoring Yes, changed out the dexcom G7 sensor and started a dexcom G7 sensor   Acute Complications No   Chronic Complications No        Today's interventions were provided through individual discussion, instruction, and written materials were provided.    Patient verbalized understanding of instruction and written materials.  Pt was able to return back demonstration of instructions today. Patient understood key points, needs reinforcement and further instruction.     Diabetes Self-Management Care Plan Review and Evaluation of Progress:    During today's follow-up [place] Diabetes Self-Management Care Plan progress was reviewed and progress was evaluated including his/her input. Aishwarya has agreed to continue his/her journey to improve/maintain overall diabetes control by continuing to set health goals. See care plan progress below.      Care Plan: Diabetes Management   Updates made since 9/25/2024 12:00 AM        Problem: Blood Glucose Self-Monitoring         Goal: Patient agrees to check and record blood sugars 3 times per day using the dexcom G6/G7 for the next 3 months    Start Date: 12/27/2023   Expected  "End Date: 3/27/2024   This Visit's Progress: Not met   Recent Progress: Not met   Priority: High   Barriers: Lack of Supplies   Note:    Patient was instructed how to use a meter. Discussed signs and symptoms, and causes and treatment of hypoglycemia. Covered blood sugar and A1C goals. Instructed patient to use provided logbooks to record blood sugars. Instructed patient to follow-up with their physician. Patient verbalized understanding of all instructions.  Prescription for meter and testing supplies pended to PCP    1/6/2023  Dexcom G6 Education  Patient is here in clinic today for initial start of Dexcom continuous glucose monitoring system (CGMA). Reviewed with patient how to insert sensor and transmitter. Patient inserted sensor on right abdomen and inserted transmitter. Time and date was set on monitor and settings were set. Hypoglycemia trigger set for 70 and hyperglycemia set for 300. Patient provided with phone number for 24-hour help line if needed. Discussed various types of possible alarms and what to do. Questions addressed. Initialization finished. No futher questions.  Patient referred to clinic today for Dexcom G6 continuous glucose sensor system training.  Education was provided using "Quick Start Guide" per Dexcom protocol.    Overview:  5min glucose reading updates, trending arrows, BG graph screens, battery life indicator, Blue Tooth Symbol.  Menus: trend Graph, start sensor, enter BG, events, Alerts, Settings, Shutdown, Stop Sensor   Settings:                          * Urgent Low: 55 mg/dL                          * Urgent Low Soon: Off                          * Low alert: 70                          * High alert: 300                          * Rise rate: Off                          * Fall Rate: Off                          * Signal Loss: 30 min                          * No Reading: Off                          * Always sound: On                             Reviewed additional " setting options with patient, including Graph Height and Transmitter ID. Transmitter was paired with .    Reviewed where to find sensor insertion time and sensor expiration date.   Discussed no need to calibrate sensor during the entirety of the 10 day wear. Discussed that pt can calibrate sensor if desired, but at that time she will need to continue calibrating every 12 hours for sensor to remain accurate. Reviewed appropriate calibration techniques.  Reviewed sensor site selection. Site selected and prepped using aseptic technique Inserted to left abdomen. Transmitter placed in pod and secured.  Practiced sensor pod/transmitter removal from site, and removal of transmitter from sensor pod.  Patient able to demonstrate without difficulty.  Encouraged to review manual prior to starting another sensor.   Reviewed problem solving aspects of sensor transmission/variables that can disrupt RF transmission.  range 20 feet, but the first 3 hrs keep within 3 feet of transmitter.  Pt instructed on lag time of interstitial fluid from CBG and was advised to tx hypoglycemia and dose insulin based on SMBG values.  Link to video provided and written instructions provided for patient to review before 10 day sensor change  Dexcom technical support contact number given and examples of when to contact them discussed.      1/17 Changing the Sensor (every 10 days)  Remove the whole thing from your body  Hold the sensor by the skinny part and break the clear piece  Remove the gray transmitter  In the reader touch start sensor  Touch yes you want to start sensor  Touch the square you want to put the number in for   There will be a 4-digit code on the sensor (like 5937)  Enter the code from the sensor  Touch ok  Touch yes, it is correct  Insert Sensor  Remove orange piece and paper tabs  Place on skin and hold in place  Push the orange button  Insert the gray transmitter into the sensor  Put the small tongue like side in  first and push down  You should hear 2 clicks  Touch confirm once sensor is secure on body  Touch start Sensor  Touch 1 then 2  Sensor will take 2 hours to warm up    Changing Transmitter (every 90 days) in the reader  Touch the =   Touch transmitter  Touch pair new  Go through the process to start sensor  Touch yes you want to start sensor  Touch the square you want to put the number in for   There will be a 4-digit code on the sensor (like 5937)  Enter the code from the sensor  Touch ok  Touch yes, it is correct  Touch the square you want to put the number in for   There will be a 6-digit code on the transmitter box (like [id number])  Enter the code from the transmitter box  Touch ok  Touch yes, it is correct  Insert Sensor  Remove orange piece and paper tabs  Place on skin and hold in place  Push the orange button  Insert the gray transmitter into the sensor  Put the small tongue like side in first and push down  You should hear 2 clicks  Touch confirm once sensor is secure on body  Touch start Sensor  Touch 1 then 2  Sensor will take 2 hours to warm up             Follow Up Plan     Follow up in about 10 days (around 11/4/2024) for Change Personal CGM and Upload Data.    Today's care plan and follow up schedule was discussed with patient.  Aishwarya verbalized understanding of the care plan, goals, and agrees to follow up plan.        The patient was encouraged to communicate with his/her health care provider/physician and care team regarding his/her condition(s) and treatment.  I provided the patient with my contact information today and encouraged to contact me via phone or [facility] Patient Portal as needed.     Length of Visit   Total Time: 15 Minutes

## 2024-10-28 ENCOUNTER — PATIENT MESSAGE (OUTPATIENT)
Dept: PRIMARY CARE CLINIC | Facility: CLINIC | Age: 64
End: 2024-10-28
Payer: MEDICARE

## 2024-11-07 ENCOUNTER — PATIENT MESSAGE (OUTPATIENT)
Dept: DIABETES | Facility: CLINIC | Age: 64
End: 2024-11-07
Payer: MEDICARE

## 2024-11-07 ENCOUNTER — NUTRITION (OUTPATIENT)
Dept: DIABETES | Facility: CLINIC | Age: 64
End: 2024-11-07
Payer: MEDICARE

## 2024-11-07 DIAGNOSIS — E11.42 TYPE 2 DIABETES MELLITUS WITH DIABETIC POLYNEUROPATHY, WITH LONG-TERM CURRENT USE OF INSULIN: ICD-10-CM

## 2024-11-07 DIAGNOSIS — E11.65 TYPE 2 DIABETES MELLITUS WITH HYPERGLYCEMIA, WITH LONG-TERM CURRENT USE OF INSULIN: Primary | ICD-10-CM

## 2024-11-07 DIAGNOSIS — Z79.4 TYPE 2 DIABETES MELLITUS WITH STAGE 3A CHRONIC KIDNEY DISEASE, WITH LONG-TERM CURRENT USE OF INSULIN: ICD-10-CM

## 2024-11-07 DIAGNOSIS — Z79.4 TYPE 2 DIABETES MELLITUS WITH DIABETIC POLYNEUROPATHY, WITH LONG-TERM CURRENT USE OF INSULIN: ICD-10-CM

## 2024-11-07 DIAGNOSIS — E11.22 TYPE 2 DIABETES MELLITUS WITH STAGE 3A CHRONIC KIDNEY DISEASE, WITH LONG-TERM CURRENT USE OF INSULIN: ICD-10-CM

## 2024-11-07 DIAGNOSIS — Z79.4 TYPE 2 DIABETES MELLITUS WITH HYPERGLYCEMIA, WITH LONG-TERM CURRENT USE OF INSULIN: Primary | ICD-10-CM

## 2024-11-07 DIAGNOSIS — N18.31 TYPE 2 DIABETES MELLITUS WITH STAGE 3A CHRONIC KIDNEY DISEASE, WITH LONG-TERM CURRENT USE OF INSULIN: ICD-10-CM

## 2024-11-11 NOTE — PROGRESS NOTES
Diabetes Care Specialist Follow-up Note  Author: Marla Stein RD, CDE  Date: 11/11/2024    Intake    Program Intake  Reason for Diabetes Program Visit:: Intervention  Type of Intervention:: Individual  Individual: Device Training  Device Training: Personal CGM  Current diabetes risk level:: high  In the last month, have you used the ER or been admitted to the hospital: No  Permission to speak with others about care:: no    Current Diabetes Treatment: Insulin, DM Injectables, Oral Medications    Continuous Glucose Monitoring  Patient has CGM: Yes  Personal CGM type:: Dexcom G6    Lab Results   Component Value Date    HGBA1C 8.3 (H) 11/07/2024     A1c Pre Diabetes Care Specialist Intervention:  8.7%    Physical activity/Exercise:   No change    SMBG: using the dexcom G6/G7 - patient has both the G6 and G7 receivers, states she finally got the G7 sensors, in the box the new dexcom G7 prescription is the  not sensors. Patient has a transmitter for the G6 and sensors with a prescription label from 11/23/2023 on them. She is less confused today  She is finally feeling better after having covid  The G6  is not charged, charged and reprogrammed at time of visit, this  does not have recent data  Patient has the new G7  and not the one we used last visit so I am unable to download sensor data to review    Lifestyle Coping Support & Clinical    Lifestyle/Coping/Support  Compared to other people your age, how would you rate your health?: Good  Psychosocial/Coping Skills Assessment Completed: : No  Assessment indicates:: Adequate understanding  Deffered due to:: Other (comment) (previously completed, there has been no change and patient has adequate understanding)  Area of need?: No    Problem Review  Active Comorbidities: Cardiovascular Disease, Hypertension, Neuropathy    Diabetes Self-Management Skills Assessment    Medication Skills Assessment  Patient is able to identify current diabetes  medications, dosages, and appropriate timing of medications.: yes  Patient reports problems or concerns with current medication regimen.: no  Medication regimen problems/concerns:: lack of training  Medication Skills Assessment Completed:: No  Assessment indicates:: Adequate understanding  Deffered due to:: Other (comment) (previously completed, there has been no change and patient has adequate understanding)  Area of need?: No    Diabetes Disease Process/Treatment Options  Diabetes Type?: Type II  Diabetes Disease Process/Treatment Options: Skills Assessment Completed: No  Assessment indicates:: Adequate understanding  Deferred due to:: Other (comment)  Area of need?: No    Nutrition/Healthy Eating  Meal Plan 24 Hour Recall - Breakfast: Toast and Eggs, coffee with sugar and cream  Meal Plan 24 Hour Recall - Lunch: chicken noodle soup with crackers or a sandwich  Meal Plan 24 Hour Recall - Dinner: yesterday some spaghetti and meatsauce, some times roast and potatoes, some times a burger and some fries, sometimes gets pizza or something home cooked  Meal Plan 24 Hour Recall - Snack: peanut butter crackers, almonds, cheerios  Nutrition/Healthy Eating Skills Assessment Completed:: No  Assessment indicates:: Adequate understanding  Deffered due to:: Other (comment)  Area of need?: No    Physical Activity/Exercise  Physical Activity/Exercise Skills Assessment Completed: : No  Assessment indicates:: Adequate understanding  Deffered due to:: Other (comment)  Area of need?: No    Home Blood Glucose Monitoring  Patient states that blood sugar is checked at home daily.: yes  Monitoring Method:: personal continuous glucose monitor  Personal CGM type:: Dexcom G6  What is your A1c Target?: 7.0 with no hypoglycemia  Home Blood Glucose Monitoring Skills Assessment Completed: : Yes  Assessment indicates:: Instruction Needed  Area of need?: Yes    Acute Complications  Acute Complications Skills Assessment Completed: : No  Assessment  indicates:: Adequate understanding  Deffered due to:: Other (comment)  Area of need?: No    Chronic Complications  Chronic Complications Skills Assessment Completed: : No  Assessment indicates:: Adequate understanding  Deferred due to:: Other (comment)  Area of need?: No      During today's follow-up visit,  the following areas required further assessment and content was provided/reviewed.    Based on today's diabetes care assessment, the following areas of need were identified:          7/26/2024    12:01 AM   Areas of Need   Medications/Current Diabetes Treatment No   Lifestyle Coping Support No   Diabetes Disease Process/Treatment Options No   Nutrition/Healthy Eating No   Physical Activity/Exercise No   Home Blood Glucose Monitoring Yes, changed out the dexcom G7 sensor and started a dexcom G7 sensor   Acute Complications No   Chronic Complications No        Today's interventions were provided through individual discussion, instruction, and written materials were provided.    Patient verbalized understanding of instruction and written materials.  Pt was able to return back demonstration of instructions today. Patient understood key points, needs reinforcement and further instruction.     Diabetes Self-Management Care Plan Review and Evaluation of Progress:    During today's follow-up Aishwarya's Diabetes Self-Management Care Plan progress was reviewed and progress was evaluated including his/her input. Aishwarya has agreed to continue his/her journey to improve/maintain overall diabetes control by continuing to set health goals. See care plan progress below.      Care Plan: Diabetes Management   Updates made since 11/12/2023 12:00 AM        Problem: Blood Glucose Self-Monitoring         Goal: Patient agrees to check and record blood sugars 3 times per day using the dexcom G6/G7 for the next 3 months    Start Date: 12/27/2023   Expected End Date: 3/27/2024   This Visit's Progress: Not met   Recent Progress: Not met  "  Priority: High   Barriers: Lack of Supplies   Note:    Patient was instructed how to use a meter. Discussed signs and symptoms, and causes and treatment of hypoglycemia. Covered blood sugar and A1C goals. Instructed patient to use provided logbooks to record blood sugars. Instructed patient to follow-up with their physician. Patient verbalized understanding of all instructions.  Prescription for meter and testing supplies pended to PCP    1/6/2023  Dexcom G6 Education  Patient is here in clinic today for initial start of Dexcom continuous glucose monitoring system (CGMA). Reviewed with patient how to insert sensor and transmitter. Patient inserted sensor on right abdomen and inserted transmitter. Time and date was set on monitor and settings were set. Hypoglycemia trigger set for 70 and hyperglycemia set for 300. Patient provided with phone number for 24-hour help line if needed. Discussed various types of possible alarms and what to do. Questions addressed. Initialization finished. No futher questions.  Patient referred to clinic today for Dexcom G6 continuous glucose sensor system training.  Education was provided using "Quick Start Guide" per Dexcom protocol.    Overview:  5min glucose reading updates, trending arrows, BG graph screens, battery life indicator, Blue Tooth Symbol.  Menus: trend Graph, start sensor, enter BG, events, Alerts, Settings, Shutdown, Stop Sensor   Settings:                          * Urgent Low: 55 mg/dL                          * Urgent Low Soon: Off                          * Low alert: 70                          * High alert: 300                          * Rise rate: Off                          * Fall Rate: Off                          * Signal Loss: 30 min                          * No Reading: Off                          * Always sound: On                             Reviewed additional setting options with patient, including Graph Height and Transmitter ID. Transmitter " was paired with .    Reviewed where to find sensor insertion time and sensor expiration date.   Discussed no need to calibrate sensor during the entirety of the 10 day wear. Discussed that pt can calibrate sensor if desired, but at that time she will need to continue calibrating every 12 hours for sensor to remain accurate. Reviewed appropriate calibration techniques.  Reviewed sensor site selection. Site selected and prepped using aseptic technique Inserted to left abdomen. Transmitter placed in pod and secured.  Practiced sensor pod/transmitter removal from site, and removal of transmitter from sensor pod.  Patient able to demonstrate without difficulty.  Encouraged to review manual prior to starting another sensor.   Reviewed problem solving aspects of sensor transmission/variables that can disrupt RF transmission.  range 20 feet, but the first 3 hrs keep within 3 feet of transmitter.  Pt instructed on lag time of interstitial fluid from CBG and was advised to tx hypoglycemia and dose insulin based on SMBG values.  Link to video provided and written instructions provided for patient to review before 10 day sensor change  Dexcom technical support contact number given and examples of when to contact them discussed.      1/17 Changing the Sensor (every 10 days)  Remove the whole thing from your body  Hold the sensor by the skinny part and break the clear piece  Remove the gray transmitter  In the reader touch start sensor  Touch yes you want to start sensor  Touch the square you want to put the number in for   There will be a 4-digit code on the sensor (like 5937)  Enter the code from the sensor  Touch ok  Touch yes, it is correct  Insert Sensor  Remove orange piece and paper tabs  Place on skin and hold in place  Push the orange button  Insert the gray transmitter into the sensor  Put the small tongue like side in first and push down  You should hear 2 clicks  Touch confirm once sensor is secure on  body  Touch start Sensor  Touch 1 then 2  Sensor will take 2 hours to warm up    Changing Transmitter (every 90 days) in the reader  Touch the =   Touch transmitter  Touch pair new  Go through the process to start sensor  Touch yes you want to start sensor  Touch the square you want to put the number in for   There will be a 4-digit code on the sensor (like 5937)  Enter the code from the sensor  Touch ok  Touch yes, it is correct  Touch the square you want to put the number in for   There will be a 6-digit code on the transmitter box (like [id number])  Enter the code from the transmitter box  Touch ok  Touch yes, it is correct  Insert Sensor  Remove orange piece and paper tabs  Place on skin and hold in place  Push the orange button  Insert the gray transmitter into the sensor  Put the small tongue like side in first and push down  You should hear 2 clicks  Touch confirm once sensor is secure on body  Touch start Sensor  Touch 1 then 2  Sensor will take 2 hours to warm up             Follow Up Plan     Follow up in about 10 days (around 11/17/2024) for Change Personal CGM and Upload Data.    Today's care plan and follow up schedule was discussed with patient.  Aishwarya verbalized understanding of the care plan, goals, and agrees to follow up plan.        The patient was encouraged to communicate with his/her health care provider/physician and care team regarding his/her condition(s) and treatment.  I provided the patient with my contact information today and encouraged to contact me via phone or [facility] Patient Portal as needed.     Length of Visit   Total Time: 10 Minutes

## 2024-11-18 ENCOUNTER — NUTRITION (OUTPATIENT)
Dept: DIABETES | Facility: CLINIC | Age: 64
End: 2024-11-18
Payer: MEDICARE

## 2024-11-18 DIAGNOSIS — Z79.4 TYPE 2 DIABETES MELLITUS WITH DIABETIC POLYNEUROPATHY, WITH LONG-TERM CURRENT USE OF INSULIN: ICD-10-CM

## 2024-11-18 DIAGNOSIS — E11.65 TYPE 2 DIABETES MELLITUS WITH HYPERGLYCEMIA, WITH LONG-TERM CURRENT USE OF INSULIN: Primary | ICD-10-CM

## 2024-11-18 DIAGNOSIS — Z79.4 TYPE 2 DIABETES MELLITUS WITH STAGE 3A CHRONIC KIDNEY DISEASE, WITH LONG-TERM CURRENT USE OF INSULIN: ICD-10-CM

## 2024-11-18 DIAGNOSIS — E11.42 TYPE 2 DIABETES MELLITUS WITH DIABETIC POLYNEUROPATHY, WITH LONG-TERM CURRENT USE OF INSULIN: ICD-10-CM

## 2024-11-18 DIAGNOSIS — N18.31 TYPE 2 DIABETES MELLITUS WITH STAGE 3A CHRONIC KIDNEY DISEASE, WITH LONG-TERM CURRENT USE OF INSULIN: ICD-10-CM

## 2024-11-18 DIAGNOSIS — Z79.4 TYPE 2 DIABETES MELLITUS WITH HYPERGLYCEMIA, WITH LONG-TERM CURRENT USE OF INSULIN: Primary | ICD-10-CM

## 2024-11-18 DIAGNOSIS — E11.22 TYPE 2 DIABETES MELLITUS WITH STAGE 3A CHRONIC KIDNEY DISEASE, WITH LONG-TERM CURRENT USE OF INSULIN: ICD-10-CM

## 2024-12-04 ENCOUNTER — NUTRITION (OUTPATIENT)
Dept: DIABETES | Facility: CLINIC | Age: 64
End: 2024-12-04
Payer: MEDICARE

## 2024-12-04 DIAGNOSIS — N18.31 TYPE 2 DIABETES MELLITUS WITH STAGE 3A CHRONIC KIDNEY DISEASE, WITH LONG-TERM CURRENT USE OF INSULIN: ICD-10-CM

## 2024-12-04 DIAGNOSIS — Z79.4 TYPE 2 DIABETES MELLITUS WITH STAGE 3A CHRONIC KIDNEY DISEASE, WITH LONG-TERM CURRENT USE OF INSULIN: ICD-10-CM

## 2024-12-04 DIAGNOSIS — E11.65 TYPE 2 DIABETES MELLITUS WITH HYPERGLYCEMIA, WITH LONG-TERM CURRENT USE OF INSULIN: Primary | ICD-10-CM

## 2024-12-04 DIAGNOSIS — Z79.4 TYPE 2 DIABETES MELLITUS WITH DIABETIC POLYNEUROPATHY, WITH LONG-TERM CURRENT USE OF INSULIN: ICD-10-CM

## 2024-12-04 DIAGNOSIS — E11.42 TYPE 2 DIABETES MELLITUS WITH DIABETIC POLYNEUROPATHY, WITH LONG-TERM CURRENT USE OF INSULIN: ICD-10-CM

## 2024-12-04 DIAGNOSIS — Z79.4 TYPE 2 DIABETES MELLITUS WITH HYPERGLYCEMIA, WITH LONG-TERM CURRENT USE OF INSULIN: Primary | ICD-10-CM

## 2024-12-04 DIAGNOSIS — E11.22 TYPE 2 DIABETES MELLITUS WITH STAGE 3A CHRONIC KIDNEY DISEASE, WITH LONG-TERM CURRENT USE OF INSULIN: ICD-10-CM

## 2024-12-04 PROCEDURE — G0108 DIAB MANAGE TRN  PER INDIV: HCPCS | Mod: S$GLB,,, | Performed by: DIETITIAN, REGISTERED

## 2024-12-16 NOTE — PROGRESS NOTES
Diabetes Care Specialist Follow-up Note  Author: Marla Stein RD, CDE  Date: 12/16/2024    Intake    Program Intake  Reason for Diabetes Program Visit:: Intervention  Type of Intervention:: Individual  Individual: Device Training  Device Training: Personal CGM  Current diabetes risk level:: high  In the last month, have you used the ER or been admitted to the hospital: No  Permission to speak with others about care:: no    Current Diabetes Treatment: Insulin, DM Injectables, Oral Medications  Oral Medication Type/Dose: farxiga 10 mg once a day  DM Injectables Type/Dose: ozempic 0.5 mg once a week  Method of insulin delivery?: Insulin Device  Insulin Device Type/Dose: humalog via VGo30    Continuous Glucose Monitoring  Patient has CGM: Yes  Personal CGM type:: Dexcom G7    Lab Results   Component Value Date    HGBA1C 8.3 (H) 11/07/2024     A1c Pre Diabetes Care Specialist Intervention:  8.7%    Physical activity/Exercise:   No change    SMBG: using the dexcom G7 -       Lifestyle Coping Support & Clinical    Lifestyle/Coping/Support  Psychosocial/Coping Skills Assessment Completed: : No  Assessment indicates:: Adequate understanding  Deffered due to:: Other (comment) (previously completed, there has been no change and patient has adequate understanding)  Area of need?: No    Problem Review  Active Comorbidities: Cardiovascular Disease, Hypertension, Neuropathy    Diabetes Self-Management Skills Assessment    Medication Skills Assessment  Patient is able to identify current diabetes medications, dosages, and appropriate timing of medications.: yes  Patient reports problems or concerns with current medication regimen.: no  Patient is  aware that some diabetes medications can cause low blood sugar?: Yes  Medication Skills Assessment Completed:: No  Assessment indicates:: Adequate understanding  Deffered due to:: Other (comment) (previously completed, there has been no change and patient has adequate  understanding)  Area of need?: No    Diabetes Disease Process/Treatment Options  Diabetes Type?: Type II  Diabetes Disease Process/Treatment Options: Skills Assessment Completed: No  Assessment indicates:: Adequate understanding  Deferred due to:: Other (comment)  Area of need?: No    Nutrition/Healthy Eating  Meal Plan 24 Hour Recall - Breakfast: Toast and Eggs, coffee with sugar and cream  Meal Plan 24 Hour Recall - Lunch: chicken noodle soup with crackers or a sandwich  Meal Plan 24 Hour Recall - Dinner: yesterday some spaghetti and meatsauce, some times roast and potatoes, some times a burger and some fries, sometimes gets pizza or something home cooked  Meal Plan 24 Hour Recall - Snack: peanut butter crackers, almonds, cheerios  Patient can identify foods that impact blood sugar.: yes  Challenges to healthy eating:: portion control, snacking between meals and at night, eating when feeling depressed/stressed, lack of will power, eating out, going to parties  Nutrition/Healthy Eating Skills Assessment Completed:: No  Assessment indicates:: Adequate understanding  Deffered due to:: Other (comment)  Area of need?: No    Physical Activity/Exercise  Physical Activity/Exercise Skills Assessment Completed: : No  Assessment indicates:: Adequate understanding  Deffered due to:: Other (comment)  Area of need?: No    Home Blood Glucose Monitoring  Patient states that blood sugar is checked at home daily.: yes  Monitoring Method:: personal continuous glucose monitor  Personal CGM type:: Dexcom G7   What is your current Time in Range?: 47%  What is your A1c Target?: 7.0 with no hypoglycemia  Home Blood Glucose Monitoring Skills Assessment Completed: : Yes  Assessment indicates:: Instruction Needed  Area of need?: Yes    Acute Complications  Acute Complications Skills Assessment Completed: : No  Assessment indicates:: Adequate understanding  Deffered due to:: Other (comment)  Area of need?: No    Chronic  Complications  Chronic Complications Skills Assessment Completed: : No  Assessment indicates:: Adequate understanding  Deferred due to:: Other (comment)  Area of need?: No      During today's follow-up visit,  the following areas required further assessment and content was provided/reviewed.    Based on today's diabetes care assessment, the following areas of need were identified:          7/26/2024    12:01 AM   Areas of Need   Medications/Current Diabetes Treatment No   Lifestyle Coping Support No   Diabetes Disease Process/Treatment Options No   Nutrition/Healthy Eating No   Physical Activity/Exercise No   Home Blood Glucose Monitoring Yes, changed out the dexcom G7 sensor and started a dexcom G7 sensor   Acute Complications No   Chronic Complications No        Today's interventions were provided through individual discussion, instruction, and written materials were provided.    Patient verbalized understanding of instruction and written materials.  Pt was able to return back demonstration of instructions today. Patient understood key points, needs reinforcement and further instruction.     Diabetes Self-Management Care Plan Review and Evaluation of Progress:    During today's follow-up Agustin Diabetes Self-Management Care Plan progress was reviewed and progress was evaluated including his/her input. Aishwarya has agreed to continue his/her journey to improve/maintain overall diabetes control by continuing to set health goals. See care plan progress below.      Care Plan: Diabetes Management   Updates made since 12/17/2023 12:00 AM        Problem: Blood Glucose Self-Monitoring         Goal: Patient agrees to check and record blood sugars 3 times per day using the dexcom G6/G7 for the next 3 months    Start Date: 12/27/2023   Expected End Date: 3/27/2024   This Visit's Progress: On track   Recent Progress: On track   Priority: High   Barriers: Lack of Supplies   Note:    Patient was instructed how to use a  "meter. Discussed signs and symptoms, and causes and treatment of hypoglycemia. Covered blood sugar and A1C goals. Instructed patient to use provided logbooks to record blood sugars. Instructed patient to follow-up with their physician. Patient verbalized understanding of all instructions.  Prescription for meter and testing supplies pended to PCP    1/6/2023  Dexcom G6 Education  Patient is here in clinic today for initial start of Dexcom continuous glucose monitoring system (CGMA). Reviewed with patient how to insert sensor and transmitter. Patient inserted sensor on right abdomen and inserted transmitter. Time and date was set on monitor and settings were set. Hypoglycemia trigger set for 70 and hyperglycemia set for 300. Patient provided with phone number for 24-hour help line if needed. Discussed various types of possible alarms and what to do. Questions addressed. Initialization finished. No futher questions.  Patient referred to clinic today for Dexcom G6 continuous glucose sensor system training.  Education was provided using "Quick Start Guide" per Dexcom protocol.    Overview:  5min glucose reading updates, trending arrows, BG graph screens, battery life indicator, Blue Tooth Symbol.  Menus: trend Graph, start sensor, enter BG, events, Alerts, Settings, Shutdown, Stop Sensor   Settings:                          * Urgent Low: 55 mg/dL                          * Urgent Low Soon: Off                          * Low alert: 70                          * High alert: 300                          * Rise rate: Off                          * Fall Rate: Off                          * Signal Loss: 30 min                          * No Reading: Off                          * Always sound: On                             Reviewed additional setting options with patient, including Graph Height and Transmitter ID. Transmitter was paired with .    Reviewed where to find sensor insertion time and sensor " expiration date.   Discussed no need to calibrate sensor during the entirety of the 10 day wear. Discussed that pt can calibrate sensor if desired, but at that time she will need to continue calibrating every 12 hours for sensor to remain accurate. Reviewed appropriate calibration techniques.  Reviewed sensor site selection. Site selected and prepped using aseptic technique Inserted to left abdomen. Transmitter placed in pod and secured.  Practiced sensor pod/transmitter removal from site, and removal of transmitter from sensor pod.  Patient able to demonstrate without difficulty.  Encouraged to review manual prior to starting another sensor.   Reviewed problem solving aspects of sensor transmission/variables that can disrupt RF transmission.  range 20 feet, but the first 3 hrs keep within 3 feet of transmitter.  Pt instructed on lag time of interstitial fluid from CBG and was advised to tx hypoglycemia and dose insulin based on SMBG values.  Link to video provided and written instructions provided for patient to review before 10 day sensor change  Dexcom technical support contact number given and examples of when to contact them discussed.      1/17 Changing the Sensor (every 10 days)  Remove the whole thing from your body  Hold the sensor by the skinny part and break the clear piece  Remove the gray transmitter  In the reader touch start sensor  Touch yes you want to start sensor  Touch the square you want to put the number in for   There will be a 4-digit code on the sensor (like 5937)  Enter the code from the sensor  Touch ok  Touch yes, it is correct  Insert Sensor  Remove orange piece and paper tabs  Place on skin and hold in place  Push the orange button  Insert the gray transmitter into the sensor  Put the small tongue like side in first and push down  You should hear 2 clicks  Touch confirm once sensor is secure on body  Touch start Sensor  Touch 1 then 2  Sensor will take 2 hours to warm  up    Changing Transmitter (every 90 days) in the reader  Touch the =   Touch transmitter  Touch pair new  Go through the process to start sensor  Touch yes you want to start sensor  Touch the square you want to put the number in for   There will be a 4-digit code on the sensor (like 5937)  Enter the code from the sensor  Touch ok  Touch yes, it is correct  Touch the square you want to put the number in for   There will be a 6-digit code on the transmitter box (like [id number])  Enter the code from the transmitter box  Touch ok  Touch yes, it is correct  Insert Sensor  Remove orange piece and paper tabs  Place on skin and hold in place  Push the orange button  Insert the gray transmitter into the sensor  Put the small tongue like side in first and push down  You should hear 2 clicks  Touch confirm once sensor is secure on body  Touch start Sensor  Touch 1 then 2  Sensor will take 2 hours to warm up           Task: Reviewed Dexcom download with patient and provider, adjustments made to treatment plan by provider and communicated to patient as directed, patient states understanding. Completed 12/16/2024          Follow Up Plan     Follow up in about 10 days (around 12/14/2024) for Personal CGM Upload, General Follow-up.    Today's care plan and follow up schedule was discussed with patient.  Aishwarya verbalized understanding of the care plan, goals, and agrees to follow up plan.        The patient was encouraged to communicate with his/her health care provider/physician and care team regarding his/her condition(s) and treatment.  I provided the patient with my contact information today and encouraged to contact me via phone or [facility] Patient Portal as needed.     Length of Visit   Total Time: 45 Minutes

## 2024-12-17 ENCOUNTER — TELEPHONE (OUTPATIENT)
Dept: DIABETES | Facility: CLINIC | Age: 64
End: 2024-12-17
Payer: MEDICARE

## 2024-12-17 ENCOUNTER — PATIENT OUTREACH (OUTPATIENT)
Dept: DIABETES | Facility: CLINIC | Age: 64
End: 2024-12-17
Payer: MEDICARE

## 2024-12-17 DIAGNOSIS — E11.65 TYPE 2 DIABETES MELLITUS WITH HYPERGLYCEMIA, WITH LONG-TERM CURRENT USE OF INSULIN: Primary | ICD-10-CM

## 2024-12-17 DIAGNOSIS — E11.42 TYPE 2 DIABETES MELLITUS WITH DIABETIC POLYNEUROPATHY, WITH LONG-TERM CURRENT USE OF INSULIN: ICD-10-CM

## 2024-12-17 DIAGNOSIS — Z79.4 TYPE 2 DIABETES MELLITUS WITH HYPERGLYCEMIA, WITH LONG-TERM CURRENT USE OF INSULIN: Primary | ICD-10-CM

## 2024-12-17 DIAGNOSIS — Z79.4 TYPE 2 DIABETES MELLITUS WITH DIABETIC POLYNEUROPATHY, WITH LONG-TERM CURRENT USE OF INSULIN: ICD-10-CM

## 2024-12-17 NOTE — Clinical Note
Let her know that I took a look at her Dexcom download from [employer] but it is an old download from September in October unless the date and time is wrong in her ?? Will make the following changes based on review of data: Unsure if this data is old or if the date and time was wrong and a  but this is from September in October I would recommend that we get some updated data but it does look like she was running high in the evening may be missing clicks with dinner?? She missed her last appointment with me in November can we see about rescheduling her

## 2024-12-17 NOTE — PROGRESS NOTES
Continuous Glucose Sensor Report of Personal Device    Aishwarya Jensen is a 64 y.o.female with type 2 diabetes     Interpretation of data from Dexcom G7-REVIEWED FROM SEPTEMBER 22, 2024 THROUGH OCTOBER 5, 2024    Reason for review: Currently on anti-hyperglycemic therapy and failing to achieve glycemic goals.    Lab Results   Component Value Date    HGBA1C 8.3 (H) 11/07/2024         Current diabetes medications and doses:   After her BMP resulted after the visit  I instructed her to hold the metformin due to VIJAY  Hold the Farxiga due to VIJAY        For now continue the Ozempic at 0.5 mg weekly---but may need to consider changing back to Mounjaro if VIJAY does not resolved  Also asked that she monitor closely for side effects-- diarrhea/ volume loss         Continue VGo30 with Fiasp   5-6 clicks with meals   1-2 clicks with snacks   Clicks BEFORE MEALS   CHANGE THE VGO EVERY 24 HOURS -- SAME TIME EVERY DAY   ALWAYS WEAR THE VGO!!!!!!     Use the dexcom consistently --will try to change the Dexcom G7 to make it a little easier for her       ________________________________________________________________    SUMMARY of KEY FINDINGS:      Average glucose: 189  Above 180 mg/dL: 37%  (Above 250 mg/dL: 16%)  Within  mg/dL: 47%  Below 70 mg/dL: 0%  (Below 54 mg/dL: 0%)      CGM data reviewed and notable for the following trends:   PRANDIAL EXCURSIONS IN THE AFTERNOON      Will make the following changes based on review of data:  Unsure if this data is old or if the date and time was wrong and a  but this is from September in October  I would recommend that we get some updated data but it does look like she was running high in the evening may be missing clicks with dinner??  She missed her last appointment with me in November can we see about rescheduling her    Micki Knott NP

## 2024-12-18 ENCOUNTER — TELEPHONE (OUTPATIENT)
Dept: DIABETES | Facility: CLINIC | Age: 64
End: 2024-12-18
Payer: MEDICARE

## 2024-12-18 NOTE — TELEPHONE ENCOUNTER
Just had labs in November      Quality 110: Preventive Care And Screening: Influenza Immunization: Influenza Immunization Administered during Influenza season Quality 111:Pneumonia Vaccination Status For Older Adults: Pneumococcal Vaccination Previously Received Quality 130: Documentation Of Current Medications In The Medical Record: Current Medications Documented Detail Level: Detailed

## 2024-12-18 NOTE — TELEPHONE ENCOUNTER
----- Message from Nurse Ospina sent at 12/17/2024  4:46 PM CST -----  Schedule pt for 12/23 at 9am

## 2024-12-20 ENCOUNTER — NUTRITION (OUTPATIENT)
Dept: DIABETES | Facility: CLINIC | Age: 64
End: 2024-12-20
Payer: MEDICARE

## 2024-12-20 DIAGNOSIS — E11.42 TYPE 2 DIABETES MELLITUS WITH DIABETIC POLYNEUROPATHY, WITH LONG-TERM CURRENT USE OF INSULIN: ICD-10-CM

## 2024-12-20 DIAGNOSIS — Z79.4 TYPE 2 DIABETES MELLITUS WITH STAGE 3A CHRONIC KIDNEY DISEASE, WITH LONG-TERM CURRENT USE OF INSULIN: ICD-10-CM

## 2024-12-20 DIAGNOSIS — E11.22 TYPE 2 DIABETES MELLITUS WITH STAGE 3A CHRONIC KIDNEY DISEASE, WITH LONG-TERM CURRENT USE OF INSULIN: ICD-10-CM

## 2024-12-20 DIAGNOSIS — N18.31 TYPE 2 DIABETES MELLITUS WITH STAGE 3A CHRONIC KIDNEY DISEASE, WITH LONG-TERM CURRENT USE OF INSULIN: ICD-10-CM

## 2024-12-20 DIAGNOSIS — Z79.4 TYPE 2 DIABETES MELLITUS WITH DIABETIC POLYNEUROPATHY, WITH LONG-TERM CURRENT USE OF INSULIN: ICD-10-CM

## 2024-12-20 DIAGNOSIS — E11.65 TYPE 2 DIABETES MELLITUS WITH HYPERGLYCEMIA, WITH LONG-TERM CURRENT USE OF INSULIN: Primary | ICD-10-CM

## 2024-12-20 DIAGNOSIS — Z79.4 TYPE 2 DIABETES MELLITUS WITH HYPERGLYCEMIA, WITH LONG-TERM CURRENT USE OF INSULIN: Primary | ICD-10-CM

## 2024-12-20 NOTE — PROGRESS NOTES
Diabetes Care Specialist Follow-up Note  Author: Marla Stein RD, CDE  Date: 12/20/2024    Intake    Program Intake  Reason for Diabetes Program Visit:: Intervention  Type of Intervention:: Individual  Individual: Device Training  Device Training: Personal CGM  Current diabetes risk level:: high  In the last month, have you used the ER or been admitted to the hospital: No  Permission to speak with others about care:: no    Current Diabetes Treatment: Insulin, DM Injectables, Oral Medications  Oral Medication Type/Dose: farxiga 10 mg once a day  DM Injectables Type/Dose: ozempic 0.5 mg once a week  Method of insulin delivery?: Insulin Device  Insulin Device Type/Dose: humalog via VGo30    Continuous Glucose Monitoring  Patient has CGM: Yes  Personal CGM type:: Dexcom G7  GMI Date: 12/20/24  GMI Value: 7.8 %    Lab Results   Component Value Date    HGBA1C 8.3 (H) 11/07/2024     A1c Pre Diabetes Care Specialist Intervention:  8.7%    Physical activity/Exercise:   No change    SMBG: using the dexcom G7 - patient has 3 different receivers (1 G6 and 2 G7) at time of visit        Lifestyle Coping Support & Clinical    Lifestyle/Coping/Support  Psychosocial/Coping Skills Assessment Completed: : No  Assessment indicates:: Adequate understanding  Deffered due to:: Other (comment) (previously completed, there has been no change and patient has adequate understanding)  Area of need?: No    Problem Review  Active Comorbidities: Cardiovascular Disease, Hypertension, Neuropathy    Diabetes Self-Management Skills Assessment    Medication Skills Assessment  Patient is able to identify current diabetes medications, dosages, and appropriate timing of medications.: yes  Patient reports problems or concerns with current medication regimen.: no  Patient is  aware that some diabetes medications can cause low blood sugar?: Yes  Medication Skills Assessment Completed:: No  Assessment indicates:: Adequate understanding  Deffered due to::  Other (comment) (previously completed, there has been no change and patient has adequate understanding)  Area of need?: No    Diabetes Disease Process/Treatment Options  Diabetes Type?: Type II  Diabetes Disease Process/Treatment Options: Skills Assessment Completed: No  Assessment indicates:: Adequate understanding  Deferred due to:: Other (comment)  Area of need?: No    Nutrition/Healthy Eating  Meal Plan 24 Hour Recall - Breakfast: Toast and Eggs, coffee with sugar and cream  Meal Plan 24 Hour Recall - Lunch: chicken noodle soup with crackers or a sandwich  Meal Plan 24 Hour Recall - Dinner: yesterday some spaghetti and meatsauce, some times roast and potatoes, some times a burger and some fries, sometimes gets pizza or something home cooked  Meal Plan 24 Hour Recall - Snack: peanut butter crackers, almonds, cheerios  Patient can identify foods that impact blood sugar.: yes  Challenges to healthy eating:: portion control, snacking between meals and at night, eating when feeling depressed/stressed, lack of will power, eating out, going to parties  Nutrition/Healthy Eating Skills Assessment Completed:: No  Assessment indicates:: Adequate understanding  Deffered due to:: Other (comment)  Area of need?: No    Physical Activity/Exercise  Physical Activity/Exercise Skills Assessment Completed: : No  Assessment indicates:: Adequate understanding  Deffered due to:: Other (comment)  Area of need?: No    Home Blood Glucose Monitoring  Patient states that blood sugar is checked at home daily.: yes  Monitoring Method:: personal continuous glucose monitor  Personal CGM type:: Dexcom G7   What is your current Time in Range?: 51%  What is your A1c Target?: 7.0 with no hypoglycemia  Home Blood Glucose Monitoring Skills Assessment Completed: : Yes  Assessment indicates:: Instruction Needed  Area of need?: Yes    Acute Complications  Acute Complications Skills Assessment Completed: : No  Assessment indicates:: Adequate  understanding  Deffered due to:: Other (comment)  Area of need?: No    Chronic Complications  Chronic Complications Skills Assessment Completed: : No  Assessment indicates:: Adequate understanding  Deferred due to:: Other (comment)  Area of need?: No      During today's follow-up visit,  the following areas required further assessment and content was provided/reviewed.    Based on today's diabetes care assessment, the following areas of need were identified:          7/26/2024    12:01 AM   Areas of Need   Medications/Current Diabetes Treatment No   Lifestyle Coping Support No   Diabetes Disease Process/Treatment Options No   Nutrition/Healthy Eating No   Physical Activity/Exercise No   Home Blood Glucose Monitoring Yes, changed out the dexcom G7 sensor and started a new one  Comparison of previous CGM data 10/5/2024 to current (11/29/2024)    Average Glucose 186 improved slightly from 189  Standard Deviation 66 improved slightly from 69  GMI 7.8 % from NA    Time in Range  16% of time patient was in Very High Range no change from 16%  33% of time patient was in High Range improved slightly from 37%  51% of time patient was in Range improved slightly from 47%  0% of time patient was in Low Range no change from 0%  0% of time patient was in Very Low Range no change from 0%   Acute Complications No   Chronic Complications No        Today's interventions were provided through individual discussion, instruction, and written materials were provided.    Patient verbalized understanding of instruction and written materials.  Pt was able to return back demonstration of instructions today. Patient understood key points, needs reinforcement and further instruction.     Diabetes Self-Management Care Plan Review and Evaluation of Progress:    During today's follow-up Aishwarya's Diabetes Self-Management Care Plan progress was reviewed and progress was evaluated including his/her input. Aishwarya has agreed to continue his/her  "journey to improve/maintain overall diabetes control by continuing to set health goals. See care plan progress below.      Care Plan: Diabetes Management   Updates made since 12/21/2023 12:00 AM        Problem: Blood Glucose Self-Monitoring         Goal: Patient agrees to check and record blood sugars 3 times per day using the dexcom G6/G7 for the next 3 months    Start Date: 12/27/2023   Expected End Date: 3/27/2024   This Visit's Progress: On track   Recent Progress: On track   Priority: High   Barriers: Lack of Supplies   Note:    Patient was instructed how to use a meter. Discussed signs and symptoms, and causes and treatment of hypoglycemia. Covered blood sugar and A1C goals. Instructed patient to use provided logbooks to record blood sugars. Instructed patient to follow-up with their physician. Patient verbalized understanding of all instructions.  Prescription for meter and testing supplies pended to PCP    1/6/2023  Dexcom G6 Education  Patient is here in clinic today for initial start of Dexcom continuous glucose monitoring system (CGMA). Reviewed with patient how to insert sensor and transmitter. Patient inserted sensor on right abdomen and inserted transmitter. Time and date was set on monitor and settings were set. Hypoglycemia trigger set for 70 and hyperglycemia set for 300. Patient provided with phone number for 24-hour help line if needed. Discussed various types of possible alarms and what to do. Questions addressed. Initialization finished. No futher questions.  Patient referred to clinic today for Dexcom G6 continuous glucose sensor system training.  Education was provided using "Quick Start Guide" per Dexcom protocol.    Overview:  5min glucose reading updates, trending arrows, BG graph screens, battery life indicator, Blue Tooth Symbol.  Menus: trend Graph, start sensor, enter BG, events, Alerts, Settings, Shutdown, Stop Sensor   Settings:                          * Urgent Low: 55 " mg/dL                          * Urgent Low Soon: Off                          * Low alert: 70                          * High alert: 300                          * Rise rate: Off                          * Fall Rate: Off                          * Signal Loss: 30 min                          * No Reading: Off                          * Always sound: On                             Reviewed additional setting options with patient, including Graph Height and Transmitter ID. Transmitter was paired with .    Reviewed where to find sensor insertion time and sensor expiration date.   Discussed no need to calibrate sensor during the entirety of the 10 day wear. Discussed that pt can calibrate sensor if desired, but at that time she will need to continue calibrating every 12 hours for sensor to remain accurate. Reviewed appropriate calibration techniques.  Reviewed sensor site selection. Site selected and prepped using aseptic technique Inserted to left abdomen. Transmitter placed in pod and secured.  Practiced sensor pod/transmitter removal from site, and removal of transmitter from sensor pod.  Patient able to demonstrate without difficulty.  Encouraged to review manual prior to starting another sensor.   Reviewed problem solving aspects of sensor transmission/variables that can disrupt RF transmission.  range 20 feet, but the first 3 hrs keep within 3 feet of transmitter.  Pt instructed on lag time of interstitial fluid from CBG and was advised to tx hypoglycemia and dose insulin based on SMBG values.  Link to video provided and written instructions provided for patient to review before 10 day sensor change  Dexcom technical support contact number given and examples of when to contact them discussed.      1/17 Changing the Sensor (every 10 days)  Remove the whole thing from your body  Hold the sensor by the skinny part and break the clear piece  Remove the gray transmitter  In the reader touch start  sensor  Touch yes you want to start sensor  Touch the square you want to put the number in for   There will be a 4-digit code on the sensor (like 5937)  Enter the code from the sensor  Touch ok  Touch yes, it is correct  Insert Sensor  Remove orange piece and paper tabs  Place on skin and hold in place  Push the orange button  Insert the gray transmitter into the sensor  Put the small tongue like side in first and push down  You should hear 2 clicks  Touch confirm once sensor is secure on body  Touch start Sensor  Touch 1 then 2  Sensor will take 2 hours to warm up    Changing Transmitter (every 90 days) in the reader  Touch the =   Touch transmitter  Touch pair new  Go through the process to start sensor  Touch yes you want to start sensor  Touch the square you want to put the number in for   There will be a 4-digit code on the sensor (like 5937)  Enter the code from the sensor  Touch ok  Touch yes, it is correct  Touch the square you want to put the number in for   There will be a 6-digit code on the transmitter box (like [id number])  Enter the code from the transmitter box  Touch ok  Touch yes, it is correct  Insert Sensor  Remove orange piece and paper tabs  Place on skin and hold in place  Push the orange button  Insert the gray transmitter into the sensor  Put the small tongue like side in first and push down  You should hear 2 clicks  Touch confirm once sensor is secure on body  Touch start Sensor  Touch 1 then 2  Sensor will take 2 hours to warm up           Task: Reviewed Dexcom download with patient and provider, adjustments made to treatment plan by provider and communicated to patient as directed, patient states understanding. Completed 12/16/2024        Task: Reviewed Dexcom download with patient and provider, adjustments made to treatment plan by provider and communicated to patient as directed, patient states understanding. Completed 12/20/2024          Follow Up Plan     Follow up in about 10 days  (around 12/30/2024) for Change Personal CGM and Upload Data.    Today's care plan and follow up schedule was discussed with patient.  Aishwarya verbalized understanding of the care plan, goals, and agrees to follow up plan.        The patient was encouraged to communicate with his/her health care provider/physician and care team regarding his/her condition(s) and treatment.  I provided the patient with my contact information today and encouraged to contact me via phone or [facility] Patient Portal as needed.     Length of Visit   Total Time: 40 Minutes

## 2024-12-23 ENCOUNTER — OFFICE VISIT (OUTPATIENT)
Dept: DIABETES | Facility: CLINIC | Age: 64
End: 2024-12-23
Payer: MEDICARE

## 2024-12-23 VITALS
BODY MASS INDEX: 36.96 KG/M2 | SYSTOLIC BLOOD PRESSURE: 126 MMHG | OXYGEN SATURATION: 98 % | HEART RATE: 61 BPM | DIASTOLIC BLOOD PRESSURE: 74 MMHG | WEIGHT: 236 LBS

## 2024-12-23 DIAGNOSIS — E11.65 TYPE 2 DIABETES MELLITUS WITH HYPERGLYCEMIA, WITH LONG-TERM CURRENT USE OF INSULIN: Primary | ICD-10-CM

## 2024-12-23 DIAGNOSIS — E11.42 TYPE 2 DIABETES MELLITUS WITH DIABETIC POLYNEUROPATHY, WITH LONG-TERM CURRENT USE OF INSULIN: ICD-10-CM

## 2024-12-23 DIAGNOSIS — E11.29 MICROALBUMINURIA DUE TO TYPE 2 DIABETES MELLITUS: ICD-10-CM

## 2024-12-23 DIAGNOSIS — E78.5 DYSLIPIDEMIA, GOAL LDL BELOW 100: ICD-10-CM

## 2024-12-23 DIAGNOSIS — E11.22 TYPE 2 DIABETES MELLITUS WITH STAGE 3A CHRONIC KIDNEY DISEASE, WITH LONG-TERM CURRENT USE OF INSULIN: ICD-10-CM

## 2024-12-23 DIAGNOSIS — Z91.199 NONCOMPLIANCE WITH DIABETES TREATMENT: ICD-10-CM

## 2024-12-23 DIAGNOSIS — R80.9 MICROALBUMINURIA DUE TO TYPE 2 DIABETES MELLITUS: ICD-10-CM

## 2024-12-23 DIAGNOSIS — E66.01 CLASS 2 SEVERE OBESITY DUE TO EXCESS CALORIES WITH SERIOUS COMORBIDITY AND BODY MASS INDEX (BMI) OF 36.0 TO 36.9 IN ADULT: ICD-10-CM

## 2024-12-23 DIAGNOSIS — D64.9 ANEMIA, UNSPECIFIED TYPE: ICD-10-CM

## 2024-12-23 DIAGNOSIS — Z79.4 TYPE 2 DIABETES MELLITUS WITH DIABETIC POLYNEUROPATHY, WITH LONG-TERM CURRENT USE OF INSULIN: ICD-10-CM

## 2024-12-23 DIAGNOSIS — Z59.9 FINANCIAL DIFFICULTIES: ICD-10-CM

## 2024-12-23 DIAGNOSIS — Z71.9 HEALTH EDUCATION/COUNSELING: ICD-10-CM

## 2024-12-23 DIAGNOSIS — Z79.4 TYPE 2 DIABETES MELLITUS WITH HYPERGLYCEMIA, WITH LONG-TERM CURRENT USE OF INSULIN: Primary | ICD-10-CM

## 2024-12-23 DIAGNOSIS — Z79.4 TYPE 2 DIABETES MELLITUS WITH STAGE 3A CHRONIC KIDNEY DISEASE, WITH LONG-TERM CURRENT USE OF INSULIN: ICD-10-CM

## 2024-12-23 DIAGNOSIS — E66.812 CLASS 2 SEVERE OBESITY DUE TO EXCESS CALORIES WITH SERIOUS COMORBIDITY AND BODY MASS INDEX (BMI) OF 36.0 TO 36.9 IN ADULT: ICD-10-CM

## 2024-12-23 DIAGNOSIS — N18.31 TYPE 2 DIABETES MELLITUS WITH STAGE 3A CHRONIC KIDNEY DISEASE, WITH LONG-TERM CURRENT USE OF INSULIN: ICD-10-CM

## 2024-12-23 DIAGNOSIS — I10 ESSENTIAL HYPERTENSION, BENIGN: ICD-10-CM

## 2024-12-23 PROCEDURE — 4010F ACE/ARB THERAPY RXD/TAKEN: CPT | Mod: CPTII,S$GLB,, | Performed by: NURSE PRACTITIONER

## 2024-12-23 PROCEDURE — 1159F MED LIST DOCD IN RCRD: CPT | Mod: CPTII,S$GLB,, | Performed by: NURSE PRACTITIONER

## 2024-12-23 PROCEDURE — 3062F POS MACROALBUMINURIA REV: CPT | Mod: CPTII,S$GLB,, | Performed by: NURSE PRACTITIONER

## 2024-12-23 PROCEDURE — 95251 CONT GLUC MNTR ANALYSIS I&R: CPT | Mod: S$GLB,,, | Performed by: NURSE PRACTITIONER

## 2024-12-23 PROCEDURE — 3074F SYST BP LT 130 MM HG: CPT | Mod: CPTII,S$GLB,, | Performed by: NURSE PRACTITIONER

## 2024-12-23 PROCEDURE — 1101F PT FALLS ASSESS-DOCD LE1/YR: CPT | Mod: CPTII,S$GLB,, | Performed by: NURSE PRACTITIONER

## 2024-12-23 PROCEDURE — 3288F FALL RISK ASSESSMENT DOCD: CPT | Mod: CPTII,S$GLB,, | Performed by: NURSE PRACTITIONER

## 2024-12-23 PROCEDURE — 3066F NEPHROPATHY DOC TX: CPT | Mod: CPTII,S$GLB,, | Performed by: NURSE PRACTITIONER

## 2024-12-23 PROCEDURE — 1126F AMNT PAIN NOTED NONE PRSNT: CPT | Mod: CPTII,S$GLB,, | Performed by: NURSE PRACTITIONER

## 2024-12-23 PROCEDURE — 3008F BODY MASS INDEX DOCD: CPT | Mod: CPTII,S$GLB,, | Performed by: NURSE PRACTITIONER

## 2024-12-23 PROCEDURE — 3052F HG A1C>EQUAL 8.0%<EQUAL 9.0%: CPT | Mod: CPTII,S$GLB,, | Performed by: NURSE PRACTITIONER

## 2024-12-23 PROCEDURE — 3078F DIAST BP <80 MM HG: CPT | Mod: CPTII,S$GLB,, | Performed by: NURSE PRACTITIONER

## 2024-12-23 PROCEDURE — 99999 PR PBB SHADOW E&M-EST. PATIENT-LVL IV: CPT | Mod: PBBFAC,,, | Performed by: NURSE PRACTITIONER

## 2024-12-23 PROCEDURE — 99214 OFFICE O/P EST MOD 30 MIN: CPT | Mod: S$GLB,,, | Performed by: NURSE PRACTITIONER

## 2024-12-23 PROCEDURE — 1160F RVW MEDS BY RX/DR IN RCRD: CPT | Mod: CPTII,S$GLB,, | Performed by: NURSE PRACTITIONER

## 2024-12-23 RX ORDER — INSULIN LISPRO 100 [IU]/ML
INJECTION, SOLUTION INTRAVENOUS; SUBCUTANEOUS
Qty: 90 ML | Refills: 3 | Status: SHIPPED | OUTPATIENT
Start: 2024-12-23

## 2024-12-23 RX ORDER — SUB-Q INSULIN DEVICE, 40 UNIT
1 EACH MISCELLANEOUS DAILY
Qty: 90 EACH | Refills: 2 | Status: SHIPPED | OUTPATIENT
Start: 2024-12-23

## 2024-12-23 RX ORDER — TIRZEPATIDE 5 MG/.5ML
5 INJECTION, SOLUTION SUBCUTANEOUS
Qty: 4 PEN | Refills: 5 | Status: SHIPPED | OUTPATIENT
Start: 2024-12-23

## 2024-12-23 RX ORDER — TIRZEPATIDE 2.5 MG/.5ML
2.5 INJECTION, SOLUTION SUBCUTANEOUS
Qty: 4 PEN | Refills: 0 | Status: SHIPPED | OUTPATIENT
Start: 2024-12-23

## 2024-12-23 RX ORDER — ROSUVASTATIN CALCIUM 40 MG/1
40 TABLET, COATED ORAL NIGHTLY
Qty: 90 TABLET | Refills: 3 | Status: SHIPPED | OUTPATIENT
Start: 2024-12-23 | End: 2025-12-23

## 2024-12-23 RX ORDER — BLOOD-GLUCOSE SENSOR
1 EACH MISCELLANEOUS
Qty: 9 EACH | Refills: 3 | Status: SHIPPED | OUTPATIENT
Start: 2024-12-23

## 2024-12-23 RX ORDER — EZETIMIBE 10 MG/1
10 TABLET ORAL NIGHTLY
Qty: 90 TABLET | Refills: 2 | Status: SHIPPED | OUTPATIENT
Start: 2024-12-23

## 2024-12-23 RX ORDER — DAPAGLIFLOZIN 10 MG/1
10 TABLET, FILM COATED ORAL DAILY
Qty: 90 TABLET | Refills: 2 | Status: SHIPPED | OUTPATIENT
Start: 2024-12-23

## 2024-12-23 RX ORDER — BLOOD-GLUCOSE SENSOR
1 EACH MISCELLANEOUS
Qty: 3 EACH | Refills: 11 | Status: SHIPPED | OUTPATIENT
Start: 2024-12-23 | End: 2024-12-23 | Stop reason: SDUPTHER

## 2024-12-23 SDOH — SOCIAL DETERMINANTS OF HEALTH (SDOH): PROBLEM RELATED TO HOUSING AND ECONOMIC CIRCUMSTANCES, UNSPECIFIED: Z59.9

## 2024-12-23 NOTE — ASSESSMENT & PLAN NOTE
BP goal is < 140/90.   Tolerating ACEi  Blood pressure goals discussed with patient  Controlled today in office   Good fetal movement  Tdap in office today   labor precautions

## 2024-12-23 NOTE — ASSESSMENT & PLAN NOTE
Uncontrolled   Her A1c has increased to 8.3%--she has been off the Ozempic  She did not bring her Dexcom  for me to look at her readings today which makes it very difficult-- reviewed readings from the end of November ---she brought her  when she saw Marla a week ago and she was able to download it  She is wearing her V Go today and it does not have insulin in it-- again today   She needs assistance with her Dexcom application---she is coming for nurse visits or to see Marla with education          -- Medication Changes:   Stay off metformin     Continue Farxiga 10 mg daily    Start Mounjaro weekly--start with 2.5 mg weekly for 4 weeks and then increase to 5 mg weekly    Continue VGo30 with Fiasp   5-6 clicks with meals   1-2 clicks with snacks   Clicks BEFORE MEALS   CHANGE THE VGO EVERY 24 HOURS -- SAME TIME EVERY DAY   ALWAYS WEAR THE VGO!!!!!!     Use the dexcom consistently --okay to come here for help with application---she is using her   Unable to get the sylvia on her phone---tried today  Discussed with patient that every time she comes to this office she should bring the Dexcom  for us to see her readings      -- Reviewed goals of therapy are to get the best control we can without hypoglycemia.  -- Reviewed patient's current insulin regimen. Clarified proper insulin dose and timing in relation to meals, etc. Insulin injection sites and proper rotation instructed.    -- Advised frequent self blood glucose monitoring.  Patient encouraged to document glucose results and bring them to every clinic visit. -- use the dexcom consistently--on the Dexcom G7---supplies come from pharmacy---needs assistance with application  -- Hypoglycemia precautions discussed. Instructed on precautions before driving.    -- Call for Bg repeatedly < 70 or > 200.   -- Close adherence to lifestyle changes recommended.   -- Periodic follow ups for eye evaluations, foot care and dental care  suggested.  Schedule with zoltan for a dexcom G7  change out       patient will benefit from a Dexcom G6 to prevent hypoglycemia due to patient experiences level 2 or hypoglycemic event <54   Patient has diabetes mellitus and manages diabetes with intensive insulin regimen and uses prandial and basal insulin daily-- on the VGO   Patient requires a therapeutic CGM and is willing to use therapeutic CGM for the necessary frequent adjustments of insulin therapy.  I have completed an in-person visit during the previous 6 months and will continue to have in-person visits every 6 months to assess adherence to their CGM regimen and diabetes treatment plan.  Due to COVID pandemic and need for remote monitoring this tool is medically necessary

## 2024-12-23 NOTE — PROGRESS NOTES
CC:   Chief Complaint   Patient presents with    Diabetes Mellitus       HPI: Aishwarya Jensen is a 64 y.o. female presents for a follow up visit today for the management of T2DM   She was diagnosed with Type 2 diabetes in her early 50's on routine lab work. She was initially started on Metformin. Insulin therapy was started soon after diagnosis.     Family hx of diabetes: Mother, father, brothers   Hospitalized for diabetes: denies     No personal or FH of thyroid cancer or personal of pancreatic cancer or pancreatitis.     She is retired now.       Our last visit was in July of 2024  Following that visit I instructed her to hold her metformin due to VIJAY  Also held her Farxiga due to VIJAY  We continued the Ozempic weekly---but discussed maybe changing back to Mounjaro with the VIJAY did not resolve---asked her to monitor for side effects of diarrhea and volume loss with the Ozempic  We continued the V Go 30 with Fiasp ---5-6 clicks with meals in 1-2 clicks with snacks  Her A1c has gone from 7.2% to 8.3%  She met with diabetes education last week who downloaded her Dexcom----the data that have is from November 16th through November 29, 2024  She left her Dexcom  at home so we can not download the data today  See attached download blood sugar readings are above goal  She has not been taking the Ozempic  Her kidney function has improved back to her baseline---CKD stage 3 with a GFR 49 and a creatinine of 1.2          Anemia seen on labs- worse than 3 months ago   Not on iron supplements   Denies blood loss-- denies vaginal bleeding or bright red blood in stool or dark colored stools   Maybe from CKD? Will have her follow up with PCP        Component Ref Range & Units 1 yr ago   Ferritin 20.0 - 300.0 ng/mL 276   Resulting Agency  [facility]             1 Result Note       Component Ref Range & Units 1 yr ago   Iron 30 - 160 ug/dL 55   Transferrin 200 - 375 mg/dL 231   TIBC 250 - 450 ug/dL 342   Saturated Iron  20 - 50 % 16 Low    Resulting Agency  [facility]                            DIABETES COMPLICATIONS: nephropathy and peripheral neuropathy      Diabetes Management Status    ASA:  not taking the ASA     Statin: Taking Crestor 40 mg nightly and taking the Zetia 10 mg daily   ACE/ARB: Taking- lisinopril 20 mg     Screening or Prevention Patient's value Goal Complete/Controlled?   HgA1C Testing and Control   Lab Results   Component Value Date    HGBA1C 8.3 (H) 11/07/2024      Annually/Less than 8% Yes   Lipid profile : 11/07/2024 Annually Yes   LDL control Lab Results   Component Value Date    LDLCALC 60.0 (L) 11/07/2024    Annually/Less than 100 mg/dl  No   Nephropathy screening Lab Results   Component Value Date    LABMICR 624.0 11/07/2024     Lab Results   Component Value Date    PROTEINUA Trace (A) 09/16/2024    Annually Yes   Blood pressure BP Readings from Last 1 Encounters:   12/23/24 126/74    Less than 140/90 No   Dilated retinal exam : 05/23/2024 Annually No   Foot exam   : 04/22/2024 Annually Yes       CURRENT A1C:    Hemoglobin A1C   Date Value Ref Range Status   11/07/2024 8.3 (H) 4.0 - 5.6 % Final     Comment:     ADA Screening Guidelines:  5.7-6.4%  Consistent with prediabetes  >or=6.5%  Consistent with diabetes    High levels of fetal hemoglobin interfere with the HbA1C  assay. Heterozygous hemoglobin variants (HbS, HgC, etc)do  not significantly interfere with this assay.   However, presence of multiple variants may affect accuracy.     06/25/2024 7.2 (H) 4.0 - 5.6 % Final     Comment:     ADA Screening Guidelines:  5.7-6.4%  Consistent with prediabetes  >or=6.5%  Consistent with diabetes    High levels of fetal hemoglobin interfere with the HbA1C  assay. Heterozygous hemoglobin variants (HbS, HgC, etc)do  not significantly interfere with this assay.   However, presence of multiple variants may affect accuracy.     04/03/2024 8.1 (H) 4.0 - 5.6 % Final     Comment:     ADA Screening Guidelines:  5.7-6.4%   Consistent with prediabetes  >or=6.5%  Consistent with diabetes    High levels of fetal hemoglobin interfere with the HbA1C  assay. Heterozygous hemoglobin variants (HbS, HgC, etc)do  not significantly interfere with this assay.   However, presence of multiple variants may affect accuracy.         GOAL A1C: less than 8%     DM MEDICATIONS USED IN THE PAST: Metformin   Victoza   Lantus   VGo  Novolog    Freestyle manisha -- didn't like it   Ozempic   Fiasp   Dexcom G6  Mounjaro       CURRENT DIABETES MEDICATIONS: Off Metformin per instruction   Off the Ozempic   VGo30 with Fiasp 5-6 clicks with meals   Giving clicks before the meals    Farxiga 10 mg daily -- taking       Insulin:VGo  Missed doses:    Usually wearing the VGo to her abdomen   She is adamant that she is changing her VGo every 24 hours. - may not be at the same time- she is unsure   Sometimes missing the VGo clicks           BLOOD GLUCOSE MONITORING:   Sensor type: Dexcom G 7   Site changed every 10 days     Did not bring her dexcom  with her today   She is wearing her dexcom but no      Reviewed dexcom download from 11/16/2024-11/29/2024  Average BG was 186   GMI- 7.8%   51% in range     0% low   0% very low       HYPOGLYCEMIA: denies recent   Maybe some hypoglycemia unawareness or error with meter   Some in the 70's         MEALS: eating 3 meals per day   drinking OJ sometimes   Drinks: water    Sparking water- SF -- ICE - SF         CURRENT EXERCISE:  No      Review of Systems  Review of Systems   Constitutional:  Negative for appetite change, fatigue and unexpected weight change.   HENT:  Negative for trouble swallowing.    Eyes:  Negative for visual disturbance.   Respiratory:  Negative for shortness of breath.    Cardiovascular:  Negative for chest pain and leg swelling.   Gastrointestinal:  Negative for abdominal distention, abdominal pain, blood in stool, constipation, diarrhea, nausea, rectal pain and vomiting.   Endocrine:  Positive for cold intolerance. Negative for polydipsia, polyphagia and polyuria.   Genitourinary:         + Nocturia 1-2 times per night    Musculoskeletal:  Negative for arthralgias.   Skin:  Negative for wound.   Neurological:  Negative for numbness.   Psychiatric/Behavioral:  Positive for sleep disturbance.        Physical Exam   Physical Exam  Vitals and nursing note reviewed.   Constitutional:       Appearance: She is well-developed. She is obese.   HENT:      Head: Normocephalic and atraumatic.      Right Ear: External ear normal.      Left Ear: External ear normal.      Nose: Nose normal.   Neck:      Thyroid: No thyromegaly.      Trachea: No tracheal deviation.   Cardiovascular:      Rate and Rhythm: Normal rate and regular rhythm.      Heart sounds: No murmur heard.  Pulmonary:      Effort: Pulmonary effort is normal. No respiratory distress.      Breath sounds: Normal breath sounds.   Abdominal:      Palpations: Abdomen is soft.      Tenderness: There is no abdominal tenderness.      Hernia: No hernia is present.   Musculoskeletal:      Cervical back: Normal range of motion and neck supple.   Skin:     General: Skin is warm and dry.      Capillary Refill: Capillary refill takes less than 2 seconds.      Findings: No rash.      Comments: She is wearing the VGo to her abdomen-  No complications   NO INSULIN IN VGO DEVICE    Neurological:      Mental Status: She is alert and oriented to person, place, and time.      Cranial Nerves: No cranial nerve deficit.   Psychiatric:         Behavior: Behavior normal.         Judgment: Judgment normal.           FOOT EXAMINATION: Appropriate footwear.           Lab Results   Component Value Date    TSH 1.281 06/25/2024         Type 2 diabetes mellitus with hyperglycemia, with long-term current use of insulin  Uncontrolled   Her A1c has increased to 8.3%--she has been off the Ozempic  She did not bring her Dexcom  for me to look at her readings today which makes  it very difficult-- reviewed readings from the end of November ---she brought her  when she saw Zoltan a week ago and she was able to download it  She is wearing her V Go today and it does not have insulin in it-- again today   She needs assistance with her Dexcom application---she is coming for nurse visits or to see Zoltan with education          -- Medication Changes:   Stay off metformin     Continue Farxiga 10 mg daily    Start Mounjaro weekly--start with 2.5 mg weekly for 4 weeks and then increase to 5 mg weekly    Continue VGo30 with Fiasp   5-6 clicks with meals   1-2 clicks with snacks   Clicks BEFORE MEALS   CHANGE THE VGO EVERY 24 HOURS -- SAME TIME EVERY DAY   ALWAYS WEAR THE VGO!!!!!!     Use the dexcom consistently --okay to come here for help with application---she is using her   Unable to get the sylvia on her phone---tried today  Discussed with patient that every time she comes to this office she should bring the Dexcom  for us to see her readings      -- Reviewed goals of therapy are to get the best control we can without hypoglycemia.  -- Reviewed patient's current insulin regimen. Clarified proper insulin dose and timing in relation to meals, etc. Insulin injection sites and proper rotation instructed.    -- Advised frequent self blood glucose monitoring.  Patient encouraged to document glucose results and bring them to every clinic visit. -- use the dexcom consistently--on the Dexcom G7---supplies come from pharmacy---needs assistance with application  -- Hypoglycemia precautions discussed. Instructed on precautions before driving.    -- Call for Bg repeatedly < 70 or > 200.   -- Close adherence to lifestyle changes recommended.   -- Periodic follow ups for eye evaluations, foot care and dental care suggested.  Schedule with zoltan for a dexcom G7  change out       patient will benefit from a Dexcom G6 to prevent hypoglycemia due to patient experiences level 2 or hypoglycemic  event <54   Patient has diabetes mellitus and manages diabetes with intensive insulin regimen and uses prandial and basal insulin daily-- on the VGO   Patient requires a therapeutic CGM and is willing to use therapeutic CGM for the necessary frequent adjustments of insulin therapy.  I have completed an in-person visit during the previous 6 months and will continue to have in-person visits every 6 months to assess adherence to their CGM regimen and diabetes treatment plan.  Due to COVID pandemic and need for remote monitoring this tool is medically necessary        Type 2 diabetes mellitus with diabetic polyneuropathy, with long-term current use of insulin  Optimize BG readings.   See above.       Educated patient to check feet daily for any foreign objects and/or wounds. Discussed with patient the importance of wearing appropriate footwear at all times, not to walk barefoot ever, and to check shoes before putting them on feet. Instructed patient to keep feet dry by regularly changing shoes and socks and drying feet after baths and exercises. Also, instructed patient to report any new lesions, discolorations, or swelling to a healthcare professional.        Microalbuminuria due to type 2 diabetes mellitus  Optimize BG readings.   See above.   On ACEi and SGLT2       Type 2 diabetes mellitus with stage 3a chronic kidney disease, with long-term current use of insulin  Optimize BG readings without insulin stacking or hypoglycemia   See above.   On Farxiga and Lisinopril     Essential hypertension, benign  BP goal is < 140/90.   Tolerating ACEi  Blood pressure goals discussed with patient  Controlled today in office    Dyslipidemia, goal LDL below 100  On statin per ADA recommendations  LDL goal < 100. LDL at goal. LFTs WNL. Continue statin.   On Crestor 40 mg nightly and Zetia 10 mg daily--reports compliance    Class 2 severe obesity due to excess calories with serious comorbidity and body mass index (BMI) of 36.0 to  36.9 in adult  Body mass index is 36.96 kg/m².  Increases insulin resistance.   Discussed DM diet and exercise.       Anemia  Follow up with PCP for management           I spent a total of 30 minutes on the day of the visit.This includes face to face time and non-face to face time preparing to see the patient (eg, review of tests), obtaining and/or reviewing separately obtained history, documenting clinical information in the electronic or other health record, independently interpreting results and communicating results to the patient/family/caregiver, or care coordinator.          Follow up in about 3 months (around 3/23/2025).  1. Schedule a follow up with liu or favio for her anemia please   2. Follow up with me in 3 months with labs prior         Orders Placed This Encounter   Procedures    Hemoglobin A1C     Standing Status:   Future     Standing Expiration Date:   6/23/2026    Basic Metabolic Panel     Standing Status:   Future     Standing Expiration Date:   6/23/2026    Fructosamine     Standing Status:   Future     Standing Expiration Date:   6/23/2026    Microalbumin/Creatinine Ratio, Urine     Standing Status:   Future     Standing Expiration Date:   6/23/2026     Order Specific Question:   Specimen Source     Answer:   Urine         Recommendations were discussed with the patient in detail  The patient verbalized understanding and agrees with the plan outlined as above.

## 2024-12-23 NOTE — ASSESSMENT & PLAN NOTE
Body mass index is 36.96 kg/m².  Increases insulin resistance.   Discussed DM diet and exercise.

## 2024-12-24 ENCOUNTER — TELEPHONE (OUTPATIENT)
Dept: DIABETES | Facility: CLINIC | Age: 64
End: 2024-12-24
Payer: MEDICARE

## 2024-12-24 NOTE — TELEPHONE ENCOUNTER
----- Message from Savita sent at 12/24/2024 10:09 AM CST -----  Contact: Jill with [facility] phone [phone] Ref# [id number]  Pharmacy is calling to clarify an RX.    RX name:  tirzepatide (MOUNJARO) 5 mg/0.5 mL PnIj    What do they need to clarify:  Dosage instructions.    Comments:

## 2024-12-30 ENCOUNTER — NUTRITION (OUTPATIENT)
Dept: DIABETES | Facility: CLINIC | Age: 64
End: 2024-12-30
Payer: MEDICARE

## 2024-12-30 DIAGNOSIS — Z79.4 TYPE 2 DIABETES MELLITUS WITH STAGE 3A CHRONIC KIDNEY DISEASE, WITH LONG-TERM CURRENT USE OF INSULIN: ICD-10-CM

## 2024-12-30 DIAGNOSIS — E11.42 TYPE 2 DIABETES MELLITUS WITH DIABETIC POLYNEUROPATHY, WITH LONG-TERM CURRENT USE OF INSULIN: ICD-10-CM

## 2024-12-30 DIAGNOSIS — E11.65 TYPE 2 DIABETES MELLITUS WITH HYPERGLYCEMIA, WITH LONG-TERM CURRENT USE OF INSULIN: Primary | ICD-10-CM

## 2024-12-30 DIAGNOSIS — Z79.4 TYPE 2 DIABETES MELLITUS WITH HYPERGLYCEMIA, WITH LONG-TERM CURRENT USE OF INSULIN: Primary | ICD-10-CM

## 2024-12-30 DIAGNOSIS — N18.31 TYPE 2 DIABETES MELLITUS WITH STAGE 3A CHRONIC KIDNEY DISEASE, WITH LONG-TERM CURRENT USE OF INSULIN: ICD-10-CM

## 2024-12-30 DIAGNOSIS — Z79.4 TYPE 2 DIABETES MELLITUS WITH DIABETIC POLYNEUROPATHY, WITH LONG-TERM CURRENT USE OF INSULIN: ICD-10-CM

## 2024-12-30 DIAGNOSIS — E11.22 TYPE 2 DIABETES MELLITUS WITH STAGE 3A CHRONIC KIDNEY DISEASE, WITH LONG-TERM CURRENT USE OF INSULIN: ICD-10-CM

## 2024-12-30 NOTE — PROGRESS NOTES
Diabetes Care Specialist Follow-up Note  Author: Marla Stein RD, CDE  Date: 12/30/2024    Intake    Program Intake  Reason for Diabetes Program Visit:: Intervention  Type of Intervention:: Individual  Individual: Device Training  Device Training: Personal CGM  Current diabetes risk level:: high  In the last month, have you used the ER or been admitted to the hospital: No  Permission to speak with others about care:: no    Current Diabetes Treatment: Insulin, DM Injectables, Oral Medications  Oral Medication Type/Dose: farxiga 10 mg once a day  DM Injectables Type/Dose: ozempic 0.5 mg once a week  Method of insulin delivery?: Insulin Device  Insulin Device Type/Dose: humalog via VGo30    Continuous Glucose Monitoring  Patient has CGM: Yes  Personal CGM type:: Dexcom G7  GMI Date: 12/20/24  GMI Value: 7.8 %    Lab Results   Component Value Date    HGBA1C 8.3 (H) 11/07/2024     A1c Pre Diabetes Care Specialist Intervention:  8.7%    Physical activity/Exercise:   No change    SMBG: using the dexcom G7         Lifestyle Coping Support & Clinical    Lifestyle/Coping/Support  Compared to other people your age, how would you rate your health?: Good  Psychosocial/Coping Skills Assessment Completed: : No  Assessment indicates:: Adequate understanding  Deffered due to:: Other (comment) (previously completed, there has been no change and patient has adequate understanding)  Area of need?: No    Problem Review  Active Comorbidities: Cardiovascular Disease, Hypertension, Neuropathy    Diabetes Self-Management Skills Assessment    Medication Skills Assessment  Patient is able to identify current diabetes medications, dosages, and appropriate timing of medications.: yes  Patient reports problems or concerns with current medication regimen.: no  Patient is  aware that some diabetes medications can cause low blood sugar?: Yes  Medication Skills Assessment Completed:: No  Assessment indicates:: Adequate understanding  Deffered due  to:: Other (comment) (previously completed, there has been no change and patient has adequate understanding)  Area of need?: No    Diabetes Disease Process/Treatment Options  Diabetes Type?: Type II  Diabetes Disease Process/Treatment Options: Skills Assessment Completed: No  Assessment indicates:: Adequate understanding  Deferred due to:: Other (comment)  Area of need?: No    Nutrition/Healthy Eating  Meal Plan 24 Hour Recall - Breakfast: Toast and Eggs, coffee with sugar and cream  Meal Plan 24 Hour Recall - Lunch: chicken noodle soup with crackers or a sandwich  Meal Plan 24 Hour Recall - Dinner: yesterday some spaghetti and meatsauce, some times roast and potatoes, some times a burger and some fries, sometimes gets pizza or something home cooked  Meal Plan 24 Hour Recall - Snack: peanut butter crackers, almonds, cheerios  Patient can identify foods that impact blood sugar.: yes  Challenges to healthy eating:: portion control, snacking between meals and at night, eating when feeling depressed/stressed, lack of will power, eating out, going to parties  Nutrition/Healthy Eating Skills Assessment Completed:: No  Assessment indicates:: Adequate understanding  Deffered due to:: Other (comment)  Area of need?: No    Physical Activity/Exercise  Assessment indicates:: Adequate understanding  Deffered due to:: Other (comment)  Area of need?: No    Home Blood Glucose Monitoring  Patient states that blood sugar is checked at home daily.: yes  Monitoring Method:: personal continuous glucose monitor  Personal CGM type:: Dexcom G7   What is your current Time in Range?: 51%  What is your A1c Target?: 7.0 with no hypoglycemia  Home Blood Glucose Monitoring Skills Assessment Completed: : Yes  Assessment indicates:: Instruction Needed  Area of need?: Yes    Acute Complications  Acute Complications Skills Assessment Completed: : No  Assessment indicates:: Adequate understanding  Deffered due to:: Other (comment)  Area of need?:  No    Chronic Complications  Chronic Complications Skills Assessment Completed: : No  Assessment indicates:: Adequate understanding  Deferred due to:: Other (comment)  Area of need?: No      During today's follow-up visit,  the following areas required further assessment and content was provided/reviewed.    Based on today's diabetes care assessment, the following areas of need were identified:          7/26/2024    12:01 AM   Areas of Need   Medications/Current Diabetes Treatment No   Lifestyle Coping Support No   Diabetes Disease Process/Treatment Options No   Nutrition/Healthy Eating No   Physical Activity/Exercise No   Home Blood Glucose Monitoring Yes, changed out the dexcom G7 sensor and started a new one   Acute Complications No   Chronic Complications No        Today's interventions were provided through individual discussion, instruction, and written materials were provided.    Patient verbalized understanding of instruction and written materials.  Pt was able to return back demonstration of instructions today. Patient understood key points, needs reinforcement and further instruction.     Diabetes Self-Management Care Plan Review and Evaluation of Progress:    During today's follow-up Agustin Diabetes Self-Management Care Plan progress was reviewed and progress was evaluated including his/her input. Aishwarya has agreed to continue his/her journey to improve/maintain overall diabetes control by continuing to set health goals. See care plan progress below.      Care Plan: Diabetes Management   Updates made since 12/31/2023 12:00 AM        Problem: Blood Glucose Self-Monitoring         Goal: Patient agrees to check and record blood sugars 3 times per day using the dexcom G6/G7 for the next 3 months    Start Date: 12/27/2023   Expected End Date: 3/27/2024   This Visit's Progress: On track   Recent Progress: On track   Priority: High   Barriers: Lack of Supplies   Note:    Patient was instructed how to use a  "meter. Discussed signs and symptoms, and causes and treatment of hypoglycemia. Covered blood sugar and A1C goals. Instructed patient to use provided logbooks to record blood sugars. Instructed patient to follow-up with their physician. Patient verbalized understanding of all instructions.  Prescription for meter and testing supplies pended to PCP    1/6/2023  Dexcom G6 Education  Patient is here in clinic today for initial start of Dexcom continuous glucose monitoring system (CGMA). Reviewed with patient how to insert sensor and transmitter. Patient inserted sensor on right abdomen and inserted transmitter. Time and date was set on monitor and settings were set. Hypoglycemia trigger set for 70 and hyperglycemia set for 300. Patient provided with phone number for 24-hour help line if needed. Discussed various types of possible alarms and what to do. Questions addressed. Initialization finished. No futher questions.  Patient referred to clinic today for Dexcom G6 continuous glucose sensor system training.  Education was provided using "Quick Start Guide" per Dexcom protocol.    Overview:  5min glucose reading updates, trending arrows, BG graph screens, battery life indicator, Blue Tooth Symbol.  Menus: trend Graph, start sensor, enter BG, events, Alerts, Settings, Shutdown, Stop Sensor   Settings:                          * Urgent Low: 55 mg/dL                          * Urgent Low Soon: Off                          * Low alert: 70                          * High alert: 300                          * Rise rate: Off                          * Fall Rate: Off                          * Signal Loss: 30 min                          * No Reading: Off                          * Always sound: On                             Reviewed additional setting options with patient, including Graph Height and Transmitter ID. Transmitter was paired with .    Reviewed where to find sensor insertion time and sensor " expiration date.   Discussed no need to calibrate sensor during the entirety of the 10 day wear. Discussed that pt can calibrate sensor if desired, but at that time she will need to continue calibrating every 12 hours for sensor to remain accurate. Reviewed appropriate calibration techniques.  Reviewed sensor site selection. Site selected and prepped using aseptic technique Inserted to left abdomen. Transmitter placed in pod and secured.  Practiced sensor pod/transmitter removal from site, and removal of transmitter from sensor pod.  Patient able to demonstrate without difficulty.  Encouraged to review manual prior to starting another sensor.   Reviewed problem solving aspects of sensor transmission/variables that can disrupt RF transmission.  range 20 feet, but the first 3 hrs keep within 3 feet of transmitter.  Pt instructed on lag time of interstitial fluid from CBG and was advised to tx hypoglycemia and dose insulin based on SMBG values.  Link to video provided and written instructions provided for patient to review before 10 day sensor change  Dexcom technical support contact number given and examples of when to contact them discussed.      1/17 Changing the Sensor (every 10 days)  Remove the whole thing from your body  Hold the sensor by the skinny part and break the clear piece  Remove the gray transmitter  In the reader touch start sensor  Touch yes you want to start sensor  Touch the square you want to put the number in for   There will be a 4-digit code on the sensor (like 5937)  Enter the code from the sensor  Touch ok  Touch yes, it is correct  Insert Sensor  Remove orange piece and paper tabs  Place on skin and hold in place  Push the orange button  Insert the gray transmitter into the sensor  Put the small tongue like side in first and push down  You should hear 2 clicks  Touch confirm once sensor is secure on body  Touch start Sensor  Touch 1 then 2  Sensor will take 2 hours to warm  up    Changing Transmitter (every 90 days) in the reader  Touch the =   Touch transmitter  Touch pair new  Go through the process to start sensor  Touch yes you want to start sensor  Touch the square you want to put the number in for   There will be a 4-digit code on the sensor (like 5937)  Enter the code from the sensor  Touch ok  Touch yes, it is correct  Touch the square you want to put the number in for   There will be a 6-digit code on the transmitter box (like [id number])  Enter the code from the transmitter box  Touch ok  Touch yes, it is correct  Insert Sensor  Remove orange piece and paper tabs  Place on skin and hold in place  Push the orange button  Insert the gray transmitter into the sensor  Put the small tongue like side in first and push down  You should hear 2 clicks  Touch confirm once sensor is secure on body  Touch start Sensor  Touch 1 then 2  Sensor will take 2 hours to warm up           Task: Reviewed Dexcom download with patient and provider, adjustments made to treatment plan by provider and communicated to patient as directed, patient states understanding. Completed 12/16/2024        Task: Reviewed Dexcom download with patient and provider, adjustments made to treatment plan by provider and communicated to patient as directed, patient states understanding. Completed 12/20/2024          Follow Up Plan     Follow up in about 10 days (around 1/9/2025) for Change Personal CGM and Upload Data.    Today's care plan and follow up schedule was discussed with patient.  Aishwarya verbalized understanding of the care plan, goals, and agrees to follow up plan.        The patient was encouraged to communicate with his/her health care provider/physician and care team regarding his/her condition(s) and treatment.  I provided the patient with my contact information today and encouraged to contact me via phone or [facility] Patient Portal as needed.     Length of Visit   Total Time: 15 Minutes

## 2025-01-08 ENCOUNTER — NUTRITION (OUTPATIENT)
Dept: DIABETES | Facility: CLINIC | Age: 65
End: 2025-01-08
Payer: MEDICARE

## 2025-01-08 DIAGNOSIS — Z79.4 TYPE 2 DIABETES MELLITUS WITH DIABETIC POLYNEUROPATHY, WITH LONG-TERM CURRENT USE OF INSULIN: ICD-10-CM

## 2025-01-08 DIAGNOSIS — E11.42 TYPE 2 DIABETES MELLITUS WITH DIABETIC POLYNEUROPATHY, WITH LONG-TERM CURRENT USE OF INSULIN: ICD-10-CM

## 2025-01-08 DIAGNOSIS — N18.31 TYPE 2 DIABETES MELLITUS WITH STAGE 3A CHRONIC KIDNEY DISEASE, WITH LONG-TERM CURRENT USE OF INSULIN: ICD-10-CM

## 2025-01-08 DIAGNOSIS — Z79.4 TYPE 2 DIABETES MELLITUS WITH STAGE 3A CHRONIC KIDNEY DISEASE, WITH LONG-TERM CURRENT USE OF INSULIN: ICD-10-CM

## 2025-01-08 DIAGNOSIS — E11.65 TYPE 2 DIABETES MELLITUS WITH HYPERGLYCEMIA, WITH LONG-TERM CURRENT USE OF INSULIN: Primary | ICD-10-CM

## 2025-01-08 DIAGNOSIS — Z79.4 TYPE 2 DIABETES MELLITUS WITH HYPERGLYCEMIA, WITH LONG-TERM CURRENT USE OF INSULIN: Primary | ICD-10-CM

## 2025-01-08 DIAGNOSIS — E11.22 TYPE 2 DIABETES MELLITUS WITH STAGE 3A CHRONIC KIDNEY DISEASE, WITH LONG-TERM CURRENT USE OF INSULIN: ICD-10-CM

## 2025-01-08 PROCEDURE — 99999 PR PBB SHADOW E&M-EST. PATIENT-LVL I: CPT | Mod: PBBFAC,,, | Performed by: DIETITIAN, REGISTERED

## 2025-01-08 NOTE — PROGRESS NOTES
Diabetes Care Specialist Follow-up Note  Author: Marla Stein RD, CDE  Date: 1/8/2025    Intake    Program Intake  Reason for Diabetes Program Visit:: Intervention  Type of Intervention:: Individual  Individual: Device Training  Device Training: Personal CGM  Current diabetes risk level:: high  In the last month, have you used the ER or been admitted to the hospital: No  Permission to speak with others about care:: no    Current Diabetes Treatment: Insulin, DM Injectables, Oral Medications  Oral Medication Type/Dose: farxiga 10 mg once a day (taking)  DM Injectables Type/Dose: mounjaro 2.5mg, will increase after the 4th dose to 5mg, taking once a week  Method of insulin delivery?: Insulin Device  Insulin Device Type/Dose: humalog via VGo30 (fiasp is ordered, unsure which one she is taking) performing 5-6 clicks with meals    Continuous Glucose Monitoring  Patient has CGM: Yes  Personal CGM type:: Dexcom G7  GMI Date: 01/08/25  GMI Value: 7.5 %    Lab Results   Component Value Date    HGBA1C 8.3 (H) 11/07/2024     A1c Pre Diabetes Care Specialist Intervention:  8.7%    Physical activity/Exercise:   No change    SMBG: using the dexcom G7               Lifestyle Coping Support & Clinical    Lifestyle/Coping/Support  Compared to other people your age, how would you rate your health?: Good  Psychosocial/Coping Skills Assessment Completed: : No  Assessment indicates:: Adequate understanding  Deffered due to:: Other (comment) (previously completed, there has been no change and patient has adequate understanding)  Area of need?: No    Problem Review  Active Comorbidities: Cardiovascular Disease, Hypertension, Neuropathy    Diabetes Self-Management Skills Assessment    Medication Skills Assessment  Patient is able to identify current diabetes medications, dosages, and appropriate timing of medications.: yes  Patient reports problems or concerns with current medication regimen.: no  Patient is  aware that some diabetes  medications can cause low blood sugar?: Yes  Medication Skills Assessment Completed:: No  Assessment indicates:: Adequate understanding  Deffered due to:: Other (comment) (previously completed, there has been no change and patient has adequate understanding)  Area of need?: No    Diabetes Disease Process/Treatment Options  Diabetes Type?: Type II  Diabetes Disease Process/Treatment Options: Skills Assessment Completed: No  Assessment indicates:: Adequate understanding  Deferred due to:: Other (comment)  Area of need?: No    Nutrition/Healthy Eating  Meal Plan 24 Hour Recall - Breakfast: Toast and Eggs, coffee with sugar and cream  Meal Plan 24 Hour Recall - Lunch: chicken noodle soup with crackers or a sandwich  Meal Plan 24 Hour Recall - Dinner: yesterday some spaghetti and meatsauce, some times roast and potatoes, some times a burger and some fries, sometimes gets pizza or something home cooked  Meal Plan 24 Hour Recall - Snack: peanut butter crackers, almonds, cheerios  Meal Plan 24 Hour Recall - Beverage: Orange Juice sometimes, drinks water and Ice sparking water  Who shops/cooks?: self  Patient can identify foods that impact blood sugar.: yes  Challenges to healthy eating:: portion control, snacking between meals and at night, eating when feeling depressed/stressed, lack of will power, eating out, going to parties  Nutrition/Healthy Eating Skills Assessment Completed:: No  Assessment indicates:: Adequate understanding  Deffered due to:: Other (comment)  Area of need?: No    Physical Activity/Exercise  Patient's daily activity level:: lightly active  Patient formally exercises outside of work.: no  Reasons for not exercising:: time constraints  Patient can identify forms of physical activity.: yes  Physical Activity/Exercise Skills Assessment Completed: : No  Assessment indicates:: Adequate understanding  Deffered due to:: Other (comment)  Area of need?: No    Home Blood Glucose Monitoring  Patient states that  blood sugar is checked at home daily.: yes  Monitoring Method:: personal continuous glucose monitor  Personal CGM type:: Dexcom G7   What is your current Time in Range?: 51%  What is your A1c Target?: 7.0 with no hypoglycemia  Home Blood Glucose Monitoring Skills Assessment Completed: : Yes  Assessment indicates:: Instruction Needed  Area of need?: Yes    Acute Complications  Acute Complications Skills Assessment Completed: : No  Assessment indicates:: Adequate understanding  Deffered due to:: Other (comment)  Area of need?: No    Chronic Complications  Chronic Complications Skills Assessment Completed: : No  Assessment indicates:: Adequate understanding  Deferred due to:: Other (comment)  Area of need?: No      During today's follow-up visit,  the following areas required further assessment and content was provided/reviewed.    Based on today's diabetes care assessment, the following areas of need were identified:          7/26/2024    12:01 AM   Areas of Need   Medications/Current Diabetes Treatment No   Lifestyle Coping Support No   Diabetes Disease Process/Treatment Options No   Nutrition/Healthy Eating No   Physical Activity/Exercise No   Home Blood Glucose Monitoring Yes, changed out the dexcom G7 sensor and started a new one  Comparison of previous CGM data 12/30/2024 to current    Average Glucose 176 improved from 186  Standard Deviation 66 no change from 66  GMI 7.5 % improved from 7.8 %    Time in Range  13% of time patient was in Very High Range improved from 16%  29% of time patient was in High Range improved from 33%  58% of time patient was in Range improved from 51%  0% of time patient was in Low Range no change from 0%  <1% of time patient was in Very Low Range increased slightly from 0%    Report reviewed with patient and provider and no changes were made at this time  Patient is tolerating the mounjaro ar present   Acute Complications No   Chronic Complications No        Today's interventions were  provided through individual discussion, instruction, and written materials were provided.    Patient verbalized understanding of instruction and written materials.  Pt was able to return back demonstration of instructions today. Patient understood key points, needs reinforcement and further instruction.     Diabetes Self-Management Care Plan Review and Evaluation of Progress:    During today's follow-up Agustin Diabetes Self-Management Care Plan progress was reviewed and progress was evaluated including his/her input. Aishwarya has agreed to continue his/her journey to improve/maintain overall diabetes control by continuing to set health goals. See care plan progress below.      Care Plan: Diabetes Management   Updates made since 1/9/2024 12:00 AM        Problem: Blood Glucose Self-Monitoring         Goal: Patient agrees to check and record blood sugars 3 times per day using the dexcom G6/G7 for the next 3 months    Start Date: 12/27/2023   Expected End Date: 3/27/2024   This Visit's Progress: On track   Recent Progress: On track   Priority: High   Barriers: Knowledge deficit   Note:    Patient was instructed how to use a meter. Discussed signs and symptoms, and causes and treatment of hypoglycemia. Covered blood sugar and A1C goals. Instructed patient to use provided logbooks to record blood sugars. Instructed patient to follow-up with their physician. Patient verbalized understanding of all instructions.  Prescription for meter and testing supplies pended to PCP    1/6/2023  Dexcom G6 Education  Patient is here in clinic today for initial start of Dexcom continuous glucose monitoring system (CGMA). Reviewed with patient how to insert sensor and transmitter. Patient inserted sensor on right abdomen and inserted transmitter. Time and date was set on monitor and settings were set. Hypoglycemia trigger set for 70 and hyperglycemia set for 300. Patient provided with phone number for 24-hour help line if needed.  "Discussed various types of possible alarms and what to do. Questions addressed. Initialization finished. No futher questions.  Patient referred to clinic today for Dexcom G6 continuous glucose sensor system training.  Education was provided using "Quick Start Guide" per Dexcom protocol.    Overview:  5min glucose reading updates, trending arrows, BG graph screens, battery life indicator, Blue Tooth Symbol.  Menus: trend Graph, start sensor, enter BG, events, Alerts, Settings, Shutdown, Stop Sensor   Settings:                          * Urgent Low: 55 mg/dL                          * Urgent Low Soon: Off                          * Low alert: 70                          * High alert: 300                          * Rise rate: Off                          * Fall Rate: Off                          * Signal Loss: 30 min                          * No Reading: Off                          * Always sound: On                             Reviewed additional setting options with patient, including Graph Height and Transmitter ID. Transmitter was paired with .    Reviewed where to find sensor insertion time and sensor expiration date.   Discussed no need to calibrate sensor during the entirety of the 10 day wear. Discussed that pt can calibrate sensor if desired, but at that time she will need to continue calibrating every 12 hours for sensor to remain accurate. Reviewed appropriate calibration techniques.  Reviewed sensor site selection. Site selected and prepped using aseptic technique Inserted to left abdomen. Transmitter placed in pod and secured.  Practiced sensor pod/transmitter removal from site, and removal of transmitter from sensor pod.  Patient able to demonstrate without difficulty.  Encouraged to review manual prior to starting another sensor.   Reviewed problem solving aspects of sensor transmission/variables that can disrupt RF transmission.  range 20 feet, but the first 3 hrs keep within 3 " feet of transmitter.  Pt instructed on lag time of interstitial fluid from CBG and was advised to tx hypoglycemia and dose insulin based on SMBG values.  Link to video provided and written instructions provided for patient to review before 10 day sensor change  Dexcom technical support contact number given and examples of when to contact them discussed.      1/17 Changing the Sensor (every 10 days)  Remove the whole thing from your body  Hold the sensor by the skinny part and break the clear piece  Remove the gray transmitter  In the reader touch start sensor  Touch yes you want to start sensor  Touch the square you want to put the number in for   There will be a 4-digit code on the sensor (like 5937)  Enter the code from the sensor  Touch ok  Touch yes, it is correct  Insert Sensor  Remove orange piece and paper tabs  Place on skin and hold in place  Push the orange button  Insert the gray transmitter into the sensor  Put the small tongue like side in first and push down  You should hear 2 clicks  Touch confirm once sensor is secure on body  Touch start Sensor  Touch 1 then 2  Sensor will take 2 hours to warm up    Changing Transmitter (every 90 days) in the reader  Touch the =   Touch transmitter  Touch pair new  Go through the process to start sensor  Touch yes you want to start sensor  Touch the square you want to put the number in for   There will be a 4-digit code on the sensor (like 5937)  Enter the code from the sensor  Touch ok  Touch yes, it is correct  Touch the square you want to put the number in for   There will be a 6-digit code on the transmitter box (like [id number])  Enter the code from the transmitter box  Touch ok  Touch yes, it is correct  Insert Sensor  Remove orange piece and paper tabs  Place on skin and hold in place  Push the orange button  Insert the gray transmitter into the sensor  Put the small tongue like side in first and push down  You should hear 2 clicks  Touch confirm once sensor is  secure on body  Touch start Sensor  Touch 1 then 2  Sensor will take 2 hours to warm up           Task: Reviewed Dexcom download with patient and provider, adjustments made to treatment plan by provider and communicated to patient as directed, patient states understanding. Completed 12/16/2024        Task: Reviewed Dexcom download with patient and provider, adjustments made to treatment plan by provider and communicated to patient as directed, patient states understanding. Completed 12/20/2024        Task: Reviewed Dexcom download with patient and provider, adjustments made to treatment plan by provider and communicated to patient as directed, patient states understanding. Completed 1/8/2025          Follow Up Plan     Follow up in about 10 days (around 1/18/2025) for Change Personal CGM and Upload Data.    Today's care plan and follow up schedule was discussed with patient.  Aishwarya verbalized understanding of the care plan, goals, and agrees to follow up plan.        The patient was encouraged to communicate with his/her health care provider/physician and care team regarding his/her condition(s) and treatment.  I provided the patient with my contact information today and encouraged to contact me via phone or [facility] Patient Portal as needed.     Length of Visit   Total Time: 35 Minutes

## 2025-01-14 ENCOUNTER — TELEPHONE (OUTPATIENT)
Dept: DIABETES | Facility: CLINIC | Age: 65
End: 2025-01-14
Payer: MEDICARE

## 2025-01-17 ENCOUNTER — NUTRITION (OUTPATIENT)
Dept: DIABETES | Facility: CLINIC | Age: 65
End: 2025-01-17
Payer: MEDICARE

## 2025-01-17 DIAGNOSIS — Z79.4 TYPE 2 DIABETES MELLITUS WITH DIABETIC POLYNEUROPATHY, WITH LONG-TERM CURRENT USE OF INSULIN: ICD-10-CM

## 2025-01-17 DIAGNOSIS — Z79.4 TYPE 2 DIABETES MELLITUS WITH HYPERGLYCEMIA, WITH LONG-TERM CURRENT USE OF INSULIN: Primary | ICD-10-CM

## 2025-01-17 DIAGNOSIS — E11.22 TYPE 2 DIABETES MELLITUS WITH STAGE 3A CHRONIC KIDNEY DISEASE, WITH LONG-TERM CURRENT USE OF INSULIN: ICD-10-CM

## 2025-01-17 DIAGNOSIS — E11.42 TYPE 2 DIABETES MELLITUS WITH DIABETIC POLYNEUROPATHY, WITH LONG-TERM CURRENT USE OF INSULIN: ICD-10-CM

## 2025-01-17 DIAGNOSIS — N18.31 TYPE 2 DIABETES MELLITUS WITH STAGE 3A CHRONIC KIDNEY DISEASE, WITH LONG-TERM CURRENT USE OF INSULIN: ICD-10-CM

## 2025-01-17 DIAGNOSIS — Z79.4 TYPE 2 DIABETES MELLITUS WITH STAGE 3A CHRONIC KIDNEY DISEASE, WITH LONG-TERM CURRENT USE OF INSULIN: ICD-10-CM

## 2025-01-17 DIAGNOSIS — E11.65 TYPE 2 DIABETES MELLITUS WITH HYPERGLYCEMIA, WITH LONG-TERM CURRENT USE OF INSULIN: Primary | ICD-10-CM

## 2025-01-17 NOTE — PROGRESS NOTES
Diabetes Care Specialist Follow-up Note  Author: Marla Stein RD, CDE  Date: 1/17/2025    Intake    Program Intake  Reason for Diabetes Program Visit:: Intervention  Type of Intervention:: Individual  Individual: Device Training  Device Training: Personal CGM  Current diabetes risk level:: high  In the last month, have you used the ER or been admitted to the hospital: No  Permission to speak with others about care:: no    Current Diabetes Treatment: Insulin, DM Injectables, Oral Medications  Oral Medication Type/Dose: farxiga 10 mg once a day (taking)  DM Injectables Type/Dose: mounjaro 2.5mg, will increase after the 4th dose to 5mg, taking once a week  Method of insulin delivery?: Insulin Device  Insulin Device Type/Dose: humalog via VGo30 (fiasp is ordered, unsure which one she is taking) performing 5-6 clicks with meals    Continuous Glucose Monitoring  Patient has CGM: Yes  Personal CGM type:: Dexcom G7  GMI Date: 01/08/25  GMI Value: 7.5 %    Lab Results   Component Value Date    HGBA1C 8.3 (H) 11/07/2024     A1c Pre Diabetes Care Specialist Intervention:  8.7%    Physical activity/Exercise:   No change    SMBG: using the dexcom G7     Lifestyle Coping Support & Clinical    Lifestyle/Coping/Support  Psychosocial/Coping Skills Assessment Completed: : No  Assessment indicates:: Adequate understanding  Deffered due to:: Other (comment) (previously completed, there has been no change and patient has adequate understanding)  Area of need?: No    Problem Review  Active Comorbidities: Cardiovascular Disease, Hypertension, Neuropathy    Diabetes Self-Management Skills Assessment    Medication Skills Assessment  Patient is able to identify current diabetes medications, dosages, and appropriate timing of medications.: yes  Patient reports problems or concerns with current medication regimen.: no  Medication Skills Assessment Completed:: No  Assessment indicates:: Adequate understanding  Deffered due to:: Other  (comment) (previously completed, there has been no change and patient has adequate understanding)  Area of need?: No    Diabetes Disease Process/Treatment Options  Diabetes Type?: Type II  Diabetes Disease Process/Treatment Options: Skills Assessment Completed: No  Assessment indicates:: Adequate understanding  Deferred due to:: Other (comment)  Area of need?: No    Nutrition/Healthy Eating  Meal Plan 24 Hour Recall - Breakfast: Toast and Eggs, coffee with sugar and cream  Meal Plan 24 Hour Recall - Lunch: chicken noodle soup with crackers or a sandwich  Meal Plan 24 Hour Recall - Dinner: yesterday some spaghetti and meatsauce, some times roast and potatoes, some times a burger and some fries, sometimes gets pizza or something home cooked  Meal Plan 24 Hour Recall - Snack: peanut butter crackers, almonds, cheerios  Meal Plan 24 Hour Recall - Beverage: Orange Juice sometimes, drinks water and Ice sparking water  Who shops/cooks?: self  Patient can identify foods that impact blood sugar.: yes  Challenges to healthy eating:: portion control, snacking between meals and at night, eating when feeling depressed/stressed, lack of will power, eating out, going to parties  Nutrition/Healthy Eating Skills Assessment Completed:: No  Assessment indicates:: Adequate understanding  Deffered due to:: Other (comment)  Area of need?: No    Physical Activity/Exercise  Patient's daily activity level:: lightly active  Patient formally exercises outside of work.: no  Reasons for not exercising:: time constraints  Patient can identify forms of physical activity.: yes  Physical Activity/Exercise Skills Assessment Completed: : No  Assessment indicates:: Adequate understanding  Deffered due to:: Other (comment)  Area of need?: No    Home Blood Glucose Monitoring  Patient states that blood sugar is checked at home daily.: yes  Monitoring Method:: personal continuous glucose monitor  Personal CGM type:: Dexcom G7   What is your current Time in  Range?: 51%  What is your A1c Target?: 7.0 with no hypoglycemia  Home Blood Glucose Monitoring Skills Assessment Completed: : Yes  Assessment indicates:: Instruction Needed  Area of need?: Yes    Acute Complications  Acute Complications Skills Assessment Completed: : No  Assessment indicates:: Adequate understanding  Deffered due to:: Other (comment)  Area of need?: No    Chronic Complications  Chronic Complications Skills Assessment Completed: : No  Assessment indicates:: Adequate understanding  Deferred due to:: Other (comment)  Area of need?: No      During today's follow-up visit,  the following areas required further assessment and content was provided/reviewed.    Based on today's diabetes care assessment, the following areas of need were identified:          7/26/2024    12:01 AM   Areas of Need   Medications/Current Diabetes Treatment No   Lifestyle Coping Support No   Diabetes Disease Process/Treatment Options No   Nutrition/Healthy Eating No   Physical Activity/Exercise No   Home Blood Glucose Monitoring Yes, changed out the dexcom G7 sensor and started a new one     Acute Complications No   Chronic Complications No        Today's interventions were provided through individual discussion, instruction, and written materials were provided.    Patient verbalized understanding of instruction and written materials.  Pt was able to return back demonstration of instructions today. Patient understood key points, needs reinforcement and further instruction.     Diabetes Self-Management Care Plan Review and Evaluation of Progress:    During today's follow-up Aishwarya's Diabetes Self-Management Care Plan progress was reviewed and progress was evaluated including his/her input. Aishwarya has agreed to continue his/her journey to improve/maintain overall diabetes control by continuing to set health goals. See care plan progress below.      Care Plan: Diabetes Management   Updates made since 1/18/2024 12:00 AM     "    Problem: Blood Glucose Self-Monitoring         Goal: Patient agrees to check and record blood sugars 3 times per day using the dexcom G6/G7 for the next 3 months    Start Date: 12/27/2023   Expected End Date: 3/27/2024   This Visit's Progress: On track   Recent Progress: On track   Priority: High   Barriers: Knowledge deficit   Note:    Patient was instructed how to use a meter. Discussed signs and symptoms, and causes and treatment of hypoglycemia. Covered blood sugar and A1C goals. Instructed patient to use provided logbooks to record blood sugars. Instructed patient to follow-up with their physician. Patient verbalized understanding of all instructions.  Prescription for meter and testing supplies pended to PCP    1/6/2023  Dexcom G6 Education  Patient is here in clinic today for initial start of Dexcom continuous glucose monitoring system (CGMA). Reviewed with patient how to insert sensor and transmitter. Patient inserted sensor on right abdomen and inserted transmitter. Time and date was set on monitor and settings were set. Hypoglycemia trigger set for 70 and hyperglycemia set for 300. Patient provided with phone number for 24-hour help line if needed. Discussed various types of possible alarms and what to do. Questions addressed. Initialization finished. No futher questions.  Patient referred to clinic today for Dexcom G6 continuous glucose sensor system training.  Education was provided using "Quick Start Guide" per Dexcom protocol.    Overview:  5min glucose reading updates, trending arrows, BG graph screens, battery life indicator, Blue Tooth Symbol.  Menus: trend Graph, start sensor, enter BG, events, Alerts, Settings, Shutdown, Stop Sensor   Settings:                          * Urgent Low: 55 mg/dL                          * Urgent Low Soon: Off                          * Low alert: 70                          * High alert: 300                          * Rise rate: Off                         "  * Fall Rate: Off                          * Signal Loss: 30 min                          * No Reading: Off                          * Always sound: On                             Reviewed additional setting options with patient, including Graph Height and Transmitter ID. Transmitter was paired with .    Reviewed where to find sensor insertion time and sensor expiration date.   Discussed no need to calibrate sensor during the entirety of the 10 day wear. Discussed that pt can calibrate sensor if desired, but at that time she will need to continue calibrating every 12 hours for sensor to remain accurate. Reviewed appropriate calibration techniques.  Reviewed sensor site selection. Site selected and prepped using aseptic technique Inserted to left abdomen. Transmitter placed in pod and secured.  Practiced sensor pod/transmitter removal from site, and removal of transmitter from sensor pod.  Patient able to demonstrate without difficulty.  Encouraged to review manual prior to starting another sensor.   Reviewed problem solving aspects of sensor transmission/variables that can disrupt RF transmission.  range 20 feet, but the first 3 hrs keep within 3 feet of transmitter.  Pt instructed on lag time of interstitial fluid from CBG and was advised to tx hypoglycemia and dose insulin based on SMBG values.  Link to video provided and written instructions provided for patient to review before 10 day sensor change  Dexcom technical support contact number given and examples of when to contact them discussed.      1/17 Changing the Sensor (every 10 days)  Remove the whole thing from your body  Hold the sensor by the skinny part and break the clear piece  Remove the gray transmitter  In the reader touch start sensor  Touch yes you want to start sensor  Touch the square you want to put the number in for   There will be a 4-digit code on the sensor (like 5937)  Enter the code from the sensor  Touch ok  Touch yes, it  is correct  Insert Sensor  Remove orange piece and paper tabs  Place on skin and hold in place  Push the orange button  Insert the gray transmitter into the sensor  Put the small tongue like side in first and push down  You should hear 2 clicks  Touch confirm once sensor is secure on body  Touch start Sensor  Touch 1 then 2  Sensor will take 2 hours to warm up    Changing Transmitter (every 90 days) in the reader  Touch the =   Touch transmitter  Touch pair new  Go through the process to start sensor  Touch yes you want to start sensor  Touch the square you want to put the number in for   There will be a 4-digit code on the sensor (like 5937)  Enter the code from the sensor  Touch ok  Touch yes, it is correct  Touch the square you want to put the number in for   There will be a 6-digit code on the transmitter box (like [id number])  Enter the code from the transmitter box  Touch ok  Touch yes, it is correct  Insert Sensor  Remove orange piece and paper tabs  Place on skin and hold in place  Push the orange button  Insert the gray transmitter into the sensor  Put the small tongue like side in first and push down  You should hear 2 clicks  Touch confirm once sensor is secure on body  Touch start Sensor  Touch 1 then 2  Sensor will take 2 hours to warm up           Task: Reviewed Dexcom download with patient and provider, adjustments made to treatment plan by provider and communicated to patient as directed, patient states understanding. Completed 12/16/2024        Task: Reviewed Dexcom download with patient and provider, adjustments made to treatment plan by provider and communicated to patient as directed, patient states understanding. Completed 12/20/2024        Task: Reviewed Dexcom download with patient and provider, adjustments made to treatment plan by provider and communicated to patient as directed, patient states understanding. Completed 1/8/2025          Follow Up Plan     Follow up in about 10 days (around  1/27/2025) for Change Personal CGM and Upload Data.    Today's care plan and follow up schedule was discussed with patient.  Aishwarya verbalized understanding of the care plan, goals, and agrees to follow up plan.        The patient was encouraged to communicate with his/her health care provider/physician and care team regarding his/her condition(s) and treatment.  I provided the patient with my contact information today and encouraged to contact me via phone or [facility] Patient Portal as needed.     Length of Visit   Total Time: 15 Minutes

## 2025-01-27 ENCOUNTER — CLINICAL SUPPORT (OUTPATIENT)
Dept: DIABETES | Facility: CLINIC | Age: 65
End: 2025-01-27
Payer: MEDICARE

## 2025-01-27 DIAGNOSIS — E11.65 TYPE 2 DIABETES MELLITUS WITH HYPERGLYCEMIA, WITH LONG-TERM CURRENT USE OF INSULIN: Primary | ICD-10-CM

## 2025-01-27 DIAGNOSIS — Z79.4 TYPE 2 DIABETES MELLITUS WITH HYPERGLYCEMIA, WITH LONG-TERM CURRENT USE OF INSULIN: Primary | ICD-10-CM

## 2025-01-27 NOTE — PROGRESS NOTES
Using aseptic technique, dexcom g7 sensor placed on the back of the left arm, sensor paired to , pt will follow up in 10 days to see Diabetes Education for sensor change out

## 2025-02-04 ENCOUNTER — OFFICE VISIT (OUTPATIENT)
Dept: PRIMARY CARE CLINIC | Facility: CLINIC | Age: 65
End: 2025-02-04
Payer: MEDICARE

## 2025-02-04 VITALS
RESPIRATION RATE: 16 BRPM | BODY MASS INDEX: 37.13 KG/M2 | HEART RATE: 66 BPM | SYSTOLIC BLOOD PRESSURE: 158 MMHG | WEIGHT: 236.56 LBS | OXYGEN SATURATION: 97 % | TEMPERATURE: 97 F | HEIGHT: 67 IN | DIASTOLIC BLOOD PRESSURE: 72 MMHG

## 2025-02-04 DIAGNOSIS — Z23 NEED FOR VACCINATION: ICD-10-CM

## 2025-02-04 DIAGNOSIS — N18.31 TYPE 2 DIABETES MELLITUS WITH STAGE 3A CHRONIC KIDNEY DISEASE, WITH LONG-TERM CURRENT USE OF INSULIN: ICD-10-CM

## 2025-02-04 DIAGNOSIS — E11.22 TYPE 2 DIABETES MELLITUS WITH STAGE 3A CHRONIC KIDNEY DISEASE, WITH LONG-TERM CURRENT USE OF INSULIN: ICD-10-CM

## 2025-02-04 DIAGNOSIS — D50.9 MICROCYTIC ANEMIA: Primary | ICD-10-CM

## 2025-02-04 DIAGNOSIS — E11.29 MICROALBUMINURIA DUE TO TYPE 2 DIABETES MELLITUS: ICD-10-CM

## 2025-02-04 DIAGNOSIS — Z79.4 TYPE 2 DIABETES MELLITUS WITH STAGE 3A CHRONIC KIDNEY DISEASE, WITH LONG-TERM CURRENT USE OF INSULIN: ICD-10-CM

## 2025-02-04 DIAGNOSIS — I10 ESSENTIAL HYPERTENSION, BENIGN: ICD-10-CM

## 2025-02-04 DIAGNOSIS — Z12.31 ENCOUNTER FOR SCREENING MAMMOGRAM FOR BREAST CANCER: ICD-10-CM

## 2025-02-04 DIAGNOSIS — R80.9 MICROALBUMINURIA DUE TO TYPE 2 DIABETES MELLITUS: ICD-10-CM

## 2025-02-04 DIAGNOSIS — E11.42 TYPE 2 DIABETES MELLITUS WITH DIABETIC POLYNEUROPATHY, WITH LONG-TERM CURRENT USE OF INSULIN: ICD-10-CM

## 2025-02-04 DIAGNOSIS — Z79.4 TYPE 2 DIABETES MELLITUS WITH DIABETIC POLYNEUROPATHY, WITH LONG-TERM CURRENT USE OF INSULIN: ICD-10-CM

## 2025-02-04 DIAGNOSIS — E66.01 SEVERE OBESITY (BMI 35.0-39.9) WITH COMORBIDITY: ICD-10-CM

## 2025-02-04 PROCEDURE — 3008F BODY MASS INDEX DOCD: CPT | Mod: CPTII,S$GLB,, | Performed by: FAMILY MEDICINE

## 2025-02-04 PROCEDURE — 1160F RVW MEDS BY RX/DR IN RCRD: CPT | Mod: CPTII,S$GLB,, | Performed by: FAMILY MEDICINE

## 2025-02-04 PROCEDURE — 99214 OFFICE O/P EST MOD 30 MIN: CPT | Mod: S$GLB,,, | Performed by: FAMILY MEDICINE

## 2025-02-04 PROCEDURE — 1126F AMNT PAIN NOTED NONE PRSNT: CPT | Mod: CPTII,S$GLB,, | Performed by: FAMILY MEDICINE

## 2025-02-04 PROCEDURE — 3077F SYST BP >= 140 MM HG: CPT | Mod: CPTII,S$GLB,, | Performed by: FAMILY MEDICINE

## 2025-02-04 PROCEDURE — 99999 PR PBB SHADOW E&M-EST. PATIENT-LVL IV: CPT | Mod: PBBFAC,,, | Performed by: FAMILY MEDICINE

## 2025-02-04 PROCEDURE — 3078F DIAST BP <80 MM HG: CPT | Mod: CPTII,S$GLB,, | Performed by: FAMILY MEDICINE

## 2025-02-04 PROCEDURE — 3288F FALL RISK ASSESSMENT DOCD: CPT | Mod: CPTII,S$GLB,, | Performed by: FAMILY MEDICINE

## 2025-02-04 PROCEDURE — 1159F MED LIST DOCD IN RCRD: CPT | Mod: CPTII,S$GLB,, | Performed by: FAMILY MEDICINE

## 2025-02-04 PROCEDURE — 1101F PT FALLS ASSESS-DOCD LE1/YR: CPT | Mod: CPTII,S$GLB,, | Performed by: FAMILY MEDICINE

## 2025-02-04 RX ORDER — AMLODIPINE BESYLATE 10 MG/1
10 TABLET ORAL DAILY
Qty: 90 TABLET | Refills: 3 | Status: SHIPPED | OUTPATIENT
Start: 2025-02-04

## 2025-02-04 NOTE — PROGRESS NOTES
Assessment:       1. Microcytic anemia    2. Encounter for screening mammogram for breast cancer    3. Microalbuminuria due to type 2 diabetes mellitus    4. Severe obesity (BMI 35.0-39.9) with comorbidity    5. Type 2 diabetes mellitus with diabetic polyneuropathy, with long-term current use of insulin    6. Type 2 diabetes mellitus with stage 3a chronic kidney disease, with long-term current use of insulin    7. Need for vaccination    8. Essential hypertension, benign         Plan:       Microcytic anemia  -     CBC Auto Differential; Future; Expected date: 02/04/2025  -     Iron and TIBC; Future; Expected date: 02/04/2025  -     Ferritin; Future; Expected date: 02/04/2025  -     Hemoglobin Electrophoresis Cascade, Blood; Future; Expected date: 02/04/2025    Encounter for screening mammogram for breast cancer  -     Mammo Digital Screening Bilat w/ [place]; Future; Expected date: 05/02/2025    Microalbuminuria due to type 2 diabetes mellitus    Severe obesity (BMI 35.0-39.9) with comorbidity    Type 2 diabetes mellitus with diabetic polyneuropathy, with long-term current use of insulin    Type 2 diabetes mellitus with stage 3a chronic kidney disease, with long-term current use of insulin    Need for vaccination  -     RSV, preF A and preF B,PF, (ABRYSVO) 120 mcg/0.5 mL SolR vaccine; Inject 0.5 mLs (120 mcg total) into the muscle once. for 1 dose  Dispense: 0.5 mL; Refill: 0    Essential hypertension, benign  -     amLODIPine (NORVASC) 10 MG tablet; Take 1 tablet (10 mg total) by mouth once daily.  Dispense: 90 tablet; Refill: 3      Assessment & Plan    - Assessed chronic low blood count; last iron level check was normal 2 years ago  - Considered potential underlying causes of low blood count, including blood disorders and GI bleeding  - Evaluated knee discomfort, likely due to arthritis  - Noted chronic kidney disease (stage 3A) is stable  - Will increase amlodipine dosage from 5mg to 10mg daily to improve blood  pressure control    DIABETES:   Discussed the importance of blood sugar control for chronic kidney disease management.   Noted that the patient is checking blood sugar at home and levels are improving slightly.   Acknowledged the patient's efforts to control diabetes.   Continued oral hypoglycemic drugs and advised increasing Mounjaro dose to 5 mg.   Instructed the patient to maintain good blood sugar control.    HYPERTENSION:   Instructed the patient to maintain consistent use of prescribed blood pressure medications.   Increased amlodipine dose from 5 mg to 10 mg daily.   Advised taking two 5 mg tablets daily until new prescription is filled, then switch to one 10 mg tablet daily.   Continued lisinopril-hydrochlorothiazide at current dose.   Noted that the patient's blood pressure is elevated on current exam.   Discussed options to achieve better blood pressure control.   Scheduled a follow-up nurse visit in 2 weeks to recheck blood pressure.   Instructed the patient to check blood pressure at home regularly.    CHRONIC KIDNEY DISEASE:   Discussed the importance of blood sugar and pressure control for chronic kidney disease management.   Noted that kidney function is stable based on recent labs.   Confirmed the patient has stage 3A chronic kidney disease.   Advised maintaining good blood sugar and blood pressure control to manage kidney disease.    ARTHRITIS:   Instructed the patient to continue using topical gel on right knee for arthritis symptoms as needed, up to 3-4 times daily.   Noted that the patient reports intermittent swelling in right knee.   Assessed the knee condition as arthritis.    ANEMIA:   Ordered CBC, iron level test, and hemoglobin electrophoresis.   Noted that the patient's blood count has been low for a while.   Reviewed that iron levels were normal two years ago.   Noted that the patient denies fatigue or other anemia symptoms.   Planned to investigate the cause of low blood count.    FOLLOW  UP:   Scheduled a follow-up nurse visit in 2 weeks to recheck blood pressure after medication adjustment.   Follow up in April for labs recheck with Marla.       Medication List with Changes/Refills   New Medications    RSV, PREF A AND PREF B,PF, (ABRYSVO) 120 MCG/0.5 ML SOLR VACCINE    Inject 0.5 mLs (120 mcg total) into the muscle once. for 1 dose   Current Medications    ACETAMINOPHEN (TYLENOL) 500 MG TABLET    Take 2 tablets (1,000 mg total) by mouth every 6 to 8 hours as needed for Pain.    ASPIRIN (ECOTRIN) 81 MG EC TABLET    Take 1 tablet (81 mg total) by mouth once daily.    BLOOD SUGAR DIAGNOSTIC STRP    To check BG 3 times daily, to use with insurance preferred meter    BLOOD-GLUCOSE METER KIT    To check BG 3 times daily, to use with insurance preferred meter    BLOOD-GLUCOSE METER,CONTINUOUS (DEXCOM G7 ) MISC    1 Device by Misc.(Non-Drug; Combo Route) route continuous.    BLOOD-GLUCOSE SENSOR (DEXCOM G7 SENSOR) DAVID    Inject 1 Device into the skin every 10 days.    DAPAGLIFLOZIN PROPANEDIOL (FARXIGA) 10 MG TABLET    Take 1 tablet (10 mg total) by mouth once daily.    DICLOFENAC SODIUM (VOLTAREN) 1 % GEL    Apply 2 g topically 4 (four) times daily.    EZETIMIBE (ZETIA) 10 MG TABLET    Take 1 tablet (10 mg total) by mouth every evening.    HYDROXYZINE HCL (ATARAX) 25 MG TABLET    Take 1 tablet (25 mg total) by mouth nightly as needed for Itching.    INSULIN LISPRO (HUMALOG U-100 INSULIN) 100 UNIT/ML INJECTION    To use with VGo 30- 5-6 clicks TID AC, Max TDD of 100 units    LANCETS MISC    To check BG 3 times daily, to use with insurance preferred meter    LISINOPRIL-HYDROCHLOROTHIAZIDE (PRINZIDE,ZESTORETIC) 20-12.5 MG PER TABLET    Take 1 tablet by mouth once daily    ONETOUCH DELICA PLUS LANCET 33 GAUGE MISC    Apply topically 3 (three) times daily.    ONETOUCH VERIO FLEX METER MISC    USE AS DIRECTED THREE TIMES DAILY    ROSUVASTATIN (CRESTOR) 40 MG TAB    Take 1 tablet (40 mg total) by mouth  "every evening.    SUB-Q INSULIN DEVICE, 30 UNIT (V-GO 30) DAVID    Inject 1 Device into the skin once daily.    TIRZEPATIDE (MOUNJARO) 5 MG/0.5 ML PNIJ    Inject 5 mg into the skin every 7 days.   Changed and/or Refilled Medications    Modified Medication Previous Medication    AMLODIPINE (NORVASC) 10 MG TABLET amLODIPine (NORVASC) 5 MG tablet       Take 1 tablet (10 mg total) by mouth once daily.    Take 1 tablet by mouth once daily   Discontinued Medications    TIRZEPATIDE (MOUNJARO) 2.5 MG/0.5 ML PNIJ    Inject 2.5 mg into the skin every 7 days.         Subjective:    Patient ID: Aishwarya Jensen is a 64 y.o. female.  Chief Complaint: Follow-up (Anemia) and Joint Swelling (Right )    HPI  History of Present Illness    CHIEF COMPLAINT:  Patient presents today for follow up of low blood count and knee concerns    HEMATOLOGIC:  She reports chronic low blood count. She denies fatigue or energy issues. She denies any known blood disorders or family history of blood disorders such as thalassemia.    MUSCULOSKELETAL:  She experiences intermittent right knee swelling without associated pain, managed with topical gel.    GASTROINTESTINAL:  She denies black or tarry stools, blood in stool, abdominal pain, trouble swallowing, or heartburn.    CURRENT MEDICATIONS:  She takes amlodipine and lisinopril-hydrochlorothiazide for blood pressure management. She is increasing Mounjaro dose to 5 mg.      ROS:  Constitutional: -fatigue  Gastrointestinal: -abdominal pain, -heartburn  Musculoskeletal: +joint swelling       Review of Systems    Objective:      Vitals:    02/04/25 1113 02/04/25 1137   BP: (!) 154/70 (!) 158/72   BP Location: Right arm Right arm   Patient Position: Sitting Sitting   Pulse: 66    Resp: 16    Temp: 97.2 °F (36.2 °C)    TempSrc: Temporal    SpO2: 97%    Weight: 107.3 kg (236 lb 8.9 oz)    Height: 5' 7" (1.702 m)      BP Readings from Last 5 Encounters:   02/04/25 (!) 158/72   12/23/24 126/74   10/15/24 " 113/60   09/16/24 (!) 142/86   08/13/24 (!) 177/77     Wt Readings from Last 5 Encounters:   02/04/25 107.3 kg (236 lb 8.9 oz)   12/23/24 107 kg (236 lb)   10/15/24 100.7 kg (221 lb 14.3 oz)   09/16/24 98 kg (216 lb 0.8 oz)   08/13/24 100.8 kg (222 lb 1.8 oz)     Physical Exam  Physical Exam    General: Well-developed. Well-nourished. No acute distress.  Eyes: EOMI. Sclerae anicteric.  HENT: Normocephalic. Atraumatic. Nares patent. Moist oral mucosa.  Cardiovascular: Regular rate. Regular rhythm. No murmurs. No rubs. No gallops. Normal S1, S2.  Respiratory: Normal respiratory effort. Clear to auscultation bilaterally. No rales. No rhonchi. No wheezing.  Musculoskeletal: No  obvious deformity.  Extremities: No lower extremity edema.  Neurological: Alert & oriented x3. No slurred speech. Normal gait.  Psychiatric: Normal mood. Normal affect. Good insight. Good judgment.  Skin: Warm. Dry. No rash.         Lab Results   Component Value Date    WBC 5.21 02/04/2025    HGB 10.6 (L) 02/04/2025    HCT 36.3 (L) 02/04/2025     02/04/2025    CHOL 135 11/07/2024    TRIG 105 11/07/2024    HDL 54 11/07/2024    ALT 19 11/07/2024    AST 25 11/07/2024     11/07/2024    K 3.8 11/07/2024     11/07/2024    CREATININE 1.2 11/07/2024    BUN 24 (H) 11/07/2024    CO2 27 11/07/2024    TSH 1.281 06/25/2024    HGBA1C 8.3 (H) 11/07/2024      This note was generated with the assistance of ambient listening technology. Verbal consent was obtained by the patient and accompanying visitor(s) for the recording of patient appointment to facilitate this note. I attest to having reviewed and edited the generated note for accuracy, though some syntax or spelling errors may persist. Please contact the author of this note for any clarification.

## 2025-02-05 ENCOUNTER — TELEPHONE (OUTPATIENT)
Dept: DIABETES | Facility: CLINIC | Age: 65
End: 2025-02-05
Payer: MEDICARE

## 2025-02-05 DIAGNOSIS — I10 ESSENTIAL HYPERTENSION, BENIGN: ICD-10-CM

## 2025-02-05 RX ORDER — LISINOPRIL AND HYDROCHLOROTHIAZIDE 12.5; 2 MG/1; MG/1
1 TABLET ORAL DAILY
Qty: 90 TABLET | Refills: 3 | Status: SHIPPED | OUTPATIENT
Start: 2025-02-05

## 2025-02-05 NOTE — TELEPHONE ENCOUNTER
No care due was identified.  [facility] Embedded Care Due Messages. Reference number: [id number].   2/05/2025 10:38:46 AM CST

## 2025-02-05 NOTE — TELEPHONE ENCOUNTER
Refill Routing Note   Medication(s) are not appropriate for processing by [facility] for the following reason(s):        Required vitals abnormal    [facility] action(s):  Defer             Appointments  past 12m or future 3m with PCP    Date Provider   Last Visit   2/4/2025 Joey Cintron MD   Next Visit   Visit date not found Joey Cintron MD   ED visits in past 90 days: 0        Note composed:12:38 PM 02/05/2025

## 2025-02-06 ENCOUNTER — TELEPHONE (OUTPATIENT)
Dept: DIABETES | Facility: CLINIC | Age: 65
End: 2025-02-06
Payer: MEDICARE

## 2025-02-06 NOTE — TELEPHONE ENCOUNTER
----- Message from Annmarie sent at 2/6/2025  7:35 AM CST -----  Contact: [phone]  Marla Stein RD, CDE  Patient called, would like to reschedule her appointment from today 02/06/25 due to unable to come.

## 2025-02-18 ENCOUNTER — CLINICAL SUPPORT (OUTPATIENT)
Dept: PRIMARY CARE CLINIC | Facility: CLINIC | Age: 65
End: 2025-02-18
Payer: MEDICARE

## 2025-02-18 VITALS — DIASTOLIC BLOOD PRESSURE: 68 MMHG | SYSTOLIC BLOOD PRESSURE: 124 MMHG

## 2025-02-18 DIAGNOSIS — I10 ESSENTIAL HYPERTENSION, BENIGN: Primary | ICD-10-CM

## 2025-02-18 NOTE — PROGRESS NOTES
Pt presents to clinic for 2wk Bp check. Per pt she is currently taking Bp meds as prescribed. Manually checked bp on right arm bp reads 124/68 Dr hatfield made aware.

## 2025-02-24 ENCOUNTER — NUTRITION (OUTPATIENT)
Dept: DIABETES | Facility: CLINIC | Age: 65
End: 2025-02-24
Payer: MEDICARE

## 2025-02-24 DIAGNOSIS — Z79.4 TYPE 2 DIABETES MELLITUS WITH HYPERGLYCEMIA, WITH LONG-TERM CURRENT USE OF INSULIN: Primary | ICD-10-CM

## 2025-02-24 DIAGNOSIS — N18.31 TYPE 2 DIABETES MELLITUS WITH STAGE 3A CHRONIC KIDNEY DISEASE, WITH LONG-TERM CURRENT USE OF INSULIN: ICD-10-CM

## 2025-02-24 DIAGNOSIS — E11.22 TYPE 2 DIABETES MELLITUS WITH STAGE 3A CHRONIC KIDNEY DISEASE, WITH LONG-TERM CURRENT USE OF INSULIN: ICD-10-CM

## 2025-02-24 DIAGNOSIS — Z79.4 TYPE 2 DIABETES MELLITUS WITH DIABETIC POLYNEUROPATHY, WITH LONG-TERM CURRENT USE OF INSULIN: ICD-10-CM

## 2025-02-24 DIAGNOSIS — E11.42 TYPE 2 DIABETES MELLITUS WITH DIABETIC POLYNEUROPATHY, WITH LONG-TERM CURRENT USE OF INSULIN: ICD-10-CM

## 2025-02-24 DIAGNOSIS — E11.65 TYPE 2 DIABETES MELLITUS WITH HYPERGLYCEMIA, WITH LONG-TERM CURRENT USE OF INSULIN: Primary | ICD-10-CM

## 2025-02-24 DIAGNOSIS — Z79.4 TYPE 2 DIABETES MELLITUS WITH STAGE 3A CHRONIC KIDNEY DISEASE, WITH LONG-TERM CURRENT USE OF INSULIN: ICD-10-CM

## 2025-02-24 NOTE — PROGRESS NOTES
Diabetes Care Specialist Follow-up Note  Author: Marla Stein RD, CDE  Date: 2/24/2025    Intake    Program Intake  Reason for Diabetes Program Visit:: Intervention  Type of Intervention:: Individual  Individual: Device Training  Device Training: Personal CGM  Current diabetes risk level:: high  In the last month, have you used the ER or been admitted to the hospital: No  Permission to speak with others about care:: no    Current Diabetes Treatment: Insulin, DM Injectables, Oral Medications  Oral Medication Type/Dose: farxiga 10 mg once a day (taking)  DM Injectables Type/Dose: mounjaro 2.5mg, will increase after the 4th dose to 5mg, taking once a week  Method of insulin delivery?: Insulin Device  Insulin Device Type/Dose: humalog via VGo30 (fiasp is ordered, unsure which one she is taking) performing 5-6 clicks with meals    Continuous Glucose Monitoring  Patient has CGM: Yes  Personal CGM type:: Dexcom G7  GMI Date: 01/08/25  GMI Value: 7.5 %    Lab Results   Component Value Date    HGBA1C 8.3 (H) 11/07/2024     A1c Pre Diabetes Care Specialist Intervention:  8.7%    Physical activity/Exercise:   No change    SMBG: using the dexcom G7     Lifestyle Coping Support & Clinical    Lifestyle/Coping/Support  Compared to other people your age, how would you rate your health?: Good  Psychosocial/Coping Skills Assessment Completed: : No  Assessment indicates:: Adequate understanding  Deffered due to:: Other (comment) (previously completed, there has been no change and patient has adequate understanding)  Area of need?: No    Problem Review  Active Comorbidities: Cardiovascular Disease, Hypertension, Neuropathy    Diabetes Self-Management Skills Assessment    Medication Skills Assessment  Patient is able to identify current diabetes medications, dosages, and appropriate timing of medications.: yes  Patient reports problems or concerns with current medication regimen.: no  Medication Skills Assessment Completed::  No  Assessment indicates:: Adequate understanding  Deffered due to:: Other (comment) (previously completed, there has been no change and patient has adequate understanding)  Area of need?: No    Diabetes Disease Process/Treatment Options  Diabetes Type?: Type II  Diabetes Disease Process/Treatment Options: Skills Assessment Completed: No  Assessment indicates:: Adequate understanding  Deferred due to:: Other (comment)  Area of need?: No    Nutrition/Healthy Eating  Meal Plan 24 Hour Recall - Breakfast: Toast and Eggs, coffee with sugar and cream  Meal Plan 24 Hour Recall - Lunch: chicken noodle soup with crackers or a sandwich  Meal Plan 24 Hour Recall - Dinner: yesterday some spaghetti and meatsauce, some times roast and potatoes, some times a burger and some fries, sometimes gets pizza or something home cooked  Meal Plan 24 Hour Recall - Snack: peanut butter crackers, almonds, cheerios  Patient can identify foods that impact blood sugar.: yes  Challenges to healthy eating:: portion control, snacking between meals and at night, eating when feeling depressed/stressed, lack of will power, eating out, going to parties  Nutrition/Healthy Eating Skills Assessment Completed:: No  Assessment indicates:: Adequate understanding  Deffered due to:: Other (comment)  Area of need?: No    Physical Activity/Exercise  Patient's daily activity level:: lightly active  Patient formally exercises outside of work.: no  Reasons for not exercising:: time constraints  Patient can identify forms of physical activity.: yes  Physical Activity/Exercise Skills Assessment Completed: : No  Assessment indicates:: Adequate understanding  Deffered due to:: Other (comment)  Area of need?: No    Home Blood Glucose Monitoring  Patient states that blood sugar is checked at home daily.: yes  Monitoring Method:: personal continuous glucose monitor  Personal CGM type:: Dexcom G7   What is your current Time in Range?: 51%  What is your A1c Target?: 7.0  with no hypoglycemia  Home Blood Glucose Monitoring Skills Assessment Completed: : Yes  Assessment indicates:: Instruction Needed  Area of need?: Yes    Acute Complications  Acute Complications Skills Assessment Completed: : No  Assessment indicates:: Adequate understanding  Deffered due to:: Other (comment)  Area of need?: No    Chronic Complications  Chronic Complications Skills Assessment Completed: : No  Assessment indicates:: Adequate understanding  Deferred due to:: Other (comment)  Area of need?: No      During today's follow-up visit,  the following areas required further assessment and content was provided/reviewed.    Based on today's diabetes care assessment, the following areas of need were identified:          7/26/2024    12:01 AM   Areas of Need   Medications/Current Diabetes Treatment No   Lifestyle Coping Support No   Diabetes Disease Process/Treatment Options No   Nutrition/Healthy Eating No   Physical Activity/Exercise No   Home Blood Glucose Monitoring Yes, changed out the dexcom G7 sensor and started a new one     Acute Complications No   Chronic Complications No        Today's interventions were provided through individual discussion, instruction, and written materials were provided.    Patient verbalized understanding of instruction and written materials.  Pt was able to return back demonstration of instructions today. Patient understood key points, needs reinforcement and further instruction.     Diabetes Self-Management Care Plan Review and Evaluation of Progress:    During today's follow-up Aishwarya's Diabetes Self-Management Care Plan progress was reviewed and progress was evaluated including his/her input. Aishwarya has agreed to continue his/her journey to improve/maintain overall diabetes control by continuing to set health goals. See care plan progress below.      Care Plan: Diabetes Management   Updates made since 2/25/2024 12:00 AM        Problem: Blood Glucose Self-Monitoring      "    Goal: Patient agrees to check and record blood sugars 3 times per day using the dexcom G6/G7 for the next 3 months    Start Date: 12/27/2023   Expected End Date: 3/27/2024   This Visit's Progress: On track   Recent Progress: On track   Priority: High   Barriers: Knowledge deficit   Note:    Patient was instructed how to use a meter. Discussed signs and symptoms, and causes and treatment of hypoglycemia. Covered blood sugar and A1C goals. Instructed patient to use provided logbooks to record blood sugars. Instructed patient to follow-up with their physician. Patient verbalized understanding of all instructions.  Prescription for meter and testing supplies pended to PCP    1/6/2023  Dexcom G6 Education  Patient is here in clinic today for initial start of Dexcom continuous glucose monitoring system (CGMA). Reviewed with patient how to insert sensor and transmitter. Patient inserted sensor on right abdomen and inserted transmitter. Time and date was set on monitor and settings were set. Hypoglycemia trigger set for 70 and hyperglycemia set for 300. Patient provided with phone number for 24-hour help line if needed. Discussed various types of possible alarms and what to do. Questions addressed. Initialization finished. No futher questions.  Patient referred to clinic today for Dexcom G6 continuous glucose sensor system training.  Education was provided using "Quick Start Guide" per Dexcom protocol.    Overview:  5min glucose reading updates, trending arrows, BG graph screens, battery life indicator, Blue Tooth Symbol.  Menus: trend Graph, start sensor, enter BG, events, Alerts, Settings, Shutdown, Stop Sensor   Settings:                          * Urgent Low: 55 mg/dL                          * Urgent Low Soon: Off                          * Low alert: 70                          * High alert: 300                          * Rise rate: Off                          * Fall Rate: Off                          * " Signal Loss: 30 min                          * No Reading: Off                          * Always sound: On                             Reviewed additional setting options with patient, including Graph Height and Transmitter ID. Transmitter was paired with .    Reviewed where to find sensor insertion time and sensor expiration date.   Discussed no need to calibrate sensor during the entirety of the 10 day wear. Discussed that pt can calibrate sensor if desired, but at that time she will need to continue calibrating every 12 hours for sensor to remain accurate. Reviewed appropriate calibration techniques.  Reviewed sensor site selection. Site selected and prepped using aseptic technique Inserted to left abdomen. Transmitter placed in pod and secured.  Practiced sensor pod/transmitter removal from site, and removal of transmitter from sensor pod.  Patient able to demonstrate without difficulty.  Encouraged to review manual prior to starting another sensor.   Reviewed problem solving aspects of sensor transmission/variables that can disrupt RF transmission.  range 20 feet, but the first 3 hrs keep within 3 feet of transmitter.  Pt instructed on lag time of interstitial fluid from CBG and was advised to tx hypoglycemia and dose insulin based on SMBG values.  Link to video provided and written instructions provided for patient to review before 10 day sensor change  Dexcom technical support contact number given and examples of when to contact them discussed.      1/17 Changing the Sensor (every 10 days)  Remove the whole thing from your body  Hold the sensor by the skinny part and break the clear piece  Remove the gray transmitter  In the reader touch start sensor  Touch yes you want to start sensor  Touch the square you want to put the number in for   There will be a 4-digit code on the sensor (like 5937)  Enter the code from the sensor  Touch ok  Touch yes, it is correct  Insert Sensor  Remove orange  piece and paper tabs  Place on skin and hold in place  Push the orange button  Insert the gray transmitter into the sensor  Put the small tongue like side in first and push down  You should hear 2 clicks  Touch confirm once sensor is secure on body  Touch start Sensor  Touch 1 then 2  Sensor will take 2 hours to warm up    Changing Transmitter (every 90 days) in the reader  Touch the =   Touch transmitter  Touch pair new  Go through the process to start sensor  Touch yes you want to start sensor  Touch the square you want to put the number in for   There will be a 4-digit code on the sensor (like 5937)  Enter the code from the sensor  Touch ok  Touch yes, it is correct  Touch the square you want to put the number in for   There will be a 6-digit code on the transmitter box (like [id number])  Enter the code from the transmitter box  Touch ok  Touch yes, it is correct  Insert Sensor  Remove orange piece and paper tabs  Place on skin and hold in place  Push the orange button  Insert the gray transmitter into the sensor  Put the small tongue like side in first and push down  You should hear 2 clicks  Touch confirm once sensor is secure on body  Touch start Sensor  Touch 1 then 2  Sensor will take 2 hours to warm up           Task: Reviewed Dexcom download with patient and provider, adjustments made to treatment plan by provider and communicated to patient as directed, patient states understanding. Completed 12/16/2024        Task: Reviewed Dexcom download with patient and provider, adjustments made to treatment plan by provider and communicated to patient as directed, patient states understanding. Completed 12/20/2024        Task: Reviewed Dexcom download with patient and provider, adjustments made to treatment plan by provider and communicated to patient as directed, patient states understanding. Completed 1/8/2025          Follow Up Plan     Follow up in about 10 days (around 3/6/2025) for Change Personal CGM and Upload  Data.    Today's care plan and follow up schedule was discussed with patient.  Aishwarya verbalized understanding of the care plan, goals, and agrees to follow up plan.        The patient was encouraged to communicate with his/her health care provider/physician and care team regarding his/her condition(s) and treatment.  I provided the patient with my contact information today and encouraged to contact me via phone or [facility] Patient Portal as needed.     Length of Visit   Total Time: 15 Minutes

## 2025-03-05 ENCOUNTER — TELEPHONE (OUTPATIENT)
Dept: DIABETES | Facility: CLINIC | Age: 65
End: 2025-03-05
Payer: MEDICARE

## 2025-03-06 ENCOUNTER — NUTRITION (OUTPATIENT)
Dept: DIABETES | Facility: CLINIC | Age: 65
End: 2025-03-06
Payer: MEDICARE

## 2025-03-06 DIAGNOSIS — Z79.4 TYPE 2 DIABETES MELLITUS WITH DIABETIC POLYNEUROPATHY, WITH LONG-TERM CURRENT USE OF INSULIN: ICD-10-CM

## 2025-03-06 DIAGNOSIS — Z79.4 TYPE 2 DIABETES MELLITUS WITH HYPERGLYCEMIA, WITH LONG-TERM CURRENT USE OF INSULIN: Primary | ICD-10-CM

## 2025-03-06 DIAGNOSIS — E11.42 TYPE 2 DIABETES MELLITUS WITH DIABETIC POLYNEUROPATHY, WITH LONG-TERM CURRENT USE OF INSULIN: ICD-10-CM

## 2025-03-06 DIAGNOSIS — E11.22 TYPE 2 DIABETES MELLITUS WITH STAGE 3A CHRONIC KIDNEY DISEASE, WITH LONG-TERM CURRENT USE OF INSULIN: ICD-10-CM

## 2025-03-06 DIAGNOSIS — Z79.4 TYPE 2 DIABETES MELLITUS WITH STAGE 3A CHRONIC KIDNEY DISEASE, WITH LONG-TERM CURRENT USE OF INSULIN: ICD-10-CM

## 2025-03-06 DIAGNOSIS — N18.31 TYPE 2 DIABETES MELLITUS WITH STAGE 3A CHRONIC KIDNEY DISEASE, WITH LONG-TERM CURRENT USE OF INSULIN: ICD-10-CM

## 2025-03-06 DIAGNOSIS — E11.65 TYPE 2 DIABETES MELLITUS WITH HYPERGLYCEMIA, WITH LONG-TERM CURRENT USE OF INSULIN: Primary | ICD-10-CM

## 2025-03-12 ENCOUNTER — TELEPHONE (OUTPATIENT)
Dept: DIABETES | Facility: CLINIC | Age: 65
End: 2025-03-12
Payer: MEDICARE

## 2025-03-13 ENCOUNTER — OFFICE VISIT (OUTPATIENT)
Dept: DIABETES | Facility: CLINIC | Age: 65
End: 2025-03-13
Payer: MEDICARE

## 2025-03-13 ENCOUNTER — CLINICAL SUPPORT (OUTPATIENT)
Dept: DIABETES | Facility: CLINIC | Age: 65
End: 2025-03-13
Payer: MEDICARE

## 2025-03-13 VITALS
BODY MASS INDEX: 36.68 KG/M2 | WEIGHT: 233.69 LBS | DIASTOLIC BLOOD PRESSURE: 63 MMHG | OXYGEN SATURATION: 98 % | SYSTOLIC BLOOD PRESSURE: 131 MMHG | HEIGHT: 67 IN | HEART RATE: 62 BPM

## 2025-03-13 DIAGNOSIS — D64.9 ANEMIA, UNSPECIFIED TYPE: ICD-10-CM

## 2025-03-13 DIAGNOSIS — E66.01 SEVERE OBESITY (BMI 35.0-39.9) WITH COMORBIDITY: ICD-10-CM

## 2025-03-13 DIAGNOSIS — R80.9 MICROALBUMINURIA DUE TO TYPE 2 DIABETES MELLITUS: ICD-10-CM

## 2025-03-13 DIAGNOSIS — E11.42 TYPE 2 DIABETES MELLITUS WITH DIABETIC POLYNEUROPATHY, WITH LONG-TERM CURRENT USE OF INSULIN: ICD-10-CM

## 2025-03-13 DIAGNOSIS — Z79.4 TYPE 2 DIABETES MELLITUS WITH STAGE 3A CHRONIC KIDNEY DISEASE, WITH LONG-TERM CURRENT USE OF INSULIN: ICD-10-CM

## 2025-03-13 DIAGNOSIS — E78.5 DYSLIPIDEMIA, GOAL LDL BELOW 100: ICD-10-CM

## 2025-03-13 DIAGNOSIS — Z79.4 TYPE 2 DIABETES MELLITUS WITH DIABETIC POLYNEUROPATHY, WITH LONG-TERM CURRENT USE OF INSULIN: ICD-10-CM

## 2025-03-13 DIAGNOSIS — Z79.4 TYPE 2 DIABETES MELLITUS WITH HYPERGLYCEMIA, WITH LONG-TERM CURRENT USE OF INSULIN: Primary | ICD-10-CM

## 2025-03-13 DIAGNOSIS — I10 ESSENTIAL HYPERTENSION, BENIGN: ICD-10-CM

## 2025-03-13 DIAGNOSIS — N18.31 TYPE 2 DIABETES MELLITUS WITH STAGE 3A CHRONIC KIDNEY DISEASE, WITH LONG-TERM CURRENT USE OF INSULIN: ICD-10-CM

## 2025-03-13 DIAGNOSIS — Z71.9 HEALTH EDUCATION/COUNSELING: ICD-10-CM

## 2025-03-13 DIAGNOSIS — E11.65 TYPE 2 DIABETES MELLITUS WITH HYPERGLYCEMIA, WITH LONG-TERM CURRENT USE OF INSULIN: Primary | ICD-10-CM

## 2025-03-13 DIAGNOSIS — E11.22 TYPE 2 DIABETES MELLITUS WITH STAGE 3A CHRONIC KIDNEY DISEASE, WITH LONG-TERM CURRENT USE OF INSULIN: ICD-10-CM

## 2025-03-13 DIAGNOSIS — E11.29 MICROALBUMINURIA DUE TO TYPE 2 DIABETES MELLITUS: ICD-10-CM

## 2025-03-13 PROCEDURE — 99999 PR PBB SHADOW E&M-EST. PATIENT-LVL V: CPT | Mod: PBBFAC,,, | Performed by: NURSE PRACTITIONER

## 2025-03-13 RX ORDER — EZETIMIBE 10 MG/1
10 TABLET ORAL NIGHTLY
Qty: 90 TABLET | Refills: 2 | Status: SHIPPED | OUTPATIENT
Start: 2025-03-13

## 2025-03-13 RX ORDER — INSULIN LISPRO 100 [IU]/ML
INJECTION, SOLUTION INTRAVENOUS; SUBCUTANEOUS
Qty: 90 ML | Refills: 3 | Status: SHIPPED | OUTPATIENT
Start: 2025-03-13

## 2025-03-13 RX ORDER — ROSUVASTATIN CALCIUM 40 MG/1
40 TABLET, COATED ORAL NIGHTLY
Qty: 90 TABLET | Refills: 3 | Status: SHIPPED | OUTPATIENT
Start: 2025-03-13 | End: 2026-03-13

## 2025-03-13 RX ORDER — DAPAGLIFLOZIN 10 MG/1
10 TABLET, FILM COATED ORAL DAILY
Qty: 90 TABLET | Refills: 2 | Status: SHIPPED | OUTPATIENT
Start: 2025-03-13

## 2025-03-13 RX ORDER — TIRZEPATIDE 5 MG/.5ML
5 INJECTION, SOLUTION SUBCUTANEOUS
Qty: 4 PEN | Refills: 6 | Status: SHIPPED | OUTPATIENT
Start: 2025-03-13

## 2025-03-13 RX ORDER — BLOOD-GLUCOSE SENSOR
1 EACH MISCELLANEOUS
Qty: 9 EACH | Refills: 3 | Status: SHIPPED | OUTPATIENT
Start: 2025-03-13

## 2025-03-13 RX ORDER — SUB-Q INSULIN DEVICE, 40 UNIT
1 EACH MISCELLANEOUS DAILY
Qty: 90 EACH | Refills: 2 | Status: SHIPPED | OUTPATIENT
Start: 2025-03-13

## 2025-03-13 NOTE — ASSESSMENT & PLAN NOTE
Body mass index is 36.6 kg/m².  Increases insulin resistance.   Discussed DM diet and exercise.

## 2025-03-13 NOTE — ASSESSMENT & PLAN NOTE
Uncontrolled   Readings look like they are improving but are still above goal on her Dexcom download  Due for A1c today----she will go get labs done after our visit      She needs assistance with her Dexcom application---she is coming for nurse visits or to see Marla with education      Off metformin due to labile renal function      -- Medication Changes:   Start the mounjaro 5 mg weekly this week - has not started this dose yet   Reports that she has been taking Mounjaro 2.5 mg weekly without any side effects---has the 5 mg weekly dose and plans to start it this week    Continue the Farxiga 10 mg daily   -- Please let us know if you have nausea, vomiting, or weakness with a normal BG. This could be euglycemic DKA.  -- please hold medication 3 days prior to surgery or if you are sick and have decreased intake.   -- Please drink lots of water daily while on this medication.   --This medication could also give you a yeast infection- please let us know if this occurs and we can treat it.       Continue the Vgo30 with Humalog   Cut back on your clicks to 4 clicks with meals- BEFORE THE MEALS with increasing the Mounjaro to 5 mg weekly   HOLD THE CLICKS IF BLOOD SUGAR IS UNDER 100  Let me know if you are having hypoglycemia and we can look at possibly changing to  VGo20 if needed   Click before the meal  Change V Go every 24 hours    Continue the Dexcom G7         -- Reviewed goals of therapy are to get the best control we can without hypoglycemia.  -- Reviewed patient's current insulin regimen. Clarified proper insulin dose and timing in relation to meals, etc. Insulin injection sites and proper rotation instructed.    -- Advised frequent self blood glucose monitoring.  Patient encouraged to document glucose results and bring them to every clinic visit. -- use the dexcom consistently--on the Dexcom G7---supplies come from pharmacy---needs assistance with application  -- Hypoglycemia precautions discussed. Instructed on  precautions before driving.    -- Call for Bg repeatedly < 70 or > 200.   -- Close adherence to lifestyle changes recommended.   -- Periodic follow ups for eye evaluations, foot care and dental care suggested.  Schedule with zoltan for a dexcom G7  change out       patient will benefit from a Dexcom G6 to prevent hypoglycemia due to patient experiences level 2 or hypoglycemic event <54   Patient has diabetes mellitus and manages diabetes with intensive insulin regimen and uses prandial and basal insulin daily-- on the VGO   Patient requires a therapeutic CGM and is willing to use therapeutic CGM for the necessary frequent adjustments of insulin therapy.  I have completed an in-person visit during the previous 6 months and will continue to have in-person visits every 6 months to assess adherence to their CGM regimen and diabetes treatment plan.  Due to COVID pandemic and need for remote monitoring this tool is medically necessary

## 2025-03-13 NOTE — PROGRESS NOTES
CC:   Chief Complaint   Patient presents with    Diabetes Mellitus       HPI: Aishwarya Jensen is a 64 y.o. female presents for a follow up visit today for the management of T2DM   She was diagnosed with Type 2 diabetes in her early 50's on routine lab work. She was initially started on Metformin. Insulin therapy was started soon after diagnosis.     Family hx of diabetes: Mother, father, brothers   Hospitalized for diabetes: denies     No personal or FH of thyroid cancer or personal of pancreatic cancer or pancreatitis.     She is retired now.         Our last visit was in December of 2024  At that visit we stayed off the metformin  Continued the Farxiga 10 mg daily  Start her on Mounjaro  Continued VGo30 with Fiasp   Discussed staying on the Dexcom and using it consistently  No recent A1c on file  See attached Dexcom download   On the mounjaro-- she has been taking hte 2.5 mg weekly and is due to start the 5 mg weekly dose this week== she has not had the 5 mg weekly dose yet  -- denies Gi upset - no n/v/d or constipation                   DIABETES COMPLICATIONS: nephropathy and peripheral neuropathy      Diabetes Management Status    ASA:  not taking the ASA     Statin: Taking Crestor 40 mg nightly and taking the Zetia 10 mg daily   ACE/ARB: Taking- lisinopril 20 mg     Screening or Prevention Patient's value Goal Complete/Controlled?   HgA1C Testing and Control   Lab Results   Component Value Date    HGBA1C 8.3 (H) 11/07/2024      Annually/Less than 8% Yes   Lipid profile : 11/07/2024 Annually Yes   LDL control Lab Results   Component Value Date    LDLCALC 60.0 (L) 11/07/2024    Annually/Less than 100 mg/dl  No   Nephropathy screening Lab Results   Component Value Date    LABMICR 624.0 11/07/2024     Lab Results   Component Value Date    PROTEINUA Trace (A) 09/16/2024    Annually Yes   Blood pressure BP Readings from Last 1 Encounters:   03/13/25 131/63    Less than 140/90 No   Dilated retinal exam :  05/23/2024 Annually No   Foot exam   : 04/22/2024 Annually Yes       CURRENT A1C:    Hemoglobin A1C   Date Value Ref Range Status   11/07/2024 8.3 (H) 4.0 - 5.6 % Final     Comment:     ADA Screening Guidelines:  5.7-6.4%  Consistent with prediabetes  >or=6.5%  Consistent with diabetes    High levels of fetal hemoglobin interfere with the HbA1C  assay. Heterozygous hemoglobin variants (HbS, HgC, etc)do  not significantly interfere with this assay.   However, presence of multiple variants may affect accuracy.     06/25/2024 7.2 (H) 4.0 - 5.6 % Final     Comment:     ADA Screening Guidelines:  5.7-6.4%  Consistent with prediabetes  >or=6.5%  Consistent with diabetes    High levels of fetal hemoglobin interfere with the HbA1C  assay. Heterozygous hemoglobin variants (HbS, HgC, etc)do  not significantly interfere with this assay.   However, presence of multiple variants may affect accuracy.     04/03/2024 8.1 (H) 4.0 - 5.6 % Final     Comment:     ADA Screening Guidelines:  5.7-6.4%  Consistent with prediabetes  >or=6.5%  Consistent with diabetes    High levels of fetal hemoglobin interfere with the HbA1C  assay. Heterozygous hemoglobin variants (HbS, HgC, etc)do  not significantly interfere with this assay.   However, presence of multiple variants may affect accuracy.         GOAL A1C: less than 8%     DM MEDICATIONS USED IN THE PAST: Metformin --off due to renal function  Victoza   Lantus   VGo  Novolog    Freestyle manisha -- didn't like it   Ozempic   Fiasp   Dexcom G6  Mounjaro   Fiasp   Humalog         CURRENT DIABETES MEDICATIONS: Mounjaro 5 mg weekly on Thursdays   VGo30 with Humalog 5-6 clicks with meals -- sometimes giving clicks after the meals    Farxiga 10 mg daily -- taking       Insulin:VGo  Missed doses:  so  Usually wearing the VGo to her abdomen   She is adamant that she is changing her VGo every 24 hours. - may not be at the same time- she is unsure   Sometimes missing the VGo clicks           BLOOD  GLUCOSE MONITORING:   Sensor type: Dexcom G 7   Site changed every 10 days   BG average is 172   60% in rnage   29% high   10% very high   1% low   0% very low         2 weeks prior- average BG is 170   63%   28% high   8% very high   1% low and <1% very low       HYPOGLYCEMIA: 1% low and 0% very low   2 weeks prior - 1% low   <1% very low     Maybe some hypoglycemia unawareness or error with meter   Some in the 70's         MEALS: eating 3 meals per day   drinking OJ sometimes   Drinks: water    Sparking water- SF -- ICE - SF         CURRENT EXERCISE:  No      Review of Systems  Review of Systems   Constitutional:  Negative for appetite change, fatigue and unexpected weight change.   HENT:  Negative for trouble swallowing.    Eyes:  Negative for visual disturbance.   Respiratory:  Negative for shortness of breath.    Cardiovascular:  Negative for chest pain and leg swelling.   Gastrointestinal:  Negative for abdominal distention, abdominal pain, blood in stool, constipation, diarrhea, nausea, rectal pain and vomiting.   Endocrine: Negative for polydipsia, polyphagia and polyuria.   Genitourinary:         + Nocturia 1-2 times per night    Musculoskeletal:  Negative for arthralgias.   Skin:  Negative for wound.   Neurological:  Negative for numbness.   Psychiatric/Behavioral:  Positive for sleep disturbance.        Physical Exam   Physical Exam  Vitals and nursing note reviewed.   Constitutional:       Appearance: She is well-developed. She is obese.   HENT:      Head: Normocephalic and atraumatic.      Right Ear: External ear normal.      Left Ear: External ear normal.      Nose: Nose normal.   Neck:      Thyroid: No thyromegaly.      Trachea: No tracheal deviation.   Cardiovascular:      Rate and Rhythm: Normal rate and regular rhythm.      Heart sounds: No murmur heard.  Pulmonary:      Effort: Pulmonary effort is normal. No respiratory distress.      Breath sounds: Normal breath sounds.   Abdominal:       Palpations: Abdomen is soft.      Tenderness: There is no abdominal tenderness.      Hernia: No hernia is present.   Musculoskeletal:      Cervical back: Normal range of motion and neck supple.   Skin:     General: Skin is warm and dry.      Capillary Refill: Capillary refill takes less than 2 seconds.      Findings: No rash.      Comments: She is wearing the VGo to her abdomen-  No complications   Her Vgo HAS INSULIN IN IT    Neurological:      Mental Status: She is alert and oriented to person, place, and time.      Cranial Nerves: No cranial nerve deficit.   Psychiatric:         Behavior: Behavior normal.         Judgment: Judgment normal.           FOOT EXAMINATION: Appropriate footwear.           Lab Results   Component Value Date    TSH 1.281 06/25/2024         Type 2 diabetes mellitus with hyperglycemia, with long-term current use of insulin  Uncontrolled   Readings look like they are improving but are still above goal on her Dexcom download  Due for A1c today----she will go get labs done after our visit      She needs assistance with her Dexcom application---she is coming for nurse visits or to see Marla with education      Off metformin due to labile renal function      -- Medication Changes:   Start the mounjaro 5 mg weekly this week - has not started this dose yet   Reports that she has been taking Mounjaro 2.5 mg weekly without any side effects---has the 5 mg weekly dose and plans to start it this week    Continue the Farxiga 10 mg daily   -- Please let us know if you have nausea, vomiting, or weakness with a normal BG. This could be euglycemic DKA.  -- please hold medication 3 days prior to surgery or if you are sick and have decreased intake.   -- Please drink lots of water daily while on this medication.   --This medication could also give you a yeast infection- please let us know if this occurs and we can treat it.       Continue the Vgo30 with Humalog   Cut back on your clicks to 4 clicks with  meals- BEFORE THE MEALS with increasing the Mounjaro to 5 mg weekly   HOLD THE CLICKS IF BLOOD SUGAR IS UNDER 100  Let me know if you are having hypoglycemia and we can look at possibly changing to  VGo20 if needed   Click before the meal  Change V Go every 24 hours    Continue the Dexcom G7         -- Reviewed goals of therapy are to get the best control we can without hypoglycemia.  -- Reviewed patient's current insulin regimen. Clarified proper insulin dose and timing in relation to meals, etc. Insulin injection sites and proper rotation instructed.    -- Advised frequent self blood glucose monitoring.  Patient encouraged to document glucose results and bring them to every clinic visit. -- use the dexcom consistently--on the Dexcom G7---supplies come from pharmacy---needs assistance with application  -- Hypoglycemia precautions discussed. Instructed on precautions before driving.    -- Call for Bg repeatedly < 70 or > 200.   -- Close adherence to lifestyle changes recommended.   -- Periodic follow ups for eye evaluations, foot care and dental care suggested.  Schedule with zoltan for a dexcom G7  change out       patient will benefit from a Dexcom G6 to prevent hypoglycemia due to patient experiences level 2 or hypoglycemic event <54   Patient has diabetes mellitus and manages diabetes with intensive insulin regimen and uses prandial and basal insulin daily-- on the VGO   Patient requires a therapeutic CGM and is willing to use therapeutic CGM for the necessary frequent adjustments of insulin therapy.  I have completed an in-person visit during the previous 6 months and will continue to have in-person visits every 6 months to assess adherence to their CGM regimen and diabetes treatment plan.  Due to COVID pandemic and need for remote monitoring this tool is medically necessary        Type 2 diabetes mellitus with diabetic polyneuropathy, with long-term current use of insulin  Optimize BG readings.   See above.        Educated patient to check feet daily for any foreign objects and/or wounds. Discussed with patient the importance of wearing appropriate footwear at all times, not to walk barefoot ever, and to check shoes before putting them on feet. Instructed patient to keep feet dry by regularly changing shoes and socks and drying feet after baths and exercises. Also, instructed patient to report any new lesions, discolorations, or swelling to a healthcare professional.        Type 2 diabetes mellitus with stage 3a chronic kidney disease, with long-term current use of insulin  Optimize BG readings without insulin stacking or hypoglycemia   See above.   On Farxiga and Lisinopril     Microalbuminuria due to type 2 diabetes mellitus  Optimize BG readings.   See above.   On ACEi and SGLT2       Essential hypertension, benign  BP goal is < 140/90.   Tolerating ACEi  Blood pressure goals discussed with patient  Controlled today in office    Dyslipidemia, goal LDL below 100  On statin per ADA recommendations  LDL goal < 100. LDL at goal. LFTs WNL. Continue statin.   On Crestor 40 mg nightly and Zetia 10 mg daily--reports compliance    Severe obesity (BMI 35.0-39.9) with comorbidity  Body mass index is 36.6 kg/m².  Increases insulin resistance.   Discussed DM diet and exercise.       Anemia  Follow up with PCP for management   May skew A1c level             I spent a total of 30 minutes on the day of the visit.This includes face to face time and non-face to face time preparing to see the patient (eg, review of tests), obtaining and/or reviewing separately obtained history, documenting clinical information in the electronic or other health record, independently interpreting results and communicating results to the patient/family/caregiver, or care coordinator.          Follow up in about 3 months (around 6/13/2025).  1. Labs today   2. Follow up with me in 3 months with labs prior           Orders Placed This Encounter   Procedures     Hemoglobin A1C     Standing Status:   Future     Expected Date:   6/13/2025     Expiration Date:   9/13/2026    Comprehensive Metabolic Panel     Standing Status:   Future     Expected Date:   6/13/2025     Expiration Date:   9/13/2026    Microalbumin/Creatinine Ratio, Urine     Standing Status:   Future     Expected Date:   6/13/2025     Expiration Date:   9/13/2026     Specimen Source:   Urine    Fructosamine     Standing Status:   Future     Expected Date:   6/13/2025     Expiration Date:   9/13/2026    TSH     Standing Status:   Future     Expected Date:   6/13/2025     Expiration Date:   9/13/2026         Recommendations were discussed with the patient in detail  The patient verbalized understanding and agrees with the plan outlined as above.

## 2025-03-13 NOTE — PATIENT INSTRUCTIONS
Start the mounjaro 5 mg weekly this week - has not started this dose yet     Continue the Farxiga 10 mg daily   -- Please let us know if you have nausea, vomiting, or weakness with a normal BG. This could be euglycemic DKA.  -- please hold medication 3 days prior to surgery or if you are sick and have decreased intake.   -- Please drink lots of water daily while on this medication.   --This medication could also give you a yeast infection- please let us know if this occurs and we can treat it.       Continue the Vgo30 with Humalog   Cut back on your clicks to 4 clicks with meals- BEFORE THE MEALS with increasing the Mounjaro to 5 mg weekly   HOLD THE CLICKS IF BLOOD SUGAR IS UNDER 100  Let me know if you are having hypoglycemia and we can look at possibly changing to  VGo20 if needed     Continue the Dexcom G7

## 2025-03-14 ENCOUNTER — TELEPHONE (OUTPATIENT)
Dept: DIABETES | Facility: CLINIC | Age: 65
End: 2025-03-14
Payer: MEDICARE

## 2025-03-14 ENCOUNTER — RESULTS FOLLOW-UP (OUTPATIENT)
Dept: DIABETES | Facility: CLINIC | Age: 65
End: 2025-03-14
Payer: MEDICARE

## 2025-03-14 DIAGNOSIS — E11.65 TYPE 2 DIABETES MELLITUS WITH HYPERGLYCEMIA, WITH LONG-TERM CURRENT USE OF INSULIN: Primary | ICD-10-CM

## 2025-03-14 DIAGNOSIS — Z79.4 TYPE 2 DIABETES MELLITUS WITH HYPERGLYCEMIA, WITH LONG-TERM CURRENT USE OF INSULIN: Primary | ICD-10-CM

## 2025-03-14 NOTE — PROGRESS NOTES
Diabetes Care Specialist Follow-up Note  Author: Marla Stein RD, CDE  Date: 3/14/2025    Intake    Program Intake  Reason for Diabetes Program Visit:: Intervention  Type of Intervention:: Individual  Individual: Device Training  Device Training: Personal CGM  Current diabetes risk level:: high  In the last month, have you used the ER or been admitted to the hospital: No  Permission to speak with others about care:: no    Current Diabetes Treatment: Insulin, DM Injectables, Oral Medications  Oral Medication Type/Dose: farxiga 10 mg once a day (taking)  DM Injectables Type/Dose: mounjaro 2.5mg, will increase after the 4th dose to 5mg, taking once a week  Method of insulin delivery?: Insulin Device  Insulin Device Type/Dose: humalog via VGo30 (fiasp is ordered, unsure which one she is taking) performing 5-6 clicks with meals    Continuous Glucose Monitoring  Patient has CGM: Yes  Personal CGM type:: Dexcom G7  GMI Date: 01/08/25  GMI Value: 7.5 %    Lab Results   Component Value Date    HGBA1C 6.8 (H) 03/13/2025     A1c Pre Diabetes Care Specialist Intervention:  8.7%    Physical activity/Exercise:   No change    SMBG: using the dexcom G7     Lifestyle Coping Support & Clinical    Lifestyle/Coping/Support  Compared to other people your age, how would you rate your health?: Good  Psychosocial/Coping Skills Assessment Completed: : No  Assessment indicates:: Adequate understanding  Deffered due to:: Other (comment) (previously completed, there has been no change and patient has adequate understanding)  Area of need?: No    Problem Review  Active Comorbidities: Cardiovascular Disease, Hypertension, Neuropathy    Diabetes Self-Management Skills Assessment    Medication Skills Assessment  Patient is able to identify current diabetes medications, dosages, and appropriate timing of medications.: yes  Patient reports problems or concerns with current medication regimen.: no  Medication Skills Assessment Completed::  No  Assessment indicates:: Adequate understanding  Deffered due to:: Other (comment) (previously completed, there has been no change and patient has adequate understanding)  Area of need?: No    Diabetes Disease Process/Treatment Options  Diabetes Type?: Type II  Diabetes Disease Process/Treatment Options: Skills Assessment Completed: No  Assessment indicates:: Adequate understanding  Deferred due to:: Other (comment)  Area of need?: No    Nutrition/Healthy Eating  Meal Plan 24 Hour Recall - Breakfast: Toast and Eggs, coffee with sugar and cream  Meal Plan 24 Hour Recall - Lunch: chicken noodle soup with crackers or a sandwich  Meal Plan 24 Hour Recall - Dinner: yesterday some spaghetti and meatsauce, some times roast and potatoes, some times a burger and some fries, sometimes gets pizza or something home cooked  Meal Plan 24 Hour Recall - Snack: peanut butter crackers, almonds, cheerios  Patient can identify foods that impact blood sugar.: yes  Challenges to healthy eating:: portion control, snacking between meals and at night, eating when feeling depressed/stressed, lack of will power, eating out, going to parties  Nutrition/Healthy Eating Skills Assessment Completed:: No  Assessment indicates:: Adequate understanding  Deffered due to:: Other (comment)  Area of need?: No    Physical Activity/Exercise  Patient's daily activity level:: lightly active  Patient formally exercises outside of work.: no  Reasons for not exercising:: time constraints  Patient can identify forms of physical activity.: yes  Physical Activity/Exercise Skills Assessment Completed: : No  Assessment indicates:: Adequate understanding  Deffered due to:: Other (comment)  Area of need?: No    Home Blood Glucose Monitoring  Patient states that blood sugar is checked at home daily.: yes  Monitoring Method:: personal continuous glucose monitor  Personal CGM type:: Dexcom G7   What is your current Time in Range?: 51%  What is your A1c Target?: 7.0  with no hypoglycemia  Home Blood Glucose Monitoring Skills Assessment Completed: : Yes  Assessment indicates:: Instruction Needed  Area of need?: Yes    Acute Complications  Acute Complications Skills Assessment Completed: : No  Assessment indicates:: Adequate understanding  Deffered due to:: Other (comment)  Area of need?: No    Chronic Complications  Chronic Complications Skills Assessment Completed: : No  Assessment indicates:: Adequate understanding  Deferred due to:: Other (comment)  Area of need?: No      During today's follow-up visit,  the following areas required further assessment and content was provided/reviewed.    Based on today's diabetes care assessment, the following areas of need were identified:          7/26/2024    12:01 AM   Areas of Need   Medications/Current Diabetes Treatment No   Lifestyle Coping Support No   Diabetes Disease Process/Treatment Options No   Nutrition/Healthy Eating No   Physical Activity/Exercise No   Home Blood Glucose Monitoring Yes, changed out the dexcom G7 sensor and started a new one     Acute Complications No   Chronic Complications No        Today's interventions were provided through individual discussion, instruction, and written materials were provided.    Patient verbalized understanding of instruction and written materials.  Pt was able to return back demonstration of instructions today. Patient understood key points, needs reinforcement and further instruction.     Diabetes Self-Management Care Plan Review and Evaluation of Progress:    During today's follow-up Aishwarya's Diabetes Self-Management Care Plan progress was reviewed and progress was evaluated including his/her input. Aishwarya has agreed to continue his/her journey to improve/maintain overall diabetes control by continuing to set health goals. See care plan progress below.      Care Plan: Diabetes Management   Updates made since 3/14/2024 12:00 AM        Problem: Blood Glucose Self-Monitoring      "    Goal: Patient agrees to check and record blood sugars 3 times per day using the dexcom G6/G7 for the next 3 months    Start Date: 12/27/2023   Expected End Date: 3/27/2024   This Visit's Progress: On track   Recent Progress: On track   Priority: High   Barriers: Knowledge deficit   Note:    Patient was instructed how to use a meter. Discussed signs and symptoms, and causes and treatment of hypoglycemia. Covered blood sugar and A1C goals. Instructed patient to use provided logbooks to record blood sugars. Instructed patient to follow-up with their physician. Patient verbalized understanding of all instructions.  Prescription for meter and testing supplies pended to PCP    1/6/2023  Dexcom G6 Education  Patient is here in clinic today for initial start of Dexcom continuous glucose monitoring system (CGMA). Reviewed with patient how to insert sensor and transmitter. Patient inserted sensor on right abdomen and inserted transmitter. Time and date was set on monitor and settings were set. Hypoglycemia trigger set for 70 and hyperglycemia set for 300. Patient provided with phone number for 24-hour help line if needed. Discussed various types of possible alarms and what to do. Questions addressed. Initialization finished. No futher questions.  Patient referred to clinic today for Dexcom G6 continuous glucose sensor system training.  Education was provided using "Quick Start Guide" per Dexcom protocol.    Overview:  5min glucose reading updates, trending arrows, BG graph screens, battery life indicator, Blue Tooth Symbol.  Menus: trend Graph, start sensor, enter BG, events, Alerts, Settings, Shutdown, Stop Sensor   Settings:                          * Urgent Low: 55 mg/dL                          * Urgent Low Soon: Off                          * Low alert: 70                          * High alert: 300                          * Rise rate: Off                          * Fall Rate: Off                          * " Signal Loss: 30 min                          * No Reading: Off                          * Always sound: On                             Reviewed additional setting options with patient, including Graph Height and Transmitter ID. Transmitter was paired with .    Reviewed where to find sensor insertion time and sensor expiration date.   Discussed no need to calibrate sensor during the entirety of the 10 day wear. Discussed that pt can calibrate sensor if desired, but at that time she will need to continue calibrating every 12 hours for sensor to remain accurate. Reviewed appropriate calibration techniques.  Reviewed sensor site selection. Site selected and prepped using aseptic technique Inserted to left abdomen. Transmitter placed in pod and secured.  Practiced sensor pod/transmitter removal from site, and removal of transmitter from sensor pod.  Patient able to demonstrate without difficulty.  Encouraged to review manual prior to starting another sensor.   Reviewed problem solving aspects of sensor transmission/variables that can disrupt RF transmission.  range 20 feet, but the first 3 hrs keep within 3 feet of transmitter.  Pt instructed on lag time of interstitial fluid from CBG and was advised to tx hypoglycemia and dose insulin based on SMBG values.  Link to video provided and written instructions provided for patient to review before 10 day sensor change  Dexcom technical support contact number given and examples of when to contact them discussed.      1/17 Changing the Sensor (every 10 days)  Remove the whole thing from your body  Hold the sensor by the skinny part and break the clear piece  Remove the gray transmitter  In the reader touch start sensor  Touch yes you want to start sensor  Touch the square you want to put the number in for   There will be a 4-digit code on the sensor (like 5937)  Enter the code from the sensor  Touch ok  Touch yes, it is correct  Insert Sensor  Remove orange  piece and paper tabs  Place on skin and hold in place  Push the orange button  Insert the gray transmitter into the sensor  Put the small tongue like side in first and push down  You should hear 2 clicks  Touch confirm once sensor is secure on body  Touch start Sensor  Touch 1 then 2  Sensor will take 2 hours to warm up    Changing Transmitter (every 90 days) in the reader  Touch the =   Touch transmitter  Touch pair new  Go through the process to start sensor  Touch yes you want to start sensor  Touch the square you want to put the number in for   There will be a 4-digit code on the sensor (like 5937)  Enter the code from the sensor  Touch ok  Touch yes, it is correct  Touch the square you want to put the number in for   There will be a 6-digit code on the transmitter box (like [id number])  Enter the code from the transmitter box  Touch ok  Touch yes, it is correct  Insert Sensor  Remove orange piece and paper tabs  Place on skin and hold in place  Push the orange button  Insert the gray transmitter into the sensor  Put the small tongue like side in first and push down  You should hear 2 clicks  Touch confirm once sensor is secure on body  Touch start Sensor  Touch 1 then 2  Sensor will take 2 hours to warm up           Task: Reviewed Dexcom download with patient and provider, adjustments made to treatment plan by provider and communicated to patient as directed, patient states understanding. Completed 12/16/2024        Task: Reviewed Dexcom download with patient and provider, adjustments made to treatment plan by provider and communicated to patient as directed, patient states understanding. Completed 12/20/2024        Task: Reviewed Dexcom download with patient and provider, adjustments made to treatment plan by provider and communicated to patient as directed, patient states understanding. Completed 1/8/2025          Follow Up Plan     Follow up in about 10 days (around 3/16/2025) for General Follow-up, Change  Personal CGM and Upload Data.    Today's care plan and follow up schedule was discussed with patient.  Aishwarya verbalized understanding of the care plan, goals, and agrees to follow up plan.        The patient was encouraged to communicate with his/her health care provider/physician and care team regarding his/her condition(s) and treatment.  I provided the patient with my contact information today and encouraged to contact me via phone or [facility] Patient Portal as needed.     Length of Visit   Total Time: 15 Minutes

## 2025-03-14 NOTE — TELEPHONE ENCOUNTER
----- Message from Micki Knott NP sent at 3/14/2025  1:41 PM CDT -----  Please call lab results to patient  Her A1c has improved from 8.3% to 6.8%!!!  I am very proud of her.  Keep up the good work.  This is the best her A1c has been in a while  Very very happy  Slight bump in her kidney function--creatinine was 1.5 and GFR 38---make sure that she is staying hydrated  Especially with taking the Farxiga we want to make sure that she is staying hydrated  If she has any nausea, vomiting, diarrhea with the higher dose of the Mounjaro she needs to let us know  If she has any hypoglycemia with the higher dose of the Mounjaro she needs to let us know---we did discuss cutting back her clicks yesterday to 4 clicks with meals  Continue the Dexcom    I would like for her to repeat a BMP in 4 weeks---we can do it the same day that she is here to see Podiatry  And then repeat labs prior to her follow-up visit    ----- Message -----  From: [facility] [employer] [facility]  Sent: 3/13/2025   1:11 PM CDT  To: Micki Knott NP

## 2025-03-17 ENCOUNTER — TELEPHONE (OUTPATIENT)
Dept: DIABETES | Facility: CLINIC | Age: 65
End: 2025-03-17
Payer: MEDICARE

## 2025-03-18 ENCOUNTER — TELEPHONE (OUTPATIENT)
Dept: DIABETES | Facility: CLINIC | Age: 65
End: 2025-03-18
Payer: MEDICARE

## 2025-03-26 ENCOUNTER — TELEPHONE (OUTPATIENT)
Dept: DIABETES | Facility: CLINIC | Age: 65
End: 2025-03-26
Payer: MEDICARE

## 2025-03-26 NOTE — TELEPHONE ENCOUNTER
----- Message from Annmarie sent at 3/26/2025 10:26 AM CDT -----  Contact: [phone]  Cc: Emil Gutiérrez RDPatient called, would like to schedule an appointment with dietician. Thank you.  
75

## 2025-03-28 ENCOUNTER — NUTRITION (OUTPATIENT)
Dept: DIABETES | Facility: CLINIC | Age: 65
End: 2025-03-28
Payer: MEDICARE

## 2025-03-28 DIAGNOSIS — Z79.4 TYPE 2 DIABETES MELLITUS WITH HYPERGLYCEMIA, WITH LONG-TERM CURRENT USE OF INSULIN: Primary | ICD-10-CM

## 2025-03-28 DIAGNOSIS — E11.65 TYPE 2 DIABETES MELLITUS WITH HYPERGLYCEMIA, WITH LONG-TERM CURRENT USE OF INSULIN: Primary | ICD-10-CM

## 2025-03-28 NOTE — PROGRESS NOTES
Diabetes Care Specialist Follow-up Note  Author: Marla Stein RD, CDCES  Date: 3/28/2025    Intake    Program Intake  Reason for Diabetes Program Visit:: Intervention  Device Training: Personal CGM  Current diabetes risk level:: high  In the last month, have you used the ER or been admitted to the hospital: No  Permission to speak with others about care:: no    Current Diabetes Treatment: Insulin, DM Injectables, Oral Medications  Oral Medication Type/Dose: farxiga 10 mg once a day (taking)  DM Injectables Type/Dose: mounjaro 2.5mg, will increase after the 4th dose to 5mg, taking once a week  Method of insulin delivery?: Insulin Device  Insulin Device Type/Dose: humalog via VGo30 (fiasp is ordered, unsure which one she is taking) performing 5-6 clicks with meals    Continuous Glucose Monitoring  Patient has CGM: Yes  Personal CGM type:: Dexcom G7  GMI Date: 01/08/25  GMI Value: 7.5 %    Lab Results   Component Value Date    HGBA1C 6.8 (H) 03/13/2025     A1c Pre Diabetes Care Specialist Intervention:  8.7%    Physical activity/Exercise:   No change    SMBG: using the dexcom G7     Lifestyle Coping Support & Clinical    Lifestyle/Coping/Support  Compared to other people your age, how would you rate your health?: Good  Psychosocial/Coping Skills Assessment Completed: : No  Assessment indicates:: Adequate understanding  Deffered due to:: Other (comment) (previously completed, there has been no change and patient has adequate understanding)  Area of need?: No    Problem Review  Active Comorbidities: Cardiovascular Disease, Hypertension, Neuropathy    Diabetes Self-Management Skills Assessment    Medication Skills Assessment  Patient is able to identify current diabetes medications, dosages, and appropriate timing of medications.: yes  Patient reports problems or concerns with current medication regimen.: no  Medication Skills Assessment Completed:: No  Assessment indicates:: Adequate understanding  Deffered due to::  Other (comment) (previously completed, there has been no change and patient has adequate understanding)  Area of need?: No    Diabetes Disease Process/Treatment Options  Diabetes Type?: Type II  Diabetes Disease Process/Treatment Options: Skills Assessment Completed: No  Assessment indicates:: Adequate understanding  Deferred due to:: Other (comment)  Area of need?: No    Nutrition/Healthy Eating  Meal Plan 24 Hour Recall - Breakfast: Toast and Eggs, coffee with sugar and cream  Meal Plan 24 Hour Recall - Lunch: chicken noodle soup with crackers or a sandwich  Meal Plan 24 Hour Recall - Dinner: yesterday some spaghetti and meatsauce, some times roast and potatoes, some times a burger and some fries, sometimes gets pizza or something home cooked  Meal Plan 24 Hour Recall - Snack: peanut butter crackers, almonds, cheerios  Meal Plan 24 Hour Recall - Beverage: Orange Juice sometimes, drinks water and Ice sparking water  Who shops/cooks?: self  Patient can identify foods that impact blood sugar.: yes  Challenges to healthy eating:: portion control, snacking between meals and at night, eating when feeling depressed/stressed, lack of will power, eating out, going to parties  Nutrition/Healthy Eating Skills Assessment Completed:: No  Assessment indicates:: Adequate understanding  Deffered due to:: Other (comment)  Area of need?: No    Physical Activity/Exercise  Patient's daily activity level:: lightly active  Patient formally exercises outside of work.: no  Reasons for not exercising:: time constraints  Patient can identify forms of physical activity.: yes  Physical Activity/Exercise Skills Assessment Completed: : No  Assessment indicates:: Adequate understanding  Deffered due to:: Other (comment)  Area of need?: No    Home Blood Glucose Monitoring  Patient states that blood sugar is checked at home daily.: yes  Monitoring Method:: personal continuous glucose monitor  Personal CGM type:: Dexcom G7   What is your current  Time in Range?: 51%  What is your A1c Target?: 7.0 with no hypoglycemia  Home Blood Glucose Monitoring Skills Assessment Completed: : Yes  Assessment indicates:: Instruction Needed  Area of need?: Yes    Acute Complications  Acute Complications Skills Assessment Completed: : No  Assessment indicates:: Adequate understanding  Deffered due to:: Other (comment)  Area of need?: No    Chronic Complications  Chronic Complications Skills Assessment Completed: : No  Assessment indicates:: Adequate understanding  Deferred due to:: Other (comment)  Area of need?: No      During today's follow-up visit,  the following areas required further assessment and content was provided/reviewed.    Based on today's diabetes care assessment, the following areas of need were identified:          7/26/2024    12:01 AM   Areas of Need   Medications/Current Diabetes Treatment No   Lifestyle Coping Support No   Diabetes Disease Process/Treatment Options No   Nutrition/Healthy Eating No   Physical Activity/Exercise No   Home Blood Glucose Monitoring Yes, changed out the dexcom G7 sensor and started a new one     Acute Complications No   Chronic Complications No        Today's interventions were provided through individual discussion, instruction, and written materials were provided.    Patient verbalized understanding of instruction and written materials.  Pt was able to return back demonstration of instructions today. Patient understood key points, needs reinforcement and further instruction.     Diabetes Self-Management Care Plan Review and Evaluation of Progress:    During today's follow-up Aishwarya's Diabetes Self-Management Care Plan progress was reviewed and progress was evaluated including his/her input. Aishwarya has agreed to continue his/her journey to improve/maintain overall diabetes control by continuing to set health goals. See care plan progress below.      Care Plan: Diabetes Management   Updates made since 3/28/2024 12:00 AM     "    Problem: Blood Glucose Self-Monitoring         Goal: Patient agrees to check and record blood sugars 3 times per day using the dexcom G6/G7 for the next 3 months    Start Date: 12/27/2023   Expected End Date: 3/27/2024   This Visit's Progress: On track   Recent Progress: On track   Priority: High   Barriers: Knowledge deficit   Note:    Patient was instructed how to use a meter. Discussed signs and symptoms, and causes and treatment of hypoglycemia. Covered blood sugar and A1C goals. Instructed patient to use provided logbooks to record blood sugars. Instructed patient to follow-up with their physician. Patient verbalized understanding of all instructions.  Prescription for meter and testing supplies pended to PCP    1/6/2023  Dexcom G6 Education  Patient is here in clinic today for initial start of Dexcom continuous glucose monitoring system (CGMA). Reviewed with patient how to insert sensor and transmitter. Patient inserted sensor on right abdomen and inserted transmitter. Time and date was set on monitor and settings were set. Hypoglycemia trigger set for 70 and hyperglycemia set for 300. Patient provided with phone number for 24-hour help line if needed. Discussed various types of possible alarms and what to do. Questions addressed. Initialization finished. No futher questions.  Patient referred to clinic today for Dexcom G6 continuous glucose sensor system training.  Education was provided using "Quick Start Guide" per Dexcom protocol.    Overview:  5min glucose reading updates, trending arrows, BG graph screens, battery life indicator, Blue Tooth Symbol.  Menus: trend Graph, start sensor, enter BG, events, Alerts, Settings, Shutdown, Stop Sensor   Settings:                          * Urgent Low: 55 mg/dL                          * Urgent Low Soon: Off                          * Low alert: 70                          * High alert: 300                          * Rise rate: Off                         "  * Fall Rate: Off                          * Signal Loss: 30 min                          * No Reading: Off                          * Always sound: On                             Reviewed additional setting options with patient, including Graph Height and Transmitter ID. Transmitter was paired with .    Reviewed where to find sensor insertion time and sensor expiration date.   Discussed no need to calibrate sensor during the entirety of the 10 day wear. Discussed that pt can calibrate sensor if desired, but at that time she will need to continue calibrating every 12 hours for sensor to remain accurate. Reviewed appropriate calibration techniques.  Reviewed sensor site selection. Site selected and prepped using aseptic technique Inserted to left abdomen. Transmitter placed in pod and secured.  Practiced sensor pod/transmitter removal from site, and removal of transmitter from sensor pod.  Patient able to demonstrate without difficulty.  Encouraged to review manual prior to starting another sensor.   Reviewed problem solving aspects of sensor transmission/variables that can disrupt RF transmission.  range 20 feet, but the first 3 hrs keep within 3 feet of transmitter.  Pt instructed on lag time of interstitial fluid from CBG and was advised to tx hypoglycemia and dose insulin based on SMBG values.  Link to video provided and written instructions provided for patient to review before 10 day sensor change  Dexcom technical support contact number given and examples of when to contact them discussed.      1/17 Changing the Sensor (every 10 days)  Remove the whole thing from your body  Hold the sensor by the skinny part and break the clear piece  Remove the gray transmitter  In the reader touch start sensor  Touch yes you want to start sensor  Touch the square you want to put the number in for   There will be a 4-digit code on the sensor (like 5937)  Enter the code from the sensor  Touch ok  Touch yes, it  is correct  Insert Sensor  Remove orange piece and paper tabs  Place on skin and hold in place  Push the orange button  Insert the gray transmitter into the sensor  Put the small tongue like side in first and push down  You should hear 2 clicks  Touch confirm once sensor is secure on body  Touch start Sensor  Touch 1 then 2  Sensor will take 2 hours to warm up    Changing Transmitter (every 90 days) in the reader  Touch the =   Touch transmitter  Touch pair new  Go through the process to start sensor  Touch yes you want to start sensor  Touch the square you want to put the number in for   There will be a 4-digit code on the sensor (like 5937)  Enter the code from the sensor  Touch ok  Touch yes, it is correct  Touch the square you want to put the number in for   There will be a 6-digit code on the transmitter box (like [id number])  Enter the code from the transmitter box  Touch ok  Touch yes, it is correct  Insert Sensor  Remove orange piece and paper tabs  Place on skin and hold in place  Push the orange button  Insert the gray transmitter into the sensor  Put the small tongue like side in first and push down  You should hear 2 clicks  Touch confirm once sensor is secure on body  Touch start Sensor  Touch 1 then 2  Sensor will take 2 hours to warm up           Task: Reviewed Dexcom download with patient and provider, adjustments made to treatment plan by provider and communicated to patient as directed, patient states understanding. Completed 12/16/2024        Task: Reviewed Dexcom download with patient and provider, adjustments made to treatment plan by provider and communicated to patient as directed, patient states understanding. Completed 12/20/2024        Task: Reviewed Dexcom download with patient and provider, adjustments made to treatment plan by provider and communicated to patient as directed, patient states understanding. Completed 1/8/2025          Follow Up Plan     Follow up in about 10 days (around  4/7/2025) for Change Personal CGM and Upload Data.    Today's care plan and follow up schedule was discussed with patient.  Aishwarya verbalized understanding of the care plan, goals, and agrees to follow up plan.        The patient was encouraged to communicate with his/her health care provider/physician and care team regarding his/her condition(s) and treatment.  I provided the patient with my contact information today and encouraged to contact me via phone or [facility] Patient Portal as needed.     Length of Visit   Total Time: 15 Minutes

## 2025-04-04 ENCOUNTER — TELEPHONE (OUTPATIENT)
Dept: DIABETES | Facility: CLINIC | Age: 65
End: 2025-04-04
Payer: MEDICARE

## 2025-04-07 ENCOUNTER — NUTRITION (OUTPATIENT)
Dept: DIABETES | Facility: CLINIC | Age: 65
End: 2025-04-07
Payer: MEDICARE

## 2025-04-07 DIAGNOSIS — E11.42 TYPE 2 DIABETES MELLITUS WITH DIABETIC POLYNEUROPATHY, WITH LONG-TERM CURRENT USE OF INSULIN: ICD-10-CM

## 2025-04-07 DIAGNOSIS — Z79.4 TYPE 2 DIABETES MELLITUS WITH HYPERGLYCEMIA, WITH LONG-TERM CURRENT USE OF INSULIN: Primary | ICD-10-CM

## 2025-04-07 DIAGNOSIS — E11.65 TYPE 2 DIABETES MELLITUS WITH HYPERGLYCEMIA, WITH LONG-TERM CURRENT USE OF INSULIN: Primary | ICD-10-CM

## 2025-04-07 DIAGNOSIS — Z79.4 TYPE 2 DIABETES MELLITUS WITH DIABETIC POLYNEUROPATHY, WITH LONG-TERM CURRENT USE OF INSULIN: ICD-10-CM

## 2025-04-10 NOTE — PROGRESS NOTES
Diabetes Care Specialist Follow-up Note  Author: Marla Stein RD, CDCES  Date: 4/10/2025    Intake    Program Intake  Reason for Diabetes Program Visit:: Intervention  Type of Intervention:: Individual  Device Training: Personal CGM  Current diabetes risk level:: high  In the last month, have you used the ER or been admitted to the hospital: No  Permission to speak with others about care:: no    Current Diabetes Treatment: Insulin, DM Injectables, Oral Medications  Oral Medication Type/Dose: farxiga 10 mg once a day (taking)  DM Injectables Type/Dose: mounjaro 2.5mg, will increase after the 4th dose to 5mg, taking once a week  Method of insulin delivery?: Insulin Device  Insulin Device Type/Dose: humalog via VGo30 (fiasp is ordered, unsure which one she is taking) performing 5-6 clicks with meals    Continuous Glucose Monitoring  Patient has CGM: Yes  Personal CGM type:: Dexcom G7  GMI Date: 01/08/25  GMI Value: 7.5 %    Lab Results   Component Value Date    HGBA1C 6.8 (H) 03/13/2025     A1c Pre Diabetes Care Specialist Intervention:  8.7%    Physical activity/Exercise:   No change    SMBG: using the dexcom G7     Lifestyle Coping Support & Clinical    Lifestyle/Coping/Support  Compared to other people your age, how would you rate your health?: Good  Psychosocial/Coping Skills Assessment Completed: : No  Assessment indicates:: Adequate understanding  Deffered due to:: Other (comment)  Area of need?: No    Problem Review  Active Comorbidities: Cardiovascular Disease, Hypertension, Neuropathy    Diabetes Self-Management Skills Assessment    Medication Skills Assessment  Patient is able to identify current diabetes medications, dosages, and appropriate timing of medications.: yes  Patient reports problems or concerns with current medication regimen.: no  Medication Skills Assessment Completed:: No  Assessment indicates:: Adequate understanding  Deffered due to:: Other (comment) (previously completed, there has been  no change and patient has adequate understanding)  Area of need?: No    Diabetes Disease Process/Treatment Options  Diabetes Type?: Type II  Diabetes Disease Process/Treatment Options: Skills Assessment Completed: No  Assessment indicates:: Adequate understanding  Deferred due to:: Other (comment)  Area of need?: No    Nutrition/Healthy Eating  Meal Plan 24 Hour Recall - Breakfast: Toast and Eggs, coffee with sugar and cream  Meal Plan 24 Hour Recall - Lunch: chicken noodle soup with crackers or a sandwich  Meal Plan 24 Hour Recall - Dinner: yesterday some spaghetti and meatsauce, some times roast and potatoes, some times a burger and some fries, sometimes gets pizza or something home cooked  Meal Plan 24 Hour Recall - Snack: peanut butter crackers, almonds, cheerios  Patient can identify foods that impact blood sugar.: yes  Challenges to healthy eating:: portion control, snacking between meals and at night, eating when feeling depressed/stressed, lack of will power, eating out, going to parties  Nutrition/Healthy Eating Skills Assessment Completed:: No  Assessment indicates:: Adequate understanding  Deffered due to:: Other (comment)  Area of need?: No    Physical Activity/Exercise  Patient's daily activity level:: lightly active  Patient formally exercises outside of work.: no  Reasons for not exercising:: time constraints  Patient can identify forms of physical activity.: yes  Physical Activity/Exercise Skills Assessment Completed: : No  Assessment indicates:: Adequate understanding  Deffered due to:: Other (comment)  Area of need?: No    Home Blood Glucose Monitoring  Patient states that blood sugar is checked at home daily.: yes  Monitoring Method:: personal continuous glucose monitor  Personal CGM type:: Dexcom G7   What is your current Time in Range?: 51%  What is your A1c Target?: 7.0 with no hypoglycemia  Home Blood Glucose Monitoring Skills Assessment Completed: : Yes  Assessment indicates:: Instruction  Needed  Area of need?: Yes    Acute Complications  Acute Complications Skills Assessment Completed: : No  Assessment indicates:: Adequate understanding  Deffered due to:: Other (comment)  Area of need?: No    Chronic Complications  Chronic Complications Skills Assessment Completed: : No  Assessment indicates:: Adequate understanding  Deferred due to:: Other (comment)  Area of need?: No      During today's follow-up visit,  the following areas required further assessment and content was provided/reviewed.    Based on today's diabetes care assessment, the following areas of need were identified:          7/26/2024    12:01 AM   Areas of Need   Medications/Current Diabetes Treatment No   Lifestyle Coping Support No   Diabetes Disease Process/Treatment Options No   Nutrition/Healthy Eating No   Physical Activity/Exercise No   Home Blood Glucose Monitoring Yes, changed out the dexcom G7 sensor and started a new one     Acute Complications No   Chronic Complications No        Today's interventions were provided through individual discussion, instruction, and written materials were provided.    Patient verbalized understanding of instruction and written materials.  Pt was able to return back demonstration of instructions today. Patient understood key points, needs reinforcement and further instruction.     Diabetes Self-Management Care Plan Review and Evaluation of Progress:    During today's follow-up Agustin Diabetes Self-Management Care Plan progress was reviewed and progress was evaluated including his/her input. Aishwarya has agreed to continue his/her journey to improve/maintain overall diabetes control by continuing to set health goals. See care plan progress below.      Care Plan: Diabetes Management   Updates made since 4/10/2024 12:00 AM        Problem: Blood Glucose Self-Monitoring         Goal: Patient agrees to check and record blood sugars 3 times per day using the dexcom G6/G7 for the next 3 months   "  Start Date: 12/27/2023   Expected End Date: 3/27/2024   This Visit's Progress: On track   Recent Progress: On track   Priority: High   Barriers: Knowledge deficit   Note:    Patient was instructed how to use a meter. Discussed signs and symptoms, and causes and treatment of hypoglycemia. Covered blood sugar and A1C goals. Instructed patient to use provided logbooks to record blood sugars. Instructed patient to follow-up with their physician. Patient verbalized understanding of all instructions.  Prescription for meter and testing supplies pended to PCP    1/6/2023  Dexcom G6 Education  Patient is here in clinic today for initial start of Dexcom continuous glucose monitoring system (CGMA). Reviewed with patient how to insert sensor and transmitter. Patient inserted sensor on right abdomen and inserted transmitter. Time and date was set on monitor and settings were set. Hypoglycemia trigger set for 70 and hyperglycemia set for 300. Patient provided with phone number for 24-hour help line if needed. Discussed various types of possible alarms and what to do. Questions addressed. Initialization finished. No futher questions.  Patient referred to clinic today for Dexcom G6 continuous glucose sensor system training.  Education was provided using "Quick Start Guide" per Dexcom protocol.    Overview:  5min glucose reading updates, trending arrows, BG graph screens, battery life indicator, Blue Tooth Symbol.  Menus: trend Graph, start sensor, enter BG, events, Alerts, Settings, Shutdown, Stop Sensor   Settings:                          * Urgent Low: 55 mg/dL                          * Urgent Low Soon: Off                          * Low alert: 70                          * High alert: 300                          * Rise rate: Off                          * Fall Rate: Off                          * Signal Loss: 30 min                          * No Reading: Off                          * Always sound: On                "              Reviewed additional setting options with patient, including Graph Height and Transmitter ID. Transmitter was paired with .    Reviewed where to find sensor insertion time and sensor expiration date.   Discussed no need to calibrate sensor during the entirety of the 10 day wear. Discussed that pt can calibrate sensor if desired, but at that time she will need to continue calibrating every 12 hours for sensor to remain accurate. Reviewed appropriate calibration techniques.  Reviewed sensor site selection. Site selected and prepped using aseptic technique Inserted to left abdomen. Transmitter placed in pod and secured.  Practiced sensor pod/transmitter removal from site, and removal of transmitter from sensor pod.  Patient able to demonstrate without difficulty.  Encouraged to review manual prior to starting another sensor.   Reviewed problem solving aspects of sensor transmission/variables that can disrupt RF transmission.  range 20 feet, but the first 3 hrs keep within 3 feet of transmitter.  Pt instructed on lag time of interstitial fluid from CBG and was advised to tx hypoglycemia and dose insulin based on SMBG values.  Link to video provided and written instructions provided for patient to review before 10 day sensor change  Dexcom technical support contact number given and examples of when to contact them discussed.      1/17 Changing the Sensor (every 10 days)  Remove the whole thing from your body  Hold the sensor by the skinny part and break the clear piece  Remove the gray transmitter  In the reader touch start sensor  Touch yes you want to start sensor  Touch the square you want to put the number in for   There will be a 4-digit code on the sensor (like 5937)  Enter the code from the sensor  Touch ok  Touch yes, it is correct  Insert Sensor  Remove orange piece and paper tabs  Place on skin and hold in place  Push the orange button  Insert the gray transmitter into the  sensor  Put the small tongue like side in first and push down  You should hear 2 clicks  Touch confirm once sensor is secure on body  Touch start Sensor  Touch 1 then 2  Sensor will take 2 hours to warm up    Changing Transmitter (every 90 days) in the reader  Touch the =   Touch transmitter  Touch pair new  Go through the process to start sensor  Touch yes you want to start sensor  Touch the square you want to put the number in for   There will be a 4-digit code on the sensor (like 5937)  Enter the code from the sensor  Touch ok  Touch yes, it is correct  Touch the square you want to put the number in for   There will be a 6-digit code on the transmitter box (like [id number])  Enter the code from the transmitter box  Touch ok  Touch yes, it is correct  Insert Sensor  Remove orange piece and paper tabs  Place on skin and hold in place  Push the orange button  Insert the gray transmitter into the sensor  Put the small tongue like side in first and push down  You should hear 2 clicks  Touch confirm once sensor is secure on body  Touch start Sensor  Touch 1 then 2  Sensor will take 2 hours to warm up           Task: Reviewed Dexcom download with patient and provider, adjustments made to treatment plan by provider and communicated to patient as directed, patient states understanding. Completed 12/16/2024        Task: Reviewed Dexcom download with patient and provider, adjustments made to treatment plan by provider and communicated to patient as directed, patient states understanding. Completed 12/20/2024        Task: Reviewed Dexcom download with patient and provider, adjustments made to treatment plan by provider and communicated to patient as directed, patient states understanding. Completed 1/8/2025          Follow Up Plan     Follow up in about 10 days (around 4/17/2025) for Change Personal CGM and Upload Data.    Today's care plan and follow up schedule was discussed with patient.  Aishwarya verbalized understanding of  the care plan, goals, and agrees to follow up plan.        The patient was encouraged to communicate with his/her health care provider/physician and care team regarding his/her condition(s) and treatment.  I provided the patient with my contact information today and encouraged to contact me via phone or [facility] Patient Portal as needed.     Length of Visit   Total Time: 15 Minutes

## 2025-04-11 NOTE — PROGRESS NOTES
Subjective:      Patient ID: Aishwarya Jensen is a 64 y.o. female.    Chief Complaint: Diabetes Mellitus, Nail Care, and Toe Pain (Right toes)    Patient is here for her 6 month DM check. Usually goes to nail shop [id number].   Also c/o pain 4th toe R after pedicure & massage Sat.for niece's wedding. Went to ED Thurs.d/t pain & swelling leg & toes; pain level 8/10. Was given Rx Gabapentin 300mg qpm x 30 that helps but still swollen - hurts w/ walking. 10/15/24  Patient is here for f/u B/L posterior heel pain. States helped by heel lift a lot as well as Voltaren gel. Most of her pain is R knee, pain level 4/10.  Last visit, dispensed new PPT heel lifts & Tulis heel cups for use all times WB including @ home. Was to continue OTC Voltaren gel up to qid prn & Tubigrip BLE prn.  8/13/24  Patient states L heel started to hurt after granddaughter played w/ her shoes & does not know where heel pads are. Went to ED a week ago; given a shot & IB. Pain level 3/10. Here in flat slip-on shoes & uses Crocs on tile in house.  5/13/24  Patient is here for F/U R heel pain.  Doing well.  Using Tuli's heel cups in all her shoes. Helps.  Not having much pain on a regular basis.  Switch to Ozempic per DM mgmt.just recently. Had her annual checkup W/ PCP earlier today.  4/16/24  Patient is here for f/u heel pain & DM foot care. States her heel pain has gone but lost one of Tulis heel cups so in flat slip-ons - wants another pair toprevent recurrence.  Has thick toenails but goes to pedicures regularly. Questions re: Vicks for toenails or other OTC topical antifungals.  Patient last in 5 months ago. Was visiting daughter in [facility] for 2 wks.  11/2/23  Patient is here for f/u R heel pain. Last here for DM foot exam & scheduled q 6 months. Instructed on appropriate shoes as well as dispensed Tulis heel cups (in current flat casual slip-ons) & PPT heel lifts for others. Rx Dm shoes written. States has some swelling in BLE  R>L.  9/28/23  Aishwarya is a 64 y.o. female who presents after >5months absence for diabetic foot & nail care in high risk feet. Nails hurt in shoes w/ pressure when walking. Also heel pain R foot.     PCP: Joey Cintron MD  2/4/25, due 5/6/25  DM mgmt: Micki Knott NP 3/13/25, due 7/22/25    Current shoe gear: flat casual; uses non-slip hospital socks in home.    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    Past Medical History:   Diagnosis Date    Cecal volvulus 05/04/2023    Diabetes mellitus, type 2     Hypercholesteremia     Hypertension    Dx DM 30+ yrs.ago.   Knee pain - ortho.referral  Patient Active Problem List   Diagnosis    Essential hypertension, benign    Dyslipidemia, goal LDL below 100    Microalbuminuria due to type 2 diabetes mellitus    Type 2 diabetes mellitus with hyperglycemia, with long-term current use of insulin    Type 2 diabetes mellitus with diabetic polyneuropathy, with long-term current use of insulin    Asymptomatic varicose veins of bilateral lower extremities    Onychomycosis due to dermatophyte    Severe obesity (BMI 35.0-39.9) with comorbidity    Stage 3a chronic kidney disease    Dyspnea on exertion    Primary osteoarthritis of right knee    Primary osteoarthritis of left knee    Type 2 diabetes mellitus, with long-term current use of insulin    Anemia    Aortic atherosclerosis    Plantar fasciitis of left foot    SBO (small bowel obstruction)    Enteritis      Hemoglobin A1C   Date Value Ref Range Status   03/13/2025 6.8 (H) 4.0 - 5.6 % Final     Comment:     ADA Screening Guidelines:  5.7-6.4%  Consistent with prediabetes  >or=6.5%  Consistent with diabetes    High levels of fetal hemoglobin interfere with the HbA1C  assay. Heterozygous hemoglobin variants (HbS, HgC, etc)do  not significantly interfere with this assay.   However, presence of multiple variants may affect accuracy.     11/07/2024 8.3 (H) 4.0 - 5.6 % Final     Comment:     ADA Screening Guidelines:  5.7-6.4%   Consistent with prediabetes  >or=6.5%  Consistent with diabetes    High levels of fetal hemoglobin interfere with the HbA1C  assay. Heterozygous hemoglobin variants (HbS, HgC, etc)do  not significantly interfere with this assay.   However, presence of multiple variants may affect accuracy.     06/25/2024 7.2 (H) 4.0 - 5.6 % Final     Comment:     ADA Screening Guidelines:  5.7-6.4%  Consistent with prediabetes  >or=6.5%  Consistent with diabetes    High levels of fetal hemoglobin interfere with the HbA1C  assay. Heterozygous hemoglobin variants (HbS, HgC, etc)do  not significantly interfere with this assay.   However, presence of multiple variants may affect accuracy.        Objective:      Physical Exam  Vitals reviewed.   Constitutional:       Appearance: She is well-developed. She is obese.   Cardiovascular:      Pulses: Normal pulses.           Dorsalis pedis pulses are 2+ on the right side and 2+ on the left side.      Comments: Varicose veins R>L, w/ edema BLE, & telangectasias.  Musculoskeletal:         General: Swelling and tenderness present. No signs of injury.      Right lower leg: Edema present.      Left lower leg: Edema present.      Right ankle:      Right Achilles Tendon: Tenderness present. No defects. Menchaca's test negative.      Left ankle: No tenderness.      Left Achilles Tendon: No tenderness or defects. Menchaca's test negative.      Right foot: Deformity (hammertoe B/L) and prominent metatarsal heads present.      Left foot: Deformity (cavus B/L) and prominent metatarsal heads present.        Feet:    Feet:      Right foot:      Skin integrity: Warmth present. No erythema.      Toenail Condition: Fungal disease present.     Left foot:      Skin integrity: No erythema or warmth.      Toenail Condition: Fungal disease present.     Comments: Equinus B/L ankles w/ < 90 deg foot to leg noted w/ knees extended.      MS strength of extrinsics to foot & ankle B/L + 5/5 in DF/PF/Inv/Ev to resistance  w/ no reproduction of pain in any direction, including Achilles insertion R.   Resolution of pain distal insertion of Achilles L w/ no remaining palpable fullness; resolution of calor.    Passive ROM of ankle & pedal joints is painless & w/out crepitation B/L.     Toenails 1st, 2nd, 3rd, 4th, 5th  B/L are thickened, dystrophic, discolored, w/ crumbly subungual debris sparing 4 L.    Skin:     General: Skin is cool and dry.      Capillary Refill: Capillary refill takes less than 2 seconds.      Findings: No bruising or erythema.      Comments: Stasis hyperpigmentation B/L.    Swelling 4th toe & base of toes including adjacent, w/ calor R. No erythema.   Neurological:      Mental Status: She is alert and oriented to person, place, and time.      Sensory: Sensory deficit present.      Motor: Motor function is intact. No weakness or abnormal muscle tone.      Gait: Gait is intact. Gait normal.      Comments: Paresthesias including numbness & parasthesias B/L feet w/ no clearly identified trigger or source; no hyperemia.   Psychiatric:         Mood and Affect: Mood and affect normal.         Behavior: Behavior normal. Behavior is cooperative.     X-Ray Foot Complete Bilateral  Order: [id number]   Status: Final result       Next appt: 04/29/2025 at 09:00 AM in Podiatry (Annmarie Keane DPM)       Dx: Pain    Test Result Released: Yes (not seen)    0 Result Notes  Details    Reading Physician Reading Date Result Priority   Rafiq Hicks MD  [phone]  4/10/2025 STAT     Narrative & Impression  EXAMINATION:  XR FOOT COMPLETE 3 VIEW BILATERAL     CLINICAL HISTORY:  PAIN; Pain, unspecified     TECHNIQUE:  AP, lateral, and oblique views of both feet were performed.     COMPARISON:  None     FINDINGS:  Right: No fracture or dislocation.  Mild degenerative change of the interphalangeal joints.  There is a small calcaneal spur.  There is an enthesophyte at the insertion of the Achilles tendon.     Left: No fracture or dislocation.   Small calcaneal spur.  Enthesophyte at the insertion of the Achilles tendon.     Impression:     See above        Electronically signed by:Rafiq Hicsk  Date:                                            04/10/2025  Time:                                           15:28   I independently reviewed the x-ray images    Assessment:      Encounter Diagnoses   Name Primary?    Pain and swelling of toe of right foot Yes    Type 2 diabetes mellitus with diabetic polyneuropathy, with long-term current use of insulin     Cellulitis of toe of right foot     Acute idiopathic gout involving toe of right foot     Encounter for diabetic foot exam     Varicose veins of leg with edema, bilateral      Problem List Items Addressed This Visit          Endocrine    Type 2 diabetes mellitus with diabetic polyneuropathy, with long-term current use of insulin     Other Visit Diagnoses         Pain and swelling of toe of right foot    -  Primary    Relevant Medications    indomethacin (INDOCIN) 25 MG capsule    colchicine (COLCRYS) 0.6 mg tablet    Other Relevant Orders    Uric acid (Completed)      Cellulitis of toe of right foot          Acute idiopathic gout involving toe of right foot          Encounter for diabetic foot exam          Varicose veins of leg with edema, bilateral                Plan:      I counseled the patient on her conditions, their implications & medical mgmt.    - Diabetic Foot Education. Patient reminded of the importance of good blood sugar control to help prevent podiatric complications of diabetes. We discussed wearing proper shoe gear, daily foot inspections, never walking w/out protective shoe gear, podiatric visits every 6-12 months, sooner prn.     Recommended shoes w/ adequate lift to to alleviate abnormal pressure & improve stability of foot while walking.   Avoid flat shoes or barefoot walking as these will exacerbate or worsen symptoms.   -Continue PPT heel lifts or Tulis heel cups in all shoe gear, all  times WB including @ home.   Continue OTC Voltaren gel up to qid prn (may use for R knee as well).    Uric acid ordered.    The nature of gout is fully explained, including dietary relationship, acute & interval phase & tx of both. Indications for the use of allopurinol for prophylaxis & the use of colchicine to prevent or treat flare-ups is also discussed. Proper use of indomethacin for acute attacks discussed. Medrol dosepak not used d/t DM.  F/U 2 wks., sooner prn.     Rx Clindamycin 300 mg tid x 7days.  Rx Indocin 25mg tid x 10 days.  Rx Colcrys 0.6 mg x 2 tabs 1st hr.then 1 tab in an hr., then 1 tab.qd x 1 wk.        Uric acid  Order: [id number]   Status: Final result       Next appt: 04/29/2025 at 09:00 AM in Podiatry (Annmarie Keane DPM)       Dx: Pain and swelling of toe of right foot    Test Result Released: Yes (not seen)    0 Result Notes       2 Follow-up Encounters      Component  Ref Range & Units (hover) 11 d ago   Uric Acid 9.2 High    Resulting Agency [facility]        Specimen Collected: 04/15/25 11:12 CDT Last Resulted: 04/15/25 12:13 CDT       Patient notified of results.        A total of 31 mins.was spent on chart review, patient visit & documentation.

## 2025-04-15 ENCOUNTER — PATIENT MESSAGE (OUTPATIENT)
Dept: DIABETES | Facility: CLINIC | Age: 65
End: 2025-04-15
Payer: MEDICARE

## 2025-04-15 ENCOUNTER — TELEPHONE (OUTPATIENT)
Dept: PODIATRY | Facility: CLINIC | Age: 65
End: 2025-04-15
Payer: MEDICARE

## 2025-04-15 ENCOUNTER — TELEPHONE (OUTPATIENT)
Dept: DIABETES | Facility: CLINIC | Age: 65
End: 2025-04-15
Payer: MEDICARE

## 2025-04-15 ENCOUNTER — RESULTS FOLLOW-UP (OUTPATIENT)
Dept: PODIATRY | Facility: CLINIC | Age: 65
End: 2025-04-15

## 2025-04-15 ENCOUNTER — OFFICE VISIT (OUTPATIENT)
Dept: PODIATRY | Facility: CLINIC | Age: 65
End: 2025-04-15
Payer: MEDICARE

## 2025-04-15 ENCOUNTER — RESULTS FOLLOW-UP (OUTPATIENT)
Dept: DIABETES | Facility: CLINIC | Age: 65
End: 2025-04-15

## 2025-04-15 VITALS
SYSTOLIC BLOOD PRESSURE: 113 MMHG | BODY MASS INDEX: 36.59 KG/M2 | HEART RATE: 66 BPM | WEIGHT: 233.13 LBS | HEIGHT: 67 IN | DIASTOLIC BLOOD PRESSURE: 44 MMHG

## 2025-04-15 DIAGNOSIS — L03.031 CELLULITIS OF TOE OF RIGHT FOOT: ICD-10-CM

## 2025-04-15 DIAGNOSIS — I83.893 VARICOSE VEINS OF LEG WITH EDEMA, BILATERAL: ICD-10-CM

## 2025-04-15 DIAGNOSIS — E11.9 ENCOUNTER FOR DIABETIC FOOT EXAM: ICD-10-CM

## 2025-04-15 DIAGNOSIS — M10.071 ACUTE IDIOPATHIC GOUT INVOLVING TOE OF RIGHT FOOT: ICD-10-CM

## 2025-04-15 DIAGNOSIS — E11.42 TYPE 2 DIABETES MELLITUS WITH DIABETIC POLYNEUROPATHY, WITH LONG-TERM CURRENT USE OF INSULIN: ICD-10-CM

## 2025-04-15 DIAGNOSIS — M79.674 PAIN AND SWELLING OF TOE OF RIGHT FOOT: Primary | ICD-10-CM

## 2025-04-15 DIAGNOSIS — M79.89 PAIN AND SWELLING OF TOE OF RIGHT FOOT: Primary | ICD-10-CM

## 2025-04-15 DIAGNOSIS — Z79.4 TYPE 2 DIABETES MELLITUS WITH DIABETIC POLYNEUROPATHY, WITH LONG-TERM CURRENT USE OF INSULIN: ICD-10-CM

## 2025-04-15 PROCEDURE — 3008F BODY MASS INDEX DOCD: CPT | Mod: CPTII,S$GLB,, | Performed by: PODIATRIST

## 2025-04-15 PROCEDURE — 99214 OFFICE O/P EST MOD 30 MIN: CPT | Mod: S$GLB,,, | Performed by: PODIATRIST

## 2025-04-15 PROCEDURE — 1125F AMNT PAIN NOTED PAIN PRSNT: CPT | Mod: CPTII,S$GLB,, | Performed by: PODIATRIST

## 2025-04-15 PROCEDURE — 1101F PT FALLS ASSESS-DOCD LE1/YR: CPT | Mod: CPTII,S$GLB,, | Performed by: PODIATRIST

## 2025-04-15 PROCEDURE — 3044F HG A1C LEVEL LT 7.0%: CPT | Mod: CPTII,S$GLB,, | Performed by: PODIATRIST

## 2025-04-15 PROCEDURE — 3066F NEPHROPATHY DOC TX: CPT | Mod: CPTII,S$GLB,, | Performed by: PODIATRIST

## 2025-04-15 PROCEDURE — 3074F SYST BP LT 130 MM HG: CPT | Mod: CPTII,S$GLB,, | Performed by: PODIATRIST

## 2025-04-15 PROCEDURE — 3062F POS MACROALBUMINURIA REV: CPT | Mod: CPTII,S$GLB,, | Performed by: PODIATRIST

## 2025-04-15 PROCEDURE — 3288F FALL RISK ASSESSMENT DOCD: CPT | Mod: CPTII,S$GLB,, | Performed by: PODIATRIST

## 2025-04-15 PROCEDURE — 99999 PR PBB SHADOW E&M-EST. PATIENT-LVL IV: CPT | Mod: PBBFAC,,, | Performed by: PODIATRIST

## 2025-04-15 PROCEDURE — 4010F ACE/ARB THERAPY RXD/TAKEN: CPT | Mod: CPTII,S$GLB,, | Performed by: PODIATRIST

## 2025-04-15 PROCEDURE — 3078F DIAST BP <80 MM HG: CPT | Mod: CPTII,S$GLB,, | Performed by: PODIATRIST

## 2025-04-15 RX ORDER — INDOMETHACIN 25 MG/1
CAPSULE ORAL
Qty: 30 CAPSULE | Refills: 1 | Status: SHIPPED | OUTPATIENT
Start: 2025-04-15

## 2025-04-15 RX ORDER — COLCHICINE 0.6 MG/1
TABLET ORAL
Qty: 10 TABLET | Refills: 2 | Status: SHIPPED | OUTPATIENT
Start: 2025-04-15

## 2025-04-15 RX ORDER — CLINDAMYCIN HYDROCHLORIDE 300 MG/1
300 CAPSULE ORAL EVERY 8 HOURS
Qty: 21 CAPSULE | Refills: 0 | Status: ON HOLD | OUTPATIENT
Start: 2025-04-15 | End: 2025-04-21 | Stop reason: HOSPADM

## 2025-04-15 NOTE — TELEPHONE ENCOUNTER
Spoke with patient and per Dr. Keane,Please let patient know that her uric acid was very high so she definitely has gout even though it started over a week ago. Hopefully, the medication helps her. I am sending her in a 2nd medication specifically for gout.Pt verbalized understanding and no further issues discussed.  Please let patient know that her uric acid was very high so she definitely has gout even though it started over a week ago. Hopefully, the medication helps her.  I am sending her in a 2nd medication   specifically for gout.  ----- Message -----  From: Lab, Background User  Sent: 4/15/2025  12:13 PM CDT  To: Annmarie Keane DPM

## 2025-04-17 ENCOUNTER — NUTRITION (OUTPATIENT)
Dept: DIABETES | Facility: CLINIC | Age: 65
End: 2025-04-17
Payer: MEDICARE

## 2025-04-17 DIAGNOSIS — E11.22 TYPE 2 DIABETES MELLITUS WITH STAGE 3A CHRONIC KIDNEY DISEASE, WITH LONG-TERM CURRENT USE OF INSULIN: ICD-10-CM

## 2025-04-17 DIAGNOSIS — E11.65 TYPE 2 DIABETES MELLITUS WITH HYPERGLYCEMIA, WITH LONG-TERM CURRENT USE OF INSULIN: Primary | ICD-10-CM

## 2025-04-17 DIAGNOSIS — E11.42 TYPE 2 DIABETES MELLITUS WITH DIABETIC POLYNEUROPATHY, WITH LONG-TERM CURRENT USE OF INSULIN: ICD-10-CM

## 2025-04-17 DIAGNOSIS — N18.31 TYPE 2 DIABETES MELLITUS WITH STAGE 3A CHRONIC KIDNEY DISEASE, WITH LONG-TERM CURRENT USE OF INSULIN: ICD-10-CM

## 2025-04-17 DIAGNOSIS — Z79.4 TYPE 2 DIABETES MELLITUS WITH DIABETIC POLYNEUROPATHY, WITH LONG-TERM CURRENT USE OF INSULIN: ICD-10-CM

## 2025-04-17 DIAGNOSIS — Z79.4 TYPE 2 DIABETES MELLITUS WITH STAGE 3A CHRONIC KIDNEY DISEASE, WITH LONG-TERM CURRENT USE OF INSULIN: ICD-10-CM

## 2025-04-17 DIAGNOSIS — Z79.4 TYPE 2 DIABETES MELLITUS WITH HYPERGLYCEMIA, WITH LONG-TERM CURRENT USE OF INSULIN: Primary | ICD-10-CM

## 2025-04-18 ENCOUNTER — HOSPITAL ENCOUNTER (INPATIENT)
Facility: HOSPITAL | Age: 65
LOS: 3 days | Discharge: HOME | DRG: 390 | End: 2025-04-21
Attending: FAMILY MEDICINE | Admitting: FAMILY MEDICINE
Payer: MEDICARE

## 2025-04-18 DIAGNOSIS — E78.5 DYSLIPIDEMIA, GOAL LDL BELOW 100: ICD-10-CM

## 2025-04-18 DIAGNOSIS — R07.9 CHEST PAIN: ICD-10-CM

## 2025-04-18 DIAGNOSIS — K56.609 SMALL BOWEL OBSTRUCTION: ICD-10-CM

## 2025-04-18 DIAGNOSIS — I10 ESSENTIAL HYPERTENSION, BENIGN: ICD-10-CM

## 2025-04-18 DIAGNOSIS — K56.609 SBO (SMALL BOWEL OBSTRUCTION): Primary | Chronic | ICD-10-CM

## 2025-04-18 PROBLEM — K52.9 ENTERITIS: Status: ACTIVE | Noted: 2025-04-18

## 2025-04-18 PROBLEM — E11.9 TYPE 2 DIABETES MELLITUS, WITH LONG-TERM CURRENT USE OF INSULIN: Status: ACTIVE | Noted: 2022-06-15

## 2025-04-18 LAB — POCT GLUCOSE: 71 MG/DL (ref 70–110)

## 2025-04-18 PROCEDURE — 63600175 PHARM REV CODE 636 W HCPCS: Performed by: STUDENT IN AN ORGANIZED HEALTH CARE EDUCATION/TRAINING PROGRAM

## 2025-04-18 PROCEDURE — 11000001 HC ACUTE MED/SURG PRIVATE ROOM

## 2025-04-18 PROCEDURE — 25000003 PHARM REV CODE 250: Performed by: STUDENT IN AN ORGANIZED HEALTH CARE EDUCATION/TRAINING PROGRAM

## 2025-04-18 RX ORDER — ATORVASTATIN CALCIUM 20 MG/1
20 TABLET, FILM COATED ORAL DAILY
Status: DISCONTINUED | OUTPATIENT
Start: 2025-04-19 | End: 2025-04-18

## 2025-04-18 RX ORDER — CIPROFLOXACIN 2 MG/ML
400 INJECTION, SOLUTION INTRAVENOUS
Status: DISCONTINUED | OUTPATIENT
Start: 2025-04-18 | End: 2025-04-21

## 2025-04-18 RX ORDER — HYDROXYZINE HYDROCHLORIDE 25 MG/1
25 TABLET, FILM COATED ORAL NIGHTLY PRN
Status: DISCONTINUED | OUTPATIENT
Start: 2025-04-18 | End: 2025-04-21 | Stop reason: HOSPADM

## 2025-04-18 RX ORDER — METRONIDAZOLE 500 MG/1
500 TABLET ORAL EVERY 8 HOURS
Status: DISCONTINUED | OUTPATIENT
Start: 2025-04-18 | End: 2025-04-21 | Stop reason: HOSPADM

## 2025-04-18 RX ORDER — IBUPROFEN 200 MG
24 TABLET ORAL
Status: DISCONTINUED | OUTPATIENT
Start: 2025-04-18 | End: 2025-04-21 | Stop reason: HOSPADM

## 2025-04-18 RX ORDER — ACETAMINOPHEN 325 MG/1
650 TABLET ORAL EVERY 8 HOURS PRN
Status: DISCONTINUED | OUTPATIENT
Start: 2025-04-18 | End: 2025-04-21 | Stop reason: HOSPADM

## 2025-04-18 RX ORDER — HYDROCODONE BITARTRATE AND ACETAMINOPHEN 5; 325 MG/1; MG/1
1 TABLET ORAL EVERY 6 HOURS PRN
Refills: 0 | Status: DISCONTINUED | OUTPATIENT
Start: 2025-04-18 | End: 2025-04-21 | Stop reason: HOSPADM

## 2025-04-18 RX ORDER — INSULIN ASPART 100 [IU]/ML
0-10 INJECTION, SOLUTION INTRAVENOUS; SUBCUTANEOUS
Status: DISCONTINUED | OUTPATIENT
Start: 2025-04-18 | End: 2025-04-18

## 2025-04-18 RX ORDER — GLUCAGON 1 MG
1 KIT INJECTION
Status: DISCONTINUED | OUTPATIENT
Start: 2025-04-18 | End: 2025-04-21 | Stop reason: HOSPADM

## 2025-04-18 RX ORDER — EZETIMIBE 10 MG/1
10 TABLET ORAL NIGHTLY
Status: DISCONTINUED | OUTPATIENT
Start: 2025-04-18 | End: 2025-04-21 | Stop reason: HOSPADM

## 2025-04-18 RX ORDER — NALOXONE HCL 0.4 MG/ML
0.02 VIAL (ML) INJECTION
Status: DISCONTINUED | OUTPATIENT
Start: 2025-04-18 | End: 2025-04-21 | Stop reason: HOSPADM

## 2025-04-18 RX ORDER — GLUCAGON 1 MG
1 KIT INJECTION
Status: DISCONTINUED | OUTPATIENT
Start: 2025-04-18 | End: 2025-04-20

## 2025-04-18 RX ORDER — HEPARIN SODIUM 5000 [USP'U]/ML
5000 INJECTION, SOLUTION INTRAVENOUS; SUBCUTANEOUS EVERY 8 HOURS
Status: DISCONTINUED | OUTPATIENT
Start: 2025-04-18 | End: 2025-04-21 | Stop reason: HOSPADM

## 2025-04-18 RX ORDER — SODIUM CHLORIDE 0.9 % (FLUSH) 0.9 %
10 SYRINGE (ML) INJECTION EVERY 12 HOURS PRN
Status: DISCONTINUED | OUTPATIENT
Start: 2025-04-18 | End: 2025-04-21 | Stop reason: HOSPADM

## 2025-04-18 RX ORDER — AMLODIPINE BESYLATE 5 MG/1
10 TABLET ORAL DAILY
Status: DISCONTINUED | OUTPATIENT
Start: 2025-04-19 | End: 2025-04-21 | Stop reason: HOSPADM

## 2025-04-18 RX ORDER — ONDANSETRON HYDROCHLORIDE 2 MG/ML
4 INJECTION, SOLUTION INTRAVENOUS EVERY 8 HOURS PRN
Status: DISCONTINUED | OUTPATIENT
Start: 2025-04-18 | End: 2025-04-21 | Stop reason: HOSPADM

## 2025-04-18 RX ORDER — SODIUM CHLORIDE 9 MG/ML
INJECTION, SOLUTION INTRAVENOUS CONTINUOUS
Status: ACTIVE | OUTPATIENT
Start: 2025-04-18 | End: 2025-04-20

## 2025-04-18 RX ORDER — ACETAMINOPHEN 325 MG/1
650 TABLET ORAL EVERY 4 HOURS PRN
Status: DISCONTINUED | OUTPATIENT
Start: 2025-04-18 | End: 2025-04-21 | Stop reason: HOSPADM

## 2025-04-18 RX ORDER — IBUPROFEN 200 MG
16 TABLET ORAL
Status: DISCONTINUED | OUTPATIENT
Start: 2025-04-18 | End: 2025-04-20

## 2025-04-18 RX ORDER — GABAPENTIN 300 MG/1
300 CAPSULE ORAL NIGHTLY
Status: DISCONTINUED | OUTPATIENT
Start: 2025-04-18 | End: 2025-04-21 | Stop reason: HOSPADM

## 2025-04-18 RX ORDER — ATORVASTATIN CALCIUM 40 MG/1
80 TABLET, FILM COATED ORAL DAILY
Status: DISCONTINUED | OUTPATIENT
Start: 2025-04-19 | End: 2025-04-21 | Stop reason: HOSPADM

## 2025-04-18 RX ORDER — IBUPROFEN 200 MG
24 TABLET ORAL
Status: DISCONTINUED | OUTPATIENT
Start: 2025-04-18 | End: 2025-04-20

## 2025-04-18 RX ORDER — IBUPROFEN 200 MG
16 TABLET ORAL
Status: DISCONTINUED | OUTPATIENT
Start: 2025-04-18 | End: 2025-04-21 | Stop reason: HOSPADM

## 2025-04-18 RX ORDER — INSULIN GLARGINE 100 [IU]/ML
20 INJECTION, SOLUTION SUBCUTANEOUS DAILY
Status: DISCONTINUED | OUTPATIENT
Start: 2025-04-19 | End: 2025-04-18

## 2025-04-18 RX ADMIN — GABAPENTIN 300 MG: 300 CAPSULE ORAL at 08:04

## 2025-04-18 RX ADMIN — CIPROFLOXACIN 400 MG: 2 INJECTION, SOLUTION INTRAVENOUS at 10:04

## 2025-04-18 RX ADMIN — EZETIMIBE 10 MG: 10 TABLET ORAL at 08:04

## 2025-04-18 RX ADMIN — SODIUM CHLORIDE: 9 INJECTION, SOLUTION INTRAVENOUS at 10:04

## 2025-04-18 RX ADMIN — METRONIDAZOLE 500 MG: 500 TABLET ORAL at 10:04

## 2025-04-18 RX ADMIN — HEPARIN SODIUM 5000 UNITS: 5000 INJECTION INTRAVENOUS; SUBCUTANEOUS at 09:04

## 2025-04-18 RX ADMIN — HYDROXYZINE HYDROCHLORIDE 25 MG: 25 TABLET ORAL at 08:04

## 2025-04-18 NOTE — PLAN OF CARE
04/18/25 1618   Admission   Initial VN Admission Questions Complete   Communication Issues? None;Technical Issue  (VN unable to see pt, pt can see VN.  Ticket placed to IT)     VN cued into patient's room for introduction with patient's permission.  VN role explained and informed patient/family that VN would be working with bedside nurse and rest of care team.  Plan of care reviewed, pt encouraged to refer to whiteboard to determine staff for the day and pt/family educated on VTE,  fall risk and advised to call for assistance at all times to ensure pt's safety.  Pt denies pain, anxiety, nausea. Patient's chart, labs and vital signs reviewed.  Allowed time for questions.  Will continue to be available as needed.

## 2025-04-18 NOTE — PROVIDER TRANSFER
"   Outside Transfer Acceptance Note / Regional Referral Center    Referring facility: [facility]  Referring provider: CRISTY CHAN  Accepting facility: [facility]  Accepting provider: MARY ANN VELAZQUEZ  Admitting provider: JULIO MCHUGH  Reason for transfer: Higher Level of Care   Transfer diagnosis: Small bowel obstruction  Transfer specialty requested: General Surgery  Transfer specialty notified: Yes  Transfer level: NUMBER 1-5: 2  Bed type requested: MED SURG  Isolation status: No active isolations   Admission class or status: IP- Inpatient      Narrative     Ms. Jensen is a 65yo lady with a past medical history of cecal volvulus, DM2, HLD and HTN.  She was last admitted for cecal volvulus on 5/4/23 - 5/11/23 at [facility] and underwent exploratory lap on 5/4/23, when she underwent partial colectomy with anastomosis - resection of distal ileum, cecum and proximal ascending colon.    She now presents to the ED at [facility] with complains of N/V/D with flu-like symptoms since yesterday.  The diarrhea has been going on for several days with generalized abdominal pain and cramping, along with abdominal distention.    CT abdomen as noted below, showed possible SBO vs ileus vs enteritis.  C. diff is being sent along with stool studies given significant diarrhea.  She was given empiric Cipro/Flagy in the ED.  She is being held NPO with IVF's and the sending provider is re-evaluating the need for NGT at this time.  Per [facility], Service/Procedure Requested: General Surgery,I  spoke Dr. Lamb, Surgery at [facility].    VS in the ED were /70 -> 190/73 -> 138/64 (BP Location: Right arm, Patient Position: Sitting)   Pulse 76   Temp 97.9 °F (36.6 °C) (Oral)   Resp 17   Ht 5' 7" (1.702 m)   Wt 105.7 kg (233 lb 0.4 oz)   SpO2 100% RA  BMI 36.50 kg/m².    Labs in ED: WBC 9.16, Hg 10.4, , Cr 1.5 (baseline 1.2-1.4), BUN 34, K 3.8, Gluc 136, LFTs normal, Lipase 97, COVID/FLU " NEGATIVE, UA: Tr LE, Nitrite neg, RBC 3, WBC 4, rare bact, Epi 15.    CT Abd/pelvis 4/18 showed: 1. Dilated fluid-filled loops of small bowel measuring up to 35 mm are seen, predominantly in the right hemiabdomen. Question transition point to decompressed small bowel loops in the right lower quadrant. Wall thickening and mucosal hyperemia and modest inflammatory changes are noted about the dilated loops of bowel. Appearance concerning for mechanical small bowel obstruction, with differential considerations to include ileus versus infectious or noninfectious inflammatory enteritis.  2. Bilateral adrenal nodules, grossly similar compared to prior CT performed 05/04/2023. Right adrenal nodule remains indeterminate. Consider dedicated renal mass protocol MRI or CT for more definitive assessment.  3. Remote operative sequela in the anterior abdominal wall. Large ventral hernia containing loops of colon, which appear noninflamed and nonobstructed. Correlation with physical examination would be recommended.  4. Additional details, as provided in the body of report.      In the ED she was treated with:     Medications   ciprofloxacin (CIPRO)400mg/200ml D5W IVPB 400 mg (has no administration in time range)   metronidazole IVPB 500 mg (has no administration in time range)   sodium chloride 0.9% bolus 1,000 mL 1,000 mL (has no administration in time range)   lactated ringers bolus 1,000 mL (0 mLs Intravenous Stopped 4/18/25 0624)   ondansetron injection 4 mg (4 mg Intravenous Given 4/18/25 0624)   iohexoL (OMNIPAQUE 350) injection 100 mL (100 mLs Intravenous Given 4/18/25 0615)          Objective     Vitals: Temp:  [97.9 °F (36.6 °C)] 97.9 °F (36.6 °C)  Pulse:  [66-87] 76  Resp:  [17-18] 17  SpO2:  [95 %-100 %] 100 %  BP: (138-190)/(64-90) 138/64     Recent Labs: All pertinent labs within the past 24 hours have been reviewed.  Recent Results (from the past 24 hours)   Comp. Metabolic Panel    Collection Time: 04/18/25  4:56 AM    Result Value Ref Range    Sodium 142 136 - 145 mmol/L    Potassium 3.8 3.5 - 5.1 mmol/L    Chloride 107 95 - 110 mmol/L    CO2 22 (L) 23 - 29 mmol/L    Glucose 136 (H) 70 - 110 mg/dL    BUN 34 (H) 8 - 23 mg/dL    Creatinine 1.5 (H) 0.5 - 1.4 mg/dL    Calcium 9.0 8.7 - 10.5 mg/dL    Protein Total 7.9 6.0 - 8.4 gm/dL    Albumin 3.8 3.5 - 5.2 g/dL    Bilirubin Total 0.3 0.1 - 1.0 mg/dL    ALP 91 40 - 150 unit/L    AST 33 11 - 45 unit/L    ALT 27 10 - 44 unit/L    Anion Gap 13 8 - 16 mmol/L    eGFR 38 (L) >60 mL/min/1.73/m2   Lipase    Collection Time: 04/18/25  4:56 AM   Result Value Ref Range    Lipase Level 97 (H) 4 - 60 U/L   CBC with Differential    Collection Time: 04/18/25  4:56 AM   Result Value Ref Range    WBC 9.16 3.90 - 12.70 K/uL    RBC 4.63 4.00 - 5.40 M/uL    HGB 10.4 (L) 12.0 - 16.0 gm/dL    HCT 36.0 (L) 37.0 - 48.5 %    MCV 78 (L) 82 - 98 fL    MCH 22.5 (L) 27.0 - 31.0 pg    MCHC 28.9 (L) 32.0 - 36.0 g/dL    RDW 14.6 (H) 11.5 - 14.5 %    Platelet Count 243 150 - 450 K/uL    MPV 10.4 9.2 - 12.9 fL    Nucleated RBC 0 <=0 /100 WBC    Neut % 74.4 (H) 38 - 73 %    Lymph % 19.8 18 - 48 %    Mono % 4.4 4 - 15 %    Eos % 0.8 <=8 %    Basophil % 0.4 <=1.9 %    Imm Grans % 0.2 0.0 - 0.5 %    Neut # 6.82 1.8 - 7.7 K/uL    Lymph # 1.81 1 - 4.8 K/uL    Mono # 0.40 0.3 - 1 K/uL    Eos # 0.07 <=0.5 K/uL    Baso # 0.04 <=0.2 K/uL    Imm Grans # 0.02 0.00 - 0.04 K/uL   Magnesium    Collection Time: 04/18/25  4:56 AM   Result Value Ref Range    Magnesium  1.6 1.6 - 2.6 mg/dL   Urinalysis, Reflex to Urine Culture    Collection Time: 04/18/25  4:58 AM    Specimen: Urine   Result Value Ref Range    Color, UA Yellow Straw, Teresa, Yellow, Light-Orange    Appearance, UA Hazy (A) Clear    pH, UA 6.0 5.0 - 8.0    Spec Grav UA 1.020 1.005 - 1.030    Protein, UA 2+ (A) Negative    Glucose, UA Negative Negative    Ketones, UA Negative Negative    Bilirubin, UA Negative Negative    Blood, UA 1+ (A) Negative    Nitrites, UA  Negative Negative    Urobilinogen, UA Negative <2.0 EU/dL    Leukocyte Esterase, UA Trace (A) Negative   GREY TOP URINE HOLD    Collection Time: 04/18/25  4:58 AM   Result Value Ref Range    Extra Tube Hold for add-ons.    Urinalysis Microscopic    Collection Time: 04/18/25  4:58 AM   Result Value Ref Range    RBC, UA 3 0 - 4 /HPF    WBC, UA 4 0 - 5 /HPF    Bacteria, UA Rare None, Rare, Occasional /HPF    Squamous Epithelial Cells, UA 15 /HPF    Hyaline Casts, UA 5 (H) 0 - 1 /LPF    Microscopic Comment     POCT COVID-19 Rapid Screening    Collection Time: 04/18/25  5:21 AM   Result Value Ref Range    POC Rapid COVID Negative Negative     Acceptable Yes    POCT Influenza A/B Molecular    Collection Time: 04/18/25  5:22 AM   Result Value Ref Range    POC Molecular Influenza A Ag Negative Negative    POC Molecular Influenza B Ag Negative Negative     Acceptable Yes    POCT rapid strep A    Collection Time: 04/18/25 10:01 AM   Result Value Ref Range    Rapid Strep A Screen Negative Negative     Acceptable Yes    POCT glucose    Collection Time: 04/18/25 10:33 AM   Result Value Ref Range    POCT Glucose 86 70 - 110 mg/dL            IV access:        Peripheral IV - Single Lumen 04/18/25 0300 18 G 1 1/4 in Left Antecubital (Active)   Site Assessment Clean;Dry;Intact 04/18/25 0333   Dressing Status Clean;Dry;Intact 04/18/25 0333     Infusions: IV FLUIDS  Allergies: Review of patient's allergies indicates:  No Known Allergies   NPO: No    Anticoagulation:   Anticoagulants       None             Instructions      [facility]  Admit to Hospital Medicine  Upon patient arrival to floor, please contact Hospital Medicine on call.

## 2025-04-19 LAB
ABSOLUTE EOSINOPHIL (OHS): 0.12 K/UL
ABSOLUTE MONOCYTE (OHS): 0.34 K/UL (ref 0.3–1)
ABSOLUTE NEUTROPHIL COUNT (OHS): 2.25 K/UL (ref 1.8–7.7)
ALBUMIN SERPL BCP-MCNC: 3.1 G/DL (ref 3.5–5.2)
ALP SERPL-CCNC: 66 UNIT/L (ref 40–150)
ALT SERPL W/O P-5'-P-CCNC: 20 UNIT/L (ref 10–44)
ANION GAP (OHS): 9 MMOL/L (ref 8–16)
AST SERPL-CCNC: 25 UNIT/L (ref 11–45)
BASOPHILS # BLD AUTO: 0.03 K/UL
BASOPHILS NFR BLD AUTO: 0.6 %
BILIRUB SERPL-MCNC: 0.3 MG/DL (ref 0.1–1)
BUN SERPL-MCNC: 21 MG/DL (ref 8–23)
CALCIUM SERPL-MCNC: 8.6 MG/DL (ref 8.7–10.5)
CHLORIDE SERPL-SCNC: 112 MMOL/L (ref 95–110)
CO2 SERPL-SCNC: 21 MMOL/L (ref 23–29)
CREAT SERPL-MCNC: 1.1 MG/DL (ref 0.5–1.4)
ERYTHROCYTE [DISTWIDTH] IN BLOOD BY AUTOMATED COUNT: 14.7 % (ref 11.5–14.5)
GFR SERPLBLD CREATININE-BSD FMLA CKD-EPI: 56 ML/MIN/1.73/M2
GLUCOSE SERPL-MCNC: 60 MG/DL (ref 70–110)
HCT VFR BLD AUTO: 30.3 % (ref 37–48.5)
HGB BLD-MCNC: 9 GM/DL (ref 12–16)
IMM GRANULOCYTES # BLD AUTO: 0.01 K/UL (ref 0–0.04)
IMM GRANULOCYTES NFR BLD AUTO: 0.2 % (ref 0–0.5)
LYMPHOCYTES # BLD AUTO: 1.89 K/UL (ref 1–4.8)
MCH RBC QN AUTO: 22.4 PG (ref 27–31)
MCHC RBC AUTO-ENTMCNC: 29.7 G/DL (ref 32–36)
MCV RBC AUTO: 75 FL (ref 82–98)
NUCLEATED RBC (/100WBC) (OHS): 0 /100 WBC
PLATELET # BLD AUTO: 225 K/UL (ref 150–450)
PMV BLD AUTO: 11.5 FL (ref 9.2–12.9)
POCT GLUCOSE: 102 MG/DL (ref 70–110)
POCT GLUCOSE: 115 MG/DL (ref 70–110)
POCT GLUCOSE: 175 MG/DL (ref 70–110)
POCT GLUCOSE: 47 MG/DL (ref 70–110)
POCT GLUCOSE: 62 MG/DL (ref 70–110)
POCT GLUCOSE: 67 MG/DL (ref 70–110)
POCT GLUCOSE: 72 MG/DL (ref 70–110)
POCT GLUCOSE: 73 MG/DL (ref 70–110)
POCT GLUCOSE: 75 MG/DL (ref 70–110)
POTASSIUM SERPL-SCNC: 3.3 MMOL/L (ref 3.5–5.1)
PROT SERPL-MCNC: 6.5 GM/DL (ref 6–8.4)
RBC # BLD AUTO: 4.02 M/UL (ref 4–5.4)
RELATIVE EOSINOPHIL (OHS): 2.6 %
RELATIVE LYMPHOCYTE (OHS): 40.7 % (ref 18–48)
RELATIVE MONOCYTE (OHS): 7.3 % (ref 4–15)
RELATIVE NEUTROPHIL (OHS): 48.6 % (ref 38–73)
SODIUM SERPL-SCNC: 142 MMOL/L (ref 136–145)
WBC # BLD AUTO: 4.64 K/UL (ref 3.9–12.7)

## 2025-04-19 PROCEDURE — 25000003 PHARM REV CODE 250: Performed by: FAMILY MEDICINE

## 2025-04-19 PROCEDURE — 25000003 PHARM REV CODE 250: Performed by: STUDENT IN AN ORGANIZED HEALTH CARE EDUCATION/TRAINING PROGRAM

## 2025-04-19 PROCEDURE — 36415 COLL VENOUS BLD VENIPUNCTURE: CPT | Performed by: STUDENT IN AN ORGANIZED HEALTH CARE EDUCATION/TRAINING PROGRAM

## 2025-04-19 PROCEDURE — 85025 COMPLETE CBC W/AUTO DIFF WBC: CPT | Performed by: STUDENT IN AN ORGANIZED HEALTH CARE EDUCATION/TRAINING PROGRAM

## 2025-04-19 PROCEDURE — 11000001 HC ACUTE MED/SURG PRIVATE ROOM

## 2025-04-19 PROCEDURE — 63600175 PHARM REV CODE 636 W HCPCS: Performed by: STUDENT IN AN ORGANIZED HEALTH CARE EDUCATION/TRAINING PROGRAM

## 2025-04-19 PROCEDURE — 80053 COMPREHEN METABOLIC PANEL: CPT | Performed by: STUDENT IN AN ORGANIZED HEALTH CARE EDUCATION/TRAINING PROGRAM

## 2025-04-19 PROCEDURE — 99223 1ST HOSP IP/OBS HIGH 75: CPT | Mod: ,,, | Performed by: STUDENT IN AN ORGANIZED HEALTH CARE EDUCATION/TRAINING PROGRAM

## 2025-04-19 RX ORDER — DIATRIZOATE MEGLUMINE AND DIATRIZOATE SODIUM 660; 100 MG/ML; MG/ML
50 SOLUTION ORAL; RECTAL ONCE
Status: DISCONTINUED | OUTPATIENT
Start: 2025-04-19 | End: 2025-04-19

## 2025-04-19 RX ORDER — COLCHICINE 0.6 MG/1
0.6 TABLET, FILM COATED ORAL DAILY
Status: DISCONTINUED | OUTPATIENT
Start: 2025-04-19 | End: 2025-04-21 | Stop reason: HOSPADM

## 2025-04-19 RX ADMIN — SODIUM CHLORIDE: 9 INJECTION, SOLUTION INTRAVENOUS at 09:04

## 2025-04-19 RX ADMIN — METRONIDAZOLE 500 MG: 500 TABLET ORAL at 09:04

## 2025-04-19 RX ADMIN — DEXTROSE MONOHYDRATE 25 G: 25 INJECTION, SOLUTION INTRAVENOUS at 11:04

## 2025-04-19 RX ADMIN — COLCHICINE 0.6 MG: 0.6 TABLET ORAL at 11:04

## 2025-04-19 RX ADMIN — HEPARIN SODIUM 5000 UNITS: 5000 INJECTION INTRAVENOUS; SUBCUTANEOUS at 06:04

## 2025-04-19 RX ADMIN — HEPARIN SODIUM 5000 UNITS: 5000 INJECTION INTRAVENOUS; SUBCUTANEOUS at 03:04

## 2025-04-19 RX ADMIN — CIPROFLOXACIN 400 MG: 2 INJECTION, SOLUTION INTRAVENOUS at 11:04

## 2025-04-19 RX ADMIN — ATORVASTATIN CALCIUM 80 MG: 40 TABLET, FILM COATED ORAL at 11:04

## 2025-04-19 RX ADMIN — DEXTROSE MONOHYDRATE 12.5 G: 25 INJECTION, SOLUTION INTRAVENOUS at 03:04

## 2025-04-19 RX ADMIN — EZETIMIBE 10 MG: 10 TABLET ORAL at 09:04

## 2025-04-19 RX ADMIN — AMLODIPINE BESYLATE 10 MG: 5 TABLET ORAL at 11:04

## 2025-04-19 RX ADMIN — METRONIDAZOLE 500 MG: 500 TABLET ORAL at 03:04

## 2025-04-19 RX ADMIN — GABAPENTIN 300 MG: 300 CAPSULE ORAL at 09:04

## 2025-04-19 RX ADMIN — METRONIDAZOLE 500 MG: 500 TABLET ORAL at 06:04

## 2025-04-19 RX ADMIN — HEPARIN SODIUM 5000 UNITS: 5000 INJECTION INTRAVENOUS; SUBCUTANEOUS at 09:04

## 2025-04-19 NOTE — PLAN OF CARE
Problem: Adult Inpatient Plan of Care  Goal: Optimal Comfort and Wellbeing  Outcome: Progressing     Problem: Diabetes Comorbidity  Goal: Blood Glucose Level Within Targeted Range  Outcome: Progressing     Problem: Hypertension Acute  Goal: Blood Pressure Within Desired Range  Outcome: Progressing     Problem: Intestinal Obstruction  Goal: Fluid and Electrolyte Balance  Outcome: Progressing

## 2025-04-19 NOTE — SUBJECTIVE & OBJECTIVE
Past Medical History:   Diagnosis Date    Cecal volvulus 05/04/2023    Diabetes mellitus, type 2     Hypercholesteremia     Hypertension        Past Surgical History:   Procedure Laterality Date    COLONOSCOPY  03/29/2023    COLONOSCOPY N/A 3/29/2023    Procedure: COLONOSCOPY;  Surgeon: Ashu Burns MD;  Location: [facility] ENDO;  Service: Endoscopy;  Laterality: N/A;    HYSTERECTOMY      LAPAROTOMY, EXPLORATORY N/A 5/4/2023    Procedure: LAPAROTOMY, EXPLORATORY;  Surgeon: Flaco Arias MD;  Location: [facility] OR;  Service: General;  Laterality: N/A;    SURGICAL REMOVAL OF ILEUM WITH CECUM  5/4/2023    Procedure: ILEOCECECTOMY ;  Surgeon: Flaco Arias MD;  Location: [facility] OR;  Service: General;;       Review of patient's allergies indicates:  No Known Allergies    Current Facility-Administered Medications on File Prior to Encounter   Medication    [COMPLETED] ciprofloxacin (CIPRO)400mg/200ml D5W IVPB 400 mg    [COMPLETED] iohexoL (OMNIPAQUE 350) injection 100 mL    [COMPLETED] lactated ringers bolus 1,000 mL    [COMPLETED] metronidazole IVPB 500 mg    [COMPLETED] ondansetron injection 4 mg    [COMPLETED] sodium chloride 0.9% bolus 1,000 mL 1,000 mL     Current Outpatient Medications on File Prior to Encounter   Medication Sig    acetaminophen (TYLENOL) 500 MG tablet Take 2 tablets (1,000 mg total) by mouth every 6 to 8 hours as needed for Pain.    amLODIPine (NORVASC) 10 MG tablet Take 1 tablet (10 mg total) by mouth once daily.    clindamycin (CLEOCIN) 300 MG capsule Take 1 capsule (300 mg total) by mouth every 8 (eight) hours. for 7 days    colchicine (COLCRYS) 0.6 mg tablet 2 tabs 1st dose, then 1 tab in an hour, then 1 tab qd.    dapagliflozin propanediol (FARXIGA) 10 mg tablet Take 1 tablet (10 mg total) by mouth once daily.    ezetimibe (ZETIA) 10 mg tablet Take 1 tablet (10 mg total) by mouth every evening.    gabapentin (NEURONTIN) 300 MG capsule Take 1 capsule (300 mg total) by mouth every evening.     hydrOXYzine HCL (ATARAX) 25 MG tablet Take 1 tablet (25 mg total) by mouth nightly as needed for Itching.    indomethacin (INDOCIN) 25 MG capsule Take 2 tablets 1st dose then 1 tablet three times a day @ mealtime    insulin lispro (HUMALOG U-100 INSULIN) 100 unit/mL injection To use with VGo 30- 4 clicks TID AC, Max TDD of 100 units    lisinopriL-hydrochlorothiazide (PRINZIDE,ZESTORETIC) 20-12.5 mg per tablet Take 1 tablet by mouth once daily    rosuvastatin (CRESTOR) 40 MG Tab Take 1 tablet (40 mg total) by mouth every evening.    sub-q insulin device, 30 unit (V-GO 30) Katelyn Inject 1 Device into the skin once daily.    aspirin (ECOTRIN) 81 MG EC tablet Take 1 tablet (81 mg total) by mouth once daily. (Patient not taking: Reported on 4/15/2025)    blood sugar diagnostic Strp To check BG 3 times daily, to use with insurance preferred meter    blood-glucose meter kit To check BG 3 times daily, to use with insurance preferred meter    blood-glucose meter,continuous (DEXCOM G7 ) Misc 1 Device by Misc.(Non-Drug; Combo Route) route continuous.    blood-glucose sensor (DEXCOM G7 SENSOR) Katelyn Inject 1 Device into the skin every 10 days.    diclofenac sodium (VOLTAREN) 1 % Gel Apply 2 g topically 4 (four) times daily. (Patient taking differently: Apply 2 g topically 4 (four) times daily. Using BID)    lancets Misc To check BG 3 times daily, to use with insurance preferred meter    ONETOUCH DELICA PLUS LANCET 33 gauge Misc Apply topically 3 (three) times daily.    ONETOUCH VERIO FLEX METER Misc USE AS DIRECTED THREE TIMES DAILY    tirzepatide (MOUNJARO) 5 mg/0.5 mL PnIj Inject 5 mg into the skin every 7 days.     Family History       Problem Relation (Age of Onset)    Breast cancer Paternal Grandmother    Cancer Maternal Grandmother    Diabetes Mother, Father, Brother          Tobacco Use    Smoking status: Former     Current packs/day: 0.00     Types: Cigarettes     Start date: 1980     Quit date: 1990     Years  since quittin.3    Smokeless tobacco: Never   Substance and Sexual Activity    Alcohol use: Yes     Comment: socially    Drug use: No    Sexual activity: Not Currently     Review of Systems   Constitutional:  Negative for appetite change and chills.   HENT:  Negative for ear discharge and ear pain.    Respiratory:  Negative for cough and choking.    Cardiovascular:  Negative for chest pain and leg swelling.   Gastrointestinal:  Positive for abdominal distention, abdominal pain, diarrhea, nausea and vomiting. Negative for anal bleeding and blood in stool.   Endocrine: Negative for polydipsia and polyphagia.   Genitourinary:  Negative for flank pain and hematuria.   Musculoskeletal:  Negative for back pain and gait problem.   Skin:  Negative for pallor and rash.   Allergic/Immunologic: Negative for food allergies and immunocompromised state.   Neurological:  Negative for seizures and speech difficulty.   Hematological:  Negative for adenopathy. Does not bruise/bleed easily.   Psychiatric/Behavioral:  Negative for decreased concentration and dysphoric mood.      Objective:     Vital Signs (Most Recent):  Temp: 97.9 °F (36.6 °C) (25)  Pulse: 74 (25)  Resp: 18 (25)  BP: (!) 149/66 (25)  SpO2: 99 % (25) Vital Signs (24h Range):  Temp:  [97.9 °F (36.6 °C)-98.2 °F (36.8 °C)] 97.9 °F (36.6 °C)  Pulse:  [] 74  Resp:  [17-18] 18  SpO2:  [95 %-100 %] 99 %  BP: (138-190)/(64-94) 149/66     Weight: 97.4 kg (214 lb 11.7 oz)  Body mass index is 33.63 kg/m².     Physical Exam  Vitals reviewed.   Constitutional:       General: She is not in acute distress.     Appearance: She is not toxic-appearing.   HENT:      Right Ear: There is no impacted cerumen.      Left Ear: There is no impacted cerumen.      Nose: No congestion or rhinorrhea.      Mouth/Throat:      Pharynx: No oropharyngeal exudate or posterior oropharyngeal erythema.   Eyes:      General:         Right eye:  "No discharge.         Left eye: No discharge.   Cardiovascular:      Rate and Rhythm: Normal rate.      Heart sounds: No murmur heard.     No gallop.   Pulmonary:      Breath sounds: No wheezing or rales.   Abdominal:      General: There is distension.      Tenderness: There is abdominal tenderness.   Musculoskeletal:         General: No swelling or deformity.      Cervical back: No rigidity.   Lymphadenopathy:      Cervical: No cervical adenopathy.   Skin:     Coloration: Skin is not jaundiced.      Findings: No bruising.   Neurological:      General: No focal deficit present.      Cranial Nerves: No cranial nerve deficit.      Motor: No weakness.   Psychiatric:         Mood and Affect: Mood normal.                Significant Labs: All pertinent labs within the past 24 hours have been reviewed.  Blood Culture: No results for input(s): "LABBLOO" in the last 48 hours.  BMP:   Recent Labs   Lab 04/18/25  0456      K 3.8      CO2 22*   BUN 34*   CREATININE 1.5*   CALCIUM 9.0   MG 1.6     CBC:   Recent Labs   Lab 04/18/25  0456   WBC 9.16   HGB 10.4*   HCT 36.0*        CMP:   Recent Labs   Lab 04/18/25  0456      K 3.8      CO2 22*   BUN 34*   CREATININE 1.5*   CALCIUM 9.0   ALBUMIN 3.8   BILITOT 0.3   ALKPHOS 91   AST 33   ALT 27   ANIONGAP 13       Significant Imaging: I have reviewed all pertinent imaging results/findings within the past 24 hours.  I have reviewed and interpreted all pertinent imaging results/findings within the past 24 hours.  "

## 2025-04-19 NOTE — CONSULTS
[facility] Surg  General Surgery  Consult Note    Patient Name: Aishwarya Jensen  MRN: [id number]  Code Status: Full Code  Admission Date: 4/18/2025  Hospital Length of Stay: 1 days  Attending Physician: Moe Hylton MD  Primary Care Provider: Joey Cintron MD    Patient information was obtained from patient and past medical records.     Inpatient consult to General Surgery  Consult performed by: Barb Cohen MD  Consult ordered by: Moe Hylton MD        Subjective:     Principal Problem: SBO (small bowel obstruction)    History of Present Illness: Aishwarya Jensen is a 64 year old female with a PMH of 2DM, HTN, HLD, CKD3, JANE who presented with abdominal pain, vomiting and constipation. She has not had a bowel movement since 4/14. She did vomit on 4/15. She states she has been passing gas throughout this time and did pass gas just prior to our evaluation. She feels well today without abdominal pain or nausea. 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     Labs without significant findings  CT scan with some dilation of the small bowel. Colon containing ventral hernia.      Current Facility-Administered Medications on File Prior to Encounter   Medication    [COMPLETED] sodium chloride 0.9% bolus 1,000 mL 1,000 mL     Current Outpatient Medications on File Prior to Encounter   Medication Sig    acetaminophen (TYLENOL) 500 MG tablet Take 2 tablets (1,000 mg total) by mouth every 6 to 8 hours as needed for Pain.    amLODIPine (NORVASC) 10 MG tablet Take 1 tablet (10 mg total) by mouth once daily.    clindamycin (CLEOCIN) 300 MG capsule Take 1 capsule (300 mg total) by mouth every 8 (eight) hours. for 7 days    colchicine (COLCRYS) 0.6 mg tablet 2 tabs 1st dose, then 1 tab in an hour, then 1 tab qd.    dapagliflozin propanediol (FARXIGA) 10 mg tablet Take 1 tablet (10 mg total) by mouth once daily.    ezetimibe (ZETIA) 10 mg tablet Take 1 tablet (10 mg total) by mouth  every evening.    gabapentin (NEURONTIN) 300 MG capsule Take 1 capsule (300 mg total) by mouth every evening.    hydrOXYzine HCL (ATARAX) 25 MG tablet Take 1 tablet (25 mg total) by mouth nightly as needed for Itching.    indomethacin (INDOCIN) 25 MG capsule Take 2 tablets 1st dose then 1 tablet three times a day @ mealtime    insulin lispro (HUMALOG U-100 INSULIN) 100 unit/mL injection To use with VGo 30- 4 clicks TID AC, Max TDD of 100 units    lisinopriL-hydrochlorothiazide (PRINZIDE,ZESTORETIC) 20-12.5 mg per tablet Take 1 tablet by mouth once daily    rosuvastatin (CRESTOR) 40 MG Tab Take 1 tablet (40 mg total) by mouth every evening.    sub-q insulin device, 30 unit (V-GO 30) Katelyn Inject 1 Device into the skin once daily.    aspirin (ECOTRIN) 81 MG EC tablet Take 1 tablet (81 mg total) by mouth once daily. (Patient not taking: Reported on 4/15/2025)    blood sugar diagnostic Strp To check BG 3 times daily, to use with insurance preferred meter    blood-glucose meter kit To check BG 3 times daily, to use with insurance preferred meter    blood-glucose meter,continuous (DEXCOM G7 ) Misc 1 Device by Misc.(Non-Drug; Combo Route) route continuous.    blood-glucose sensor (DEXCOM G7 SENSOR) Katelyn Inject 1 Device into the skin every 10 days.    diclofenac sodium (VOLTAREN) 1 % Gel Apply 2 g topically 4 (four) times daily. (Patient taking differently: Apply 2 g topically 4 (four) times daily. Using BID)    lancets [facility] To check BG 3 times daily, to use with insurance preferred meter    ONETOUCH DELICA PLUS LANCET 33 gauge Misc Apply topically 3 (three) times daily.    ONETOUCH VERIO FLEX METER [facility] USE AS DIRECTED THREE TIMES DAILY    tirzepatide (MOUNJARO) 5 mg/0.5 mL PnIj Inject 5 mg into the skin every 7 days.       Review of patient's allergies indicates:  No Known Allergies    Past Medical History:   Diagnosis Date    Cecal volvulus 05/04/2023    Diabetes mellitus, type 2     Hypercholesteremia      Hypertension      Past Surgical History:   Procedure Laterality Date    COLONOSCOPY  2023    COLONOSCOPY N/A 3/29/2023    Procedure: COLONOSCOPY;  Surgeon: Ashu Bunrs MD;  [facility];  Service: Endoscopy;  Laterality: N/A;    HYSTERECTOMY      LAPAROTOMY, EXPLORATORY N/A 2023    Procedure: LAPAROTOMY, EXPLORATORY;  Surgeon: Flaco Arias MD;  Location: [facility] OR;  Service: General;  Laterality: N/A;    SURGICAL REMOVAL OF ILEUM WITH CECUM  2023    Procedure: ILEOCECECTOMY ;  Surgeon: Flaco Arias MD;  Location: [facility] OR;  Service: General;;     Family History       Problem Relation (Age of Onset)    Breast cancer Paternal Grandmother    Cancer Maternal Grandmother    Diabetes Mother, Father, Brother          Tobacco Use    Smoking status: Former     Current packs/day: 0.00     Types: Cigarettes     Start date:      Quit date:      Years since quittin.3    Smokeless tobacco: Never   Substance and Sexual Activity    Alcohol use: Yes     Comment: socially    Drug use: No    Sexual activity: Not Currently     Review of Systems   Constitutional:  Negative for chills and fever.   Respiratory:  Negative for cough and shortness of breath.    Cardiovascular:  Negative for chest pain.   Gastrointestinal:  Positive for constipation. Negative for abdominal pain, nausea and vomiting.     Objective:     Vital Signs (Most Recent):  Temp: 98.5 °F (36.9 °C) (25 1123)  Pulse: 68 (25 1123)  Resp: 20 (25 1123)  BP: (!) 197/81 (25 1123)  SpO2: 98 % (25 1123) Vital Signs (24h Range):  Temp:  [97.4 °F (36.3 °C)-98.5 °F (36.9 °C)] 98.5 °F (36.9 °C)  Pulse:  [68-86] 68  Resp:  [18-20] 20  SpO2:  [95 %-100 %] 98 %  BP: (110-197)/(63-81) 197/81     Weight: 97.4 kg (214 lb 11.7 oz)  Body mass index is 33.63 kg/m².     Physical Exam  Constitutional:       General: She is not in acute distress.  HENT:      Head: Normocephalic and atraumatic.   Cardiovascular:       Rate and Rhythm: Normal rate and regular rhythm.   Pulmonary:      Effort: Pulmonary effort is normal. No respiratory distress.   Abdominal:      General: There is distension (mild).      Palpations: Abdomen is soft.      Tenderness: There is no abdominal tenderness.      Comments: Soft partially reducible hernia   Skin:     General: Skin is warm and dry.   Neurological:      General: No focal deficit present.      Mental Status: She is alert and oriented to person, place, and time.            I have reviewed all pertinent lab results within the past 24 hours.  CBC:   Recent Labs   Lab 04/19/25  0213   WBC 4.64   RBC 4.02   HGB 9.0*   HCT 30.3*      MCV 75*   MCH 22.4*   MCHC 29.7*     CMP:   Recent Labs   Lab 04/19/25  0213   CALCIUM 8.6*   ALBUMIN 3.1*      K 3.3*   CO2 21*   *   BUN 21   CREATININE 1.1   ALKPHOS 66   ALT 20   AST 25   BILITOT 0.3       Significant Diagnostics:  I have reviewed all pertinent imaging results/findings within the past 24 hours.    Assessment/Plan:     * SBO (small bowel obstruction)  64 year old female with a PMH of 2DM, HTN, HLD, CKD3, JANE who presented with constipation and vomiting. Hernia does not appear to be cause of obstruction on exam or on imaging    Patient passing gas. It is possible this is just constipation vs an obstruction that has resolved.   CLD. Ok to slowly advance as tolerated  Ok to discharge from a surgery standpoint once tolerating regular diet      VTE Risk Mitigation (From admission, onward)           Ordered     heparin (porcine) injection 5,000 Units  Every 8 hours         04/18/25 1618     IP VTE HIGH RISK PATIENT  Once         04/18/25 1618     Place sequential compression device  Until discontinued         04/18/25 1618                    Thank you for your consult. I will follow-up with patient. Please contact us if you have any additional questions.    Barb Cohen MD  General Surgery  [facility] Surg

## 2025-04-19 NOTE — H&P
[facility] Medicine  History & Physical    Patient Name: Aishwarya Jensen  MRN: [id number]  Patient Class: IP- Inpatient  Admission Date: 4/18/2025  Attending Physician: Whit Monte*   Primary Care Provider: Joey Cintron MD         Patient information was obtained from patient and ER records.     Subjective:     Principal Problem:SBO (small bowel obstruction)    Chief Complaint:   Chief Complaint   Patient presents with    SBO        HPI: Ms. 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    Patient states that she has had nausea, vomiting, diarrhea with the past few days with associated generalized pain and cramping.  Due to these symptoms she presented to an outside hospital further evaluation.    Triage vitals at that time were significant for elevated blood pressures.  Physical examination was significant for abdominal tenderness on palpation.  Review of systems significant for abdominal pain, nausea, vomiting.    CBC significant for microcytic anemia.  CMP significant for elevated glucose.    CT imaging was concerning for possible SBO versus ileus versus enteritis.  She was given Cipro and Flagyl.  NG tube was held at this time.  After discussion with General surgery at [place], she was sent over for further evaluation.     Upon arrival, patient complaining of some abdominal pain.  The patient is currently NPO.  She was continued on antibiotics.    Patient admitted for SBO and possible enteritis.    Past Medical History:   Diagnosis Date    Cecal volvulus 05/04/2023    Diabetes mellitus, type 2     Hypercholesteremia     Hypertension        Past Surgical History:   Procedure Laterality Date    COLONOSCOPY  03/29/2023    COLONOSCOPY N/A 3/29/2023    Procedure: COLONOSCOPY;   Surgeon: Ashu Burns MD;  Location: [facility];  Service: Endoscopy;  Laterality: N/A;    HYSTERECTOMY      LAPAROTOMY, EXPLORATORY N/A 5/4/2023    Procedure: LAPAROTOMY, EXPLORATORY;  Surgeon: Flaco Arias MD;  Location: [facility] OR;  Service: General;  Laterality: N/A;    SURGICAL REMOVAL OF ILEUM WITH CECUM  5/4/2023    Procedure: ILEOCECECTOMY ;  Surgeon: Flaco Arias MD;  Location: [facility] OR;  Service: General;;       Review of patient's allergies indicates:  No Known Allergies    Current Facility-Administered Medications on File Prior to Encounter   Medication    [COMPLETED] ciprofloxacin (CIPRO)400mg/200ml D5W IVPB 400 mg    [COMPLETED] iohexoL (OMNIPAQUE 350) injection 100 mL    [COMPLETED] lactated ringers bolus 1,000 mL    [COMPLETED] metronidazole IVPB 500 mg    [COMPLETED] ondansetron injection 4 mg    [COMPLETED] sodium chloride 0.9% bolus 1,000 mL 1,000 mL     Current Outpatient Medications on File Prior to Encounter   Medication Sig    acetaminophen (TYLENOL) 500 MG tablet Take 2 tablets (1,000 mg total) by mouth every 6 to 8 hours as needed for Pain.    amLODIPine (NORVASC) 10 MG tablet Take 1 tablet (10 mg total) by mouth once daily.    clindamycin (CLEOCIN) 300 MG capsule Take 1 capsule (300 mg total) by mouth every 8 (eight) hours. for 7 days    colchicine (COLCRYS) 0.6 mg tablet 2 tabs 1st dose, then 1 tab in an hour, then 1 tab qd.    dapagliflozin propanediol (FARXIGA) 10 mg tablet Take 1 tablet (10 mg total) by mouth once daily.    ezetimibe (ZETIA) 10 mg tablet Take 1 tablet (10 mg total) by mouth every evening.    gabapentin (NEURONTIN) 300 MG capsule Take 1 capsule (300 mg total) by mouth every evening.    hydrOXYzine HCL (ATARAX) 25 MG tablet Take 1 tablet (25 mg total) by mouth nightly as needed for Itching.    indomethacin (INDOCIN) 25 MG capsule Take 2 tablets 1st dose then 1 tablet three times a day @ mealtime    insulin lispro (HUMALOG U-100 INSULIN) 100 unit/mL injection  To use with VGo 30- 4 clicks TID AC, Max TDD of 100 units    lisinopriL-hydrochlorothiazide (PRINZIDE,ZESTORETIC) 20-12.5 mg per tablet Take 1 tablet by mouth once daily    rosuvastatin (CRESTOR) 40 MG Tab Take 1 tablet (40 mg total) by mouth every evening.    sub-q insulin device, 30 unit (V-GO 30) Katelyn Inject 1 Device into the skin once daily.    aspirin (ECOTRIN) 81 MG EC tablet Take 1 tablet (81 mg total) by mouth once daily. (Patient not taking: Reported on 4/15/2025)    blood sugar diagnostic Strp To check BG 3 times daily, to use with insurance preferred meter    blood-glucose meter kit To check BG 3 times daily, to use with insurance preferred meter    blood-glucose meter,continuous (DEXCOM G7 ) Misc 1 Device by Misc.(Non-Drug; Combo Route) route continuous.    blood-glucose sensor (DEXCOM G7 SENSOR) Katelyn Inject 1 Device into the skin every 10 days.    diclofenac sodium (VOLTAREN) 1 % Gel Apply 2 g topically 4 (four) times daily. (Patient taking differently: Apply 2 g topically 4 (four) times daily. Using BID)    lancets Misc To check BG 3 times daily, to use with insurance preferred meter    ONETOUCH DELICA PLUS LANCET 33 gauge Misc Apply topically 3 (three) times daily.    ONETOUCH VERIO FLEX METER Misc USE AS DIRECTED THREE TIMES DAILY    tirzepatide (MOUNJARO) 5 mg/0.5 mL PnIj Inject 5 mg into the skin every 7 days.     Family History       Problem Relation (Age of Onset)    Breast cancer Paternal Grandmother    Cancer Maternal Grandmother    Diabetes Mother, Father, Brother          Tobacco Use    Smoking status: Former     Current packs/day: 0.00     Types: Cigarettes     Start date:      Quit date:      Years since quittin.3    Smokeless tobacco: Never   Substance and Sexual Activity    Alcohol use: Yes     Comment: socially    Drug use: No    Sexual activity: Not Currently     Review of Systems   Constitutional:  Negative for appetite change and chills.   HENT:  Negative for ear  discharge and ear pain.    Respiratory:  Negative for cough and choking.    Cardiovascular:  Negative for chest pain and leg swelling.   Gastrointestinal:  Positive for abdominal distention, abdominal pain, diarrhea, nausea and vomiting. Negative for anal bleeding and blood in stool.   Endocrine: Negative for polydipsia and polyphagia.   Genitourinary:  Negative for flank pain and hematuria.   Musculoskeletal:  Negative for back pain and gait problem.   Skin:  Negative for pallor and rash.   Allergic/Immunologic: Negative for food allergies and immunocompromised state.   Neurological:  Negative for seizures and speech difficulty.   Hematological:  Negative for adenopathy. Does not bruise/bleed easily.   Psychiatric/Behavioral:  Negative for decreased concentration and dysphoric mood.      Objective:     Vital Signs (Most Recent):  Temp: 97.9 °F (36.6 °C) (04/18/25 1926)  Pulse: 74 (04/18/25 1926)  Resp: 18 (04/18/25 1926)  BP: (!) 149/66 (04/18/25 1926)  SpO2: 99 % (04/18/25 1926) Vital Signs (24h Range):  Temp:  [97.9 °F (36.6 °C)-98.2 °F (36.8 °C)] 97.9 °F (36.6 °C)  Pulse:  [] 74  Resp:  [17-18] 18  SpO2:  [95 %-100 %] 99 %  BP: (138-190)/(64-94) 149/66     Weight: 97.4 kg (214 lb 11.7 oz)  Body mass index is 33.63 kg/m².     Physical Exam  Vitals reviewed.   Constitutional:       General: She is not in acute distress.     Appearance: She is not toxic-appearing.   HENT:      Right Ear: There is no impacted cerumen.      Left Ear: There is no impacted cerumen.      Nose: No congestion or rhinorrhea.      Mouth/Throat:      Pharynx: No oropharyngeal exudate or posterior oropharyngeal erythema.   Eyes:      General:         Right eye: No discharge.         Left eye: No discharge.   Cardiovascular:      Rate and Rhythm: Normal rate.      Heart sounds: No murmur heard.     No gallop.   Pulmonary:      Breath sounds: No wheezing or rales.   Abdominal:      General: There is distension.      Tenderness: There is  "abdominal tenderness.   Musculoskeletal:         General: No swelling or deformity.      Cervical back: No rigidity.   Lymphadenopathy:      Cervical: No cervical adenopathy.   Skin:     Coloration: Skin is not jaundiced.      Findings: No bruising.   Neurological:      General: No focal deficit present.      Cranial Nerves: No cranial nerve deficit.      Motor: No weakness.   Psychiatric:         Mood and Affect: Mood normal.                Significant Labs: All pertinent labs within the past 24 hours have been reviewed.  Blood Culture: No results for input(s): "LABBLOO" in the last 48 hours.  BMP:   Recent Labs   Lab 04/18/25  0456      K 3.8      CO2 22*   BUN 34*   CREATININE 1.5*   CALCIUM 9.0   MG 1.6     CBC:   Recent Labs   Lab 04/18/25  0456   WBC 9.16   HGB 10.4*   HCT 36.0*        CMP:   Recent Labs   Lab 04/18/25  0456      K 3.8      CO2 22*   BUN 34*   CREATININE 1.5*   CALCIUM 9.0   ALBUMIN 3.8   BILITOT 0.3   ALKPHOS 91   AST 33   ALT 27   ANIONGAP 13       Significant Imaging: I have reviewed all pertinent imaging results/findings within the past 24 hours.  I have reviewed and interpreted all pertinent imaging results/findings within the past 24 hours.  Assessment/Plan:     Assessment & Plan  SBO (small bowel obstruction)  Prior history of sigmoid volvulus, s/p resection in 2023  Likely adhesions  CT imaging concerning for possible SBO    Plan:  NPO  May consider NG tube  Continuous fluids  Follow-up with surgery in the a.m.    Type 2 diabetes mellitus, with long-term current use of insulin  On insulin outpatient    Plan:  Due to NPO status, we will hold off on insulin at this time.  Enteritis  Patient reports some diarrhea, abdominal pain  CT imaging concerning for possible enteritis    Plan:  We will treat with Flagyl, Cipro.  Low threshold to hold    VTE Risk Mitigation (From admission, onward)           Ordered     heparin (porcine) injection 5,000 Units  Every 8 " hours         04/18/25 1618     IP VTE HIGH RISK PATIENT  Once         04/18/25 1618     Place sequential compression device  Until discontinued         04/18/25 1618                                    Franky Paula MD  Department of Hospital Medicine  [facility]

## 2025-04-19 NOTE — PLAN OF CARE
Problem: Adult Inpatient Plan of Care  Goal: Plan of Care Review  Outcome: Progressing     Problem: Diabetes Comorbidity  Goal: Blood Glucose Level Within Targeted Range  Outcome: Progressing     Problem: Hypertension Acute  Goal: Blood Pressure Within Desired Range  Outcome: Progressing     Problem: Intestinal Obstruction  Goal: Optimal Bowel Function  Outcome: Progressing     Problem: Diarrhea  Goal: Effective Diarrhea Management  Outcome: Progressing     Problem: Pain Acute  Goal: Optimal Pain Control and Function  Outcome: Progressing       AAOx4, no c/o of n/v or pain during shift. Ambulates independently. Patient became hypoglycemic during shift twice, treated with IV dextrose. X ray of abdomen shows no obstruction, patient moved to clear liquid diet. Bed in lowest position, call light and personal items within reach, patient encouraged top call staff for assistance if needed.

## 2025-04-19 NOTE — PROGRESS NOTES
[facility]  Progress Note    Patient Name: Aishwarya Jensen  MRN: [id number]  Patient Class: IP- Inpatient   Admission Date: 4/18/2025  Length of Stay: 1 days  Attending Physician: Moe Hylton MD  Primary Care Provider: Joey Cintron MD        Subjective:     Principal Problem:SBO (small bowel obstruction)      HPI: Ms. 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     Patient states that she has had nausea, vomiting, diarrhea with the past few days with associated generalized pain and cramping.  Due to these symptoms she presented to an outside hospital further evaluation.     Triage vitals at that time were significant for elevated blood pressures.  Physical examination was significant for abdominal tenderness on palpation.  Review of systems significant for abdominal pain, nausea, vomiting.     CBC significant for microcytic anemia.  CMP significant for elevated glucose.     CT imaging was concerning for possible SBO versus ileus versus enteritis.  She was given Cipro and Flagyl.  NG tube was held at this time.  After discussion with General surgery at [place], she was sent over for further evaluation.     Upon arrival, patient complaining of some abdominal pain.  The patient is currently NPO.  She was continued on antibiotics.     Patient admitted for SBO and possible enteritis.    Interval History: no acute event overnight. Labs and vitals reviewed. Continue NPO and abx.    Review of Systems      Constitutional:  Negative for appetite change and chills.   HENT:  Negative for ear discharge and ear pain.    Respiratory:  Negative for cough and choking.    Cardiovascular:  Negative for chest pain and leg swelling.   Gastrointestinal:  Positive for abdominal distention,  Patient reports that resolved after she started her blood pressure medication   abdominal pain, diarrhea, nausea and vomiting. Negative for anal bleeding and blood in stool.   Endocrine: Negative for polydipsia and polyphagia.   Genitourinary:  Negative for flank pain and hematuria.   Musculoskeletal:  Negative for back pain and gait problem.   Skin:  Negative for pallor and rash.   Allergic/Immunologic: Negative for food allergies and immunocompromised state.   Neurological:  Negative for seizures and speech difficulty.   Hematological:  Negative for adenopathy. Does not bruise/bleed easily.   Psychiatric/Behavioral:  Negative for decreased concentration and dysphoric mood.      Objective:     Vital Signs (Most Recent):  Temp: 97.4 °F (36.3 °C) (04/19/25 0343)  Pulse: 71 (04/19/25 0343)  Resp: 18 (04/19/25 0343)  BP: 110/63 (04/19/25 0343)  SpO2: 95 % (04/19/25 0343) Vital Signs (24h Range):  Temp:  [97.4 °F (36.3 °C)-98.2 °F (36.8 °C)] 97.4 °F (36.3 °C)  Pulse:  [] 71  Resp:  [17-18] 18  SpO2:  [95 %-100 %] 95 %  BP: (110-159)/(63-94) 110/63     Weight: 97.4 kg (214 lb 11.7 oz)  Body mass index is 33.63 kg/m².    Intake/Output Summary (Last 24 hours) at 4/19/2025 0954  Last data filed at 4/18/2025 1700  Gross per 24 hour   Intake 0 ml   Output --   Net 0 ml      Physical Exam      Vitals reviewed.   Constitutional:       General: She is not in acute distress.     Appearance: She is not toxic-appearing.   HENT:      Right Ear: There is no impacted cerumen.      Left Ear: There is no impacted cerumen.      Nose: No congestion or rhinorrhea.      Mouth/Throat:      Pharynx: No oropharyngeal exudate or posterior oropharyngeal erythema.   Eyes:      General:         Right eye: No discharge.         Left eye: No discharge.   Cardiovascular:      Rate and Rhythm: Normal rate.      Heart sounds: No murmur heard.     No gallop.   Pulmonary:      Breath sounds: No wheezing or rales.   Abdominal:      General: There is distension.      Tenderness: There is abdominal tenderness.   Musculoskeletal:          General: No swelling or deformity.      Cervical back: No rigidity.   Lymphadenopathy:      Cervical: No cervical adenopathy.   Skin:     Coloration: Skin is not jaundiced.      Findings: No bruising.   Neurological:      General: No focal deficit present.      Cranial Nerves: No cranial nerve deficit.      Motor: No weakness.   Psychiatric:         Mood and Affect: Mood normal.     Significant Labs: All pertinent labs within the past 24 hours have been reviewed.  CBC:   Recent Labs   Lab 04/18/25 0456 04/19/25 0213   WBC 9.16 4.64   HGB 10.4* 9.0*   HCT 36.0* 30.3*    225     CMP:   Recent Labs   Lab 04/18/25 0456 04/19/25 0213    142   K 3.8 3.3*    112*   CO2 22* 21*   BUN 34* 21   CREATININE 1.5* 1.1   CALCIUM 9.0 8.6*   ALBUMIN 3.8 3.1*   BILITOT 0.3 0.3   ALKPHOS 91 66   AST 33 25   ALT 27 20   ANIONGAP 13 9       Significant Imaging: I have reviewed all pertinent imaging results/findings within the past 24 hours.  I have reviewed and interpreted all pertinent imaging results/findings within the past 24 hours.    Assessment/Plan:      Active Diagnoses:    Diagnosis Date Noted POA    PRINCIPAL PROBLEM:  SBO (small bowel obstruction) [K56.609] 04/18/2025 Yes     Chronic    Enteritis [K52.9] 04/18/2025 Yes    Type 2 diabetes mellitus, with long-term current use of insulin [E11.9, Z79.4] 06/15/2022 Not Applicable      Problems Resolved During this Admission:     VTE Risk Mitigation (From admission, onward)           Ordered     heparin (porcine) injection 5,000 Units  Every 8 hours         04/18/25 1618     IP VTE HIGH RISK PATIENT  Once         04/18/25 1618     Place sequential compression device  Until discontinued         04/18/25 1618                   SBO (small bowel obstruction)  Prior history of sigmoid volvulus, s/p resection in 2023  Likely adhesions  CT imaging concerning for possible SBO     Plan:  NPO  May consider NG tube  Continuous fluids  Consult gen surgery     Type  2 diabetes mellitus, with long-term current use of insulin  On insulin outpatient     Plan:  Due to NPO status, we will hold off on insulin at this time.  Enteritis  Patient reports some diarrhea, abdominal pain  CT imaging concerning for possible enteritis     Plan:  We will treat with Flagyl, Cipro.  Low threshold to hold    Moe Hylton MD  Department of [facility] Medicine   [facility]

## 2025-04-19 NOTE — ASSESSMENT & PLAN NOTE
Patient reports some diarrhea, abdominal pain  CT imaging concerning for possible enteritis    Plan:  We will treat with Flagyl, Cipro.  Low threshold to hold

## 2025-04-19 NOTE — NURSING
Notified Dr. Hylton of patients glucose 47. Pt asymptomatic. IV dextrose given as ordered. Will recheck glucose in 15 mins.

## 2025-04-19 NOTE — SUBJECTIVE & OBJECTIVE
Current Facility-Administered Medications on File Prior to Encounter   Medication    [COMPLETED] sodium chloride 0.9% bolus 1,000 mL 1,000 mL     Current Outpatient Medications on File Prior to Encounter   Medication Sig    acetaminophen (TYLENOL) 500 MG tablet Take 2 tablets (1,000 mg total) by mouth every 6 to 8 hours as needed for Pain.    amLODIPine (NORVASC) 10 MG tablet Take 1 tablet (10 mg total) by mouth once daily.    clindamycin (CLEOCIN) 300 MG capsule Take 1 capsule (300 mg total) by mouth every 8 (eight) hours. for 7 days    colchicine (COLCRYS) 0.6 mg tablet 2 tabs 1st dose, then 1 tab in an hour, then 1 tab qd.    dapagliflozin propanediol (FARXIGA) 10 mg tablet Take 1 tablet (10 mg total) by mouth once daily.    ezetimibe (ZETIA) 10 mg tablet Take 1 tablet (10 mg total) by mouth every evening.    gabapentin (NEURONTIN) 300 MG capsule Take 1 capsule (300 mg total) by mouth every evening.    hydrOXYzine HCL (ATARAX) 25 MG tablet Take 1 tablet (25 mg total) by mouth nightly as needed for Itching.    indomethacin (INDOCIN) 25 MG capsule Take 2 tablets 1st dose then 1 tablet three times a day @ mealtime    insulin lispro (HUMALOG U-100 INSULIN) 100 unit/mL injection To use with VGo 30- 4 clicks TID AC, Max TDD of 100 units    lisinopriL-hydrochlorothiazide (PRINZIDE,ZESTORETIC) 20-12.5 mg per tablet Take 1 tablet by mouth once daily    rosuvastatin (CRESTOR) 40 MG Tab Take 1 tablet (40 mg total) by mouth every evening.    sub-q insulin device, 30 unit (V-GO 30) Katelyn Inject 1 Device into the skin once daily.    aspirin (ECOTRIN) 81 MG EC tablet Take 1 tablet (81 mg total) by mouth once daily. (Patient not taking: Reported on 4/15/2025)    blood sugar diagnostic Strp To check BG 3 times daily, to use with insurance preferred meter    blood-glucose meter kit To check BG 3 times daily, to use with insurance preferred meter    blood-glucose meter,continuous (DEXCOM G7 ) Misc 1 Device by Misc.(Non-Drug;  Combo Route) route continuous.    blood-glucose sensor (DEXCOM G7 SENSOR) Katelyn Inject 1 Device into the skin every 10 days.    diclofenac sodium (VOLTAREN) 1 % Gel Apply 2 g topically 4 (four) times daily. (Patient taking differently: Apply 2 g topically 4 (four) times daily. Using BID)    lancets Misc To check BG 3 times daily, to use with insurance preferred meter    ONETOUCH DELICA PLUS LANCET 33 gauge Misc Apply topically 3 (three) times daily.    ONETOUCH VERIO FLEX METER Misc USE AS DIRECTED THREE TIMES DAILY    tirzepatide (MOUNJARO) 5 mg/0.5 mL PnIj Inject 5 mg into the skin every 7 days.       Review of patient's allergies indicates:  No Known Allergies    Past Medical History:   Diagnosis Date    Cecal volvulus 2023    Diabetes mellitus, type 2     Hypercholesteremia     Hypertension      Past Surgical History:   Procedure Laterality Date    COLONOSCOPY  2023    COLONOSCOPY N/A 3/29/2023    Procedure: COLONOSCOPY;  Surgeon: Ashu Burns MD;  Location: [facility];  Service: Endoscopy;  Laterality: N/A;    HYSTERECTOMY      LAPAROTOMY, EXPLORATORY N/A 2023    Procedure: LAPAROTOMY, EXPLORATORY;  Surgeon: Flaco Arias MD;  Location: [facility] OR;  Service: General;  Laterality: N/A;    SURGICAL REMOVAL OF ILEUM WITH CECUM  2023    Procedure: ILEOCECECTOMY ;  Surgeon: Flaco Arias MD;  Location: [facility] OR;  Service: General;;     Family History       Problem Relation (Age of Onset)    Breast cancer Paternal Grandmother    Cancer Maternal Grandmother    Diabetes Mother, Father, Brother          Tobacco Use    Smoking status: Former     Current packs/day: 0.00     Types: Cigarettes     Start date:      Quit date:      Years since quittin.3    Smokeless tobacco: Never   Substance and Sexual Activity    Alcohol use: Yes     Comment: socially    Drug use: No    Sexual activity: Not Currently     Review of Systems   Constitutional:  Negative for chills and fever.    Respiratory:  Negative for cough and shortness of breath.    Cardiovascular:  Negative for chest pain.   Gastrointestinal:  Positive for constipation. Negative for abdominal pain, nausea and vomiting.     Objective:     Vital Signs (Most Recent):  Temp: 98.5 °F (36.9 °C) (04/19/25 1123)  Pulse: 68 (04/19/25 1123)  Resp: 20 (04/19/25 1123)  BP: (!) 197/81 (04/19/25 1123)  SpO2: 98 % (04/19/25 1123) Vital Signs (24h Range):  Temp:  [97.4 °F (36.3 °C)-98.5 °F (36.9 °C)] 98.5 °F (36.9 °C)  Pulse:  [68-86] 68  Resp:  [18-20] 20  SpO2:  [95 %-100 %] 98 %  BP: (110-197)/(63-81) 197/81     Weight: 97.4 kg (214 lb 11.7 oz)  Body mass index is 33.63 kg/m².     Physical Exam  Constitutional:       General: She is not in acute distress.  HENT:      Head: Normocephalic and atraumatic.   Cardiovascular:      Rate and Rhythm: Normal rate and regular rhythm.   Pulmonary:      Effort: Pulmonary effort is normal. No respiratory distress.   Abdominal:      General: There is distension (mild).      Palpations: Abdomen is soft.      Tenderness: There is no abdominal tenderness.      Comments: Soft partially reducible hernia   Skin:     General: Skin is warm and dry.   Neurological:      General: No focal deficit present.      Mental Status: She is alert and oriented to person, place, and time.            I have reviewed all pertinent lab results within the past 24 hours.  CBC:   Recent Labs   Lab 04/19/25 0213   WBC 4.64   RBC 4.02   HGB 9.0*   HCT 30.3*      MCV 75*   MCH 22.4*   MCHC 29.7*     CMP:   Recent Labs   Lab 04/19/25 0213   CALCIUM 8.6*   ALBUMIN 3.1*      K 3.3*   CO2 21*   *   BUN 21   CREATININE 1.1   ALKPHOS 66   ALT 20   AST 25   BILITOT 0.3       Significant Diagnostics:  I have reviewed all pertinent imaging results/findings within the past 24 hours.

## 2025-04-19 NOTE — ASSESSMENT & PLAN NOTE
64 year old female with a PMH of 2DM, HTN, HLD, CKD3, JANE who presented with constipation and vomiting. Hernia does not appear to be cause of obstruction on exam or on imaging    Patient passing gas. It is possible this is just constipation vs an obstruction that has resolved.   CLD. Ok to slowly advance as tolerated  Ok to discharge from a surgery standpoint once tolerating regular diet

## 2025-04-19 NOTE — PLAN OF CARE
Inpatient Upgrade Note    Aishwarya Jensen has warranted treatment spanning two or more midnights of hospital level care for the management of  Bowel Obstruction . She continues to require further evaluation by consultants. Her condition is also complicated by the following comorbidities: HTN, CKD3 & HLD.

## 2025-04-19 NOTE — HPI
Aishwarya Jensen is a 64 year old female with a PMH of 2DM, HTN, HLD, CKD3, JANE who presented with abdominal pain, vomiting and constipation. She has not had a bowel movement since 4/14. She did vomit on 4/15. She states she has been passing gas throughout this time and did pass gas just prior to our evaluation. She feels well today without abdominal pain or nausea. 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     Labs without significant findings  CT scan with some dilation of the small bowel. Colon containing ventral hernia.

## 2025-04-19 NOTE — HPI
Ms. 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    Patient states that she has had nausea, vomiting, diarrhea with the past few days with associated generalized pain and cramping.  Due to these symptoms she presented to an outside hospital further evaluation.    Triage vitals at that time were significant for elevated blood pressures.  Physical examination was significant for abdominal tenderness on palpation.  Review of systems significant for abdominal pain, nausea, vomiting.    CBC significant for microcytic anemia.  CMP significant for elevated glucose.    CT imaging was concerning for possible SBO versus ileus versus enteritis.  She was given Cipro and Flagyl.  NG tube was held at this time.  After discussion with General surgery at [facility], she was sent over for further evaluation.     Upon arrival, patient complaining of some abdominal pain.  The patient is currently NPO.  She was continued on antibiotics.    Patient admitted for SBO and possible enteritis.

## 2025-04-19 NOTE — ASSESSMENT & PLAN NOTE
Prior history of sigmoid volvulus, s/p resection in 2023  Likely adhesions  CT imaging concerning for possible SBO    Plan:  NPO  May consider NG tube  Continuous fluids  Follow-up with surgery in the a.m.

## 2025-04-20 LAB
ABSOLUTE EOSINOPHIL (OHS): 0.13 K/UL
ABSOLUTE MONOCYTE (OHS): 0.34 K/UL (ref 0.3–1)
ABSOLUTE NEUTROPHIL COUNT (OHS): 1.89 K/UL (ref 1.8–7.7)
ALBUMIN SERPL BCP-MCNC: 2.9 G/DL (ref 3.5–5.2)
ALP SERPL-CCNC: 63 UNIT/L (ref 40–150)
ALT SERPL W/O P-5'-P-CCNC: 20 UNIT/L (ref 10–44)
ANION GAP (OHS): 9 MMOL/L (ref 8–16)
AST SERPL-CCNC: 24 UNIT/L (ref 11–45)
BASOPHILS # BLD AUTO: 0.03 K/UL
BASOPHILS NFR BLD AUTO: 0.7 %
BILIRUB SERPL-MCNC: 0.3 MG/DL (ref 0.1–1)
BUN SERPL-MCNC: 13 MG/DL (ref 8–23)
CALCIUM SERPL-MCNC: 8.7 MG/DL (ref 8.7–10.5)
CHLORIDE SERPL-SCNC: 112 MMOL/L (ref 95–110)
CO2 SERPL-SCNC: 20 MMOL/L (ref 23–29)
CREAT SERPL-MCNC: 1 MG/DL (ref 0.5–1.4)
ERYTHROCYTE [DISTWIDTH] IN BLOOD BY AUTOMATED COUNT: 14.6 % (ref 11.5–14.5)
GFR SERPLBLD CREATININE-BSD FMLA CKD-EPI: >60 ML/MIN/1.73/M2
GLUCOSE SERPL-MCNC: 96 MG/DL (ref 70–110)
HCT VFR BLD AUTO: 30.2 % (ref 37–48.5)
HGB BLD-MCNC: 8.9 GM/DL (ref 12–16)
IMM GRANULOCYTES # BLD AUTO: 0.01 K/UL (ref 0–0.04)
IMM GRANULOCYTES NFR BLD AUTO: 0.2 % (ref 0–0.5)
LYMPHOCYTES # BLD AUTO: 1.71 K/UL (ref 1–4.8)
MCH RBC QN AUTO: 22.3 PG (ref 27–31)
MCHC RBC AUTO-ENTMCNC: 29.5 G/DL (ref 32–36)
MCV RBC AUTO: 76 FL (ref 82–98)
NUCLEATED RBC (/100WBC) (OHS): 0 /100 WBC
PLATELET # BLD AUTO: 218 K/UL (ref 150–450)
PMV BLD AUTO: 11.7 FL (ref 9.2–12.9)
POCT GLUCOSE: 102 MG/DL (ref 70–110)
POCT GLUCOSE: 134 MG/DL (ref 70–110)
POCT GLUCOSE: 171 MG/DL (ref 70–110)
POCT GLUCOSE: 215 MG/DL (ref 70–110)
POCT GLUCOSE: 94 MG/DL (ref 70–110)
POTASSIUM SERPL-SCNC: 3.4 MMOL/L (ref 3.5–5.1)
PROT SERPL-MCNC: 6.2 GM/DL (ref 6–8.4)
RBC # BLD AUTO: 3.99 M/UL (ref 4–5.4)
RELATIVE EOSINOPHIL (OHS): 3.2 %
RELATIVE LYMPHOCYTE (OHS): 41.6 % (ref 18–48)
RELATIVE MONOCYTE (OHS): 8.3 % (ref 4–15)
RELATIVE NEUTROPHIL (OHS): 46 % (ref 38–73)
SODIUM SERPL-SCNC: 141 MMOL/L (ref 136–145)
WBC # BLD AUTO: 4.11 K/UL (ref 3.9–12.7)

## 2025-04-20 PROCEDURE — 63600175 PHARM REV CODE 636 W HCPCS: Performed by: STUDENT IN AN ORGANIZED HEALTH CARE EDUCATION/TRAINING PROGRAM

## 2025-04-20 PROCEDURE — 25000003 PHARM REV CODE 250: Performed by: STUDENT IN AN ORGANIZED HEALTH CARE EDUCATION/TRAINING PROGRAM

## 2025-04-20 PROCEDURE — 99232 SBSQ HOSP IP/OBS MODERATE 35: CPT | Mod: GC,,, | Performed by: STUDENT IN AN ORGANIZED HEALTH CARE EDUCATION/TRAINING PROGRAM

## 2025-04-20 PROCEDURE — 11000001 HC ACUTE MED/SURG PRIVATE ROOM

## 2025-04-20 PROCEDURE — 63600175 PHARM REV CODE 636 W HCPCS: Performed by: FAMILY MEDICINE

## 2025-04-20 PROCEDURE — 36415 COLL VENOUS BLD VENIPUNCTURE: CPT | Performed by: STUDENT IN AN ORGANIZED HEALTH CARE EDUCATION/TRAINING PROGRAM

## 2025-04-20 PROCEDURE — 25000003 PHARM REV CODE 250: Performed by: FAMILY MEDICINE

## 2025-04-20 PROCEDURE — 80053 COMPREHEN METABOLIC PANEL: CPT | Performed by: STUDENT IN AN ORGANIZED HEALTH CARE EDUCATION/TRAINING PROGRAM

## 2025-04-20 PROCEDURE — 85025 COMPLETE CBC W/AUTO DIFF WBC: CPT | Performed by: STUDENT IN AN ORGANIZED HEALTH CARE EDUCATION/TRAINING PROGRAM

## 2025-04-20 RX ORDER — HYDRALAZINE HYDROCHLORIDE 20 MG/ML
10 INJECTION INTRAMUSCULAR; INTRAVENOUS ONCE
Status: COMPLETED | OUTPATIENT
Start: 2025-04-20 | End: 2025-04-20

## 2025-04-20 RX ADMIN — COLCHICINE 0.6 MG: 0.6 TABLET ORAL at 09:04

## 2025-04-20 RX ADMIN — ATORVASTATIN CALCIUM 80 MG: 40 TABLET, FILM COATED ORAL at 09:04

## 2025-04-20 RX ADMIN — HEPARIN SODIUM 5000 UNITS: 5000 INJECTION INTRAVENOUS; SUBCUTANEOUS at 10:04

## 2025-04-20 RX ADMIN — SODIUM CHLORIDE: 9 INJECTION, SOLUTION INTRAVENOUS at 01:04

## 2025-04-20 RX ADMIN — HYDRALAZINE HYDROCHLORIDE 10 MG: 20 INJECTION INTRAMUSCULAR; INTRAVENOUS at 04:04

## 2025-04-20 RX ADMIN — AMLODIPINE BESYLATE 10 MG: 5 TABLET ORAL at 09:04

## 2025-04-20 RX ADMIN — CIPROFLOXACIN 400 MG: 2 INJECTION, SOLUTION INTRAVENOUS at 10:04

## 2025-04-20 RX ADMIN — SODIUM CHLORIDE: 9 INJECTION, SOLUTION INTRAVENOUS at 10:04

## 2025-04-20 RX ADMIN — HEPARIN SODIUM 5000 UNITS: 5000 INJECTION INTRAVENOUS; SUBCUTANEOUS at 02:04

## 2025-04-20 RX ADMIN — EZETIMIBE 10 MG: 10 TABLET ORAL at 09:04

## 2025-04-20 RX ADMIN — METRONIDAZOLE 500 MG: 500 TABLET ORAL at 05:04

## 2025-04-20 RX ADMIN — METRONIDAZOLE 500 MG: 500 TABLET ORAL at 10:04

## 2025-04-20 RX ADMIN — HEPARIN SODIUM 5000 UNITS: 5000 INJECTION INTRAVENOUS; SUBCUTANEOUS at 05:04

## 2025-04-20 RX ADMIN — GABAPENTIN 300 MG: 300 CAPSULE ORAL at 09:04

## 2025-04-20 RX ADMIN — POTASSIUM BICARBONATE 20 MEQ: 391 TABLET, EFFERVESCENT ORAL at 10:04

## 2025-04-20 RX ADMIN — METRONIDAZOLE 500 MG: 500 TABLET ORAL at 02:04

## 2025-04-20 NOTE — PLAN OF CARE
Problem: Adult Inpatient Plan of Care  Goal: Optimal Comfort and Wellbeing  Outcome: Progressing     Problem: Diabetes Comorbidity  Goal: Blood Glucose Level Within Targeted Range  Outcome: Progressing     Problem: Intestinal Obstruction  Goal: Fluid and Electrolyte Balance  Outcome: Progressing  Goal: Optimal Pain Control and Function  Outcome: Progressing     Problem: Fall Injury Risk  Goal: Absence of Fall and Fall-Related Injury  Outcome: Progressing

## 2025-04-20 NOTE — PROGRESS NOTES
[facility] Medicine  Progress Note    Patient Name: Aishwarya Jensen  MRN: [id number]  Patient Class: IP- Inpatient   Admission Date: 4/18/2025  Length of Stay: 2 days  Attending Physician: Moe Hylton MD  Primary Care Provider: Joey Cintron MD        Subjective:     Principal Problem:SBO (small bowel obstruction)        HPI: Ms. 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     Patient states that she has had nausea, vomiting, diarrhea with the past few days with associated generalized pain and cramping.  Due to these symptoms she presented to an outside hospital further evaluation.     Triage vitals at that time were significant for elevated blood pressures.  Physical examination was significant for abdominal tenderness on palpation.  Review of systems significant for abdominal pain, nausea, vomiting.     CBC significant for microcytic anemia.  CMP significant for elevated glucose.     CT imaging was concerning for possible SBO versus ileus versus enteritis.  She was given Cipro and Flagyl.  NG tube was held at this time.  After discussion with General surgery at [facility], she was sent over for further evaluation.     Upon arrival, patient complaining of some abdominal pain.  The patient is currently NPO.  She was continued on antibiotics.     Patient admitted for SBO and possible enteritis.      Interval History: no acute event overnight. Labs and vitals reviewed. Mild hypokalemic, replacement done. Appreciate general surgery consult, ok to advance diet and then DC once stable.    Review of Systems    Constitutional:  Negative for appetite change and chills.   HENT:  Negative for ear discharge and ear pain.    Respiratory:  Negative for cough and choking.    Cardiovascular:  Negative  for chest pain and leg swelling.   Gastrointestinal:  Positive for abdominal distention, abdominal pain, diarrhea, nausea and vomiting. Negative for anal bleeding and blood in stool.   Endocrine: Negative for polydipsia and polyphagia.   Genitourinary:  Negative for flank pain and hematuria.   Musculoskeletal:  Negative for back pain and gait problem.   Skin:  Negative for pallor and rash.   Allergic/Immunologic: Negative for food allergies and immunocompromised state.   Neurological:  Negative for seizures and speech difficulty.   Hematological:  Negative for adenopathy. Does not bruise/bleed easily.   Psychiatric/Behavioral:  Negative for decreased concentration and dysphoric mood.          Objective:     Vital Signs (Most Recent):  Temp: 98.1 °F (36.7 °C) (04/20/25 0720)  Pulse: 65 (04/20/25 0720)  Resp: 18 (04/20/25 0720)  BP: (!) 150/73 (04/20/25 0720)  SpO2: (!) 94 % (04/20/25 0720) Vital Signs (24h Range):  Temp:  [97.8 °F (36.6 °C)-98.5 °F (36.9 °C)] 98.1 °F (36.7 °C)  Pulse:  [65-72] 65  Resp:  [16-20] 18  SpO2:  [20 %-98 %] 94 %  BP: (140-197)/(64-81) 150/73     Weight: 97.4 kg (214 lb 11.7 oz)  Body mass index is 33.63 kg/m².    Intake/Output Summary (Last 24 hours) at 4/20/2025 0938  Last data filed at 4/19/2025 1823  Gross per 24 hour   Intake 1322.32 ml   Output --   Net 1322.32 ml      Physical Exam      Vitals reviewed.   Constitutional:       General: She is not in acute distress.     Appearance: She is not toxic-appearing.   HENT:      Right Ear: There is no impacted cerumen.      Left Ear: There is no impacted cerumen.      Nose: No congestion or rhinorrhea.      Mouth/Throat:      Pharynx: No oropharyngeal exudate or posterior oropharyngeal erythema.   Eyes:      General:         Right eye: No discharge.         Left eye: No discharge.   Cardiovascular:      Rate and Rhythm: Normal rate.      Heart sounds: No murmur heard.     No gallop.   Pulmonary:      Breath sounds: No wheezing or rales.    Abdominal:      General: There is distension.      Tenderness: There is abdominal tenderness.   Musculoskeletal:         General: No swelling or deformity.      Cervical back: No rigidity.   Lymphadenopathy:      Cervical: No cervical adenopathy.   Skin:     Coloration: Skin is not jaundiced.      Findings: No bruising.   Neurological:      General: No focal deficit present.      Cranial Nerves: No cranial nerve deficit.      Motor: No weakness.   Psychiatric:         Mood and Affect: Mood normal.     Significant Labs: All pertinent labs within the past 24 hours have been reviewed.  CBC:   Recent Labs   Lab 04/19/25 0213 04/20/25 0235   WBC 4.64 4.11   HGB 9.0* 8.9*   HCT 30.3* 30.2*    218     CMP:   Recent Labs   Lab 04/19/25 0213 04/20/25 0235    141   K 3.3* 3.4*   * 112*   CO2 21* 20*   BUN 21 13   CREATININE 1.1 1.0   CALCIUM 8.6* 8.7   ALBUMIN 3.1* 2.9*   BILITOT 0.3 0.3   ALKPHOS 66 63   AST 25 24   ALT 20 20   ANIONGAP 9 9       Significant Imaging: I have reviewed all pertinent imaging results/findings within the past 24 hours.  I have reviewed and interpreted all pertinent imaging results/findings within the past 24 hours.    Assessment/Plan:      Active Diagnoses:    Diagnosis Date Noted POA    PRINCIPAL PROBLEM:  SBO (small bowel obstruction) [K56.609] 04/18/2025 Yes     Chronic    Enteritis [K52.9] 04/18/2025 Yes    Type 2 diabetes mellitus, with long-term current use of insulin [E11.9, Z79.4] 06/15/2022 Not Applicable      Problems Resolved During this Admission:     VTE Risk Mitigation (From admission, onward)           Ordered     heparin (porcine) injection 5,000 Units  Every 8 hours         04/18/25 1618     IP VTE HIGH RISK PATIENT  Once         04/18/25 1618     Place sequential compression device  Until discontinued         04/18/25 1618                  *SBO (small bowel obstruction)  Prior history of sigmoid volvulus, s/p resection in 2023  Likely adhesions  CT  imaging concerning for possible SBO     Plan:  Start reg diet    Consult gen surgery - appreciate consult     Type 2 diabetes mellitus, with long-term current use of insulin  On insulin outpatient   diet      Enteritis  Patient reports some diarrhea, abdominal pain  CT imaging concerning for possible enteritis      No abx at this time    Moe Hylton MD  Department of Hospital Medicine   [facility] Surg

## 2025-04-20 NOTE — SUBJECTIVE & OBJECTIVE
Interval History: No acute events overnight. HDS on RA. Tolerated clears. Passing gas, no BM    Medications:  Continuous Infusions:   0.9% NaCl   Intravenous Continuous 100 mL/hr at 04/20/25 0143 New Bag at 04/20/25 0143     Scheduled Meds:   amLODIPine  10 mg Oral Daily    atorvastatin  80 mg Oral Daily    ciprofloxacin  400 mg Intravenous Q12H    colchicine  0.6 mg Oral Daily    ezetimibe  10 mg Oral QHS    gabapentin  300 mg Oral QHS    heparin (porcine)  5,000 Units Subcutaneous Q8H    metroNIDAZOLE  500 mg Oral Q8H    potassium bicarbonate  20 mEq Oral Daily     PRN Meds:  Current Facility-Administered Medications:     acetaminophen, 650 mg, Oral, Q4H PRN    acetaminophen, 650 mg, Oral, Q8H PRN    dextrose 50%, 12.5 g, Intravenous, PRN    dextrose 50%, 25 g, Intravenous, PRN    glucagon (human recombinant), 1 mg, Intramuscular, PRN    glucose, 16 g, Oral, PRN    glucose, 24 g, Oral, PRN    HYDROcodone-acetaminophen, 1 tablet, Oral, Q6H PRN    hydrOXYzine HCL, 25 mg, Oral, Nightly PRN    naloxone, 0.02 mg, Intravenous, PRN    ondansetron, 4 mg, Intravenous, Q8H PRN    sodium chloride 0.9%, 10 mL, Intravenous, Q12H PRN     Review of patient's allergies indicates:  No Known Allergies  Objective:     Vital Signs (Most Recent):  Temp: 98.1 °F (36.7 °C) (04/20/25 0720)  Pulse: 65 (04/20/25 0720)  Resp: 18 (04/20/25 0720)  BP: (!) 150/73 (04/20/25 0720)  SpO2: (!) 94 % (04/20/25 0720) Vital Signs (24h Range):  Temp:  [97.8 °F (36.6 °C)-98.5 °F (36.9 °C)] 98.1 °F (36.7 °C)  Pulse:  [65-72] 65  Resp:  [16-20] 18  SpO2:  [20 %-98 %] 94 %  BP: (140-197)/(64-81) 150/73     Weight: 97.4 kg (214 lb 11.7 oz)  Body mass index is 33.63 kg/m².    Intake/Output - Last 3 Shifts         04/18 0700  04/19 0659 04/19 0700  04/20 0659 04/20 0700  04/21 0659    P.O. 0      I.V. (mL/kg)  1122.6 (11.5)     IV Piggyback  199.7     Total Intake(mL/kg) 0 (0) 1322.3 (13.6)     Net 0 +1322.3            Urine Occurrence  1 x               Physical Exam  Constitutional:       General: She is not in acute distress.  HENT:      Head: Normocephalic and atraumatic.   Cardiovascular:      Rate and Rhythm: Normal rate and regular rhythm.   Pulmonary:      Effort: Pulmonary effort is normal. No respiratory distress.   Abdominal:      General: There is distension (mild).      Palpations: Abdomen is soft.      Tenderness: There is no abdominal tenderness.      Comments: Soft partially reducible hernia   Skin:     General: Skin is warm and dry.   Neurological:      General: No focal deficit present.      Mental Status: She is alert and oriented to person, place, and time.          Significant Labs:  I have reviewed all pertinent lab results within the past 24 hours.  CBC:   Recent Labs   Lab 04/20/25  0235   WBC 4.11   RBC 3.99*   HGB 8.9*   HCT 30.2*      MCV 76*   MCH 22.3*   MCHC 29.5*     CMP:   Recent Labs   Lab 04/20/25  0235   CALCIUM 8.7   ALBUMIN 2.9*      K 3.4*   CO2 20*   *   BUN 13   CREATININE 1.0   ALKPHOS 63   ALT 20   AST 24   BILITOT 0.3       Significant Diagnostics:  I have reviewed all pertinent imaging results/findings within the past 24 hours.

## 2025-04-20 NOTE — ASSESSMENT & PLAN NOTE
64 year old female with a PMH of 2DM, HTN, HLD, CKD3, JANE who presented with constipation and vomiting. Hernia does not appear to be cause of obstruction on exam or on imaging    Patient passing gas. It is possible this is just constipation vs an obstruction that has resolved.   Ok to advance diet.   Would recommend miralax to help with constipation   Ok to discharge from a surgery standpoint once tolerating regular diet   none

## 2025-04-20 NOTE — PROGRESS NOTES
[facility]  General Surgery  Progress Note    Subjective:     History of Present Illness:  Aishwarya Jensen is a 64 year old female with a PMH of 2DM, HTN, HLD, CKD3, JANE who presented with abdominal pain, vomiting and constipation. She has not had a bowel movement since 4/14. She did vomit on 4/15. She states she has been passing gas throughout this time and did pass gas just prior to our evaluation. She feels well today without abdominal pain or nausea. 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     Labs without significant findings  CT scan with some dilation of the small bowel. Colon containing ventral hernia.      Post-Op Info:  * No surgery found *         Interval History: No acute events overnight. HDS on RA. Tolerated clears. Passing gas, no BM    Medications:  Continuous Infusions:   0.9% NaCl   Intravenous Continuous 100 mL/hr at 04/20/25 0143 New Bag at 04/20/25 0143     Scheduled Meds:   amLODIPine  10 mg Oral Daily    atorvastatin  80 mg Oral Daily    ciprofloxacin  400 mg Intravenous Q12H    colchicine  0.6 mg Oral Daily    ezetimibe  10 mg Oral QHS    gabapentin  300 mg Oral QHS    heparin (porcine)  5,000 Units Subcutaneous Q8H    metroNIDAZOLE  500 mg Oral Q8H    potassium bicarbonate  20 mEq Oral Daily     PRN Meds:  Current Facility-Administered Medications:     acetaminophen, 650 mg, Oral, Q4H PRN    acetaminophen, 650 mg, Oral, Q8H PRN    dextrose 50%, 12.5 g, Intravenous, PRN    dextrose 50%, 25 g, Intravenous, PRN    glucagon (human recombinant), 1 mg, Intramuscular, PRN    glucose, 16 g, Oral, PRN    glucose, 24 g, Oral, PRN    HYDROcodone-acetaminophen, 1 tablet, Oral, Q6H PRN    hydrOXYzine HCL, 25 mg, Oral, Nightly PRN    naloxone, 0.02 mg, Intravenous, PRN    ondansetron, 4 mg, Intravenous, Q8H PRN    sodium chloride 0.9%, 10 mL, Intravenous, Q12H PRN     Review of patient's allergies indicates:  No Known Allergies  Objective:     Vital Signs (Most  Recent):  Temp: 98.1 °F (36.7 °C) (04/20/25 0720)  Pulse: 65 (04/20/25 0720)  Resp: 18 (04/20/25 0720)  BP: (!) 150/73 (04/20/25 0720)  SpO2: (!) 94 % (04/20/25 0720) Vital Signs (24h Range):  Temp:  [97.8 °F (36.6 °C)-98.5 °F (36.9 °C)] 98.1 °F (36.7 °C)  Pulse:  [65-72] 65  Resp:  [16-20] 18  SpO2:  [20 %-98 %] 94 %  BP: (140-197)/(64-81) 150/73     Weight: 97.4 kg (214 lb 11.7 oz)  Body mass index is 33.63 kg/m².    Intake/Output - Last 3 Shifts         04/18 0700 04/19 0659 04/19 0700 04/20 0659 04/20 0700 04/21 0659    P.O. 0      I.V. (mL/kg)  1122.6 (11.5)     IV Piggyback  199.7     Total Intake(mL/kg) 0 (0) 1322.3 (13.6)     Net 0 +1322.3            Urine Occurrence  1 x              Physical Exam  Constitutional:       General: She is not in acute distress.  HENT:      Head: Normocephalic and atraumatic.   Cardiovascular:      Rate and Rhythm: Normal rate and regular rhythm.   Pulmonary:      Effort: Pulmonary effort is normal. No respiratory distress.   Abdominal:      General: There is distension (mild).      Palpations: Abdomen is soft.      Tenderness: There is no abdominal tenderness.      Comments: Soft partially reducible hernia   Skin:     General: Skin is warm and dry.   Neurological:      General: No focal deficit present.      Mental Status: She is alert and oriented to person, place, and time.          Significant Labs:  I have reviewed all pertinent lab results within the past 24 hours.  CBC:   Recent Labs   Lab 04/20/25  0235   WBC 4.11   RBC 3.99*   HGB 8.9*   HCT 30.2*      MCV 76*   MCH 22.3*   MCHC 29.5*     CMP:   Recent Labs   Lab 04/20/25  0235   CALCIUM 8.7   ALBUMIN 2.9*      K 3.4*   CO2 20*   *   BUN 13   CREATININE 1.0   ALKPHOS 63   ALT 20   AST 24   BILITOT 0.3       Significant Diagnostics:  I have reviewed all pertinent imaging results/findings within the past 24 hours.  Assessment/Plan:     * SBO (small bowel obstruction)  64 year old female with a PMH  of 2DM, HTN, HLD, CKD3, JANE who presented with constipation and vomiting. Hernia does not appear to be cause of obstruction on exam or on imaging    Patient passing gas. It is possible this is just constipation vs an obstruction that has resolved.   Ok to advance diet.   Would recommend miralax to help with constipation   Ok to discharge from a surgery standpoint once tolerating regular diet        Barb Cohen MD  General Surgery  [facility] Surg

## 2025-04-20 NOTE — PLAN OF CARE
Problem: Adult Inpatient Plan of Care  Goal: Plan of Care Review  Outcome: Progressing     Problem: Diabetes Comorbidity  Goal: Blood Glucose Level Within Targeted Range  Outcome: Progressing     Problem: Hypertension Acute  Goal: Blood Pressure Within Desired Range  Outcome: Progressing     Problem: Intestinal Obstruction  Goal: Optimal Bowel Function  Outcome: Progressing     Problem: Diarrhea  Goal: Effective Diarrhea Management  Outcome: Progressing     Problem: Pain Acute  Goal: Optimal Pain Control and Function  Outcome: Progressing     Problem: Fall Injury Risk  Goal: Absence of Fall and Fall-Related Injury  Outcome: Progressing       AAOx4, no complaints of n/v during shifts but frequent c/o dizziness. Patient ambulated in [place] and became increasingly dizzy. Assessed BP: 199/76. MD notified, PRN ordered. Patient encouraged to call nurse for assistance with ambulating d/t safety.  Continuous NS dc. Patient maintained blood sugars WDL during shift. Bed in lowest position, all personal items and call light within each.

## 2025-04-20 NOTE — NURSING
Patient c/o dizziness while ambulating in hallway. /96. MD notified, one time dose of IV hydralazine ordered

## 2025-04-21 VITALS
OXYGEN SATURATION: 100 % | BODY MASS INDEX: 33.71 KG/M2 | RESPIRATION RATE: 16 BRPM | SYSTOLIC BLOOD PRESSURE: 150 MMHG | TEMPERATURE: 98 F | HEART RATE: 100 BPM | WEIGHT: 214.75 LBS | HEIGHT: 67 IN | DIASTOLIC BLOOD PRESSURE: 68 MMHG

## 2025-04-21 LAB
ABSOLUTE EOSINOPHIL (OHS): 0.16 K/UL
ABSOLUTE MONOCYTE (OHS): 0.34 K/UL (ref 0.3–1)
ABSOLUTE NEUTROPHIL COUNT (OHS): 2.33 K/UL (ref 1.8–7.7)
ALBUMIN SERPL BCP-MCNC: 3.1 G/DL (ref 3.5–5.2)
ALP SERPL-CCNC: 69 UNIT/L (ref 40–150)
ALT SERPL W/O P-5'-P-CCNC: 22 UNIT/L (ref 10–44)
ANION GAP (OHS): 10 MMOL/L (ref 8–16)
AST SERPL-CCNC: 26 UNIT/L (ref 11–45)
BASOPHILS # BLD AUTO: 0.03 K/UL
BASOPHILS NFR BLD AUTO: 0.7 %
BILIRUB SERPL-MCNC: 0.4 MG/DL (ref 0.1–1)
BUN SERPL-MCNC: 14 MG/DL (ref 8–23)
CALCIUM SERPL-MCNC: 8.8 MG/DL (ref 8.7–10.5)
CHLORIDE SERPL-SCNC: 109 MMOL/L (ref 95–110)
CO2 SERPL-SCNC: 21 MMOL/L (ref 23–29)
CREAT SERPL-MCNC: 1 MG/DL (ref 0.5–1.4)
ERYTHROCYTE [DISTWIDTH] IN BLOOD BY AUTOMATED COUNT: 14.8 % (ref 11.5–14.5)
GFR SERPLBLD CREATININE-BSD FMLA CKD-EPI: >60 ML/MIN/1.73/M2
GLUCOSE SERPL-MCNC: 177 MG/DL (ref 70–110)
HCT VFR BLD AUTO: 32.8 % (ref 37–48.5)
HGB BLD-MCNC: 9.9 GM/DL (ref 12–16)
IMM GRANULOCYTES # BLD AUTO: 0.01 K/UL (ref 0–0.04)
IMM GRANULOCYTES NFR BLD AUTO: 0.2 % (ref 0–0.5)
LYMPHOCYTES # BLD AUTO: 1.52 K/UL (ref 1–4.8)
MCH RBC QN AUTO: 22.4 PG (ref 27–31)
MCHC RBC AUTO-ENTMCNC: 30.2 G/DL (ref 32–36)
MCV RBC AUTO: 74 FL (ref 82–98)
NUCLEATED RBC (/100WBC) (OHS): 0 /100 WBC
PLATELET # BLD AUTO: 226 K/UL (ref 150–450)
PMV BLD AUTO: 11 FL (ref 9.2–12.9)
POCT GLUCOSE: 183 MG/DL (ref 70–110)
POCT GLUCOSE: 184 MG/DL (ref 70–110)
POCT GLUCOSE: 192 MG/DL (ref 70–110)
POTASSIUM SERPL-SCNC: 3.6 MMOL/L (ref 3.5–5.1)
PROT SERPL-MCNC: 6.6 GM/DL (ref 6–8.4)
RBC # BLD AUTO: 4.42 M/UL (ref 4–5.4)
RELATIVE EOSINOPHIL (OHS): 3.6 %
RELATIVE LYMPHOCYTE (OHS): 34.6 % (ref 18–48)
RELATIVE MONOCYTE (OHS): 7.7 % (ref 4–15)
RELATIVE NEUTROPHIL (OHS): 53.2 % (ref 38–73)
SODIUM SERPL-SCNC: 140 MMOL/L (ref 136–145)
WBC # BLD AUTO: 4.39 K/UL (ref 3.9–12.7)

## 2025-04-21 PROCEDURE — 85025 COMPLETE CBC W/AUTO DIFF WBC: CPT | Performed by: STUDENT IN AN ORGANIZED HEALTH CARE EDUCATION/TRAINING PROGRAM

## 2025-04-21 PROCEDURE — 25000003 PHARM REV CODE 250: Performed by: FAMILY MEDICINE

## 2025-04-21 PROCEDURE — 80053 COMPREHEN METABOLIC PANEL: CPT | Performed by: STUDENT IN AN ORGANIZED HEALTH CARE EDUCATION/TRAINING PROGRAM

## 2025-04-21 PROCEDURE — 36415 COLL VENOUS BLD VENIPUNCTURE: CPT | Performed by: STUDENT IN AN ORGANIZED HEALTH CARE EDUCATION/TRAINING PROGRAM

## 2025-04-21 PROCEDURE — 63600175 PHARM REV CODE 636 W HCPCS: Performed by: STUDENT IN AN ORGANIZED HEALTH CARE EDUCATION/TRAINING PROGRAM

## 2025-04-21 PROCEDURE — 25000003 PHARM REV CODE 250: Performed by: STUDENT IN AN ORGANIZED HEALTH CARE EDUCATION/TRAINING PROGRAM

## 2025-04-21 RX ORDER — IBUPROFEN 200 MG
16 TABLET ORAL
Status: DISCONTINUED | OUTPATIENT
Start: 2025-04-21 | End: 2025-04-21 | Stop reason: HOSPADM

## 2025-04-21 RX ORDER — POLYETHYLENE GLYCOL 3350 17 G/17G
17 POWDER, FOR SOLUTION ORAL DAILY
Status: DISCONTINUED | OUTPATIENT
Start: 2025-04-21 | End: 2025-04-21 | Stop reason: HOSPADM

## 2025-04-21 RX ORDER — GLUCAGON 1 MG
1 KIT INJECTION
Status: DISCONTINUED | OUTPATIENT
Start: 2025-04-21 | End: 2025-04-21 | Stop reason: HOSPADM

## 2025-04-21 RX ORDER — INSULIN ASPART 100 [IU]/ML
0-5 INJECTION, SOLUTION INTRAVENOUS; SUBCUTANEOUS
Status: DISCONTINUED | OUTPATIENT
Start: 2025-04-21 | End: 2025-04-21 | Stop reason: HOSPADM

## 2025-04-21 RX ORDER — IBUPROFEN 200 MG
24 TABLET ORAL
Status: DISCONTINUED | OUTPATIENT
Start: 2025-04-21 | End: 2025-04-21 | Stop reason: HOSPADM

## 2025-04-21 RX ADMIN — AMLODIPINE BESYLATE 10 MG: 5 TABLET ORAL at 09:04

## 2025-04-21 RX ADMIN — HEPARIN SODIUM 5000 UNITS: 5000 INJECTION INTRAVENOUS; SUBCUTANEOUS at 02:04

## 2025-04-21 RX ADMIN — ATORVASTATIN CALCIUM 80 MG: 40 TABLET, FILM COATED ORAL at 09:04

## 2025-04-21 RX ADMIN — COLCHICINE 0.6 MG: 0.6 TABLET ORAL at 09:04

## 2025-04-21 RX ADMIN — METRONIDAZOLE 500 MG: 500 TABLET ORAL at 05:04

## 2025-04-21 RX ADMIN — METRONIDAZOLE 500 MG: 500 TABLET ORAL at 02:04

## 2025-04-21 RX ADMIN — POLYETHYLENE GLYCOL 3350 17 G: 17 POWDER, FOR SOLUTION ORAL at 09:04

## 2025-04-21 RX ADMIN — HEPARIN SODIUM 5000 UNITS: 5000 INJECTION INTRAVENOUS; SUBCUTANEOUS at 05:04

## 2025-04-21 RX ADMIN — CIPROFLOXACIN 400 MG: 2 INJECTION, SOLUTION INTRAVENOUS at 10:04

## 2025-04-21 NOTE — DISCHARGE SUMMARY
[facility]  Discharge Summary      Patient Name: Aishwarya Jensen  MRN: [id number]  Admission Date: 4/18/2025  Hospital Length of Stay: 3 days  Discharge Date and Time: 04/21/2025 3:24 PM  Attending Physician: Roni Grijalva MD   Discharging Provider: Roni Grijalva MD  Discharge Provider Team: Networked reference to record PCT   Primary Care Provider: Joey Cintron MD        HPI: Ms. 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     Patient states that she has had nausea, vomiting, diarrhea with the past few days with associated generalized pain and cramping.  Due to these symptoms she presented to an outside hospital further evaluation.     Triage vitals at that time were significant for elevated blood pressures.  Physical examination was significant for abdominal tenderness on palpation.  Review of systems significant for abdominal pain, nausea, vomiting.     CBC significant for microcytic anemia.  CMP significant for elevated glucose.     CT imaging was concerning for possible SBO versus ileus versus enteritis.  She was given Cipro and Flagyl.  NG tube was held at this time.  After discussion with General surgery at [place], she was sent over for further evaluation.     Upon arrival, patient complaining of some abdominal pain.  The patient is currently NPO.  She was continued on antibiotics.     Patient admitted for SBO and possible enteritis.    * No surgery found *      Hospital Course: Pt admitted with nausea/vomiting and diarrhea. Evaluated by general surgery with no acute intervention performed.  Pt was able to tolerate PO intake prior to discharge.       Consults:   Consults (From admission, onward)          Status Ordering Provider     Inpatient consult to General Surgery   Once        Provider:  (Not yet assigned)    Completed MIRANDA DILL            Final Active Diagnoses:    Diagnosis Date Noted POA    PRINCIPAL PROBLEM:  SBO (small bowel obstruction) [K56.609] 04/18/2025 Yes     Chronic    Enteritis [K52.9] 04/18/2025 Yes    Type 2 diabetes mellitus, with long-term current use of insulin [E11.9, Z79.4] 06/15/2022 Not Applicable      Problems Resolved During this Admission:      Discharged Condition: stable    Disposition: Home or Self Care    Follow Up:    Patient Instructions:      Diet diabetic     Activity as tolerated     Medications:  Reconciled Home Medications:      Medication List        CONTINUE taking these medications      acetaminophen 500 MG tablet  Commonly known as: TYLENOL  Take 2 tablets (1,000 mg total) by mouth every 6 to 8 hours as needed for Pain.     amLODIPine 10 MG tablet  Commonly known as: NORVASC  Take 1 tablet (10 mg total) by mouth once daily.     blood sugar diagnostic Strp  To check BG 3 times daily, to use with insurance preferred meter     * blood-glucose meter kit  To check BG 3 times daily, to use with insurance preferred meter     * ONETOUCH VERIO FLEX METER Misc  Generic drug: blood-glucose meter  USE AS DIRECTED THREE TIMES DAILY     colchicine 0.6 mg tablet  Commonly known as: COLCRYS  2 tabs 1st dose, then 1 tab in an hour, then 1 tab qd.     dapagliflozin propanediol 10 mg tablet  Commonly known as: FARXIGA  Take 1 tablet (10 mg total) by mouth once daily.     DEXCOM G7  Misc  Generic drug: blood-glucose,,cont  1 Device by Misc.(Non-Drug; Combo Route) route continuous.     DEXCOM G7 SENSOR Katelyn  Generic drug: blood-glucose sensor  Inject 1 Device into the skin every 10 days.     diclofenac sodium 1 % Gel  Commonly known as: VOLTAREN  Apply 2 g topically 4 (four) times daily.     ezetimibe 10 mg tablet  Commonly known as: ZETIA  Take 1 tablet (10 mg total) by mouth every evening.     gabapentin 300 MG capsule  Commonly known  as: NEURONTIN  Take 1 capsule (300 mg total) by mouth every evening.     hydrOXYzine HCL 25 MG tablet  Commonly known as: ATARAX  Take 1 tablet (25 mg total) by mouth nightly as needed for Itching.     indomethacin 25 MG capsule  Commonly known as: INDOCIN  Take 2 tablets 1st dose then 1 tablet three times a day @ mealtime     insulin lispro 100 unit/mL injection  Commonly known as: HumaLOG U-100 Insulin  To use with VGo 30- 4 clicks TID AC, Max TDD of 100 units     * lancets Misc  To check BG 3 times daily, to use with insurance preferred meter     * ONETOUCH DELICA PLUS LANCET 33 gauge Misc  Generic drug: lancets  Apply topically 3 (three) times daily.     lisinopriL-hydrochlorothiazide 20-12.5 mg per tablet  Commonly known as: PRINZIDE,ZESTORETIC  Take 1 tablet by mouth once daily     MOUNJARO 5 mg/0.5 mL Pnij  Generic drug: tirzepatide  Inject 5 mg into the skin every 7 days.     rosuvastatin 40 MG Tab  Commonly known as: CRESTOR  Take 1 tablet (40 mg total) by mouth every evening.     V-GO 30 Katelyn  Generic drug: sub-q insulin device, 30 unit  Inject 1 Device into the skin once daily.           * This list has 4 medication(s) that are the same as other medications prescribed for you. Read the directions carefully, and ask your doctor or other care provider to review them with you.                STOP taking these medications      clindamycin 300 MG capsule  Commonly known as: CLEOCIN            ASK your doctor about these medications      aspirin 81 MG EC tablet  Commonly known as: ECOTRIN  Take 1 tablet (81 mg total) by mouth once daily.              Significant Diagnostic Studies: N/A    Pending Diagnostic Studies:       None          Indwelling Lines/Drains at time of discharge:   Lines/Drains/Airways       None                   Time spent on the discharge of patient: 20 minutes         Roni Grijalva MD  Department of Hospital Medicine  [facility]

## 2025-04-21 NOTE — PROGRESS NOTES
Pharmacist Intervention IV to PO Note    Aishwarya Jensen is a 64 y.o. female being treated with IV medication ciprofloxacin    Patient Data:    Vital Signs (Most Recent):  Temp: 97.9 °F (36.6 °C) (04/21/25 1118)  Pulse: 78 (04/21/25 1118)  Resp: 16 (04/21/25 1118)  BP: (!) 163/70 (04/21/25 1118)  SpO2: 99 % (04/21/25 1118) Vital Signs (72h Range):  Temp:  [97.3 °F (36.3 °C)-98.6 °F (37 °C)]   Pulse:  [62-86]   Resp:  [16-20]   BP: (110-199)/(57-82)   SpO2:  [20 %-100 %]      CBC:  Recent Labs   Lab 04/19/25 0213 04/20/25 0235 04/21/25 0215   WBC 4.64 4.11 4.39   RBC 4.02 3.99* 4.42   HGB 9.0* 8.9* 9.9*   HCT 30.3* 30.2* 32.8*    218 226   MCV 75* 76* 74*   MCH 22.4* 22.3* 22.4*   MCHC 29.7* 29.5* 30.2*     CMP:     Recent Labs   Lab 04/19/25 0213 04/20/25 0235 04/21/25 0215   CALCIUM 8.6* 8.7 8.8   ALBUMIN 3.1* 2.9* 3.1*    141 140   K 3.3* 3.4* 3.6   CO2 21* 20* 21*   * 112* 109   BUN 21 13 14   CREATININE 1.1 1.0 1.0   ALKPHOS 66 63 69   ALT 20 20 22   AST 25 24 26   BILITOT 0.3 0.3 0.4       Dietary Orders:  Diet Orders            Diet Consistent Carbohydrate 2000 Calories (up to 75 gm per meal); Standard Tray: Carb Control starting at 04/20 0945            Based on the following criteria, this patient qualifies for intravenous to oral conversion:  [x] The patients gastrointestinal tract is functioning (tolerating medications via oral or enteral route for 24 hours and tolerating food or enteral feeds for 24 hours).  [x] The patient is hemodynamically stable for 24 hours (heart rate <100 beats per minute, systolic blood pressure >99 mm Hg, and respiratory rate <20 breaths per minute).  [x] The patient shows clinical improvement (afebrile for at least 24 hours and white blood cell count downtrending or normalized). Additionally, the patient must be non-neutropenic (absolute neutrophil count >500 cells/mm3).  [x] For antimicrobials, the patient has received IV therapy for at least  24 hours.      Pharmacist's Name: Aide De La Garza  Pharmacist's Extension: [phone]

## 2025-04-21 NOTE — PLAN OF CARE
VN note: VN completed AVS and attachments and notified bedside nurse, Kalli. Will cont to be available and intervene prn.

## 2025-04-21 NOTE — PLAN OF CARE
AVS and educational attachments shared with patient via [employer]. Discussed thoroughly. Patient verbalized clear understanding using teach back method. Notified bedside nurse of completion of education. At present no distress noted. Patient to be discharged via w/c with escort service and family with all of patient's belonings. Will cont to be available to patient and family and intervene prn.

## 2025-04-21 NOTE — PLAN OF CARE
Problem: Adult Inpatient Plan of Care  Goal: Optimal Comfort and Wellbeing  Outcome: Progressing     Problem: Diabetes Comorbidity  Goal: Blood Glucose Level Within Targeted Range  Outcome: Progressing     Problem: Hypertension Acute  Goal: Blood Pressure Within Desired Range  Outcome: Progressing     Problem: Fall Injury Risk  Goal: Absence of Fall and Fall-Related Injury  Outcome: Progressing

## 2025-04-21 NOTE — PLAN OF CARE
04/21/25 1030   Rounds   Attendance Provider;Nurse    Discharge Plan A Home with family   Why the patient remains in the hospital Requires continued medical care     1030  CM was informed by Dr Luque that the pt is medically stable to discharge home today.      [facility] - Med Surg  Initial Discharge Assessment       Primary Care Provider: Joey Cintron MD    Admission Diagnosis: Small bowel obstruction [K56.609]    Admission Date: 4/18/2025  Expected Discharge Date: 4/21/2025    Consult: gen surg    Payor: [facility] [facility] / Plan: [employer] / Product Type: Medicare Advantage /     Extended Emergency Contact Information  Primary Emergency Contact: Bianca Jensen  [place] [address] [place] [address] [facility]  Home Phone: [phone]  Mobile Phone: [phone]  Relation: Daughter  Preferred language: English   needed? No  Secondary Emergency Contact: Chris Jensen  Mobile Phone: [phone]  Relation: Spouse  Preferred language: English   needed? No    Discharge Plan A: Home with family  Discharge Plan B: [facility]      [facility] [address] [place], [place] [address] - [address]  [address] [place]) [place] [address]  Phone: [phone] Fax: [phone]      Initial Assessment (most recent)       Adult Discharge Assessment - 04/21/25 1215          Discharge Assessment    Assessment Type Discharge Planning Assessment     Confirmed/corrected address, phone number and insurance Yes     Source of Information patient     Communicated MARIS with patient/caregiver Yes     People in Home spouse   spouseChris Camila ([phone])    Do you expect to return to your current living situation? Yes     Do you have help at home or someone to help you manage your care at home? Yes     Prior to hospitilization cognitive status: Alert/Oriented     Current cognitive status: Alert/Oriented     Equipment  Currently Used at Home glucometer;medication pump;shower chair     Readmission within 30 days? No     Patient currently being followed by outpatient case management? No     Do you currently have service(s) that help you manage your care at home? No     Do you take prescription medications? Yes     Do you have prescription coverage? Yes     Do you have any problems affording any of your prescribed medications? No     Is the patient taking medications as prescribed? yes     How do you get to doctors appointments? health plan transportation;public transportation     Are you on dialysis? No     Do you take coumadin? No     Discharge Plan A Home with family     Discharge Plan B Home Health     DME Needed Upon Discharge  none     Discharge Plan discussed with: Patient        Physical Activity    On average, how many days per week do you engage in moderate to strenuous exercise (like a brisk walk)? 2 days     On average, how many minutes do you engage in exercise at this level? 20 min        Financial Resource Strain    How hard is it for you to pay for the very basics like food, housing, medical care, and heating? Not hard at all        Housing Stability    In the last 12 months, was there a time when you were not able to pay the mortgage or rent on time? No     At any time in the past 12 months, were you homeless or living in a shelter (including now)? No        Transportation Needs    In the past 12 months, has lack of transportation kept you from medical appointments or from getting medications? No     In the past 12 months, has lack of transportation kept you from meetings, work, or from getting things needed for daily living? No        Food Insecurity    Within the past 12 months, you worried that your food would run out before you got the money to buy more. Never true     Within the past 12 months, the food you bought just didn't last and you didn't have money to get more. Never true        Stress    Do you feel  stress - tense, restless, nervous, or anxious, or unable to sleep at night because your mind is troubled all the time - these days? Not at all        Social Isolation    How often do you feel lonely or isolated from those around you?  Never (P)         Alcohol Use    Q1: How often do you have a drink containing alcohol? Monthly or less (P)      Q2: How many drinks containing alcohol do you have on a typical day when you are drinking? 1 or 2 (P)      Q3: How often do you have six or more drinks on one occasion? Never (P)         [employer]    In the past 12 months has the electric, gas, oil, or water company threatened to shut off services in your home? No (P)         Health Literacy    How often do you need to have someone help you when you read instructions, pamphlets, or other written material from your doctor or pharmacy? Rarely (P)    pt wears glasses                     1215  Patient awake & alert eating lunch on side of the bed when CM rounded. No family present. Patient was admitted with small bowel obstruction & is being followed by gen surg.    Patient lives with her spouse, Chris Jensen, is independent of all ADLs, & denied the need for assistance with transportation at time of discharge. Pt in agreement with the plan to discharge home today.     CM informed the pt of the previously scheduled appointments with Dr Keane (pod) on 4/29/2025 at 0900, Dr Stein (DM) on 4/29/2025 at 0930, & Dr Cintron (PCP) on 5/5/2025 at 1000 and encouraged pt to attend all appointments. Pt verbalized understanding. Information added to the pt's discharge paperwork.     CM updated patient's whiteboard with CM name & contact information.     1528  DC order noted. Message sent to nurse Kalli & virtual nurse Danielle informing that the pt is cleared to discharge.       Will continue to follow.

## 2025-04-22 NOTE — PROGRESS NOTES
Diabetes Care Specialist Follow-up Note  Author: Marla Stein RD, [facility]  Date: 4/22/2025    Intake    Program Intake  Reason for Diabetes Program Visit:: Intervention  Type of Intervention:: Individual  Individual: Device Training  Device Training: Personal CGM  Current diabetes risk level:: high  In the last month, have you used the ER or been admitted to the hospital: No  Permission to speak with others about care:: no    Current Diabetes Treatment: Insulin, DM Injectables, Oral Medications  Oral Medication Type/Dose: farxiga 10 mg once a day (taking)  DM Injectables Type/Dose: mounjaro 2.5mg, will increase after the 4th dose to 5mg, taking once a week  Method of insulin delivery?: Insulin Device  Insulin Device Type/Dose: humalog via VGo30 (fiasp is ordered, unsure which one she is taking) performing 5-6 clicks with meals    Continuous Glucose Monitoring  Patient has CGM: Yes  Personal CGM type:: Dexcom G7  GMI Date: 01/08/25  GMI Value: 7.5 %    Lab Results   Component Value Date    HGBA1C 6.8 (H) 03/13/2025     A1c Pre Diabetes Care Specialist Intervention:  8.7%    Physical activity/Exercise:   No change    SMBG: using the dexcom G7     Lifestyle Coping Support & Clinical    Lifestyle/Coping/Support  Compared to other people your age, how would you rate your health?: Good  Psychosocial/Coping Skills Assessment Completed: : No  Assessment indicates:: Adequate understanding  Deffered due to:: Other (comment)  Area of need?: No    Problem Review  Active Comorbidities: Cardiovascular Disease, Hypertension, Neuropathy    Diabetes Self-Management Skills Assessment    Medication Skills Assessment  Patient is able to identify current diabetes medications, dosages, and appropriate timing of medications.: yes  Patient reports problems or concerns with current medication regimen.: no  Patient is  aware that some diabetes medications can cause low blood sugar?: Yes  Medication Skills Assessment Completed::  No  Assessment indicates:: Adequate understanding  Deffered due to:: Other (comment) (previously completed, there has been no change and patient has adequate understanding)  Area of need?: No    Diabetes Disease Process/Treatment Options  Diabetes Type?: Type II  Diabetes Disease Process/Treatment Options: Skills Assessment Completed: No  Assessment indicates:: Adequate understanding  Deferred due to:: Other (comment)  Area of need?: No    Nutrition/Healthy Eating  Meal Plan 24 Hour Recall - Breakfast: Toast and Eggs, coffee with sugar and cream  Meal Plan 24 Hour Recall - Lunch: chicken noodle soup with crackers or a sandwich  Meal Plan 24 Hour Recall - Dinner: yesterday some spaghetti and meatsauce, some times roast and potatoes, some times a burger and some fries, sometimes gets pizza or something home cooked  Meal Plan 24 Hour Recall - Snack: peanut butter crackers, almonds, cheerios  Patient can identify foods that impact blood sugar.: yes  Challenges to healthy eating:: portion control, snacking between meals and at night, eating when feeling depressed/stressed, lack of will power, eating out, going to parties  Nutrition/Healthy Eating Skills Assessment Completed:: No  Assessment indicates:: Adequate understanding  Deffered due to:: Other (comment)  Area of need?: No    Physical Activity/Exercise  Patient's daily activity level:: lightly active  Patient formally exercises outside of work.: no  Reasons for not exercising:: time constraints  Patient can identify forms of physical activity.: yes  Physical Activity/Exercise Skills Assessment Completed: : No  Assessment indicates:: Adequate understanding  Deffered due to:: Other (comment)  Area of need?: No    Home Blood Glucose Monitoring  Patient states that blood sugar is checked at home daily.: yes  Monitoring Method:: personal continuous glucose monitor  Personal CGM type:: Dexcom G7   What is your current Time in Range?: 51%  What is your A1c Target?: 7.0  with no hypoglycemia  Home Blood Glucose Monitoring Skills Assessment Completed: : Yes  Assessment indicates:: Instruction Needed  Area of need?: Yes    Acute Complications  Acute Complications Skills Assessment Completed: : No  Assessment indicates:: Adequate understanding  Deffered due to:: Other (comment)  Area of need?: No    Chronic Complications  Chronic Complications Skills Assessment Completed: : No  Assessment indicates:: Adequate understanding  Deferred due to:: Other (comment)  Area of need?: No      During today's follow-up visit,  the following areas required further assessment and content was provided/reviewed.    Based on today's diabetes care assessment, the following areas of need were identified:          7/26/2024    12:01 AM   Areas of Need   Medications/Current Diabetes Treatment No   Lifestyle Coping Support No   Diabetes Disease Process/Treatment Options No   Nutrition/Healthy Eating No   Physical Activity/Exercise No   Home Blood Glucose Monitoring Yes, changed out the dexcom G7 sensor and started a new one     Acute Complications No   Chronic Complications No        Today's interventions were provided through individual discussion, instruction, and written materials were provided.    Patient verbalized understanding of instruction and written materials.  Pt was able to return back demonstration of instructions today. Patient understood key points, needs reinforcement and further instruction.     Diabetes Self-Management Care Plan Review and Evaluation of Progress:    During today's follow-up Aishwarya's Diabetes Self-Management Care Plan progress was reviewed and progress was evaluated including his/her input. Aishwarya has agreed to continue his/her journey to improve/maintain overall diabetes control by continuing to set health goals. See care plan progress below.      Care Plan: Diabetes Management   Updates made since 4/22/2024 12:00 AM        Problem: Blood Glucose Self-Monitoring      "    Goal: Patient agrees to check and record blood sugars 3 times per day using the dexcom G6/G7 for the next 3 months    Start Date: 12/27/2023   Expected End Date: 3/27/2024   This Visit's Progress: On track   Recent Progress: On track   Priority: High   Barriers: Knowledge deficit   Note:    Patient was instructed how to use a meter. Discussed signs and symptoms, and causes and treatment of hypoglycemia. Covered blood sugar and A1C goals. Instructed patient to use provided logbooks to record blood sugars. Instructed patient to follow-up with their physician. Patient verbalized understanding of all instructions.  Prescription for meter and testing supplies pended to PCP    1/6/2023  Dexcom G6 Education  Patient is here in clinic today for initial start of Dexcom continuous glucose monitoring system (CGMA). Reviewed with patient how to insert sensor and transmitter. Patient inserted sensor on right abdomen and inserted transmitter. Time and date was set on monitor and settings were set. Hypoglycemia trigger set for 70 and hyperglycemia set for 300. Patient provided with phone number for 24-hour help line if needed. Discussed various types of possible alarms and what to do. Questions addressed. Initialization finished. No futher questions.  Patient referred to clinic today for Dexcom G6 continuous glucose sensor system training.  Education was provided using "Quick Start Guide" per Dexcom protocol.    Overview:  5min glucose reading updates, trending arrows, BG graph screens, battery life indicator, Blue Tooth Symbol.  Menus: trend Graph, start sensor, enter BG, events, Alerts, Settings, Shutdown, Stop Sensor   Settings:                          * Urgent Low: 55 mg/dL                          * Urgent Low Soon: Off                          * Low alert: 70                          * High alert: 300                          * Rise rate: Off                          * Fall Rate: Off                          * " Signal Loss: 30 min                          * No Reading: Off                          * Always sound: On                             Reviewed additional setting options with patient, including Graph Height and Transmitter ID. Transmitter was paired with .    Reviewed where to find sensor insertion time and sensor expiration date.   Discussed no need to calibrate sensor during the entirety of the 10 day wear. Discussed that pt can calibrate sensor if desired, but at that time she will need to continue calibrating every 12 hours for sensor to remain accurate. Reviewed appropriate calibration techniques.  Reviewed sensor site selection. Site selected and prepped using aseptic technique Inserted to left abdomen. Transmitter placed in pod and secured.  Practiced sensor pod/transmitter removal from site, and removal of transmitter from sensor pod.  Patient able to demonstrate without difficulty.  Encouraged to review manual prior to starting another sensor.   Reviewed problem solving aspects of sensor transmission/variables that can disrupt RF transmission.  range 20 feet, but the first 3 hrs keep within 3 feet of transmitter.  Pt instructed on lag time of interstitial fluid from CBG and was advised to tx hypoglycemia and dose insulin based on SMBG values.  Link to video provided and written instructions provided for patient to review before 10 day sensor change  Dexcom technical support contact number given and examples of when to contact them discussed.      1/17 Changing the Sensor (every 10 days)  Remove the whole thing from your body  Hold the sensor by the skinny part and break the clear piece  Remove the gray transmitter  In the reader touch start sensor  Touch yes you want to start sensor  Touch the square you want to put the number in for   There will be a 4-digit code on the sensor (like 5937)  Enter the code from the sensor  Touch ok  Touch yes, it is correct  Insert Sensor  Remove orange  piece and paper tabs  Place on skin and hold in place  Push the orange button  Insert the gray transmitter into the sensor  Put the small tongue like side in first and push down  You should hear 2 clicks  Touch confirm once sensor is secure on body  Touch start Sensor  Touch 1 then 2  Sensor will take 2 hours to warm up    Changing Transmitter (every 90 days) in the reader  Touch the =   Touch transmitter  Touch pair new  Go through the process to start sensor  Touch yes you want to start sensor  Touch the square you want to put the number in for   There will be a 4-digit code on the sensor (like 5937)  Enter the code from the sensor  Touch ok  Touch yes, it is correct  Touch the square you want to put the number in for   There will be a 6-digit code on the transmitter box (like [id number])  Enter the code from the transmitter box  Touch ok  Touch yes, it is correct  Insert Sensor  Remove orange piece and paper tabs  Place on skin and hold in place  Push the orange button  Insert the gray transmitter into the sensor  Put the small tongue like side in first and push down  You should hear 2 clicks  Touch confirm once sensor is secure on body  Touch start Sensor  Touch 1 then 2  Sensor will take 2 hours to warm up           Task: Reviewed Dexcom download with patient and provider, adjustments made to treatment plan by provider and communicated to patient as directed, patient states understanding. Completed 12/16/2024        Task: Reviewed Dexcom download with patient and provider, adjustments made to treatment plan by provider and communicated to patient as directed, patient states understanding. Completed 12/20/2024        Task: Reviewed Dexcom download with patient and provider, adjustments made to treatment plan by provider and communicated to patient as directed, patient states understanding. Completed 1/8/2025          Follow Up Plan     Follow up in about 10 days (around 4/27/2025) for Change Personal CGM and Upload  Data.    Today's care plan and follow up schedule was discussed with patient.  Aishwarya verbalized understanding of the care plan, goals, and agrees to follow up plan.        The patient was encouraged to communicate with his/her health care provider/physician and care team regarding his/her condition(s) and treatment.  I provided the patient with my contact information today and encouraged to contact me via phone or [facility]'s Patient Portal as needed.     Length of Visit   Total Time: 15 Minutes

## 2025-04-22 NOTE — PLAN OF CARE
[facility] - Med Surg  Discharge Final Note    Primary Care Provider: Joey Cintron MD    Expected Discharge Date: 4/21/2025    Final Discharge Note (most recent)       Final Note - 04/22/25 0649          Final Note    Assessment Type Final Discharge Note (P)      Anticipated Discharge Disposition Home or Self Care (P)      Hospital Resources/Appts/Education Provided Appointments scheduled and added to AVS (P)                        Contact Info       Annmarie Keane DPM   Specialty: Podiatry    [address]  [place]   Phone: [phone]       Next Steps: Follow up on 4/29/2025    Instructions: at 9:00 AM; previously scheduled podiatry appointment    Marla Stein RD, [facility]   Specialty: Diabetes, Nutrition, Endocrinology   Relationship: Diabetes Educator    [address]  [place] [address]   Phone: [phone]       Next Steps: Follow up on 4/29/2025    Instructions: at 9:30 AM; previously scheduled diabetes education appointment    Joey Cintron MD   Specialty: Family Medicine   Relationship: PCP - General    [address]  [place]   Phone: [phone]       Next Steps: Follow up on 5/5/2025    Instructions: at 10:00 AM; previously scheduled PCP appointment

## 2025-04-24 ENCOUNTER — PATIENT OUTREACH (OUTPATIENT)
Dept: ADMINISTRATIVE | Facility: CLINIC | Age: 65
End: 2025-04-24
Payer: MEDICARE

## 2025-04-24 NOTE — PROGRESS NOTES
C3 nurse spoke with Aishwarya Jensen for a TCC post hospital discharge follow up call. The patient has a scheduled HOSFU appointment with Joey Cintron MD on 05/06/2025 @ [id number].

## 2025-04-27 NOTE — PROGRESS NOTES
Subjective:      Patient ID: Aishwarya Jensen is a 64 y.o. female.    Chief Complaint: Follow-up    Patient present for f/u cellulitis R foot 4th toe. Doing better w/ less pain & swelling; pain level 5/10 but not constant, only w/ pressure.  Sxx resolved noticeably after about 3 days. No other LE concerns.    Uric acid  Order: [id number]   Status: Final result       Next appt: 04/29/2025 at 09:00 AM in Podiatry (Annmarie Keane DPM)       Dx: Pain and swelling of toe of right foot    Test Result Released: Yes (not seen)    0 Result Notes       2 Follow-up Encounters      Component  Ref Range & Units (hover) 11 d ago   Uric Acid 9.2 High    Resulting Agency [facility]        Specimen Collected: 04/15/25 11:12 CDT Last Resulted: 04/15/25 12:13 CDT       Patient notified of results.    4/15/25  Patient is here for her 6 month DM check. Usually goes to nail shop [id number].   Also c/o pain 4th toe R after pedicure & massage Sat.for niece's wedding. Went to ED Thurs.d/t pain & swelling leg & toes; pain level 8/10. Was given Rx Gabapentin 300mg qpm x 30 that helps but still swollen - hurts w/ walking. 10/15/24  Patient is here for f/u B/L posterior heel pain. States helped by heel lift a lot as well as Voltaren gel. Most of her pain is R knee, pain level 4/10.  Last visit, dispensed new PPT heel lifts & Tulis heel cups for use all times WB including @ home. Was to continue OTC Voltaren gel up to qid prn & Tubigrip BLE prn.  8/13/24  Patient states L heel started to hurt after granddaughter played w/ her shoes & does not know where heel pads are. Went to ED a week ago; given a shot & IB. Pain level 3/10. Here in flat slip-on shoes & uses Crocs on tile in house.  5/13/24  Patient is here for F/U R heel pain.  Doing well.  Using Tuli's heel cups in all her shoes. Helps.  Not having much pain on a regular basis.  Switch to Ozempic per DM mgmt.just recently. Had her annual checkup W/ PCP earlier today.  4/16/24  Patient is here for  f/u heel pain & DM foot care. States her heel pain has gone but lost one of Tulis heel cups so in flat slip-ons - wants another pair toprevent recurrence.  Has thick toenails but goes to pedicures regularly. Questions re: Vicks for toenails or other OTC topical antifungals.  Patient last in 5 months ago. Was visiting daughter in [facility] for 2 wks.  11/2/23  Patient is here for f/u R heel pain. Last here for DM foot exam & scheduled q 6 months. Instructed on appropriate shoes as well as dispensed Tulis heel cups (in current flat casual slip-ons) & PPT heel lifts for others. Rx Dm shoes written. States has some swelling in BLE R>L.  9/28/23  Aishwarya is a 64 y.o. female who presents after >5months absence for diabetic foot & nail care in high risk feet. Nails hurt in shoes w/ pressure when walking. Also heel pain R foot.     PCP: Joey Cintron MD  2/4/25, due 5/6/25  DM mgmt: Micki Knott NP 3/13/25, due 7/22/25    Current shoe gear: flat casual; uses non-slip hospital socks in home.    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    Past Medical History:   Diagnosis Date    Cecal volvulus 05/04/2023    Diabetes mellitus, type 2     Hypercholesteremia     Hypertension    Dx DM 30+ yrs.ago.   Knee pain - ortho.referral  Patient Active Problem List   Diagnosis    Essential hypertension, benign    Dyslipidemia, goal LDL below 100    Microalbuminuria due to type 2 diabetes mellitus    Type 2 diabetes mellitus with hyperglycemia, with long-term current use of insulin    Type 2 diabetes mellitus with diabetic polyneuropathy, with long-term current use of insulin    Asymptomatic varicose veins of bilateral lower extremities    Onychomycosis due to dermatophyte    Severe obesity (BMI 35.0-39.9) with comorbidity    Stage 3a chronic kidney disease    Dyspnea on exertion    Primary osteoarthritis of right knee    Primary osteoarthritis of left knee    Type 2 diabetes mellitus, with long-term current use of insulin    Anemia     Aortic atherosclerosis    Plantar fasciitis of left foot    SBO (small bowel obstruction)    Enteritis      Hemoglobin A1C   Date Value Ref Range Status   03/13/2025 6.8 (H) 4.0 - 5.6 % Final     Comment:     ADA Screening Guidelines:  5.7-6.4%  Consistent with prediabetes  >or=6.5%  Consistent with diabetes    High levels of fetal hemoglobin interfere with the HbA1C  assay. Heterozygous hemoglobin variants (HbS, HgC, etc)do  not significantly interfere with this assay.   However, presence of multiple variants may affect accuracy.     11/07/2024 8.3 (H) 4.0 - 5.6 % Final     Comment:     ADA Screening Guidelines:  5.7-6.4%  Consistent with prediabetes  >or=6.5%  Consistent with diabetes    High levels of fetal hemoglobin interfere with the HbA1C  assay. Heterozygous hemoglobin variants (HbS, HgC, etc)do  not significantly interfere with this assay.   However, presence of multiple variants may affect accuracy.     06/25/2024 7.2 (H) 4.0 - 5.6 % Final     Comment:     ADA Screening Guidelines:  5.7-6.4%  Consistent with prediabetes  >or=6.5%  Consistent with diabetes    High levels of fetal hemoglobin interfere with the HbA1C  assay. Heterozygous hemoglobin variants (HbS, HgC, etc)do  not significantly interfere with this assay.   However, presence of multiple variants may affect accuracy.        Objective:      Physical Exam  Vitals reviewed.   Constitutional:       Appearance: She is well-developed. She is obese.   Cardiovascular:      Pulses: Normal pulses.           Dorsalis pedis pulses are 2+ on the right side and 2+ on the left side.      Comments: Varicose veins R>L, w/ edema BLE, & telangectasias.  Musculoskeletal:         General: Swelling and tenderness present. No signs of injury.      Right lower leg: Edema present.      Left lower leg: Edema present.      Right ankle:      Right Achilles Tendon: Tenderness present. No defects. Menchaca's test negative.      Left ankle: No tenderness.      Left  Achilles Tendon: No tenderness or defects. Menchaca's test negative.      Right foot: Deformity (hammertoe B/L) and prominent metatarsal heads present.      Left foot: Deformity (cavus B/L) and prominent metatarsal heads present.        Feet:    Feet:      Right foot:      Skin integrity: No erythema or warmth.      Toenail Condition: Fungal disease present.     Left foot:      Skin integrity: No erythema or warmth.      Toenail Condition: Fungal disease present.     Comments: Equinus B/L ankles w/ < 90 deg foot to leg noted w/ knees extended.      MS strength of extrinsics to foot & ankle B/L + 5/5 in DF/PF/Inv/Ev to resistance w/ no reproduction of pain in any direction, including Achilles insertion B/L.    Passive ROM of ankle & pedal joints is painless & w/out crepitation B/L.     Toenails 1st, 2nd, 3rd, 4th, 5th  B/L are thickened, dystrophic, discolored, w/ crumbly subungual debris sparing 4 L.    Skin:     General: Skin is cool and dry.      Capillary Refill: Capillary refill takes less than 2 seconds.      Findings: No bruising or erythema.      Comments: Stasis hyperpigmentation B/L.    Reduced/ minimal swelling 4th base of toe w/out calor R. No erythema.   Neurological:      Mental Status: She is alert and oriented to person, place, and time.      Sensory: Sensory deficit present.      Motor: Motor function is intact. No weakness or abnormal muscle tone.      Gait: Gait is intact. Gait normal.      Comments: Paresthesias including numbness & parasthesias B/L feet w/ no clearly identified trigger or source; no hyperemia.   Psychiatric:         Mood and Affect: Mood and affect normal.         Behavior: Behavior normal. Behavior is cooperative.     X-Ray Foot Complete Bilateral  Order: [id number]   Status: Final result       Next appt: 04/29/2025 at 09:00 AM in Podiatry (Annmarie Keane DPM)       Dx: Pain    Test Result Released: Yes (not seen)    0 Result Notes  Details    Reading Physician Reading Date Result  Priority   Rafiq Hicks MD  [phone]  4/10/2025 STAT     Narrative & Impression  EXAMINATION:  XR FOOT COMPLETE 3 VIEW BILATERAL     CLINICAL HISTORY:  PAIN; Pain, unspecified     TECHNIQUE:  AP, lateral, and oblique views of both feet were performed.     COMPARISON:  None     FINDINGS:  Right: No fracture or dislocation.  Mild degenerative change of the interphalangeal joints.  There is a small calcaneal spur.  There is an enthesophyte at the insertion of the Achilles tendon.     Left: No fracture or dislocation.  Small calcaneal spur.  Enthesophyte at the insertion of the Achilles tendon.     Impression:     See above        Electronically signed by:Rafiq Hicks  Date:                                            04/10/2025  Time:                                           15:28   I independently reviewed the x-ray images    Assessment:      Encounter Diagnoses   Name Primary?    Pain and swelling of toe of right foot Yes    Cellulitis of toe of right foot     Acute idiopathic gout involving toe of right foot     Achilles tendinitis of both lower extremities     Type 2 diabetes mellitus with diabetic polyneuropathy, with long-term current use of insulin        Problem List Items Addressed This Visit          Endocrine    Type 2 diabetes mellitus with diabetic polyneuropathy, with long-term current use of insulin     Other Visit Diagnoses         Pain and swelling of toe of right foot    -  Primary      Cellulitis of toe of right foot          Acute idiopathic gout involving toe of right foot          Achilles tendinitis of both lower extremities                Plan:      I counseled the patient on her conditions, their implications & medical mgmt.    - Diabetic Foot Education. Patient reminded of the importance of good blood sugar control to help prevent podiatric complications of diabetes. We discussed wearing proper shoe gear, daily foot inspections, never walking w/out protective shoe gear, podiatric visits  every 6-12 months, sooner prn.     Continue shoes w/ adequate lift to to alleviate abnormal pressure & improve stability of foot while walking.   Avoid flat shoes or barefoot walking as these will exacerbate or worsen symptoms.   -Continue PPT heel lifts or Tulis heel cups in all shoe gear, all times WB including @ home.   Continue OTC Voltaren gel up to qid prn (may use for R knee as well).    Discussed again nature of gout including dietary relationship, acute & interval phase & tx of both. Use of colchicine to prevent or treat flare-ups is also discussed. Proper use of indomethacin for acute attacks discussed. Medrol dosepak not used d/t DM.  Dispensed Silopos digital tubesleeve 4th toe, use prn.    F/U as scheduled for DM foot exam, sooner prn.        A total of 22 mins.was spent on chart review, patient visit & documentation.

## 2025-04-28 ENCOUNTER — TELEPHONE (OUTPATIENT)
Dept: DIABETES | Facility: CLINIC | Age: 65
End: 2025-04-28
Payer: MEDICARE

## 2025-04-29 ENCOUNTER — OFFICE VISIT (OUTPATIENT)
Dept: PODIATRY | Facility: CLINIC | Age: 65
End: 2025-04-29
Payer: MEDICARE

## 2025-04-29 ENCOUNTER — NUTRITION (OUTPATIENT)
Dept: DIABETES | Facility: CLINIC | Age: 65
End: 2025-04-29
Payer: MEDICARE

## 2025-04-29 VITALS — WEIGHT: 236.56 LBS | BODY MASS INDEX: 37.13 KG/M2 | HEIGHT: 67 IN

## 2025-04-29 VITALS
SYSTOLIC BLOOD PRESSURE: 144 MMHG | DIASTOLIC BLOOD PRESSURE: 73 MMHG | HEART RATE: 61 BPM | BODY MASS INDEX: 37.13 KG/M2 | WEIGHT: 236.56 LBS | HEIGHT: 67 IN

## 2025-04-29 DIAGNOSIS — E11.22 TYPE 2 DIABETES MELLITUS WITH STAGE 3A CHRONIC KIDNEY DISEASE, WITH LONG-TERM CURRENT USE OF INSULIN: ICD-10-CM

## 2025-04-29 DIAGNOSIS — Z79.4 TYPE 2 DIABETES MELLITUS WITH STAGE 3A CHRONIC KIDNEY DISEASE, WITH LONG-TERM CURRENT USE OF INSULIN: ICD-10-CM

## 2025-04-29 DIAGNOSIS — E11.42 TYPE 2 DIABETES MELLITUS WITH DIABETIC POLYNEUROPATHY, WITH LONG-TERM CURRENT USE OF INSULIN: ICD-10-CM

## 2025-04-29 DIAGNOSIS — L03.031 CELLULITIS OF TOE OF RIGHT FOOT: ICD-10-CM

## 2025-04-29 DIAGNOSIS — Z79.4 TYPE 2 DIABETES MELLITUS WITH DIABETIC POLYNEUROPATHY, WITH LONG-TERM CURRENT USE OF INSULIN: ICD-10-CM

## 2025-04-29 DIAGNOSIS — M76.61 ACHILLES TENDINITIS OF BOTH LOWER EXTREMITIES: ICD-10-CM

## 2025-04-29 DIAGNOSIS — M79.89 PAIN AND SWELLING OF TOE OF RIGHT FOOT: Primary | ICD-10-CM

## 2025-04-29 DIAGNOSIS — E11.65 TYPE 2 DIABETES MELLITUS WITH HYPERGLYCEMIA, WITH LONG-TERM CURRENT USE OF INSULIN: Primary | ICD-10-CM

## 2025-04-29 DIAGNOSIS — N18.31 TYPE 2 DIABETES MELLITUS WITH STAGE 3A CHRONIC KIDNEY DISEASE, WITH LONG-TERM CURRENT USE OF INSULIN: ICD-10-CM

## 2025-04-29 DIAGNOSIS — Z79.4 TYPE 2 DIABETES MELLITUS WITH HYPERGLYCEMIA, WITH LONG-TERM CURRENT USE OF INSULIN: Primary | ICD-10-CM

## 2025-04-29 DIAGNOSIS — M76.62 ACHILLES TENDINITIS OF BOTH LOWER EXTREMITIES: ICD-10-CM

## 2025-04-29 DIAGNOSIS — M10.071 ACUTE IDIOPATHIC GOUT INVOLVING TOE OF RIGHT FOOT: ICD-10-CM

## 2025-04-29 DIAGNOSIS — M79.674 PAIN AND SWELLING OF TOE OF RIGHT FOOT: Primary | ICD-10-CM

## 2025-04-29 PROCEDURE — 4010F ACE/ARB THERAPY RXD/TAKEN: CPT | Mod: CPTII,S$GLB,, | Performed by: PODIATRIST

## 2025-04-29 PROCEDURE — 1125F AMNT PAIN NOTED PAIN PRSNT: CPT | Mod: CPTII,S$GLB,, | Performed by: PODIATRIST

## 2025-04-29 PROCEDURE — 99999 PR PBB SHADOW E&M-EST. PATIENT-LVL IV: CPT | Mod: PBBFAC,,, | Performed by: PODIATRIST

## 2025-04-29 PROCEDURE — 1159F MED LIST DOCD IN RCRD: CPT | Mod: CPTII,S$GLB,, | Performed by: PODIATRIST

## 2025-04-29 PROCEDURE — 3044F HG A1C LEVEL LT 7.0%: CPT | Mod: CPTII,S$GLB,, | Performed by: PODIATRIST

## 2025-04-29 PROCEDURE — 3062F POS MACROALBUMINURIA REV: CPT | Mod: CPTII,S$GLB,, | Performed by: PODIATRIST

## 2025-04-29 PROCEDURE — 3077F SYST BP >= 140 MM HG: CPT | Mod: CPTII,S$GLB,, | Performed by: PODIATRIST

## 2025-04-29 PROCEDURE — 99999 PR PBB SHADOW E&M-EST. PATIENT-LVL I: CPT | Mod: PBBFAC,,, | Performed by: DIETITIAN, REGISTERED

## 2025-04-29 PROCEDURE — 1101F PT FALLS ASSESS-DOCD LE1/YR: CPT | Mod: CPTII,S$GLB,, | Performed by: PODIATRIST

## 2025-04-29 PROCEDURE — 3008F BODY MASS INDEX DOCD: CPT | Mod: CPTII,S$GLB,, | Performed by: PODIATRIST

## 2025-04-29 PROCEDURE — 3066F NEPHROPATHY DOC TX: CPT | Mod: CPTII,S$GLB,, | Performed by: PODIATRIST

## 2025-04-29 PROCEDURE — 3078F DIAST BP <80 MM HG: CPT | Mod: CPTII,S$GLB,, | Performed by: PODIATRIST

## 2025-04-29 PROCEDURE — 99213 OFFICE O/P EST LOW 20 MIN: CPT | Mod: S$GLB,,, | Performed by: PODIATRIST

## 2025-04-29 PROCEDURE — 1111F DSCHRG MED/CURRENT MED MERGE: CPT | Mod: CPTII,S$GLB,, | Performed by: PODIATRIST

## 2025-04-29 PROCEDURE — 3288F FALL RISK ASSESSMENT DOCD: CPT | Mod: CPTII,S$GLB,, | Performed by: PODIATRIST

## 2025-04-29 NOTE — PROGRESS NOTES
Diabetes Care Specialist Follow-up Note  Author: Marla Stein RD, [facility]  Date: 4/29/2025    Intake    Program Intake  Reason for Diabetes Program Visit:: Intervention  Type of Intervention:: Individual  Individual: Device Training  Device Training: Personal CGM  Current diabetes risk level:: high  In the last month, have you used the ER or been admitted to the hospital: No  Permission to speak with others about care:: no    Current Diabetes Treatment: Insulin, DM Injectables, Oral Medications  Oral Medication Type/Dose: farxiga 10 mg once a day (taking)  DM Injectables Type/Dose: mounjaro 2.5mg, will increase after the 4th dose to 5mg, taking once a week  Method of insulin delivery?: Insulin Device  Insulin Device Type/Dose: humalog via VGo30 (fiasp is ordered, unsure which one she is taking) performing 5-6 clicks with meals    Continuous Glucose Monitoring  Patient has CGM: Yes  Personal CGM type:: Dexcom G7  GMI Date: 01/08/25  GMI Value: 7.5 %    Lab Results   Component Value Date    HGBA1C 6.8 (H) 03/13/2025     A1c Pre Diabetes Care Specialist Intervention:  8.7%    Physical activity/Exercise:   No change    SMBG: using the dexcom G7     Lifestyle Coping Support & Clinical    Lifestyle/Coping/Support  Compared to other people your age, how would you rate your health?: Good  Psychosocial/Coping Skills Assessment Completed: : No  Assessment indicates:: Adequate understanding  Deffered due to:: Other (comment)  Area of need?: No    Problem Review  Active Comorbidities: Cardiovascular Disease, Hypertension, Neuropathy    Diabetes Self-Management Skills Assessment    Medication Skills Assessment  Patient is able to identify current diabetes medications, dosages, and appropriate timing of medications.: yes  Patient reports problems or concerns with current medication regimen.: no  Medication Skills Assessment Completed:: No  Assessment indicates:: Adequate understanding  Deffered due to:: Other (comment)  (previously completed, there has been no change and patient has adequate understanding)  Area of need?: No    Diabetes Disease Process/Treatment Options  Diabetes Type?: Type II  Diabetes Disease Process/Treatment Options: Skills Assessment Completed: No  Assessment indicates:: Adequate understanding  Deferred due to:: Other (comment)  Area of need?: No    Nutrition/Healthy Eating  Meal Plan 24 Hour Recall - Breakfast: Toast and Eggs, coffee with sugar and cream  Meal Plan 24 Hour Recall - Lunch: chicken noodle soup with crackers or a sandwich  Meal Plan 24 Hour Recall - Dinner: yesterday some spaghetti and meatsauce, some times roast and potatoes, some times a burger and some fries, sometimes gets pizza or something home cooked  Meal Plan 24 Hour Recall - Snack: peanut butter crackers, almonds, cheerios  Patient can identify foods that impact blood sugar.: yes  Challenges to healthy eating:: portion control, snacking between meals and at night, eating when feeling depressed/stressed, lack of will power, eating out, going to parties  Nutrition/Healthy Eating Skills Assessment Completed:: No  Assessment indicates:: Adequate understanding  Deffered due to:: Other (comment)  Area of need?: No    Physical Activity/Exercise  Patient's daily activity level:: lightly active  Patient formally exercises outside of work.: no  Reasons for not exercising:: time constraints  Patient can identify forms of physical activity.: yes  Physical Activity/Exercise Skills Assessment Completed: : No  Assessment indicates:: Adequate understanding  Deffered due to:: Other (comment)  Area of need?: No    Home Blood Glucose Monitoring  Patient states that blood sugar is checked at home daily.: yes  Monitoring Method:: personal continuous glucose monitor  Personal CGM type:: Dexcom G7   What is your current Time in Range?: 51%  What is your A1c Target?: 7.0 with no hypoglycemia  Home Blood Glucose Monitoring Skills Assessment Completed: :  Yes  Assessment indicates:: Instruction Needed  Area of need?: Yes    Acute Complications  Acute Complications Skills Assessment Completed: : No  Assessment indicates:: Adequate understanding  Deffered due to:: Other (comment)  Area of need?: No    Chronic Complications  Chronic Complications Skills Assessment Completed: : No  Assessment indicates:: Adequate understanding  Deferred due to:: Other (comment)  Area of need?: No      During today's follow-up visit,  the following areas required further assessment and content was provided/reviewed.    Based on today's diabetes care assessment, the following areas of need were identified:          7/26/2024    12:01 AM   Areas of Need   Medications/Current Diabetes Treatment No   Lifestyle Coping Support No   Diabetes Disease Process/Treatment Options No   Nutrition/Healthy Eating No   Physical Activity/Exercise No   Home Blood Glucose Monitoring Yes, changed out the dexcom G7 sensor and started a new one     Acute Complications No   Chronic Complications No        Today's interventions were provided through individual discussion, instruction, and written materials were provided.    Patient verbalized understanding of instruction and written materials.  Pt was able to return back demonstration of instructions today. Patient understood key points, needs reinforcement and further instruction.     Diabetes Self-Management Care Plan Review and Evaluation of Progress:    During today's follow-up Aishwarya's Diabetes Self-Management Care Plan progress was reviewed and progress was evaluated including his/her input. Aishwarya has agreed to continue his/her journey to improve/maintain overall diabetes control by continuing to set health goals. See care plan progress below.      Care Plan: Diabetes Management   Updates made since 4/29/2024 12:00 AM        Problem: Blood Glucose Self-Monitoring         Goal: Patient agrees to check and record blood sugars 3 times per day using the  "dexcom G6/G7 for the next 3 months    Start Date: 12/27/2023   Expected End Date: 3/27/2024   This Visit's Progress: On track   Recent Progress: On track   Priority: High   Barriers: Knowledge deficit   Note:    Patient was instructed how to use a meter. Discussed signs and symptoms, and causes and treatment of hypoglycemia. Covered blood sugar and A1C goals. Instructed patient to use provided logbooks to record blood sugars. Instructed patient to follow-up with their physician. Patient verbalized understanding of all instructions.  Prescription for meter and testing supplies pended to PCP    1/6/2023  Dexcom G6 Education  Patient is here in clinic today for initial start of Dexcom continuous glucose monitoring system (CGMA). Reviewed with patient how to insert sensor and transmitter. Patient inserted sensor on right abdomen and inserted transmitter. Time and date was set on monitor and settings were set. Hypoglycemia trigger set for 70 and hyperglycemia set for 300. Patient provided with phone number for 24-hour help line if needed. Discussed various types of possible alarms and what to do. Questions addressed. Initialization finished. No futher questions.  Patient referred to clinic today for Dexcom G6 continuous glucose sensor system training.  Education was provided using "Quick Start Guide" per Dexcom protocol.    Overview:  5min glucose reading updates, trending arrows, BG graph screens, battery life indicator, Blue Tooth Symbol.  Menus: trend Graph, start sensor, enter BG, events, Alerts, Settings, Shutdown, Stop Sensor   Settings:                          * Urgent Low: 55 mg/dL                          * Urgent Low Soon: Off                          * Low alert: 70                          * High alert: 300                          * Rise rate: Off                          * Fall Rate: Off                          * Signal Loss: 30 min                          * No Reading: Off                        "   * Always sound: On                             Reviewed additional setting options with patient, including Graph Height and Transmitter ID. Transmitter was paired with .    Reviewed where to find sensor insertion time and sensor expiration date.   Discussed no need to calibrate sensor during the entirety of the 10 day wear. Discussed that pt can calibrate sensor if desired, but at that time she will need to continue calibrating every 12 hours for sensor to remain accurate. Reviewed appropriate calibration techniques.  Reviewed sensor site selection. Site selected and prepped using aseptic technique Inserted to left abdomen. Transmitter placed in pod and secured.  Practiced sensor pod/transmitter removal from site, and removal of transmitter from sensor pod.  Patient able to demonstrate without difficulty.  Encouraged to review manual prior to starting another sensor.   Reviewed problem solving aspects of sensor transmission/variables that can disrupt RF transmission.  range 20 feet, but the first 3 hrs keep within 3 feet of transmitter.  Pt instructed on lag time of interstitial fluid from CBG and was advised to tx hypoglycemia and dose insulin based on SMBG values.  Link to video provided and written instructions provided for patient to review before 10 day sensor change  Dexcom technical support contact number given and examples of when to contact them discussed.      1/17 Changing the Sensor (every 10 days)  Remove the whole thing from your body  Hold the sensor by the skinny part and break the clear piece  Remove the gray transmitter  In the reader touch start sensor  Touch yes you want to start sensor  Touch the square you want to put the number in for   There will be a 4-digit code on the sensor (like 5937)  Enter the code from the sensor  Touch ok  Touch yes, it is correct  Insert Sensor  Remove orange piece and paper tabs  Place on skin and hold in place  Push the orange button  Insert the  gray transmitter into the sensor  Put the small tongue like side in first and push down  You should hear 2 clicks  Touch confirm once sensor is secure on body  Touch start Sensor  Touch 1 then 2  Sensor will take 2 hours to warm up    Changing Transmitter (every 90 days) in the reader  Touch the =   Touch transmitter  Touch pair new  Go through the process to start sensor  Touch yes you want to start sensor  Touch the square you want to put the number in for   There will be a 4-digit code on the sensor (like 5937)  Enter the code from the sensor  Touch ok  Touch yes, it is correct  Touch the square you want to put the number in for   There will be a 6-digit code on the transmitter box (like [id number])  Enter the code from the transmitter box  Touch ok  Touch yes, it is correct  Insert Sensor  Remove orange piece and paper tabs  Place on skin and hold in place  Push the orange button  Insert the gray transmitter into the sensor  Put the small tongue like side in first and push down  You should hear 2 clicks  Touch confirm once sensor is secure on body  Touch start Sensor  Touch 1 then 2  Sensor will take 2 hours to warm up           Task: Reviewed Dexcom download with patient and provider, adjustments made to treatment plan by provider and communicated to patient as directed, patient states understanding. Completed 12/16/2024        Task: Reviewed Dexcom download with patient and provider, adjustments made to treatment plan by provider and communicated to patient as directed, patient states understanding. Completed 12/20/2024        Task: Reviewed Dexcom download with patient and provider, adjustments made to treatment plan by provider and communicated to patient as directed, patient states understanding. Completed 1/8/2025          Follow Up Plan     Follow up in about 10 days (around 5/9/2025) for Change Personal CGM and Upload Data.    Today's care plan and follow up schedule was discussed with patient.  Aishwarya  verbalized understanding of the care plan, goals, and agrees to follow up plan.        The patient was encouraged to communicate with his/her health care provider/physician and care team regarding his/her condition(s) and treatment.  I provided the patient with my contact information today and encouraged to contact me via phone or [facility] Patient Portal as needed.     Length of Visit   Total Time: 15 Minutes

## 2025-05-06 ENCOUNTER — OFFICE VISIT (OUTPATIENT)
Dept: PRIMARY CARE CLINIC | Facility: CLINIC | Age: 65
End: 2025-05-06
Payer: MEDICARE

## 2025-05-06 ENCOUNTER — CLINICAL SUPPORT (OUTPATIENT)
Dept: PRIMARY CARE CLINIC | Facility: CLINIC | Age: 65
End: 2025-05-06
Attending: FAMILY MEDICINE
Payer: MEDICARE

## 2025-05-06 ENCOUNTER — RESULTS FOLLOW-UP (OUTPATIENT)
Dept: PRIMARY CARE CLINIC | Facility: CLINIC | Age: 65
End: 2025-05-06

## 2025-05-06 VITALS
TEMPERATURE: 98 F | BODY MASS INDEX: 37.34 KG/M2 | DIASTOLIC BLOOD PRESSURE: 76 MMHG | RESPIRATION RATE: 18 BRPM | HEART RATE: 65 BPM | WEIGHT: 237.88 LBS | OXYGEN SATURATION: 98 % | SYSTOLIC BLOOD PRESSURE: 180 MMHG | HEIGHT: 67 IN

## 2025-05-06 DIAGNOSIS — Z79.4 TYPE 2 DIABETES MELLITUS WITH DIABETIC POLYNEUROPATHY, WITH LONG-TERM CURRENT USE OF INSULIN: ICD-10-CM

## 2025-05-06 DIAGNOSIS — I10 ESSENTIAL HYPERTENSION, BENIGN: ICD-10-CM

## 2025-05-06 DIAGNOSIS — N76.0 ACUTE VAGINITIS: ICD-10-CM

## 2025-05-06 DIAGNOSIS — K52.9 ENTERITIS: Primary | ICD-10-CM

## 2025-05-06 DIAGNOSIS — E11.42 TYPE 2 DIABETES MELLITUS WITH DIABETIC POLYNEUROPATHY, WITH LONG-TERM CURRENT USE OF INSULIN: ICD-10-CM

## 2025-05-06 PROBLEM — K56.609 SBO (SMALL BOWEL OBSTRUCTION): Chronic | Status: RESOLVED | Noted: 2025-04-18 | Resolved: 2025-05-06

## 2025-05-06 PROCEDURE — 3044F HG A1C LEVEL LT 7.0%: CPT | Mod: CPTII,S$GLB,, | Performed by: FAMILY MEDICINE

## 2025-05-06 PROCEDURE — 1160F RVW MEDS BY RX/DR IN RCRD: CPT | Mod: CPTII,S$GLB,, | Performed by: FAMILY MEDICINE

## 2025-05-06 PROCEDURE — 92228 IMG RTA DETC/MNTR DS PHY/QHP: CPT | Mod: 26,S$GLB,, | Performed by: OPTOMETRIST

## 2025-05-06 PROCEDURE — 3062F POS MACROALBUMINURIA REV: CPT | Mod: CPTII,S$GLB,, | Performed by: FAMILY MEDICINE

## 2025-05-06 PROCEDURE — 99214 OFFICE O/P EST MOD 30 MIN: CPT | Mod: S$GLB,,, | Performed by: FAMILY MEDICINE

## 2025-05-06 PROCEDURE — 3077F SYST BP >= 140 MM HG: CPT | Mod: CPTII,S$GLB,, | Performed by: FAMILY MEDICINE

## 2025-05-06 PROCEDURE — 3066F NEPHROPATHY DOC TX: CPT | Mod: CPTII,S$GLB,, | Performed by: FAMILY MEDICINE

## 2025-05-06 PROCEDURE — 1126F AMNT PAIN NOTED NONE PRSNT: CPT | Mod: CPTII,S$GLB,, | Performed by: FAMILY MEDICINE

## 2025-05-06 PROCEDURE — 3078F DIAST BP <80 MM HG: CPT | Mod: CPTII,S$GLB,, | Performed by: FAMILY MEDICINE

## 2025-05-06 PROCEDURE — 4010F ACE/ARB THERAPY RXD/TAKEN: CPT | Mod: CPTII,S$GLB,, | Performed by: FAMILY MEDICINE

## 2025-05-06 PROCEDURE — 1159F MED LIST DOCD IN RCRD: CPT | Mod: CPTII,S$GLB,, | Performed by: FAMILY MEDICINE

## 2025-05-06 PROCEDURE — 1101F PT FALLS ASSESS-DOCD LE1/YR: CPT | Mod: CPTII,S$GLB,, | Performed by: FAMILY MEDICINE

## 2025-05-06 PROCEDURE — 3008F BODY MASS INDEX DOCD: CPT | Mod: CPTII,S$GLB,, | Performed by: FAMILY MEDICINE

## 2025-05-06 PROCEDURE — 99999 PR PBB SHADOW E&M-EST. PATIENT-LVL V: CPT | Mod: PBBFAC,,, | Performed by: FAMILY MEDICINE

## 2025-05-06 PROCEDURE — 1111F DSCHRG MED/CURRENT MED MERGE: CPT | Mod: CPTII,S$GLB,, | Performed by: FAMILY MEDICINE

## 2025-05-06 PROCEDURE — 3288F FALL RISK ASSESSMENT DOCD: CPT | Mod: CPTII,S$GLB,, | Performed by: FAMILY MEDICINE

## 2025-05-06 PROCEDURE — 2025F 7 FLD RTA PHOTO W/O RTNOPTHY: CPT | Mod: CPTII,S$GLB,, | Performed by: OPTOMETRIST

## 2025-05-06 RX ORDER — FLUCONAZOLE 150 MG/1
150 TABLET ORAL
Qty: 2 TABLET | Refills: 0 | Status: SHIPPED | OUTPATIENT
Start: 2025-05-06 | End: 2025-05-10

## 2025-05-06 RX ORDER — OLMESARTAN MEDOXOMIL AND HYDROCHLOROTHIAZIDE 40/25 40; 25 MG/1; MG/1
1 TABLET ORAL DAILY
Qty: 90 TABLET | Refills: 1 | Status: SHIPPED | OUTPATIENT
Start: 2025-05-06

## 2025-05-06 NOTE — PROGRESS NOTES
Aishwarya Jensen is a 64 y.o. female here for a diabetic eye screening with non-dilated fundus photos per .    Patient cooperative?: Yes  Small pupils?: No  Last eye exam: + 1 year    For exam results, see Encounter Report.

## 2025-05-06 NOTE — PROGRESS NOTES
Assessment:       1. Enteritis    2. Essential hypertension, benign    3. Acute vaginitis    4. Type 2 diabetes mellitus with diabetic polyneuropathy, with long-term current use of insulin         Plan:       Enteritis    Essential hypertension, benign  -     olmesartan-hydrochlorothiazide (BENICAR HCT) 40-25 mg per tablet; Take 1 tablet by mouth once daily.  Dispense: 90 tablet; Refill: 1    Acute vaginitis  -     fluconazole (DIFLUCAN) 150 MG Tab; Take 1 tablet (150 mg total) by mouth every 72 hours. for 2 doses  Dispense: 2 tablet; Refill: 0    Type 2 diabetes mellitus with diabetic polyneuropathy, with long-term current use of insulin  -     Diabetic Eye Screening Photo; Future; Expected date: 05/06/2025      Assessment & Plan    - Assessed hospital follow-up for suspected small bowel obstruction or enteritis.  - Evaluated resolution of diarrhea, vomiting, and gout flare-up.  - BP remains significantly elevated despite current antihypertensive regimen.  - Modified antihypertensive therapy due to inadequate BP control.  - Considered recent antibiotic use as potential cause for vaginal itching, suggesting yeast infection.    HYPERTENSIVE CRISIS:   Discontinued Lisinopril/HCTZ combination due to significantly elevated, uncontrolled hypertension.   Initiated olmesartan 40 mg/HCTZ 25 mg combination daily for better blood pressure control.   Continued Amlodipine at current dose.    TYPE 2 DIABETES MELLITUS:   Ordered diabetic eye exam to be performed in the office.   Last A1C was 6.8, which is considered acceptable, though patient reports occasional high blood sugar readings.    VAGINAL CANDIDIASIS:   Prescribed fluconazole for yeast infection: 1 tablet initially, followed by another dose 3 days later.   Patient reports vaginal pruritus and minimal discharge, consistent with vaginal candidiasis, likely resulting from recent antibiotic therapy during hospitalization.    GOUT:   Patient experienced a gout exacerbation  during hospitalization.    FOLLOW-UP:   Scheduled follow-up on May 21st (same day as mammogram appointment) for BP check with nurse.       Medication List with Changes/Refills   New Medications    FLUCONAZOLE (DIFLUCAN) 150 MG TAB    Take 1 tablet (150 mg total) by mouth every 72 hours. for 2 doses    OLMESARTAN-HYDROCHLOROTHIAZIDE (BENICAR HCT) 40-25 MG PER TABLET    Take 1 tablet by mouth once daily.   Current Medications    ACETAMINOPHEN (TYLENOL) 500 MG TABLET    Take 2 tablets (1,000 mg total) by mouth every 6 to 8 hours as needed for Pain.    AMLODIPINE (NORVASC) 10 MG TABLET    Take 1 tablet (10 mg total) by mouth once daily.    BLOOD SUGAR DIAGNOSTIC STRP    To check BG 3 times daily, to use with insurance preferred meter    BLOOD-GLUCOSE METER KIT    To check BG 3 times daily, to use with insurance preferred meter    BLOOD-GLUCOSE METER,CONTINUOUS (DEXCOM G7 ) MISC    1 Device by Misc.(Non-Drug; Combo Route) route continuous.    BLOOD-GLUCOSE SENSOR (DEXCOM G7 SENSOR) DAVID    Inject 1 Device into the skin every 10 days.    COLCHICINE (COLCRYS) 0.6 MG TABLET    2 tabs 1st dose, then 1 tab in an hour, then 1 tab qd.    DAPAGLIFLOZIN PROPANEDIOL (FARXIGA) 10 MG TABLET    Take 1 tablet (10 mg total) by mouth once daily.    DICLOFENAC SODIUM (VOLTAREN) 1 % GEL    Apply 2 g topically 4 (four) times daily.    EZETIMIBE (ZETIA) 10 MG TABLET    Take 1 tablet (10 mg total) by mouth every evening.    GABAPENTIN (NEURONTIN) 300 MG CAPSULE    Take 1 capsule (300 mg total) by mouth every evening.    HYDROXYZINE HCL (ATARAX) 25 MG TABLET    Take 1 tablet (25 mg total) by mouth nightly as needed for Itching.    INDOMETHACIN (INDOCIN) 25 MG CAPSULE    Take 2 tablets 1st dose then 1 tablet three times a day @ mealtime    INSULIN LISPRO (HUMALOG U-100 INSULIN) 100 UNIT/ML INJECTION    To use with VGo 30- 4 clicks TID AC, Max TDD of 100 units    LANCETS MISC    To check BG 3 times daily, to use with insurance preferred  meter    ONETOUCH DELICA PLUS LANCET 33 GAUGE MISC    Apply topically 3 (three) times daily.    ONETOUCH VERIO FLEX METER MISC    USE AS DIRECTED THREE TIMES DAILY    ROSUVASTATIN (CRESTOR) 40 MG TAB    Take 1 tablet (40 mg total) by mouth every evening.    SUB-Q INSULIN DEVICE, 30 UNIT (V-GO 30) DAVID    Inject 1 Device into the skin once daily.    TIRZEPATIDE (MOUNJARO) 5 MG/0.5 ML PNIJ    Inject 5 mg into the skin every 7 days.   Discontinued Medications    ASPIRIN (ECOTRIN) 81 MG EC TABLET    Take 1 tablet (81 mg total) by mouth once daily.    LISINOPRIL-HYDROCHLOROTHIAZIDE (PRINZIDE,ZESTORETIC) 20-12.5 MG PER TABLET    Take 1 tablet by mouth once daily         Subjective:    Patient ID: Aishwarya Jensen is a 64 y.o. female.  Chief Complaint: Hospital Follow Up    HPI  History of Present Illness    CHIEF COMPLAINT:  Patient presents today for hospital follow-up.    HISTORY OF PRESENT ILLNESS:  She was recently hospitalized for severe diarrhea and vomiting requiring admission and antibiotic treatment. Both symptoms have now resolved. She reports concurrent gout flare-up during this period. Currently experiencing feeling cold without associated chills and maintains good appetite.    GENITOURINARY:  She reports vaginal itching with mild discharge. Self-treatment with vaseline provided temporary relief before symptoms recurred.    DIABETES:  Blood sugars occasionally run high. A1C was 6.8.    MEDICATIONS:  She takes Lisinopril and Amlodipine for blood pressure management. Some medications are taken in the morning, while others, including cough medications, are taken in the evening.      ROS:  Constitutional: -chills  Respiratory: +cough  Gastrointestinal: -abdominal pain, -vomiting, -diarrhea  Female Genitourinary: +vaginal itching or burning, +vaginal discharge  Endocrine: +cold intolerance       Review of Systems    Objective:      Vitals:    05/06/25 1135 05/06/25 1219   BP: (!) 178/68 (!) 180/76   BP  "Location: Left arm    Patient Position: Sitting    Pulse: 65    Resp: 18    Temp: 97.5 °F (36.4 °C)    TempSrc: Temporal    SpO2: 98%    Weight: 107.9 kg (237 lb 14 oz)    Height: 5' 7" (1.702 m)      BP Readings from Last 5 Encounters:   05/06/25 (!) 180/76   04/29/25 (!) 144/73   04/21/25 (!) 150/68   04/18/25 (!) 139/94   04/15/25 (!) 113/44     Wt Readings from Last 5 Encounters:   05/06/25 107.9 kg (237 lb 14 oz)   04/29/25 107.3 kg (236 lb 8.9 oz)   04/29/25 107.3 kg (236 lb 8.9 oz)   04/18/25 97.4 kg (214 lb 11.7 oz)   04/18/25 105.7 kg (233 lb 0.4 oz)     Physical Exam  Physical Exam    Vitals: Blood pressure is super high.  General: Well-developed. Well-nourished. No acute distress.  Eyes: EOMI. Sclerae anicteric.  HENT: Normocephalic. Atraumatic. Nares patent. Moist oral mucosa.  Cardiovascular: Regular rate. Regular rhythm. No murmurs. No rubs. No gallops. Normal S1, S2.  Respiratory: Normal respiratory effort. Clear to auscultation bilaterally. No rales. No rhonchi. No wheezing.  Musculoskeletal: No  obvious deformity.  Extremities: No lower extremity edema.  Neurological: Alert & oriented x3. No slurred speech. Normal gait.  Psychiatric: Normal mood. Normal affect. Good insight. Good judgment.  Skin: Warm. Dry. No rash.         Lab Results   Component Value Date    WBC 4.39 04/21/2025    HGB 9.9 (L) 04/21/2025    HCT 32.8 (L) 04/21/2025     04/21/2025    CHOL 135 11/07/2024    TRIG 105 11/07/2024    HDL 54 11/07/2024    ALT 22 04/21/2025    AST 26 04/21/2025     04/21/2025    K 3.6 04/21/2025     04/21/2025    CREATININE 1.0 04/21/2025    BUN 14 04/21/2025    CO2 21 (L) 04/21/2025    TSH 1.281 06/25/2024    HGBA1C 6.8 (H) 03/13/2025      This note was generated with the assistance of ambient listening technology. Verbal consent was obtained by the patient and accompanying visitor(s) for the recording of patient appointment to facilitate this note. I attest to having reviewed and " edited the generated note for accuracy, though some syntax or spelling errors may persist. Please contact the author of this note for any clarification.

## 2025-05-09 ENCOUNTER — CLINICAL SUPPORT (OUTPATIENT)
Dept: DIABETES | Facility: CLINIC | Age: 65
End: 2025-05-09
Payer: MEDICARE

## 2025-05-09 DIAGNOSIS — Z79.4 TYPE 2 DIABETES MELLITUS WITH DIABETIC POLYNEUROPATHY, WITH LONG-TERM CURRENT USE OF INSULIN: ICD-10-CM

## 2025-05-09 DIAGNOSIS — Z79.4 TYPE 2 DIABETES MELLITUS WITH HYPERGLYCEMIA, WITH LONG-TERM CURRENT USE OF INSULIN: Primary | ICD-10-CM

## 2025-05-09 DIAGNOSIS — E11.65 TYPE 2 DIABETES MELLITUS WITH HYPERGLYCEMIA, WITH LONG-TERM CURRENT USE OF INSULIN: Primary | ICD-10-CM

## 2025-05-09 DIAGNOSIS — E11.42 TYPE 2 DIABETES MELLITUS WITH DIABETIC POLYNEUROPATHY, WITH LONG-TERM CURRENT USE OF INSULIN: ICD-10-CM

## 2025-05-09 DIAGNOSIS — N18.31 TYPE 2 DIABETES MELLITUS WITH STAGE 3A CHRONIC KIDNEY DISEASE, WITH LONG-TERM CURRENT USE OF INSULIN: ICD-10-CM

## 2025-05-09 DIAGNOSIS — E11.22 TYPE 2 DIABETES MELLITUS WITH STAGE 3A CHRONIC KIDNEY DISEASE, WITH LONG-TERM CURRENT USE OF INSULIN: ICD-10-CM

## 2025-05-09 DIAGNOSIS — Z79.4 TYPE 2 DIABETES MELLITUS WITH STAGE 3A CHRONIC KIDNEY DISEASE, WITH LONG-TERM CURRENT USE OF INSULIN: ICD-10-CM

## 2025-05-09 NOTE — PROGRESS NOTES
Diabetes Care Specialist Follow-up Note  Author: Marla Stein RD, [facility]  Date: 5/9/2025    Intake    Program Intake  Reason for Diabetes Program Visit:: Intervention  Type of Intervention:: Individual  Individual: Device Training  Device Training: Personal CGM  Current diabetes risk level:: high  In the last month, have you used the ER or been admitted to the hospital: No  Permission to speak with others about care:: no    Current Diabetes Treatment: Insulin, DM Injectables, Oral Medications  Oral Medication Type/Dose: farxiga 10 mg once a day (taking)  DM Injectables Type/Dose: mounjaro 2.5mg, will increase after the 4th dose to 5mg, taking once a week  Method of insulin delivery?: Insulin Device  Insulin Device Type/Dose: humalog via VGo30 (fiasp is ordered, unsure which one she is taking) performing 5-6 clicks with meals    Continuous Glucose Monitoring  Patient has CGM: Yes  Personal CGM type:: Dexcom G7  GMI Date: 01/08/25  GMI Value: 7.5 %    Lab Results   Component Value Date    HGBA1C 6.8 (H) 03/13/2025     A1c Pre Diabetes Care Specialist Intervention:  8.7%    Physical activity/Exercise:   No change    SMBG: using the dexcom G7     Lifestyle Coping Support & Clinical    Lifestyle/Coping/Support  Compared to other people your age, how would you rate your health?: Good  Psychosocial/Coping Skills Assessment Completed: : No  Assessment indicates:: Adequate understanding  Deffered due to:: Other (comment)  Area of need?: No    Problem Review  Active Comorbidities: Cardiovascular Disease, Hypertension, Neuropathy    Diabetes Self-Management Skills Assessment    Medication Skills Assessment  Patient is able to identify current diabetes medications, dosages, and appropriate timing of medications.: yes  Patient reports problems or concerns with current medication regimen.: no  Medication Skills Assessment Completed:: No  Assessment indicates:: Adequate understanding  Deffered due to:: Other (comment) (previously  completed, there has been no change and patient has adequate understanding)  Area of need?: No    Diabetes Disease Process/Treatment Options  Diabetes Type?: Type II  Diabetes Disease Process/Treatment Options: Skills Assessment Completed: No  Assessment indicates:: Adequate understanding  Deferred due to:: Other (comment)  Area of need?: No    Nutrition/Healthy Eating  Meal Plan 24 Hour Recall - Breakfast: Toast and Eggs, coffee with sugar and cream  Meal Plan 24 Hour Recall - Lunch: chicken noodle soup with crackers or a sandwich  Meal Plan 24 Hour Recall - Dinner: yesterday some spaghetti and meatsauce, some times roast and potatoes, some times a burger and some fries, sometimes gets pizza or something home cooked  Meal Plan 24 Hour Recall - Snack: peanut butter crackers, almonds, cheerios  Patient can identify foods that impact blood sugar.: yes  Challenges to healthy eating:: portion control, snacking between meals and at night, eating when feeling depressed/stressed, lack of will power, eating out, going to parties  Nutrition/Healthy Eating Skills Assessment Completed:: No  Assessment indicates:: Adequate understanding  Deffered due to:: Other (comment)  Area of need?: No    Physical Activity/Exercise  Patient's daily activity level:: lightly active  Patient formally exercises outside of work.: no  Reasons for not exercising:: time constraints  Patient can identify forms of physical activity.: yes  Physical Activity/Exercise Skills Assessment Completed: : No  Assessment indicates:: Adequate understanding  Deffered due to:: Other (comment)  Area of need?: No    Home Blood Glucose Monitoring  Patient states that blood sugar is checked at home daily.: yes  Monitoring Method:: personal continuous glucose monitor  Personal CGM type:: Dexcom G7   What is your current Time in Range?: 51%  What is your A1c Target?: 7.0 with no hypoglycemia  Home Blood Glucose Monitoring Skills Assessment Completed: : Yes  Assessment  indicates:: Instruction Needed  Area of need?: Yes    Acute Complications  Acute Complications Skills Assessment Completed: : No  Assessment indicates:: Adequate understanding  Deffered due to:: Other (comment)  Area of need?: No    Chronic Complications  Chronic Complications Skills Assessment Completed: : No  Assessment indicates:: Adequate understanding  Deferred due to:: Other (comment)  Area of need?: No      During today's follow-up visit,  the following areas required further assessment and content was provided/reviewed.    Based on today's diabetes care assessment, the following areas of need were identified:          7/26/2024    12:01 AM   Areas of Need   Medications/Current Diabetes Treatment No   Lifestyle Coping Support No   Diabetes Disease Process/Treatment Options No   Nutrition/Healthy Eating No   Physical Activity/Exercise No   Home Blood Glucose Monitoring Yes, changed out the dexcom G7 sensor and started a new one     Acute Complications No   Chronic Complications No        Today's interventions were provided through individual discussion, instruction, and written materials were provided.    Patient verbalized understanding of instruction and written materials.  Pt was able to return back demonstration of instructions today. Patient understood key points, needs reinforcement and further instruction.     Diabetes Self-Management Care Plan Review and Evaluation of Progress:    During today's follow-up Aishwarya's Diabetes Self-Management Care Plan progress was reviewed and progress was evaluated including his/her input. Aishwarya has agreed to continue his/her journey to improve/maintain overall diabetes control by continuing to set health goals. See care plan progress below.      Care Plan: Diabetes Management   Updates made since 5/9/2024 12:00 AM        Problem: Blood Glucose Self-Monitoring         Goal: Patient agrees to check and record blood sugars 3 times per day using the dexcom G6/G7 for  "the next 3 months    Start Date: 12/27/2023   Expected End Date: 3/27/2024   This Visit's Progress: On track   Recent Progress: On track   Priority: High   Barriers: Knowledge deficit   Note:    Patient was instructed how to use a meter. Discussed signs and symptoms, and causes and treatment of hypoglycemia. Covered blood sugar and A1C goals. Instructed patient to use provided logbooks to record blood sugars. Instructed patient to follow-up with their physician. Patient verbalized understanding of all instructions.  Prescription for meter and testing supplies pended to PCP    1/6/2023  Dexcom G6 Education  Patient is here in clinic today for initial start of Dexcom continuous glucose monitoring system (CGMA). Reviewed with patient how to insert sensor and transmitter. Patient inserted sensor on right abdomen and inserted transmitter. Time and date was set on monitor and settings were set. Hypoglycemia trigger set for 70 and hyperglycemia set for 300. Patient provided with phone number for 24-hour help line if needed. Discussed various types of possible alarms and what to do. Questions addressed. Initialization finished. No futher questions.  Patient referred to clinic today for Dexcom G6 continuous glucose sensor system training.  Education was provided using "Quick Start Guide" per Dexcom protocol.    Overview:  5min glucose reading updates, trending arrows, BG graph screens, battery life indicator, Blue Tooth Symbol.  Menus: trend Graph, start sensor, enter BG, events, Alerts, Settings, Shutdown, Stop Sensor   Settings:                          * Urgent Low: 55 mg/dL                          * Urgent Low Soon: Off                          * Low alert: 70                          * High alert: 300                          * Rise rate: Off                          * Fall Rate: Off                          * Signal Loss: 30 min                          * No Reading: Off                          * Always " sound: On                             Reviewed additional setting options with patient, including Graph Height and Transmitter ID. Transmitter was paired with .    Reviewed where to find sensor insertion time and sensor expiration date.   Discussed no need to calibrate sensor during the entirety of the 10 day wear. Discussed that pt can calibrate sensor if desired, but at that time she will need to continue calibrating every 12 hours for sensor to remain accurate. Reviewed appropriate calibration techniques.  Reviewed sensor site selection. Site selected and prepped using aseptic technique Inserted to left abdomen. Transmitter placed in pod and secured.  Practiced sensor pod/transmitter removal from site, and removal of transmitter from sensor pod.  Patient able to demonstrate without difficulty.  Encouraged to review manual prior to starting another sensor.   Reviewed problem solving aspects of sensor transmission/variables that can disrupt RF transmission.  range 20 feet, but the first 3 hrs keep within 3 feet of transmitter.  Pt instructed on lag time of interstitial fluid from CBG and was advised to tx hypoglycemia and dose insulin based on SMBG values.  Link to video provided and written instructions provided for patient to review before 10 day sensor change  Dexcom technical support contact number given and examples of when to contact them discussed.      1/17 Changing the Sensor (every 10 days)  Remove the whole thing from your body  Hold the sensor by the skinny part and break the clear piece  Remove the gray transmitter  In the reader touch start sensor  Touch yes you want to start sensor  Touch the square you want to put the number in for   There will be a 4-digit code on the sensor (like 5937)  Enter the code from the sensor  Touch ok  Touch yes, it is correct  Insert Sensor  Remove orange piece and paper tabs  Place on skin and hold in place  Push the orange button  Insert the gray  transmitter into the sensor  Put the small tongue like side in first and push down  You should hear 2 clicks  Touch confirm once sensor is secure on body  Touch start Sensor  Touch 1 then 2  Sensor will take 2 hours to warm up    Changing Transmitter (every 90 days) in the reader  Touch the =   Touch transmitter  Touch pair new  Go through the process to start sensor  Touch yes you want to start sensor  Touch the square you want to put the number in for   There will be a 4-digit code on the sensor (like 5937)  Enter the code from the sensor  Touch ok  Touch yes, it is correct  Touch the square you want to put the number in for   There will be a 6-digit code on the transmitter box (like [id number])  Enter the code from the transmitter box  Touch ok  Touch yes, it is correct  Insert Sensor  Remove orange piece and paper tabs  Place on skin and hold in place  Push the orange button  Insert the gray transmitter into the sensor  Put the small tongue like side in first and push down  You should hear 2 clicks  Touch confirm once sensor is secure on body  Touch start Sensor  Touch 1 then 2  Sensor will take 2 hours to warm up           Task: Reviewed Dexcom download with patient and provider, adjustments made to treatment plan by provider and communicated to patient as directed, patient states understanding. Completed 12/16/2024        Task: Reviewed Dexcom download with patient and provider, adjustments made to treatment plan by provider and communicated to patient as directed, patient states understanding. Completed 12/20/2024        Task: Reviewed Dexcom download with patient and provider, adjustments made to treatment plan by provider and communicated to patient as directed, patient states understanding. Completed 1/8/2025          Follow Up Plan     Follow up in about 10 days (around 5/19/2025) for Change Personal CGM and Upload Data.    Today's care plan and follow up schedule was discussed with patient.  Aishwarya  verbalized understanding of the care plan, goals, and agrees to follow up plan.        The patient was encouraged to communicate with his/her health care provider/physician and care team regarding his/her condition(s) and treatment.  I provided the patient with my contact information today and encouraged to contact me via phone or [facility] Patient Portal as needed.     Length of Visit   Total Time: 15 Minutes

## 2025-05-21 ENCOUNTER — TELEPHONE (OUTPATIENT)
Dept: PRIMARY CARE CLINIC | Facility: CLINIC | Age: 65
End: 2025-05-21
Payer: MEDICARE

## 2025-05-21 ENCOUNTER — OFFICE VISIT (OUTPATIENT)
Dept: PRIMARY CARE CLINIC | Facility: CLINIC | Age: 65
End: 2025-05-21
Payer: MEDICARE

## 2025-05-21 ENCOUNTER — HOSPITAL ENCOUNTER (OUTPATIENT)
Dept: RADIOLOGY | Facility: HOSPITAL | Age: 65
Discharge: HOME | End: 2025-05-21
Attending: FAMILY MEDICINE
Payer: MEDICARE

## 2025-05-21 ENCOUNTER — CLINICAL SUPPORT (OUTPATIENT)
Dept: DIABETES | Facility: CLINIC | Age: 65
End: 2025-05-21
Payer: MEDICARE

## 2025-05-21 VITALS
DIASTOLIC BLOOD PRESSURE: 62 MMHG | RESPIRATION RATE: 15 BRPM | SYSTOLIC BLOOD PRESSURE: 142 MMHG | BODY MASS INDEX: 36.54 KG/M2 | TEMPERATURE: 98 F | WEIGHT: 232.81 LBS | HEIGHT: 67 IN

## 2025-05-21 DIAGNOSIS — I10 ESSENTIAL HYPERTENSION, BENIGN: Primary | ICD-10-CM

## 2025-05-21 DIAGNOSIS — Z79.4 TYPE 2 DIABETES MELLITUS WITH HYPERGLYCEMIA, WITH LONG-TERM CURRENT USE OF INSULIN: Primary | ICD-10-CM

## 2025-05-21 DIAGNOSIS — E11.65 TYPE 2 DIABETES MELLITUS WITH HYPERGLYCEMIA, WITH LONG-TERM CURRENT USE OF INSULIN: Primary | ICD-10-CM

## 2025-05-21 DIAGNOSIS — Z79.4 TYPE 2 DIABETES MELLITUS WITH OTHER SPECIFIED COMPLICATION, WITH LONG-TERM CURRENT USE OF INSULIN: ICD-10-CM

## 2025-05-21 DIAGNOSIS — Z12.31 ENCOUNTER FOR SCREENING MAMMOGRAM FOR BREAST CANCER: ICD-10-CM

## 2025-05-21 DIAGNOSIS — N18.31 STAGE 3A CHRONIC KIDNEY DISEASE: ICD-10-CM

## 2025-05-21 DIAGNOSIS — E11.69 TYPE 2 DIABETES MELLITUS WITH OTHER SPECIFIED COMPLICATION, WITH LONG-TERM CURRENT USE OF INSULIN: ICD-10-CM

## 2025-05-21 PROCEDURE — 77067 SCR MAMMO BI INCL CAD: CPT | Mod: 26,,, | Performed by: RADIOLOGY

## 2025-05-21 PROCEDURE — G2211 COMPLEX E/M VISIT ADD ON: HCPCS | Mod: S$GLB,,, | Performed by: NURSE PRACTITIONER

## 2025-05-21 PROCEDURE — 1159F MED LIST DOCD IN RCRD: CPT | Mod: CPTII,S$GLB,, | Performed by: NURSE PRACTITIONER

## 2025-05-21 PROCEDURE — 1101F PT FALLS ASSESS-DOCD LE1/YR: CPT | Mod: CPTII,S$GLB,, | Performed by: NURSE PRACTITIONER

## 2025-05-21 PROCEDURE — 1111F DSCHRG MED/CURRENT MED MERGE: CPT | Mod: CPTII,S$GLB,, | Performed by: NURSE PRACTITIONER

## 2025-05-21 PROCEDURE — 3008F BODY MASS INDEX DOCD: CPT | Mod: CPTII,S$GLB,, | Performed by: NURSE PRACTITIONER

## 2025-05-21 PROCEDURE — 1126F AMNT PAIN NOTED NONE PRSNT: CPT | Mod: CPTII,S$GLB,, | Performed by: NURSE PRACTITIONER

## 2025-05-21 PROCEDURE — 3062F POS MACROALBUMINURIA REV: CPT | Mod: CPTII,S$GLB,, | Performed by: NURSE PRACTITIONER

## 2025-05-21 PROCEDURE — 99999 PR PBB SHADOW E&M-EST. PATIENT-LVL I: CPT | Mod: PBBFAC,,, | Performed by: DIETITIAN, REGISTERED

## 2025-05-21 PROCEDURE — 99214 OFFICE O/P EST MOD 30 MIN: CPT | Mod: S$GLB,,, | Performed by: NURSE PRACTITIONER

## 2025-05-21 PROCEDURE — 3288F FALL RISK ASSESSMENT DOCD: CPT | Mod: CPTII,S$GLB,, | Performed by: NURSE PRACTITIONER

## 2025-05-21 PROCEDURE — 3044F HG A1C LEVEL LT 7.0%: CPT | Mod: CPTII,S$GLB,, | Performed by: NURSE PRACTITIONER

## 2025-05-21 PROCEDURE — 3066F NEPHROPATHY DOC TX: CPT | Mod: CPTII,S$GLB,, | Performed by: NURSE PRACTITIONER

## 2025-05-21 PROCEDURE — 77063 BREAST TOMOSYNTHESIS BI: CPT | Mod: TC

## 2025-05-21 PROCEDURE — 3077F SYST BP >= 140 MM HG: CPT | Mod: CPTII,S$GLB,, | Performed by: NURSE PRACTITIONER

## 2025-05-21 PROCEDURE — 77063 BREAST TOMOSYNTHESIS BI: CPT | Mod: 26,,, | Performed by: RADIOLOGY

## 2025-05-21 PROCEDURE — 3078F DIAST BP <80 MM HG: CPT | Mod: CPTII,S$GLB,, | Performed by: NURSE PRACTITIONER

## 2025-05-21 PROCEDURE — 4010F ACE/ARB THERAPY RXD/TAKEN: CPT | Mod: CPTII,S$GLB,, | Performed by: NURSE PRACTITIONER

## 2025-05-21 PROCEDURE — 99999 PR PBB SHADOW E&M-EST. PATIENT-LVL IV: CPT | Mod: PBBFAC,,, | Performed by: NURSE PRACTITIONER

## 2025-05-21 NOTE — TELEPHONE ENCOUNTER
----- Message from Cindy sent at 5/21/2025  3:28 PM CDT -----  Contact: [phone]  [email or web address] Patient is calling for Medical Advice regarding:pt is calling she was seen today and she is calling about meds that was prescribed please call pt back and advise.olmesartan-hydrochlorothiazide (BENICAR HCT) 40-25 mg per tablet pt states she never received this one also.  Please call her back and advise.How long has patient had these symptoms:Pharmacy name and phone#:[facility] wants a call back or [facility]:callbackComments:Please advise patient replies from provider may take up to 48 hours.

## 2025-05-21 NOTE — TELEPHONE ENCOUNTER
I contacted pt ,she did not answer . Mailbox is full    I called her [facility] pharmacy and was informed that the med needed to be ordered and will be in on tomorrow .

## 2025-05-21 NOTE — PROGRESS NOTES
Diabetes Care Specialist Follow-up Note  Author: Marla Stein RD, [facility]  Date: 5/21/2025    Intake    Program Intake  Reason for Diabetes Program Visit:: Intervention  Type of Intervention:: Individual  Individual: Device Training  Current diabetes risk level:: high  In the last month, have you used the ER or been admitted to the hospital: No  Permission to speak with others about care:: no    Current Diabetes Treatment: Insulin, DM Injectables, Oral Medications  Oral Medication Type/Dose: farxiga 10 mg once a day (taking)  DM Injectables Type/Dose: mounjaro 2.5mg, will increase after the 4th dose to 5mg, taking once a week  Method of insulin delivery?: Insulin Device  Insulin Device Type/Dose: humalog via VGo30 (fiasp is ordered, unsure which one she is taking) performing 5-6 clicks with meals    Continuous Glucose Monitoring  Patient has CGM: Yes  Personal CGM type:: Dexcom G7  GMI Date: 05/21/25  GMI Value: 7.5 %    Lab Results   Component Value Date    HGBA1C 6.8 (H) 03/13/2025     A1c Pre Diabetes Care Specialist Intervention:  8.7%    Physical activity/Exercise:   No change    SMBG: using the dexcom G7       Lifestyle Coping Support & Clinical    Lifestyle/Coping/Support  Compared to other people your age, how would you rate your health?: Good  Psychosocial/Coping Skills Assessment Completed: : No  Assessment indicates:: Adequate understanding  Deffered due to:: Other (comment)  Area of need?: No    Problem Review  Active Comorbidities: Cardiovascular Disease, Hypertension, Neuropathy    Diabetes Self-Management Skills Assessment    Medication Skills Assessment  Patient is able to identify current diabetes medications, dosages, and appropriate timing of medications.: yes  Patient reports problems or concerns with current medication regimen.: no  Patient is  aware that some diabetes medications can cause low blood sugar?: Yes  Medication Skills Assessment Completed:: No  Assessment indicates:: Adequate  understanding  Deffered due to:: Other (comment) (previously completed, there has been no change and patient has adequate understanding)  Area of need?: No    Diabetes Disease Process/Treatment Options  Diabetes Type?: Type II  Diabetes Disease Process/Treatment Options: Skills Assessment Completed: No  Assessment indicates:: Adequate understanding  Deferred due to:: Other (comment)  Area of need?: No    Nutrition/Healthy Eating  Meal Plan 24 Hour Recall - Breakfast: Toast and Eggs, coffee with sugar and cream  Meal Plan 24 Hour Recall - Lunch: chicken noodle soup with crackers or a sandwich  Meal Plan 24 Hour Recall - Dinner: yesterday some spaghetti and meatsauce, some times roast and potatoes, some times a burger and some fries, sometimes gets pizza or something home cooked  Meal Plan 24 Hour Recall - Snack: peanut butter crackers, almonds, cheerios  Patient can identify foods that impact blood sugar.: yes  Challenges to healthy eating:: portion control, snacking between meals and at night, eating when feeling depressed/stressed, lack of will power, eating out, going to parties  Nutrition/Healthy Eating Skills Assessment Completed:: No  Assessment indicates:: Adequate understanding  Deffered due to:: Other (comment)  Area of need?: No    Physical Activity/Exercise  Patient's daily activity level:: lightly active  Patient formally exercises outside of work.: no  Reasons for not exercising:: time constraints  Patient can identify forms of physical activity.: yes  Physical Activity/Exercise Skills Assessment Completed: : No  Assessment indicates:: Adequate understanding  Deffered due to:: Other (comment)  Area of need?: No    Home Blood Glucose Monitoring  Patient states that blood sugar is checked at home daily.: yes  Monitoring Method:: personal continuous glucose monitor  Personal CGM type:: Dexcom G7   What is your current Time in Range?: 68%  What is your A1c Target?: 7.0 with no hypoglycemia  Home Blood  Glucose Monitoring Skills Assessment Completed: : Yes  Assessment indicates:: Instruction Needed  Area of need?: Yes    Acute Complications  Acute Complications Skills Assessment Completed: : No  Assessment indicates:: Adequate understanding  Deffered due to:: Other (comment)  Area of need?: No    Chronic Complications  Chronic Complications Skills Assessment Completed: : No  Assessment indicates:: Adequate understanding  Deferred due to:: Other (comment)  Area of need?: No      During today's follow-up visit,  the following areas required further assessment and content was provided/reviewed.    Based on today's diabetes care assessment, the following areas of need were identified:          7/26/2024    12:01 AM   Areas of Need   Medications/Current Diabetes Treatment No   Lifestyle Coping Support No   Diabetes Disease Process/Treatment Options No   Nutrition/Healthy Eating No   Physical Activity/Exercise No   Home Blood Glucose Monitoring Yes, changed out the dexcom G7 sensor and started a new one  Comparison of previous CGM data 1/8/2025 to current    Average Glucose 165 improved from 176  Standard Deviation 56 improved from 66  GMI 7.3 % improved from 7.5 %    Time in Range  9% of time patient was in Very High Range improved from 13%  23% of time patient was in High Range improved from 29%  68% of time patient was in Range improved from 58%  0% of time patient was in Low Range no change from 0%  <1% of time patient was in Very Low Range no change from <1%    No changes made at this time     Acute Complications No   Chronic Complications No        Today's interventions were provided through individual discussion, instruction, and written materials were provided.    Patient verbalized understanding of instruction and written materials.  Pt was able to return back demonstration of instructions today. Patient understood key points, needs reinforcement and further instruction.     Diabetes Self-Management Care Plan  "Review and Evaluation of Progress:    During today's follow-up Aishwarya's Diabetes Self-Management Care Plan progress was reviewed and progress was evaluated including his/her input. Aishwarya has agreed to continue his/her journey to improve/maintain overall diabetes control by continuing to set health goals. See care plan progress below.      Care Plan: Diabetes Management   Updates made since 5/21/2024 12:00 AM        Problem: Blood Glucose Self-Monitoring         Goal: Patient agrees to check and record blood sugars 3 times per day using the dexcom G6/G7 for the next 3 months    Start Date: 12/27/2023   Expected End Date: 3/27/2024   This Visit's Progress: On track   Recent Progress: On track   Priority: High   Barriers: Knowledge deficit   Note:    Patient was instructed how to use a meter. Discussed signs and symptoms, and causes and treatment of hypoglycemia. Covered blood sugar and A1C goals. Instructed patient to use provided logbooks to record blood sugars. Instructed patient to follow-up with their physician. Patient verbalized understanding of all instructions.  Prescription for meter and testing supplies pended to PCP    1/6/2023  Dexcom G6 Education  Patient is here in clinic today for initial start of Dexcom continuous glucose monitoring system (CGMA). Reviewed with patient how to insert sensor and transmitter. Patient inserted sensor on right abdomen and inserted transmitter. Time and date was set on monitor and settings were set. Hypoglycemia trigger set for 70 and hyperglycemia set for 300. Patient provided with phone number for 24-hour help line if needed. Discussed various types of possible alarms and what to do. Questions addressed. Initialization finished. No futher questions.  Patient referred to clinic today for Dexcom G6 continuous glucose sensor system training.  Education was provided using "Quick Start Guide" per Dexcom protocol.    Overview:  5min glucose reading updates, trending arrows, " BG graph screens, battery life indicator, Blue Tooth Symbol.  Menus: trend Graph, start sensor, enter BG, events, Alerts, Settings, Shutdown, Stop Sensor   Settings:                          * Urgent Low: 55 mg/dL                          * Urgent Low Soon: Off                          * Low alert: 70                          * High alert: 300                          * Rise rate: Off                          * Fall Rate: Off                          * Signal Loss: 30 min                          * No Reading: Off                          * Always sound: On                             Reviewed additional setting options with patient, including Graph Height and Transmitter ID. Transmitter was paired with .    Reviewed where to find sensor insertion time and sensor expiration date.   Discussed no need to calibrate sensor during the entirety of the 10 day wear. Discussed that pt can calibrate sensor if desired, but at that time she will need to continue calibrating every 12 hours for sensor to remain accurate. Reviewed appropriate calibration techniques.  Reviewed sensor site selection. Site selected and prepped using aseptic technique Inserted to left abdomen. Transmitter placed in pod and secured.  Practiced sensor pod/transmitter removal from site, and removal of transmitter from sensor pod.  Patient able to demonstrate without difficulty.  Encouraged to review manual prior to starting another sensor.   Reviewed problem solving aspects of sensor transmission/variables that can disrupt RF transmission.  range 20 feet, but the first 3 hrs keep within 3 feet of transmitter.  Pt instructed on lag time of interstitial fluid from CBG and was advised to tx hypoglycemia and dose insulin based on SMBG values.  Link to video provided and written instructions provided for patient to review before 10 day sensor change  Dexcom technical support contact number given and examples of when to contact them  discussed.      1/17 Changing the Sensor (every 10 days)  Remove the whole thing from your body  Hold the sensor by the skinny part and break the clear piece  Remove the gray transmitter  In the reader touch start sensor  Touch yes you want to start sensor  Touch the square you want to put the number in for   There will be a 4-digit code on the sensor (like 5937)  Enter the code from the sensor  Touch ok  Touch yes, it is correct  Insert Sensor  Remove orange piece and paper tabs  Place on skin and hold in place  Push the orange button  Insert the gray transmitter into the sensor  Put the small tongue like side in first and push down  You should hear 2 clicks  Touch confirm once sensor is secure on body  Touch start Sensor  Touch 1 then 2  Sensor will take 2 hours to warm up    Changing Transmitter (every 90 days) in the reader  Touch the =   Touch transmitter  Touch pair new  Go through the process to start sensor  Touch yes you want to start sensor  Touch the square you want to put the number in for   There will be a 4-digit code on the sensor (like 5937)  Enter the code from the sensor  Touch ok  Touch yes, it is correct  Touch the square you want to put the number in for   There will be a 6-digit code on the transmitter box (like [id number])  Enter the code from the transmitter box  Touch ok  Touch yes, it is correct  Insert Sensor  Remove orange piece and paper tabs  Place on skin and hold in place  Push the orange button  Insert the gray transmitter into the sensor  Put the small tongue like side in first and push down  You should hear 2 clicks  Touch confirm once sensor is secure on body  Touch start Sensor  Touch 1 then 2  Sensor will take 2 hours to warm up           Task: Reviewed Dexcom download with patient and provider, adjustments made to treatment plan by provider and communicated to patient as directed, patient states understanding. Completed 12/16/2024        Task: Reviewed Dexcom download with patient  and provider, adjustments made to treatment plan by provider and communicated to patient as directed, patient states understanding. Completed 12/20/2024        Task: Reviewed Dexcom download with patient and provider, adjustments made to treatment plan by provider and communicated to patient as directed, patient states understanding. Completed 1/8/2025        Task: Reviewed Dexcom download with patient and provider, adjustments made to treatment plan by provider and communicated to patient as directed, patient states understanding. Completed 5/21/2025          Follow Up Plan     Follow up in about 2 weeks (around 6/4/2025) for Change Personal CGM and Upload Data.    Today's care plan and follow up schedule was discussed with patient.  Aishwarya verbalized understanding of the care plan, goals, and agrees to follow up plan.        The patient was encouraged to communicate with his/her health care provider/physician and care team regarding his/her condition(s) and treatment.  I provided the patient with my contact information today and encouraged to contact me via phone or [facility] Patient Portal as needed.     Length of Visit   Total Time: 30 Minutes

## 2025-05-22 ENCOUNTER — RESULTS FOLLOW-UP (OUTPATIENT)
Dept: PRIMARY CARE CLINIC | Facility: CLINIC | Age: 65
End: 2025-05-22

## 2025-05-27 NOTE — PROGRESS NOTES
Assessment:       1. Essential hypertension, benign    2. Type 2 diabetes mellitus with other specified complication, with long-term current use of insulin    3. Stage 3a chronic kidney disease         Plan:       Essential hypertension, benign  Experiencing dizziness and lightheadedness.  Okay to take amlodipine at night.  We will call for her blood pressure check.    Type 2 diabetes mellitus with other specified complication, with long-term current use of insulin  Follow up with Micki as planned.    Stage 3a chronic kidney disease  Hydrate well.  Avoid nephrotoxins      Assessment & Plan    IMPRESSION:  - Evaluated BP control; noted improvement from previous reading of 180/76 on May 6th to current reading of 142/60.  - Considered timing of BP medication administration in relation to symptoms and daily activities.  - Assessed for potential side effects of BP medication, including dizziness and increased urination.    HYPERTENSION:   Blood pressure improved from 180/76 on May 6th to current 142/60.   Aishwarya feels well on medication with no reported dizziness.   Performed BP measurement, pulse oximetry, and cardiac auscultation.   Explained that BP naturally increases with physical activity.   Continuing Olmesartan/HCTZ 25 mg in the morning and Amlodipine 10 mg for blood pressure management.  We will call patient for home blood pressure check.  Experiencing dizziness creating has a tends to increase regimen.    TYPE 2 DIABETES:   Monitoring glucose using Dexcom.   Last A1c was 6.8 on 3/20/25.   Continuing Tenifer Ceega, Lispro (Humalog), Barboza, and Munjara for diabetes management.    HYPERLIPIDEMIA:   Continuing Crestor and Zetia for cholesterol management.    URINARY FREQUENCY:   Aishwarya reports frequent urination during the day.    GENERAL SYMPTOMS:   Aishwarya reports feeling cold frequently.    DIET AND EATING HABITS:   Aishwarya reports not eating a lot but consuming whatever is available.   Recommend healthier  options like salmon and grains.       Medication List with Changes/Refills   Current Medications    ACETAMINOPHEN (TYLENOL) 500 MG TABLET    Take 2 tablets (1,000 mg total) by mouth every 6 to 8 hours as needed for Pain.    AMLODIPINE (NORVASC) 10 MG TABLET    Take 1 tablet (10 mg total) by mouth once daily.    BLOOD SUGAR DIAGNOSTIC STRP    To check BG 3 times daily, to use with insurance preferred meter    BLOOD-GLUCOSE METER KIT    To check BG 3 times daily, to use with insurance preferred meter    BLOOD-GLUCOSE METER,CONTINUOUS (DEXCOM G7 ) MISC    1 Device by Misc.(Non-Drug; Combo Route) route continuous.    BLOOD-GLUCOSE SENSOR (DEXCOM G7 SENSOR) DAVID    Inject 1 Device into the skin every 10 days.    COLCHICINE (COLCRYS) 0.6 MG TABLET    2 tabs 1st dose, then 1 tab in an hour, then 1 tab qd.    DAPAGLIFLOZIN PROPANEDIOL (FARXIGA) 10 MG TABLET    Take 1 tablet (10 mg total) by mouth once daily.    DICLOFENAC SODIUM (VOLTAREN) 1 % GEL    Apply 2 g topically 4 (four) times daily.    EZETIMIBE (ZETIA) 10 MG TABLET    Take 1 tablet (10 mg total) by mouth every evening.    GABAPENTIN (NEURONTIN) 300 MG CAPSULE    Take 1 capsule (300 mg total) by mouth every evening.    HYDROXYZINE HCL (ATARAX) 25 MG TABLET    Take 1 tablet (25 mg total) by mouth nightly as needed for Itching.    INDOMETHACIN (INDOCIN) 25 MG CAPSULE    Take 2 tablets 1st dose then 1 tablet three times a day @ mealtime    INSULIN LISPRO (HUMALOG U-100 INSULIN) 100 UNIT/ML INJECTION    To use with VGo 30- 4 clicks TID AC, Max TDD of 100 units    LANCETS MISC    To check BG 3 times daily, to use with insurance preferred meter    OLMESARTAN-HYDROCHLOROTHIAZIDE (BENICAR HCT) 40-25 MG PER TABLET    Take 1 tablet by mouth once daily.    ONETOUCH DELICA PLUS LANCET 33 GAUGE MISC    Apply topically 3 (three) times daily.    ONETOUCH VERIO FLEX METER MISC    USE AS DIRECTED THREE TIMES DAILY    ROSUVASTATIN (CRESTOR) 40 MG TAB    Take 1 tablet (40 mg  total) by mouth every evening.    SUB-Q INSULIN DEVICE, 30 UNIT (V-GO 30) DAVID    Inject 1 Device into the skin once daily.    TIRZEPATIDE (MOUNJARO) 5 MG/0.5 ML PNIJ    Inject 5 mg into the skin every 7 days.         Subjective:    Patient ID: Aishwarya Jensen is a 64 y.o. female.  Chief Complaint: Hypertension    History of Present Illness    CHIEF COMPLAINT:  Aishwarya presents today for follow up.    HYPERTENSION:  She reports feeling well on current blood pressure medications. She was recently switched from Lisinopril Hydrochlorothiazide to Olmesartan Hydrochlorothiazide 25 and continues Amlodipine 10 mg, both taken in the morning. She reports frequent urination during the day but denies dizziness related to blood pressure medications.    DIABETES AND MEDICATIONS:  She is currently on Dexcom, Tresiba, Humalog, Ozempic, and Mounjaro for diabetes management. A1c was 6.8 on 3/20/25.    CHOLESTEROL:  She continues Crestor and Zetia for cholesterol management.    DIET AND GENERAL SYMPTOMS:  She reports always feeling cold. Her food intake has decreased, and she tends to eat whatever is readily available or convenient. She expresses interest in incorporating healthier options like salmon and grains into her diet.       Review of Systems   Constitutional:  Negative for chills and fever.   HENT:  Negative for ear pain, nosebleeds, sinus pressure and sore throat.    Respiratory:  Negative for cough and shortness of breath.    Cardiovascular:  Negative for chest pain and palpitations.   Gastrointestinal:  Negative for diarrhea, nausea and vomiting.   Genitourinary: Negative.    Neurological:  Positive for dizziness and light-headedness.   Psychiatric/Behavioral:  Negative for dysphoric mood and sleep disturbance. The patient is not nervous/anxious.        Objective:      Vitals:    05/21/25 1202   BP: (!) 142/62   BP Location: Right arm   Patient Position: Sitting   Resp: 15   Temp: 98.1 °F (36.7 °C)   TempSrc:  "Oral   Weight: 105.6 kg (232 lb 12.9 oz)   Height: 5' 7" (1.702 m)     BP Readings from Last 5 Encounters:   05/21/25 (!) 142/62   05/06/25 (!) 180/76   04/29/25 (!) 144/73   04/21/25 (!) 150/68   04/18/25 (!) 139/94     Wt Readings from Last 5 Encounters:   05/21/25 105.6 kg (232 lb 12.9 oz)   05/06/25 107.9 kg (237 lb 14 oz)   04/29/25 107.3 kg (236 lb 8.9 oz)   04/29/25 107.3 kg (236 lb 8.9 oz)   04/18/25 97.4 kg (214 lb 11.7 oz)     Physical Exam  Constitutional:       General: She is not in acute distress.     Appearance: Normal appearance. She is not ill-appearing.   HENT:      Head: Normocephalic.      Mouth/Throat:      Mouth: Mucous membranes are moist.      Pharynx: No pharyngeal swelling or oropharyngeal exudate.      Tonsils: No tonsillar exudate.   Eyes:      Conjunctiva/sclera:      Right eye: Right conjunctiva is not injected.      Left eye: Left conjunctiva is not injected.   Cardiovascular:      Rate and Rhythm: Normal rate and regular rhythm.      Pulses:           Radial pulses are 1+ on the right side and 1+ on the left side.      Heart sounds: No murmur heard.     No systolic murmur is present.      No diastolic murmur is present.   Pulmonary:      Effort: No tachypnea or bradypnea.      Breath sounds: Normal breath sounds. No decreased breath sounds, wheezing, rhonchi or rales.   Abdominal:      General: There is no distension.   Musculoskeletal:      Right lower leg: No edema.      Left lower leg: No edema.   Skin:     General: Skin is warm and dry.   Neurological:      Mental Status: She is alert.   Psychiatric:         Attention and Perception: Attention normal.         Speech: Speech normal.         Behavior: Behavior normal.           Lab Results   Component Value Date    WBC 4.39 04/21/2025    HGB 9.9 (L) 04/21/2025    HCT 32.8 (L) 04/21/2025     04/21/2025    CHOL 135 11/07/2024    TRIG 105 11/07/2024    HDL 54 11/07/2024    ALT 22 04/21/2025    AST 26 04/21/2025     " 04/21/2025    K 3.6 04/21/2025     04/21/2025    CREATININE 1.0 04/21/2025    BUN 14 04/21/2025    CO2 21 (L) 04/21/2025    TSH 1.281 06/25/2024    HGBA1C 6.8 (H) 03/13/2025      This note was generated with the assistance of ambient listening technology. Verbal consent was obtained by the patient and accompanying visitor(s) for the recording of patient appointment to facilitate this note. I attest to having reviewed and edited the generated note for accuracy, though some syntax or spelling errors may persist. Please contact the author of this note for any clarification.

## 2025-05-28 ENCOUNTER — TELEPHONE (OUTPATIENT)
Dept: PRIMARY CARE CLINIC | Facility: CLINIC | Age: 65
End: 2025-05-28
Payer: MEDICARE

## 2025-05-28 NOTE — TELEPHONE ENCOUNTER
----- Message from Marija Dias NP sent at 5/27/2025  3:52 PM CDT -----  Please call patient for home blood pressure check

## 2025-06-03 ENCOUNTER — TELEPHONE (OUTPATIENT)
Dept: DIABETES | Facility: CLINIC | Age: 65
End: 2025-06-03
Payer: MEDICARE

## 2025-06-04 ENCOUNTER — CLINICAL SUPPORT (OUTPATIENT)
Dept: DIABETES | Facility: CLINIC | Age: 65
End: 2025-06-04
Payer: MEDICARE

## 2025-06-04 DIAGNOSIS — Z79.4 TYPE 2 DIABETES MELLITUS WITH HYPERGLYCEMIA, WITH LONG-TERM CURRENT USE OF INSULIN: Primary | ICD-10-CM

## 2025-06-04 DIAGNOSIS — Z79.4 TYPE 2 DIABETES MELLITUS WITH STAGE 3A CHRONIC KIDNEY DISEASE, WITH LONG-TERM CURRENT USE OF INSULIN: ICD-10-CM

## 2025-06-04 DIAGNOSIS — Z79.4 TYPE 2 DIABETES MELLITUS WITH DIABETIC POLYNEUROPATHY, WITH LONG-TERM CURRENT USE OF INSULIN: ICD-10-CM

## 2025-06-04 DIAGNOSIS — E11.65 TYPE 2 DIABETES MELLITUS WITH HYPERGLYCEMIA, WITH LONG-TERM CURRENT USE OF INSULIN: Primary | ICD-10-CM

## 2025-06-04 DIAGNOSIS — E11.42 TYPE 2 DIABETES MELLITUS WITH DIABETIC POLYNEUROPATHY, WITH LONG-TERM CURRENT USE OF INSULIN: ICD-10-CM

## 2025-06-04 DIAGNOSIS — N18.31 TYPE 2 DIABETES MELLITUS WITH STAGE 3A CHRONIC KIDNEY DISEASE, WITH LONG-TERM CURRENT USE OF INSULIN: ICD-10-CM

## 2025-06-04 DIAGNOSIS — E11.22 TYPE 2 DIABETES MELLITUS WITH STAGE 3A CHRONIC KIDNEY DISEASE, WITH LONG-TERM CURRENT USE OF INSULIN: ICD-10-CM

## 2025-06-12 ENCOUNTER — TELEPHONE (OUTPATIENT)
Dept: DIABETES | Facility: CLINIC | Age: 65
End: 2025-06-12
Payer: MEDICARE

## 2025-06-13 ENCOUNTER — CLINICAL SUPPORT (OUTPATIENT)
Dept: DIABETES | Facility: CLINIC | Age: 65
End: 2025-06-13
Payer: MEDICARE

## 2025-06-13 DIAGNOSIS — E11.65 TYPE 2 DIABETES MELLITUS WITH HYPERGLYCEMIA, WITH LONG-TERM CURRENT USE OF INSULIN: Primary | ICD-10-CM

## 2025-06-13 DIAGNOSIS — Z79.4 TYPE 2 DIABETES MELLITUS WITH HYPERGLYCEMIA, WITH LONG-TERM CURRENT USE OF INSULIN: Primary | ICD-10-CM

## 2025-06-13 NOTE — PROGRESS NOTES
Diabetes Care Specialist Follow-up Note  Author: Marla Stein RD, [facility]  Date: 6/13/2025    Intake    Program Intake  Reason for Diabetes Program Visit:: Intervention  Type of Intervention:: Individual  Individual: Device Training  Device Training: Personal CGM  Current diabetes risk level:: high  In the last month, have you used the ER or been admitted to the hospital: No  Permission to speak with others about care:: no    Current Diabetes Treatment: Insulin, DM Injectables, Oral Medications  Oral Medication Type/Dose: farxiga 10 mg once a day (taking)  DM Injectables Type/Dose: mounjaro 2.5mg, will increase after the 4th dose to 5mg, taking once a week  Method of insulin delivery?: Insulin Device  Insulin Device Type/Dose: humalog via VGo30 (fiasp is ordered, unsure which one she is taking) performing 5-6 clicks with meals    Continuous Glucose Monitoring  Patient has CGM: Yes  Personal CGM type:: Dexcom G7  GMI Date: 05/21/25  GMI Value: 7.5 %    Lab Results   Component Value Date    HGBA1C 6.8 (H) 03/13/2025     A1c Pre Diabetes Care Specialist Intervention:  8.7%    Physical activity/Exercise:   No change    SMBG: using the dexcom G7     Lifestyle Coping Support & Clinical    Lifestyle/Coping/Support  Compared to other people your age, how would you rate your health?: Good  Psychosocial/Coping Skills Assessment Completed: : No  Assessment indicates:: Adequate understanding  Deffered due to:: Other (comment)  Area of need?: No    Problem Review  Active Comorbidities: Cardiovascular Disease, Hypertension, Neuropathy    Diabetes Self-Management Skills Assessment    Medication Skills Assessment  Patient is able to identify current diabetes medications, dosages, and appropriate timing of medications.: yes  Patient reports problems or concerns with current medication regimen.: no  Medication Skills Assessment Completed:: No  Assessment indicates:: Adequate understanding  Deffered due to:: Other (comment)  (previously completed, there has been no change and patient has adequate understanding)  Area of need?: No    Diabetes Disease Process/Treatment Options  Diabetes Type?: Type II  Diabetes Disease Process/Treatment Options: Skills Assessment Completed: No  Assessment indicates:: Adequate understanding  Deferred due to:: Other (comment)  Area of need?: No    Nutrition/Healthy Eating  Meal Plan 24 Hour Recall - Breakfast: Toast and Eggs, coffee with sugar and cream  Meal Plan 24 Hour Recall - Lunch: chicken noodle soup with crackers or a sandwich  Meal Plan 24 Hour Recall - Dinner: yesterday some spaghetti and meatsauce, some times roast and potatoes, some times a burger and some fries, sometimes gets pizza or something home cooked  Meal Plan 24 Hour Recall - Snack: peanut butter crackers, almonds, cheerios  Patient can identify foods that impact blood sugar.: yes  Challenges to healthy eating:: portion control, snacking between meals and at night, eating when feeling depressed/stressed, lack of will power, eating out, going to parties  Nutrition/Healthy Eating Skills Assessment Completed:: No  Assessment indicates:: Adequate understanding  Deffered due to:: Other (comment)  Area of need?: No    Physical Activity/Exercise  Patient's daily activity level:: lightly active  Patient formally exercises outside of work.: no  Reasons for not exercising:: time constraints  Patient can identify forms of physical activity.: yes  Physical Activity/Exercise Skills Assessment Completed: : No  Assessment indicates:: Adequate understanding  Deffered due to:: Other (comment)  Area of need?: No    Home Blood Glucose Monitoring  Patient states that blood sugar is checked at home daily.: yes  Monitoring Method:: personal continuous glucose monitor  Personal CGM type:: Dexcom G7   What is your current Time in Range?: 68%  What is your A1c Target?: 7.0 with no hypoglycemia  Home Blood Glucose Monitoring Skills Assessment Completed: :  Yes  Assessment indicates:: Instruction Needed  Area of need?: Yes    Acute Complications  Acute Complications Skills Assessment Completed: : No  Assessment indicates:: Adequate understanding  Deffered due to:: Other (comment)  Area of need?: No    Chronic Complications  Chronic Complications Skills Assessment Completed: : No  Assessment indicates:: Adequate understanding  Deferred due to:: Other (comment)  Area of need?: No      During today's follow-up visit,  the following areas required further assessment and content was provided/reviewed.    Based on today's diabetes care assessment, the following areas of need were identified:          7/26/2024    12:01 AM   Areas of Need   Medications/Current Diabetes Treatment No   Lifestyle Coping Support No   Diabetes Disease Process/Treatment Options No   Nutrition/Healthy Eating No   Physical Activity/Exercise No   Home Blood Glucose Monitoring Yes, changed out the dexcom G7 sensor and started a new one   Acute Complications No   Chronic Complications No        Today's interventions were provided through individual discussion, instruction, and written materials were provided.    Patient verbalized understanding of instruction and written materials.  Pt was able to return back demonstration of instructions today. Patient understood key points, needs reinforcement and further instruction.     Diabetes Self-Management Care Plan Review and Evaluation of Progress:    During today's follow-up Aishwarya's Diabetes Self-Management Care Plan progress was reviewed and progress was evaluated including his/her input. Aishwarya has agreed to continue his/her journey to improve/maintain overall diabetes control by continuing to set health goals. See care plan progress below.      Care Plan: Diabetes Management   Updates made since 6/13/2024 12:00 AM        Problem: Blood Glucose Self-Monitoring         Goal: Patient agrees to check and record blood sugars 3 times per day using the  "dexcom G6/G7 for the next 3 months    Start Date: 12/27/2023   Expected End Date: 3/27/2024   This Visit's Progress: On track   Recent Progress: On track   Priority: High   Barriers: Knowledge deficit   Note:    Patient was instructed how to use a meter. Discussed signs and symptoms, and causes and treatment of hypoglycemia. Covered blood sugar and A1C goals. Instructed patient to use provided logbooks to record blood sugars. Instructed patient to follow-up with their physician. Patient verbalized understanding of all instructions.  Prescription for meter and testing supplies pended to PCP    1/6/2023  Dexcom G6 Education  Patient is here in clinic today for initial start of Dexcom continuous glucose monitoring system (CGMA). Reviewed with patient how to insert sensor and transmitter. Patient inserted sensor on right abdomen and inserted transmitter. Time and date was set on monitor and settings were set. Hypoglycemia trigger set for 70 and hyperglycemia set for 300. Patient provided with phone number for 24-hour help line if needed. Discussed various types of possible alarms and what to do. Questions addressed. Initialization finished. No futher questions.  Patient referred to clinic today for Dexcom G6 continuous glucose sensor system training.  Education was provided using "Quick Start Guide" per Dexcom protocol.    Overview:  5min glucose reading updates, trending arrows, BG graph screens, battery life indicator, Blue Tooth Symbol.  Menus: trend Graph, start sensor, enter BG, events, Alerts, Settings, Shutdown, Stop Sensor   Settings:                          * Urgent Low: 55 mg/dL                          * Urgent Low Soon: Off                          * Low alert: 70                          * High alert: 300                          * Rise rate: Off                          * Fall Rate: Off                          * Signal Loss: 30 min                          * No Reading: Off                        "   * Always sound: On                             Reviewed additional setting options with patient, including Graph Height and Transmitter ID. Transmitter was paired with .    Reviewed where to find sensor insertion time and sensor expiration date.   Discussed no need to calibrate sensor during the entirety of the 10 day wear. Discussed that pt can calibrate sensor if desired, but at that time she will need to continue calibrating every 12 hours for sensor to remain accurate. Reviewed appropriate calibration techniques.  Reviewed sensor site selection. Site selected and prepped using aseptic technique Inserted to left abdomen. Transmitter placed in pod and secured.  Practiced sensor pod/transmitter removal from site, and removal of transmitter from sensor pod.  Patient able to demonstrate without difficulty.  Encouraged to review manual prior to starting another sensor.   Reviewed problem solving aspects of sensor transmission/variables that can disrupt RF transmission.  range 20 feet, but the first 3 hrs keep within 3 feet of transmitter.  Pt instructed on lag time of interstitial fluid from CBG and was advised to tx hypoglycemia and dose insulin based on SMBG values.  Link to video provided and written instructions provided for patient to review before 10 day sensor change  Dexcom technical support contact number given and examples of when to contact them discussed.      1/17 Changing the Sensor (every 10 days)  Remove the whole thing from your body  Hold the sensor by the skinny part and break the clear piece  Remove the gray transmitter  In the reader touch start sensor  Touch yes you want to start sensor  Touch the square you want to put the number in for   There will be a 4-digit code on the sensor (like 5937)  Enter the code from the sensor  Touch ok  Touch yes, it is correct  Insert Sensor  Remove orange piece and paper tabs  Place on skin and hold in place  Push the orange button  Insert the  gray transmitter into the sensor  Put the small tongue like side in first and push down  You should hear 2 clicks  Touch confirm once sensor is secure on body  Touch start Sensor  Touch 1 then 2  Sensor will take 2 hours to warm up    Changing Transmitter (every 90 days) in the reader  Touch the =   Touch transmitter  Touch pair new  Go through the process to start sensor  Touch yes you want to start sensor  Touch the square you want to put the number in for   There will be a 4-digit code on the sensor (like 5937)  Enter the code from the sensor  Touch ok  Touch yes, it is correct  Touch the square you want to put the number in for   There will be a 6-digit code on the transmitter box (like [id number])  Enter the code from the transmitter box  Touch ok  Touch yes, it is correct  Insert Sensor  Remove orange piece and paper tabs  Place on skin and hold in place  Push the orange button  Insert the gray transmitter into the sensor  Put the small tongue like side in first and push down  You should hear 2 clicks  Touch confirm once sensor is secure on body  Touch start Sensor  Touch 1 then 2  Sensor will take 2 hours to warm up           Task: Reviewed Dexcom download with patient and provider, adjustments made to treatment plan by provider and communicated to patient as directed, patient states understanding. Completed 12/16/2024        Task: Reviewed Dexcom download with patient and provider, adjustments made to treatment plan by provider and communicated to patient as directed, patient states understanding. Completed 12/20/2024        Task: Reviewed Dexcom download with patient and provider, adjustments made to treatment plan by provider and communicated to patient as directed, patient states understanding. Completed 1/8/2025        Task: Reviewed Dexcom download with patient and provider, adjustments made to treatment plan by provider and communicated to patient as directed, patient states understanding. Completed  5/21/2025          Follow Up Plan     Follow up in about 10 days (around 6/23/2025) for Change Personal CGM and Upload Data.    Today's care plan and follow up schedule was discussed with patient.  Aishwarya verbalized understanding of the care plan, goals, and agrees to follow up plan.        The patient was encouraged to communicate with his/her health care provider/physician and care team regarding his/her condition(s) and treatment.  I provided the patient with my contact information today and encouraged to contact me via phone or [facility] Patient Portal as needed.     Length of Visit   Total Time: 10 Minutes

## 2025-06-20 ENCOUNTER — TELEPHONE (OUTPATIENT)
Dept: DIABETES | Facility: CLINIC | Age: 65
End: 2025-06-20
Payer: MEDICARE

## 2025-06-23 ENCOUNTER — CLINICAL SUPPORT (OUTPATIENT)
Dept: DIABETES | Facility: CLINIC | Age: 65
End: 2025-06-23
Payer: MEDICARE

## 2025-06-23 DIAGNOSIS — Z79.4 TYPE 2 DIABETES MELLITUS WITH HYPERGLYCEMIA, WITH LONG-TERM CURRENT USE OF INSULIN: Primary | ICD-10-CM

## 2025-06-23 DIAGNOSIS — E11.65 TYPE 2 DIABETES MELLITUS WITH HYPERGLYCEMIA, WITH LONG-TERM CURRENT USE OF INSULIN: Primary | ICD-10-CM

## 2025-06-23 NOTE — PROGRESS NOTES
Diabetes Care Specialist Follow-up Note  Author: Marla Stein RD, CDCES  Date: 6/23/2025    Intake    Program Intake  Reason for Diabetes Program Visit:: Intervention  Type of Intervention:: Individual  Device Training: Personal CGM  Current diabetes risk level:: high  In the last month, have you used the ER or been admitted to the hospital: No  Permission to speak with others about care:: no    Current Diabetes Treatment: Insulin, DM Injectables, Oral Medications  Oral Medication Type/Dose: farxiga 10 mg once a day (taking)  DM Injectables Type/Dose: mounjaro 2.5mg, will increase after the 4th dose to 5mg, taking once a week  Method of insulin delivery?: Insulin Device  Insulin Device Type/Dose: humalog via VGo30 (fiasp is ordered, unsure which one she is taking) performing 5-6 clicks with meals    Continuous Glucose Monitoring  Patient has CGM: Yes  Personal CGM type:: Dexcom G7  GMI Date: 05/21/25  GMI Value: 7.5 %    Lab Results   Component Value Date    HGBA1C 6.8 (H) 03/13/2025     A1c Pre Diabetes Care Specialist Intervention:  8.7%    Physical activity/Exercise:   No change    SMBG: using the dexcom G7     Lifestyle Coping Support & Clinical    Lifestyle/Coping/Support  Compared to other people your age, how would you rate your health?: Good  Psychosocial/Coping Skills Assessment Completed: : No  Assessment indicates:: Adequate understanding  Deffered due to:: Other (comment)  Area of need?: No    Problem Review  Active Comorbidities: Cardiovascular Disease, Hypertension, Neuropathy    Diabetes Self-Management Skills Assessment    Medication Skills Assessment  Patient is able to identify current diabetes medications, dosages, and appropriate timing of medications.: yes  Patient reports problems or concerns with current medication regimen.: no  Medication Skills Assessment Completed:: No  Assessment indicates:: Adequate understanding  Deffered due to:: Other (comment) (previously completed, there has been  no change and patient has adequate understanding)  Area of need?: No    Diabetes Disease Process/Treatment Options  Diabetes Type?: Type II  Diabetes Disease Process/Treatment Options: Skills Assessment Completed: No  Assessment indicates:: Adequate understanding  Deferred due to:: Other (comment)  Area of need?: No    Nutrition/Healthy Eating  Meal Plan 24 Hour Recall - Breakfast: Toast and Eggs, coffee with sugar and cream  Meal Plan 24 Hour Recall - Lunch: chicken noodle soup with crackers or a sandwich  Meal Plan 24 Hour Recall - Dinner: yesterday some spaghetti and meatsauce, some times roast and potatoes, some times a burger and some fries, sometimes gets pizza or something home cooked  Meal Plan 24 Hour Recall - Snack: peanut butter crackers, almonds, cheerios  Patient can identify foods that impact blood sugar.: yes  Challenges to healthy eating:: portion control, snacking between meals and at night, eating when feeling depressed/stressed, lack of will power, eating out, going to parties  Nutrition/Healthy Eating Skills Assessment Completed:: No  Assessment indicates:: Adequate understanding  Deffered due to:: Other (comment)  Area of need?: No    Physical Activity/Exercise  Patient's daily activity level:: lightly active  Patient formally exercises outside of work.: no  Reasons for not exercising:: time constraints  Patient can identify forms of physical activity.: yes  Physical Activity/Exercise Skills Assessment Completed: : No  Assessment indicates:: Adequate understanding  Deffered due to:: Other (comment)  Area of need?: No    Home Blood Glucose Monitoring  Patient states that blood sugar is checked at home daily.: yes  Monitoring Method:: personal continuous glucose monitor  Personal CGM type:: Dexcom G7   What is your current Time in Range?: 68%  What is your A1c Target?: 7.0 with no hypoglycemia  Home Blood Glucose Monitoring Skills Assessment Completed: : Yes  Assessment indicates:: Instruction  Needed  Area of need?: Yes    Acute Complications  Acute Complications Skills Assessment Completed: : No  Assessment indicates:: Adequate understanding  Deffered due to:: Other (comment)  Area of need?: No    Chronic Complications  Chronic Complications Skills Assessment Completed: : No  Assessment indicates:: Adequate understanding  Deferred due to:: Other (comment)  Area of need?: No      During today's follow-up visit,  the following areas required further assessment and content was provided/reviewed.    Based on today's diabetes care assessment, the following areas of need were identified:          7/26/2024    12:01 AM   Areas of Need   Medications/Current Diabetes Treatment No   Lifestyle Coping Support No   Diabetes Disease Process/Treatment Options No   Nutrition/Healthy Eating No   Physical Activity/Exercise No   Home Blood Glucose Monitoring Yes, changed out the dexcom G7 sensor and started a new one   Acute Complications No   Chronic Complications No        Today's interventions were provided through individual discussion, instruction, and written materials were provided.    Patient verbalized understanding of instruction and written materials.  Pt was able to return back demonstration of instructions today. Patient understood key points, needs reinforcement and further instruction.     Diabetes Self-Management Care Plan Review and Evaluation of Progress:    During today's follow-up Agustin Diabetes Self-Management Care Plan progress was reviewed and progress was evaluated including his/her input. Aishwarya has agreed to continue his/her journey to improve/maintain overall diabetes control by continuing to set health goals. See care plan progress below.      Care Plan: Diabetes Management   Updates made since 6/23/2024 12:00 AM        Problem: Blood Glucose Self-Monitoring         Goal: Patient agrees to check and record blood sugars 3 times per day using the dexcom G6/G7 for the next 3 months    Start  "Date: 12/27/2023   Expected End Date: 3/27/2024   This Visit's Progress: On track   Recent Progress: On track   Priority: High   Barriers: Knowledge deficit   Note:    Patient was instructed how to use a meter. Discussed signs and symptoms, and causes and treatment of hypoglycemia. Covered blood sugar and A1C goals. Instructed patient to use provided logbooks to record blood sugars. Instructed patient to follow-up with their physician. Patient verbalized understanding of all instructions.  Prescription for meter and testing supplies pended to PCP    1/6/2023  Dexcom G6 Education  Patient is here in clinic today for initial start of Dexcom continuous glucose monitoring system (CGMA). Reviewed with patient how to insert sensor and transmitter. Patient inserted sensor on right abdomen and inserted transmitter. Time and date was set on monitor and settings were set. Hypoglycemia trigger set for 70 and hyperglycemia set for 300. Patient provided with phone number for 24-hour help line if needed. Discussed various types of possible alarms and what to do. Questions addressed. Initialization finished. No futher questions.  Patient referred to clinic today for Dexcom G6 continuous glucose sensor system training.  Education was provided using "Quick Start Guide" per Dexcom protocol.    Overview:  5min glucose reading updates, trending arrows, BG graph screens, battery life indicator, Blue Tooth Symbol.  Menus: trend Graph, start sensor, enter BG, events, Alerts, Settings, Shutdown, Stop Sensor   Settings:                          * Urgent Low: 55 mg/dL                          * Urgent Low Soon: Off                          * Low alert: 70                          * High alert: 300                          * Rise rate: Off                          * Fall Rate: Off                          * Signal Loss: 30 min                          * No Reading: Off                          * Always sound: On                        "      Reviewed additional setting options with patient, including Graph Height and Transmitter ID. Transmitter was paired with .    Reviewed where to find sensor insertion time and sensor expiration date.   Discussed no need to calibrate sensor during the entirety of the 10 day wear. Discussed that pt can calibrate sensor if desired, but at that time she will need to continue calibrating every 12 hours for sensor to remain accurate. Reviewed appropriate calibration techniques.  Reviewed sensor site selection. Site selected and prepped using aseptic technique Inserted to left abdomen. Transmitter placed in pod and secured.  Practiced sensor pod/transmitter removal from site, and removal of transmitter from sensor pod.  Patient able to demonstrate without difficulty.  Encouraged to review manual prior to starting another sensor.   Reviewed problem solving aspects of sensor transmission/variables that can disrupt RF transmission.  range 20 feet, but the first 3 hrs keep within 3 feet of transmitter.  Pt instructed on lag time of interstitial fluid from CBG and was advised to tx hypoglycemia and dose insulin based on SMBG values.  Link to video provided and written instructions provided for patient to review before 10 day sensor change  Dexcom technical support contact number given and examples of when to contact them discussed.      1/17 Changing the Sensor (every 10 days)  Remove the whole thing from your body  Hold the sensor by the skinny part and break the clear piece  Remove the gray transmitter  In the reader touch start sensor  Touch yes you want to start sensor  Touch the square you want to put the number in for   There will be a 4-digit code on the sensor (like 5937)  Enter the code from the sensor  Touch ok  Touch yes, it is correct  Insert Sensor  Remove orange piece and paper tabs  Place on skin and hold in place  Push the orange button  Insert the gray transmitter into the sensor  Put the  small tongue like side in first and push down  You should hear 2 clicks  Touch confirm once sensor is secure on body  Touch start Sensor  Touch 1 then 2  Sensor will take 2 hours to warm up    Changing Transmitter (every 90 days) in the reader  Touch the =   Touch transmitter  Touch pair new  Go through the process to start sensor  Touch yes you want to start sensor  Touch the square you want to put the number in for   There will be a 4-digit code on the sensor (like 5937)  Enter the code from the sensor  Touch ok  Touch yes, it is correct  Touch the square you want to put the number in for   There will be a 6-digit code on the transmitter box (like [id number])  Enter the code from the transmitter box  Touch ok  Touch yes, it is correct  Insert Sensor  Remove orange piece and paper tabs  Place on skin and hold in place  Push the orange button  Insert the gray transmitter into the sensor  Put the small tongue like side in first and push down  You should hear 2 clicks  Touch confirm once sensor is secure on body  Touch start Sensor  Touch 1 then 2  Sensor will take 2 hours to warm up           Task: Reviewed Dexcom download with patient and provider, adjustments made to treatment plan by provider and communicated to patient as directed, patient states understanding. Completed 12/16/2024        Task: Reviewed Dexcom download with patient and provider, adjustments made to treatment plan by provider and communicated to patient as directed, patient states understanding. Completed 12/20/2024        Task: Reviewed Dexcom download with patient and provider, adjustments made to treatment plan by provider and communicated to patient as directed, patient states understanding. Completed 1/8/2025        Task: Reviewed Dexcom download with patient and provider, adjustments made to treatment plan by provider and communicated to patient as directed, patient states understanding. Completed 5/21/2025          Follow Up Plan     Follow up  in about 10 days (around 7/3/2025) for Change Personal CGM and Upload Data.    Today's care plan and follow up schedule was discussed with patient.  Aishwarya verbalized understanding of the care plan, goals, and agrees to follow up plan.        The patient was encouraged to communicate with his/her health care provider/physician and care team regarding his/her condition(s) and treatment.  I provided the patient with my contact information today and encouraged to contact me via phone or [facility] Patient Portal as needed.     Length of Visit   Total Time: 10 Minutes

## 2025-07-02 ENCOUNTER — TELEPHONE (OUTPATIENT)
Dept: DIABETES | Facility: CLINIC | Age: 65
End: 2025-07-02
Payer: MEDICARE

## 2025-07-03 ENCOUNTER — CLINICAL SUPPORT (OUTPATIENT)
Dept: DIABETES | Facility: CLINIC | Age: 65
End: 2025-07-03
Payer: MEDICARE

## 2025-07-03 DIAGNOSIS — E11.65 TYPE 2 DIABETES MELLITUS WITH HYPERGLYCEMIA, WITH LONG-TERM CURRENT USE OF INSULIN: Primary | ICD-10-CM

## 2025-07-03 DIAGNOSIS — Z79.4 TYPE 2 DIABETES MELLITUS WITH HYPERGLYCEMIA, WITH LONG-TERM CURRENT USE OF INSULIN: Primary | ICD-10-CM

## 2025-07-03 DIAGNOSIS — Z79.4 TYPE 2 DIABETES MELLITUS WITH DIABETIC POLYNEUROPATHY, WITH LONG-TERM CURRENT USE OF INSULIN: ICD-10-CM

## 2025-07-03 DIAGNOSIS — E11.42 TYPE 2 DIABETES MELLITUS WITH DIABETIC POLYNEUROPATHY, WITH LONG-TERM CURRENT USE OF INSULIN: ICD-10-CM

## 2025-07-03 NOTE — PROGRESS NOTES
Diabetes Care Specialist Follow-up Note  Author: Marla Stein RD, [facility]  Date: 7/3/2025    Intake    Program Intake  Reason for Diabetes Program Visit:: Intervention  Type of Intervention:: Individual  Individual: Device Training  Device Training: Personal CGM  Current diabetes risk level:: high  In the last month, have you used the ER or been admitted to the hospital: No  Permission to speak with others about care:: no    Current Diabetes Treatment: Insulin, DM Injectables, Oral Medications  Oral Medication Type/Dose: farxiga 10 mg once a day (taking)  DM Injectables Type/Dose: mounjaro 2.5mg, will increase after the 4th dose to 5mg, taking once a week  Method of insulin delivery?: Insulin Device  Insulin Device Type/Dose: humalog via VGo30 (fiasp is ordered, unsure which one she is taking) performing 5-6 clicks with meals    Continuous Glucose Monitoring  Patient has CGM: Yes  Personal CGM type:: Dexcom G7  GMI Date: 05/21/25  GMI Value: 7.5 %    Lab Results   Component Value Date    HGBA1C 6.8 (H) 03/13/2025     A1c Pre Diabetes Care Specialist Intervention:  8.7%    Physical activity/Exercise:   No change    SMBG: using the dexcom G7     Lifestyle Coping Support & Clinical    Lifestyle/Coping/Support  Compared to other people your age, how would you rate your health?: Good  Psychosocial/Coping Skills Assessment Completed: : No  Assessment indicates:: Adequate understanding  Deffered due to:: Other (comment)  Area of need?: No    Problem Review  Active Comorbidities: Cardiovascular Disease, Hypertension, Neuropathy    Diabetes Self-Management Skills Assessment    Medication Skills Assessment  Patient is able to identify current diabetes medications, dosages, and appropriate timing of medications.: yes  Patient reports problems or concerns with current medication regimen.: no  Medication Skills Assessment Completed:: No  Assessment indicates:: Adequate understanding  Deffered due to:: Other (comment) (previously  completed, there has been no change and patient has adequate understanding)  Area of need?: No    Diabetes Disease Process/Treatment Options  Diabetes Type?: Type II  Diabetes Disease Process/Treatment Options: Skills Assessment Completed: No  Assessment indicates:: Adequate understanding  Deferred due to:: Other (comment)  Area of need?: No    Nutrition/Healthy Eating  Meal Plan 24 Hour Recall - Breakfast: Toast and Eggs, coffee with sugar and cream  Meal Plan 24 Hour Recall - Lunch: chicken noodle soup with crackers or a sandwich  Meal Plan 24 Hour Recall - Dinner: yesterday some spaghetti and meatsauce, some times roast and potatoes, some times a burger and some fries, sometimes gets pizza or something home cooked  Meal Plan 24 Hour Recall - Snack: peanut butter crackers, almonds, cheerios  Meal Plan 24 Hour Recall - Beverage: orange juice sometimes, drinks water and Ice sparking water  Who shops/cooks?: self  Patient can identify foods that impact blood sugar.: yes  Challenges to healthy eating:: portion control, snacking between meals and at night, eating when feeling depressed/stressed, lack of will power, eating out, going to parties  Nutrition/Healthy Eating Skills Assessment Completed:: No  Assessment indicates:: Adequate understanding  Deffered due to:: Other (comment)  Area of need?: No    Physical Activity/Exercise  Patient's daily activity level:: lightly active  Patient formally exercises outside of work.: no  Reasons for not exercising:: time constraints  Patient can identify forms of physical activity.: yes  Physical Activity/Exercise Skills Assessment Completed: : No  Assessment indicates:: Adequate understanding  Deffered due to:: Other (comment)  Area of need?: No    Home Blood Glucose Monitoring  Patient states that blood sugar is checked at home daily.: yes  Monitoring Method:: personal continuous glucose monitor  Personal CGM type:: Dexcom G7   What is your current Time in Range?: 68%  What is  your A1c Target?: 7.0 with no hypoglycemia  Home Blood Glucose Monitoring Skills Assessment Completed: : Yes  Assessment indicates:: Instruction Needed  Area of need?: Yes    Acute Complications  Acute Complications Skills Assessment Completed: : No  Assessment indicates:: Adequate understanding  Deffered due to:: Other (comment)  Area of need?: No    Chronic Complications  Chronic Complications Skills Assessment Completed: : No  Assessment indicates:: Adequate understanding  Deferred due to:: Other (comment)  Area of need?: No      During today's follow-up visit,  the following areas required further assessment and content was provided/reviewed.    Based on today's diabetes care assessment, the following areas of need were identified:          7/26/2024    12:01 AM   Areas of Need   Medications/Current Diabetes Treatment No   Lifestyle Coping Support No   Diabetes Disease Process/Treatment Options No   Nutrition/Healthy Eating No   Physical Activity/Exercise No   Home Blood Glucose Monitoring Yes, changed out the dexcom G7 sensor and started a new one   Acute Complications No   Chronic Complications No        Today's interventions were provided through individual discussion, instruction, and written materials were provided.    Patient verbalized understanding of instruction and written materials.  Pt was able to return back demonstration of instructions today. Patient understood key points, needs reinforcement and further instruction.     Diabetes Self-Management Care Plan Review and Evaluation of Progress:    During today's follow-up Aishwarya's Diabetes Self-Management Care Plan progress was reviewed and progress was evaluated including his/her input. Aishwarya has agreed to continue his/her journey to improve/maintain overall diabetes control by continuing to set health goals. See care plan progress below.      Care Plan: Diabetes Management   Updates made since 7/3/2024 12:00 AM        Problem: Blood Glucose  "Self-Monitoring         Goal: Patient agrees to check and record blood sugars 3 times per day using the dexcom G6/G7 for the next 3 months    Start Date: 12/27/2023   Expected End Date: 3/27/2024   This Visit's Progress: On track   Recent Progress: On track   Priority: High   Barriers: Knowledge deficit   Note:    Patient was instructed how to use a meter. Discussed signs and symptoms, and causes and treatment of hypoglycemia. Covered blood sugar and A1C goals. Instructed patient to use provided logbooks to record blood sugars. Instructed patient to follow-up with their physician. Patient verbalized understanding of all instructions.  Prescription for meter and testing supplies pended to PCP    1/6/2023  Dexcom G6 Education  Patient is here in clinic today for initial start of Dexcom continuous glucose monitoring system (CGMA). Reviewed with patient how to insert sensor and transmitter. Patient inserted sensor on right abdomen and inserted transmitter. Time and date was set on monitor and settings were set. Hypoglycemia trigger set for 70 and hyperglycemia set for 300. Patient provided with phone number for 24-hour help line if needed. Discussed various types of possible alarms and what to do. Questions addressed. Initialization finished. No futher questions.  Patient referred to clinic today for Dexcom G6 continuous glucose sensor system training.  Education was provided using "Quick Start Guide" per Dexcom protocol.    Overview:  5min glucose reading updates, trending arrows, BG graph screens, battery life indicator, Blue Tooth Symbol.  Menus: trend Graph, start sensor, enter BG, events, Alerts, Settings, Shutdown, Stop Sensor   Settings:                          * Urgent Low: 55 mg/dL                          * Urgent Low Soon: Off                          * Low alert: 70                          * High alert: 300                          * Rise rate: Off                          * Fall Rate: Off          "                 * Signal Loss: 30 min                          * No Reading: Off                          * Always sound: On                             Reviewed additional setting options with patient, including Graph Height and Transmitter ID. Transmitter was paired with .    Reviewed where to find sensor insertion time and sensor expiration date.   Discussed no need to calibrate sensor during the entirety of the 10 day wear. Discussed that pt can calibrate sensor if desired, but at that time she will need to continue calibrating every 12 hours for sensor to remain accurate. Reviewed appropriate calibration techniques.  Reviewed sensor site selection. Site selected and prepped using aseptic technique Inserted to left abdomen. Transmitter placed in pod and secured.  Practiced sensor pod/transmitter removal from site, and removal of transmitter from sensor pod.  Patient able to demonstrate without difficulty.  Encouraged to review manual prior to starting another sensor.   Reviewed problem solving aspects of sensor transmission/variables that can disrupt RF transmission.  range 20 feet, but the first 3 hrs keep within 3 feet of transmitter.  Pt instructed on lag time of interstitial fluid from CBG and was advised to tx hypoglycemia and dose insulin based on SMBG values.  Link to video provided and written instructions provided for patient to review before 10 day sensor change  Dexcom technical support contact number given and examples of when to contact them discussed.      1/17 Changing the Sensor (every 10 days)  Remove the whole thing from your body  Hold the sensor by the skinny part and break the clear piece  Remove the gray transmitter  In the reader touch start sensor  Touch yes you want to start sensor  Touch the square you want to put the number in for   There will be a 4-digit code on the sensor (like 5937)  Enter the code from the sensor  Touch ok  Touch yes, it is correct  Insert  Sensor  Remove orange piece and paper tabs  Place on skin and hold in place  Push the orange button  Insert the gray transmitter into the sensor  Put the small tongue like side in first and push down  You should hear 2 clicks  Touch confirm once sensor is secure on body  Touch start Sensor  Touch 1 then 2  Sensor will take 2 hours to warm up    Changing Transmitter (every 90 days) in the reader  Touch the =   Touch transmitter  Touch pair new  Go through the process to start sensor  Touch yes you want to start sensor  Touch the square you want to put the number in for   There will be a 4-digit code on the sensor (like 5937)  Enter the code from the sensor  Touch ok  Touch yes, it is correct  Touch the square you want to put the number in for   There will be a 6-digit code on the transmitter box (like [id number])  Enter the code from the transmitter box  Touch ok  Touch yes, it is correct  Insert Sensor  Remove orange piece and paper tabs  Place on skin and hold in place  Push the orange button  Insert the gray transmitter into the sensor  Put the small tongue like side in first and push down  You should hear 2 clicks  Touch confirm once sensor is secure on body  Touch start Sensor  Touch 1 then 2  Sensor will take 2 hours to warm up           Task: Reviewed Dexcom download with patient and provider, adjustments made to treatment plan by provider and communicated to patient as directed, patient states understanding. Completed 12/16/2024        Task: Reviewed Dexcom download with patient and provider, adjustments made to treatment plan by provider and communicated to patient as directed, patient states understanding. Completed 12/20/2024        Task: Reviewed Dexcom download with patient and provider, adjustments made to treatment plan by provider and communicated to patient as directed, patient states understanding. Completed 1/8/2025        Task: Reviewed Dexcom download with patient and provider, adjustments made to  treatment plan by provider and communicated to patient as directed, patient states understanding. Completed 5/21/2025          Follow Up Plan     Follow up in about 12 days (around 7/15/2025) for Change Personal CGM and Upload Data.    Today's care plan and follow up schedule was discussed with patient.  Aishwarya verbalized understanding of the care plan, goals, and agrees to follow up plan.        The patient was encouraged to communicate with his/her health care provider/physician and care team regarding his/her condition(s) and treatment.  I provided the patient with my contact information today and encouraged to contact me via phone or [facility] Patient Portal as needed.     Length of Visit   Total Time: 15 Minutes

## 2025-07-14 ENCOUNTER — TELEPHONE (OUTPATIENT)
Dept: DIABETES | Facility: CLINIC | Age: 65
End: 2025-07-14
Payer: MEDICARE

## 2025-07-15 ENCOUNTER — TELEPHONE (OUTPATIENT)
Dept: PRIMARY CARE CLINIC | Facility: CLINIC | Age: 65
End: 2025-07-15
Payer: MEDICARE

## 2025-07-15 ENCOUNTER — TELEPHONE (OUTPATIENT)
Dept: DIABETES | Facility: CLINIC | Age: 65
End: 2025-07-15
Payer: MEDICARE

## 2025-07-15 NOTE — TELEPHONE ENCOUNTER
Spoke to pt stated that she needed missed visits rescheduled, visits were rescheduled to 7/18, stated if she can;t get transportation for those visits, that the visit will be rescheduled    We can switch to atenolol 25mg daily - if that is not enough, we can increase it    Anabella Weiss MD  5/27/2020

## 2025-07-15 NOTE — TELEPHONE ENCOUNTER
Copied from CRM #[id number]. Topic: General Inquiry - Patient Advice  >> Jul 15, 2025 11:42 AM Chary wrote:  1MEDICALADVICE     Patient is calling for Medical Advice regarding:patient missed appointment today due to mix up with the  getting different info about appointment location.  brought patient back home and not to correct location. Patient asking to be rescheduled and want her lab rescheduled same day as the appointment    How long has patient had these symptoms:    Pharmacy name and phone#:    Patient wants a call back or thru [facility], provide patient's call back phone number:     Comments:    Please advise patient replies from provider may take up to 48 hours.

## 2025-07-17 ENCOUNTER — TELEPHONE (OUTPATIENT)
Dept: DIABETES | Facility: CLINIC | Age: 65
End: 2025-07-17
Payer: MEDICARE

## 2025-07-18 ENCOUNTER — TELEPHONE (OUTPATIENT)
Dept: DIABETES | Facility: CLINIC | Age: 65
End: 2025-07-18
Payer: MEDICARE

## 2025-07-18 ENCOUNTER — CLINICAL SUPPORT (OUTPATIENT)
Dept: DIABETES | Facility: CLINIC | Age: 65
End: 2025-07-18
Payer: MEDICARE

## 2025-07-18 DIAGNOSIS — Z79.4 TYPE 2 DIABETES MELLITUS WITH HYPERGLYCEMIA, WITH LONG-TERM CURRENT USE OF INSULIN: Primary | ICD-10-CM

## 2025-07-18 DIAGNOSIS — E11.65 TYPE 2 DIABETES MELLITUS WITH HYPERGLYCEMIA, WITH LONG-TERM CURRENT USE OF INSULIN: Primary | ICD-10-CM

## 2025-07-18 NOTE — TELEPHONE ENCOUNTER
----- Message from Micki Knott NP sent at 7/18/2025  1:57 PM CDT -----  Please call her and let her know that her creatinine is up to 1.8  Make sure that she is not taking a ton of NSAIDs-- ibuprofen or Aleve.   Make sure that she is staying hydrated---6-8 glasses of water per day.  This is very important  Make sure she is not having vomiting or diarrhea where it would affect her kidneys  Let us repeat a BMP in a week please  Still waiting for the rest of the labs to come back  ----- Message -----  From: Lab, Background User  Sent: 7/18/2025  12:45 PM CDT  To: Micki Knott NP

## 2025-07-21 ENCOUNTER — PATIENT MESSAGE (OUTPATIENT)
Dept: DIABETES | Facility: CLINIC | Age: 65
End: 2025-07-21
Payer: MEDICARE

## 2025-07-21 ENCOUNTER — TELEPHONE (OUTPATIENT)
Dept: DIABETES | Facility: CLINIC | Age: 65
End: 2025-07-21
Payer: MEDICARE

## 2025-07-21 NOTE — PROGRESS NOTES
CC:   Chief Complaint   Patient presents with    Diabetes Mellitus       HPI: Aishwarya Jensen is a 65 y.o. female presents for a follow up visit today for the management of T2DM   She was diagnosed with Type 2 diabetes in her early 50's on routine lab work. She was initially started on Metformin. Insulin therapy was started soon after diagnosis.     Family hx of diabetes: Mother, father, brothers   Hospitalized for diabetes: denies     No personal or FH of thyroid cancer or personal of pancreatic cancer or pancreatitis.     She is retired now.       Our last visit was in March of 2025  At that visit we started Mounjaro 5 mg weekly  She was taking the 2.5 mg weekly dose that any side effects but had not started the 5 mg weekly dose  We continued the Farxiga 10 mg daily  Continued the V Go 30 with Humalog  Discussed cutting back on her clicks to 4 clicks with meals  Change the V Go every 24 hours  Continue the Dexcom G7  Recent A1c of 6.4%  Fructosamine level of 268----is a 2 week average and correlates to an A1c between 6 and 7%       At the end of May 2025- she started Olmesartan - HCTZ and Lisinopril - HCTZ was d/c    Her creatinine increased from 1.0 to 1.8   Repeat creatinine today was 1.6   Denies NSAIDs OTC -- but she is taking Indomethacin   Only takes Tylenol   Drinking 4 bottles of water per day   Also on Farxiga 10 mg daily         Dispenses     Dispensed Days Supply Quantity Provider Pharmacy   INDOMETHACIN 25MG   CAP 06/09/2025 10 30 each Annmarie Keane DPM [id number] [phone]...   INDOMETHACIN 25MG   CAP 04/15/2025 10 30 each Annmarie Keane DPM [id number] -...         How do dispenses affect the score?     Take 2 tablets 1st dose then 1 tablet three times a day @ mealtime                     DIABETES COMPLICATIONS: nephropathy and peripheral neuropathy      Diabetes Management Status    ASA:  not taking the ASA     Statin: Taking Crestor 40 mg nightly and taking the Zetia 10 mg daily    ACE/ARB: Taking- olmesartan 40 mg    Screening or Prevention Patient's value Goal Complete/Controlled?   HgA1C Testing and Control   Lab Results   Component Value Date    HGBA1C 6.4 (H) 07/18/2025      Annually/Less than 8% Yes   Lipid profile : 11/07/2024 Annually Yes   LDL control Lab Results   Component Value Date    LDLCALC 60.0 (L) 11/07/2024    Annually/Less than 100 mg/dl  No   Nephropathy screening Lab Results   Component Value Date    LABMICR 1,646.0 07/18/2025     Lab Results   Component Value Date    PROTEINUA 2+ (A) 04/18/2025    Annually Yes   Blood pressure BP Readings from Last 1 Encounters:   07/22/25 132/71    Less than 140/90 No   Dilated retinal exam : 05/06/2025 Annually No   Foot exam   : 07/22/2025 Annually Yes       CURRENT A1C:    Hemoglobin A1C   Date Value Ref Range Status   03/13/2025 6.8 (H) 4.0 - 5.6 % Final     Comment:     ADA Screening Guidelines:  5.7-6.4%  Consistent with prediabetes  >or=6.5%  Consistent with diabetes    High levels of fetal hemoglobin interfere with the HbA1C  assay. Heterozygous hemoglobin variants (HbS, HgC, etc)do  not significantly interfere with this assay.   However, presence of multiple variants may affect accuracy.     11/07/2024 8.3 (H) 4.0 - 5.6 % Final     Comment:     ADA Screening Guidelines:  5.7-6.4%  Consistent with prediabetes  >or=6.5%  Consistent with diabetes    High levels of fetal hemoglobin interfere with the HbA1C  assay. Heterozygous hemoglobin variants (HbS, HgC, etc)do  not significantly interfere with this assay.   However, presence of multiple variants may affect accuracy.     06/25/2024 7.2 (H) 4.0 - 5.6 % Final     Comment:     ADA Screening Guidelines:  5.7-6.4%  Consistent with prediabetes  >or=6.5%  Consistent with diabetes    High levels of fetal hemoglobin interfere with the HbA1C  assay. Heterozygous hemoglobin variants (HbS, HgC, etc)do  not significantly interfere with this assay.   However, presence of multiple variants  may affect accuracy.       Hemoglobin A1c   Date Value Ref Range Status   07/18/2025 6.4 (H) 4.0 - 5.6 % Final     Comment:     ADA Screening Guidelines:  5.7-6.4%  Consistent with prediabetes  >=6.5%  Consistent with diabetes    High levels of fetal hemoglobin interfere with the HbA1C  assay. Heterozygous hemoglobin variants (HbS, HgC, etc)do  not significantly interfere with this assay.   However, presence of multiple variants may affect accuracy.       GOAL A1C: less than 8%       DM MEDICATIONS USED IN THE PAST: Metformin --off due to renal function  Victoza   Lantus   VGo  Novolog    Freestyle manisha -- didn't like it   Ozempic   Fiasp   Dexcom G6  Mounjaro   Fiasp   Humalog         CURRENT DIABETES MEDICATIONS: Mounjaro 5 mg weekly on Mondays   VGo30 with Humalog 5 clicks with meals ---generally taking her clicks after the meal instead of before   Farxiga 10 mg daily -- taking       Insulin:VGo  Missed doses:  so  Usually wearing the VGo to her abdomen   She is adamant that she is changing her VGo every 24 hours. - may not be at the same time- she is unsure   Sometimes missing the VGo clicks           BLOOD GLUCOSE MONITORING:   Sensor type: Dexcom G 7   Site changed every 10 days   BG average is 155   76% in rnage   16% high   7% very high   1% low   0% very low         2 weeks prior- average BG is  169   66% in range   26% high   8% very high   0% low and 0% very low       HYPOGLYCEMIA: 1% low and 0% very low   2 weeks prior - 0% low   0% very low     Maybe some hypoglycemia unawareness        MEALS: eating 3 meals per day   drinking OJ sometimes   Drinks: water    Sparking water- SF -- ICE - SF         CURRENT EXERCISE:  No      Review of Systems  Review of Systems   Constitutional:  Negative for appetite change, fatigue and unexpected weight change.   HENT:  Negative for trouble swallowing.    Eyes:  Negative for visual disturbance.   Respiratory:  Negative for shortness of breath.    Cardiovascular:   Positive for palpitations. Negative for chest pain and leg swelling.   Gastrointestinal:  Negative for abdominal distention, abdominal pain, blood in stool, constipation, diarrhea, nausea, rectal pain and vomiting.   Endocrine: Negative for polydipsia, polyphagia and polyuria.   Genitourinary:         + Nocturia 1-2 times per night    Musculoskeletal:  Negative for arthralgias.   Skin:  Negative for wound.   Neurological:  Negative for numbness.   Psychiatric/Behavioral:  Positive for sleep disturbance.        Physical Exam   Physical Exam  Vitals and nursing note reviewed.   Constitutional:       Appearance: She is well-developed. She is obese.   HENT:      Head: Normocephalic and atraumatic.      Right Ear: External ear normal.      Left Ear: External ear normal.      Nose: Nose normal.   Neck:      Thyroid: No thyromegaly.      Trachea: No tracheal deviation.   Cardiovascular:      Rate and Rhythm: Normal rate and regular rhythm.      Heart sounds: No murmur heard.  Pulmonary:      Effort: Pulmonary effort is normal. No respiratory distress.      Breath sounds: Normal breath sounds.   Abdominal:      Palpations: Abdomen is soft.      Tenderness: There is no abdominal tenderness.      Hernia: No hernia is present.   Musculoskeletal:      Cervical back: Normal range of motion and neck supple.   Skin:     General: Skin is warm and dry.      Capillary Refill: Capillary refill takes less than 2 seconds.      Findings: No rash.      Comments: She is wearing the VGo to her abdomen-  No complications   Her Vgo HAS INSULIN IN IT    Neurological:      Mental Status: She is alert and oriented to person, place, and time.      Cranial Nerves: No cranial nerve deficit.   Psychiatric:         Behavior: Behavior normal.         Judgment: Judgment normal.           FOOT EXAMINATION: Appropriate footwear.   Follow with podiatry         Lab Results   Component Value Date    TSH 1.847 07/18/2025         Type 2 diabetes mellitus with  hyperglycemia, with long-term current use of insulin  Well controlled  Blood sugar readings have greatly improved  Avoid hypoglycemia         She needs assistance with her Dexcom application---she is coming for nurse visits or to see Marla with education        Off metformin due to labile renal function        -- Medication Changes:   Continue Mounjaro 5 mg weekly this week       Continue the Farxiga 10 mg daily   -- Please let us know if you have nausea, vomiting, or weakness with a normal BG. This could be euglycemic DKA.  -- please hold medication 3 days prior to surgery or if you are sick and have decreased intake.   -- Please drink lots of water daily while on this medication.   --This medication could also give you a yeast infection- please let us know if this occurs and we can treat it.       Continue the Vgo30 with Humalog   Cut back on your clicks to 3-4 clicks with meals- BEFORE THE MEALS with increasing the Mounjaro to 5 mg weekly   HOLD THE CLICKS IF BLOOD SUGAR IS UNDER 100  Let me know if you are having hypoglycemia and we can look at possibly changing to  VGo20 if needed   Click before the meal  Change V Go every 24 hours      Continue the Dexcom G7         -- Reviewed goals of therapy are to get the best control we can without hypoglycemia.  -- Reviewed patient's current insulin regimen. Clarified proper insulin dose and timing in relation to meals, etc. Insulin injection sites and proper rotation instructed.    -- Advised frequent self blood glucose monitoring.  Patient encouraged to document glucose results and bring them to every clinic visit. -- use the dexcom consistently--on the Dexcom G7---supplies come from pharmacy---needs assistance with application-- following with education   -- Hypoglycemia precautions discussed. Instructed on precautions before driving.    -- Call for Bg repeatedly < 70 or > 200.   -- Close adherence to lifestyle changes recommended.   -- Periodic follow ups for eye  evaluations, foot care and dental care suggested.  Follow up with zoltan for a dexcom G7  change out       patient will benefit from a Dexcom G6 to prevent hypoglycemia due to patient experiences level 2 or hypoglycemic event <54   Patient has diabetes mellitus and manages diabetes with intensive insulin regimen and uses prandial and basal insulin daily-- on the VGO   Patient requires a therapeutic CGM and is willing to use therapeutic CGM for the necessary frequent adjustments of insulin therapy.  I have completed an in-person visit during the previous 6 months and will continue to have in-person visits every 6 months to assess adherence to their CGM regimen and diabetes treatment plan.  Due to COVID pandemic and need for remote monitoring this tool is medically necessary        Type 2 diabetes mellitus with diabetic polyneuropathy, with long-term current use of insulin  Optimize BG readings.   See above.       Educated patient to check feet daily for any foreign objects and/or wounds. Discussed with patient the importance of wearing appropriate footwear at all times, not to walk barefoot ever, and to check shoes before putting them on feet. Instructed patient to keep feet dry by regularly changing shoes and socks and drying feet after baths and exercises. Also, instructed patient to report any new lesions, discolorations, or swelling to a healthcare professional.        Microalbuminuria due to type 2 diabetes mellitus  Optimize BG readings.   See above.   On ARB and SGLT2       Type 2 diabetes mellitus, with long-term current use of insulin  Optimize BG readings without insulin stacking or hypoglycemia   See above.   On Farxiga and ARB     Essential hypertension, benign  BP goal is < 140/90.   Tolerating ARB  Blood pressure goals discussed with patient  Controlled today in office    Dyslipidemia, goal LDL below 100  On statin per ADA recommendations  LDL goal < 100. LDL at goal. LFTs WNL. Continue statin.   On  Crestor 40 mg nightly and Zetia 10 mg daily--reports compliance    Class 2 severe obesity due to excess calories with serious comorbidity and body mass index (BMI) of 36.0 to 36.9 in adult  Body mass index is 36.45 kg/m².  Increases insulin resistance.   Discussed DM diet and exercise.       Anemia  Follow up with PCP for management   May skew A1c level         Reviewed case with primary care doctor Dr. Joey Cintron---regarding VIJAY  He agrees with stopping the indomethacin and rechecking labs in 3 weeks  Discussed possible over diuresis with HCTZ and Farxiga  If BMP does not show a resolved VIJAY with labs in 3 weeks with stopping the indomethacin----may consider stopping HCTZ  We did discuss that she recently started olmesartan at the end of May 2025        I spent a total of 30 minutes on the day of the visit.This includes face to face time and non-face to face time preparing to see the patient (eg, review of tests), obtaining and/or reviewing separately obtained history, documenting clinical information in the electronic or other health record, independently interpreting results and communicating results to the patient/family/caregiver, or care coordinator.          Follow up in about 4 months (around 11/22/2025).  Follow-up with me in 4 months with labs prior  BMP in 3 weeks please   Schedule a follow up with liu please- heart palpitations / anemia           Orders Placed This Encounter   Procedures    Basic Metabolic Panel     Standing Status:   Future     Expected Date:   8/12/2025     Expiration Date:   1/22/2027    Hemoglobin A1C     Standing Status:   Future     Expected Date:   10/22/2025     Expiration Date:   1/22/2027    Comprehensive Metabolic Panel     Standing Status:   Future     Expected Date:   10/22/2025     Expiration Date:   1/22/2027    Lipid Panel     Standing Status:   Future     Expected Date:   10/22/2025     Expiration Date:   1/22/2027    Microalbumin/Creatinine Ratio, Urine      Standing Status:   Future     Expected Date:   10/22/2025     Expiration Date:   1/22/2027     Specimen Source:   Urine    Fructosamine     Standing Status:   Future     Expected Date:   10/22/2025     Expiration Date:   1/22/2027    CBC Auto Differential     Standing Status:   Future     Expected Date:   7/22/2025     Expiration Date:   1/22/2027     Send normal result to authorizing provider's In Basket if patient is active on MyChart::   Yes         Recommendations were discussed with the patient in detail  The patient verbalized understanding and agrees with the plan outlined as above.

## 2025-07-21 NOTE — TELEPHONE ENCOUNTER
----- Message from Micki Knott NP sent at 7/21/2025  8:29 AM CDT -----  Please call lab results to patient  A1c is at 6.4% which is excellent.  Keep up the good work.  I am very proud of her  She still spilling some protein in the urine  She had that bump in kidney function as we discussed.  Make sure that she is staying hydrated.  Make sure that she repeats that BMP for me  Follow-up with me as scheduled tomorrow we will review the labs together  ----- Message -----  From: Lab, Background User  Sent: 7/18/2025  12:45 PM CDT  To: Micki Knott NP

## 2025-07-22 ENCOUNTER — OFFICE VISIT (OUTPATIENT)
Dept: DIABETES | Facility: CLINIC | Age: 65
End: 2025-07-22
Payer: MEDICARE

## 2025-07-22 VITALS
HEART RATE: 68 BPM | DIASTOLIC BLOOD PRESSURE: 71 MMHG | HEIGHT: 67 IN | OXYGEN SATURATION: 100 % | BODY MASS INDEX: 36.52 KG/M2 | SYSTOLIC BLOOD PRESSURE: 132 MMHG | WEIGHT: 232.69 LBS

## 2025-07-22 DIAGNOSIS — E66.01 CLASS 2 SEVERE OBESITY DUE TO EXCESS CALORIES WITH SERIOUS COMORBIDITY AND BODY MASS INDEX (BMI) OF 36.0 TO 36.9 IN ADULT: ICD-10-CM

## 2025-07-22 DIAGNOSIS — R80.9 MICROALBUMINURIA DUE TO TYPE 2 DIABETES MELLITUS: ICD-10-CM

## 2025-07-22 DIAGNOSIS — E78.5 DYSLIPIDEMIA, GOAL LDL BELOW 100: ICD-10-CM

## 2025-07-22 DIAGNOSIS — E66.812 CLASS 2 SEVERE OBESITY DUE TO EXCESS CALORIES WITH SERIOUS COMORBIDITY AND BODY MASS INDEX (BMI) OF 36.0 TO 36.9 IN ADULT: ICD-10-CM

## 2025-07-22 DIAGNOSIS — Z79.4 TYPE 2 DIABETES MELLITUS WITH OTHER SPECIFIED COMPLICATION, WITH LONG-TERM CURRENT USE OF INSULIN: ICD-10-CM

## 2025-07-22 DIAGNOSIS — Z71.9 HEALTH EDUCATION/COUNSELING: ICD-10-CM

## 2025-07-22 DIAGNOSIS — I10 ESSENTIAL HYPERTENSION, BENIGN: ICD-10-CM

## 2025-07-22 DIAGNOSIS — E11.65 TYPE 2 DIABETES MELLITUS WITH HYPERGLYCEMIA, WITH LONG-TERM CURRENT USE OF INSULIN: Primary | ICD-10-CM

## 2025-07-22 DIAGNOSIS — Z79.4 TYPE 2 DIABETES MELLITUS WITH DIABETIC POLYNEUROPATHY, WITH LONG-TERM CURRENT USE OF INSULIN: ICD-10-CM

## 2025-07-22 DIAGNOSIS — E11.29 MICROALBUMINURIA DUE TO TYPE 2 DIABETES MELLITUS: ICD-10-CM

## 2025-07-22 DIAGNOSIS — E11.69 TYPE 2 DIABETES MELLITUS WITH OTHER SPECIFIED COMPLICATION, WITH LONG-TERM CURRENT USE OF INSULIN: ICD-10-CM

## 2025-07-22 DIAGNOSIS — Z79.4 TYPE 2 DIABETES MELLITUS WITH HYPERGLYCEMIA, WITH LONG-TERM CURRENT USE OF INSULIN: Primary | ICD-10-CM

## 2025-07-22 DIAGNOSIS — E11.42 TYPE 2 DIABETES MELLITUS WITH DIABETIC POLYNEUROPATHY, WITH LONG-TERM CURRENT USE OF INSULIN: ICD-10-CM

## 2025-07-22 DIAGNOSIS — N17.9 AKI (ACUTE KIDNEY INJURY): ICD-10-CM

## 2025-07-22 DIAGNOSIS — D64.9 ANEMIA, UNSPECIFIED TYPE: ICD-10-CM

## 2025-07-22 PROCEDURE — 4010F ACE/ARB THERAPY RXD/TAKEN: CPT | Mod: CPTII,S$GLB,, | Performed by: NURSE PRACTITIONER

## 2025-07-22 PROCEDURE — 1160F RVW MEDS BY RX/DR IN RCRD: CPT | Mod: CPTII,S$GLB,, | Performed by: NURSE PRACTITIONER

## 2025-07-22 PROCEDURE — 3075F SYST BP GE 130 - 139MM HG: CPT | Mod: CPTII,S$GLB,, | Performed by: NURSE PRACTITIONER

## 2025-07-22 PROCEDURE — 99214 OFFICE O/P EST MOD 30 MIN: CPT | Mod: S$GLB,,, | Performed by: NURSE PRACTITIONER

## 2025-07-22 PROCEDURE — 3288F FALL RISK ASSESSMENT DOCD: CPT | Mod: CPTII,S$GLB,, | Performed by: NURSE PRACTITIONER

## 2025-07-22 PROCEDURE — 3008F BODY MASS INDEX DOCD: CPT | Mod: CPTII,S$GLB,, | Performed by: NURSE PRACTITIONER

## 2025-07-22 PROCEDURE — 95251 CONT GLUC MNTR ANALYSIS I&R: CPT | Mod: S$GLB,,, | Performed by: NURSE PRACTITIONER

## 2025-07-22 PROCEDURE — 1126F AMNT PAIN NOTED NONE PRSNT: CPT | Mod: CPTII,S$GLB,, | Performed by: NURSE PRACTITIONER

## 2025-07-22 PROCEDURE — 3066F NEPHROPATHY DOC TX: CPT | Mod: CPTII,S$GLB,, | Performed by: NURSE PRACTITIONER

## 2025-07-22 PROCEDURE — 3044F HG A1C LEVEL LT 7.0%: CPT | Mod: CPTII,S$GLB,, | Performed by: NURSE PRACTITIONER

## 2025-07-22 PROCEDURE — 3062F POS MACROALBUMINURIA REV: CPT | Mod: CPTII,S$GLB,, | Performed by: NURSE PRACTITIONER

## 2025-07-22 PROCEDURE — 1101F PT FALLS ASSESS-DOCD LE1/YR: CPT | Mod: CPTII,S$GLB,, | Performed by: NURSE PRACTITIONER

## 2025-07-22 PROCEDURE — 1159F MED LIST DOCD IN RCRD: CPT | Mod: CPTII,S$GLB,, | Performed by: NURSE PRACTITIONER

## 2025-07-22 PROCEDURE — 99999 PR PBB SHADOW E&M-EST. PATIENT-LVL V: CPT | Mod: PBBFAC,,, | Performed by: NURSE PRACTITIONER

## 2025-07-22 PROCEDURE — 3078F DIAST BP <80 MM HG: CPT | Mod: CPTII,S$GLB,, | Performed by: NURSE PRACTITIONER

## 2025-07-22 RX ORDER — DAPAGLIFLOZIN 10 MG/1
10 TABLET, FILM COATED ORAL DAILY
Qty: 90 TABLET | Refills: 2 | Status: SHIPPED | OUTPATIENT
Start: 2025-07-22

## 2025-07-22 RX ORDER — ROSUVASTATIN CALCIUM 40 MG/1
40 TABLET, COATED ORAL NIGHTLY
Qty: 90 TABLET | Refills: 3 | Status: SHIPPED | OUTPATIENT
Start: 2025-07-22 | End: 2026-07-22

## 2025-07-22 RX ORDER — BLOOD-GLUCOSE SENSOR
1 EACH MISCELLANEOUS
Qty: 9 EACH | Refills: 3 | Status: SHIPPED | OUTPATIENT
Start: 2025-07-22

## 2025-07-22 RX ORDER — INSULIN LISPRO 100 [IU]/ML
INJECTION, SOLUTION INTRAVENOUS; SUBCUTANEOUS
Qty: 90 ML | Refills: 3 | Status: SHIPPED | OUTPATIENT
Start: 2025-07-22

## 2025-07-22 RX ORDER — SUB-Q INSULIN DEVICE, 40 UNIT
1 EACH MISCELLANEOUS DAILY
Qty: 90 EACH | Refills: 2 | Status: SHIPPED | OUTPATIENT
Start: 2025-07-22

## 2025-07-22 RX ORDER — EZETIMIBE 10 MG/1
10 TABLET ORAL NIGHTLY
Qty: 90 TABLET | Refills: 2 | Status: SHIPPED | OUTPATIENT
Start: 2025-07-22

## 2025-07-22 RX ORDER — TIRZEPATIDE 5 MG/.5ML
5 INJECTION, SOLUTION SUBCUTANEOUS
Qty: 12 PEN | Refills: 3 | Status: SHIPPED | OUTPATIENT
Start: 2025-07-22

## 2025-07-22 NOTE — ASSESSMENT & PLAN NOTE
Well controlled  Blood sugar readings have greatly improved  Avoid hypoglycemia         She needs assistance with her Dexcom application---she is coming for nurse visits or to see Marla with education        Off metformin due to labile renal function        -- Medication Changes:   Continue Mounjaro 5 mg weekly this week       Continue the Farxiga 10 mg daily   -- Please let us know if you have nausea, vomiting, or weakness with a normal BG. This could be euglycemic DKA.  -- please hold medication 3 days prior to surgery or if you are sick and have decreased intake.   -- Please drink lots of water daily while on this medication.   --This medication could also give you a yeast infection- please let us know if this occurs and we can treat it.       Continue the Vgo30 with Humalog   Cut back on your clicks to 3-4 clicks with meals- BEFORE THE MEALS with increasing the Mounjaro to 5 mg weekly   HOLD THE CLICKS IF BLOOD SUGAR IS UNDER 100  Let me know if you are having hypoglycemia and we can look at possibly changing to  VGo20 if needed   Click before the meal  Change V Go every 24 hours      Continue the Dexcom G7         -- Reviewed goals of therapy are to get the best control we can without hypoglycemia.  -- Reviewed patient's current insulin regimen. Clarified proper insulin dose and timing in relation to meals, etc. Insulin injection sites and proper rotation instructed.    -- Advised frequent self blood glucose monitoring.  Patient encouraged to document glucose results and bring them to every clinic visit. -- use the dexcom consistently--on the Dexcom G7---supplies come from pharmacy---needs assistance with application-- following with education   -- Hypoglycemia precautions discussed. Instructed on precautions before driving.    -- Call for Bg repeatedly < 70 or > 200.   -- Close adherence to lifestyle changes recommended.   -- Periodic follow ups for eye evaluations, foot care and dental care suggested.  Follow  up with zoltan for a dexcom G7  change out       patient will benefit from a Dexcom G6 to prevent hypoglycemia due to patient experiences level 2 or hypoglycemic event <54   Patient has diabetes mellitus and manages diabetes with intensive insulin regimen and uses prandial and basal insulin daily-- on the VGO   Patient requires a therapeutic CGM and is willing to use therapeutic CGM for the necessary frequent adjustments of insulin therapy.  I have completed an in-person visit during the previous 6 months and will continue to have in-person visits every 6 months to assess adherence to their CGM regimen and diabetes treatment plan.  Due to COVID pandemic and need for remote monitoring this tool is medically necessary

## 2025-07-22 NOTE — ASSESSMENT & PLAN NOTE
Body mass index is 36.45 kg/m².  Increases insulin resistance.   Discussed DM diet and exercise.

## 2025-07-22 NOTE — ASSESSMENT & PLAN NOTE
BP goal is < 140/90.   Tolerating ARB  Blood pressure goals discussed with patient  Controlled today in office

## 2025-07-24 NOTE — PROGRESS NOTES
Diabetes Care Specialist Follow-up Note  Author: Marla Stein RD, [facility]  Date: 7/24/2025    Intake    Program Intake  Reason for Diabetes Program Visit:: Intervention  Type of Intervention:: Individual  Individual: Device Training  Education: Self-Management Skill Review  Medication Training: Injectable Medication  Device Training: Personal CGM  Current diabetes risk level:: high  In the last month, have you used the ER or been admitted to the hospital: No  Permission to speak with others about care:: no    Current Diabetes Treatment: Insulin, DM Injectables, Oral Medications  Oral Medication Type/Dose: farxiga 10 mg once a day (taking)  DM Injectables Type/Dose: mounjaro 2.5mg, will increase after the 4th dose to 5mg, taking once a week  Method of insulin delivery?: Insulin Device  Insulin Device Type/Dose: humalog via VGo30 (fiasp is ordered, unsure which one she is taking) performing 5-6 clicks with meals    Continuous Glucose Monitoring  Patient has CGM: Yes  Personal CGM type:: Dexcom G7  GMI Date: 05/21/25  GMI Value: 7.5 %    Lab Results   Component Value Date    HGBA1C 6.4 (H) 07/18/2025     A1c Pre Diabetes Care Specialist Intervention:  8.7%    Physical activity/Exercise:   No change    SMBG: using the dexcom G7     Lifestyle Coping Support & Clinical    Lifestyle/Coping/Support  Compared to other people your age, how would you rate your health?: Good  Psychosocial/Coping Skills Assessment Completed: : No  Assessment indicates:: Adequate understanding  Deffered due to:: Other (comment)  Area of need?: No    Problem Review  Active Comorbidities: Cardiovascular Disease, Hypertension, Neuropathy    Diabetes Self-Management Skills Assessment    Medication Skills Assessment  Patient is able to identify current diabetes medications, dosages, and appropriate timing of medications.: yes  Patient reports problems or concerns with current medication regimen.: no  Patient is  aware that some diabetes medications  can cause low blood sugar?: Yes  Medication Skills Assessment Completed:: No  Assessment indicates:: Adequate understanding  Deffered due to:: Other (comment) (previously completed, there has been no change and patient has adequate understanding)  Area of need?: No    Diabetes Disease Process/Treatment Options  Diabetes Type?: Type II  Diabetes Disease Process/Treatment Options: Skills Assessment Completed: No  Assessment indicates:: Adequate understanding  Deferred due to:: Other (comment)  Area of need?: No    Nutrition/Healthy Eating  Meal Plan 24 Hour Recall - Breakfast: Toast and Eggs, coffee with sugar and cream  Meal Plan 24 Hour Recall - Lunch: chicken noodle soup with crackers or a sandwich  Meal Plan 24 Hour Recall - Dinner: yesterday some spaghetti and meatsauce, some times roast and potatoes, some times a burger and some fries, sometimes gets pizza or something home cooked  Meal Plan 24 Hour Recall - Snack: peanut butter crackers, almonds, cheerios  Patient can identify foods that impact blood sugar.: yes  Challenges to healthy eating:: portion control, snacking between meals and at night, eating when feeling depressed/stressed, lack of will power, eating out, going to parties  Nutrition/Healthy Eating Skills Assessment Completed:: No  Assessment indicates:: Adequate understanding  Deffered due to:: Other (comment)  Area of need?: No    Physical Activity/Exercise  Patient's daily activity level:: lightly active  Patient formally exercises outside of work.: no  Reasons for not exercising:: time constraints  Patient can identify forms of physical activity.: yes  Physical Activity/Exercise Skills Assessment Completed: : No  Assessment indicates:: Adequate understanding  Deffered due to:: Other (comment)  Area of need?: No    Home Blood Glucose Monitoring  Patient states that blood sugar is checked at home daily.: yes  Monitoring Method:: personal continuous glucose monitor  Personal CGM type:: Dexcom G7    What is your current Time in Range?: 68%  What is your A1c Target?: 7.0 with no hypoglycemia  Home Blood Glucose Monitoring Skills Assessment Completed: : Yes  Assessment indicates:: Instruction Needed  Area of need?: Yes    Acute Complications  Acute Complications Skills Assessment Completed: : No  Assessment indicates:: Adequate understanding  Deffered due to:: Other (comment)  Area of need?: No    Chronic Complications  Chronic Complications Skills Assessment Completed: : No  Assessment indicates:: Adequate understanding  Deferred due to:: Other (comment)  Area of need?: No      During today's follow-up visit,  the following areas required further assessment and content was provided/reviewed.    Based on today's diabetes care assessment, the following areas of need were identified:          7/26/2024    12:01 AM   Areas of Need   Medications/Current Diabetes Treatment No   Lifestyle Coping Support No   Diabetes Disease Process/Treatment Options No   Nutrition/Healthy Eating No   Physical Activity/Exercise No   Home Blood Glucose Monitoring Yes, changed out the dexcom G7 sensor and started a new one   Acute Complications No   Chronic Complications No        Today's interventions were provided through individual discussion, instruction, and written materials were provided.    Patient verbalized understanding of instruction and written materials.  Pt was able to return back demonstration of instructions today. Patient understood key points, needs reinforcement and further instruction.     Diabetes Self-Management Care Plan Review and Evaluation of Progress:    During today's follow-up Aishwarya's Diabetes Self-Management Care Plan progress was reviewed and progress was evaluated including his/her input. Aishwarya has agreed to continue his/her journey to improve/maintain overall diabetes control by continuing to set health goals. See care plan progress below.      Care Plan: Diabetes Management   Updates made since  "7/24/2024 12:00 AM        Problem: Blood Glucose Self-Monitoring         Goal: Patient agrees to check and record blood sugars 3 times per day using the dexcom G6/G7 for the next 3 months    Start Date: 12/27/2023   Expected End Date: 3/27/2024   This Visit's Progress: On track   Recent Progress: On track   Priority: High   Barriers: Knowledge deficit   Note:    Patient was instructed how to use a meter. Discussed signs and symptoms, and causes and treatment of hypoglycemia. Covered blood sugar and A1C goals. Instructed patient to use provided logbooks to record blood sugars. Instructed patient to follow-up with their physician. Patient verbalized understanding of all instructions.  Prescription for meter and testing supplies pended to PCP    1/6/2023  Dexcom G6 Education  Patient is here in clinic today for initial start of Dexcom continuous glucose monitoring system (CGMA). Reviewed with patient how to insert sensor and transmitter. Patient inserted sensor on right abdomen and inserted transmitter. Time and date was set on monitor and settings were set. Hypoglycemia trigger set for 70 and hyperglycemia set for 300. Patient provided with phone number for 24-hour help line if needed. Discussed various types of possible alarms and what to do. Questions addressed. Initialization finished. No futher questions.  Patient referred to clinic today for Dexcom G6 continuous glucose sensor system training.  Education was provided using "Quick Start Guide" per Dexcom protocol.    Overview:  5min glucose reading updates, trending arrows, BG graph screens, battery life indicator, Blue Tooth Symbol.  Menus: trend Graph, start sensor, enter BG, events, Alerts, Settings, Shutdown, Stop Sensor   Settings:                          * Urgent Low: 55 mg/dL                          * Urgent Low Soon: Off                          * Low alert: 70                          * High alert: 300                          * Rise rate: Off   "                        * Fall Rate: Off                          * Signal Loss: 30 min                          * No Reading: Off                          * Always sound: On                             Reviewed additional setting options with patient, including Graph Height and Transmitter ID. Transmitter was paired with .    Reviewed where to find sensor insertion time and sensor expiration date.   Discussed no need to calibrate sensor during the entirety of the 10 day wear. Discussed that pt can calibrate sensor if desired, but at that time she will need to continue calibrating every 12 hours for sensor to remain accurate. Reviewed appropriate calibration techniques.  Reviewed sensor site selection. Site selected and prepped using aseptic technique Inserted to left abdomen. Transmitter placed in pod and secured.  Practiced sensor pod/transmitter removal from site, and removal of transmitter from sensor pod.  Patient able to demonstrate without difficulty.  Encouraged to review manual prior to starting another sensor.   Reviewed problem solving aspects of sensor transmission/variables that can disrupt RF transmission.  range 20 feet, but the first 3 hrs keep within 3 feet of transmitter.  Pt instructed on lag time of interstitial fluid from CBG and was advised to tx hypoglycemia and dose insulin based on SMBG values.  Link to video provided and written instructions provided for patient to review before 10 day sensor change  Dexcom technical support contact number given and examples of when to contact them discussed.      1/17 Changing the Sensor (every 10 days)  Remove the whole thing from your body  Hold the sensor by the skinny part and break the clear piece  Remove the gray transmitter  In the reader touch start sensor  Touch yes you want to start sensor  Touch the square you want to put the number in for   There will be a 4-digit code on the sensor (like 5937)  Enter the code from the  sensor  Touch ok  Touch yes, it is correct  Insert Sensor  Remove orange piece and paper tabs  Place on skin and hold in place  Push the orange button  Insert the gray transmitter into the sensor  Put the small tongue like side in first and push down  You should hear 2 clicks  Touch confirm once sensor is secure on body  Touch start Sensor  Touch 1 then 2  Sensor will take 2 hours to warm up    Changing Transmitter (every 90 days) in the reader  Touch the =   Touch transmitter  Touch pair new  Go through the process to start sensor  Touch yes you want to start sensor  Touch the square you want to put the number in for   There will be a 4-digit code on the sensor (like 5937)  Enter the code from the sensor  Touch ok  Touch yes, it is correct  Touch the square you want to put the number in for   There will be a 6-digit code on the transmitter box (like [id number])  Enter the code from the transmitter box  Touch ok  Touch yes, it is correct  Insert Sensor  Remove orange piece and paper tabs  Place on skin and hold in place  Push the orange button  Insert the gray transmitter into the sensor  Put the small tongue like side in first and push down  You should hear 2 clicks  Touch confirm once sensor is secure on body  Touch start Sensor  Touch 1 then 2  Sensor will take 2 hours to warm up           Task: Reviewed Dexcom download with patient and provider, adjustments made to treatment plan by provider and communicated to patient as directed, patient states understanding. Completed 12/16/2024        Task: Reviewed Dexcom download with patient and provider, adjustments made to treatment plan by provider and communicated to patient as directed, patient states understanding. Completed 12/20/2024        Task: Reviewed Dexcom download with patient and provider, adjustments made to treatment plan by provider and communicated to patient as directed, patient states understanding. Completed 1/8/2025        Task: Reviewed Dexcom  download with patient and provider, adjustments made to treatment plan by provider and communicated to patient as directed, patient states understanding. Completed 5/21/2025          Follow Up Plan     Follow up in about 10 days (around 7/28/2025) for Change Personal CGM and Upload Data.    Today's care plan and follow up schedule was discussed with patient.  Aishwarya verbalized understanding of the care plan, goals, and agrees to follow up plan.        The patient was encouraged to communicate with his/her health care provider/physician and care team regarding his/her condition(s) and treatment.  I provided the patient with my contact information today and encouraged to contact me via phone or [facility] Patient Portal as needed.     Length of Visit   Total Time: 15 Minutes

## 2025-07-28 ENCOUNTER — CLINICAL SUPPORT (OUTPATIENT)
Dept: DIABETES | Facility: CLINIC | Age: 65
End: 2025-07-28
Payer: MEDICARE

## 2025-07-28 DIAGNOSIS — E11.65 TYPE 2 DIABETES MELLITUS WITH HYPERGLYCEMIA, WITH LONG-TERM CURRENT USE OF INSULIN: Primary | ICD-10-CM

## 2025-07-28 DIAGNOSIS — Z79.4 TYPE 2 DIABETES MELLITUS WITH HYPERGLYCEMIA, WITH LONG-TERM CURRENT USE OF INSULIN: Primary | ICD-10-CM

## 2025-07-29 ENCOUNTER — PATIENT MESSAGE (OUTPATIENT)
Dept: ADMINISTRATIVE | Facility: HOSPITAL | Age: 65
End: 2025-07-29
Payer: MEDICARE

## 2025-07-30 NOTE — PROGRESS NOTES
Diabetes Care Specialist Follow-up Note  [facility] Marla Stein RD, [facility]  Date: 7/30/2025    Intake    Program Intake  Reason for Diabetes Program Visit:: Intervention  Type of Intervention:: Individual  Current diabetes risk level:: high  Permission to speak with others about care:: no    Current Diabetes Treatment: Insulin, DM Injectables, Oral Medications  Oral Medication Type/Dose: farxiga 10 mg once a day (taking)  DM Injectables Type/Dose: mounjaro 2.5mg, will increase after the 4th dose to 5mg, taking once a week  Method of insulin delivery?: Insulin Device  Insulin Device Type/Dose: humalog via VGo30 (fiasp is ordered, unsure which one she is taking) performing 5-6 clicks with meals    Continuous Glucose Monitoring  Patient has CGM: Yes  Personal CGM type:: Dexcom G7  GMI Date: 05/21/25  GMI Value: 7.5 %    Lab Results   Component Value Date    HGBA1C 6.4 (H) 07/18/2025     A1c Pre Diabetes Care Specialist Intervention:  8.7%    Physical activity/Exercise:   No change    SMBG: using the dexcom G7     Lifestyle Coping Support & Clinical    Lifestyle/Coping/Support  Compared to other people your age, how would you rate your health?: Good  Psychosocial/Coping Skills Assessment Completed: : No  Assessment indicates:: Adequate understanding  Deffered due to:: Other (comment)  Area of need?: No    Problem Review  Active Comorbidities: Cardiovascular Disease, Hypertension, Neuropathy    Diabetes Self-Management Skills Assessment    Medication Skills Assessment  Patient is able to identify current diabetes medications, dosages, and appropriate timing of medications.: yes  Patient reports problems or concerns with current medication regimen.: no  Medication Skills Assessment Completed:: No  Assessment indicates:: Adequate understanding  Deffered due to:: Other (comment) (previously completed, there has been no change and patient has adequate understanding)  Area of need?: No    Diabetes Disease Process/Treatment  Options  Diabetes Type?: Type II  Diabetes Disease Process/Treatment Options: Skills Assessment Completed: No  Assessment indicates:: Adequate understanding  Deferred due to:: Other (comment)  Area of need?: No    Nutrition/Healthy Eating  Meal Plan 24 Hour Recall - Breakfast: Toast and Eggs, coffee with sugar and cream  Meal Plan 24 Hour Recall - Lunch: chicken noodle soup with crackers or a sandwich  Meal Plan 24 Hour Recall - Dinner: yesterday some spaghetti and meatsauce, some times roast and potatoes, some times a burger and some fries, sometimes gets pizza or something home cooked  Meal Plan 24 Hour Recall - Snack: peanut butter crackers, almonds, cheerios  Patient can identify foods that impact blood sugar.: yes  Challenges to healthy eating:: portion control, snacking between meals and at night, eating when feeling depressed/stressed, lack of will power, eating out, going to parties  Nutrition/Healthy Eating Skills Assessment Completed:: No  Assessment indicates:: Adequate understanding  Deffered due to:: Other (comment)  Area of need?: No    Physical Activity/Exercise  Patient's daily activity level:: lightly active  Patient formally exercises outside of work.: no  Reasons for not exercising:: time constraints  Patient can identify forms of physical activity.: yes  Physical Activity/Exercise Skills Assessment Completed: : No  Assessment indicates:: Adequate understanding  Deffered due to:: Other (comment)  Area of need?: No    Home Blood Glucose Monitoring  Patient states that blood sugar is checked at home daily.: yes  Monitoring Method:: personal continuous glucose monitor  Personal CGM type:: Dexcom G7   What is your current Time in Range?: 68%  What is your A1c Target?: 7.0 with no hypoglycemia  Home Blood Glucose Monitoring Skills Assessment Completed: : Yes  Assessment indicates:: Instruction Needed  Area of need?: Yes    Acute Complications  Acute Complications Skills Assessment Completed: :  No  Assessment indicates:: Adequate understanding  Deffered due to:: Other (comment)  Area of need?: No    Chronic Complications  Chronic Complications Skills Assessment Completed: : No  Assessment indicates:: Adequate understanding  Deferred due to:: Other (comment)  Area of need?: No      During today's follow-up visit,  the following areas required further assessment and content was provided/reviewed.    Based on today's diabetes care assessment, the following areas of need were identified:          7/26/2024    12:01 AM   Areas of Need   Medications/Current Diabetes Treatment No   Lifestyle Coping Support No   Diabetes Disease Process/Treatment Options No   Nutrition/Healthy Eating No   Physical Activity/Exercise No   Home Blood Glucose Monitoring Yes, changed out the dexcom G7 sensor and started a new one   Acute Complications No   Chronic Complications No        Today's interventions were provided through individual discussion, instruction, and written materials were provided.    Patient verbalized understanding of instruction and written materials.  Pt was able to return back demonstration of instructions today. Patient understood key points, needs reinforcement and further instruction.     Diabetes Self-Management Care Plan Review and Evaluation of Progress:    During today's follow-up Aishwarya's Diabetes Self-Management Care Plan progress was reviewed and progress was evaluated including his/her input. Aishwarya has agreed to continue his/her journey to improve/maintain overall diabetes control by continuing to set health goals. See care plan progress below.      Care Plan: Diabetes Management   Updates made since 7/30/2024 12:00 AM        Problem: Blood Glucose Self-Monitoring         Goal: Patient agrees to check and record blood sugars 3 times per day using the dexcom G6/G7 for the next 3 months    Start Date: 12/27/2023   Expected End Date: 3/27/2024   This Visit's Progress: On track   Recent Progress:  "On track   Priority: High   Barriers: Knowledge deficit   Note:    Patient was instructed how to use a meter. Discussed signs and symptoms, and causes and treatment of hypoglycemia. Covered blood sugar and A1C goals. Instructed patient to use provided logbooks to record blood sugars. Instructed patient to follow-up with their physician. Patient verbalized understanding of all instructions.  Prescription for meter and testing supplies pended to PCP    1/6/2023  Dexcom G6 Education  Patient is here in clinic today for initial start of Dexcom continuous glucose monitoring system (CGMA). Reviewed with patient how to insert sensor and transmitter. Patient inserted sensor on right abdomen and inserted transmitter. Time and date was set on monitor and settings were set. Hypoglycemia trigger set for 70 and hyperglycemia set for 300. Patient provided with phone number for 24-hour help line if needed. Discussed various types of possible alarms and what to do. Questions addressed. Initialization finished. No futher questions.  Patient referred to clinic today for Dexcom G6 continuous glucose sensor system training.  Education was provided using "Quick Start Guide" per Dexcom protocol.    Overview:  5min glucose reading updates, trending arrows, BG graph screens, battery life indicator, Blue Tooth Symbol.  Menus: trend Graph, start sensor, enter BG, events, Alerts, Settings, Shutdown, Stop Sensor   Settings:                          * Urgent Low: 55 mg/dL                          * Urgent Low Soon: Off                          * Low alert: 70                          * High alert: 300                          * Rise rate: Off                          * Fall Rate: Off                          * Signal Loss: 30 min                          * No Reading: Off                          * Always sound: On                             Reviewed additional setting options with patient, including Graph Height and Transmitter ID. " Transmitter was paired with .    Reviewed where to find sensor insertion time and sensor expiration date.   Discussed no need to calibrate sensor during the entirety of the 10 day wear. Discussed that pt can calibrate sensor if desired, but at that time she will need to continue calibrating every 12 hours for sensor to remain accurate. Reviewed appropriate calibration techniques.  Reviewed sensor site selection. Site selected and prepped using aseptic technique Inserted to left abdomen. Transmitter placed in pod and secured.  Practiced sensor pod/transmitter removal from site, and removal of transmitter from sensor pod.  Patient able to demonstrate without difficulty.  Encouraged to review manual prior to starting another sensor.   Reviewed problem solving aspects of sensor transmission/variables that can disrupt RF transmission.  range 20 feet, but the first 3 hrs keep within 3 feet of transmitter.  Pt instructed on lag time of interstitial fluid from CBG and was advised to tx hypoglycemia and dose insulin based on SMBG values.  Link to video provided and written instructions provided for patient to review before 10 day sensor change  Dexcom technical support contact number given and examples of when to contact them discussed.      1/17 Changing the Sensor (every 10 days)  Remove the whole thing from your body  Hold the sensor by the skinny part and break the clear piece  Remove the gray transmitter  In the reader touch start sensor  Touch yes you want to start sensor  Touch the square you want to put the number in for   There will be a 4-digit code on the sensor (like 5937)  Enter the code from the sensor  Touch ok  Touch yes, it is correct  Insert Sensor  Remove orange piece and paper tabs  Place on skin and hold in place  Push the orange button  Insert the gray transmitter into the sensor  Put the small tongue like side in first and push down  You should hear 2 clicks  Touch confirm once sensor  None is secure on body  Touch start Sensor  Touch 1 then 2  Sensor will take 2 hours to warm up    Changing Transmitter (every 90 days) in the reader  Touch the =   Touch transmitter  Touch pair new  Go through the process to start sensor  Touch yes you want to start sensor  Touch the square you want to put the number in for   There will be a 4-digit code on the sensor (like 5937)  Enter the code from the sensor  Touch ok  Touch yes, it is correct  Touch the square you want to put the number in for   There will be a 6-digit code on the transmitter box (like [id number])  Enter the code from the transmitter box  Touch ok  Touch yes, it is correct  Insert Sensor  Remove orange piece and paper tabs  Place on skin and hold in place  Push the orange button  Insert the gray transmitter into the sensor  Put the small tongue like side in first and push down  You should hear 2 clicks  Touch confirm once sensor is secure on body  Touch start Sensor  Touch 1 then 2  Sensor will take 2 hours to warm up           Task: Reviewed Dexcom download with patient and provider, adjustments made to treatment plan by provider and communicated to patient as directed, patient states understanding. Completed 12/16/2024        Task: Reviewed Dexcom download with patient and provider, adjustments made to treatment plan by provider and communicated to patient as directed, patient states understanding. Completed 12/20/2024        Task: Reviewed Dexcom download with patient and provider, adjustments made to treatment plan by provider and communicated to patient as directed, patient states understanding. Completed 1/8/2025        Task: Reviewed Dexcom download with patient and provider, adjustments made to treatment plan by provider and communicated to patient as directed, patient states understanding. Completed 5/21/2025          Follow Up Plan     Follow up in about 10 days (around 8/7/2025) for Change Personal CGM and Upload Data.    Today's care plan  and follow up schedule was discussed with patient.  Aishwarya verbalized understanding of the care plan, goals, and agrees to follow up plan.        The patient was encouraged to communicate with his/her health care provider/physician and care team regarding his/her condition(s) and treatment.  I provided the patient with my contact information today and encouraged to contact me via phone or [facility] Patient Portal as needed.     Length of Visit   Total Time: 10 Minutes

## 2025-07-31 DIAGNOSIS — I10 ESSENTIAL HYPERTENSION, BENIGN: ICD-10-CM

## 2025-07-31 RX ORDER — AMLODIPINE BESYLATE 5 MG/1
5 TABLET ORAL
Qty: 90 TABLET | Refills: 1 | Status: SHIPPED | OUTPATIENT
Start: 2025-07-31

## 2025-08-07 ENCOUNTER — CLINICAL SUPPORT (OUTPATIENT)
Dept: DIABETES | Facility: CLINIC | Age: 65
End: 2025-08-07
Payer: MEDICARE

## 2025-08-07 DIAGNOSIS — Z79.4 TYPE 2 DIABETES MELLITUS WITH HYPERGLYCEMIA, WITH LONG-TERM CURRENT USE OF INSULIN: Primary | ICD-10-CM

## 2025-08-07 DIAGNOSIS — E11.65 TYPE 2 DIABETES MELLITUS WITH HYPERGLYCEMIA, WITH LONG-TERM CURRENT USE OF INSULIN: Primary | ICD-10-CM

## 2025-08-07 NOTE — PROGRESS NOTES
Diabetes Care Specialist Follow-up Note  Author: Marla Stein RD, [facility]  Date: 8/7/2025    Intake    Program Intake  Reason for Diabetes Program Visit:: Intervention  Type of Intervention:: Individual  Individual: Device Training  Device Training: Personal CGM  Current diabetes risk level:: high  In the last month, have you used the ER or been admitted to the hospital: No  Permission to speak with others about care:: no    Current Diabetes Treatment: Insulin, DM Injectables, Oral Medications  Oral Medication Type/Dose: farxiga 10 mg once a day (taking)  DM Injectables Type/Dose: mounjaro 2.5mg, will increase after the 4th dose to 5mg, taking once a week  Method of insulin delivery?: Insulin Device  Insulin Device Type/Dose: humalog via VGo30 (fiasp is ordered, unsure which one she is taking) performing 5-6 clicks with meals    Continuous Glucose Monitoring  Patient has CGM: Yes  Personal CGM type:: Dexcom G7  GMI Date: 05/21/25  GMI Value: 7.5 %    Lab Results   Component Value Date    HGBA1C 6.4 (H) 07/18/2025     A1c Pre Diabetes Care Specialist Intervention:  8.7%    Physical activity/Exercise:   No change    SMBG: using the dexcom G7     Lifestyle Coping Support & Clinical    Lifestyle/Coping/Support  Compared to other people your age, how would you rate your health?: Good  Psychosocial/Coping Skills Assessment Completed: : No  Assessment indicates:: Adequate understanding  Deffered due to:: Other (comment)  Area of need?: No    Problem Review  Active Comorbidities: Cardiovascular Disease, Hypertension, Neuropathy    Diabetes Self-Management Skills Assessment    Medication Skills Assessment  Patient is able to identify current diabetes medications, dosages, and appropriate timing of medications.: yes  Patient reports problems or concerns with current medication regimen.: no  Patient is  aware that some diabetes medications can cause low blood sugar?: Yes  Medication Skills Assessment Completed:: No  Assessment  indicates:: Adequate understanding  Deffered due to:: Other (comment) (previously completed, there has been no change and patient has adequate understanding)  Area of need?: No    Diabetes Disease Process/Treatment Options  Diabetes Type?: Type II  Diabetes Disease Process/Treatment Options: Skills Assessment Completed: No  Assessment indicates:: Adequate understanding  Deferred due to:: Other (comment)  Area of need?: No    Nutrition/Healthy Eating  Meal Plan 24 Hour Recall - Breakfast: Toast and Eggs, coffee with sugar and cream  Meal Plan 24 Hour Recall - Lunch: chicken noodle soup with crackers or a sandwich  Meal Plan 24 Hour Recall - Dinner: yesterday some spaghetti and meatsauce, some times roast and potatoes, some times a burger and some fries, sometimes gets pizza or something home cooked  Meal Plan 24 Hour Recall - Snack: peanut butter crackers, almonds, cheerios  Meal Plan 24 Hour Recall - Beverage: orange juice sometimes, drinks water and Ice sparking water  Who shops/cooks?: self  Patient can identify foods that impact blood sugar.: yes  Challenges to healthy eating:: portion control, snacking between meals and at night, eating when feeling depressed/stressed, lack of will power, eating out, going to parties  Nutrition/Healthy Eating Skills Assessment Completed:: No  Assessment indicates:: Adequate understanding  Deffered due to:: Other (comment)  Area of need?: No    Physical Activity/Exercise  Patient's daily activity level:: lightly active  Patient formally exercises outside of work.: no  Reasons for not exercising:: time constraints  Patient can identify forms of physical activity.: yes  Physical Activity/Exercise Skills Assessment Completed: : No  Assessment indicates:: Adequate understanding  Deffered due to:: Other (comment)  Area of need?: No    Home Blood Glucose Monitoring  Patient states that blood sugar is checked at home daily.: yes  Monitoring Method:: personal continuous glucose  monitor  Personal CGM type:: Dexcom G7   What is your current Time in Range?: 68%  What is your A1c Target?: 7.0 with no hypoglycemia  Home Blood Glucose Monitoring Skills Assessment Completed: : Yes  Assessment indicates:: Instruction Needed  Area of need?: Yes    Acute Complications  Acute Complications Skills Assessment Completed: : No  Assessment indicates:: Adequate understanding  Deffered due to:: Other (comment)  Area of need?: No    Chronic Complications  Chronic Complications Skills Assessment Completed: : No  Assessment indicates:: Adequate understanding  Deferred due to:: Other (comment)  Area of need?: No      During today's follow-up visit,  the following areas required further assessment and content was provided/reviewed.    Based on today's diabetes care assessment, the following areas of need were identified:          7/26/2024    12:01 AM   Areas of Need   Medications/Current Diabetes Treatment No   Lifestyle Coping Support No   Diabetes Disease Process/Treatment Options No   Nutrition/Healthy Eating No   Physical Activity/Exercise No   Home Blood Glucose Monitoring Yes, changed out the dexcom G7 sensor and started a new one   Acute Complications No   Chronic Complications No        Today's interventions were provided through individual discussion, instruction, and written materials were provided.    Patient verbalized understanding of instruction and written materials.  Pt was able to return back demonstration of instructions today. Patient understood key points, needs reinforcement and further instruction.     Diabetes Self-Management Care Plan Review and Evaluation of Progress:    During today's follow-up [place] Diabetes Self-Management Care Plan progress was reviewed and progress was evaluated including his/her input. Aishwarya has agreed to continue his/her journey to improve/maintain overall diabetes control by continuing to set health goals. See care plan progress below.      Care Plan:  "Diabetes Management   Updates made since 8/7/2024 12:00 AM        Problem: Blood Glucose Self-Monitoring         Goal: Patient agrees to check and record blood sugars 3 times per day using the dexcom G6/G7 for the next 3 months    Start Date: 12/27/2023   Expected End Date: 3/27/2024   This Visit's Progress: On track   Recent Progress: On track   Priority: High   Barriers: Knowledge deficit   Note:    Patient was instructed how to use a meter. Discussed signs and symptoms, and causes and treatment of hypoglycemia. Covered blood sugar and A1C goals. Instructed patient to use provided logbooks to record blood sugars. Instructed patient to follow-up with their physician. Patient verbalized understanding of all instructions.  Prescription for meter and testing supplies pended to PCP    1/6/2023  Dexcom G6 Education  Patient is here in clinic today for initial start of Dexcom continuous glucose monitoring system (CGMA). Reviewed with patient how to insert sensor and transmitter. Patient inserted sensor on right abdomen and inserted transmitter. Time and date was set on monitor and settings were set. Hypoglycemia trigger set for 70 and hyperglycemia set for 300. Patient provided with phone number for 24-hour help line if needed. Discussed various types of possible alarms and what to do. Questions addressed. Initialization finished. No futher questions.  Patient referred to clinic today for Dexcom G6 continuous glucose sensor system training.  Education was provided using "Quick Start Guide" per Dexcom protocol.    Overview:  5min glucose reading updates, trending arrows, BG graph screens, battery life indicator, Blue Tooth Symbol.  Menus: trend Graph, start sensor, enter BG, events, Alerts, Settings, Shutdown, Stop Sensor   Settings:                          * Urgent Low: 55 mg/dL                          * Urgent Low Soon: Off                          * Low alert: 70                          * High alert: 300     "                      * Rise rate: Off                          * Fall Rate: Off                          * Signal Loss: 30 min                          * No Reading: Off                          * Always sound: On                             Reviewed additional setting options with patient, including Graph Height and Transmitter ID. Transmitter was paired with .    Reviewed where to find sensor insertion time and sensor expiration date.   Discussed no need to calibrate sensor during the entirety of the 10 day wear. Discussed that pt can calibrate sensor if desired, but at that time she will need to continue calibrating every 12 hours for sensor to remain accurate. Reviewed appropriate calibration techniques.  Reviewed sensor site selection. Site selected and prepped using aseptic technique Inserted to left abdomen. Transmitter placed in pod and secured.  Practiced sensor pod/transmitter removal from site, and removal of transmitter from sensor pod.  Patient able to demonstrate without difficulty.  Encouraged to review manual prior to starting another sensor.   Reviewed problem solving aspects of sensor transmission/variables that can disrupt RF transmission.  range 20 feet, but the first 3 hrs keep within 3 feet of transmitter.  Pt instructed on lag time of interstitial fluid from CBG and was advised to tx hypoglycemia and dose insulin based on SMBG values.  Link to video provided and written instructions provided for patient to review before 10 day sensor change  Dexcom technical support contact number given and examples of when to contact them discussed.      1/17 Changing the Sensor (every 10 days)  Remove the whole thing from your body  Hold the sensor by the skinny part and break the clear piece  Remove the gray transmitter  In the reader touch start sensor  Touch yes you want to start sensor  Touch the square you want to put the number in for   There will be a 4-digit code on the sensor (like  5937)  Enter the code from the sensor  Touch ok  Touch yes, it is correct  Insert Sensor  Remove orange piece and paper tabs  Place on skin and hold in place  Push the orange button  Insert the gray transmitter into the sensor  Put the small tongue like side in first and push down  You should hear 2 clicks  Touch confirm once sensor is secure on body  Touch start Sensor  Touch 1 then 2  Sensor will take 2 hours to warm up    Changing Transmitter (every 90 days) in the reader  Touch the =   Touch transmitter  Touch pair new  Go through the process to start sensor  Touch yes you want to start sensor  Touch the square you want to put the number in for   There will be a 4-digit code on the sensor (like 5937)  Enter the code from the sensor  Touch ok  Touch yes, it is correct  Touch the square you want to put the number in for   There will be a 6-digit code on the transmitter box (like [id number])  Enter the code from the transmitter box  Touch ok  Touch yes, it is correct  Insert Sensor  Remove orange piece and paper tabs  Place on skin and hold in place  Push the orange button  Insert the gray transmitter into the sensor  Put the small tongue like side in first and push down  You should hear 2 clicks  Touch confirm once sensor is secure on body  Touch start Sensor  Touch 1 then 2  Sensor will take 2 hours to warm up           Task: Reviewed Dexcom download with patient and provider, adjustments made to treatment plan by provider and communicated to patient as directed, patient states understanding. Completed 12/16/2024        Task: Reviewed Dexcom download with patient and provider, adjustments made to treatment plan by provider and communicated to patient as directed, patient states understanding. Completed 12/20/2024        Task: Reviewed Dexcom download with patient and provider, adjustments made to treatment plan by provider and communicated to patient as directed, patient states understanding. Completed 1/8/2025         Task: Reviewed Dexcom download with patient and provider, adjustments made to treatment plan by provider and communicated to patient as directed, patient states understanding. Completed 5/21/2025          Follow Up Plan     Follow up in about 10 days (around 8/17/2025) for Change Personal CGM and Upload Data.    Today's care plan and follow up schedule was discussed with patient.  Aishwarya verbalized understanding of the care plan, goals, and agrees to follow up plan.        The patient was encouraged to communicate with his/her health care provider/physician and care team regarding his/her condition(s) and treatment.  I provided the patient with my contact information today and encouraged to contact me via phone or [facility] Patient Portal as needed.     Length of Visit   Total Time: 15 Minutes

## 2025-08-13 ENCOUNTER — RESULTS FOLLOW-UP (OUTPATIENT)
Dept: DIABETES | Facility: CLINIC | Age: 65
End: 2025-08-13
Payer: MEDICARE

## 2025-08-13 ENCOUNTER — E-CONSULT (OUTPATIENT)
Dept: HEMATOLOGY/ONCOLOGY | Facility: CLINIC | Age: 65
End: 2025-08-13
Payer: MEDICARE

## 2025-08-13 ENCOUNTER — OFFICE VISIT (OUTPATIENT)
Dept: PRIMARY CARE CLINIC | Facility: CLINIC | Age: 65
End: 2025-08-13
Payer: MEDICARE

## 2025-08-13 VITALS
WEIGHT: 236.25 LBS | BODY MASS INDEX: 37.08 KG/M2 | HEART RATE: 73 BPM | RESPIRATION RATE: 18 BRPM | SYSTOLIC BLOOD PRESSURE: 152 MMHG | HEIGHT: 67 IN | OXYGEN SATURATION: 100 % | DIASTOLIC BLOOD PRESSURE: 64 MMHG | TEMPERATURE: 98 F

## 2025-08-13 DIAGNOSIS — D50.9 MICROCYTIC ANEMIA: Primary | ICD-10-CM

## 2025-08-13 DIAGNOSIS — D50.9 MICROCYTIC ANEMIA: ICD-10-CM

## 2025-08-13 DIAGNOSIS — I10 ESSENTIAL HYPERTENSION, BENIGN: ICD-10-CM

## 2025-08-13 DIAGNOSIS — N76.0 ACUTE VAGINITIS: ICD-10-CM

## 2025-08-13 DIAGNOSIS — R00.2 PALPITATIONS: Primary | ICD-10-CM

## 2025-08-13 DIAGNOSIS — R74.8 ABNORMAL LEVELS OF OTHER SERUM ENZYMES: ICD-10-CM

## 2025-08-13 LAB
OHS QRS DURATION: 86 MS
OHS QTC CALCULATION: 441 MS

## 2025-08-13 PROCEDURE — 3288F FALL RISK ASSESSMENT DOCD: CPT | Mod: CPTII,S$GLB,, | Performed by: FAMILY MEDICINE

## 2025-08-13 PROCEDURE — 3044F HG A1C LEVEL LT 7.0%: CPT | Mod: CPTII,S$GLB,, | Performed by: FAMILY MEDICINE

## 2025-08-13 PROCEDURE — 1160F RVW MEDS BY RX/DR IN RCRD: CPT | Mod: CPTII,S$GLB,, | Performed by: FAMILY MEDICINE

## 2025-08-13 PROCEDURE — 1126F AMNT PAIN NOTED NONE PRSNT: CPT | Mod: CPTII,S$GLB,, | Performed by: FAMILY MEDICINE

## 2025-08-13 PROCEDURE — 93010 ELECTROCARDIOGRAM REPORT: CPT | Mod: S$GLB,,, | Performed by: INTERNAL MEDICINE

## 2025-08-13 PROCEDURE — 99999 PR PBB SHADOW E&M-EST. PATIENT-LVL V: CPT | Mod: PBBFAC,,, | Performed by: FAMILY MEDICINE

## 2025-08-13 PROCEDURE — 93005 ELECTROCARDIOGRAM TRACING: CPT | Mod: S$GLB,,, | Performed by: FAMILY MEDICINE

## 2025-08-13 PROCEDURE — 1159F MED LIST DOCD IN RCRD: CPT | Mod: CPTII,S$GLB,, | Performed by: FAMILY MEDICINE

## 2025-08-13 PROCEDURE — 99214 OFFICE O/P EST MOD 30 MIN: CPT | Mod: S$GLB,,, | Performed by: FAMILY MEDICINE

## 2025-08-13 PROCEDURE — 3008F BODY MASS INDEX DOCD: CPT | Mod: CPTII,S$GLB,, | Performed by: FAMILY MEDICINE

## 2025-08-13 PROCEDURE — 3066F NEPHROPATHY DOC TX: CPT | Mod: CPTII,S$GLB,, | Performed by: FAMILY MEDICINE

## 2025-08-13 PROCEDURE — 3062F POS MACROALBUMINURIA REV: CPT | Mod: CPTII,S$GLB,, | Performed by: FAMILY MEDICINE

## 2025-08-13 PROCEDURE — 1101F PT FALLS ASSESS-DOCD LE1/YR: CPT | Mod: CPTII,S$GLB,, | Performed by: FAMILY MEDICINE

## 2025-08-13 PROCEDURE — 3077F SYST BP >= 140 MM HG: CPT | Mod: CPTII,S$GLB,, | Performed by: FAMILY MEDICINE

## 2025-08-13 PROCEDURE — 4010F ACE/ARB THERAPY RXD/TAKEN: CPT | Mod: CPTII,S$GLB,, | Performed by: FAMILY MEDICINE

## 2025-08-13 PROCEDURE — 3078F DIAST BP <80 MM HG: CPT | Mod: CPTII,S$GLB,, | Performed by: FAMILY MEDICINE

## 2025-08-13 RX ORDER — AMLODIPINE BESYLATE 10 MG/1
10 TABLET ORAL DAILY
Qty: 90 TABLET | Refills: 3 | Status: SHIPPED | OUTPATIENT
Start: 2025-08-13

## 2025-08-13 RX ORDER — FLUCONAZOLE 150 MG/1
150 TABLET ORAL ONCE
Qty: 1 TABLET | Refills: 0 | Status: SHIPPED | OUTPATIENT
Start: 2025-08-13 | End: 2025-08-13

## 2025-08-21 ENCOUNTER — CLINICAL SUPPORT (OUTPATIENT)
Dept: DIABETES | Facility: CLINIC | Age: 65
End: 2025-08-21
Payer: MEDICARE

## 2025-08-21 DIAGNOSIS — E11.65 TYPE 2 DIABETES MELLITUS WITH HYPERGLYCEMIA, WITH LONG-TERM CURRENT USE OF INSULIN: Primary | ICD-10-CM

## 2025-08-21 DIAGNOSIS — Z79.4 TYPE 2 DIABETES MELLITUS WITH HYPERGLYCEMIA, WITH LONG-TERM CURRENT USE OF INSULIN: Primary | ICD-10-CM

## 2025-08-25 ENCOUNTER — TELEPHONE (OUTPATIENT)
Dept: DIABETES | Facility: CLINIC | Age: 65
End: 2025-08-25
Payer: MEDICARE

## 2025-08-27 ENCOUNTER — TELEPHONE (OUTPATIENT)
Dept: PRIMARY CARE CLINIC | Facility: CLINIC | Age: 65
End: 2025-08-27
Payer: MEDICARE

## 2025-08-27 VITALS — SYSTOLIC BLOOD PRESSURE: 143 MMHG | DIASTOLIC BLOOD PRESSURE: 75 MMHG

## 2025-09-02 ENCOUNTER — CLINICAL SUPPORT (OUTPATIENT)
Dept: DIABETES | Facility: CLINIC | Age: 65
End: 2025-09-02
Payer: MEDICARE

## 2025-09-02 DIAGNOSIS — E11.65 TYPE 2 DIABETES MELLITUS WITH HYPERGLYCEMIA, WITH LONG-TERM CURRENT USE OF INSULIN: Primary | ICD-10-CM

## 2025-09-02 DIAGNOSIS — Z79.4 TYPE 2 DIABETES MELLITUS WITH HYPERGLYCEMIA, WITH LONG-TERM CURRENT USE OF INSULIN: Primary | ICD-10-CM

## (undated) DEVICE — CUTTER PROXIMATE BLUE 75MM

## (undated) DEVICE — SEALER LIGASURE IMPACT 18CM

## (undated) DEVICE — SUT 1 48IN PDS II VIO MONO

## (undated) DEVICE — TRAY FOLEY 16FR INFECTION CONT

## (undated) DEVICE — SUT SILK 0 STRANDS 30IN BLK

## (undated) DEVICE — SUT VICRYL ANTIMICRO VIL BR

## (undated) DEVICE — APPLICATOR CHLORAPREP ORN 26ML

## (undated) DEVICE — MANIFOLD 4 PORT

## (undated) DEVICE — SUT SILK 2-0 STRANDS 30IN

## (undated) DEVICE — SPONGE LAP 18X18 PREWASHED

## (undated) DEVICE — SUT CTD VICRYL 3-0 CR/SH

## (undated) DEVICE — STAPLER SKIN ROTATING HEAD

## (undated) DEVICE — GLOVE SURGICAL LATEX SZ 7

## (undated) DEVICE — GOWN POLY REINF BRTH SLV XL

## (undated) DEVICE — COVER OVERHEAD SURG LT BLUE

## (undated) DEVICE — DRAPE LAP T SHT W/ INSTR PAD

## (undated) DEVICE — DRAPE FLUID WARMER ORS 44X44IN

## (undated) DEVICE — RELOAD PROXIMATE CUT BLUE 75MM

## (undated) DEVICE — Device

## (undated) DEVICE — GOWN POLY REINF BRTH SLV LG

## (undated) DEVICE — ELECTRODE REM PLYHSV RETURN 9

## (undated) DEVICE — SUT 2/0 30IN SILK BLK BRAI

## (undated) DEVICE — GAUZE SPONGE 4X4 12PLY

## (undated) DEVICE — PACK SURGERY START